# Patient Record
Sex: FEMALE | Race: WHITE | NOT HISPANIC OR LATINO
[De-identification: names, ages, dates, MRNs, and addresses within clinical notes are randomized per-mention and may not be internally consistent; named-entity substitution may affect disease eponyms.]

---

## 2017-01-03 ENCOUNTER — APPOINTMENT (OUTPATIENT)
Dept: INTERNAL MEDICINE | Facility: CLINIC | Age: 62
End: 2017-01-03

## 2017-01-04 ENCOUNTER — APPOINTMENT (OUTPATIENT)
Dept: INTERNAL MEDICINE | Facility: CLINIC | Age: 62
End: 2017-01-04

## 2017-01-30 ENCOUNTER — APPOINTMENT (OUTPATIENT)
Dept: INTERNAL MEDICINE | Facility: CLINIC | Age: 62
End: 2017-01-30

## 2017-01-30 VITALS
WEIGHT: 151 LBS | HEIGHT: 64 IN | BODY MASS INDEX: 25.78 KG/M2 | DIASTOLIC BLOOD PRESSURE: 90 MMHG | SYSTOLIC BLOOD PRESSURE: 165 MMHG | HEART RATE: 82 BPM

## 2017-02-03 ENCOUNTER — APPOINTMENT (OUTPATIENT)
Dept: PULMONOLOGY | Facility: CLINIC | Age: 62
End: 2017-02-03

## 2017-02-03 VITALS
DIASTOLIC BLOOD PRESSURE: 92 MMHG | HEART RATE: 93 BPM | WEIGHT: 148 LBS | BODY MASS INDEX: 25.4 KG/M2 | SYSTOLIC BLOOD PRESSURE: 144 MMHG

## 2017-02-23 ENCOUNTER — FORM ENCOUNTER (OUTPATIENT)
Age: 62
End: 2017-02-23

## 2017-02-25 LAB
ALBUMIN SERPL-MCNC: 4.2 G/DL
ALP SERPL-CCNC: 59 IU/L
ALT SERPL-CCNC: 15 IU/L
ANION GAP SERPL CALC-SCNC: 16 MEQ/L
AST SERPL-CCNC: 17 IU/L
BASOPHILS # BLD: 0.04 TH/MM3
BASOPHILS NFR BLD: 0.5 %
BILIRUB DIRECT SERPL-MCNC: 0.12 MG/DL
BILIRUB SERPL-MCNC: 0.6 MG/DL
BUN SERPL-MCNC: 10 MG/DL
BUN/CREAT SERPL: 11.4 %
CALCIUM SERPL-MCNC: 9.3 MG/DL
CHLORIDE SERPL-SCNC: 105 MEQ/L
CO2 SERPL-SCNC: 22 MEQ/L
CREAT SERPL-MCNC: 0.88 MG/DL
EOSINOPHIL # BLD: 0.06 TH/MM3
EOSINOPHIL NFR BLD: 0.8 %
ERYTHROCYTE [DISTWIDTH] IN BLOOD BY AUTOMATED COUNT: 14.2 %
ERYTHROCYTE [SEDIMENTATION RATE] IN BLOOD: 3 MM/HR
GFR SERPL CREATININE-BSD FRML MDRD: 65
GLUCOSE SERPL-MCNC: 89 MG/DL
GRANULOCYTES # BLD: 5.18 TH/MM3
GRANULOCYTES NFR BLD: 67 %
HCT VFR BLD AUTO: 45.9 %
HGB BLD-MCNC: 14.8 G/DL
IMM GRANULOCYTES # BLD: 0.01 TH/MM3
IMM GRANULOCYTES NFR BLD: 0.1 %
LYMPHOCYTES # BLD: 1.89 TH/MM3
LYMPHOCYTES NFR BLD: 24.4 %
MCH RBC QN AUTO: 30.5 PG
MCHC RBC AUTO-ENTMCNC: 32.2 G/DL
MCV RBC AUTO: 94.6 FL
MONOCYTES # BLD: 0.56 TH/MM3
MONOCYTES NFR BLD: 7.2 %
PLATELET # BLD: 279 TH/MM3
PMV BLD AUTO: 10 FL
POTASSIUM SERPL-SCNC: 4.5 MMOL/L
PROT SERPL-MCNC: 5.9 G/DL
RBC # BLD AUTO: 4.85 MIL/MM3
SODIUM SERPL-SCNC: 143 MEQ/L
WBC # BLD: 7.74 TH/MM3

## 2017-02-27 ENCOUNTER — APPOINTMENT (OUTPATIENT)
Dept: INTERNAL MEDICINE | Facility: CLINIC | Age: 62
End: 2017-02-27

## 2017-02-27 VITALS
WEIGHT: 149 LBS | BODY MASS INDEX: 25.44 KG/M2 | DIASTOLIC BLOOD PRESSURE: 87 MMHG | HEIGHT: 64 IN | SYSTOLIC BLOOD PRESSURE: 135 MMHG | HEART RATE: 80 BPM

## 2017-02-28 LAB — CRP SERPL-MCNC: 0.27 MG/DL

## 2017-03-02 ENCOUNTER — APPOINTMENT (OUTPATIENT)
Dept: RHEUMATOLOGY | Facility: CLINIC | Age: 62
End: 2017-03-02

## 2017-03-02 VITALS
HEIGHT: 64 IN | SYSTOLIC BLOOD PRESSURE: 126 MMHG | BODY MASS INDEX: 25.95 KG/M2 | DIASTOLIC BLOOD PRESSURE: 84 MMHG | WEIGHT: 152 LBS | HEART RATE: 65 BPM

## 2017-04-21 ENCOUNTER — OUTPATIENT (OUTPATIENT)
Dept: OUTPATIENT SERVICES | Facility: HOSPITAL | Age: 62
LOS: 1 days | Discharge: HOME | End: 2017-04-21

## 2017-04-21 ENCOUNTER — APPOINTMENT (OUTPATIENT)
Dept: INTERNAL MEDICINE | Facility: CLINIC | Age: 62
End: 2017-04-21

## 2017-04-21 VITALS
BODY MASS INDEX: 25.44 KG/M2 | DIASTOLIC BLOOD PRESSURE: 91 MMHG | HEIGHT: 64 IN | HEART RATE: 113 BPM | WEIGHT: 149 LBS | SYSTOLIC BLOOD PRESSURE: 139 MMHG

## 2017-04-28 ENCOUNTER — APPOINTMENT (OUTPATIENT)
Dept: PULMONOLOGY | Facility: CLINIC | Age: 62
End: 2017-04-28

## 2017-04-28 VITALS
BODY MASS INDEX: 25.95 KG/M2 | HEART RATE: 92 BPM | HEIGHT: 64 IN | WEIGHT: 152 LBS | DIASTOLIC BLOOD PRESSURE: 89 MMHG | SYSTOLIC BLOOD PRESSURE: 138 MMHG

## 2017-05-05 ENCOUNTER — APPOINTMENT (OUTPATIENT)
Dept: HEMATOLOGY ONCOLOGY | Facility: CLINIC | Age: 62
End: 2017-05-05

## 2017-05-05 VITALS
DIASTOLIC BLOOD PRESSURE: 100 MMHG | HEIGHT: 64 IN | RESPIRATION RATE: 12 BRPM | TEMPERATURE: 97.2 F | SYSTOLIC BLOOD PRESSURE: 134 MMHG | WEIGHT: 155 LBS | HEART RATE: 93 BPM | BODY MASS INDEX: 26.46 KG/M2

## 2017-05-23 ENCOUNTER — RX RENEWAL (OUTPATIENT)
Age: 62
End: 2017-05-23

## 2017-06-22 ENCOUNTER — APPOINTMENT (OUTPATIENT)
Dept: RHEUMATOLOGY | Facility: CLINIC | Age: 62
End: 2017-06-22

## 2017-06-22 ENCOUNTER — OUTPATIENT (OUTPATIENT)
Dept: OUTPATIENT SERVICES | Facility: HOSPITAL | Age: 62
LOS: 1 days | Discharge: HOME | End: 2017-06-22

## 2017-06-22 VITALS
HEIGHT: 64 IN | DIASTOLIC BLOOD PRESSURE: 76 MMHG | HEART RATE: 93 BPM | WEIGHT: 150 LBS | SYSTOLIC BLOOD PRESSURE: 105 MMHG | BODY MASS INDEX: 25.61 KG/M2

## 2017-06-22 DIAGNOSIS — J93.83 OTHER PNEUMOTHORAX: ICD-10-CM

## 2017-06-27 LAB
CHOLEST SERPL-MCNC: 145 MG/DL
ESTIMATED AVERGAGE GLUCOSE (NORTH): 126 MG/DL
HBA1C MFR BLD: 6 %
HDLC SERPL-MCNC: 55 MG/DL
HDLC SERPL: 2.64
LDLC SERPL DIRECT ASSAY-MCNC: 69 MG/DL
TRIGL SERPL-MCNC: 116 MG/DL
VLDLC SERPL-MCNC: 23 MG/DL

## 2017-06-28 DIAGNOSIS — C34.90 MALIGNANT NEOPLASM OF UNSPECIFIED PART OF UNSPECIFIED BRONCHUS OR LUNG: ICD-10-CM

## 2017-06-28 DIAGNOSIS — M94.1 RELAPSING POLYCHONDRITIS: ICD-10-CM

## 2017-06-30 ENCOUNTER — OUTPATIENT (OUTPATIENT)
Dept: OUTPATIENT SERVICES | Facility: HOSPITAL | Age: 62
LOS: 1 days | Discharge: HOME | End: 2017-06-30

## 2017-06-30 ENCOUNTER — APPOINTMENT (OUTPATIENT)
Dept: INTERNAL MEDICINE | Facility: CLINIC | Age: 62
End: 2017-06-30

## 2017-06-30 VITALS
DIASTOLIC BLOOD PRESSURE: 90 MMHG | SYSTOLIC BLOOD PRESSURE: 125 MMHG | HEART RATE: 92 BPM | BODY MASS INDEX: 25.61 KG/M2 | HEIGHT: 64 IN | WEIGHT: 150 LBS

## 2017-06-30 VITALS
DIASTOLIC BLOOD PRESSURE: 93 MMHG | HEART RATE: 108 BPM | BODY MASS INDEX: 25.75 KG/M2 | HEIGHT: 64 IN | SYSTOLIC BLOOD PRESSURE: 130 MMHG

## 2017-06-30 DIAGNOSIS — J93.83 OTHER PNEUMOTHORAX: ICD-10-CM

## 2017-08-03 LAB
ALBUMIN SERPL-MCNC: 4.3 G/DL
ALBUMIN/GLOB SERPL: 2.69
ALP SERPL-CCNC: 70 IU/L
ALT SERPL-CCNC: 16 IU/L
ANION GAP SERPL CALC-SCNC: 10 MEQ/L
AST SERPL-CCNC: 19 IU/L
BASOPHILS # BLD: 0.02 TH/MM3
BASOPHILS NFR BLD: 0.3 %
BILIRUB SERPL-MCNC: 0.8 MG/DL
BUN SERPL-MCNC: 10 MG/DL
BUN/CREAT SERPL: 12.2 %
CALCIUM SERPL-MCNC: 9.3 MG/DL
CHLORIDE SERPL-SCNC: 104 MEQ/L
CO2 SERPL-SCNC: 25 MEQ/L
CREAT SERPL-MCNC: 0.82 MG/DL
DIFFERENTIAL METHOD BLD: NORMAL
EOSINOPHIL # BLD: 0.06 TH/MM3
EOSINOPHIL NFR BLD: 0.9 %
ERYTHROCYTE [DISTWIDTH] IN BLOOD BY AUTOMATED COUNT: 13.9 %
GFR SERPL CREATININE-BSD FRML MDRD: 70
GLUCOSE SERPL-MCNC: 101 MG/DL
GRANULOCYTES # BLD: 4.65 TH/MM3
GRANULOCYTES NFR BLD: 71.3 %
HCT VFR BLD AUTO: 45 %
HGB BLD-MCNC: 14.7 G/DL
IMM GRANULOCYTES # BLD: 0.01 TH/MM3
IMM GRANULOCYTES NFR BLD: 0.2 %
LYMPHOCYTES # BLD: 1.38 TH/MM3
LYMPHOCYTES NFR BLD: 21.2 %
MCH RBC QN AUTO: 29.6 PG
MCHC RBC AUTO-ENTMCNC: 32.7 G/DL
MCV RBC AUTO: 90.5 FL
MONOCYTES # BLD: 0.4 TH/MM3
MONOCYTES NFR BLD: 6.1 %
PLATELET # BLD: 232 TH/MM3
PMV BLD AUTO: 10.1 FL
POTASSIUM SERPL-SCNC: 4.2 MMOL/L
PROT SERPL-MCNC: 5.9 G/DL
RBC # BLD AUTO: 4.97 MIL/MM3
SODIUM SERPL-SCNC: 139 MEQ/L
WBC # BLD: 6.52 TH/MM3

## 2017-08-04 ENCOUNTER — OUTPATIENT (OUTPATIENT)
Dept: OUTPATIENT SERVICES | Facility: HOSPITAL | Age: 62
LOS: 1 days | Discharge: HOME | End: 2017-08-04

## 2017-08-04 ENCOUNTER — APPOINTMENT (OUTPATIENT)
Dept: HEMATOLOGY ONCOLOGY | Facility: CLINIC | Age: 62
End: 2017-08-04

## 2017-08-04 VITALS
SYSTOLIC BLOOD PRESSURE: 132 MMHG | DIASTOLIC BLOOD PRESSURE: 90 MMHG | WEIGHT: 152 LBS | TEMPERATURE: 96.71 F | RESPIRATION RATE: 18 BRPM | HEART RATE: 77 BPM | BODY MASS INDEX: 25.95 KG/M2 | HEIGHT: 64 IN

## 2017-08-04 DIAGNOSIS — C34.90 MALIGNANT NEOPLASM OF UNSPECIFIED PART OF UNSPECIFIED BRONCHUS OR LUNG: ICD-10-CM

## 2017-08-04 DIAGNOSIS — J93.83 OTHER PNEUMOTHORAX: ICD-10-CM

## 2017-09-21 ENCOUNTER — APPOINTMENT (OUTPATIENT)
Dept: RHEUMATOLOGY | Facility: CLINIC | Age: 62
End: 2017-09-21

## 2017-09-21 ENCOUNTER — OUTPATIENT (OUTPATIENT)
Dept: OUTPATIENT SERVICES | Facility: HOSPITAL | Age: 62
LOS: 1 days | Discharge: HOME | End: 2017-09-21

## 2017-09-21 VITALS
WEIGHT: 149 LBS | DIASTOLIC BLOOD PRESSURE: 74 MMHG | HEIGHT: 64 IN | SYSTOLIC BLOOD PRESSURE: 112 MMHG | BODY MASS INDEX: 25.44 KG/M2 | HEART RATE: 85 BPM

## 2017-09-21 DIAGNOSIS — J93.83 OTHER PNEUMOTHORAX: ICD-10-CM

## 2017-09-27 DIAGNOSIS — M94.1 RELAPSING POLYCHONDRITIS: ICD-10-CM

## 2017-09-27 DIAGNOSIS — M79.0 RHEUMATISM, UNSPECIFIED: ICD-10-CM

## 2017-10-06 ENCOUNTER — APPOINTMENT (OUTPATIENT)
Dept: INTERNAL MEDICINE | Facility: CLINIC | Age: 62
End: 2017-10-06

## 2017-10-06 ENCOUNTER — OUTPATIENT (OUTPATIENT)
Dept: OUTPATIENT SERVICES | Facility: HOSPITAL | Age: 62
LOS: 1 days | Discharge: HOME | End: 2017-10-06

## 2017-10-06 VITALS
SYSTOLIC BLOOD PRESSURE: 129 MMHG | BODY MASS INDEX: 26.29 KG/M2 | WEIGHT: 154 LBS | HEART RATE: 66 BPM | TEMPERATURE: 97.7 F | DIASTOLIC BLOOD PRESSURE: 90 MMHG | HEIGHT: 64 IN

## 2017-10-06 DIAGNOSIS — J93.83 OTHER PNEUMOTHORAX: ICD-10-CM

## 2017-10-06 RX ORDER — AZITHROMYCIN DIHYDRATE 250 MG/1
250 TABLET, FILM COATED ORAL
Qty: 1 | Refills: 0 | Status: DISCONTINUED | COMMUNITY
Start: 2017-04-21 | End: 2017-10-06

## 2017-10-10 DIAGNOSIS — Z23 ENCOUNTER FOR IMMUNIZATION: ICD-10-CM

## 2017-10-10 DIAGNOSIS — M94.1 RELAPSING POLYCHONDRITIS: ICD-10-CM

## 2017-10-10 DIAGNOSIS — R91.1 SOLITARY PULMONARY NODULE: ICD-10-CM

## 2017-11-02 ENCOUNTER — APPOINTMENT (OUTPATIENT)
Dept: HEMATOLOGY ONCOLOGY | Facility: CLINIC | Age: 62
End: 2017-11-02

## 2017-11-02 ENCOUNTER — OUTPATIENT (OUTPATIENT)
Dept: OUTPATIENT SERVICES | Facility: HOSPITAL | Age: 62
LOS: 1 days | Discharge: HOME | End: 2017-11-02

## 2017-11-02 VITALS
SYSTOLIC BLOOD PRESSURE: 137 MMHG | HEART RATE: 89 BPM | TEMPERATURE: 98 F | WEIGHT: 154 LBS | BODY MASS INDEX: 26.29 KG/M2 | HEIGHT: 64 IN | RESPIRATION RATE: 16 BRPM | DIASTOLIC BLOOD PRESSURE: 71 MMHG

## 2017-11-09 DIAGNOSIS — C34.90 MALIGNANT NEOPLASM OF UNSPECIFIED PART OF UNSPECIFIED BRONCHUS OR LUNG: ICD-10-CM

## 2017-12-21 ENCOUNTER — APPOINTMENT (OUTPATIENT)
Dept: RHEUMATOLOGY | Facility: CLINIC | Age: 62
End: 2017-12-21

## 2017-12-29 ENCOUNTER — APPOINTMENT (OUTPATIENT)
Dept: PULMONOLOGY | Facility: CLINIC | Age: 62
End: 2017-12-29

## 2018-01-18 ENCOUNTER — APPOINTMENT (OUTPATIENT)
Dept: INTERNAL MEDICINE | Facility: CLINIC | Age: 63
End: 2018-01-18

## 2018-01-18 ENCOUNTER — OUTPATIENT (OUTPATIENT)
Dept: OUTPATIENT SERVICES | Facility: HOSPITAL | Age: 63
LOS: 1 days | Discharge: HOME | End: 2018-01-18

## 2018-01-18 VITALS
HEIGHT: 64 IN | SYSTOLIC BLOOD PRESSURE: 135 MMHG | BODY MASS INDEX: 25.95 KG/M2 | WEIGHT: 152 LBS | DIASTOLIC BLOOD PRESSURE: 87 MMHG | HEART RATE: 83 BPM

## 2018-01-18 DIAGNOSIS — J93.83 OTHER PNEUMOTHORAX: ICD-10-CM

## 2018-01-18 RX ORDER — SIMETHICONE 80 MG/1
80 TABLET, CHEWABLE ORAL
Qty: 120 | Refills: 0 | Status: COMPLETED | COMMUNITY
Start: 2017-01-30 | End: 2018-01-18

## 2018-01-19 DIAGNOSIS — M94.1 RELAPSING POLYCHONDRITIS: ICD-10-CM

## 2018-01-19 DIAGNOSIS — C34.90 MALIGNANT NEOPLASM OF UNSPECIFIED PART OF UNSPECIFIED BRONCHUS OR LUNG: ICD-10-CM

## 2018-01-19 DIAGNOSIS — I10 ESSENTIAL (PRIMARY) HYPERTENSION: ICD-10-CM

## 2018-01-19 DIAGNOSIS — J44.9 CHRONIC OBSTRUCTIVE PULMONARY DISEASE, UNSPECIFIED: ICD-10-CM

## 2018-02-15 ENCOUNTER — APPOINTMENT (OUTPATIENT)
Dept: HEMATOLOGY ONCOLOGY | Facility: CLINIC | Age: 63
End: 2018-02-15

## 2018-02-15 ENCOUNTER — OUTPATIENT (OUTPATIENT)
Dept: OUTPATIENT SERVICES | Facility: HOSPITAL | Age: 63
LOS: 1 days | Discharge: HOME | End: 2018-02-15

## 2018-02-15 VITALS
WEIGHT: 152 LBS | RESPIRATION RATE: 16 BRPM | SYSTOLIC BLOOD PRESSURE: 124 MMHG | HEIGHT: 64 IN | TEMPERATURE: 96.4 F | HEART RATE: 80 BPM | BODY MASS INDEX: 25.95 KG/M2 | DIASTOLIC BLOOD PRESSURE: 75 MMHG

## 2018-02-20 DIAGNOSIS — C34.90 MALIGNANT NEOPLASM OF UNSPECIFIED PART OF UNSPECIFIED BRONCHUS OR LUNG: ICD-10-CM

## 2018-02-22 ENCOUNTER — OUTPATIENT (OUTPATIENT)
Dept: OUTPATIENT SERVICES | Facility: HOSPITAL | Age: 63
LOS: 1 days | Discharge: HOME | End: 2018-02-22

## 2018-02-22 ENCOUNTER — APPOINTMENT (OUTPATIENT)
Dept: RHEUMATOLOGY | Facility: CLINIC | Age: 63
End: 2018-02-22

## 2018-02-22 VITALS
WEIGHT: 150 LBS | HEART RATE: 76 BPM | HEIGHT: 64 IN | BODY MASS INDEX: 25.61 KG/M2 | SYSTOLIC BLOOD PRESSURE: 119 MMHG | DIASTOLIC BLOOD PRESSURE: 75 MMHG

## 2018-02-26 ENCOUNTER — OUTPATIENT (OUTPATIENT)
Dept: OUTPATIENT SERVICES | Facility: HOSPITAL | Age: 63
LOS: 1 days | Discharge: HOME | End: 2018-02-26

## 2018-02-26 DIAGNOSIS — Z12.31 ENCOUNTER FOR SCREENING MAMMOGRAM FOR MALIGNANT NEOPLASM OF BREAST: ICD-10-CM

## 2018-03-23 ENCOUNTER — APPOINTMENT (OUTPATIENT)
Dept: PULMONOLOGY | Facility: CLINIC | Age: 63
End: 2018-03-23

## 2018-05-17 ENCOUNTER — APPOINTMENT (OUTPATIENT)
Dept: HEMATOLOGY ONCOLOGY | Facility: CLINIC | Age: 63
End: 2018-05-17

## 2018-05-24 ENCOUNTER — APPOINTMENT (OUTPATIENT)
Dept: RHEUMATOLOGY | Facility: CLINIC | Age: 63
End: 2018-05-24

## 2018-06-07 ENCOUNTER — APPOINTMENT (OUTPATIENT)
Dept: RHEUMATOLOGY | Facility: CLINIC | Age: 63
End: 2018-06-07

## 2018-06-07 ENCOUNTER — OUTPATIENT (OUTPATIENT)
Dept: OUTPATIENT SERVICES | Facility: HOSPITAL | Age: 63
LOS: 1 days | Discharge: HOME | End: 2018-06-07

## 2018-06-07 VITALS
HEART RATE: 94 BPM | HEIGHT: 64 IN | SYSTOLIC BLOOD PRESSURE: 118 MMHG | BODY MASS INDEX: 25.95 KG/M2 | WEIGHT: 152 LBS | DIASTOLIC BLOOD PRESSURE: 76 MMHG

## 2018-06-07 DIAGNOSIS — M94.1 RELAPSING POLYCHONDRITIS: ICD-10-CM

## 2018-06-08 ENCOUNTER — APPOINTMENT (OUTPATIENT)
Dept: PULMONOLOGY | Facility: CLINIC | Age: 63
End: 2018-06-08

## 2018-06-08 ENCOUNTER — OUTPATIENT (OUTPATIENT)
Dept: OUTPATIENT SERVICES | Facility: HOSPITAL | Age: 63
LOS: 1 days | Discharge: HOME | End: 2018-06-08

## 2018-06-08 VITALS
DIASTOLIC BLOOD PRESSURE: 73 MMHG | HEART RATE: 87 BPM | WEIGHT: 150 LBS | SYSTOLIC BLOOD PRESSURE: 109 MMHG | HEIGHT: 64 IN | BODY MASS INDEX: 25.61 KG/M2

## 2018-06-21 ENCOUNTER — OUTPATIENT (OUTPATIENT)
Dept: OUTPATIENT SERVICES | Facility: HOSPITAL | Age: 63
LOS: 1 days | Discharge: HOME | End: 2018-06-21

## 2018-06-21 DIAGNOSIS — J44.9 CHRONIC OBSTRUCTIVE PULMONARY DISEASE, UNSPECIFIED: ICD-10-CM

## 2018-07-13 ENCOUNTER — APPOINTMENT (OUTPATIENT)
Dept: PULMONOLOGY | Facility: CLINIC | Age: 63
End: 2018-07-13

## 2018-07-13 ENCOUNTER — OUTPATIENT (OUTPATIENT)
Dept: OUTPATIENT SERVICES | Facility: HOSPITAL | Age: 63
LOS: 1 days | Discharge: HOME | End: 2018-07-13

## 2018-07-13 VITALS
SYSTOLIC BLOOD PRESSURE: 124 MMHG | WEIGHT: 154 LBS | TEMPERATURE: 97 F | BODY MASS INDEX: 26.29 KG/M2 | HEIGHT: 64 IN | HEART RATE: 86 BPM | DIASTOLIC BLOOD PRESSURE: 86 MMHG

## 2018-08-13 ENCOUNTER — APPOINTMENT (OUTPATIENT)
Dept: INTERNAL MEDICINE | Facility: CLINIC | Age: 63
End: 2018-08-13

## 2018-08-15 ENCOUNTER — OUTPATIENT (OUTPATIENT)
Dept: OUTPATIENT SERVICES | Facility: HOSPITAL | Age: 63
LOS: 1 days | Discharge: HOME | End: 2018-08-15

## 2018-08-15 ENCOUNTER — APPOINTMENT (OUTPATIENT)
Dept: INTERNAL MEDICINE | Facility: CLINIC | Age: 63
End: 2018-08-15

## 2018-08-15 VITALS
BODY MASS INDEX: 25.95 KG/M2 | DIASTOLIC BLOOD PRESSURE: 101 MMHG | HEIGHT: 64 IN | HEART RATE: 85 BPM | TEMPERATURE: 97.9 F | SYSTOLIC BLOOD PRESSURE: 159 MMHG | WEIGHT: 152 LBS

## 2018-08-15 RX ORDER — PREDNISONE 20 MG/1
20 TABLET ORAL DAILY
Qty: 10 | Refills: 0 | Status: DISCONTINUED | COMMUNITY
Start: 2018-02-22 | End: 2018-08-15

## 2018-08-15 NOTE — ASSESSMENT
[FreeTextEntry1] : 62F w PMHx of malignant right lung nodule s/p ablation, hypertension, copd, relapsing polychondritis, prediabetes presents for f/u and results of PET. C/o left arm lesion just distal to elbow which is discolored and burning. \par \par #Lung Adenocarcinoma\par -pt follows with oncology\par \par #COPD\par -stable on ventolin\par \par #Pre-diabetes\par -Hba1c6.0 to be repeated on next visit\par \par #HTN\par -c/w lisinopril. stable\par \par #DLD\par -c/w simvastatin, zetia\par #Relapsing Polychondritis\par - will send to ophthalmology for eye check up\par -following with rheumatology/arthritis service\par -c/w cellecept \par \par #Osteoporosis\par -c/w alendronate\par \par \par #HCM\par -GI referral made for colonoscopy \par -Mammo in 2/2017 done normal. mammography referral to be made for 2018.\par -Pap smear 2017 & obgyn stated no future f/u necessary\par \par #RTC in 6 months and prn\par - all meds refill sent\par regular labs on next visit

## 2018-08-15 NOTE — HEALTH RISK ASSESSMENT
[] : Yes [No falls in past year] : Patient reported no falls in the past year [de-identified] : 1 pack a day

## 2018-08-15 NOTE — HISTORY OF PRESENT ILLNESS
[Other: _____] : [unfilled] [de-identified] : 62F w/ RLL & RML lung adenocarcinoma s/p RFA of lesions, hypertension, COPD, relapsing polychondritis, prediabetes presents for f/u\par \par \par [facility] 11/2/2017: c/w observation s/p RFA & pt agreed, ordered PET-CT to assess disease. recs to follow s/p PET-CT. [FreeTextEntry1] : INTERVAL HX: For adenocarcinoma of Lung she had radiotherapy ablation 2 cycles in 3/2017. Also following pulmonologist for the same reason. COPD and still smoker and have no intention of quitting smoking instead of being aware of harmful effects. No other symptoms.\par \par C/O straining her back last week and is having pain. On examination no signs of inflammation.

## 2018-08-15 NOTE — PHYSICAL EXAM
[No Acute Distress] : no acute distress [Well Nourished] : well nourished [Well Developed] : well developed [Normal Sclera/Conjunctiva] : normal sclera/conjunctiva [PERRL] : pupils equal round and reactive to light [EOMI] : extraocular movements intact [Normal Outer Ear/Nose] : the outer ears and nose were normal in appearance [Normal Oropharynx] : the oropharynx was normal [No JVD] : no jugular venous distention [Supple] : supple [No Lymphadenopathy] : no lymphadenopathy [No Respiratory Distress] : no respiratory distress  [Clear to Auscultation] : lungs were clear to auscultation bilaterally [Normal Rate] : normal rate  [Regular Rhythm] : with a regular rhythm [Normal S1, S2] : normal S1 and S2 [No Edema] : there was no peripheral edema [Soft] : abdomen soft [Non Tender] : non-tender [No HSM] : no HSM [Normal Bowel Sounds] : normal bowel sounds [No CVA Tenderness] : no CVA  tenderness [No Spinal Tenderness] : no spinal tenderness [No Joint Swelling] : no joint swelling [Grossly Normal Strength/Tone] : grossly normal strength/tone [No Focal Deficits] : no focal deficits [Kyphosis] : no kyphosis

## 2018-08-16 DIAGNOSIS — M94.1 RELAPSING POLYCHONDRITIS: ICD-10-CM

## 2018-08-16 DIAGNOSIS — C34.90 MALIGNANT NEOPLASM OF UNSPECIFIED PART OF UNSPECIFIED BRONCHUS OR LUNG: ICD-10-CM

## 2018-08-16 DIAGNOSIS — J44.9 CHRONIC OBSTRUCTIVE PULMONARY DISEASE, UNSPECIFIED: ICD-10-CM

## 2018-09-13 ENCOUNTER — OUTPATIENT (OUTPATIENT)
Dept: OUTPATIENT SERVICES | Facility: HOSPITAL | Age: 63
LOS: 1 days | Discharge: HOME | End: 2018-09-13

## 2018-09-13 ENCOUNTER — APPOINTMENT (OUTPATIENT)
Dept: RHEUMATOLOGY | Facility: CLINIC | Age: 63
End: 2018-09-13

## 2018-09-13 VITALS
WEIGHT: 153 LBS | HEIGHT: 64 IN | HEART RATE: 93 BPM | SYSTOLIC BLOOD PRESSURE: 117 MMHG | BODY MASS INDEX: 26.12 KG/M2 | TEMPERATURE: 96 F | DIASTOLIC BLOOD PRESSURE: 73 MMHG

## 2018-09-13 LAB
ALBUMIN SERPL ELPH-MCNC: 4.3 G/DL
ALP BLD-CCNC: 85 U/L
ALT SERPL-CCNC: 10 U/L
ANA SER IF-ACNC: NEGATIVE
ANION GAP SERPL CALC-SCNC: 14 MMOL/L
AST SERPL-CCNC: 13 U/L
BASOPHILS # BLD AUTO: 0.04 K/UL
BASOPHILS NFR BLD AUTO: 0.5 %
BILIRUB SERPL-MCNC: 0.2 MG/DL
BUN SERPL-MCNC: 10 MG/DL
CALCIUM SERPL-MCNC: 9 MG/DL
CHLORIDE SERPL-SCNC: 105 MMOL/L
CHOLEST SERPL-MCNC: 147 MG/DL
CHOLEST/HDLC SERPL: 2.9 RATIO
CO2 SERPL-SCNC: 24 MMOL/L
CREAT SERPL-MCNC: 0.7 MG/DL
EOSINOPHIL # BLD AUTO: 0.08 K/UL
EOSINOPHIL NFR BLD AUTO: 1.1 %
GLUCOSE SERPL-MCNC: 98 MG/DL
HCT VFR BLD CALC: 45.6 %
HDLC SERPL-MCNC: 50 MG/DL
HGB BLD-MCNC: 14.8 G/DL
IMM GRANULOCYTES NFR BLD AUTO: 0.3 %
LDLC SERPL CALC-MCNC: 76 MG/DL
LYMPHOCYTES # BLD AUTO: 1.26 K/UL
LYMPHOCYTES NFR BLD AUTO: 16.8 %
MAN DIFF?: NORMAL
MCHC RBC-ENTMCNC: 29.4 PG
MCHC RBC-ENTMCNC: 32.5 G/DL
MCV RBC AUTO: 90.5 FL
MONOCYTES # BLD AUTO: 0.39 K/UL
MONOCYTES NFR BLD AUTO: 5.2 %
NEUTROPHILS # BLD AUTO: 5.7 K/UL
NEUTROPHILS NFR BLD AUTO: 76.1 %
PLATELET # BLD AUTO: 221 K/UL
POTASSIUM SERPL-SCNC: 4.6 MMOL/L
PROT SERPL-MCNC: 6.2 G/DL
RBC # BLD: 5.04 M/UL
RBC # FLD: 13.4 %
RHEUMATOID FACT SER QL: <10 IU/ML
SODIUM SERPL-SCNC: 143 MMOL/L
TRIGL SERPL-MCNC: 176 MG/DL
WBC # FLD AUTO: 7.49 K/UL

## 2018-10-05 ENCOUNTER — OUTPATIENT (OUTPATIENT)
Dept: OUTPATIENT SERVICES | Facility: HOSPITAL | Age: 63
LOS: 1 days | Discharge: HOME | End: 2018-10-05

## 2018-10-05 ENCOUNTER — APPOINTMENT (OUTPATIENT)
Dept: GASTROENTEROLOGY | Facility: CLINIC | Age: 63
End: 2018-10-05

## 2018-10-05 VITALS
HEART RATE: 75 BPM | WEIGHT: 150 LBS | DIASTOLIC BLOOD PRESSURE: 75 MMHG | BODY MASS INDEX: 25.61 KG/M2 | SYSTOLIC BLOOD PRESSURE: 117 MMHG | HEIGHT: 64 IN

## 2018-10-05 DIAGNOSIS — D12.6 BENIGN NEOPLASM OF COLON, UNSPECIFIED: ICD-10-CM

## 2018-12-13 ENCOUNTER — OUTPATIENT (OUTPATIENT)
Dept: OUTPATIENT SERVICES | Facility: HOSPITAL | Age: 63
LOS: 1 days | Discharge: HOME | End: 2018-12-13

## 2018-12-13 ENCOUNTER — APPOINTMENT (OUTPATIENT)
Dept: RHEUMATOLOGY | Facility: CLINIC | Age: 63
End: 2018-12-13

## 2018-12-13 VITALS
WEIGHT: 142 LBS | HEIGHT: 64 IN | TEMPERATURE: 97 F | DIASTOLIC BLOOD PRESSURE: 68 MMHG | BODY MASS INDEX: 24.24 KG/M2 | HEART RATE: 83 BPM | SYSTOLIC BLOOD PRESSURE: 113 MMHG

## 2018-12-13 DIAGNOSIS — M94.1 RELAPSING POLYCHONDRITIS: ICD-10-CM

## 2018-12-13 RX ORDER — POLYETHYLENE GLYCOL 3350 AND ELECTROLYTES WITH LEMON FLAVOR 236; 22.74; 6.74; 5.86; 2.97 G/4L; G/4L; G/4L; G/4L; G/4L
236 POWDER, FOR SOLUTION ORAL
Qty: 1 | Refills: 0 | Status: DISCONTINUED | COMMUNITY
Start: 2018-10-05 | End: 2018-12-13

## 2019-01-03 LAB
ALBUMIN SERPL ELPH-MCNC: 4.1 G/DL
ALP BLD-CCNC: 87 U/L
ALT SERPL-CCNC: 7 U/L
ANION GAP SERPL CALC-SCNC: 8 MMOL/L
APTT BLD: 28.8 SEC
AST SERPL-CCNC: 11 U/L
BASOPHILS # BLD AUTO: 0.04 K/UL
BASOPHILS NFR BLD AUTO: 0.6 %
BILIRUB SERPL-MCNC: <0.2 MG/DL
BUN SERPL-MCNC: 7 MG/DL
CALCIUM SERPL-MCNC: 8.8 MG/DL
CHLORIDE SERPL-SCNC: 103 MMOL/L
CO2 SERPL-SCNC: 28 MMOL/L
CREAT SERPL-MCNC: 0.6 MG/DL
EOSINOPHIL # BLD AUTO: 0.06 K/UL
EOSINOPHIL NFR BLD AUTO: 0.8 %
GLUCOSE SERPL-MCNC: 100 MG/DL
HCT VFR BLD CALC: 43.5 %
HGB BLD-MCNC: 13.9 G/DL
IMM GRANULOCYTES NFR BLD AUTO: 0.3 %
INR PPP: 0.93 RATIO
LYMPHOCYTES # BLD AUTO: 1.54 K/UL
LYMPHOCYTES NFR BLD AUTO: 21.7 %
MAGNESIUM SERPL-MCNC: 2 MG/DL
MAN DIFF?: NORMAL
MCHC RBC-ENTMCNC: 29 PG
MCHC RBC-ENTMCNC: 32 G/DL
MCV RBC AUTO: 90.8 FL
MONOCYTES # BLD AUTO: 0.42 K/UL
MONOCYTES NFR BLD AUTO: 5.9 %
NEUTROPHILS # BLD AUTO: 5.01 K/UL
NEUTROPHILS NFR BLD AUTO: 70.7 %
PLATELET # BLD AUTO: 227 K/UL
POTASSIUM SERPL-SCNC: 4.5 MMOL/L
PROT SERPL-MCNC: 5.8 G/DL
PT BLD: 10.7 SEC
RBC # BLD: 4.79 M/UL
RBC # FLD: 13.4 %
SODIUM SERPL-SCNC: 139 MMOL/L
WBC # FLD AUTO: 7.09 K/UL

## 2019-01-04 ENCOUNTER — APPOINTMENT (OUTPATIENT)
Dept: GASTROENTEROLOGY | Facility: CLINIC | Age: 64
End: 2019-01-04

## 2019-01-17 ENCOUNTER — RX RENEWAL (OUTPATIENT)
Age: 64
End: 2019-01-17

## 2019-01-24 ENCOUNTER — RX RENEWAL (OUTPATIENT)
Age: 64
End: 2019-01-24

## 2019-02-01 ENCOUNTER — APPOINTMENT (OUTPATIENT)
Dept: PULMONOLOGY | Facility: CLINIC | Age: 64
End: 2019-02-01

## 2019-02-06 ENCOUNTER — APPOINTMENT (OUTPATIENT)
Dept: INTERNAL MEDICINE | Facility: CLINIC | Age: 64
End: 2019-02-06

## 2019-02-06 ENCOUNTER — OUTPATIENT (OUTPATIENT)
Dept: OUTPATIENT SERVICES | Facility: HOSPITAL | Age: 64
LOS: 1 days | Discharge: HOME | End: 2019-02-06

## 2019-02-06 VITALS
SYSTOLIC BLOOD PRESSURE: 125 MMHG | DIASTOLIC BLOOD PRESSURE: 88 MMHG | HEIGHT: 64 IN | WEIGHT: 145 LBS | BODY MASS INDEX: 24.75 KG/M2 | HEART RATE: 72 BPM | TEMPERATURE: 96.3 F

## 2019-02-06 DIAGNOSIS — J44.9 CHRONIC OBSTRUCTIVE PULMONARY DISEASE, UNSPECIFIED: ICD-10-CM

## 2019-02-06 NOTE — PHYSICAL EXAM
[No Acute Distress] : no acute distress [PERRL] : pupils equal round and reactive to light [No JVD] : no jugular venous distention [No Respiratory Distress] : no respiratory distress  [Clear to Auscultation] : lungs were clear to auscultation bilaterally [No Accessory Muscle Use] : no accessory muscle use [Normal Rate] : normal rate  [Regular Rhythm] : with a regular rhythm [Normal S1, S2] : normal S1 and S2 [No Murmur] : no murmur heard [No Carotid Bruits] : no carotid bruits [No Edema] : there was no peripheral edema [No CVA Tenderness] : no CVA  tenderness [No Joint Swelling] : no joint swelling [Grossly Normal Strength/Tone] : grossly normal strength/tone [No Focal Deficits] : no focal deficits

## 2019-02-06 NOTE — ASSESSMENT
[FreeTextEntry1] : 62F w PMHx of malignant right lung nodule s/p ablation, hypertension, copd, relapsing polychondritis, prediabetes and she c/o pelvic pressure since 1 month but no urinalry symptoms. \par \par #Lung Adenocarcinoma\par -pt follows with oncology\par - no interest in quitting smoking despite knowing consequences of smoking. \par \par #COPD\par -stable on Ventolin\par \par #Pre-diabetes dioet and exercise\par \par \par -will order today\par \par #HTN\par -c/w lisinopril. stable, c/w same dose\par \par #DLD\par -c/w simvastatin, zetia\par \par #Relapsing Polychondritis\par - seen by rheumatology, f/u the recommendastion.\par - c/w meds\par \par #Osteoporosis\par -c/w alendronate\par \par \par #HCM\par -GI referral made for colonoscopy : needs clearance from pulmonary \par - mammogram referral\par -Pap smear 2017 & obgyn stated no future f/u necessary\par \par #RTC in 6 months and prn\par - all meds refill sent\par regular labs today

## 2019-02-06 NOTE — HISTORY OF PRESENT ILLNESS
[Other: _____] : [unfilled] [de-identified] : 62F w/ RLL & RML lung adenocarcinoma s/p RFA of lesions, hypertension, COPD, relapsing polychondritis, prediabetes presents for f/u\par \par \par [facility] 11/2/2017: c/w observation s/p RFA & pt agreed, ordered PET-CT to assess disease. recs to follow s/p PET-CT. [FreeTextEntry1] : INTERVAL HX: Feels pelvic pressure, but no leaking or other urinary symptoms. She was told by a Gynecologist 4 year ago that her urinary bladder was low. \par Otherwise no symptoms.

## 2019-02-27 ENCOUNTER — FORM ENCOUNTER (OUTPATIENT)
Age: 64
End: 2019-02-27

## 2019-02-28 ENCOUNTER — OUTPATIENT (OUTPATIENT)
Dept: OUTPATIENT SERVICES | Facility: HOSPITAL | Age: 64
LOS: 1 days | Discharge: HOME | End: 2019-02-28

## 2019-03-05 ENCOUNTER — FORM ENCOUNTER (OUTPATIENT)
Age: 64
End: 2019-03-05

## 2019-03-05 DIAGNOSIS — Z12.31 ENCOUNTER FOR SCREENING MAMMOGRAM FOR MALIGNANT NEOPLASM OF BREAST: ICD-10-CM

## 2019-03-06 ENCOUNTER — OUTPATIENT (OUTPATIENT)
Dept: OUTPATIENT SERVICES | Facility: HOSPITAL | Age: 64
LOS: 1 days | Discharge: HOME | End: 2019-03-06

## 2019-03-06 DIAGNOSIS — R10.2 PELVIC AND PERINEAL PAIN: ICD-10-CM

## 2019-03-14 ENCOUNTER — APPOINTMENT (OUTPATIENT)
Dept: RHEUMATOLOGY | Facility: CLINIC | Age: 64
End: 2019-03-14

## 2019-03-14 ENCOUNTER — OUTPATIENT (OUTPATIENT)
Dept: OUTPATIENT SERVICES | Facility: HOSPITAL | Age: 64
LOS: 1 days | Discharge: HOME | End: 2019-03-14

## 2019-03-14 VITALS
TEMPERATURE: 97.2 F | HEIGHT: 64 IN | WEIGHT: 148 LBS | BODY MASS INDEX: 25.27 KG/M2 | SYSTOLIC BLOOD PRESSURE: 111 MMHG | HEART RATE: 77 BPM | DIASTOLIC BLOOD PRESSURE: 77 MMHG

## 2019-03-14 DIAGNOSIS — M94.1 RELAPSING POLYCHONDRITIS: ICD-10-CM

## 2019-03-14 NOTE — PHYSICAL EXAM
[General Appearance - Alert] : alert [Sclera] : the sclera and conjunctiva were normal [Outer Ear] : the ears and nose were normal in appearance [Auscultation Breath Sounds / Voice Sounds] : lungs were clear to auscultation bilaterally [Heart Sounds] : normal S1 and S2 [Abdomen Soft] : soft [Abdomen Tenderness] : non-tender [Abnormal Walk] : normal gait [Musculoskeletal - Swelling] : no joint swelling seen [] : no rash [No Focal Deficits] : no focal deficits [Oriented To Time, Place, And Person] : oriented to person, place, and time [FreeTextEntry1] : pain on active movement of L shoulder

## 2019-03-14 NOTE — ADDENDUM
[FreeTextEntry1] : attending- pt seen/exam w/ resident - pt w/ relapsing polychondritis /OP on cellcept 3 gm , plaquenil 400 mg , no new c/o ,same left shoulder pain w/ activity .  vit d -20 ,  esr- 5 , \par cont cellcept /plaquenil, optho eval , order vit d weekly ,encourage alendronate , repeat dexa scan , cbc/cmp,esr/crp before visit , rt 4 months

## 2019-03-14 NOTE — ASSESSMENT
[FreeTextEntry1] : 65 yo F with relapsing polychondritis and osteoporosis presenting for regular fu.\par Pt is clinically stable\par blood work reviewed and consistent with low vitamin D\par c/w cellcept and hydroxychlorquine\par c/w alendronate \par start vitamin D weekly\par Dexa scan\par ophthalmology referral\par CBC, CMP and ESR prior to next visit\par fu in 4 months

## 2019-03-21 ENCOUNTER — APPOINTMENT (OUTPATIENT)
Dept: INTERNAL MEDICINE | Facility: CLINIC | Age: 64
End: 2019-03-21

## 2019-03-21 ENCOUNTER — OUTPATIENT (OUTPATIENT)
Dept: OUTPATIENT SERVICES | Facility: HOSPITAL | Age: 64
LOS: 1 days | Discharge: HOME | End: 2019-03-21

## 2019-03-21 VITALS
HEART RATE: 80 BPM | DIASTOLIC BLOOD PRESSURE: 85 MMHG | BODY MASS INDEX: 25.27 KG/M2 | HEIGHT: 64 IN | SYSTOLIC BLOOD PRESSURE: 159 MMHG | WEIGHT: 148 LBS

## 2019-03-21 NOTE — PHYSICAL EXAM
[Normal] : urethra [No Bleeding] : there was no active vaginal bleeding [Uterine Adnexae] : were not tender and not enlarged [Rectocele] : a rectocele [Absent] : absent

## 2019-03-21 NOTE — HISTORY OF PRESENT ILLNESS
[Burning] : no burning [Continuous] : no continuous [Dull] : no dull [Throbbing] : no throbbing [Fever] : no fever [Nausea] : no nausea [Vomiting] : no vomiting [Diarrhea] : no diarrhea [Vaginal Bleeding] : no vaginal bleeding [Pelvic Pressure] : no pelvic pressure [Dysuria] : no dysuria [Localized Pain] : no localized pain [Hot Flashes] : no hot flashes [Night Sweats] : no night sweats [Vaginal Itching] : no vaginal itching [Dyspareunia] : no dyspareunia

## 2019-04-04 ENCOUNTER — APPOINTMENT (OUTPATIENT)
Dept: UROLOGY | Facility: CLINIC | Age: 64
End: 2019-04-04

## 2019-04-16 ENCOUNTER — OUTPATIENT (OUTPATIENT)
Dept: OUTPATIENT SERVICES | Facility: HOSPITAL | Age: 64
LOS: 1 days | Discharge: HOME | End: 2019-04-16

## 2019-04-17 ENCOUNTER — APPOINTMENT (OUTPATIENT)
Dept: INTERNAL MEDICINE | Facility: CLINIC | Age: 64
End: 2019-04-17

## 2019-04-17 ENCOUNTER — OUTPATIENT (OUTPATIENT)
Dept: OUTPATIENT SERVICES | Facility: HOSPITAL | Age: 64
LOS: 1 days | Discharge: HOME | End: 2019-04-17

## 2019-04-17 VITALS
TEMPERATURE: 97.1 F | HEIGHT: 64 IN | BODY MASS INDEX: 25.44 KG/M2 | DIASTOLIC BLOOD PRESSURE: 87 MMHG | WEIGHT: 149 LBS | HEART RATE: 96 BPM | SYSTOLIC BLOOD PRESSURE: 143 MMHG

## 2019-04-17 DIAGNOSIS — M81.0 AGE-RELATED OSTEOPOROSIS WITHOUT CURRENT PATHOLOGICAL FRACTURE: ICD-10-CM

## 2019-04-17 DIAGNOSIS — Z87.310 PERSONAL HISTORY OF (HEALED) OSTEOPOROSIS FRACTURE: ICD-10-CM

## 2019-04-17 DIAGNOSIS — Z09 ENCOUNTER FOR FOLLOW-UP EXAMINATION AFTER COMPLETED TREATMENT FOR CONDITIONS OTHER THAN MALIGNANT NEOPLASM: ICD-10-CM

## 2019-04-17 DIAGNOSIS — F17.200 NICOTINE DEPENDENCE, UNSPECIFIED, UNCOMPLICATED: ICD-10-CM

## 2019-04-17 DIAGNOSIS — Z78.0 ASYMPTOMATIC MENOPAUSAL STATE: ICD-10-CM

## 2019-04-18 DIAGNOSIS — M94.1 RELAPSING POLYCHONDRITIS: ICD-10-CM

## 2019-04-18 DIAGNOSIS — N81.10 CYSTOCELE, UNSPECIFIED: ICD-10-CM

## 2019-04-18 DIAGNOSIS — J98.4 OTHER DISORDERS OF LUNG: ICD-10-CM

## 2019-04-25 LAB
25(OH)D3 SERPL-MCNC: 20 NG/ML
ALBUMIN SERPL ELPH-MCNC: 4.2 G/DL
ALP BLD-CCNC: 82 U/L
ALT SERPL-CCNC: 9 U/L
ANION GAP SERPL CALC-SCNC: 12 MMOL/L
AST SERPL-CCNC: 13 U/L
BASOPHILS # BLD AUTO: 0.04 K/UL
BASOPHILS NFR BLD AUTO: 0.5 %
BILIRUB SERPL-MCNC: 0.2 MG/DL
BUN SERPL-MCNC: 10 MG/DL
CALCIUM SERPL-MCNC: 8.8 MG/DL
CHLORIDE SERPL-SCNC: 104 MMOL/L
CO2 SERPL-SCNC: 24 MMOL/L
CREAT SERPL-MCNC: 0.7 MG/DL
EOSINOPHIL # BLD AUTO: 0.05 K/UL
EOSINOPHIL NFR BLD AUTO: 0.6 %
ERYTHROCYTE [SEDIMENTATION RATE] IN BLOOD BY WESTERGREN METHOD: 5 MM/HR
GLUCOSE SERPL-MCNC: 89 MG/DL
HCT VFR BLD CALC: 43.9 %
HGB BLD-MCNC: 14 G/DL
IMM GRANULOCYTES NFR BLD AUTO: 0.2 %
LYMPHOCYTES # BLD AUTO: 1.67 K/UL
LYMPHOCYTES NFR BLD AUTO: 20.6 %
MAN DIFF?: NORMAL
MCHC RBC-ENTMCNC: 28.9 PG
MCHC RBC-ENTMCNC: 31.9 G/DL
MCV RBC AUTO: 90.7 FL
MONOCYTES # BLD AUTO: 0.38 K/UL
MONOCYTES NFR BLD AUTO: 4.7 %
NEUTROPHILS # BLD AUTO: 5.96 K/UL
NEUTROPHILS NFR BLD AUTO: 73.4 %
PLATELET # BLD AUTO: 232 K/UL
POTASSIUM SERPL-SCNC: 4.4 MMOL/L
PROT SERPL-MCNC: 6 G/DL
RBC # BLD: 4.84 M/UL
RBC # FLD: 13.6 %
SODIUM SERPL-SCNC: 140 MMOL/L
WBC # FLD AUTO: 8.12 K/UL

## 2019-05-10 ENCOUNTER — APPOINTMENT (OUTPATIENT)
Dept: UROGYNECOLOGY | Facility: CLINIC | Age: 64
End: 2019-05-10
Payer: MEDICARE

## 2019-05-10 ENCOUNTER — OUTPATIENT (OUTPATIENT)
Dept: OUTPATIENT SERVICES | Facility: HOSPITAL | Age: 64
LOS: 1 days | Discharge: HOME | End: 2019-05-10

## 2019-05-10 VITALS — DIASTOLIC BLOOD PRESSURE: 76 MMHG | BODY MASS INDEX: 25.58 KG/M2 | SYSTOLIC BLOOD PRESSURE: 124 MMHG | WEIGHT: 149 LBS

## 2019-05-10 DIAGNOSIS — R10.2 PELVIC AND PERINEAL PAIN: ICD-10-CM

## 2019-05-10 DIAGNOSIS — N81.9 FEMALE GENITAL PROLAPSE, UNSPECIFIED: ICD-10-CM

## 2019-05-10 PROCEDURE — 57160 INSERT PESSARY/OTHER DEVICE: CPT

## 2019-05-10 PROCEDURE — 99204 OFFICE O/P NEW MOD 45 MIN: CPT | Mod: 25

## 2019-05-10 PROCEDURE — 81003 URINALYSIS AUTO W/O SCOPE: CPT | Mod: QW

## 2019-05-10 RX ORDER — GLYCERIN .002; .002; .01 MG/MG; MG/MG; MG/MG
0.2-0.2-1 SOLUTION/ DROPS OPHTHALMIC
Qty: 1 | Refills: 3 | Status: DISCONTINUED | COMMUNITY
Start: 2018-12-13 | End: 2019-05-10

## 2019-05-11 LAB
APPEARANCE: CLEAR
BILIRUBIN URINE: NEGATIVE
BLOOD URINE: NEGATIVE
COLOR: YELLOW
GLUCOSE QUALITATIVE U: NEGATIVE MG/DL
KETONES URINE: NEGATIVE
LEUKOCYTE ESTERASE URINE: NEGATIVE
NITRITE URINE: NEGATIVE
PH URINE: 6.5
PROTEIN URINE: NEGATIVE MG/DL
SPECIFIC GRAVITY URINE: <=1.005
UROBILINOGEN URINE: 0.2 MG/DL (ref 0.2–?)

## 2019-05-12 LAB — BACTERIA UR CULT: NORMAL

## 2019-05-13 NOTE — COUNSELING
[FreeTextEntry1] : \par Please call the office if you have any issues with vaginal pain, vaginal bleeding, difficulty urinating or having a bowel movement or if the pessary falls out so that we can arrange another size pessary.\par \par We will notify you of the urine results if they are abnormal\par \par Apply a pea size amount of the cream to the opening of the vagina every night for two weeks followed by three nights per week\par \par Please call my office if you have any issues with the cost or side effects of the medication.\par \par Schedule 2 week pessary check with my PA, Tim. At that visit she will check to see if you empty your bladder better with the pessary in place.

## 2019-05-13 NOTE — HISTORY OF PRESENT ILLNESS
[FreeTextEntry1] : \par Pt with pelvic floor dysfunction here for urogynecologic evaluation. She describes: \par Referring provider: Dr LEWIS Dodson\par PCP: Dr Mendoza\par \par Chief PFD: prolapse\par \par Pelvic organ prolapse: hx of COPD, hx of lung adenocarcinoma (does not want follow up with oncology), s/p GEETHA/BS0 (fibroids), s/p tubal, s/p bowel resection, +bulge, for the last 6 months, denies splinting\par Stress urinary incontinence: no\par Overactive bladder syndrome: hx of pre diabetes, voids [id number] secondary to urgency and discomfort that improves with urination, no urge incontinence, for 6 months, no prior treatment, no glaucoma\par Voiding dysfunction: no Incomplete bladder emptying, yes hesitancy \par Lower urinary tract/vaginal symptoms: no recurrent UTIs per year, no hematuria, no dysuria, as above bladder pain \par Fecal incontinence: no\par Defecatory dysfunction: sausage \par Sexual dysfunction: not active\par Pelvic pain: lower quadrants, for only today, 6/10, cramping, non radiating, worse without a bowel movement, no improving factors\par Vaginal dryness : no\par \par Her pelvic floor symptoms are significantly bothersome and negatively impacting her quality of life. \par \par

## 2019-05-13 NOTE — DISCUSSION/SUMMARY
[FreeTextEntry1] : \par Vault prolapse/cystocele:\par The patient was counseled regarding the possible natural progression of prolapse and the clinical consequences of worsening prolapse. The stage and the location of the prolapse was reviewed with the patient. She was counseled regarding the management strategies including observation but if the prolapse is the cause of the incomplete bladder emptying than she can develop kidney damage, pelvic floor physical therapy, pessary placement and surgery (anterior colporrhaphy/sacrospinous ligament fixation). The patient voiced understanding and agrees with a pessary fitting.\par \par Ring with support #4, uncomfortable\par Ring with support #3 placed without difficulty. Comfortable with valsalav, ambulation and voiding.\par Precautions provided.\par Will return in 2 weeks for a pessary check and PVR check with prolapse reduction.\par \par Atrophic vaginitis-\par We reviewed the risks, benefits, alternatives and indications of local estrogen therapy and I gave her a handout that covers this information. She does opt to begin this therapy. I have given her a prescription and specific instructions on how to use the estrogen cream, applied with a finger at a low dose for urogenital atrophy.\par \par Incomplete bladder emptying-\par Advised the patient that this can be due to an acute UTI. Will send her urine to rule out infectious etiology and then will also check to see if she empties better with her prolapse reduced with a pessary. If she still does not empty well with the pessary in place  then I will recommend further workup with urodynamics (with reduction). Discussed with the patient that we are concerned about incomplete bladder emptying because it can cause recurrent UTI and can cause kidney damage. The patient voiced understanding.\par \par

## 2019-05-17 DIAGNOSIS — N81.11 CYSTOCELE, MIDLINE: ICD-10-CM

## 2019-05-17 DIAGNOSIS — N95.2 POSTMENOPAUSAL ATROPHIC VAGINITIS: ICD-10-CM

## 2019-05-17 DIAGNOSIS — R33.9 RETENTION OF URINE, UNSPECIFIED: ICD-10-CM

## 2019-05-17 DIAGNOSIS — N99.3 PROLAPSE OF VAGINAL VAULT AFTER HYSTERECTOMY: ICD-10-CM

## 2019-05-24 ENCOUNTER — OUTPATIENT (OUTPATIENT)
Dept: OUTPATIENT SERVICES | Facility: HOSPITAL | Age: 64
LOS: 1 days | Discharge: HOME | End: 2019-05-24

## 2019-05-24 ENCOUNTER — APPOINTMENT (OUTPATIENT)
Dept: UROGYNECOLOGY | Facility: CLINIC | Age: 64
End: 2019-05-24
Payer: MEDICARE

## 2019-05-24 VITALS
BODY MASS INDEX: 25.44 KG/M2 | SYSTOLIC BLOOD PRESSURE: 134 MMHG | HEIGHT: 64 IN | WEIGHT: 149 LBS | DIASTOLIC BLOOD PRESSURE: 92 MMHG

## 2019-05-24 PROCEDURE — 99214 OFFICE O/P EST MOD 30 MIN: CPT | Mod: 25

## 2019-05-24 PROCEDURE — 51701 INSERT BLADDER CATHETER: CPT

## 2019-05-24 NOTE — HISTORY OF PRESENT ILLNESS
[FreeTextEntry1] : \par Patient is here for 2 weeks pessary check for cystocele and vaginal vault prolapse and PVR check.\par Last seen 5/10/19 as NP for cystocele, vaginal vault prolapse, atrophic vaginitis, and incomplete bladder emptying. \par \par H/o COPD, H/o lung adenocarcinoma\par s/p bowel resection, s/p GEETHA/BSO\par \par GH: 4 TVL: 7\par \par Fitted for ring and support #4 (uncomfortable)\par Ring and Support #3\par Estrace cream for atrophy\par \par Today, patient states she dislikes the pessary and would like to have surgery. She would like the pessary removed and not reinserted. States she now has stress incontinence. Using Estrace cream as advised.

## 2019-05-24 NOTE — COUNSELING
[FreeTextEntry1] : \par Please schedule a kidney ultrasound ([facility]) \par \par Please review handouts regarding surgery (anterior colporrhaphy/sacrospinous ligament fixation)\par \par [facility] or [facility] will call you to schedule bladder function testing (UDS with reduction) with SHERMAN Dumont. \par \par Please call the office if you feel like you have an infection because we can not do the bladder function testing in the setting of an infection.\par \par Please come with a full, not painful bladder.

## 2019-05-24 NOTE — DISCUSSION/SUMMARY
[FreeTextEntry1] : \par Vaginal Vault Prolapse/Cystocele:\par Ring and Support #3 removed, cleaned, inspected and NOT reinserted. The patient tolerated this well. \par No bleeding, lesions, abnormal discharge were noted. Provided handouts regarding surgery (anterior colporrhaphy/sacrospinous ligament fixation) and urodynamics with reduction.\par \par Incomplete Bladder Emptying:\par Discussed with the patient that we are concerned about incomplete bladder emptying because it can cause recurrent UTI and can cause kidney damage. Recommend a renal ultrasound to rule out hydronephrosis. The patient voiced understanding.\par  \par Atrophic Vaginitis:\par Advised to continue to apply a pea size amount of the cream to the opening of the vagina three nights per week.

## 2019-05-29 DIAGNOSIS — R33.9 RETENTION OF URINE, UNSPECIFIED: ICD-10-CM

## 2019-05-29 DIAGNOSIS — N95.2 POSTMENOPAUSAL ATROPHIC VAGINITIS: ICD-10-CM

## 2019-05-29 DIAGNOSIS — N81.11 CYSTOCELE, MIDLINE: ICD-10-CM

## 2019-05-29 DIAGNOSIS — N99.3 PROLAPSE OF VAGINAL VAULT AFTER HYSTERECTOMY: ICD-10-CM

## 2019-05-30 ENCOUNTER — OUTPATIENT (OUTPATIENT)
Dept: OUTPATIENT SERVICES | Facility: HOSPITAL | Age: 64
LOS: 1 days | Discharge: HOME | End: 2019-05-30
Payer: MEDICARE

## 2019-05-30 DIAGNOSIS — R33.9 RETENTION OF URINE, UNSPECIFIED: ICD-10-CM

## 2019-05-30 PROCEDURE — 76775 US EXAM ABDO BACK WALL LIM: CPT | Mod: 26

## 2019-06-03 ENCOUNTER — APPOINTMENT (OUTPATIENT)
Dept: UROGYNECOLOGY | Facility: CLINIC | Age: 64
End: 2019-06-03
Payer: MEDICARE

## 2019-06-03 VITALS — HEIGHT: 64 IN

## 2019-06-03 PROCEDURE — 51797 INTRAABDOMINAL PRESSURE TEST: CPT | Mod: 26

## 2019-06-03 PROCEDURE — 51741 ELECTRO-UROFLOWMETRY FIRST: CPT | Mod: 26

## 2019-06-03 PROCEDURE — 99214 OFFICE O/P EST MOD 30 MIN: CPT | Mod: 25

## 2019-06-03 PROCEDURE — 81003 URINALYSIS AUTO W/O SCOPE: CPT | Mod: QW

## 2019-06-03 PROCEDURE — 51729 CYSTOMETROGRAM W/VP&UP: CPT | Mod: 26

## 2019-06-03 PROCEDURE — 51784 ANAL/URINARY MUSCLE STUDY: CPT | Mod: 26

## 2019-06-04 LAB
BILIRUB UR QL STRIP: NORMAL
CLARITY UR: CLEAR
COLLECTION METHOD: NORMAL
GLUCOSE UR-MCNC: NORMAL
HCG UR QL: NORMAL EU/DL
HGB UR QL STRIP.AUTO: NORMAL
KETONES UR-MCNC: NORMAL
LEUKOCYTE ESTERASE UR QL STRIP: NORMAL
NITRITE UR QL STRIP: NORMAL
PH UR STRIP: NORMAL
PROT UR STRIP-MCNC: NORMAL
SP GR UR STRIP: 1

## 2019-06-07 ENCOUNTER — OTHER (OUTPATIENT)
Age: 64
End: 2019-06-07

## 2019-06-20 ENCOUNTER — OUTPATIENT (OUTPATIENT)
Dept: OUTPATIENT SERVICES | Facility: HOSPITAL | Age: 64
LOS: 1 days | Discharge: HOME | End: 2019-06-20

## 2019-06-20 ENCOUNTER — APPOINTMENT (OUTPATIENT)
Dept: RHEUMATOLOGY | Facility: CLINIC | Age: 64
End: 2019-06-20

## 2019-06-20 DIAGNOSIS — M13.0 POLYARTHRITIS, UNSPECIFIED: ICD-10-CM

## 2019-06-20 NOTE — HISTORY OF PRESENT ILLNESS
[FreeTextEntry1] : 64 F here for follow up on relapsing polychondritis & osteoporosis presenting for routine follow up. Pt has no complaints, did not do the bloodwork (cbc, cmp, esr). Pt requesting refills.

## 2019-06-20 NOTE — ASSESSMENT
[FreeTextEntry1] : 64 F w/ relapsing polychondritis & osteoporosis presents for routine f/u.\par \par #  relapsing polychondritis\par - c/w cellcept & plaquenil\par - repeat blood work\par - rtc 3 months\par \par # Osteoporosis\par - c/w alendronate\par

## 2019-06-20 NOTE — PHYSICAL EXAM
[General Appearance - In No Acute Distress] : in no acute distress [General Appearance - Alert] : alert [] : no respiratory distress [Auscultation Breath Sounds / Voice Sounds] : lungs were clear to auscultation bilaterally [Heart Rate And Rhythm] : heart rate was normal and rhythm regular [Heart Sounds] : normal S1 and S2 [Heart Sounds Gallop] : no gallops [Murmurs] : no murmurs [Heart Sounds Pericardial Friction Rub] : no pericardial rub [Abnormal Walk] : normal gait [Nail Clubbing] : no clubbing  or cyanosis of the fingernails [Motor Tone] : muscle strength and tone were normal [Musculoskeletal - Swelling] : no joint swelling seen

## 2019-06-20 NOTE — ADDENDUM
[FreeTextEntry1] : attending- pt seen/exam w/resident - pt w/ rellaspsing polychondritis/ OP on cellcept/plaquenil/alendronate , no c/o , repeat dexa - no bone loss since 2016, cont alendronate - on about 5 yrs but not compliant , cont cellcept 3gm , vit d 5000, repeat  level-monitor, optho exam , bld wk now and before visit , rt 3 months

## 2019-06-25 ENCOUNTER — OUTPATIENT (OUTPATIENT)
Dept: OUTPATIENT SERVICES | Facility: HOSPITAL | Age: 64
LOS: 1 days | Discharge: HOME | End: 2019-06-25
Payer: MEDICARE

## 2019-06-25 VITALS
HEART RATE: 74 BPM | HEIGHT: 64 IN | SYSTOLIC BLOOD PRESSURE: 133 MMHG | DIASTOLIC BLOOD PRESSURE: 77 MMHG | TEMPERATURE: 98 F | WEIGHT: 146.39 LBS | RESPIRATION RATE: 20 BRPM

## 2019-06-25 DIAGNOSIS — Z01.818 ENCOUNTER FOR OTHER PREPROCEDURAL EXAMINATION: ICD-10-CM

## 2019-06-25 DIAGNOSIS — Z90.49 ACQUIRED ABSENCE OF OTHER SPECIFIED PARTS OF DIGESTIVE TRACT: Chronic | ICD-10-CM

## 2019-06-25 DIAGNOSIS — N81.11 CYSTOCELE, MIDLINE: ICD-10-CM

## 2019-06-25 DIAGNOSIS — J38.1 POLYP OF VOCAL CORD AND LARYNX: Chronic | ICD-10-CM

## 2019-06-25 DIAGNOSIS — N99.3 PROLAPSE OF VAGINAL VAULT AFTER HYSTERECTOMY: ICD-10-CM

## 2019-06-25 DIAGNOSIS — R91.1 SOLITARY PULMONARY NODULE: Chronic | ICD-10-CM

## 2019-06-25 DIAGNOSIS — Z90.710 ACQUIRED ABSENCE OF BOTH CERVIX AND UTERUS: Chronic | ICD-10-CM

## 2019-06-25 LAB
ALBUMIN SERPL ELPH-MCNC: 4.3 G/DL — SIGNIFICANT CHANGE UP (ref 3.5–5.2)
ALP SERPL-CCNC: 64 U/L — SIGNIFICANT CHANGE UP (ref 30–115)
ALT FLD-CCNC: 11 U/L — SIGNIFICANT CHANGE UP (ref 0–41)
ANION GAP SERPL CALC-SCNC: 10 MMOL/L — SIGNIFICANT CHANGE UP (ref 7–14)
APPEARANCE UR: CLEAR — SIGNIFICANT CHANGE UP
APTT BLD: 29.5 SEC — SIGNIFICANT CHANGE UP (ref 27–39.2)
AST SERPL-CCNC: 14 U/L — SIGNIFICANT CHANGE UP (ref 0–41)
BASOPHILS # BLD AUTO: 0.03 K/UL — SIGNIFICANT CHANGE UP (ref 0–0.2)
BASOPHILS NFR BLD AUTO: 0.4 % — SIGNIFICANT CHANGE UP (ref 0–1)
BILIRUB SERPL-MCNC: 0.3 MG/DL — SIGNIFICANT CHANGE UP (ref 0.2–1.2)
BILIRUB UR-MCNC: NEGATIVE — SIGNIFICANT CHANGE UP
BUN SERPL-MCNC: 11 MG/DL — SIGNIFICANT CHANGE UP (ref 10–20)
CALCIUM SERPL-MCNC: 9.2 MG/DL — SIGNIFICANT CHANGE UP (ref 8.5–10.1)
CHLORIDE SERPL-SCNC: 107 MMOL/L — SIGNIFICANT CHANGE UP (ref 98–110)
CO2 SERPL-SCNC: 27 MMOL/L — SIGNIFICANT CHANGE UP (ref 17–32)
COLOR SPEC: YELLOW — SIGNIFICANT CHANGE UP
CREAT SERPL-MCNC: 0.6 MG/DL — LOW (ref 0.7–1.5)
DIFF PNL FLD: NEGATIVE — SIGNIFICANT CHANGE UP
EOSINOPHIL # BLD AUTO: 0.1 K/UL — SIGNIFICANT CHANGE UP (ref 0–0.7)
EOSINOPHIL NFR BLD AUTO: 1.4 % — SIGNIFICANT CHANGE UP (ref 0–8)
EPI CELLS # UR: ABNORMAL /HPF
GLUCOSE SERPL-MCNC: 90 MG/DL — SIGNIFICANT CHANGE UP (ref 70–99)
GLUCOSE UR QL: NEGATIVE MG/DL — SIGNIFICANT CHANGE UP
HCT VFR BLD CALC: 43.3 % — SIGNIFICANT CHANGE UP (ref 37–47)
HGB BLD-MCNC: 14 G/DL — SIGNIFICANT CHANGE UP (ref 12–16)
IMM GRANULOCYTES NFR BLD AUTO: 0.1 % — SIGNIFICANT CHANGE UP (ref 0.1–0.3)
INR BLD: 0.93 RATIO — SIGNIFICANT CHANGE UP (ref 0.65–1.3)
KETONES UR-MCNC: NEGATIVE — SIGNIFICANT CHANGE UP
LEUKOCYTE ESTERASE UR-ACNC: NEGATIVE — SIGNIFICANT CHANGE UP
LYMPHOCYTES # BLD AUTO: 1.42 K/UL — SIGNIFICANT CHANGE UP (ref 1.2–3.4)
LYMPHOCYTES # BLD AUTO: 20 % — LOW (ref 20.5–51.1)
MCHC RBC-ENTMCNC: 29.5 PG — SIGNIFICANT CHANGE UP (ref 27–31)
MCHC RBC-ENTMCNC: 32.3 G/DL — SIGNIFICANT CHANGE UP (ref 32–37)
MCV RBC AUTO: 91.4 FL — SIGNIFICANT CHANGE UP (ref 81–99)
MONOCYTES # BLD AUTO: 0.45 K/UL — SIGNIFICANT CHANGE UP (ref 0.1–0.6)
MONOCYTES NFR BLD AUTO: 6.3 % — SIGNIFICANT CHANGE UP (ref 1.7–9.3)
NEUTROPHILS # BLD AUTO: 5.09 K/UL — SIGNIFICANT CHANGE UP (ref 1.4–6.5)
NEUTROPHILS NFR BLD AUTO: 71.8 % — SIGNIFICANT CHANGE UP (ref 42.2–75.2)
NITRITE UR-MCNC: NEGATIVE — SIGNIFICANT CHANGE UP
NRBC # BLD: 0 /100 WBCS — SIGNIFICANT CHANGE UP (ref 0–0)
PH UR: 6 — SIGNIFICANT CHANGE UP (ref 5–8)
PLATELET # BLD AUTO: 211 K/UL — SIGNIFICANT CHANGE UP (ref 130–400)
POTASSIUM SERPL-MCNC: 4.6 MMOL/L — SIGNIFICANT CHANGE UP (ref 3.5–5)
POTASSIUM SERPL-SCNC: 4.6 MMOL/L — SIGNIFICANT CHANGE UP (ref 3.5–5)
PROT SERPL-MCNC: 6.4 G/DL — SIGNIFICANT CHANGE UP (ref 6–8)
PROT UR-MCNC: ABNORMAL MG/DL
PROTHROM AB SERPL-ACNC: 10.7 SEC — SIGNIFICANT CHANGE UP (ref 9.95–12.87)
RBC # BLD: 4.74 M/UL — SIGNIFICANT CHANGE UP (ref 4.2–5.4)
RBC # FLD: 13.7 % — SIGNIFICANT CHANGE UP (ref 11.5–14.5)
SODIUM SERPL-SCNC: 144 MMOL/L — SIGNIFICANT CHANGE UP (ref 135–146)
SP GR SPEC: 1.02 — SIGNIFICANT CHANGE UP (ref 1.01–1.03)
UROBILINOGEN FLD QL: 0.2 MG/DL — SIGNIFICANT CHANGE UP (ref 0.2–0.2)
WBC # BLD: 7.1 K/UL — SIGNIFICANT CHANGE UP (ref 4.8–10.8)
WBC # FLD AUTO: 7.1 K/UL — SIGNIFICANT CHANGE UP (ref 4.8–10.8)
WBC UR QL: SIGNIFICANT CHANGE UP /HPF

## 2019-06-25 PROCEDURE — 71046 X-RAY EXAM CHEST 2 VIEWS: CPT | Mod: 26

## 2019-06-25 PROCEDURE — 93010 ELECTROCARDIOGRAM REPORT: CPT

## 2019-06-25 RX ORDER — HYDROXYCHLOROQUINE SULFATE 200 MG
1 TABLET ORAL
Qty: 0 | Refills: 0 | DISCHARGE

## 2019-06-25 RX ORDER — LISINOPRIL 2.5 MG/1
1 TABLET ORAL
Qty: 0 | Refills: 0 | DISCHARGE

## 2019-06-25 NOTE — H&P PST ADULT - NSICDXPASTMEDICALHX_GEN_ALL_CORE_FT
PAST MEDICAL HISTORY:  CAD (coronary artery disease)     COPD (chronic obstructive pulmonary disease)     Female cystocele     GERD (gastroesophageal reflux disease)     H/O gastroesophageal reflux (GERD)     HTN (hypertension)     Hypercholesterolemia     Pulmonary nodule     Relapsing polychondritis PAST MEDICAL HISTORY:  CAD (coronary artery disease)     COPD (chronic obstructive pulmonary disease)     Female cystocele     GERD (gastroesophageal reflux disease)     H/O pneumothorax 2016    HTN (hypertension)     Hypercholesterolemia     Pulmonary nodule     Relapsing polychondritis

## 2019-06-25 NOTE — H&P PST ADULT - HISTORY OF PRESENT ILLNESS
65 y/o female with h/o bladder prolapse  scheduled for anterior colporrhaphy sacrospinous ligament fixation cystoscopy  reports no c/o cp,sob,palpitations,cough or dysuria  1-2 fos without sob

## 2019-06-25 NOTE — H&P PST ADULT - NSICDXPASTSURGICALHX_GEN_ALL_CORE_FT
PAST SURGICAL HISTORY:  Pulmonary nodule     S/P colon resection     S/P GEETHA-BSO     Vocal cord polyps removal of same 2005

## 2019-06-25 NOTE — H&P PST ADULT - PRIMARY CARE PROVIDER
pmd: ministerio( 2/19) pulm: luis(2018) pmd: ministerio( 2/19) pulm: luis(2018) rheum: aquafredda(l6/19)

## 2019-06-27 PROBLEM — E78.00 PURE HYPERCHOLESTEROLEMIA, UNSPECIFIED: Chronic | Status: ACTIVE | Noted: 2019-06-25

## 2019-06-27 PROBLEM — M94.1 RELAPSING POLYCHONDRITIS: Chronic | Status: ACTIVE | Noted: 2019-06-25

## 2019-06-27 PROBLEM — K21.9 GASTRO-ESOPHAGEAL REFLUX DISEASE WITHOUT ESOPHAGITIS: Chronic | Status: ACTIVE | Noted: 2019-06-25

## 2019-06-27 PROBLEM — Z87.09 PERSONAL HISTORY OF OTHER DISEASES OF THE RESPIRATORY SYSTEM: Chronic | Status: ACTIVE | Noted: 2019-06-25

## 2019-06-27 PROBLEM — R91.1 SOLITARY PULMONARY NODULE: Chronic | Status: ACTIVE | Noted: 2019-06-25

## 2019-06-27 PROBLEM — N81.10 CYSTOCELE, UNSPECIFIED: Chronic | Status: ACTIVE | Noted: 2019-06-25

## 2019-06-27 PROBLEM — J44.9 CHRONIC OBSTRUCTIVE PULMONARY DISEASE, UNSPECIFIED: Chronic | Status: ACTIVE | Noted: 2019-06-25

## 2019-06-27 PROBLEM — I25.10 ATHEROSCLEROTIC HEART DISEASE OF NATIVE CORONARY ARTERY WITHOUT ANGINA PECTORIS: Chronic | Status: ACTIVE | Noted: 2019-06-25

## 2019-06-27 PROBLEM — I10 ESSENTIAL (PRIMARY) HYPERTENSION: Chronic | Status: ACTIVE | Noted: 2019-06-25

## 2019-06-27 LAB
-  AMIKACIN: SIGNIFICANT CHANGE UP
-  AMPICILLIN/SULBACTAM: SIGNIFICANT CHANGE UP
-  AMPICILLIN: SIGNIFICANT CHANGE UP
-  AZTREONAM: SIGNIFICANT CHANGE UP
-  CEFAZOLIN: SIGNIFICANT CHANGE UP
-  CEFEPIME: SIGNIFICANT CHANGE UP
-  CEFOXITIN: SIGNIFICANT CHANGE UP
-  CEFTRIAXONE: SIGNIFICANT CHANGE UP
-  CIPROFLOXACIN: SIGNIFICANT CHANGE UP
-  ERTAPENEM: SIGNIFICANT CHANGE UP
-  GENTAMICIN: SIGNIFICANT CHANGE UP
-  IMIPENEM: SIGNIFICANT CHANGE UP
-  LEVOFLOXACIN: SIGNIFICANT CHANGE UP
-  MEROPENEM: SIGNIFICANT CHANGE UP
-  NITROFURANTOIN: SIGNIFICANT CHANGE UP
-  PIPERACILLIN/TAZOBACTAM: SIGNIFICANT CHANGE UP
-  TIGECYCLINE: SIGNIFICANT CHANGE UP
-  TOBRAMYCIN: SIGNIFICANT CHANGE UP
-  TRIMETHOPRIM/SULFAMETHOXAZOLE: SIGNIFICANT CHANGE UP
25(OH)D3 SERPL-MCNC: 66 NG/ML
ALBUMIN SERPL ELPH-MCNC: 4.7 G/DL
ALP BLD-CCNC: 69 U/L
ALT SERPL-CCNC: 11 U/L
ANION GAP SERPL CALC-SCNC: 13 MMOL/L
AST SERPL-CCNC: 14 U/L
BASOPHILS # BLD AUTO: 0.02 K/UL
BASOPHILS NFR BLD AUTO: 0.3 %
BILIRUB SERPL-MCNC: 0.3 MG/DL
BUN SERPL-MCNC: 8 MG/DL
CALCIUM SERPL-MCNC: 8.8 MG/DL
CHLORIDE SERPL-SCNC: 104 MMOL/L
CO2 SERPL-SCNC: 25 MMOL/L
CREAT SERPL-MCNC: 0.6 MG/DL
CULTURE RESULTS: SIGNIFICANT CHANGE UP
EOSINOPHIL # BLD AUTO: 0.05 K/UL
EOSINOPHIL NFR BLD AUTO: 0.6 %
ERYTHROCYTE [SEDIMENTATION RATE] IN BLOOD BY WESTERGREN METHOD: 4 MM/HR
GLUCOSE SERPL-MCNC: 91 MG/DL
HCT VFR BLD CALC: 43.6 %
HGB BLD-MCNC: 14.1 G/DL
IMM GRANULOCYTES NFR BLD AUTO: 0.3 %
LYMPHOCYTES # BLD AUTO: 1.75 K/UL
LYMPHOCYTES NFR BLD AUTO: 22.4 %
MAN DIFF?: NORMAL
MCHC RBC-ENTMCNC: 29.3 PG
MCHC RBC-ENTMCNC: 32.3 G/DL
MCV RBC AUTO: 90.5 FL
METHOD TYPE: SIGNIFICANT CHANGE UP
MONOCYTES # BLD AUTO: 0.46 K/UL
MONOCYTES NFR BLD AUTO: 5.9 %
NEUTROPHILS # BLD AUTO: 5.52 K/UL
NEUTROPHILS NFR BLD AUTO: 70.5 %
ORGANISM # SPEC MICROSCOPIC CNT: SIGNIFICANT CHANGE UP
ORGANISM # SPEC MICROSCOPIC CNT: SIGNIFICANT CHANGE UP
PLATELET # BLD AUTO: 241 K/UL
POTASSIUM SERPL-SCNC: 4 MMOL/L
PROT SERPL-MCNC: 6.6 G/DL
RBC # BLD: 4.82 M/UL
RBC # FLD: 13.7 %
SODIUM SERPL-SCNC: 142 MMOL/L
SPECIMEN SOURCE: SIGNIFICANT CHANGE UP
WBC # FLD AUTO: 7.82 K/UL

## 2019-06-28 ENCOUNTER — APPOINTMENT (OUTPATIENT)
Dept: PULMONOLOGY | Facility: CLINIC | Age: 64
End: 2019-06-28
Payer: MEDICARE

## 2019-06-28 ENCOUNTER — OUTPATIENT (OUTPATIENT)
Dept: OUTPATIENT SERVICES | Facility: HOSPITAL | Age: 64
LOS: 1 days | Discharge: HOME | End: 2019-06-28

## 2019-06-28 VITALS
HEIGHT: 64 IN | HEART RATE: 78 BPM | BODY MASS INDEX: 24.75 KG/M2 | SYSTOLIC BLOOD PRESSURE: 135 MMHG | WEIGHT: 145 LBS | OXYGEN SATURATION: 96 % | DIASTOLIC BLOOD PRESSURE: 77 MMHG | TEMPERATURE: 97.6 F

## 2019-06-28 DIAGNOSIS — R91.1 SOLITARY PULMONARY NODULE: Chronic | ICD-10-CM

## 2019-06-28 DIAGNOSIS — J38.1 POLYP OF VOCAL CORD AND LARYNX: Chronic | ICD-10-CM

## 2019-06-28 DIAGNOSIS — Z90.710 ACQUIRED ABSENCE OF BOTH CERVIX AND UTERUS: Chronic | ICD-10-CM

## 2019-06-28 DIAGNOSIS — Z90.49 ACQUIRED ABSENCE OF OTHER SPECIFIED PARTS OF DIGESTIVE TRACT: Chronic | ICD-10-CM

## 2019-06-28 PROCEDURE — 99213 OFFICE O/P EST LOW 20 MIN: CPT | Mod: GC

## 2019-06-28 NOTE — ASSESSMENT
[FreeTextEntry1] : 62 y/o with hx of COPD , relapsing polychondritis , malignant right lung nodule s/p ablation, HTN, prediabetes comes for a pre-op risk stratification\par \par # Lung nodule/adeno ca ;\par - s/p ablation, patient reports that the repeat PET scan was clear but she does not recall when and where she had it\par - Does not want to follow up with Heme/onc \par - will get a CT chest w/o contrast as yearly screening follow up\par \par # COPD:\par - Uses inhalers PRN, does not use it frequently\par - Will get PFTs, does not need to be pre-op since it is not a thoracic/abdominal surgery\par \par # Pre-op risk stratification:\par - Patient is a moderate risk to a moderate risk surgery. From pulm standpoint, can proceed with surgery and will need to use her inhalers from now until the surgery to optimize her respiratory function.\par \par # RTC in 6 months\par \par \par

## 2019-06-28 NOTE — REVIEW OF SYSTEMS
[Negative] : Sleep Disorder [Cough] : cough [FreeTextEntry8] : whitish sputum; dyspnea on exertion ( [place])

## 2019-06-28 NOTE — HISTORY OF PRESENT ILLNESS
[FreeTextEntry1] : 62 y/o with hx of COPD , relapsing polychondritis , malignant right lung nodule s/p ablation, HTN, prediabetes comes for a pre-op risk stratification. She will have anterior colporrhaphy/sacrospinous ligament fixation on July 9th. She is still smoking, currently 6 cigarettes a day, trying to cut down completely, failed chantix and patches before. She has intermittent productive cough of whitish sputum, no hemoptysis. She stops some times when going uphill to her house. She is not using her inhalers frequently, it's every now and then. No weight loss or anorexia, no fever/chills.

## 2019-06-28 NOTE — PHYSICAL EXAM
[Heart Sounds] : normal S1 and S2 [Arterial Pulses Normal] : the arterial pulses were normal [Respiration, Rhythm And Depth] : normal respiratory rhythm and effort [Chest Palpation] : palpation of the chest revealed no abnormalities [Abdomen Soft] : soft [Abdomen Tenderness] : non-tender [FreeTextEntry1] : decrease air entry bilaterally, no wheezing

## 2019-06-28 NOTE — END OF VISIT
[FreeTextEntry3] : moderate risk for the planned gyn procedure-continue maint rx------from a pulm perspective [] : Resident [Time Spent: ___ minutes] : I have spent [unfilled] minutes of face to face time with the patient

## 2019-07-02 ENCOUNTER — APPOINTMENT (OUTPATIENT)
Dept: INTERNAL MEDICINE | Facility: CLINIC | Age: 64
End: 2019-07-02

## 2019-07-02 ENCOUNTER — OUTPATIENT (OUTPATIENT)
Dept: OUTPATIENT SERVICES | Facility: HOSPITAL | Age: 64
LOS: 1 days | Discharge: HOME | End: 2019-07-02

## 2019-07-02 VITALS
SYSTOLIC BLOOD PRESSURE: 109 MMHG | DIASTOLIC BLOOD PRESSURE: 74 MMHG | BODY MASS INDEX: 24.59 KG/M2 | TEMPERATURE: 98.2 F | HEIGHT: 64 IN | WEIGHT: 144 LBS | HEART RATE: 72 BPM

## 2019-07-02 DIAGNOSIS — Z90.49 ACQUIRED ABSENCE OF OTHER SPECIFIED PARTS OF DIGESTIVE TRACT: Chronic | ICD-10-CM

## 2019-07-02 DIAGNOSIS — J38.1 POLYP OF VOCAL CORD AND LARYNX: Chronic | ICD-10-CM

## 2019-07-02 DIAGNOSIS — R91.1 SOLITARY PULMONARY NODULE: Chronic | ICD-10-CM

## 2019-07-02 DIAGNOSIS — Z90.710 ACQUIRED ABSENCE OF BOTH CERVIX AND UTERUS: Chronic | ICD-10-CM

## 2019-07-02 NOTE — ASSESSMENT
[FreeTextEntry1] : 62F w PMHx of malignant right lung nodule s/p ablation, hypertension, copd, relapsing polychondritis, prediabetes and she c/o pelvic pressure since 1 month but no urinary symptoms. \par \par Pre-op clearance\par -risk assessed for surgery\par -intermediate risk for relatively moderate risk surgery\par \par Lung Adenocarcinoma\par -pt follows with oncology\par - no interest in quitting smoking despite knowing consequences of smoking. \par \par COPD\par -stable on Ventolin\par \par Pre-diabetes \par -diet and exercise\par -will order today\par \par HTN, stable\par -c/w lisinopril. \par \par Active smoker\par -advised to quit smoking\par DLD\par -c/w simvastatin, zetia\par \par Relapsing Polychondritis\par - seen by rheumatology, f/u the recommendastion.\par - c/w meds\par \par Osteoporosis\par -c/w alendronate\par \par HCM\par -colo utd\par - mammogram utd\par -Pap smear 2017 & obgyn stated no future f/u necessary\par \par RTC\par -after surgery.\par

## 2019-07-02 NOTE — PHYSICAL EXAM
[No Acute Distress] : no acute distress [Well Nourished] : well nourished [Well Developed] : well developed [Normal Sclera/Conjunctiva] : normal sclera/conjunctiva [Well-Appearing] : well-appearing [PERRL] : pupils equal round and reactive to light [Normal Outer Ear/Nose] : the outer ears and nose were normal in appearance [EOMI] : extraocular movements intact [Normal Oropharynx] : the oropharynx was normal [No JVD] : no jugular venous distention [No Lymphadenopathy] : no lymphadenopathy [Supple] : supple [Thyroid Normal, No Nodules] : the thyroid was normal and there were no nodules present [No Respiratory Distress] : no respiratory distress  [No Accessory Muscle Use] : no accessory muscle use [Normal Rate] : normal rate  [No Murmur] : no murmur heard [Regular Rhythm] : with a regular rhythm [Normal S1, S2] : normal S1 and S2 [Pedal Pulses Present] : the pedal pulses are present [No Edema] : there was no peripheral edema [No Extremity Clubbing/Cyanosis] : no extremity clubbing/cyanosis [Non Tender] : non-tender [Soft] : abdomen soft [No Masses] : no abdominal mass palpated [Non-distended] : non-distended [No HSM] : no HSM [Normal Bowel Sounds] : normal bowel sounds [No CVA Tenderness] : no CVA  tenderness [No Joint Swelling] : no joint swelling [No Spinal Tenderness] : no spinal tenderness [No Rash] : no rash [Grossly Normal Strength/Tone] : grossly normal strength/tone [No Focal Deficits] : no focal deficits [Coordination Grossly Intact] : coordination grossly intact [Normal Gait] : normal gait [Deep Tendon Reflexes (DTR)] : deep tendon reflexes were 2+ and symmetric [Normal Insight/Judgement] : insight and judgment were intact [Normal Affect] : the affect was normal [de-identified] : course cathy b/l

## 2019-07-02 NOTE — HISTORY OF PRESENT ILLNESS
[FreeTextEntry1] : pre-op clearence for uterine prolapse surgery [de-identified] : 64F w/ PMHx RLL & RML lung adenocarcinoma s/p RFA of lesions, hypertension, COPD, relapsing polychondritis, prediabetes presents for pre-op clearance for uterine prolapse surgery. She had been feeling pelvic pressure, but no leaking or other urinary symptoms. She went to the urogynecologist and will need surgery. She states that she feels well overall and has no current complaints.

## 2019-07-03 ENCOUNTER — CHART COPY (OUTPATIENT)
Age: 64
End: 2019-07-03

## 2019-07-03 DIAGNOSIS — M94.1 RELAPSING POLYCHONDRITIS: ICD-10-CM

## 2019-07-03 DIAGNOSIS — R33.9 RETENTION OF URINE, UNSPECIFIED: ICD-10-CM

## 2019-07-03 DIAGNOSIS — J44.9 CHRONIC OBSTRUCTIVE PULMONARY DISEASE, UNSPECIFIED: ICD-10-CM

## 2019-07-03 DIAGNOSIS — M13.0 POLYARTHRITIS, UNSPECIFIED: ICD-10-CM

## 2019-07-03 DIAGNOSIS — I25.10 ATHEROSCLEROTIC HEART DISEASE OF NATIVE CORONARY ARTERY WITHOUT ANGINA PECTORIS: ICD-10-CM

## 2019-07-09 ENCOUNTER — OUTPATIENT (OUTPATIENT)
Dept: OUTPATIENT SERVICES | Facility: HOSPITAL | Age: 64
LOS: 1 days | Discharge: HOME | End: 2019-07-09
Payer: MEDICARE

## 2019-07-09 VITALS
HEIGHT: 64 IN | DIASTOLIC BLOOD PRESSURE: 73 MMHG | WEIGHT: 145.06 LBS | TEMPERATURE: 98 F | SYSTOLIC BLOOD PRESSURE: 127 MMHG | RESPIRATION RATE: 20 BRPM | HEART RATE: 75 BPM

## 2019-07-09 VITALS
DIASTOLIC BLOOD PRESSURE: 67 MMHG | HEART RATE: 98 BPM | OXYGEN SATURATION: 96 % | RESPIRATION RATE: 18 BRPM | SYSTOLIC BLOOD PRESSURE: 137 MMHG

## 2019-07-09 DIAGNOSIS — J38.1 POLYP OF VOCAL CORD AND LARYNX: Chronic | ICD-10-CM

## 2019-07-09 DIAGNOSIS — Z90.49 ACQUIRED ABSENCE OF OTHER SPECIFIED PARTS OF DIGESTIVE TRACT: Chronic | ICD-10-CM

## 2019-07-09 DIAGNOSIS — R91.1 SOLITARY PULMONARY NODULE: Chronic | ICD-10-CM

## 2019-07-09 DIAGNOSIS — Z90.710 ACQUIRED ABSENCE OF BOTH CERVIX AND UTERUS: Chronic | ICD-10-CM

## 2019-07-09 PROCEDURE — 57282 COLPOPEXY EXTRAPERITONEAL: CPT

## 2019-07-09 PROCEDURE — 57260 CMBN ANT PST COLPRHY: CPT

## 2019-07-09 RX ORDER — DOCUSATE SODIUM 100 MG
1 CAPSULE ORAL
Qty: 60 | Refills: 1
Start: 2019-07-09 | End: 2019-09-06

## 2019-07-09 RX ORDER — PHENAZOPYRIDINE HCL 100 MG
200 TABLET ORAL ONCE
Refills: 0 | Status: COMPLETED | OUTPATIENT
Start: 2019-07-09 | End: 2019-07-09

## 2019-07-09 RX ORDER — HYDROMORPHONE HYDROCHLORIDE 2 MG/ML
1 INJECTION INTRAMUSCULAR; INTRAVENOUS; SUBCUTANEOUS
Refills: 0 | Status: DISCONTINUED | OUTPATIENT
Start: 2019-07-09 | End: 2019-07-09

## 2019-07-09 RX ORDER — ONDANSETRON 8 MG/1
4 TABLET, FILM COATED ORAL ONCE
Refills: 0 | Status: COMPLETED | OUTPATIENT
Start: 2019-07-09 | End: 2019-07-09

## 2019-07-09 RX ORDER — IBUPROFEN 200 MG
1 TABLET ORAL
Qty: 60 | Refills: 0
Start: 2019-07-09

## 2019-07-09 RX ORDER — ACETAMINOPHEN 500 MG
2 TABLET ORAL
Qty: 120 | Refills: 0
Start: 2019-07-09

## 2019-07-09 RX ORDER — PHENAZOPYRIDINE HCL 100 MG
1 TABLET ORAL
Qty: 0 | Refills: 0 | DISCHARGE
Start: 2019-07-09

## 2019-07-09 RX ORDER — SODIUM CHLORIDE 9 MG/ML
1000 INJECTION, SOLUTION INTRAVENOUS
Refills: 0 | Status: DISCONTINUED | OUTPATIENT
Start: 2019-07-09 | End: 2019-07-09

## 2019-07-09 RX ORDER — HYDROMORPHONE HYDROCHLORIDE 2 MG/ML
0.5 INJECTION INTRAMUSCULAR; INTRAVENOUS; SUBCUTANEOUS
Refills: 0 | Status: DISCONTINUED | OUTPATIENT
Start: 2019-07-09 | End: 2019-07-09

## 2019-07-09 RX ORDER — ENOXAPARIN SODIUM 100 MG/ML
40 INJECTION SUBCUTANEOUS ONCE
Refills: 0 | Status: COMPLETED | OUTPATIENT
Start: 2019-07-09 | End: 2019-07-09

## 2019-07-09 RX ADMIN — ONDANSETRON 4 MILLIGRAM(S): 8 TABLET, FILM COATED ORAL at 16:05

## 2019-07-09 RX ADMIN — Medication 200 MILLIGRAM(S): at 09:52

## 2019-07-09 RX ADMIN — SODIUM CHLORIDE 100 MILLILITER(S): 9 INJECTION, SOLUTION INTRAVENOUS at 14:05

## 2019-07-09 RX ADMIN — Medication 200 MILLIGRAM(S): at 15:47

## 2019-07-09 RX ADMIN — ENOXAPARIN SODIUM 40 MILLIGRAM(S): 100 INJECTION SUBCUTANEOUS at 09:52

## 2019-07-09 NOTE — ASU PATIENT PROFILE, ADULT - PMH
CAD (coronary artery disease)    COPD (chronic obstructive pulmonary disease)    Female cystocele    GERD (gastroesophageal reflux disease)    H/O pneumothorax  2016  HTN (hypertension)    Hypercholesterolemia    Pulmonary nodule    Relapsing polychondritis

## 2019-07-09 NOTE — ASU PATIENT PROFILE, ADULT - PSH
Pulmonary nodule    S/P colon resection    S/P GEETHA-BSO    Vocal cord polyps  removal of same 2005

## 2019-07-09 NOTE — BRIEF OPERATIVE NOTE - NSICDXBRIEFPREOP_GEN_ALL_CORE_FT
PRE-OP DIAGNOSIS:  Rectocele 09-Jul-2019 13:55:18  Dale Will  Midline cystocele 09-Jul-2019 13:55:10  Dale Will  Vaginal vault prolapse, posthysterectomy 09-Jul-2019 13:54:59  Dale Will

## 2019-07-09 NOTE — CHART NOTE - NSCHARTNOTEFT_GEN_A_CORE
PGY3 Note    Pt seen and evaluated at bedside for active voiding trial. Procedure explained to patient who voiced understanding.   Bladder emptied. Romo disconnected, bladder filled with 180cc NS, at which point pt felt intense urge to urinate.   Romo catheter removed, pt ambulated to bathroom and voided 130cc.     Pt passed active voiding trial. Ambulating well, tolerating PO. Discharge instructions and precautions given.    Will inform Dr. Ortega.

## 2019-07-09 NOTE — BRIEF OPERATIVE NOTE - NSICDXBRIEFPROCEDURE_GEN_ALL_CORE_FT
PROCEDURES:  Cystoscopy 09-Jul-2019 13:54:34  Dale Will  Posterior colporrhaphy 09-Jul-2019 13:54:24 with perineorrhaphy Dale Will  Anterior colporrhaphy 09-Jul-2019 13:54:00  Dale Will  Fixation, sacrospinous ligament, for vaginal prolapse 09-Jul-2019 13:53:47  Dale Will

## 2019-07-09 NOTE — PRE-ANESTHESIA EVALUATION ADULT - NSANTHOSAYNRD_GEN_A_CORE
No. ASHUTOSH screening performed.  STOP BANG Legend: 0-2 = LOW Risk; 3-4 = INTERMEDIATE Risk; 5-8 = HIGH Risk/see ashutosh tool

## 2019-07-09 NOTE — ASU DISCHARGE PLAN (ADULT/PEDIATRIC) - CARE PROVIDER_API CALL
Velvet Ortega)  Female [facility]; Obstetrics and Gynecology  [address] [place] [address]  Phone: [phone]  Fax: [phone]  Follow Up Time: 2 weeks

## 2019-07-09 NOTE — BRIEF OPERATIVE NOTE - OPERATION/FINDINGS
posthysterectomy vault prolapse with cystocele and rectocele noted and repaired.   cystoscopy showed B/L jets+

## 2019-07-09 NOTE — CHART NOTE - NSCHARTNOTEFT_GEN_A_CORE
PACU ANESTHESIA ADMISSION NOTE      Procedure: Anterior colporrhaphy  Fixation, sacrospinous ligament, for vaginal prolapse    Post op diagnosis:  Prolapse bladder        ____  Intubated  TV:______       Rate: ______      FiO2: ______    __x__  Patent Airway    __x__  Full return of protective reflexes    __x__  Full recovery from anesthesia / back to baseline status    Vitals:  T(C): 36.6 (07-09-19 @ 09:36), Max: 36.6 (07-09-19 @ 09:10)  HR: 75 (07-09-19 @ 09:36) (75 - 75)  BP: 127/73 (07-09-19 @ 09:36) (127/73 - 127/73)  RR: 20 (07-09-19 @ 09:36) (20 - 20)  SpO2: 99%    Mental Status:  __x__ Awake   ___x__ Alert   _____ Drowsy   _____ Sedated    Nausea/Vomiting:  __x__ NO  ______Yes,   See Post - Op Orders          Pain Scale (0-10):  _____    Treatment: __x__ None    __x__ See Post - Op/PCA Orders    Post - Operative Fluids:   ____ Oral   __x__ See Post - Op Orders    Plan: Discharge:   _x___Home       _____Floor     _____Critical Care    _____  Other:_________________    Comments: Report endorsed to RN in the PACU. Patient had smooth intraoperative event, no anesthesia complication.  PACU Vital signs stable. PACU ANESTHESIA ADMISSION NOTE      Procedure: Anterior colporrhaphy  Fixation, sacrospinous ligament, for vaginal prolapse    Post op diagnosis:  Prolapse bladder        ____  Intubated  TV:______       Rate: ______      FiO2: ______    __x__  Patent Airway    __x__  Full return of protective reflexes    __x__  Full recovery from anesthesia / back to baseline status    Vitals:  Temp: 97.2  HR: 93  BP: 122/70  RR: 19  SpO2: 99%    Mental Status:  __x__ Awake   ___x__ Alert   _____ Drowsy   _____ Sedated    Nausea/Vomiting:  __x__ NO  ______Yes,   See Post - Op Orders          Pain Scale (0-10):  _____    Treatment: __x__ None    __x__ See Post - Op/PCA Orders    Post - Operative Fluids:   ____ Oral   __x__ See Post - Op Orders    Plan: Discharge:   _x___Home       _____Floor     _____Critical Care    _____  Other:_________________    Comments: Report endorsed to RN in the PACU. Patient had smooth intraoperative event, no anesthesia complication.  PACU Vital signs stable.

## 2019-07-09 NOTE — PROGRESS NOTE ADULT - SUBJECTIVE AND OBJECTIVE BOX
DATE OF PROCEDURE: 07/9/2019  SURGEON: Velvet Ortega MD   ASSISTANT: Dr. Dale Will, PGY-3     PREOPERATIVE DIAGNOSES:  1.	Cystocele  2.	Rectocele  3.	Vault prolapse    POSTOPERATIVE DIAGNOSES:   1. Cystocele.  2. Rectocele.  3. Vault prolapse    PROCEDURES PERFORMED:  1. Anterior colporrhaphy  2. Bilateral sacrospinous ligament fixation  3. Cystourethroscopy.   4. Posterior colporrhaphy  5. Perinneorhaphy    COMPLICATIONS: None.   DISPOSITION: Stable to PACU.   DRAINS: Goddard catheter.   ANESTHESIA: General via an endotracheal tube and 30cc pitressin solution (20units in 80cc of normal saline)  INTRAVENOUS FLUIDS: 1500 mL crystalloid.   ESTIMATED BLOOD LOSS: 100 mL.   URINE OUTPUT: 50 mL.   SPECIMENS: urine culture  IMPLANTS USED: none   FINDINGS: Cystocele, vault prolapse, as well as rectocele. The vaginal vault, as well as the rectal vault  were examined at the end of the case and noted to be free of mesh exposure, perforation, or foreign body.     INDICATION: The patient is a 64-year-old woman who presented with pelvic organ prolapse that had become increasingly bothersome and impacting her quality of life. She was noted to have a symptomatic cystocele and rectocele and vault prolapse. There was no evidence of preoperative stress urinary incontinence. After a thorough discussion of her options, both conservative and surgical, the patient elected to proceed with surgery. Informed consent was obtained for the above-stated procedures. All of her questions were answered to her satisfaction. The patient expressed understanding and agreement to surgical plan.  The risks and benefits were discussed.  The patient agreed to proceed.     DESCRIPTION OF PROCEDURE: The patient was brought to the operating room where she was placed in the supine position. Sequential compression devices were placed on both lower extremities for DVT prophylaxis. The patient was repositioned in lithotomy using hydraulic Clifton stirrups. General anesthesia was then administered via an endotracheal tube and found to be adequate. The patient received antibiotics for an infectious prophylaxis. She was then prepped and draped in the usual sterile fashion. A formal timeout was performed with all surgical team members present and in agreement with the surgical plan.  A Goddard catheter was inserted under sterile technique to drain the bladder. A urine culture was sent because her preoperative urine culture was positive and treated and today’s urine culture would be a test of cure. Attention was then turned to the patient's vagina.    The urethrovesical junction was identified by using the goddard bulb. An allis clamp was placed approximately 1 cm proximal to the urethrovesical junction and another allis clamp was placed 1cm from the junction of the vaginal cuff on the vaginal epithelium. A scalpel was used to extend the incision between the two allis clamps.  The edges of the incision were grasped with allis clamps, and the Metzenbaum scissors were used to dissect the vesicovaginal fibromuscular connective tissue from the vaginal epithelium.  This dissection was carried laterally in both directions, so that the cystocele defects was completely visualized. 2-0 PDS sutures were used to plicate the underlying vesicovaginal fibromuscular connective tissue in an interrupted fashion. At the end of the plication, there was good correction of the cystocele.      Attention was then turned to sacrospinous ligament fixation.  The right ischial spine was identified. The overlying fatty tissue was bluntly dissected away from the right sacrospinous ligament.  A Capio needle  was used to place a 0 monofilament permanent suture along the sacrospinous ligament, medial to the ischial spine along the caudal portion of the ligament.  A second stitch of the same suture was loaded onto the Capio needle  and placed on the left sacrospinous ligament. Care was taken to make sure that we stayed approximately 1-2 cm medial to the ischial spine with placement of all sutures. The first suture was passed through the fibromuscular tissue on the right and the second stich was passed through the fibromuscular tissue on the left. The paravaginal spaces were noted to be hemostatic.  The anterior colporrhaphy incision was partially closed with an interrupted stitch of 2-0 vicryl, stopping in the mid section.      A cystourethroscopy was performed using a 22 Greek 30 degree cystoscope. The bladder was instilled  with sterile water and examined thoroughly in a standard fashion making sequential sweeps from the dome to the urethrovesical junction clockwise and then counterclockwise. All areas of the bladder appeared intact. The urothelium and bladder wall appeared normal. There was no foreign body in the bladder. There was no perforation of the bladder. There was excellent efflux of Pyridium tinged urine from each ureteral orifice. The sacrospinous sutures were then held on tension and strong flow of urine was visualized from the bilateral ureteral orifices. The urethral lumen was examined under distention as the cystoscope was retracted and noted to be free of mesh exposure, perforation or abnormality. The  cystoscope was then removed and a new Goddard catheter was then placed.     All sacrospinous sutures were then tied down and cut, resulting in excellent apical support of the vagina.  The remainder of the vaginal epithelium incision was closed in an interrupted fashion using 2-0 vicryl.    I turned my attention to performing a posterior colporrhaphy and perineorrhaphy. Allis clamps were placed on the left and right hymenal remnants and at the proximal limit of the rectovaginal septal defect along the vaginal epithelium. These areas were identified. The perineum and the vaginal epithelium were then injected with Pitressin solution, which was distributed beneath the perineal skin and the vaginal epithelium both in the midline and in the direction of the fornices. The perineal skin and the vaginal epithelium within the outlined area was then dissected off the underlying perineal body and fibromuscular tissue with King scissors. The rectovaginal fibromuscular tissue was exposed bilaterally. These were plicated in the midline with a series of vertical mattress stitches of 2- 0 PDS. These were switched to 0-vicryl as I approached the introitus, bringing together the perineal body in the midline between the hymenal remnants. A series of vertical mattress stitches were used to plicate the perineal body beneath the perineal skin; thereby, plicating the superficial and deep transverse perineal muscles and bulbar cavernosus muscle. Care was taken to make sure that the vaginal caliber and introitus allowed 2 to 3 fingerbreadths to be placed without difficulty. There was no significant posterior vaginal wall contracture or ridging at the completion of the plication.  The vaginal epithelial margins of the vertical incisions were then reapproximated with a running stitch of 2-0 Vicryl. Hemostasis was observed along the suture line. Vaginal exam confirmed good reinforcement of the rectocele site without ridging or significant contraction of the wall. Repeat rectal exam revealed no foreign body in the rectal vault.     Ray-Rose Mary, needle and instrument counts were noted to be correct x 2. The patient tolerated the procedure well. Vaginal exam revealed no evidence of any mesh exposure or ridging inside the vagina. A rectal exam revealed no foreign body in the rectal vault. The patient was then awoken, extubated and transferred to the PACU in stable condition.

## 2019-07-09 NOTE — BRIEF OPERATIVE NOTE - NSICDXBRIEFPOSTOP_GEN_ALL_CORE_FT
POST-OP DIAGNOSIS:  Rectocele 09-Jul-2019 13:55:52  Dale Will  Midline cystocele 09-Jul-2019 13:55:44  Dale Will  Vaginal vault prolapse, posthysterectomy 09-Jul-2019 13:55:30  Dale Will

## 2019-07-11 DIAGNOSIS — I25.10 ATHEROSCLEROTIC HEART DISEASE OF NATIVE CORONARY ARTERY WITHOUT ANGINA PECTORIS: ICD-10-CM

## 2019-07-11 DIAGNOSIS — K21.9 GASTRO-ESOPHAGEAL REFLUX DISEASE WITHOUT ESOPHAGITIS: ICD-10-CM

## 2019-07-11 DIAGNOSIS — N99.3 PROLAPSE OF VAGINAL VAULT AFTER HYSTERECTOMY: ICD-10-CM

## 2019-07-11 DIAGNOSIS — N81.10 CYSTOCELE, UNSPECIFIED: ICD-10-CM

## 2019-07-11 DIAGNOSIS — I10 ESSENTIAL (PRIMARY) HYPERTENSION: ICD-10-CM

## 2019-07-11 DIAGNOSIS — E78.00 PURE HYPERCHOLESTEROLEMIA, UNSPECIFIED: ICD-10-CM

## 2019-07-11 DIAGNOSIS — N81.6 RECTOCELE: ICD-10-CM

## 2019-07-11 DIAGNOSIS — J44.9 CHRONIC OBSTRUCTIVE PULMONARY DISEASE, UNSPECIFIED: ICD-10-CM

## 2019-07-12 LAB
CULTURE RESULTS: SIGNIFICANT CHANGE UP
SPECIMEN SOURCE: SIGNIFICANT CHANGE UP

## 2019-07-24 ENCOUNTER — APPOINTMENT (OUTPATIENT)
Dept: UROGYNECOLOGY | Facility: CLINIC | Age: 64
End: 2019-07-24
Payer: MEDICARE

## 2019-07-24 ENCOUNTER — OUTPATIENT (OUTPATIENT)
Dept: OUTPATIENT SERVICES | Facility: HOSPITAL | Age: 64
LOS: 1 days | Discharge: HOME | End: 2019-07-24

## 2019-07-24 VITALS
DIASTOLIC BLOOD PRESSURE: 78 MMHG | SYSTOLIC BLOOD PRESSURE: 128 MMHG | HEIGHT: 64 IN | WEIGHT: 144 LBS | BODY MASS INDEX: 24.59 KG/M2

## 2019-07-24 DIAGNOSIS — Z90.49 ACQUIRED ABSENCE OF OTHER SPECIFIED PARTS OF DIGESTIVE TRACT: Chronic | ICD-10-CM

## 2019-07-24 DIAGNOSIS — Z90.710 ACQUIRED ABSENCE OF BOTH CERVIX AND UTERUS: Chronic | ICD-10-CM

## 2019-07-24 DIAGNOSIS — R33.9 RETENTION OF URINE, UNSPECIFIED: ICD-10-CM

## 2019-07-24 DIAGNOSIS — N99.3 PROLAPSE OF VAGINAL VAULT AFTER HYSTERECTOMY: ICD-10-CM

## 2019-07-24 DIAGNOSIS — J38.1 POLYP OF VOCAL CORD AND LARYNX: Chronic | ICD-10-CM

## 2019-07-24 DIAGNOSIS — R91.1 SOLITARY PULMONARY NODULE: Chronic | ICD-10-CM

## 2019-07-24 PROCEDURE — 99024 POSTOP FOLLOW-UP VISIT: CPT

## 2019-07-24 NOTE — ADDENDUM
[FreeTextEntry1] : \par Please continue with all of your postoperative restrictions\par \par Please continue with the stool softeners, only once a day.\par \par Call with any issues\par \par Return for your next scheduled postop visit 09/04/19

## 2019-07-24 NOTE — DISCUSSION/SUMMARY
[FreeTextEntry1] : \par Vaginal Vault Prolapse/Midline Cystocele:\par Reviewed postoperative restrictions. All of the patient's questions and concerns were answered. Advised to return for her next scheduled postop visit or earlier if she has any issues. Advised to decrease stool softeners to once a day. The patient voiced understanding and is happy with the plan.

## 2019-07-30 DIAGNOSIS — N99.3 PROLAPSE OF VAGINAL VAULT AFTER HYSTERECTOMY: ICD-10-CM

## 2019-07-30 DIAGNOSIS — N81.11 CYSTOCELE, MIDLINE: ICD-10-CM

## 2019-08-07 LAB
CULTURE RESULTS: SIGNIFICANT CHANGE UP
SPECIMEN SOURCE: SIGNIFICANT CHANGE UP

## 2019-09-04 ENCOUNTER — OUTPATIENT (OUTPATIENT)
Dept: OUTPATIENT SERVICES | Facility: HOSPITAL | Age: 64
LOS: 1 days | Discharge: HOME | End: 2019-09-04

## 2019-09-04 ENCOUNTER — APPOINTMENT (OUTPATIENT)
Dept: UROGYNECOLOGY | Facility: CLINIC | Age: 64
End: 2019-09-04
Payer: MEDICARE

## 2019-09-04 VITALS
WEIGHT: 144 LBS | DIASTOLIC BLOOD PRESSURE: 72 MMHG | HEIGHT: 64 IN | SYSTOLIC BLOOD PRESSURE: 120 MMHG | BODY MASS INDEX: 24.59 KG/M2

## 2019-09-04 DIAGNOSIS — Z90.49 ACQUIRED ABSENCE OF OTHER SPECIFIED PARTS OF DIGESTIVE TRACT: Chronic | ICD-10-CM

## 2019-09-04 DIAGNOSIS — Z90.710 ACQUIRED ABSENCE OF BOTH CERVIX AND UTERUS: Chronic | ICD-10-CM

## 2019-09-04 DIAGNOSIS — R91.1 SOLITARY PULMONARY NODULE: Chronic | ICD-10-CM

## 2019-09-04 DIAGNOSIS — J38.1 POLYP OF VOCAL CORD AND LARYNX: Chronic | ICD-10-CM

## 2019-09-04 DIAGNOSIS — N81.11 CYSTOCELE, MIDLINE: ICD-10-CM

## 2019-09-04 DIAGNOSIS — Z87.440 PERSONAL HISTORY OF URINARY (TRACT) INFECTIONS: ICD-10-CM

## 2019-09-04 PROCEDURE — 99024 POSTOP FOLLOW-UP VISIT: CPT

## 2019-09-04 NOTE — OBJECTIVE
[Soft and Nontender] : soft and nontender [Good Support] : Good support [Healing well] : healing well [FreeTextEntry3] : suture lines intact, some suture on the posterior vaginal wall is still present

## 2019-09-04 NOTE — ADDENDUM
[FreeTextEntry1] : \par You look great!\par \par You can start doing all the restrictions again in 2 weeks\par \par Please call the office if you have any bladder or vaginal issues\par \par Please continue your routine gyn care with your general gynecologist\par \par Follow up as needed

## 2019-09-04 NOTE — DISCUSSION/SUMMARY
[FreeTextEntry1] : \par Cystocele-\par The patient is very happy with the postoperative results. All postoperative restrictions are lifted at 10 weeks. Advised to continue routine gyn care with her primary gyn provider. Patient advised to follow up with us if she has any issues . The patient voiced understanding and agrees with the plan.\par \par \par

## 2019-09-05 ENCOUNTER — FORM ENCOUNTER (OUTPATIENT)
Age: 64
End: 2019-09-05

## 2019-09-06 ENCOUNTER — OUTPATIENT (OUTPATIENT)
Dept: OUTPATIENT SERVICES | Facility: HOSPITAL | Age: 64
LOS: 1 days | Discharge: HOME | End: 2019-09-06
Payer: MEDICARE

## 2019-09-06 DIAGNOSIS — R91.1 SOLITARY PULMONARY NODULE: Chronic | ICD-10-CM

## 2019-09-06 DIAGNOSIS — Z90.710 ACQUIRED ABSENCE OF BOTH CERVIX AND UTERUS: Chronic | ICD-10-CM

## 2019-09-06 DIAGNOSIS — Z90.49 ACQUIRED ABSENCE OF OTHER SPECIFIED PARTS OF DIGESTIVE TRACT: Chronic | ICD-10-CM

## 2019-09-06 DIAGNOSIS — J44.9 CHRONIC OBSTRUCTIVE PULMONARY DISEASE, UNSPECIFIED: ICD-10-CM

## 2019-09-06 DIAGNOSIS — J38.1 POLYP OF VOCAL CORD AND LARYNX: Chronic | ICD-10-CM

## 2019-09-06 PROCEDURE — 71250 CT THORAX DX C-: CPT | Mod: 26

## 2019-09-09 ENCOUNTER — OUTPATIENT (OUTPATIENT)
Dept: OUTPATIENT SERVICES | Facility: HOSPITAL | Age: 64
LOS: 1 days | Discharge: HOME | End: 2019-09-09

## 2019-09-09 DIAGNOSIS — J44.9 CHRONIC OBSTRUCTIVE PULMONARY DISEASE, UNSPECIFIED: ICD-10-CM

## 2019-09-09 DIAGNOSIS — J38.1 POLYP OF VOCAL CORD AND LARYNX: Chronic | ICD-10-CM

## 2019-09-09 DIAGNOSIS — R91.1 SOLITARY PULMONARY NODULE: Chronic | ICD-10-CM

## 2019-09-09 DIAGNOSIS — Z90.710 ACQUIRED ABSENCE OF BOTH CERVIX AND UTERUS: Chronic | ICD-10-CM

## 2019-09-09 DIAGNOSIS — Z90.49 ACQUIRED ABSENCE OF OTHER SPECIFIED PARTS OF DIGESTIVE TRACT: Chronic | ICD-10-CM

## 2019-09-16 DIAGNOSIS — N81.11 CYSTOCELE, MIDLINE: ICD-10-CM

## 2019-09-23 ENCOUNTER — OUTPATIENT (OUTPATIENT)
Dept: OUTPATIENT SERVICES | Facility: HOSPITAL | Age: 64
LOS: 1 days | Discharge: HOME | End: 2019-09-23

## 2019-09-23 ENCOUNTER — APPOINTMENT (OUTPATIENT)
Dept: RHEUMATOLOGY | Facility: CLINIC | Age: 64
End: 2019-09-23
Payer: MEDICARE

## 2019-09-23 VITALS — DIASTOLIC BLOOD PRESSURE: 88 MMHG | HEART RATE: 89 BPM | SYSTOLIC BLOOD PRESSURE: 146 MMHG

## 2019-09-23 DIAGNOSIS — J38.1 POLYP OF VOCAL CORD AND LARYNX: Chronic | ICD-10-CM

## 2019-09-23 DIAGNOSIS — Z90.49 ACQUIRED ABSENCE OF OTHER SPECIFIED PARTS OF DIGESTIVE TRACT: Chronic | ICD-10-CM

## 2019-09-23 DIAGNOSIS — Z90.710 ACQUIRED ABSENCE OF BOTH CERVIX AND UTERUS: Chronic | ICD-10-CM

## 2019-09-23 DIAGNOSIS — R91.1 SOLITARY PULMONARY NODULE: Chronic | ICD-10-CM

## 2019-09-23 PROCEDURE — 99214 OFFICE O/P EST MOD 30 MIN: CPT

## 2019-09-24 NOTE — PHYSICAL EXAM
[General Appearance - In No Acute Distress] : in no acute distress [General Appearance - Alert] : alert [Sclera] : the sclera and conjunctiva were normal [Exaggerated Use Of Accessory Muscles For Inspiration] : no accessory muscle use [Abdomen Soft] : soft [] : no rash [Skin Lesions] : no skin lesions [FreeTextEntry1] : Normal muscle bulk/tone

## 2019-09-24 NOTE — HISTORY OF PRESENT ILLNESS
[___ Month(s) Ago] : [unfilled] month(s) ago [FreeTextEntry1] : Patient reports no changes since her last visit. Still complains of hand and foot joint pain intermittently but reports that is has been stable for years. \par ROS is otherwise negative except for mild abdominal crampy pain that has been going on since her bladder surgery.

## 2019-09-24 NOTE — ASSESSMENT
[FreeTextEntry1] : 64 year old female with polychondritis and osteoporosis presenting for follow up. \par Last DEXA in april 2019 - osteoporosis of fem neck\par Symptoms of polychondritis well controlled. \par \par PLAN\par - CBC and CMP with LFTs for next visit\par - opthalmology referral \par - xray thoraco-lumbar spine to evaluate for compression fractures\par - Flu vaccine given \par - keep same doses of medication as patient is tolerating well

## 2019-09-24 NOTE — END OF VISIT
[] : Resident [FreeTextEntry3] : 65 yo F presenting for routine follow up of RP on HCQ/MMF and osteoporosis on alendronate. Pt doing well without new symptoms suggestive of active RP. Denies auricular symptoms, ocular pain/redness, other new HEENT complaints. No new respiratory complaints. Follows with pulm for COPD (ongoing active tobacco abuse 1 ppd). No new peripheral joint complaints although does have worsened thoracic/lumbar pain. Would recommend xray thoracic/lumbar spine for completeness given known osteoporosis r/o compression fractures. Had been non-compliant on alendronate before being restarted on it earlier this year, tolerating well. Recommend continuing alendronate, MMF/HCQ. Should f/u with ophthalmology. Repeat labs in 3 months before f/u OV. Given flu vaccine today. Encouraged continued work at tobacco cessation (pt notes personal stressors have made cessation challenging).

## 2019-09-25 DIAGNOSIS — M81.0 AGE-RELATED OSTEOPOROSIS WITHOUT CURRENT PATHOLOGICAL FRACTURE: ICD-10-CM

## 2019-09-25 DIAGNOSIS — M94.1 RELAPSING POLYCHONDRITIS: ICD-10-CM

## 2019-09-25 DIAGNOSIS — Z51.81 ENCOUNTER FOR THERAPEUTIC DRUG LEVEL MONITORING: ICD-10-CM

## 2019-09-25 DIAGNOSIS — Z72.0 TOBACCO USE: ICD-10-CM

## 2019-10-08 ENCOUNTER — FORM ENCOUNTER (OUTPATIENT)
Age: 64
End: 2019-10-08

## 2019-10-09 ENCOUNTER — OUTPATIENT (OUTPATIENT)
Dept: OUTPATIENT SERVICES | Facility: HOSPITAL | Age: 64
LOS: 1 days | Discharge: HOME | End: 2019-10-09
Payer: MEDICARE

## 2019-10-09 ENCOUNTER — OUTPATIENT (OUTPATIENT)
Dept: OUTPATIENT SERVICES | Facility: HOSPITAL | Age: 64
LOS: 1 days | Discharge: HOME | End: 2019-10-09

## 2019-10-09 ENCOUNTER — APPOINTMENT (OUTPATIENT)
Dept: INTERNAL MEDICINE | Facility: CLINIC | Age: 64
End: 2019-10-09
Payer: MEDICARE

## 2019-10-09 VITALS
BODY MASS INDEX: 25.61 KG/M2 | TEMPERATURE: 97.3 F | SYSTOLIC BLOOD PRESSURE: 130 MMHG | HEART RATE: 76 BPM | WEIGHT: 150 LBS | HEIGHT: 64 IN | DIASTOLIC BLOOD PRESSURE: 78 MMHG

## 2019-10-09 DIAGNOSIS — Z90.710 ACQUIRED ABSENCE OF BOTH CERVIX AND UTERUS: Chronic | ICD-10-CM

## 2019-10-09 DIAGNOSIS — J38.1 POLYP OF VOCAL CORD AND LARYNX: Chronic | ICD-10-CM

## 2019-10-09 DIAGNOSIS — R91.1 SOLITARY PULMONARY NODULE: Chronic | ICD-10-CM

## 2019-10-09 DIAGNOSIS — Z90.49 ACQUIRED ABSENCE OF OTHER SPECIFIED PARTS OF DIGESTIVE TRACT: Chronic | ICD-10-CM

## 2019-10-09 DIAGNOSIS — R05 COUGH: ICD-10-CM

## 2019-10-09 DIAGNOSIS — M81.0 AGE-RELATED OSTEOPOROSIS WITHOUT CURRENT PATHOLOGICAL FRACTURE: ICD-10-CM

## 2019-10-09 PROCEDURE — 72070 X-RAY EXAM THORAC SPINE 2VWS: CPT | Mod: 26

## 2019-10-09 PROCEDURE — 71046 X-RAY EXAM CHEST 2 VIEWS: CPT | Mod: 26

## 2019-10-09 PROCEDURE — 72100 X-RAY EXAM L-S SPINE 2/3 VWS: CPT | Mod: 26,76

## 2019-10-09 PROCEDURE — 99214 OFFICE O/P EST MOD 30 MIN: CPT

## 2019-10-09 RX ORDER — ESTRADIOL 0.1 MG/G
0.1 CREAM VAGINAL
Qty: 1 | Refills: 3 | Status: DISCONTINUED | COMMUNITY
Start: 2019-05-10 | End: 2019-10-09

## 2019-10-09 RX ORDER — NITROFURANTOIN (MONOHYDRATE/MACROCRYSTALS) 25; 75 MG/1; MG/1
100 CAPSULE ORAL
Qty: 14 | Refills: 0 | Status: DISCONTINUED | COMMUNITY
Start: 2019-07-01 | End: 2019-10-09

## 2019-10-09 NOTE — ASSESSMENT
[FreeTextEntry1] : 64 yoF with hx of relapsing polychondritis, COPD, HTN, GERD, presents for productive cough and congestion\par \par # Productive cough and congestion\par - states it started after flu shot\par - continues to smoke, does not want to try the patch or gum\par - no chills/fever\par - sending for chest xray pa/lat and mucinex 600 bid\par \par # Relapsing polychonditis\par -c/w cellcept and hydroxychloroquine\par \par # GERD\par -c/w omeprazole\par \par # HTN\par - /78\par -c/w lisinopril\par \par # HCM\par -last colonoscopy was clear, states it's been less than 10 years\par - will get mammogram, last year it was clear\par - last pap smear 7 months ago was clear\par \par f/u in 3 months and prn

## 2019-10-09 NOTE — HISTORY OF PRESENT ILLNESS
[FreeTextEntry1] : 3 month follow up [de-identified] : 64 yoF with hx of relapsing polychondritis, RLL lung adenocarcinoma s/p RFA, HTN, COPD, prediabetes presents for 3 month follow up.  States that she has been coughing and feels congested since getting the flu shot on the 23rd.  Coughs up yellowish mucus that has become clearer.  Also states she has had this in the past.  Feels lightheaded once in a while, no SOB, states she hears herself wheezing.  No chest pain.  States she gets rib pain when she coughs.  No systemic muscle aches.  No chills or fever at home.

## 2019-10-10 DIAGNOSIS — S32.000A WEDGE COMPRESSION FRACTURE OF UNSPECIFIED LUMBAR VERTEBRA, INITIAL ENCOUNTER FOR CLOSED FRACTURE: ICD-10-CM

## 2019-10-11 PROBLEM — S32.000A LUMBAR COMPRESSION FRACTURE: Status: ACTIVE | Noted: 2019-10-11

## 2019-10-22 DIAGNOSIS — J44.9 CHRONIC OBSTRUCTIVE PULMONARY DISEASE, UNSPECIFIED: ICD-10-CM

## 2019-10-22 DIAGNOSIS — M94.1 RELAPSING POLYCHONDRITIS: ICD-10-CM

## 2019-10-22 DIAGNOSIS — I10 ESSENTIAL (PRIMARY) HYPERTENSION: ICD-10-CM

## 2019-10-22 DIAGNOSIS — R05 COUGH: ICD-10-CM

## 2019-10-22 DIAGNOSIS — E78.5 HYPERLIPIDEMIA, UNSPECIFIED: ICD-10-CM

## 2019-12-13 ENCOUNTER — APPOINTMENT (OUTPATIENT)
Dept: PULMONOLOGY | Facility: CLINIC | Age: 64
End: 2019-12-13
Payer: MEDICARE

## 2019-12-13 ENCOUNTER — OUTPATIENT (OUTPATIENT)
Dept: OUTPATIENT SERVICES | Facility: HOSPITAL | Age: 64
LOS: 1 days | Discharge: HOME | End: 2019-12-13

## 2019-12-13 VITALS
HEART RATE: 91 BPM | HEIGHT: 64 IN | WEIGHT: 151 LBS | SYSTOLIC BLOOD PRESSURE: 128 MMHG | OXYGEN SATURATION: 93 % | DIASTOLIC BLOOD PRESSURE: 87 MMHG | BODY MASS INDEX: 25.78 KG/M2

## 2019-12-13 DIAGNOSIS — J38.1 POLYP OF VOCAL CORD AND LARYNX: Chronic | ICD-10-CM

## 2019-12-13 DIAGNOSIS — Z90.49 ACQUIRED ABSENCE OF OTHER SPECIFIED PARTS OF DIGESTIVE TRACT: Chronic | ICD-10-CM

## 2019-12-13 DIAGNOSIS — R91.1 SOLITARY PULMONARY NODULE: Chronic | ICD-10-CM

## 2019-12-13 DIAGNOSIS — Z90.710 ACQUIRED ABSENCE OF BOTH CERVIX AND UTERUS: Chronic | ICD-10-CM

## 2019-12-13 PROCEDURE — 99213 OFFICE O/P EST LOW 20 MIN: CPT | Mod: GC

## 2019-12-13 NOTE — ASSESSMENT
[FreeTextEntry1] : 64 yo w/ hx of COPD, malignant right lung nodule s/p ablation presents for follow up\par \par #Dry cough, COPD\par -add Incruse\par -Ventolin prn\par -PFT Sept 2019 reviewed -> moderate obstruction, severe decrease in diffusing capacity, significant response to bronchodilator\par -RTC in 6 months\par \par #Lung nodule/adeno ca s/p ablation\par -patient reports that the repeat PET scan was clear but she does not recall when and where she had it\par -does not want to follow up with Heme/onc \par -CT chest w/o contrast Sept 2019 reviewed -> stable nodules, she needs annual CT

## 2019-12-13 NOTE — PHYSICAL EXAM
[General Appearance - Well Developed] : well developed [Normal Appearance] : normal appearance [General Appearance - Well Nourished] : well nourished [General Appearance - In No Acute Distress] : no acute distress [] : no respiratory distress [Exaggerated Use Of Accessory Muscles For Inspiration] : no accessory muscle use [Respiration, Rhythm And Depth] : normal respiratory rhythm and effort [Auscultation Breath Sounds / Voice Sounds] : lungs were clear to auscultation bilaterally [FreeTextEntry1] : erythematous pharynx

## 2019-12-13 NOTE — HISTORY OF PRESENT ILLNESS
[FreeTextEntry1] : 64 yo w/ hx of COPD, malignant right lung nodule s/p ablation presents for follow up. She is still smoking, currently 1ppd, failed Chantix and patches before. She has intermittent productive cough of whitish sputum the became dry cough, worse with lying down, feels something tickling back of her throat.

## 2019-12-17 DIAGNOSIS — R05 COUGH: ICD-10-CM

## 2019-12-17 DIAGNOSIS — R91.1 SOLITARY PULMONARY NODULE: ICD-10-CM

## 2019-12-17 DIAGNOSIS — J44.9 CHRONIC OBSTRUCTIVE PULMONARY DISEASE, UNSPECIFIED: ICD-10-CM

## 2020-01-08 ENCOUNTER — OUTPATIENT (OUTPATIENT)
Dept: OUTPATIENT SERVICES | Facility: HOSPITAL | Age: 65
LOS: 1 days | Discharge: HOME | End: 2020-01-08
Payer: MEDICARE

## 2020-01-08 DIAGNOSIS — Z90.49 ACQUIRED ABSENCE OF OTHER SPECIFIED PARTS OF DIGESTIVE TRACT: Chronic | ICD-10-CM

## 2020-01-08 DIAGNOSIS — Z90.710 ACQUIRED ABSENCE OF BOTH CERVIX AND UTERUS: Chronic | ICD-10-CM

## 2020-01-08 DIAGNOSIS — J38.1 POLYP OF VOCAL CORD AND LARYNX: Chronic | ICD-10-CM

## 2020-01-08 DIAGNOSIS — R91.1 SOLITARY PULMONARY NODULE: Chronic | ICD-10-CM

## 2020-01-08 PROCEDURE — 92134 CPTRZ OPH DX IMG PST SGM RTA: CPT | Mod: 26

## 2020-01-08 PROCEDURE — 92004 COMPRE OPH EXAM NEW PT 1/>: CPT

## 2020-01-16 ENCOUNTER — APPOINTMENT (OUTPATIENT)
Dept: RHEUMATOLOGY | Facility: CLINIC | Age: 65
End: 2020-01-16

## 2020-01-18 DIAGNOSIS — H11.123 CONJUNCTIVAL CONCRETIONS, BILATERAL: ICD-10-CM

## 2020-01-18 DIAGNOSIS — H35.039 HYPERTENSIVE RETINOPATHY, UNSPECIFIED EYE: ICD-10-CM

## 2020-01-18 DIAGNOSIS — H02.88B MEIBOMIAN GLAND DYSFUNCTION LEFT EYE, UPPER AND LOWER EYELIDS: ICD-10-CM

## 2020-01-18 DIAGNOSIS — M94.1 RELAPSING POLYCHONDRITIS: ICD-10-CM

## 2020-01-18 DIAGNOSIS — H35.343 MACULAR CYST, HOLE, OR PSEUDOHOLE, BILATERAL: ICD-10-CM

## 2020-01-18 DIAGNOSIS — H02.88A MEIBOMIAN GLAND DYSFUNCTION RIGHT EYE, UPPER AND LOWER EYELIDS: ICD-10-CM

## 2020-01-18 DIAGNOSIS — Z79.899 OTHER LONG TERM (CURRENT) DRUG THERAPY: ICD-10-CM

## 2020-01-22 ENCOUNTER — OUTPATIENT (OUTPATIENT)
Dept: OUTPATIENT SERVICES | Facility: HOSPITAL | Age: 65
LOS: 1 days | Discharge: HOME | End: 2020-01-22

## 2020-01-22 ENCOUNTER — APPOINTMENT (OUTPATIENT)
Dept: INTERNAL MEDICINE | Facility: CLINIC | Age: 65
End: 2020-01-22
Payer: MEDICARE

## 2020-01-22 VITALS
BODY MASS INDEX: 25.95 KG/M2 | SYSTOLIC BLOOD PRESSURE: 116 MMHG | HEART RATE: 94 BPM | HEIGHT: 64 IN | DIASTOLIC BLOOD PRESSURE: 86 MMHG | WEIGHT: 152 LBS

## 2020-01-22 DIAGNOSIS — R07.81 PLEURODYNIA: ICD-10-CM

## 2020-01-22 DIAGNOSIS — J38.1 POLYP OF VOCAL CORD AND LARYNX: Chronic | ICD-10-CM

## 2020-01-22 DIAGNOSIS — Z90.49 ACQUIRED ABSENCE OF OTHER SPECIFIED PARTS OF DIGESTIVE TRACT: Chronic | ICD-10-CM

## 2020-01-22 DIAGNOSIS — R91.1 SOLITARY PULMONARY NODULE: Chronic | ICD-10-CM

## 2020-01-22 DIAGNOSIS — Z90.710 ACQUIRED ABSENCE OF BOTH CERVIX AND UTERUS: Chronic | ICD-10-CM

## 2020-01-22 PROCEDURE — 99213 OFFICE O/P EST LOW 20 MIN: CPT | Mod: GC

## 2020-01-22 RX ORDER — LISINOPRIL 5 MG/1
5 TABLET ORAL DAILY
Qty: 30 | Refills: 5 | Status: COMPLETED | COMMUNITY
Start: 2017-01-30 | End: 2020-01-22

## 2020-01-22 RX ORDER — CHOLECALCIFEROL (VITAMIN D3) 25 MCG
TABLET ORAL DAILY
Refills: 0 | Status: COMPLETED | COMMUNITY
End: 2020-01-22

## 2020-01-22 RX ORDER — OMEPRAZOLE 40 MG/1
40 CAPSULE, DELAYED RELEASE ORAL
Qty: 30 | Refills: 5 | Status: COMPLETED | COMMUNITY
Start: 2017-01-30 | End: 2020-01-22

## 2020-01-22 NOTE — ASSESSMENT
[FreeTextEntry1] : 64 yoF with hx of relapsing polychondritis, COPD, HTN, GERD, presents for productive cough and congestion\par \par # Recurrent cough for 3 months - denies post nasal drip\par - Chest XR\par - C/w inhalers\par - Change ACEI to ARB\par - On omeprazole but no current symptoms\par - Pulm f/u\par - Mucinex\par \par # Right rib pain\par - Rib series XR\par \par # Relapsing polychonditis\par -c/w cellcept and hydroxychloroquine\par - Ophthomology followup\par - Rheum f/u\par \par # Hx of lung lesion s/p RFA\par - Needs annual CT chest w/o per pulm, next Sep 2020\par \par # COPD: Stable\par - Incruse\par - Ventolin\par - RTC pulm in 6/20\par \par # Osteoporosis\par - C/w alendronate\par - Hold Vit D\par - Check Vit D level\par \par # GERD\par -c/w omeprazole\par \par # HTN\par - Change to losartan 25mg\par \par # Cystocele s/p correction\par - F/u with uro-gyn\par \par # Compression fractures/back pain: Has age indeterminate compression fxs at L2, L3, L4, T5, T8\par \par # HCM\par -last colonoscopy was clear, states it's been less than 10 years\par - Ordered mammogram\par - s/p total hysterectomy\par \par f/u in 3 months and prn.

## 2020-01-22 NOTE — HISTORY OF PRESENT ILLNESS
[de-identified] : 64 yoF with hx of relapsing polychondritis, RLL lung adenocarcinoma s/p RFA, HTN, COPD, prediabetes presents for followup. \par 2 months of cough on and off, improved with Incruse and albuterol, occasionally has small amount of phlegm; was prescribed Mucinex with some relief\par Right rib pain, states worse after taking alendronate, no burnign on urination, no fevers\par Seen by pulm, started on Incruse \par Back pain stable\par \par

## 2020-01-29 DIAGNOSIS — R05 COUGH: ICD-10-CM

## 2020-01-29 DIAGNOSIS — J44.9 CHRONIC OBSTRUCTIVE PULMONARY DISEASE, UNSPECIFIED: ICD-10-CM

## 2020-01-29 DIAGNOSIS — M94.1 RELAPSING POLYCHONDRITIS: ICD-10-CM

## 2020-02-17 ENCOUNTER — FORM ENCOUNTER (OUTPATIENT)
Age: 65
End: 2020-02-17

## 2020-02-18 ENCOUNTER — OUTPATIENT (OUTPATIENT)
Dept: OUTPATIENT SERVICES | Facility: HOSPITAL | Age: 65
LOS: 1 days | Discharge: HOME | End: 2020-02-18
Payer: MEDICARE

## 2020-02-18 DIAGNOSIS — Z90.710 ACQUIRED ABSENCE OF BOTH CERVIX AND UTERUS: Chronic | ICD-10-CM

## 2020-02-18 DIAGNOSIS — J38.1 POLYP OF VOCAL CORD AND LARYNX: Chronic | ICD-10-CM

## 2020-02-18 DIAGNOSIS — Z90.49 ACQUIRED ABSENCE OF OTHER SPECIFIED PARTS OF DIGESTIVE TRACT: Chronic | ICD-10-CM

## 2020-02-18 DIAGNOSIS — R91.1 SOLITARY PULMONARY NODULE: Chronic | ICD-10-CM

## 2020-02-18 DIAGNOSIS — R07.81 PLEURODYNIA: ICD-10-CM

## 2020-02-18 PROCEDURE — 71100 X-RAY EXAM RIBS UNI 2 VIEWS: CPT | Mod: 26,RT

## 2020-02-18 PROCEDURE — 99213 OFFICE O/P EST LOW 20 MIN: CPT

## 2020-02-18 PROCEDURE — 92083 EXTENDED VISUAL FIELD XM: CPT | Mod: 26

## 2020-02-18 PROCEDURE — 71046 X-RAY EXAM CHEST 2 VIEWS: CPT | Mod: 26

## 2020-02-18 PROCEDURE — 92020 GONIOSCOPY: CPT

## 2020-02-26 DIAGNOSIS — Z79.899 OTHER LONG TERM (CURRENT) DRUG THERAPY: ICD-10-CM

## 2020-02-26 DIAGNOSIS — M94.1 RELAPSING POLYCHONDRITIS: ICD-10-CM

## 2020-02-26 DIAGNOSIS — H40.033 ANATOMICAL NARROW ANGLE, BILATERAL: ICD-10-CM

## 2020-02-26 DIAGNOSIS — H02.88A MEIBOMIAN GLAND DYSFUNCTION RIGHT EYE, UPPER AND LOWER EYELIDS: ICD-10-CM

## 2020-02-26 DIAGNOSIS — H02.88B MEIBOMIAN GLAND DYSFUNCTION LEFT EYE, UPPER AND LOWER EYELIDS: ICD-10-CM

## 2020-03-01 ENCOUNTER — FORM ENCOUNTER (OUTPATIENT)
Age: 65
End: 2020-03-01

## 2020-03-02 ENCOUNTER — RESULT REVIEW (OUTPATIENT)
Age: 65
End: 2020-03-02

## 2020-03-02 ENCOUNTER — OUTPATIENT (OUTPATIENT)
Dept: OUTPATIENT SERVICES | Facility: HOSPITAL | Age: 65
LOS: 1 days | Discharge: HOME | End: 2020-03-02
Payer: MEDICARE

## 2020-03-02 DIAGNOSIS — Z12.31 ENCOUNTER FOR SCREENING MAMMOGRAM FOR MALIGNANT NEOPLASM OF BREAST: ICD-10-CM

## 2020-03-02 DIAGNOSIS — J38.1 POLYP OF VOCAL CORD AND LARYNX: Chronic | ICD-10-CM

## 2020-03-02 DIAGNOSIS — Z90.710 ACQUIRED ABSENCE OF BOTH CERVIX AND UTERUS: Chronic | ICD-10-CM

## 2020-03-02 DIAGNOSIS — Z90.49 ACQUIRED ABSENCE OF OTHER SPECIFIED PARTS OF DIGESTIVE TRACT: Chronic | ICD-10-CM

## 2020-03-02 DIAGNOSIS — R91.1 SOLITARY PULMONARY NODULE: Chronic | ICD-10-CM

## 2020-03-02 PROCEDURE — 77067 SCR MAMMO BI INCL CAD: CPT | Mod: 26

## 2020-03-02 PROCEDURE — 77063 BREAST TOMOSYNTHESIS BI: CPT | Mod: 26

## 2020-04-23 ENCOUNTER — APPOINTMENT (OUTPATIENT)
Dept: RHEUMATOLOGY | Facility: CLINIC | Age: 65
End: 2020-04-23

## 2020-05-04 ENCOUNTER — APPOINTMENT (OUTPATIENT)
Dept: INTERNAL MEDICINE | Facility: CLINIC | Age: 65
End: 2020-05-04

## 2020-05-29 ENCOUNTER — RX RENEWAL (OUTPATIENT)
Age: 65
End: 2020-05-29

## 2020-06-09 ENCOUNTER — OUTPATIENT (OUTPATIENT)
Dept: OUTPATIENT SERVICES | Facility: HOSPITAL | Age: 65
LOS: 1 days | Discharge: HOME | End: 2020-06-09
Payer: MEDICARE

## 2020-06-09 ENCOUNTER — RESULT REVIEW (OUTPATIENT)
Age: 65
End: 2020-06-09

## 2020-06-09 DIAGNOSIS — J38.1 POLYP OF VOCAL CORD AND LARYNX: Chronic | ICD-10-CM

## 2020-06-09 DIAGNOSIS — Z90.710 ACQUIRED ABSENCE OF BOTH CERVIX AND UTERUS: Chronic | ICD-10-CM

## 2020-06-09 DIAGNOSIS — Z90.49 ACQUIRED ABSENCE OF OTHER SPECIFIED PARTS OF DIGESTIVE TRACT: Chronic | ICD-10-CM

## 2020-06-09 DIAGNOSIS — R92.8 OTHER ABNORMAL AND INCONCLUSIVE FINDINGS ON DIAGNOSTIC IMAGING OF BREAST: ICD-10-CM

## 2020-06-09 DIAGNOSIS — R91.1 SOLITARY PULMONARY NODULE: Chronic | ICD-10-CM

## 2020-06-09 PROCEDURE — 76642 ULTRASOUND BREAST LIMITED: CPT | Mod: 26,LT

## 2020-06-09 PROCEDURE — 77061 BREAST TOMOSYNTHESIS UNI: CPT | Mod: 26,LT

## 2020-06-09 PROCEDURE — 77065 DX MAMMO INCL CAD UNI: CPT | Mod: 26,LT

## 2020-06-09 PROCEDURE — G0279: CPT | Mod: 26

## 2020-06-12 ENCOUNTER — APPOINTMENT (OUTPATIENT)
Dept: PULMONOLOGY | Facility: CLINIC | Age: 65
End: 2020-06-12
Payer: MEDICARE

## 2020-06-12 ENCOUNTER — OUTPATIENT (OUTPATIENT)
Dept: OUTPATIENT SERVICES | Facility: HOSPITAL | Age: 65
LOS: 1 days | Discharge: HOME | End: 2020-06-12

## 2020-06-12 VITALS
DIASTOLIC BLOOD PRESSURE: 91 MMHG | WEIGHT: 148 LBS | BODY MASS INDEX: 25.27 KG/M2 | OXYGEN SATURATION: 98 % | HEIGHT: 64 IN | SYSTOLIC BLOOD PRESSURE: 148 MMHG | HEART RATE: 93 BPM

## 2020-06-12 DIAGNOSIS — J38.1 POLYP OF VOCAL CORD AND LARYNX: Chronic | ICD-10-CM

## 2020-06-12 DIAGNOSIS — Z90.49 ACQUIRED ABSENCE OF OTHER SPECIFIED PARTS OF DIGESTIVE TRACT: Chronic | ICD-10-CM

## 2020-06-12 DIAGNOSIS — R91.1 SOLITARY PULMONARY NODULE: Chronic | ICD-10-CM

## 2020-06-12 DIAGNOSIS — Z90.710 ACQUIRED ABSENCE OF BOTH CERVIX AND UTERUS: Chronic | ICD-10-CM

## 2020-06-12 PROCEDURE — 99213 OFFICE O/P EST LOW 20 MIN: CPT | Mod: GC

## 2020-06-12 NOTE — PHYSICAL EXAM
[Well Nourished] : well nourished [Normal Appearance] : normal appearance [No Acute Distress] : no acute distress [Well Groomed] : well groomed

## 2020-06-12 NOTE — ASSESSMENT
[FreeTextEntry1] : 62 yo w/ hx of COPD, malignant right lung nodule s/p ablation presents for follow up\par \par #Dry cough, COPD\par -add Incruse\par -Ventolin prn\par -PFT Sept 2019 reviewed -> moderate obstruction, severe decrease in diffusing capacity, significant response to bronchodilator\par -RTC in 6 months\par \par #Lung nodule/adeno ca s/p ablation\par -patient reports that the repeat PET scan was clear but she does not recall when and where she had it\par -does not want to follow up with Heme/onc \par -CT chest w/o contrast Sept 2019 reviewed -> stable nodules, CT scan was ordered \par \par

## 2020-06-12 NOTE — REASON FOR VISIT
[Follow-Up] : a follow-up visit [Lung Cancer] : lung cancer [COPD] : COPD [Pulmonary Nodules] : pulmonary nodules

## 2020-06-12 NOTE — HISTORY OF PRESENT ILLNESS
[Current] : current [TextBox_4] : 64 yo w/ hx of COPD, malignant right lung nodule s/p ablation presents for follow up. She is still smoking, currently 1ppd, failed Chantix and patches before. She has intermittent productive cough of whitish sputum the became dry cough, worse with lying down, feels something tickling back of her throat. \par  \par  [TextBox_11] : 1 [TextBox_13] : 52 [TextBox_12] : Sept/2019

## 2020-06-12 NOTE — REVIEW OF SYSTEMS
[Cough] : cough [Negative] : Cardiovascular [Sputum] : sputum [Fever] : no fever [Fatigue] : no fatigue [Chills] : no chills [Hemoptysis] : no hemoptysis [Chest Discomfort] : no chest discomfort [TextBox_30] : As per HPI

## 2020-06-15 ENCOUNTER — OUTPATIENT (OUTPATIENT)
Dept: OUTPATIENT SERVICES | Facility: HOSPITAL | Age: 65
LOS: 1 days | Discharge: HOME | End: 2020-06-15

## 2020-06-15 ENCOUNTER — APPOINTMENT (OUTPATIENT)
Dept: INTERNAL MEDICINE | Facility: CLINIC | Age: 65
End: 2020-06-15
Payer: MEDICARE

## 2020-06-15 VITALS
HEIGHT: 64 IN | DIASTOLIC BLOOD PRESSURE: 85 MMHG | SYSTOLIC BLOOD PRESSURE: 158 MMHG | WEIGHT: 168 LBS | HEART RATE: 105 BPM | BODY MASS INDEX: 28.68 KG/M2

## 2020-06-15 DIAGNOSIS — R91.1 SOLITARY PULMONARY NODULE: Chronic | ICD-10-CM

## 2020-06-15 DIAGNOSIS — Z90.49 ACQUIRED ABSENCE OF OTHER SPECIFIED PARTS OF DIGESTIVE TRACT: Chronic | ICD-10-CM

## 2020-06-15 DIAGNOSIS — J38.1 POLYP OF VOCAL CORD AND LARYNX: Chronic | ICD-10-CM

## 2020-06-15 DIAGNOSIS — Z90.710 ACQUIRED ABSENCE OF BOTH CERVIX AND UTERUS: Chronic | ICD-10-CM

## 2020-06-15 LAB
25(OH)D3 SERPL-MCNC: 37 NG/ML
ALBUMIN SERPL ELPH-MCNC: 4.4 G/DL
ALP BLD-CCNC: 56 U/L
ALT SERPL-CCNC: 13 U/L
ANION GAP SERPL CALC-SCNC: 13 MMOL/L
AST SERPL-CCNC: 16 U/L
BASOPHILS # BLD AUTO: 0.05 K/UL
BASOPHILS NFR BLD AUTO: 0.7 %
BILIRUB SERPL-MCNC: 0.4 MG/DL
BUN SERPL-MCNC: 7 MG/DL
CALCIUM SERPL-MCNC: 9 MG/DL
CHLORIDE SERPL-SCNC: 104 MMOL/L
CHOLEST SERPL-MCNC: 159 MG/DL
CHOLEST/HDLC SERPL: 3.1 RATIO
CO2 SERPL-SCNC: 25 MMOL/L
CREAT SERPL-MCNC: 0.7 MG/DL
EOSINOPHIL # BLD AUTO: 0.07 K/UL
EOSINOPHIL NFR BLD AUTO: 0.9 %
ERYTHROCYTE [SEDIMENTATION RATE] IN BLOOD BY WESTERGREN METHOD: 6 MM/HR
ESTIMATED AVERAGE GLUCOSE: 120 MG/DL
GLUCOSE SERPL-MCNC: 104 MG/DL
HBA1C MFR BLD HPLC: 5.8 %
HCT VFR BLD CALC: 46 %
HDLC SERPL-MCNC: 52 MG/DL
HGB BLD-MCNC: 14.6 G/DL
IMM GRANULOCYTES NFR BLD AUTO: 0.3 %
LDLC SERPL CALC-MCNC: 88 MG/DL
LYMPHOCYTES # BLD AUTO: 1.32 K/UL
LYMPHOCYTES NFR BLD AUTO: 17.2 %
MAN DIFF?: NORMAL
MCHC RBC-ENTMCNC: 29.1 PG
MCHC RBC-ENTMCNC: 31.7 G/DL
MCV RBC AUTO: 91.8 FL
MONOCYTES # BLD AUTO: 0.43 K/UL
MONOCYTES NFR BLD AUTO: 5.6 %
NEUTROPHILS # BLD AUTO: 5.77 K/UL
NEUTROPHILS NFR BLD AUTO: 75.3 %
PLATELET # BLD AUTO: 257 K/UL
POTASSIUM SERPL-SCNC: 4.3 MMOL/L
PROT SERPL-MCNC: 6.2 G/DL
RBC # BLD: 5.01 M/UL
RBC # FLD: 13.2 %
SODIUM SERPL-SCNC: 142 MMOL/L
TRIGL SERPL-MCNC: 142 MG/DL
WBC # FLD AUTO: 7.66 K/UL

## 2020-06-15 PROCEDURE — 99213 OFFICE O/P EST LOW 20 MIN: CPT | Mod: GC

## 2020-06-15 NOTE — HISTORY OF PRESENT ILLNESS
[FreeTextEntry1] : follow up examination [de-identified] : 65 year old lady with PMHx of relapsing polychondritis, RLL lung adenocarcinoma s/p RFA, HTN, COPD, prediabetes presents for follow up. She states that she feels well overall and has no current complaints. Denied fever, chills, cough, HA, SOB, cp, n/v/d/c.

## 2020-06-15 NOTE — PHYSICAL EXAM
[No Acute Distress] : no acute distress [Well Nourished] : well nourished [Well-Appearing] : well-appearing [Well Developed] : well developed [Normal Sclera/Conjunctiva] : normal sclera/conjunctiva [PERRL] : pupils equal round and reactive to light [EOMI] : extraocular movements intact [Normal Outer Ear/Nose] : the outer ears and nose were normal in appearance [Normal Oropharynx] : the oropharynx was normal [No JVD] : no jugular venous distention [No Respiratory Distress] : no respiratory distress  [Supple] : supple [No Accessory Muscle Use] : no accessory muscle use [Normal Rate] : normal rate  [Clear to Auscultation] : lungs were clear to auscultation bilaterally [Regular Rhythm] : with a regular rhythm [Normal S1, S2] : normal S1 and S2 [No Murmur] : no murmur heard [No Edema] : there was no peripheral edema [Pedal Pulses Present] : the pedal pulses are present [No Extremity Clubbing/Cyanosis] : no extremity clubbing/cyanosis [Soft] : abdomen soft [Non-distended] : non-distended [Non Tender] : non-tender [Normal Bowel Sounds] : normal bowel sounds [No HSM] : no HSM [No Masses] : no abdominal mass palpated [No CVA Tenderness] : no CVA  tenderness [No Spinal Tenderness] : no spinal tenderness [No Joint Swelling] : no joint swelling [Grossly Normal Strength/Tone] : grossly normal strength/tone [No Rash] : no rash [Coordination Grossly Intact] : coordination grossly intact [No Focal Deficits] : no focal deficits [Normal Gait] : normal gait [Deep Tendon Reflexes (DTR)] : deep tendon reflexes were 2+ and symmetric [Normal Insight/Judgement] : insight and judgment were intact [Normal Affect] : the affect was normal

## 2020-06-15 NOTE — ASSESSMENT
[FreeTextEntry1] : 65 year old lady with PMHx of relapsing polychondritis, COPD, HTN, GERD, presents for follow up exam\par \par Relapsing polychonditis\par - c/w cellcept and hydroxychloroquine\par - Ophthomology followup\par - Rheum f/u\par \par Hx of lung lesion s/p RFA\par - Needs annual CT chest w/o per pulm, next Sep 2020\par \par COPD: Stable\par - RTC pulm in 6/20\par \par Osteoporosis\par - C/w alendronate\par -Vit D wnl\par \par GERD\par -c/w omeprazole\par \par HTN\par - c/w losartan 25mg\par \par Cystocele s/p correction\par - F/u with uro-gyn\par \par Compression fractures/back pain: Has age indeterminate compression fxs at L2, L3, L4, T5, T8\par \par HCM\par -last colonoscopy was clear, states it's been less than 10 years\par - mammogram shows suspicious lesion. Biopsy 6/19/2020 scheduled\par - smoking cessation\par \par f/u in 3 months and PRN. \par \par

## 2020-06-16 ENCOUNTER — OUTPATIENT (OUTPATIENT)
Dept: OUTPATIENT SERVICES | Facility: HOSPITAL | Age: 65
LOS: 1 days | Discharge: HOME | End: 2020-06-16
Payer: MEDICARE

## 2020-06-16 DIAGNOSIS — Z90.49 ACQUIRED ABSENCE OF OTHER SPECIFIED PARTS OF DIGESTIVE TRACT: Chronic | ICD-10-CM

## 2020-06-16 DIAGNOSIS — Z90.710 ACQUIRED ABSENCE OF BOTH CERVIX AND UTERUS: Chronic | ICD-10-CM

## 2020-06-16 DIAGNOSIS — J38.1 POLYP OF VOCAL CORD AND LARYNX: Chronic | ICD-10-CM

## 2020-06-16 DIAGNOSIS — R91.1 SOLITARY PULMONARY NODULE: Chronic | ICD-10-CM

## 2020-06-16 PROCEDURE — 92012 INTRM OPH EXAM EST PATIENT: CPT

## 2020-06-18 DIAGNOSIS — F17.200 NICOTINE DEPENDENCE, UNSPECIFIED, UNCOMPLICATED: ICD-10-CM

## 2020-06-19 ENCOUNTER — RESULT REVIEW (OUTPATIENT)
Age: 65
End: 2020-06-19

## 2020-06-19 ENCOUNTER — OUTPATIENT (OUTPATIENT)
Dept: OUTPATIENT SERVICES | Facility: HOSPITAL | Age: 65
LOS: 1 days | Discharge: HOME | End: 2020-06-19
Payer: MEDICARE

## 2020-06-19 DIAGNOSIS — Z90.710 ACQUIRED ABSENCE OF BOTH CERVIX AND UTERUS: Chronic | ICD-10-CM

## 2020-06-19 DIAGNOSIS — Z90.49 ACQUIRED ABSENCE OF OTHER SPECIFIED PARTS OF DIGESTIVE TRACT: Chronic | ICD-10-CM

## 2020-06-19 DIAGNOSIS — R91.1 SOLITARY PULMONARY NODULE: Chronic | ICD-10-CM

## 2020-06-19 DIAGNOSIS — J38.1 POLYP OF VOCAL CORD AND LARYNX: Chronic | ICD-10-CM

## 2020-06-19 PROCEDURE — 19082 BX BREAST ADD LESION STRTCTC: CPT | Mod: LT

## 2020-06-19 PROCEDURE — 88305 TISSUE EXAM BY PATHOLOGIST: CPT | Mod: 26

## 2020-06-19 PROCEDURE — 19081 BX BREAST 1ST LESION STRTCTC: CPT | Mod: LT

## 2020-06-22 DIAGNOSIS — N63.0 UNSPECIFIED LUMP IN UNSPECIFIED BREAST: ICD-10-CM

## 2020-06-22 LAB — SURGICAL PATHOLOGY STUDY: SIGNIFICANT CHANGE UP

## 2020-06-23 DIAGNOSIS — H25.13 AGE-RELATED NUCLEAR CATARACT, BILATERAL: ICD-10-CM

## 2020-06-23 DIAGNOSIS — H40.033 ANATOMICAL NARROW ANGLE, BILATERAL: ICD-10-CM

## 2020-06-23 DIAGNOSIS — H04.123 DRY EYE SYNDROME OF BILATERAL LACRIMAL GLANDS: ICD-10-CM

## 2020-06-23 DIAGNOSIS — H40.039 ANATOMICAL NARROW ANGLE, UNSPECIFIED EYE: ICD-10-CM

## 2020-06-25 DIAGNOSIS — N60.22 FIBROADENOSIS OF LEFT BREAST: ICD-10-CM

## 2020-06-25 DIAGNOSIS — N62 HYPERTROPHY OF BREAST: ICD-10-CM

## 2020-06-25 DIAGNOSIS — N60.32 FIBROSCLEROSIS OF LEFT BREAST: ICD-10-CM

## 2020-06-25 DIAGNOSIS — D24.2 BENIGN NEOPLASM OF LEFT BREAST: ICD-10-CM

## 2020-06-25 DIAGNOSIS — N64.2 ATROPHY OF BREAST: ICD-10-CM

## 2020-06-25 DIAGNOSIS — R92.0 MAMMOGRAPHIC MICROCALCIFICATION FOUND ON DIAGNOSTIC IMAGING OF BREAST: ICD-10-CM

## 2020-06-25 DIAGNOSIS — L90.5 SCAR CONDITIONS AND FIBROSIS OF SKIN: ICD-10-CM

## 2020-06-30 DIAGNOSIS — R91.1 SOLITARY PULMONARY NODULE: ICD-10-CM

## 2020-06-30 DIAGNOSIS — E78.5 HYPERLIPIDEMIA, UNSPECIFIED: ICD-10-CM

## 2020-06-30 DIAGNOSIS — K21.9 GASTRO-ESOPHAGEAL REFLUX DISEASE WITHOUT ESOPHAGITIS: ICD-10-CM

## 2020-06-30 DIAGNOSIS — I10 ESSENTIAL (PRIMARY) HYPERTENSION: ICD-10-CM

## 2020-06-30 DIAGNOSIS — R73.03 PREDIABETES: ICD-10-CM

## 2020-06-30 DIAGNOSIS — I25.10 ATHEROSCLEROTIC HEART DISEASE OF NATIVE CORONARY ARTERY WITHOUT ANGINA PECTORIS: ICD-10-CM

## 2020-06-30 DIAGNOSIS — J44.9 CHRONIC OBSTRUCTIVE PULMONARY DISEASE, UNSPECIFIED: ICD-10-CM

## 2020-07-13 ENCOUNTER — APPOINTMENT (OUTPATIENT)
Dept: BREAST CENTER | Facility: CLINIC | Age: 65
End: 2020-07-13
Payer: MEDICARE

## 2020-07-13 VITALS
HEIGHT: 64 IN | BODY MASS INDEX: 28.68 KG/M2 | SYSTOLIC BLOOD PRESSURE: 146 MMHG | TEMPERATURE: 98.3 F | WEIGHT: 168 LBS | DIASTOLIC BLOOD PRESSURE: 80 MMHG

## 2020-07-13 DIAGNOSIS — Z80.3 FAMILY HISTORY OF MALIGNANT NEOPLASM OF BREAST: ICD-10-CM

## 2020-07-13 DIAGNOSIS — Z86.79 PERSONAL HISTORY OF OTHER DISEASES OF THE CIRCULATORY SYSTEM: ICD-10-CM

## 2020-07-13 PROCEDURE — 99203 OFFICE O/P NEW LOW 30 MIN: CPT

## 2020-07-14 PROBLEM — Z80.3 FAMILY HISTORY OF BREAST CANCER: Status: ACTIVE | Noted: 2020-07-14

## 2020-07-14 PROBLEM — Z86.79 HISTORY OF HYPERTENSION: Status: RESOLVED | Noted: 2020-07-14 | Resolved: 2020-07-14

## 2020-07-14 NOTE — HISTORY OF PRESENT ILLNESS
[FreeTextEntry1] : Sol is a 65 F with two left breast radial scars. \par \par Her work up was as follows: \par 3/2/2020 -- b/l screening mammogram \par -heterogenously dense breasts\par -focal asymmetry in L UOQ \par -no suspicious mass, calcifications, or other abnormalities in her RIGHT breast \par BIRADS 0 \par \par 2020 -- L dx mammogram and US \par -circumscribed mass with amorphous calcifications in her left lateral breast --> no significant change \par -irregular mass with amorphous calcifications in UOQ, LEFT --> BIOPSY\par -new circumscribed mass in UOQ, LEFT --> BIOPSY \par L US \par @2N6, benign cyst which correlates with lateral breast mass seen on mammogram \par BIRADS 4\par \par 2020 -- L stereobx x 2 \par -anterior asymmetry (top hat) --> radial scar \par -posterior asymmetry (minicork) --> radial scar \par \par Prior to her biopsy, she had no breast related complaints.  She denies any breast pain, has not palpated any new palpable masses in either breast and denies any nipple discharge or retraction. However, she was a little sore in her left lateral breast after her biopsy.  \par \par HISTORICAL RISK FACTORS: \par -2 prior breast biopsies as above, no prior surgeries \par -family history of breast cancer in her sister and maternal grandmother \par -, age at first live birth was 18 \par -no prior OCP use, HRT use x 2 years, stopped \par -s/p GEETHA/BSO in  \par \par RISK ASSESSMENT: \par Babs\par 5yr -- 4.7%\par lifetime -- 15.8%\par \par TC v6\par 5yr -- 3.1%\par lifetime -- 8.7%\par

## 2020-07-14 NOTE — PAST MEDICAL HISTORY
[Menopause Age____] : age at menopause was [unfilled] [Menarche Age ____] : age at menarche was [unfilled] [History of Hormone Replacement Treatment] : has a history of hormone replacement treatment [Live Births ___] : P[unfilled]  [Total Preg ___] : G[unfilled] [Age At Live Birth ___] : Age at live birth: [unfilled] [FreeTextEntry5] : s/p GEETHA/KASIA in 1996 [FreeTextEntry6] : denies [FreeTextEntry7] : denies [FreeTextEntry8] : denies

## 2020-07-14 NOTE — ASSESSMENT
[FreeTextEntry1] : Sol is a 65 F with two left breast radial scars. \par \par On exam, there was no suspicious abnormalities palpated within either breast. \par Her most recent screening mammogram and subsequent L dx mammogram and US on 3/2/2020 and 6/9/2020 revealed an irregular mass with amorphous calcifications in UOQ, left breast and a new circumscribed mass in the L UOQ, both of which were biopsy proven radial scars. \par \par We discussed radial scars without atypia.  These lesions are considered fibroproliferative lesions without atypia.  Patients with these lesions were found to have a slightly increased relative risk compared to the reference population.  However the lesions themselves do not have any malignant potential. There is about a 10-20% upgrade rate to a high risk lesion (including atypical hyperplasias) and a <3% upgrade to cancer.  However, when atypia is present there is up to a 15-25% upgrade rate to cancer.  For this reason, we have discussed observation vs. surgical excision of this lesion.  I have recommended that we move forward with surgical excision at this time.\par \par Because it is not readily palpable, I will have her undergo a preoperative radiofrequency tag localization x 2.  \par \par The risks and benefits of the procedure were explained to the patient, including bleeding, infection, seroma/hematoma formation, and possible re-operation if the surgical excision yields an upgrade to cancer with positive margins. Informed consent was obtained today.\par \par In regards to her family history of breast cancer in her sister and maternal grandmother, her risk assessment is as follows: \par \par RISK ASSESSMENT: \par Babs\par 5yr -- 4.7%\par lifetime -- 15.8%\par \par TC v6\par 5yr -- 3.1%\par lifetime -- 8.7%\par \par This puts her in an average to intermediate risk for breast cancer. She does not quite meet criteria for screening breast MRIs, although she does meet criteria for chemopreventative medications.  This will be discussed with her further at her post op visit. \par \par All of her questions were answered.  She knows to call with any further questions or concerns. \par \par PLAN: \par -OR: LEFT BREAST WIDE LOCAL EXCISION OF TWO AREAS WITH RF LOCALIZATION \par -DIAGNOSIS: LEFT BREAST RADIAL SCAR X 2\par -f/up after

## 2020-07-14 NOTE — DATA REVIEWED
[FreeTextEntry1] : EXAM: MG MAMMO SCREEN W ALONSO BI#\par \par *** ADDENDUM 05/29/2020 ***\par \par ---------- ADDENDED REPORT ----------\par \par This patient's screening mammogram showed findings that require additional\par imaging evaluation.We have attempted to contact her by telephone and by\par letter to return for the recommended additional imaging. A certified letter\par was sent on 4/8/2020 and the patient has failed to respond; the additional\par imaging has not been scheduled. Based on the screening views, malignancy\par cannot be ruled out. Dr. Mendoza was contacted on 5/29/20.\par \par ASSESSMENT:\par BI-RADS Category 0: Incomplete: Needs Additional Imaging Evaluation\par \par ----------\par \par \par *** END OF ADDENDUM 05/29/2020 ***\par \par \par \par PROCEDURE DATE: 03/02/2020\par \par \par \par INTERPRETATION: HISTORY:\par Bilateral MG MAMMO SCREEN W ALONSO BI# was performed. Patient is 64 years old\par and is seen for screening. The patient has a history of lung cancer at age\par 62. The patient has the following family history of breast cancer:\par maternal grandmother, at age 60, breast cancer.\par \par RISK ASSESSMENT:\par NCI Lifetime Risk: 4.2\par Tyrer-Cuzick Lifetime Risk: 7.8\par \par CLINICAL BREAST EXAM:\par The patient reports her last clinical breast exam was performed over one\par year ago.\par \par COMPARISON STUDIES:\par The present examination has been compared to prior imaging studies performed\par at [facility] on 02/24/2017, 02/26/2018 and\par 02/28/2019.\par \par MAMMOGRAM FINDINGS:\par Mammography was performed including the following views: bilateral\par craniocaudal with tomosynthesis, bilateral mediolateral oblique with\par tomosynthesis. The examination includes digital synthetic 2D and digital\par tomosynthesis 3D images. Additional imaging analysis was performed using CAD\par (computer-aided detection) software.\par \par The breasts are heterogeneously dense, which may obscure small masses.\par \par There is a focal asymmetry seen in the upper outer quadrant of the left\par breast.\par \par No suspicious mass, grouping of calcifications, or other abnormality is\par identified in the right breast.\par \par IMPRESSION:\par Focal asymmetry in the left breast requires additional evaluation.\par \par RECOMMENDATION:\par Patient will be recalled for additional mammographic views and, if\par indicated, breast ultrasound.\par \par ASSESSMENT:\par BI-RADS Category 0: Incomplete: Needs Additional Imaging Evaluation\par \par The patient will be notified of these results by telephone, and will also be\par mailed a written summary in layman's terms.\par \par \par \par ***Please see the addendum at the top of this report. It may contain\par additional important information or changes.****\par \par \par VADIM ORTIZ M.D., RESIDENT RADIOLOGIST\par This document has been electronically signed.\par ANEL LANGE M.D., ATTENDING RADIOLOGIST\par This document has been electronically signed. Mar 2 2020 12:24PM\par Addend: ANEL LANGE M.D., ATTENDING RADIOLOGIST\par This addendum was electronically signed on: May 29 2020 11:31AM.\par \par EXAM: US BREAST LIMITED LT\par EXAM: MG MAMMO DIAG W ALONSO LT#\par \par \par PROCEDURE DATE: 06/09/2020\par \par \par \par INTERPRETATION:\par CLINICAL HISTORY: Additional imaging requested from screening mammogram. The\par patient returns for additional imaging of a focal asymmetry in the left\par breast\par \par The patient reports her last clinical breast examination was performed over\par one year ago.\par \par FAMILY HISTORY: Maternal grandmother at the age of 60.\par \par COMPARISONS: Mammograms dating back to 2009.\par \par BREAST COMPOSITION: The breasts are heterogeneously dense, which may obscure\par small masses.\par \par VIEWS: 2-D/tomosynthesis ML and spot CC/MLO views of the left breast.\par Computer-aided detection was utilized by the radiologists in the\par interpretation of this examination.\par \par \par FINDINGS:\par \par MAMMOGRAM:.\par Circumscribed mass with amorphous calcifications is seen in the left lateral\par breast; this is not significantly changed from mammograms dating back to\par 2009, consistent with a benign etiology.\par \par Irregular mass with amorphous calcifications in the upper outer quadrant of\par the left breast corresponds to the abnormality noted on the screening\par mammogram. Stereotactic guided biopsy is recommended.\par \par Circumscribed mass in the upper outer quadrant of the left breast is new\par when compared to prior mammograms dating back to 2018. This is\par indeterminate. Stereotactic guided biopsy is recommended.\par \par \par ULTRASOUND:\par Targeted left breast ultrasound was performed.\par \par Benign cyst in the left breast, 2:00 axis, 6 cm from the nipple measures up\par to 0.5 cm. This corresponds to the circumscribed benign mass in the left\par lateral breast. No suspicious masses are seen in the remaining left lateral\par breast. There is no left axillary adenopathy.\par \par \par IMPRESSION:\par 1. Irregular mass with calcifications in the left upper outer quadrant.\par Stereotactic guided biopsy is recommended.\par 2. Circumscribed mass in the left upper outer quadrant. Stereotactic guided\par biopsy is recommended.\par 3. Benign cyst in the left breast.\par \par Recommendation: Stereotactic guided biopsy.\par \par BI-RADS Category 4: Suspicious\par \par Findings and recommendations were discussed with the patient and \par Kelly.\par \par \par \par \par \par \par \par ED SILVA M.D., ATTENDING RADIOLOGIST\par This document has been electronically signed. Jun 9 2020 4:51PM\par \par EXAM: MG STEREO BX ADD LT SISC\par EXAM: MG STEREO BX 1ST LT SISC\par \par *** ADDENDUM 06/22/2020 ***\par \par Histology:\par 1. BREAST, LEFT 0.5 CM ANTERIOR ASYMMETRY, STEREOTACTIC GUIDED\par VACUUM ASSISTED NEEDLE CORE BIOPSIES:\par - RADIAL SCLEROSING LESION (RADIAL SCAR).\par - BENIGN ATROPHIC FATTY BREAST TISSUE WITH PROLIFERATIVE TYPE\par FIBROCYSTIC CHANGES INCLUDING FLORID DUCT HYPERPLASIA, STROMAL\par FIBROSIS, SCLEROSING AND BLUNT DUCT ADENOSIS, APOCRINE\par METAPLASIA, AND MICROCALCIFICATIONS.\par \par 2. BREAST, LEFT 0.5 CM POSTERIOR ASYMMETRY, STEREOTACTIC GUIDED\par VACUUM ASSISTED NEEDLE CORE BIOPSIES:\par - COMPLEX SCLEROSING LESION (RADIAL SCAR) WITH FOCAL PERIPHERAL\par PAPILLOMATOUS ARCHITECTURE.\par - MICROSCOPIC MYXOID FIBROADENOMA.\par - BENIGN ATROPHIC FATTY BREAST TISSUE WITH PROLIFERATIVE TYPE\par FIBROCYSTIC CHANGES INCLUDING FLORID DUCT HYPERPLASIA, STROMAL\par FIBROSIS, SCLEROSING AND BLUNT DUCT ADENOSIS, CYSTIC/PAPILLARY\par APOCRINE METAPLASIA, AND MICROCALCIFICATIONS.\par \par Findings are high risk concordant.\par \par Recommendation: surgical excision. Surgical consultation for excision is\par recommended.\par \par The findings and recommendation were discussed with the patient on 6/22/2020\par at 2:57 PM.\par \par The findings and recommendation were discussed with Dr. Prince on 6/22/2020 at\par 2:45 PM with read back.\par \par \par *** END OF ADDENDUM 06/22/2020 ***\par \par \par \par PROCEDURE DATE: 06/19/2020\par \par \par \par INTERPRETATION: Clinical History / Reason for exam: Left breast upper outer\par quadrant focal asymmetries.\par \par Images were reviewed. Informed consent was obtained including a discussion\par of risks and benefits of the procedure as well as questioning about patient\par allergies. The patient safety checklist, including time out procedure, was\par used.\par \par Site 1: Upper outer quadrant, relatively anterior\par The patient was seated. The left breast was compressed and stereo and\par tomographic images were obtained to locate the focal asymmetry. The focal\par asymmetry is located in the upper outer quadrant of the breast. A superior\par approach was used. The area was prepped and draped in the normal sterile\par fashion. The skin was anesthetized with 5 mL buffered 1% lidocaine. A small\par skin incision was made. Through the incision, using a biopsy gun, a 9 gauge\par needle was introduced and pre-and post-fire stereo images were obtained.\par Deep anesthesia was given with 10 mL 1% lidocaine with epinephrine. Numerous\par specimens were taken.\par \par Through the needle a radiopaque marker (JOOR top-hat) was deposited. The\par needle was removed and the breast was compressed to achieve hemostasis.\par \par \par Site 2: Upper outer quadrant, relatively posterior\par The patient was seated. The left breast was compressed and stereo and\par tomographic images were obtained to locate the focal asymmetry. The focal\par asymmetry in located in the upper outer quadrant of the breast. A superior\par approach was used. The area was prepped and draped in the normal sterile\par fashion. The skin was anesthetized with 5 mL buffered 1% lidocaine. A small\par skin incision was made. Through the incision, using a biopsy gun, a 9 gauge\par needle was introduced and pre-and post-fire stereo images were obtained.\par Deep anesthesia was given with 10 mL 1% lidocaine with epinephrine. Numerous\par specimens were taken.\par \par Through the needle a radiopaque marker (JOOR mini-cork) was deposited.\par The needle was removed and the breast was compressed to achieve hemostasis.\par The patient tolerated the procedure well and there was no immediate post\par procedure complication.\par \par The specimens were sent to pathology.\par \par Mammogram:\par Post procedure two view mammogram demonstrates the relatively anterior top\par hat-shaped biopsy marker superiorly displaced by approximately 2 cm. The\par relatively posterior cork shaped biopsy marker is in the anticipated\par location.\par \par IMPRESSION:\par \par Successful stereotactic core biopsy of focal asymmetries in the left breast.\par \par Histology: Pending, to be reported in an addendum.\par \par \par ***Please see the addendum at the top of this report. It may contain\par additional important information or changes.****\par \par \par \par \par ANEL LANGE M.D., ATTENDING RADIOLOGIST\par This document has been electronically signed. Jun 19 2020 1:48PM\par Addend: ANEL LANGE M.D., ATTENDING RADIOLOGIST\par This addendum was electronically signed on: Jun 22 2020 2:57PM.\par \par \par

## 2020-07-14 NOTE — REVIEW OF SYSTEMS
[Cough] : cough [As Noted in HPI] : as noted in HPI [Negative] : Heme/Lymph [Fever] : no fever [Chills] : no chills [Shortness Of Breath] : no shortness of breath [Abn Vaginal Bleeding] : no unexplained vaginal bleeding [Skin Lesions] : no skin lesions [Breast Pain] : no breast pain [Skin Wound] : no skin wound [Hot Flashes] : no hot flashes [Breast Lump] : no breast lump [FreeTextEntry6] : hx of COPD

## 2020-07-14 NOTE — PHYSICAL EXAM
[Normocephalic] : normocephalic [Atraumatic] : atraumatic [No Supraclavicular Adenopathy] : no supraclavicular adenopathy [EOMI] : extra ocular movement intact [No Cervical Adenopathy] : no cervical adenopathy [Examined in the supine and seated position] : examined in the supine and seated position [Symmetrical] : symmetrical [No dominant masses] : no dominant masses in right breast  [No dominant masses] : no dominant masses left breast [No Nipple Retraction] : no left nipple retraction [No Nipple Discharge] : no left nipple discharge [No Axillary Lymphadenopathy] : no left axillary lymphadenopathy [Soft] : abdomen soft [No Edema] : no edema [Not Tender] : non-tender [No Ulceration] : no ulceration [No Rashes] : no rashes [de-identified] : no suspicious mass palpated within either breast [de-identified] : biopsy site changes present in superior breast, but no suspicious masses were palpated

## 2020-07-23 ENCOUNTER — APPOINTMENT (OUTPATIENT)
Dept: RHEUMATOLOGY | Facility: CLINIC | Age: 65
End: 2020-07-23

## 2020-07-31 ENCOUNTER — OUTPATIENT (OUTPATIENT)
Dept: OUTPATIENT SERVICES | Facility: HOSPITAL | Age: 65
LOS: 1 days | Discharge: HOME | End: 2020-07-31
Payer: MEDICARE

## 2020-07-31 DIAGNOSIS — Z90.49 ACQUIRED ABSENCE OF OTHER SPECIFIED PARTS OF DIGESTIVE TRACT: Chronic | ICD-10-CM

## 2020-07-31 DIAGNOSIS — J38.1 POLYP OF VOCAL CORD AND LARYNX: Chronic | ICD-10-CM

## 2020-07-31 DIAGNOSIS — R91.1 SOLITARY PULMONARY NODULE: ICD-10-CM

## 2020-07-31 DIAGNOSIS — R91.1 SOLITARY PULMONARY NODULE: Chronic | ICD-10-CM

## 2020-07-31 DIAGNOSIS — Z90.710 ACQUIRED ABSENCE OF BOTH CERVIX AND UTERUS: Chronic | ICD-10-CM

## 2020-07-31 PROCEDURE — 71250 CT THORAX DX C-: CPT | Mod: 26

## 2020-08-07 ENCOUNTER — APPOINTMENT (OUTPATIENT)
Dept: RHEUMATOLOGY | Facility: CLINIC | Age: 65
End: 2020-08-07
Payer: MEDICARE

## 2020-08-07 ENCOUNTER — OUTPATIENT (OUTPATIENT)
Dept: OUTPATIENT SERVICES | Facility: HOSPITAL | Age: 65
LOS: 1 days | Discharge: HOME | End: 2020-08-07

## 2020-08-07 VITALS — HEART RATE: 80 BPM | SYSTOLIC BLOOD PRESSURE: 127 MMHG | DIASTOLIC BLOOD PRESSURE: 80 MMHG

## 2020-08-07 DIAGNOSIS — J38.1 POLYP OF VOCAL CORD AND LARYNX: Chronic | ICD-10-CM

## 2020-08-07 DIAGNOSIS — Z51.81 ENCOUNTER FOR THERAPEUTIC DRUG LVL MONITORING: ICD-10-CM

## 2020-08-07 DIAGNOSIS — M81.0 AGE-RELATED OSTEOPOROSIS WITHOUT CURRENT PATHOLOGICAL FRACTURE: ICD-10-CM

## 2020-08-07 DIAGNOSIS — M94.1 RELAPSING POLYCHONDRITIS: ICD-10-CM

## 2020-08-07 DIAGNOSIS — R91.1 SOLITARY PULMONARY NODULE: Chronic | ICD-10-CM

## 2020-08-07 DIAGNOSIS — Z90.710 ACQUIRED ABSENCE OF BOTH CERVIX AND UTERUS: Chronic | ICD-10-CM

## 2020-08-07 DIAGNOSIS — Z51.81 ENCOUNTER FOR THERAPEUTIC DRUG LEVEL MONITORING: ICD-10-CM

## 2020-08-07 DIAGNOSIS — Z90.49 ACQUIRED ABSENCE OF OTHER SPECIFIED PARTS OF DIGESTIVE TRACT: Chronic | ICD-10-CM

## 2020-08-07 PROCEDURE — 99214 OFFICE O/P EST MOD 30 MIN: CPT

## 2020-08-07 NOTE — ASSESSMENT
[FreeTextEntry1] : 65 yo F presenting for routine follow up of RP on HCQ/MMF and osteoporosis on alendronate\par \par # RP, therapeutic drug monitoring \par - No current indications of flares\par - Unclear if L auricular complaints may be viral/allergy related, no external complaints, internal auditory exam not overly consistent with active otitis media - may trial OTC antihistamine over the weekend and see PCP early next week if symptoms continue \par - Given stability of RP for an extended period of time (no flares reported in any of her available notes by prior rheumatology providers either in our EMR) - would de-escalate MMF to 1000 mg BID and continue  mg BID\par - Follow up with ophthalmology in January\par - Encouraged tobacco cessation, following with pulmonology \par - Continue to maintain updated immunizations and age appropriate malignancy screenings\par - Due for labs next month, may f/u in 3 months \par \par # Osteoporosis \par - Tricky hx of intermittent PO bisphosphonate use by patient (occ has side effects with PO bisphosphonate, but prefers taking this to injection/infusion and doesn't want to change) - has been on current course of bisphosphonate regimen much less than 3-5 years so not due for drug holiday yet\par - May continue alendronate for now, will need updated DEXA next year\par - Should continue supplemental calcium/vit D\par - Should practice weight bearing exercises such as walking routinely

## 2020-08-07 NOTE — HISTORY OF PRESENT ILLNESS
[FreeTextEntry1] : 64 yo F presenting for routine follow up of RP and osteoporosis \par \par Current longstanding therapy for RP includes MMF 1500 mg BID and  mg BID - no recent flares\par Current therapy for osteoporosis includes alendronate recently restarted, has been off/on alendronate for around 10 years (current routine use has not been > 5 years)\par Plan for L breast excision of two areas later this month with Dr. Keith \par Throat and L ear bothering her on/off recently, no external ear complaints\par UTD age appropriate malignancy screenings\par Next due for ophthalmology exam in January, no hx inflammatory eye disease \par No nasal complaints\par No new cough or difficulty breathing, still smoking 1 ppd, having difficulty with stopping, following with pulmonology Dr. Bright for COPD\par No CP\par Sometimes more recently she has puffy appearing fingers/toes when the weather is very hot, but no localized joint swelling, no prolonged AM stiffness, no current joint pain\par No paresthesias, no new rashes \par No abdominal pain or frequent diarrhea, no dysphagia. + intermittent GERD

## 2020-08-17 ENCOUNTER — OUTPATIENT (OUTPATIENT)
Dept: OUTPATIENT SERVICES | Facility: HOSPITAL | Age: 65
LOS: 1 days | Discharge: HOME | End: 2020-08-17
Payer: MEDICARE

## 2020-08-17 VITALS
TEMPERATURE: 97 F | WEIGHT: 163.14 LBS | DIASTOLIC BLOOD PRESSURE: 88 MMHG | OXYGEN SATURATION: 98 % | HEART RATE: 87 BPM | HEIGHT: 64 IN | SYSTOLIC BLOOD PRESSURE: 140 MMHG | RESPIRATION RATE: 20 BRPM

## 2020-08-17 DIAGNOSIS — Z90.49 ACQUIRED ABSENCE OF OTHER SPECIFIED PARTS OF DIGESTIVE TRACT: Chronic | ICD-10-CM

## 2020-08-17 DIAGNOSIS — R91.1 SOLITARY PULMONARY NODULE: Chronic | ICD-10-CM

## 2020-08-17 DIAGNOSIS — Z90.710 ACQUIRED ABSENCE OF BOTH CERVIX AND UTERUS: Chronic | ICD-10-CM

## 2020-08-17 DIAGNOSIS — Z98.890 OTHER SPECIFIED POSTPROCEDURAL STATES: Chronic | ICD-10-CM

## 2020-08-17 DIAGNOSIS — J38.1 POLYP OF VOCAL CORD AND LARYNX: Chronic | ICD-10-CM

## 2020-08-17 DIAGNOSIS — Z01.818 ENCOUNTER FOR OTHER PREPROCEDURAL EXAMINATION: ICD-10-CM

## 2020-08-17 DIAGNOSIS — N64.89 OTHER SPECIFIED DISORDERS OF BREAST: ICD-10-CM

## 2020-08-17 DIAGNOSIS — R09.89 OTHER SPECIFIED SYMPTOMS AND SIGNS INVOLVING THE CIRCULATORY AND RESPIRATORY SYSTEMS: ICD-10-CM

## 2020-08-17 LAB
ALBUMIN SERPL ELPH-MCNC: 4.6 G/DL — SIGNIFICANT CHANGE UP (ref 3.5–5.2)
ALP SERPL-CCNC: 62 U/L — SIGNIFICANT CHANGE UP (ref 30–115)
ALT FLD-CCNC: 11 U/L — SIGNIFICANT CHANGE UP (ref 0–41)
ANION GAP SERPL CALC-SCNC: 12 MMOL/L — SIGNIFICANT CHANGE UP (ref 7–14)
APTT BLD: 27.8 SEC — SIGNIFICANT CHANGE UP (ref 27–39.2)
AST SERPL-CCNC: 14 U/L — SIGNIFICANT CHANGE UP (ref 0–41)
BASOPHILS # BLD AUTO: 0.04 K/UL — SIGNIFICANT CHANGE UP (ref 0–0.2)
BASOPHILS NFR BLD AUTO: 0.5 % — SIGNIFICANT CHANGE UP (ref 0–1)
BILIRUB SERPL-MCNC: <0.2 MG/DL — SIGNIFICANT CHANGE UP (ref 0.2–1.2)
BUN SERPL-MCNC: 6 MG/DL — LOW (ref 10–20)
CALCIUM SERPL-MCNC: 8.8 MG/DL — SIGNIFICANT CHANGE UP (ref 8.5–10.1)
CHLORIDE SERPL-SCNC: 103 MMOL/L — SIGNIFICANT CHANGE UP (ref 98–110)
CO2 SERPL-SCNC: 26 MMOL/L — SIGNIFICANT CHANGE UP (ref 17–32)
CREAT SERPL-MCNC: 0.7 MG/DL — SIGNIFICANT CHANGE UP (ref 0.7–1.5)
EOSINOPHIL # BLD AUTO: 0.1 K/UL — SIGNIFICANT CHANGE UP (ref 0–0.7)
EOSINOPHIL NFR BLD AUTO: 1.2 % — SIGNIFICANT CHANGE UP (ref 0–8)
GLUCOSE SERPL-MCNC: 98 MG/DL — SIGNIFICANT CHANGE UP (ref 70–99)
HCT VFR BLD CALC: 46.6 % — SIGNIFICANT CHANGE UP (ref 37–47)
HGB BLD-MCNC: 15 G/DL — SIGNIFICANT CHANGE UP (ref 12–16)
IMM GRANULOCYTES NFR BLD AUTO: 0.5 % — HIGH (ref 0.1–0.3)
INR BLD: 0.92 RATIO — SIGNIFICANT CHANGE UP (ref 0.65–1.3)
LYMPHOCYTES # BLD AUTO: 1.48 K/UL — SIGNIFICANT CHANGE UP (ref 1.2–3.4)
LYMPHOCYTES # BLD AUTO: 17.1 % — LOW (ref 20.5–51.1)
MCHC RBC-ENTMCNC: 29.5 PG — SIGNIFICANT CHANGE UP (ref 27–31)
MCHC RBC-ENTMCNC: 32.2 G/DL — SIGNIFICANT CHANGE UP (ref 32–37)
MCV RBC AUTO: 91.6 FL — SIGNIFICANT CHANGE UP (ref 81–99)
MONOCYTES # BLD AUTO: 0.56 K/UL — SIGNIFICANT CHANGE UP (ref 0.1–0.6)
MONOCYTES NFR BLD AUTO: 6.5 % — SIGNIFICANT CHANGE UP (ref 1.7–9.3)
NEUTROPHILS # BLD AUTO: 6.44 K/UL — SIGNIFICANT CHANGE UP (ref 1.4–6.5)
NEUTROPHILS NFR BLD AUTO: 74.2 % — SIGNIFICANT CHANGE UP (ref 42.2–75.2)
NRBC # BLD: 0 /100 WBCS — SIGNIFICANT CHANGE UP (ref 0–0)
PLATELET # BLD AUTO: 247 K/UL — SIGNIFICANT CHANGE UP (ref 130–400)
POTASSIUM SERPL-MCNC: 4.2 MMOL/L — SIGNIFICANT CHANGE UP (ref 3.5–5)
POTASSIUM SERPL-SCNC: 4.2 MMOL/L — SIGNIFICANT CHANGE UP (ref 3.5–5)
PROT SERPL-MCNC: 6.6 G/DL — SIGNIFICANT CHANGE UP (ref 6–8)
PROTHROM AB SERPL-ACNC: 10.6 SEC — SIGNIFICANT CHANGE UP (ref 9.95–12.87)
RBC # BLD: 5.09 M/UL — SIGNIFICANT CHANGE UP (ref 4.2–5.4)
RBC # FLD: 13 % — SIGNIFICANT CHANGE UP (ref 11.5–14.5)
SODIUM SERPL-SCNC: 141 MMOL/L — SIGNIFICANT CHANGE UP (ref 135–146)
WBC # BLD: 8.66 K/UL — SIGNIFICANT CHANGE UP (ref 4.8–10.8)
WBC # FLD AUTO: 8.66 K/UL — SIGNIFICANT CHANGE UP (ref 4.8–10.8)

## 2020-08-17 PROCEDURE — 93010 ELECTROCARDIOGRAM REPORT: CPT

## 2020-08-17 RX ORDER — LISINOPRIL 2.5 MG/1
1 TABLET ORAL
Qty: 0 | Refills: 0 | DISCHARGE

## 2020-08-17 RX ORDER — CHOLECALCIFEROL (VITAMIN D3) 125 MCG
1 CAPSULE ORAL
Qty: 0 | Refills: 0 | DISCHARGE

## 2020-08-17 NOTE — H&P PST ADULT - NSICDXPASTMEDICALHX_GEN_ALL_CORE_FT
PAST MEDICAL HISTORY:  CAD (coronary artery disease)     COPD (chronic obstructive pulmonary disease)     Female cystocele     GERD (gastroesophageal reflux disease)     H/O pneumothorax 2016    HTN (hypertension)     Hypercholesterolemia     Pulmonary nodule     Relapsing polychondritis

## 2020-08-17 NOTE — H&P PST ADULT - REASON FOR ADMISSION
Left breast wide local excision with radiofrequency localizer of 2 areas due to nodules found on mammo in March

## 2020-08-17 NOTE — H&P PST ADULT - HISTORY OF PRESENT ILLNESS
PATIENT CURRENTLY DENIES CHEST PAIN  SHORTNESS OF BREATH  PALPITATIONS,  DYSURIA, OR UPPER RESPIRATORY INFECTION IN PAST 2 WEEKS  EXERCISE  TOLERANCE  1-2 FLIGHT OF STAIRS  WITHOUT SHORTNESS OF BREATH     denies travel outside the [place] in the past 30 days    pt denies any covid s/s, or tested positive in the past 2 weeks    Anesthesia Alert  NO--Difficult Airway  NO--History of neck surgery or radiation  NO--Limited ROM of neck  NO--History of Malignant hyperthermia  NO--Personal or family history of Pseudocholinesterase deficiency  NO--Prior Anesthesia Complication  NO--Latex Allergy  NO--Loose teeth  NO--History of Rheumatoid Arthritis  NO--ASHUTOSH  NO--Other_____

## 2020-08-18 ENCOUNTER — RESULT REVIEW (OUTPATIENT)
Age: 65
End: 2020-08-18

## 2020-08-18 ENCOUNTER — OUTPATIENT (OUTPATIENT)
Dept: OUTPATIENT SERVICES | Facility: HOSPITAL | Age: 65
LOS: 1 days | Discharge: HOME | End: 2020-08-18
Payer: MEDICARE

## 2020-08-18 DIAGNOSIS — Z98.890 OTHER SPECIFIED POSTPROCEDURAL STATES: Chronic | ICD-10-CM

## 2020-08-18 DIAGNOSIS — J38.1 POLYP OF VOCAL CORD AND LARYNX: Chronic | ICD-10-CM

## 2020-08-18 DIAGNOSIS — Z90.710 ACQUIRED ABSENCE OF BOTH CERVIX AND UTERUS: Chronic | ICD-10-CM

## 2020-08-18 DIAGNOSIS — R91.1 SOLITARY PULMONARY NODULE: Chronic | ICD-10-CM

## 2020-08-18 DIAGNOSIS — Z90.49 ACQUIRED ABSENCE OF OTHER SPECIFIED PARTS OF DIGESTIVE TRACT: Chronic | ICD-10-CM

## 2020-08-18 PROCEDURE — 19282 PERQ DEVICE BREAST EA IMAG: CPT | Mod: 59,LT

## 2020-08-18 PROCEDURE — 19281 PERQ DEVICE BREAST 1ST IMAG: CPT | Mod: LT

## 2020-08-21 ENCOUNTER — LABORATORY RESULT (OUTPATIENT)
Age: 65
End: 2020-08-21

## 2020-08-21 ENCOUNTER — OUTPATIENT (OUTPATIENT)
Dept: OUTPATIENT SERVICES | Facility: HOSPITAL | Age: 65
LOS: 1 days | Discharge: HOME | End: 2020-08-21

## 2020-08-21 DIAGNOSIS — Z90.710 ACQUIRED ABSENCE OF BOTH CERVIX AND UTERUS: Chronic | ICD-10-CM

## 2020-08-21 DIAGNOSIS — N63.20 UNSPECIFIED LUMP IN THE LEFT BREAST, UNSPECIFIED QUADRANT: ICD-10-CM

## 2020-08-21 DIAGNOSIS — R91.1 SOLITARY PULMONARY NODULE: Chronic | ICD-10-CM

## 2020-08-21 DIAGNOSIS — Z90.49 ACQUIRED ABSENCE OF OTHER SPECIFIED PARTS OF DIGESTIVE TRACT: Chronic | ICD-10-CM

## 2020-08-21 DIAGNOSIS — Z11.59 ENCOUNTER FOR SCREENING FOR OTHER VIRAL DISEASES: ICD-10-CM

## 2020-08-21 DIAGNOSIS — J38.1 POLYP OF VOCAL CORD AND LARYNX: Chronic | ICD-10-CM

## 2020-08-21 DIAGNOSIS — Z98.890 OTHER SPECIFIED POSTPROCEDURAL STATES: Chronic | ICD-10-CM

## 2020-08-21 NOTE — ASU PATIENT PROFILE, ADULT - ACCEPTABLE
Detail Level: Detailed
Patient Specific Counseling (Will Not Stick From Patient To Patient): Meera was instructed to have his dubose raise the level of the shaver so that his scalp is not traumatized
Detail Level: Simple
0

## 2020-08-21 NOTE — ASU PATIENT PROFILE, ADULT - PSH
History of bladder surgery  2019  Pulmonary nodule    S/P colon resection    S/P GEETHA-BSO    Vocal cord polyps  removal of same 2005

## 2020-08-24 ENCOUNTER — RESULT REVIEW (OUTPATIENT)
Age: 65
End: 2020-08-24

## 2020-08-24 ENCOUNTER — OUTPATIENT (OUTPATIENT)
Dept: OUTPATIENT SERVICES | Facility: HOSPITAL | Age: 65
LOS: 1 days | Discharge: HOME | End: 2020-08-24
Payer: MEDICARE

## 2020-08-24 ENCOUNTER — APPOINTMENT (OUTPATIENT)
Dept: BREAST CENTER | Facility: AMBULATORY SURGERY CENTER | Age: 65
End: 2020-08-24
Payer: MEDICARE

## 2020-08-24 VITALS
RESPIRATION RATE: 18 BRPM | SYSTOLIC BLOOD PRESSURE: 126 MMHG | OXYGEN SATURATION: 95 % | HEART RATE: 98 BPM | DIASTOLIC BLOOD PRESSURE: 65 MMHG

## 2020-08-24 VITALS
TEMPERATURE: 96 F | HEIGHT: 64 IN | RESPIRATION RATE: 18 BRPM | WEIGHT: 163.14 LBS | HEART RATE: 74 BPM | OXYGEN SATURATION: 95 % | SYSTOLIC BLOOD PRESSURE: 127 MMHG | DIASTOLIC BLOOD PRESSURE: 73 MMHG

## 2020-08-24 DIAGNOSIS — Z90.710 ACQUIRED ABSENCE OF BOTH CERVIX AND UTERUS: Chronic | ICD-10-CM

## 2020-08-24 DIAGNOSIS — Z98.890 OTHER SPECIFIED POSTPROCEDURAL STATES: Chronic | ICD-10-CM

## 2020-08-24 DIAGNOSIS — J38.1 POLYP OF VOCAL CORD AND LARYNX: Chronic | ICD-10-CM

## 2020-08-24 DIAGNOSIS — Z90.49 ACQUIRED ABSENCE OF OTHER SPECIFIED PARTS OF DIGESTIVE TRACT: Chronic | ICD-10-CM

## 2020-08-24 DIAGNOSIS — R91.1 SOLITARY PULMONARY NODULE: Chronic | ICD-10-CM

## 2020-08-24 PROCEDURE — 19125 EXCISION BREAST LESION: CPT | Mod: LT

## 2020-08-24 PROCEDURE — 19281 PERQ DEVICE BREAST 1ST IMAG: CPT | Mod: LT

## 2020-08-24 PROCEDURE — 19282 PERQ DEVICE BREAST EA IMAG: CPT | Mod: LT

## 2020-08-24 PROCEDURE — 88305 TISSUE EXAM BY PATHOLOGIST: CPT | Mod: 26

## 2020-08-24 PROCEDURE — 88300 SURGICAL PATH GROSS: CPT | Mod: 26,59

## 2020-08-24 RX ORDER — HYDROMORPHONE HYDROCHLORIDE 2 MG/ML
0.5 INJECTION INTRAMUSCULAR; INTRAVENOUS; SUBCUTANEOUS ONCE
Refills: 0 | Status: DISCONTINUED | OUTPATIENT
Start: 2020-08-24 | End: 2020-08-24

## 2020-08-24 RX ORDER — IBUPROFEN 200 MG
1 TABLET ORAL
Qty: 12 | Refills: 0
Start: 2020-08-24 | End: 2020-08-27

## 2020-08-24 RX ORDER — OXYCODONE HYDROCHLORIDE 5 MG/1
5 TABLET ORAL ONCE
Refills: 0 | Status: DISCONTINUED | OUTPATIENT
Start: 2020-08-24 | End: 2020-08-24

## 2020-08-24 RX ORDER — ALBUTEROL 90 UG/1
2.5 AEROSOL, METERED ORAL ONCE
Refills: 0 | Status: COMPLETED | OUTPATIENT
Start: 2020-08-24 | End: 2020-08-24

## 2020-08-24 RX ORDER — ONDANSETRON 8 MG/1
4 TABLET, FILM COATED ORAL ONCE
Refills: 0 | Status: DISCONTINUED | OUTPATIENT
Start: 2020-08-24 | End: 2020-09-08

## 2020-08-24 RX ORDER — SODIUM CHLORIDE 9 MG/ML
1000 INJECTION, SOLUTION INTRAVENOUS
Refills: 0 | Status: DISCONTINUED | OUTPATIENT
Start: 2020-08-24 | End: 2020-09-08

## 2020-08-24 RX ADMIN — SODIUM CHLORIDE 100 MILLILITER(S): 9 INJECTION, SOLUTION INTRAVENOUS at 11:55

## 2020-08-24 RX ADMIN — ALBUTEROL 2.5 MILLIGRAM(S): 90 AEROSOL, METERED ORAL at 10:09

## 2020-08-24 NOTE — ASU DISCHARGE PLAN (ADULT/PEDIATRIC) - CARE PROVIDER_API CALL
Becka Keith (MD)  Surgery  [address] [facility], [address] [place] [address]  Phone: [phone]  Fax: [phone]  Follow Up Time:

## 2020-08-24 NOTE — BRIEF OPERATIVE NOTE - NSICDXBRIEFPROCEDURE_GEN_ALL_CORE_FT
PROCEDURES:  Lumpectomy, breast, 2 areas each breast 24-Aug-2020 11:52:39 left breast lumpectomy x 2 Becka Keith

## 2020-08-24 NOTE — ASU DISCHARGE PLAN (ADULT/PEDIATRIC) - ASU DC SPECIAL INSTRUCTIONSFT
s/p Left breast excision of 2 masses    diet: resume regular diet  medication: please continue with home medications. please take ibuprofen 600mg every 8 hours for 4 days.  activity: resume previous level of activity after 1 day of rest. please be aware that you were under sedation in the operation room today, rest is needed.    follow up: please follow up with Dr. Keith at her office on 9/2/2020 at 10:30am as previously discussed.

## 2020-08-24 NOTE — ASU DISCHARGE PLAN (ADULT/PEDIATRIC) - CALL YOUR DOCTOR IF YOU HAVE ANY OF THE FOLLOWING:
Swelling that gets worse/Pain not relieved by Medications/Wound/Surgical Site with redness, or foul smelling discharge or pus/Bleeding that does not stop/Fever greater than (need to indicate Fahrenheit or Celsius)/Numbness, tingling, color or temperature change to extremity

## 2020-08-24 NOTE — CHART NOTE - NSCHARTNOTEFT_GEN_A_CORE
PACU ANESTHESIA ADMISSION NOTE      Procedure: Lumpectomy, breast, 2 areas each breast: left breast lumpectomy x 2    Post op diagnosis:  Radial scar of left breast      ____  Intubated  TV:______       Rate: ______      FiO2: ______    _x___  Patent Airway    _x___  Full return of protective reflexes    _x___  Full recovery from anesthesia / back to baseline status    Vitals:  T(C): 35.8  HR: 74  BP: 127/73  RR: 18  SpO2: 95%    Mental Status:  _x___ Awake   _____ Alert   _____ Drowsy   _____ Sedated    Nausea/Vomiting:  _x___  NO       ______Yes,   See Post - Op Orders         Pain Scale (0-10):  __0___    Treatment: _x___ None    ____ See Post - Op/PCA Orders    Post - Operative Fluids:   __ Oral   ____ See Post - Op Orders    Plan: Discharge:   _x___Home       _____Floor     _____Critical Care    _____  Other:_________________    Comments:  No anesthesia issues or complications noted.  Discharge when criteria met.

## 2020-08-27 LAB — SURGICAL PATHOLOGY STUDY: SIGNIFICANT CHANGE UP

## 2020-08-28 DIAGNOSIS — Z90.49 ACQUIRED ABSENCE OF OTHER SPECIFIED PARTS OF DIGESTIVE TRACT: ICD-10-CM

## 2020-08-28 DIAGNOSIS — D24.2 BENIGN NEOPLASM OF LEFT BREAST: ICD-10-CM

## 2020-08-28 DIAGNOSIS — I25.10 ATHEROSCLEROTIC HEART DISEASE OF NATIVE CORONARY ARTERY WITHOUT ANGINA PECTORIS: ICD-10-CM

## 2020-08-28 DIAGNOSIS — Z90.710 ACQUIRED ABSENCE OF BOTH CERVIX AND UTERUS: ICD-10-CM

## 2020-08-28 DIAGNOSIS — K21.9 GASTRO-ESOPHAGEAL REFLUX DISEASE WITHOUT ESOPHAGITIS: ICD-10-CM

## 2020-08-28 DIAGNOSIS — N60.12 DIFFUSE CYSTIC MASTOPATHY OF LEFT BREAST: ICD-10-CM

## 2020-08-28 DIAGNOSIS — J44.9 CHRONIC OBSTRUCTIVE PULMONARY DISEASE, UNSPECIFIED: ICD-10-CM

## 2020-08-28 DIAGNOSIS — E78.00 PURE HYPERCHOLESTEROLEMIA, UNSPECIFIED: ICD-10-CM

## 2020-08-28 DIAGNOSIS — I10 ESSENTIAL (PRIMARY) HYPERTENSION: ICD-10-CM

## 2020-08-28 DIAGNOSIS — Z72.0 TOBACCO USE: ICD-10-CM

## 2020-09-02 ENCOUNTER — APPOINTMENT (OUTPATIENT)
Dept: BREAST CENTER | Facility: CLINIC | Age: 65
End: 2020-09-02
Payer: MEDICARE

## 2020-09-02 VITALS
HEIGHT: 64 IN | TEMPERATURE: 97.5 F | BODY MASS INDEX: 28.68 KG/M2 | WEIGHT: 168 LBS | SYSTOLIC BLOOD PRESSURE: 134 MMHG | DIASTOLIC BLOOD PRESSURE: 88 MMHG

## 2020-09-02 PROCEDURE — 99024 POSTOP FOLLOW-UP VISIT: CPT

## 2020-09-02 NOTE — ASSESSMENT
[FreeTextEntry1] : Sol is a 65 F with two left breast radial scars, s/p L WLE on 8/24/2020. \par \par 8/24/2020 -- L WLE of two areas \par -more superior mass: proliferative type FC changes, no residual radial scar \par -more inferior mass: sclerosing IP, small radial scar \par \par On exam, there was no signs of infection, seroma or hematoma formation.  \par \par She will be due for a L dx mammogram and R screening mammogram, b/l US on 3/2/2021.  This will be scheduled for her today.  I will have her follow up after for a CBE. \par \par Her final pathology revealed a sclerosing intraductal papilloma and radial scar. No further surgical intervention is indicated. \par \par Her most recent screening mammogram and subsequent L dx mammogram and US on 3/2/2020 and 6/9/2020 revealed an irregular mass with amorphous calcifications in UOQ, left breast and a new circumscribed mass in the L UOQ, both of which were biopsy proven radial scars. \par \par We discussed radial scars without atypia.  These lesions are considered fibroproliferative lesions without atypia.  Patients with these lesions were found to have a slightly increased relative risk compared to the reference population.  However the lesions themselves do not have any malignant potential. There is about a 10-20% upgrade rate to a high risk lesion (including atypical hyperplasias) and a <3% upgrade to cancer.  However, when atypia is present there is up to a 15-25% upgrade rate to cancer.  For this reason, we have discussed observation vs. surgical excision of this lesion.  I have recommended that we move forward with surgical excision at this time.\par \par In regards to her family history of breast cancer in her sister and maternal grandmother, her risk assessment is as follows: \par \par RISK ASSESSMENT: \par Babs\par 5yr -- 4.7%\par lifetime -- 15.8%\par \par TC v6\par 5yr -- 3.1%\par lifetime -- 8.7%\par \par This puts her in an average to intermediate risk for breast cancer. She does not quite meet criteria for screening breast MRIs, although she does meet criteria for chemopreventative medications.  This will be discussed with her further at her post op visit. \par \par All of her questions were answered.  She knows to call with any further questions or concerns. \par \par PLAN: \par -L dx mammogram, R screening mammogram, b/l US on 3/2/21\par -f/up after

## 2020-09-02 NOTE — DATA REVIEWED
[FreeTextEntry1] : Cayla Accession Number : [id number]\par \par GEREMIAS FULLER                       2\par \par \par \par Surgical Final Report\par \par \par \par \par Final Diagnosis\par 1. Breast, left inferior mass, needle localized lumpectomy:\par - Sclerosing intraductal papilloma located adjacent to a healing\par prior biopsy site.\par - Separate small radial sclerosing lesion (radial scar).\par - Benign breast tissue with proliferative type fibrocystic\par changes including florid duct hyperplasia, stromal fibrosis,\par sclerosing adenosis, apocrine metaplasia, and\par microcalcifications.\par \par 2. Breast, left superior mass, needle localized lumpectomy:\par - Benign atrophic fatty breast tissue with mildly proliferative\par type fibrocystic changes.\par - Prior biopsy site changes with no residual radial scar\par identified in this entirely submitted specimen.\par \par 3. Breast, left "RF" clips, removal:\par - Three (3) radio frequency seed localization clips (macroscopic\par diagnosis).\par \par Verified by: Mateus Taveras M.D.\par (Electronic Signature)\par Reported on: 08/27/20 15:18 EDT, [address] [place] [address],\par [place] [address]\par Phone: [phone]   Fax: [phone]\par _________________________________________________________________\par \par Clinical History\par Left breast wide local excision x 2\par COVID (-)\par \par Specimen(s) Submitted\par 1     Left inferior breast mass\par Time obtained: 11:05 am\par 2     Left superior breast mass\par Time obtained: 11:16 am\par 3     3 clips left breast\par Time obtained: 11:30 am\par \par Gross Description\par 1. The specimen is received fresh, labeled "left inferior breast\par mass needle localization short superior, long lateral, double\par anterior" and consists of a fibroadipose tissue mass with needle,\par weighing 9 gm and measuring 5 x 2.5 x 1.0 cm. The specimen is\par inked as follows: anterior - red, posterior - black, superior -\par blue, inferior - green, lateral - yellow\par \par \par \par \par \par \par GEREMIAS FULLER                       2\par \par \par \par Surgical Final Report\par \par \par \par \par and medial - orange. The specimen is serially sectioned from\par medial to lateral to reveal a yellow white tan cut surface. The\par clip is identified. The specimen is submitted entirely. (8\par blocks)\par \par 2. The specimen is received fresh, labeled "left superior mass,\par needle localization short superior, long lateral, double\par anterior" and consists of a fibroadipose tissue mass with needle,\par weighing 8 gm and measuring 4.5 x 3.5 x 1.0 cm. The specimen is\par inked as follows: anterior - red, posterior - black, superior -\par blue, inferior - green, lateral - yellow and medial - orange. The\par specimen is serially sectioned from superior to inferior to\par reveal a yellow white tan cut surface. The clip is identified.\par The specimen is submitted entirely. (8 blocks)\par \par 3. The specimen is received without fixative, labeled "left\par breast 3 rf clips #[id number], #[id number], #[id number]" and consists of three\par idential white brown radio frequency localizers each measuring 1\par cm in length and 0.2 cm in diameter. Gross description only. No\par sections submitted.\par \par Specimen was received and underwent gross examination at [facility], [address] [place] [address].\par \par 08/25/20 07:16 mt\par \par Perioperative Diagnosis\par Left breast radial scar x 2\par

## 2020-09-02 NOTE — REVIEW OF SYSTEMS
[Chills] : no chills [Fever] : no fever [Abn Vaginal Bleeding] : no unexplained vaginal bleeding [Shortness Of Breath] : no shortness of breath [Skin Lesions] : no skin lesions [Skin Wound] : skin wound [Breast Pain] : no breast pain [Breast Lump] : no breast lump [Hot Flashes] : no hot flashes [FreeTextEntry6] : hx of COPD

## 2020-09-02 NOTE — PHYSICAL EXAM
[de-identified] : no suspicious mass palpated within either breast [de-identified] : surgical incision is healing well with dermabond still in place, no significant seroma or hematoma, no erythema or induration or other signs of infection

## 2020-09-02 NOTE — HISTORY OF PRESENT ILLNESS
[FreeTextEntry1] : Sol is a 65 F with two left breast radial scars, s/p L WLE of two areas on 2020. \par \par 2020 -- L WLE of two areas \par -more superior mass: proliferative type FC changes, no residual radial scar \par -more inferior mass: sclerosing IP, small radial scar \par \par Her work up was as follows: \par 3/2/2020 -- b/l screening mammogram \par -heterogenously dense breasts\par -focal asymmetry in L UOQ \par -no suspicious mass, calcifications, or other abnormalities in her RIGHT breast \par BIRADS 0 \par \par 2020 -- L dx mammogram and US \par -circumscribed mass with amorphous calcifications in her left lateral breast --> no significant change \par -irregular mass with amorphous calcifications in UOQ, LEFT --> BIOPSY\par -new circumscribed mass in UOQ, LEFT --> BIOPSY \par L US \par @2N6, benign cyst which correlates with lateral breast mass seen on mammogram \par BIRADS 4\par \par 2020 -- L stereobx x 2 \par -anterior asymmetry (top hat) --> radial scar \par -posterior asymmetry (minicork) --> radial scar \par \par Prior to her biopsy, she had no breast related complaints.  She denies any breast pain, has not palpated any new palpable masses in either breast and denies any nipple discharge or retraction. However, she was a little sore in her left lateral breast after her biopsy.  \par \par HISTORICAL RISK FACTORS: \par -2 prior breast biopsies as above, no prior surgeries \par -family history of breast cancer in her sister and maternal grandmother \par -, age at first live birth was 18 \par -no prior OCP use, HRT use x 2 years, stopped \par -s/p GEETHA/BSO in  \par \par RISK ASSESSMENT: \par Babs\par 5yr -- 4.7%\par lifetime -- 15.8%\par \par TC v6\par 5yr -- 3.1%\par lifetime -- 8.7%\par \par INTERVAL HISTORY: \par Sol returns for her FIRST POST OP VISIT, s/p L WLE of two areas on 2020. \par \par Her final pathology revealed a sclerosing intraductal papilloma and a small radial scar, but no evidence of carcinoma. \par \par She is healing well from her surgery. She has occasional pain in her breast, denies any redness, drainage, fevers or chills.

## 2020-09-02 NOTE — PAST MEDICAL HISTORY
[FreeTextEntry5] : s/p GEETHA/KASIA in 1996 [FreeTextEntry6] : denies [FreeTextEntry7] : denies [FreeTextEntry8] : denies

## 2020-09-28 NOTE — H&P PST ADULT - HEALTH CARE MAINTENANCE
Cm met with pt for initial discharge planning assessment.Pt is alert and oriented at time of assessment.    Pt is a student at [employer], he is a Swimming athlete at the school. Pt lives in off campus housing with roommates. Pt has a car but friends will provide transportation home.    Pt has no PCP in the city, and will use [facility]. Pt in agreement with [facility] for meds.    Pt verified demographic information is correct in Epic. Pt with no HH or DME and pt not on HD.    CM to follow for plans and arrangements.   09/28/20 1418   Discharge Assessment   Assessment Type Discharge Planning Assessment   Confirmed/corrected address and phone number on facesheet? Yes   Assessment information obtained from? Patient;Medical Record   Expected Length of Stay (days) 3   Communicated expected length of stay with patient/caregiver yes   Prior to hospitilization cognitive status: Alert/Oriented   Prior to hospitalization functional status: Independent   Current cognitive status: Alert/Oriented   Current Functional Status: Independent   Lives With other (see comments)  (room mates)   Able to Return to Prior Arrangements yes   Is patient able to care for self after discharge? Yes   Patient's perception of discharge disposition home or selfcare   Readmission Within the Last 30 Days no previous admission in last 30 days   Patient currently being followed by outpatient case management? No   Patient currently receives any other outside agency services? No   Equipment Currently Used at Home none   Do you have any problems affording any of your prescribed medications? No   Does the patient have transportation home? Yes   Transportation Anticipated family or friend will provide   Does the patient receive services at the Coumadin Clinic? No   Discharge Plan A Home   DME Needed Upon Discharge  none   Patient/Family in Agreement with Plan yes        pap 2017  wilman 2019  colonoscopy 2011

## 2020-09-30 ENCOUNTER — APPOINTMENT (OUTPATIENT)
Dept: INTERNAL MEDICINE | Facility: CLINIC | Age: 65
End: 2020-09-30
Payer: MEDICARE

## 2020-09-30 ENCOUNTER — OUTPATIENT (OUTPATIENT)
Dept: OUTPATIENT SERVICES | Facility: HOSPITAL | Age: 65
LOS: 1 days | Discharge: HOME | End: 2020-09-30

## 2020-09-30 VITALS
WEIGHT: 161 LBS | SYSTOLIC BLOOD PRESSURE: 146 MMHG | TEMPERATURE: 97.1 F | BODY MASS INDEX: 27.49 KG/M2 | HEIGHT: 64 IN | DIASTOLIC BLOOD PRESSURE: 86 MMHG | HEART RATE: 86 BPM

## 2020-09-30 DIAGNOSIS — R91.1 SOLITARY PULMONARY NODULE: Chronic | ICD-10-CM

## 2020-09-30 DIAGNOSIS — H92.09 OTALGIA, UNSPECIFIED EAR: ICD-10-CM

## 2020-09-30 DIAGNOSIS — J38.1 POLYP OF VOCAL CORD AND LARYNX: Chronic | ICD-10-CM

## 2020-09-30 DIAGNOSIS — Z90.710 ACQUIRED ABSENCE OF BOTH CERVIX AND UTERUS: Chronic | ICD-10-CM

## 2020-09-30 DIAGNOSIS — Z98.890 OTHER SPECIFIED POSTPROCEDURAL STATES: Chronic | ICD-10-CM

## 2020-09-30 DIAGNOSIS — J44.9 CHRONIC OBSTRUCTIVE PULMONARY DISEASE, UNSPECIFIED: ICD-10-CM

## 2020-09-30 DIAGNOSIS — M13.0 POLYARTHRITIS, UNSPECIFIED: ICD-10-CM

## 2020-09-30 DIAGNOSIS — Z90.49 ACQUIRED ABSENCE OF OTHER SPECIFIED PARTS OF DIGESTIVE TRACT: Chronic | ICD-10-CM

## 2020-09-30 PROCEDURE — 99213 OFFICE O/P EST LOW 20 MIN: CPT | Mod: GC

## 2020-09-30 NOTE — HISTORY OF PRESENT ILLNESS
[FreeTextEntry1] : Follow up [de-identified] : 65 year old lady with PMHx of relapsing polychondritis, intraductal papilloma, RLL lung adenocarcinoma s/p RFA, HTN, COPD, prediabetes presents for follow up. She reports ear pain, and odynophagia with hoarseness. Denied fever, chills, cough, HA, SOB, cp, n/v/d/c. \par

## 2020-09-30 NOTE — PHYSICAL EXAM
[Normal] : normal gait, coordination grossly intact, no focal deficits and deep tendon reflexes were 2+ and symmetric [de-identified] : left ear canal inflamed, erythematous

## 2020-09-30 NOTE — REVIEW OF SYSTEMS
[Earache] : earache [Hoarseness] : hoarseness [Sore Throat] : sore throat [Postnasal Drip] : postnasal drip [Negative] : Integumentary [Fever] : no fever [Chills] : no chills [Fatigue] : no fatigue [Hot Flashes] : no hot flashes [Night Sweats] : no night sweats [Recent Change In Weight] : ~T no recent weight change [Discharge] : no discharge [Nosebleed] : no nosebleeds [Hearing Loss] : no hearing loss [Nasal Discharge] : no nasal discharge

## 2020-09-30 NOTE — ASSESSMENT
[FreeTextEntry1] : 65 year old lady with PMHx of relapsing polychondritis, COPD, HTN, GERD, presents for follow up exam\par \par #Left ear pain, hoarseness, odynophagia\par #otitis externa:\par - inflammation in left ear canal. \par - presence of hoarseness and odynophagia.\par - current smoker. \par - sent for cipro otic drops for 10 days.\par - ENT referral.\par \par #Relapsing polychonditis\par - MMF 1000 mg BID and  mg BID\par - no current indications for flare. \par - Ophthomology followup\par - Rheum f/u\par \par #Dense breast tissue on mammogram \par #Radial scar of left breast \par -S/P biopsy\par - two left breast radial scars, s/p L WLE on 8/24/2020. \par - Her final pathology revealed a sclerosing intraductal papilloma and a small radial scar, but no evidence of carcinoma. \par -L dx mammogram, R screening mammogram, b/l US on 3/2/21\par -f/up after with general surgery\par \par \par #Hx of lung lesion s/p RFA\par - Needs annual CT chest w/o per pulm, next Sep 2020\par \par #COPD: Stable\par - F/U pulm \par \par #Osteoporosis\par - C/w alendronate\par -Vit D wnl\par - needs a DEXA scan in 2021\par - weight bearing exercises\par \par #GERD\par -c/w omeprazole\par \par #HTN\par - c/w losartan 25mg\par \par #Cystocele s/p correction\par - F/u with uro-gyn\par \par #Compression fractures/back pain: Has age indeterminate compression fxs at L2, L3, L4, T5, T8\par \par #HCM\par -last colonoscopy was clear, states it's been less than 10 years\par - mammogram with B/L US in 3/2021\par - smoking cessation\par \par f/u in  6 months and PRN. \par

## 2020-10-05 ENCOUNTER — APPOINTMENT (OUTPATIENT)
Dept: INTERNAL MEDICINE | Facility: CLINIC | Age: 65
End: 2020-10-05

## 2020-10-09 ENCOUNTER — APPOINTMENT (OUTPATIENT)
Dept: OTOLARYNGOLOGY | Facility: CLINIC | Age: 65
End: 2020-10-09
Payer: MEDICARE

## 2020-10-09 VITALS — HEIGHT: 64 IN | BODY MASS INDEX: 27.66 KG/M2 | WEIGHT: 162 LBS

## 2020-10-09 DIAGNOSIS — J39.2 OTHER DISEASES OF PHARYNX: ICD-10-CM

## 2020-10-09 DIAGNOSIS — H92.02 OTALGIA, LEFT EAR: ICD-10-CM

## 2020-10-09 PROCEDURE — 99205 OFFICE O/P NEW HI 60 MIN: CPT | Mod: 25

## 2020-10-09 PROCEDURE — 31575 DIAGNOSTIC LARYNGOSCOPY: CPT

## 2020-10-09 NOTE — HISTORY OF PRESENT ILLNESS
[de-identified] : Patient presents today with c/o left otalgia, odynophagia. Patient admits theses symptoms of odynophagia for the past 3-4 months. Dr Li gave her Mucinex work for about a week thank came back. It was intermittent for about a month she gets food stuck in her throat. Recently this happens daily. \par Her ear started about a month ago has worsen she has to take Tylenol every night due to the pain .\par Hearing has been stable, but bad.\par \par Patient has a history of polychondritis.\par \par Patient complains of dysphagia to solids mainly. no choking with liquids.\par \par No dysphonia. \par

## 2020-10-26 ENCOUNTER — OUTPATIENT (OUTPATIENT)
Dept: OUTPATIENT SERVICES | Facility: HOSPITAL | Age: 65
LOS: 1 days | Discharge: HOME | End: 2020-10-26
Payer: MEDICARE

## 2020-10-26 DIAGNOSIS — J39.2 OTHER DISEASES OF PHARYNX: ICD-10-CM

## 2020-10-26 DIAGNOSIS — Z98.890 OTHER SPECIFIED POSTPROCEDURAL STATES: Chronic | ICD-10-CM

## 2020-10-26 DIAGNOSIS — R91.1 SOLITARY PULMONARY NODULE: Chronic | ICD-10-CM

## 2020-10-26 DIAGNOSIS — Z90.710 ACQUIRED ABSENCE OF BOTH CERVIX AND UTERUS: Chronic | ICD-10-CM

## 2020-10-26 DIAGNOSIS — Z90.49 ACQUIRED ABSENCE OF OTHER SPECIFIED PARTS OF DIGESTIVE TRACT: Chronic | ICD-10-CM

## 2020-10-26 DIAGNOSIS — J38.1 POLYP OF VOCAL CORD AND LARYNX: Chronic | ICD-10-CM

## 2020-10-26 PROCEDURE — 70540 MRI ORBIT/FACE/NECK W/O DYE: CPT | Mod: 26

## 2020-11-02 ENCOUNTER — RESULT REVIEW (OUTPATIENT)
Age: 65
End: 2020-11-02

## 2020-11-02 ENCOUNTER — OUTPATIENT (OUTPATIENT)
Dept: OUTPATIENT SERVICES | Facility: HOSPITAL | Age: 65
LOS: 1 days | Discharge: HOME | End: 2020-11-02
Payer: MEDICARE

## 2020-11-02 ENCOUNTER — APPOINTMENT (OUTPATIENT)
Dept: OTOLARYNGOLOGY | Facility: CLINIC | Age: 65
End: 2020-11-02
Payer: MEDICARE

## 2020-11-02 VITALS
WEIGHT: 156.97 LBS | HEART RATE: 67 BPM | RESPIRATION RATE: 15 BRPM | OXYGEN SATURATION: 97 % | TEMPERATURE: 98 F | SYSTOLIC BLOOD PRESSURE: 126 MMHG | DIASTOLIC BLOOD PRESSURE: 68 MMHG | HEIGHT: 64 IN

## 2020-11-02 DIAGNOSIS — Z98.890 OTHER SPECIFIED POSTPROCEDURAL STATES: Chronic | ICD-10-CM

## 2020-11-02 DIAGNOSIS — J38.1 POLYP OF VOCAL CORD AND LARYNX: Chronic | ICD-10-CM

## 2020-11-02 DIAGNOSIS — J39.2 OTHER DISEASES OF PHARYNX: ICD-10-CM

## 2020-11-02 DIAGNOSIS — R91.1 SOLITARY PULMONARY NODULE: Chronic | ICD-10-CM

## 2020-11-02 DIAGNOSIS — Z01.818 ENCOUNTER FOR OTHER PREPROCEDURAL EXAMINATION: ICD-10-CM

## 2020-11-02 DIAGNOSIS — Z90.49 ACQUIRED ABSENCE OF OTHER SPECIFIED PARTS OF DIGESTIVE TRACT: Chronic | ICD-10-CM

## 2020-11-02 DIAGNOSIS — Z90.710 ACQUIRED ABSENCE OF BOTH CERVIX AND UTERUS: Chronic | ICD-10-CM

## 2020-11-02 LAB
ALBUMIN SERPL ELPH-MCNC: 4.4 G/DL — SIGNIFICANT CHANGE UP (ref 3.5–5.2)
ALP SERPL-CCNC: 70 U/L — SIGNIFICANT CHANGE UP (ref 30–115)
ALT FLD-CCNC: 9 U/L — SIGNIFICANT CHANGE UP (ref 0–41)
ANION GAP SERPL CALC-SCNC: 2 MMOL/L — LOW (ref 7–14)
APPEARANCE UR: CLEAR — SIGNIFICANT CHANGE UP
APTT BLD: 32.4 SEC — SIGNIFICANT CHANGE UP (ref 27–39.2)
AST SERPL-CCNC: 14 U/L — SIGNIFICANT CHANGE UP (ref 0–41)
BACTERIA # UR AUTO: ABNORMAL
BASOPHILS # BLD AUTO: 0.05 K/UL — SIGNIFICANT CHANGE UP (ref 0–0.2)
BASOPHILS NFR BLD AUTO: 0.6 % — SIGNIFICANT CHANGE UP (ref 0–1)
BILIRUB SERPL-MCNC: 0.3 MG/DL — SIGNIFICANT CHANGE UP (ref 0.2–1.2)
BILIRUB UR-MCNC: NEGATIVE — SIGNIFICANT CHANGE UP
BUN SERPL-MCNC: 5 MG/DL — LOW (ref 10–20)
CALCIUM SERPL-MCNC: 9.3 MG/DL — SIGNIFICANT CHANGE UP (ref 8.5–10.1)
CHLORIDE SERPL-SCNC: 104 MMOL/L — SIGNIFICANT CHANGE UP (ref 98–110)
CO2 SERPL-SCNC: 24 MMOL/L — SIGNIFICANT CHANGE UP (ref 17–32)
COLOR SPEC: YELLOW — SIGNIFICANT CHANGE UP
CREAT SERPL-MCNC: 0.6 MG/DL — LOW (ref 0.7–1.5)
DIFF PNL FLD: ABNORMAL
EOSINOPHIL # BLD AUTO: 0.06 K/UL — SIGNIFICANT CHANGE UP (ref 0–0.7)
EOSINOPHIL NFR BLD AUTO: 0.7 % — SIGNIFICANT CHANGE UP (ref 0–8)
EPI CELLS # UR: 2 /HPF — SIGNIFICANT CHANGE UP (ref 0–5)
GLUCOSE SERPL-MCNC: 104 MG/DL — HIGH (ref 70–99)
GLUCOSE UR QL: NEGATIVE — SIGNIFICANT CHANGE UP
HCT VFR BLD CALC: 48.1 % — HIGH (ref 37–47)
HGB BLD-MCNC: 15.6 G/DL — SIGNIFICANT CHANGE UP (ref 12–16)
HYALINE CASTS # UR AUTO: 4 /LPF — SIGNIFICANT CHANGE UP (ref 0–7)
IMM GRANULOCYTES NFR BLD AUTO: 0.3 % — SIGNIFICANT CHANGE UP (ref 0.1–0.3)
INR BLD: 1.02 RATIO — SIGNIFICANT CHANGE UP (ref 0.65–1.3)
KETONES UR-MCNC: NEGATIVE — SIGNIFICANT CHANGE UP
LEUKOCYTE ESTERASE UR-ACNC: ABNORMAL
LYMPHOCYTES # BLD AUTO: 1.53 K/UL — SIGNIFICANT CHANGE UP (ref 1.2–3.4)
LYMPHOCYTES # BLD AUTO: 17.2 % — LOW (ref 20.5–51.1)
MCHC RBC-ENTMCNC: 29.1 PG — SIGNIFICANT CHANGE UP (ref 27–31)
MCHC RBC-ENTMCNC: 32.4 G/DL — SIGNIFICANT CHANGE UP (ref 32–37)
MCV RBC AUTO: 89.6 FL — SIGNIFICANT CHANGE UP (ref 81–99)
MONOCYTES # BLD AUTO: 0.62 K/UL — HIGH (ref 0.1–0.6)
MONOCYTES NFR BLD AUTO: 7 % — SIGNIFICANT CHANGE UP (ref 1.7–9.3)
NEUTROPHILS # BLD AUTO: 6.59 K/UL — HIGH (ref 1.4–6.5)
NEUTROPHILS NFR BLD AUTO: 74.2 % — SIGNIFICANT CHANGE UP (ref 42.2–75.2)
NITRITE UR-MCNC: POSITIVE
NRBC # BLD: 0 /100 WBCS — SIGNIFICANT CHANGE UP (ref 0–0)
PH UR: 6.5 — SIGNIFICANT CHANGE UP (ref 5–8)
PLATELET # BLD AUTO: 292 K/UL — SIGNIFICANT CHANGE UP (ref 130–400)
POTASSIUM SERPL-MCNC: 4.1 MMOL/L — SIGNIFICANT CHANGE UP (ref 3.5–5)
POTASSIUM SERPL-SCNC: 4.1 MMOL/L — SIGNIFICANT CHANGE UP (ref 3.5–5)
PROT SERPL-MCNC: 6.5 G/DL — SIGNIFICANT CHANGE UP (ref 6–8)
PROT UR-MCNC: ABNORMAL
PROTHROM AB SERPL-ACNC: 11.7 SEC — SIGNIFICANT CHANGE UP (ref 9.95–12.87)
RBC # BLD: 5.37 M/UL — SIGNIFICANT CHANGE UP (ref 4.2–5.4)
RBC # FLD: 13.6 % — SIGNIFICANT CHANGE UP (ref 11.5–14.5)
RBC CASTS # UR COMP ASSIST: 11 /HPF — HIGH (ref 0–4)
SODIUM SERPL-SCNC: 130 MMOL/L — LOW (ref 135–146)
SP GR SPEC: 1.02 — SIGNIFICANT CHANGE UP (ref 1.01–1.03)
UROBILINOGEN FLD QL: SIGNIFICANT CHANGE UP
WBC # BLD: 8.88 K/UL — SIGNIFICANT CHANGE UP (ref 4.8–10.8)
WBC # FLD AUTO: 8.88 K/UL — SIGNIFICANT CHANGE UP (ref 4.8–10.8)
WBC UR QL: 16 /HPF — HIGH (ref 0–5)

## 2020-11-02 PROCEDURE — 99215 OFFICE O/P EST HI 40 MIN: CPT | Mod: 25

## 2020-11-02 PROCEDURE — 71046 X-RAY EXAM CHEST 2 VIEWS: CPT | Mod: 26

## 2020-11-02 PROCEDURE — 99072 ADDL SUPL MATRL&STAF TM PHE: CPT

## 2020-11-02 PROCEDURE — 93010 ELECTROCARDIOGRAM REPORT: CPT

## 2020-11-02 PROCEDURE — 31575 DIAGNOSTIC LARYNGOSCOPY: CPT

## 2020-11-02 NOTE — CONSULT LETTER
[Dear  ___] : Dear  [unfilled], [Consult Letter:] : I had the pleasure of evaluating your patient, [unfilled]. [Please see my note below.] : Please see my note below. [Consult Closing:] : Thank you very much for allowing me to participate in the care of this patient.  If you have any questions, please do not hesitate to contact me. [Sincerely,] : Sincerely, [FreeTextEntry2] : Alex Mendoza MD [FreeTextEntry3] : Nuria Najera MD\par Otolaryngology - Head & Neck Surgery\par

## 2020-11-02 NOTE — H&P PST ADULT - NSICDXPASTSURGICALHX_GEN_ALL_CORE_FT
PAST SURGICAL HISTORY:  H/O breast biopsy LEFT    History of bladder surgery 2019    Pulmonary nodule     S/P colon resection     S/P GEETHA-BSO     Vocal cord polyps removal of same 2005

## 2020-11-02 NOTE — H&P PST ADULT - REASON FOR ADMISSION
PT PRESENTS TO PAST WITH NO SOB, CP, PALPITATIONS, DYSURIA, UTI OR URI AT PRESENT.   PT ABLE TO WALK UP 2-3 FLIGHTS OF STEPS WITH NO SOB.  AS PER THE PT, THIS IS HIS/HER COMPLETE MEDICAL AND SURGICAL HX, INCLUDING MEDICATIONS PRESCRIBED AND OVER THE COUNTER  denies travel outside the [place] in the past 30 days  pt denies any covid s/s, or tested positive in the past  pt advised self quarantine till day of procedure  Anesthesia Alert  NO--Difficult Airway  NO--History of neck surgery or radiation  NO--Limited ROM of neck  NO--History of Malignant hyperthermia  NO--Personal or family history of Pseudocholinesterase deficiency  NO--Prior Anesthesia Complication  NO--Latex Allergy  NO--Loose teeth  NO--History of Rheumatoid Arthritis  NO--ASHUTOSH  NO--Other_____  65 YR FEMALE SCHEDULED FOR LARYNGOSCOPY. BIOPSY OF BASE  OF TONGUE.    PT HAS H/O LUNG CANCER.  PT REPORTS- I AM GOING FOR THIS PROCEDURE -I AM HAVING A PROBLEM SWALLOWING MY FOOD.    THIS HAS BEEN GOING ON FOR ABOUT 6 MONTHS.

## 2020-11-02 NOTE — PHYSICAL EXAM
[FreeTextEntry1] : muffled voice [de-identified] : left level II LAD 1.5cm, slightly mobile, slightly tender to palpation [Midline] : trachea located in midline position [Normal] : no rashes

## 2020-11-02 NOTE — H&P PST ADULT - NSICDXFAMILYHX_GEN_ALL_CORE_FT
AK/Loki      Patient went to coumadin clinic this morning and her pulse was running low. She was told to call Dr. Hawkins to ask if he wanted to make changes to her medications. Pt. #[phone].   FAMILY HISTORY:  Family history of diabetes mellitus (DM)

## 2020-11-02 NOTE — HISTORY OF PRESENT ILLNESS
[de-identified] : Patient presents today with c/o left otalgia, odynophagia. Patient admits theses symptoms of odynophagia for the past 3-4 months. Dr Li gave her Mucinex work for about a week thank came back. It was intermittent for about a month she gets food stuck in her throat. Recently this happens daily. \par Her ear started about a month ago has worsen she has to take Tylenol every night due to the pain .\par Hearing has been stable, but bad.\par \par Patient has a history of polychondritis.\par \par Patient complains of dysphagia to solids mainly. no choking with liquids.\par \par No dysphonia. \par    [FreeTextEntry1] : \par 11/2/2020: Patient presents today following up after seeing Dr. Jain for pharyngeal mass. Patient had MRI neck done, shows vallecular mass. Son notes muffled voice over the past 6 months. Continues to have swallowing difficulty. ?weight loss. \par \par She takes Cellcept, hydroxychloroquine for polychondritis.\par \par She has a hx of FDG avid pulmonary nodules on PET 2-3 years ago, these were "burned" per patient. She sees Dr Bright for this.

## 2020-11-02 NOTE — REASON FOR VISIT
[Subsequent Evaluation] : a subsequent evaluation for [FreeTextEntry2] : dysphagia, pharyngeal mass

## 2020-11-02 NOTE — DATA REVIEWED
[de-identified] : relevant images and reports personally reviewed by me:\par \par \par EXAM: MR ORBIT FACE AND OR NECK\par \par \par PROCEDURE DATE: 10/26/2020\par \par \par \par \par INTERPRETATION: Clinical History / Reason for exam: Pharyngeal mass. Left otalgia, odontophagia.\par \par TECHNIQUE: MRI of the neck without contrast. Multiplanar multi sequential MRI of the neck was performed without intravenous contrast on a 1.5 Sanjuana magnet. The patient declined IV contrast.\par \par COMPARISON: None available\par \par FINDINGS:\par \par There is a soft tissue mass occupying the vallecula. The epiglottis is presumed to be posteriorly displaced but is not visualized distally from the mass. The mass measures up to about 3.5 x 3.7 x 5.0 cm (transverse, AP, CC).\par \par The included brain parenchyma demonstrates no gross mass effect. Trace maxillary sinus mucosal thickening is noted.\par \par Parotid and submandibular glands appear grossly unremarkable.\par \par There is cervical spine degenerative changes with mild anterolisthesis C4 on C5.\par \par There is a mildly enlarged left level 2 lymph node measuring 1.3 x 1.7 cm. Suggestion of a right level 2 lymph node measuring 0.7 x 1.2 cm.\par \par There are bilateral thyroid nodules.\par \par IMPRESSION:\par \par 1. Soft tissue mass filling the vallecula measuring up to 5 cm (craniocaudad), of probable tongue base origin base origin. Please note the patient declined IV contrast limiting evaluation of the mass.\par \par 2. Mildly enlarged left level II lymph node measuring 1.7 cm. Nonspecific right level II lymph node is also noted measuring 1.2 cm.\par \par 3. Bilateral thyroid nodules which may be further characterized with thyroid sonogram. The\par \par \par \par \par \par AMOR STEIN MD; Attending Radiologist\par This document has been electronically signed. Oct 26 2020 12:56PM

## 2020-11-02 NOTE — ASSESSMENT
[FreeTextEntry1] : - patient with large base of tongue/vallecular mass. Discussed role of direct laryngoscopy with biopsy, awake tracheostomy. Discussed risks, benefits, and alternatives to treatment in extensive detail. Patient asked questions and these were answered to his apparent satisfaction. Patient gave informed written consent.\par - PET/CT now\par - clearance from Dr Mendoza and Dr Bright\par

## 2020-11-03 ENCOUNTER — INPATIENT (INPATIENT)
Facility: HOSPITAL | Age: 65
LOS: 11 days | Discharge: HOME HEALTH | End: 2020-11-15
Attending: OTOLARYNGOLOGY | Admitting: OTOLARYNGOLOGY
Payer: MEDICARE

## 2020-11-03 VITALS
WEIGHT: 156.09 LBS | RESPIRATION RATE: 19 BRPM | HEIGHT: 64 IN | HEART RATE: 102 BPM | TEMPERATURE: 98 F | OXYGEN SATURATION: 97 % | SYSTOLIC BLOOD PRESSURE: 137 MMHG | DIASTOLIC BLOOD PRESSURE: 79 MMHG

## 2020-11-03 DIAGNOSIS — Z90.710 ACQUIRED ABSENCE OF BOTH CERVIX AND UTERUS: Chronic | ICD-10-CM

## 2020-11-03 DIAGNOSIS — R91.1 SOLITARY PULMONARY NODULE: Chronic | ICD-10-CM

## 2020-11-03 DIAGNOSIS — Z98.890 OTHER SPECIFIED POSTPROCEDURAL STATES: Chronic | ICD-10-CM

## 2020-11-03 DIAGNOSIS — Z90.49 ACQUIRED ABSENCE OF OTHER SPECIFIED PARTS OF DIGESTIVE TRACT: Chronic | ICD-10-CM

## 2020-11-03 DIAGNOSIS — J38.1 POLYP OF VOCAL CORD AND LARYNX: Chronic | ICD-10-CM

## 2020-11-03 LAB
ALBUMIN SERPL ELPH-MCNC: 3.9 G/DL — SIGNIFICANT CHANGE UP (ref 3.5–5.2)
ALP SERPL-CCNC: 64 U/L — SIGNIFICANT CHANGE UP (ref 30–115)
ALT FLD-CCNC: 9 U/L — SIGNIFICANT CHANGE UP (ref 0–41)
ANION GAP SERPL CALC-SCNC: 12 MMOL/L — SIGNIFICANT CHANGE UP (ref 7–14)
APPEARANCE UR: CLEAR — SIGNIFICANT CHANGE UP
APTT BLD: 28.5 SEC — SIGNIFICANT CHANGE UP (ref 27–39.2)
AST SERPL-CCNC: 13 U/L — SIGNIFICANT CHANGE UP (ref 0–41)
BACTERIA # UR AUTO: ABNORMAL
BASOPHILS # BLD AUTO: 0.04 K/UL — SIGNIFICANT CHANGE UP (ref 0–0.2)
BASOPHILS NFR BLD AUTO: 0.4 % — SIGNIFICANT CHANGE UP (ref 0–1)
BILIRUB SERPL-MCNC: 0.2 MG/DL — SIGNIFICANT CHANGE UP (ref 0.2–1.2)
BILIRUB UR-MCNC: NEGATIVE — SIGNIFICANT CHANGE UP
BLD GP AB SCN SERPL QL: SIGNIFICANT CHANGE UP
BUN SERPL-MCNC: 7 MG/DL — LOW (ref 10–20)
CALCIUM SERPL-MCNC: 8.9 MG/DL — SIGNIFICANT CHANGE UP (ref 8.5–10.1)
CHLORIDE SERPL-SCNC: 102 MMOL/L — SIGNIFICANT CHANGE UP (ref 98–110)
CO2 SERPL-SCNC: 24 MMOL/L — SIGNIFICANT CHANGE UP (ref 17–32)
COLOR SPEC: SIGNIFICANT CHANGE UP
CREAT SERPL-MCNC: 0.6 MG/DL — LOW (ref 0.7–1.5)
DIFF PNL FLD: NEGATIVE — SIGNIFICANT CHANGE UP
EOSINOPHIL # BLD AUTO: 0.07 K/UL — SIGNIFICANT CHANGE UP (ref 0–0.7)
EOSINOPHIL NFR BLD AUTO: 0.7 % — SIGNIFICANT CHANGE UP (ref 0–8)
EPI CELLS # UR: 2 /HPF — SIGNIFICANT CHANGE UP (ref 0–5)
GLUCOSE SERPL-MCNC: 120 MG/DL — HIGH (ref 70–99)
GLUCOSE UR QL: NEGATIVE — SIGNIFICANT CHANGE UP
HCT VFR BLD CALC: 44.2 % — SIGNIFICANT CHANGE UP (ref 37–47)
HGB BLD-MCNC: 14.6 G/DL — SIGNIFICANT CHANGE UP (ref 12–16)
HYALINE CASTS # UR AUTO: 0 /LPF — SIGNIFICANT CHANGE UP (ref 0–7)
IMM GRANULOCYTES NFR BLD AUTO: 0.2 % — SIGNIFICANT CHANGE UP (ref 0.1–0.3)
INR BLD: 1.03 RATIO — SIGNIFICANT CHANGE UP (ref 0.65–1.3)
KETONES UR-MCNC: NEGATIVE — SIGNIFICANT CHANGE UP
LEUKOCYTE ESTERASE UR-ACNC: NEGATIVE — SIGNIFICANT CHANGE UP
LYMPHOCYTES # BLD AUTO: 1.56 K/UL — SIGNIFICANT CHANGE UP (ref 1.2–3.4)
LYMPHOCYTES # BLD AUTO: 15.8 % — LOW (ref 20.5–51.1)
MCHC RBC-ENTMCNC: 29.2 PG — SIGNIFICANT CHANGE UP (ref 27–31)
MCHC RBC-ENTMCNC: 33 G/DL — SIGNIFICANT CHANGE UP (ref 32–37)
MCV RBC AUTO: 88.4 FL — SIGNIFICANT CHANGE UP (ref 81–99)
MONOCYTES # BLD AUTO: 0.58 K/UL — SIGNIFICANT CHANGE UP (ref 0.1–0.6)
MONOCYTES NFR BLD AUTO: 5.9 % — SIGNIFICANT CHANGE UP (ref 1.7–9.3)
NEUTROPHILS # BLD AUTO: 7.61 K/UL — HIGH (ref 1.4–6.5)
NEUTROPHILS NFR BLD AUTO: 77 % — HIGH (ref 42.2–75.2)
NITRITE UR-MCNC: POSITIVE
NRBC # BLD: 0 /100 WBCS — SIGNIFICANT CHANGE UP (ref 0–0)
NT-PROBNP SERPL-SCNC: 115 PG/ML — SIGNIFICANT CHANGE UP (ref 0–300)
PH UR: 6.5 — SIGNIFICANT CHANGE UP (ref 5–8)
PLATELET # BLD AUTO: 264 K/UL — SIGNIFICANT CHANGE UP (ref 130–400)
POTASSIUM SERPL-MCNC: 3.7 MMOL/L — SIGNIFICANT CHANGE UP (ref 3.5–5)
POTASSIUM SERPL-SCNC: 3.7 MMOL/L — SIGNIFICANT CHANGE UP (ref 3.5–5)
PROT SERPL-MCNC: 5.7 G/DL — LOW (ref 6–8)
PROT UR-MCNC: NEGATIVE — SIGNIFICANT CHANGE UP
PROTHROM AB SERPL-ACNC: 11.8 SEC — SIGNIFICANT CHANGE UP (ref 9.95–12.87)
RBC # BLD: 5 M/UL — SIGNIFICANT CHANGE UP (ref 4.2–5.4)
RBC # FLD: 13.7 % — SIGNIFICANT CHANGE UP (ref 11.5–14.5)
RBC CASTS # UR COMP ASSIST: 2 /HPF — SIGNIFICANT CHANGE UP (ref 0–4)
SARS-COV-2 RNA SPEC QL NAA+PROBE: SIGNIFICANT CHANGE UP
SODIUM SERPL-SCNC: 138 MMOL/L — SIGNIFICANT CHANGE UP (ref 135–146)
SP GR SPEC: 1.01 — LOW (ref 1.01–1.03)
UROBILINOGEN FLD QL: SIGNIFICANT CHANGE UP
WBC # BLD: 9.88 K/UL — SIGNIFICANT CHANGE UP (ref 4.8–10.8)
WBC # FLD AUTO: 9.88 K/UL — SIGNIFICANT CHANGE UP (ref 4.8–10.8)
WBC UR QL: 1 /HPF — SIGNIFICANT CHANGE UP (ref 0–5)

## 2020-11-03 PROCEDURE — 99285 EMERGENCY DEPT VISIT HI MDM: CPT

## 2020-11-03 PROCEDURE — 71046 X-RAY EXAM CHEST 2 VIEWS: CPT | Mod: 26

## 2020-11-03 PROCEDURE — 93010 ELECTROCARDIOGRAM REPORT: CPT

## 2020-11-03 RX ORDER — MYCOPHENOLATE MOFETIL 250 MG/1
500 CAPSULE ORAL
Refills: 0 | Status: DISCONTINUED | OUTPATIENT
Start: 2020-11-03 | End: 2020-11-04

## 2020-11-03 RX ORDER — SIMVASTATIN 20 MG/1
40 TABLET, FILM COATED ORAL AT BEDTIME
Refills: 0 | Status: DISCONTINUED | OUTPATIENT
Start: 2020-11-03 | End: 2020-11-06

## 2020-11-03 RX ORDER — IPRATROPIUM/ALBUTEROL SULFATE 18-103MCG
3 AEROSOL WITH ADAPTER (GRAM) INHALATION
Refills: 0 | Status: DISCONTINUED | OUTPATIENT
Start: 2020-11-03 | End: 2020-11-05

## 2020-11-03 RX ORDER — CHLORHEXIDINE GLUCONATE 213 G/1000ML
1 SOLUTION TOPICAL
Refills: 0 | Status: DISCONTINUED | OUTPATIENT
Start: 2020-11-03 | End: 2020-11-06

## 2020-11-03 RX ORDER — ALBUTEROL 90 UG/1
2 AEROSOL, METERED ORAL EVERY 4 HOURS
Refills: 0 | Status: DISCONTINUED | OUTPATIENT
Start: 2020-11-03 | End: 2020-11-06

## 2020-11-03 RX ORDER — PANTOPRAZOLE SODIUM 20 MG/1
40 TABLET, DELAYED RELEASE ORAL
Refills: 0 | Status: DISCONTINUED | OUTPATIENT
Start: 2020-11-03 | End: 2020-11-06

## 2020-11-03 RX ORDER — HYDROXYCHLOROQUINE SULFATE 200 MG
200 TABLET ORAL
Refills: 0 | Status: DISCONTINUED | OUTPATIENT
Start: 2020-11-03 | End: 2020-11-06

## 2020-11-03 RX ORDER — LOSARTAN POTASSIUM 100 MG/1
25 TABLET, FILM COATED ORAL DAILY
Refills: 0 | Status: DISCONTINUED | OUTPATIENT
Start: 2020-11-03 | End: 2020-11-06

## 2020-11-03 RX ORDER — TIOTROPIUM BROMIDE 18 UG/1
1 CAPSULE ORAL; RESPIRATORY (INHALATION) DAILY
Refills: 0 | Status: DISCONTINUED | OUTPATIENT
Start: 2020-11-03 | End: 2020-11-06

## 2020-11-03 RX ADMIN — ALBUTEROL 2 PUFF(S): 90 AEROSOL, METERED ORAL at 23:18

## 2020-11-03 RX ADMIN — SIMVASTATIN 40 MILLIGRAM(S): 20 TABLET, FILM COATED ORAL at 22:00

## 2020-11-03 NOTE — H&P ADULT - ASSESSMENT
65 y.o F CAD, COPD, GERD, pneumothorax 2016, pulmonary nodule, relapsing polychondritis, HTN, HLD sent from Dr. Najera office for medical and pulmonary clearance, planning to go for OR on Friday 11/6/20 for awake tracheostomy and base of tongue lesion Bx.     # Tongue biopsy on 11/6/2020, preop clearance requested  - Plan is for trach and biopsy on 11/6  - Medical clearance and Pulmonary (Dr. Bright) clearance  - EKG shows NSR, chest xray shows b/l pleural effusions  - Electrolytes and coags normal, UA negative  - Full liquid diet  - NPO after midnight on Thursday      # COPD  - Will continue standing ventolin, Duoneb PRN    # Relapsing polychondritis  - Continue home hydroxychloroquine, mycophenolate     # HLD, HTN  - Continue home meds    # GERD  - Continue home meds      DVT PPX: Lovenox  Diet: Full liquid  Dispo: From home

## 2020-11-03 NOTE — ED PROVIDER NOTE - PHYSICAL EXAMINATION
CONSTITUTIONAL: Well-developed; well-nourished; in no acute distress, nontoxic appearing  SKIN: skin exam is warm and dry,  HEAD: Normocephalic; atraumatic.  ENT: oropharynx non-erythematous, uvula midline, tolerating secretions   NECK: ROM intact.  CARD: S1, S2 normal, no murmur  RESP: Good air movement bilaterally. Bibasilar crackles. No increased WOB.   ABD: soft; non-distended; non-tender.   EXT: Normal ROM.   NEURO: awake, alert, following commands, oriented, grossly unremarkable. No Focal deficits. GCS 15.   PSYCH: Cooperative, appropriate.

## 2020-11-03 NOTE — CONSULT NOTE ADULT - CONSULT REASON
found b/l pleural effusion on preadmission testing sent for medical and pulmonary clearance. OR for awake tracheostomy and base of tongue bx on 11/6/20

## 2020-11-03 NOTE — H&P ADULT - HISTORY OF PRESENT ILLNESS
65 y.o F CAD, COPD, GERD, pneumothorax 2016, pulmonary nodule, relapsing polychondritis, HTN, HLD sent from Dr. Najera office for medical and pulmonary clearance, planning to go for OR on Friday 11/6/20 for awake tracheostomy and base of tongue lesion Bx. Patient reports mass has been growing for a few weeks and is causing her to have difficulty swallowing solids, with no difficulty swallowing liquids. She denies any other symptoms, including pain anywhere, SOB, dysuria, urinary frequency, N/V/D/C, fever, chills.  On outpatient testing Pt. found to have + UA and CXR findings of blunting of costophrenic angles consistent with pleural effusion.     From ENT note: Outpatient FFL11/2/20 There is a large mass appearing to arise from the base of tongue, filling vallecula and obstructing approximately 70% of oropharynx. Non-friable. The epiglottis, aryepiglottic folds, and arytenoids were within normal limits. Pyriform sinuses clear. Bilateral and symmetric movement of the vocal folds bilaterally, no masses or lesions

## 2020-11-03 NOTE — ED PROVIDER NOTE - ATTENDING CONTRIBUTION TO CARE
65F h/o htn, hl, cad, copd, gerd, polychondritis referred to ED by ENT for admission for med/pulm clearance for planned tongue mass bx & tracheostomy. pt rpts dysphasia x ~6 mos, recently saw ENT Dr. Najera who visualized mass @ base of tongue via laryngoscopy, rec trach & bx for further mgmt. Pt went for preo-op testing today, found w/pleurla effusion & nitrate+ urine, referred to ED for admission to obtain inpatient med & pulm clearance for planned procedure this Fri 11/6. Pt confirms diff swallowing, but denies any drooling or diff breathing. vocal changes appreciated. No f/c, cp/sob, nv, abd pain, edema, urinary sx, flank pain. pmD Dabaghian / Pulm Kaila.    PE:  nad  skin warm, dry  ncat  op- lips.ligia nl, op appears nl / no mass apprec on routine exam, +vocal changes/hoarseness noted, no stridor/drooling  neck supple  rrr nl s1s2 no mrg  ctab no wrr  abd soft ntnd no palpable masses no rgr  back non-tender no cvat  ext no cce dpi  neuro aaox3 grossly nf exam

## 2020-11-03 NOTE — ED PROVIDER NOTE - OBJECTIVE STATEMENT
65 year old female, past medical history COPD, HTN, HDL, pulmonary nodules, tongue mass, sent in by Dr Najera for admission. 65 year old female, past medical history COPD, HTN, HDL, pulmonary nodules, tongue mass, sent in by Dr Najera for admission. patient with recent diagnosis of tongue mass, plan for biposy via awake tracheostomy. 65 year old female, past medical history COPD, HTN, HDL, pulmonary nodules, tongue mass, sent in by Dr Najera for admission. patient with recent diagnosis of tongue mass, plan for biposy via awake tracheostomy. patient to be admitted for medicine/pulm clinic, followed by dr smith. patient with pre-op labs performed yesterday, +UA. no f/c, chest pain, shortness of breath, abd pain, n/v/d, hematuria/urgency/frequency. 65 year old female, past medical history COPD, HTN, HDL, pulmonary nodules, tongue mass, polychondritis, sent in by Dr Najera for admission. patient with recent diagnosis of tongue mass, plan for biposy via awake tracheostomy. patient to be admitted for medicine/pulm clinic, followed by dr smith. patient with pre-op labs performed yesterday, +UA. no f/c, chest pain, shortness of breath, abd pain, n/v/d, hematuria/urgency/frequency.

## 2020-11-03 NOTE — H&P ADULT - ATTENDING COMMENTS
66 YO F with a PMH of CAD, COPD, GERD, pneumothorax 2016, pulmonary nodule, relapsing polychondritis, HTN, and HLD who was ssent to the hospital from Dr. Najera's office for medical and pulmonary clearance for possible awake tracheostomy and biopsy of mass at base of tongue that has been growing for the past 3-4 weeks. Associated with dysphagia to food but not liquids. No difficulty with respiration or swallowing saliva.    Physical exam shows pt in NAD. VSS, afebrile, not hypoxic on RA. A&Ox3. Non-focal neuro exam. Muscle strength/sensation intact. CTA B/L with no W/C/R. RRR, no M/G/R. ABD is soft and non-tender, normoactive BSs. LEs without swelling. No rashes. Labs and radiology as above.    Admission for biopsy and awake tracheostomy on 11/6. Will need pulm and medical optimization.     Hx of CAD, COPD, GERD, pneumothorax 2016, pulmonary nodule, relapsing polychondritis, HTN, and HLD. Restart home meds. DVT PPX. Inform PCP of pt's admission to hospital. My note supersedes the residents note.

## 2020-11-03 NOTE — ED PROVIDER NOTE - NS ED ROS FT
Review of Systems:  	•	CONSTITUTIONAL: no fever, no diaphoresis, no chills  	•	SKIN: no rash  	•	EYES: no eye pain, no blurry vision  	•	ENT: +tongue mass, no drooling.   	•	RESPIRATORY: no shortness of breath, no cough  	•	CARDIAC: no chest pain, no palpitations  	•	GI: no abd pain, no nausea, no vomiting, no diarrhea  	•	GENITO-URINARY: no dysuria; no hematuria, no increased urinary frequency  	•	MUSCULOSKELETAL: no joint paint, no swelling, no redness  	•	NEUROLOGIC: no weakness, numbness, paresthesias, syncope

## 2020-11-03 NOTE — H&P ADULT - NSHPPHYSICALEXAM_GEN_ALL_CORE
Vital Signs Last 24 Hrs  T(C): 36.4 (03 Nov 2020 17:27), Max: 36.4 (03 Nov 2020 17:27)  T(F): 97.6 (03 Nov 2020 17:27), Max: 97.6 (03 Nov 2020 17:27)  HR: 102 (03 Nov 2020 17:27) (102 - 102)  BP: 137/79 (03 Nov 2020 17:27) (137/79 - 137/79)  RR: 19 (03 Nov 2020 17:27) (19 - 19)  SpO2: 97% (03 Nov 2020 17:27) (97% - 97%)          GENERAL: NAD, lying in bed comfortably  HEAD:  Atraumatic, Normocephalic  EYES: EOMI, PERRLA, conjunctiva and sclera clear  ENT: Moist mucous membranes  CHEST/LUNG: Clear to auscultation bilaterally; No rales, rhonchi, wheezing, or rubs. Unlabored respirations  HEART: Regular rate and rhythm; No murmurs, rubs, or gallops  ABDOMEN: Bowel sounds present; Soft, Nontender, Nondistended  EXTREMITIES: No clubbing, cyanosis, or edema  NERVOUS SYSTEM:  Alert & Oriented X3, speech clear. No deficits

## 2020-11-03 NOTE — H&P ADULT - NSHPLABSRESULTS_GEN_ALL_CORE
LABS:  cret                        14.6   9.88  )-----------( 264      ( 03 Nov 2020 18:35 )             44.2     11-03    138  |  102  |  7<L>  ----------------------------<  120<H>  3.7   |  24  |  0.6<L>    Ca    8.9      03 Nov 2020 18:35    TPro  5.7<L>  /  Alb  3.9  /  TBili  0.2  /  DBili  x   /  AST  13  /  ALT  9   /  AlkPhos  64  11-03    PT/INR - ( 03 Nov 2020 18:35 )   PT: 11.80 sec;   INR: 1.03 ratio         PTT - ( 03 Nov 2020 18:35 )  PTT:28.5 sec        < from: 12 Lead ECG (11.03.20 @ 18:48) >    Diagnosis Line Normal sinus rhythm  Normal ECG    < end of copied text >    < from: Xray Chest 2 Views (11.02.20 @ 13:18) >    Findings/  impression: Heart size within normal limits. Bilateral costophrenic angle blunting/pleural effusions. Stable bony structures. Midthoracic vertebral body chronic compression fracture    < end of copied text >

## 2020-11-03 NOTE — CONSULT NOTE ADULT - SUBJECTIVE AND OBJECTIVE BOX
HPI:  65 y.o F HTN, HLD, CAD, COPD, Female cystocele, GERD, pneumothorax 2016, pulmonary nodule, relapsing polychondritis base of tongue mass sent from Dr. Escobedo office for medical and pulmonary clearance, planning to go for OR on Friday 11/6/20 for awake tracheostomy and base of tongue lesion Bx. On pre-op admission testing Pt. found to have + UA and CXR findings of blunting of costophrenic angles consistent with pleural effusion. Pt. states to dysphagia to solids,  able to eat full liquid diet and had no problem swallowing fluids. Pt. denies fever, chills, N/V.    Active smoker.   Outpatient FFL11/2/20 There is a large mass appearing to arise from the base of tongue, filling vallecula and obstructing approximately 70% of oropharynx. Non-friable. The epiglottis, aryepiglottic folds, and arytenoids were within normal limits. Pyriform sinuses clear. Bilateral and symmetric movement of the vocal folds bilaterally, no masses or lesions      PMH: HTN, HLD, CAD, COPD, Female cystocele, GERD, pneumothorax 2016, pulmonary nodule, relapsing polychondritis base of tongue mass     PSH  H/O breast biopsy LEFT  History of bladder surgery 2019  Pulmonary nodule   S/P colon resection   S/P GEETHA-BSO   Vocal cord polyps removal of same 2005.     Home Medications:  alendronate weekly: 1 tab(s) orally once a week (24 Aug 2020 07:23)  ezetimibe 10 mg oral tablet: 1 tab(s) orally once a day (24 Aug 2020 07:23)  hydroxychloroquine 200 mg oral tablet: 1 tab(s) orally 2 times a day (24 Aug 2020 07:23)  Incruse Ellipta 62.5 mcg/inh inhalation powder: inhaled once a day (24 Aug 2020 07:23)  losartan 25 mg oral tablet: 1 tab(s) orally once a day (24 Aug 2020 07:23)  mycophenolate mofetil 500 mg oral tablet: 2 tab(s) orally 2 times a day (24 Aug 2020 07:23)  omeprazole 40 mg oral delayed release capsule: 1 cap(s) orally once a day (24 Aug 2020 07:23)  simvastatin 40 mg oral tablet: 1 tab(s) orally once a day (at bedtime) (24 Aug 2020 07:23)  Ventolin HFA 90 mcg/inh inhalation aerosol: 2 puff(s) inhaled 4 times a day (24 Aug 2020 07:23)      ROS:   [x ] A 10 Point Review of Systems was negative except where noted  [  ] Due to altered mental status/intubation, subjective information was not able to be obtained from the patient. History was obtained to the extent possible from review of the chart and collateral sources of information.     Vital Signs Last 24 Hrs  T(C): 36.4 (03 Nov 2020 17:27), Max: 36.4 (03 Nov 2020 17:27)  T(F): 97.6 (03 Nov 2020 17:27), Max: 97.6 (03 Nov 2020 17:27)  HR: 102 (03 Nov 2020 17:27) (102 - 102)  BP: 137/79 (03 Nov 2020 17:27) (137/79 - 137/79)  RR: 19 (03 Nov 2020 17:27) (19 - 19)  SpO2: 97% (03 Nov 2020 17:27) (97% - 97%)      PE  Constitutional: NAD, well-developed, well nourished   HEENT: NC/AT, EOMI  Nose: B/L nares patent, no d/c   Mouth: mucosa pink and moist, tongue and uvula midline, no ttp or FOM masses or lesions.   Neck: FROM no ttp.   Back: No CVA tenderness B/L   Respiratory: No accessory respiratory muscle use  Abd: Soft, NT/ND     Extremities: no edema  Neurological: A/O x 3  Psychiatric: Normal mood, normal affect.        LABS:                        15.6   8.88  )-----------( 292      ( 02 Nov 2020 14:15 )             48.1     11-02    130<L>  |  104  |  5<L>  ----------------------------<  104<H>  4.1   |  24  |  0.6<L>    Ca    9.3      02 Nov 2020 14:15    TPro  6.5  /  Alb  4.4  /  TBili  0.3  /  DBili  x   /  AST  14  /  ALT  9   /  AlkPhos  70  11-02     Urinalysis (11.02.20 @ 14:15)    Glucose Qualitative, Urine: Negative    Blood, Urine: Small    pH Urine: 6.5    Color: Yellow    Urine Appearance: Clear    Bilirubin: Negative    Ketone - Urine: Negative    Specific Gravity: 1.017    Protein, Urine: 30 mg/dL    Urobilinogen: <2 mg/dL    Nitrite: Positive    Leukocyte Esterase Concentration: Small    Urine Microscopic-Add On (NC) (11.02.20 @ 14:15)    Hyaline Casts: 4 /LPF    Red Blood Cell - Urine: 11 /HPF    White Blood Cell - Urine: 16 /HPF    Bacteria: Many    Epithelial Cells: 2 /HPF       RADIOLOGY & ADDITIONAL STUDIES:   < from: Xray Chest 2 Views (11.02.20 @ 13:18) >    EXAM:  XR CHEST 2V            PROCEDURE DATE:  11/02/2020        INTERPRETATION:  Clinical History / Reason for exam: Preop    PA and lateral    COMPARISON: February 18, 2020    Findings/  impression: Heart size within normal limits. Bilateral costophrenic angle blunting/pleural effusions. Stable bony structures. Midthoracic vertebral body chronic compression fracture    Spoke with YOUNG ESCOBEDO MD               on 11/2/2020 2:25 PM with readback.      AUDREY LEWIS MD; Attending Radiologist  This document has been electronically signed. Nov 2 2020  2:25PM    < end of copied text >    < from: MR Orbit, Face, and/or Neck No Cont (10.26.20 @ 11:28) >    EXAM:  MR ORBIT FACE AND OR NECK            PROCEDURE DATE:  10/26/2020            INTERPRETATION:  Clinical History / Reason for exam: Pharyngeal mass. Left otalgia, odontophagia.    TECHNIQUE: MRI of the neck without contrast. Multiplanar multi sequential MRI of the neck was performed without intravenous contrast on a 1.5 Sanjuana magnet. The patient declined IV contrast.    COMPARISON: None available    FINDINGS:    There is a soft tissue mass occupying the vallecula. The epiglottis is presumedto be posteriorly displaced but is not visualized distally from the mass. The mass measures up to about 3.5 x 3.7 x 5.0 cm (transverse, AP, CC).    The included brain parenchyma demonstrates no gross mass effect. Trace maxillary sinus mucosal thickening is noted.    Parotid and submandibular glands appear grossly unremarkable.    There is cervical spine degenerative changes with mild anterolisthesis C4 on C5.    There is a mildly enlarged left level 2 lymph node measuring 1.3 x 1.7 cm. Suggestionof a right level 2 lymph node measuring 0.7 x 1.2 cm.    There are bilateral thyroid nodules.    IMPRESSION:    1.  Soft tissue mass filling the vallecula measuring up to 5 cm (craniocaudad), of probable tongue base origin base origin. Please note the patient declined IV contrast limiting evaluation of the mass.    2.  Mildly enlarged left level II lymph node measuring 1.7 cm. Nonspecific right level II lymph node is also noted measuring 1.2 cm.    3.  Bilateral thyroid nodules which may be further characterized with thyroid sonogram. The        AMOR STEIN MD; Attending Radiologist  This document has been electronically signed. Oct 26 2020 12:56PM    < end of copied text >  r

## 2020-11-03 NOTE — ED PROVIDER NOTE - CLINICAL SUMMARY MEDICAL DECISION MAKING FREE TEXT BOX
tongue mass pending bx & trach by ENT, sent to eD for admission for inpatient med/pulm clearance given abn pre-op findings today (bl pleural eff on cxr, nitrate+ utine) - multiple comorbidities, admitted to medicine for further mgmt

## 2020-11-04 LAB
HCV AB S/CO SERPL IA: 0.12 COI — SIGNIFICANT CHANGE UP
HCV AB SERPL-IMP: SIGNIFICANT CHANGE UP

## 2020-11-04 PROCEDURE — 99223 1ST HOSP IP/OBS HIGH 75: CPT | Mod: AI

## 2020-11-04 RX ORDER — CEFTRIAXONE 500 MG/1
INJECTION, POWDER, FOR SOLUTION INTRAMUSCULAR; INTRAVENOUS
Refills: 0 | Status: DISCONTINUED | OUTPATIENT
Start: 2020-11-04 | End: 2020-11-05

## 2020-11-04 RX ORDER — CEFTRIAXONE 500 MG/1
1000 INJECTION, POWDER, FOR SOLUTION INTRAMUSCULAR; INTRAVENOUS EVERY 24 HOURS
Refills: 0 | Status: DISCONTINUED | OUTPATIENT
Start: 2020-11-05 | End: 2020-11-05

## 2020-11-04 RX ORDER — MYCOPHENOLATE MOFETIL 250 MG/1
1000 CAPSULE ORAL
Refills: 0 | Status: DISCONTINUED | OUTPATIENT
Start: 2020-11-04 | End: 2020-11-06

## 2020-11-04 RX ORDER — LANOLIN ALCOHOL/MO/W.PET/CERES
3 CREAM (GRAM) TOPICAL AT BEDTIME
Refills: 0 | Status: DISCONTINUED | OUTPATIENT
Start: 2020-11-04 | End: 2020-11-06

## 2020-11-04 RX ORDER — CEFTRIAXONE 500 MG/1
1000 INJECTION, POWDER, FOR SOLUTION INTRAMUSCULAR; INTRAVENOUS ONCE
Refills: 0 | Status: COMPLETED | OUTPATIENT
Start: 2020-11-04 | End: 2020-11-04

## 2020-11-04 RX ADMIN — SIMVASTATIN 40 MILLIGRAM(S): 20 TABLET, FILM COATED ORAL at 21:44

## 2020-11-04 RX ADMIN — LOSARTAN POTASSIUM 25 MILLIGRAM(S): 100 TABLET, FILM COATED ORAL at 05:56

## 2020-11-04 RX ADMIN — Medication 3 MILLIGRAM(S): at 22:27

## 2020-11-04 RX ADMIN — ALBUTEROL 2 PUFF(S): 90 AEROSOL, METERED ORAL at 09:17

## 2020-11-04 RX ADMIN — MYCOPHENOLATE MOFETIL 500 MILLIGRAM(S): 250 CAPSULE ORAL at 17:16

## 2020-11-04 RX ADMIN — ALBUTEROL 2 PUFF(S): 90 AEROSOL, METERED ORAL at 11:13

## 2020-11-04 RX ADMIN — MYCOPHENOLATE MOFETIL 500 MILLIGRAM(S): 250 CAPSULE ORAL at 05:56

## 2020-11-04 RX ADMIN — CEFTRIAXONE 1000 MILLIGRAM(S): 500 INJECTION, POWDER, FOR SOLUTION INTRAMUSCULAR; INTRAVENOUS at 14:27

## 2020-11-04 RX ADMIN — Medication 200 MILLIGRAM(S): at 05:56

## 2020-11-04 RX ADMIN — Medication 200 MILLIGRAM(S): at 17:16

## 2020-11-04 RX ADMIN — ALBUTEROL 2 PUFF(S): 90 AEROSOL, METERED ORAL at 19:57

## 2020-11-04 RX ADMIN — ALBUTEROL 2 PUFF(S): 90 AEROSOL, METERED ORAL at 05:55

## 2020-11-04 NOTE — CONSULT NOTE ADULT - SUBJECTIVE AND OBJECTIVE BOX
Patient is a 65y old  Female who presents with a chief complaint of Clearance for tongue biopsy (2020 07:53)      HPI:  65 y.o F CAD, COPD, GERD, pneumothorax 2016, pulmonary nodule, relapsing polychondritis, HTN, HLD sent from Dr. Najera office for medical and pulmonary clearance, planning to go for OR on 20 for awake tracheostomy and base of tongue lesion Bx. Patient reports mass has been growing for a few weeks and is causing her to have difficulty swallowing solids, with no difficulty swallowing liquids. She denies any other symptoms, including pain anywhere, SOB, dysuria, urinary frequency, N/V/D/C, fever, chills.  On outpatient testing Pt. found to have + UA and CXR findings of blunting of costophrenic angles consistent with pleural effusion.     From ENT note: Outpatient [id number] There is a large mass appearing to arise from the base of tongue, filling vallecula and obstructing approximately 70% of oropharynx. Non-friable. The epiglottis, aryepiglottic folds, and arytenoids were within normal limits. Pyriform sinuses clear. Bilateral and symmetric movement of the vocal folds bilaterally, no masses or lesions     (2020 21:07)      PAST MEDICAL & SURGICAL HISTORY:  H/O pneumothorax  2016    Female cystocele    Relapsing polychondritis    COPD (chronic obstructive pulmonary disease)    CAD (coronary artery disease)    Pulmonary nodule    GERD (gastroesophageal reflux disease)    Hypercholesterolemia    HTN (hypertension)    H/O breast biopsy  LEFT    History of bladder surgery  2019    Pulmonary nodule    Vocal cord polyps  removal of same     S/P colon resection    S/P GEETHA-BSO        SOCIAL HX:   Smoking                         ETOH                            Other    FAMILY HISTORY:  Family history of diabetes mellitus (DM)    .  No cardiovascular or pulmonary family history     REVIEW OF SYSTEMS:    CONSTITUTIONAL:   no fever   no chills.  no weight gain   no weight loss    EYES:   no discharge,   no visual changes.    ENT:   Ears: no ear pain and no hearing problems.  Nose: no nasal congestion and no nasal drainage.  Mouth/Throat: no dysphagia,  no hoarseness and no throat pain.  Neck: no lumps, no pain, no stiffness and no swollen glands.    CARDIOVASCULAR:   no chest pain,   no swelling  no palpitaions  no syncope    RESPIRATORY:  no SOB,  no wheezing ,  no respiratory difficulty  no sputum production    GASTROINTESTINAL:   no abdominal pain,   no constipation,   no diarrhea,   no vomiting.    GENITOURINARY:  no dysuria,   no hematuria.    MUSCULOSKELETAL:   no back pain,   no musculoskeletal pain,  no weakness.    SKIN:   no jaundice,    no rashes.    NEURO:   no loss of consciousness,   no headache,   no weakness.    PSYCHIATRIC:   no known mental health issues  no anxiety  no depression    ALLERGIC/IMMUNOLOGIC:   No active allergic or immunologic issues      Allergies    CONTRAST DYS (Nausea)  No Known Drug Allergies    Intolerances          PHYSICAL EXAM  Vital Signs Last 24 Hrs  T(C): 37 (2020 08:00), Max: 37 (2020 08:00)  T(F): 98.6 (2020 08:00), Max: 98.6 (2020 08:00)  HR: 73 (2020 08:00) (73 - 102)  BP: 121/59 (2020 08:00) (116/82 - 139/69)  BP(mean): --  RR: 18 (2020 08:00) (18 - 19)  SpO2: 98% (2020 08:00) (97% - 100%)    CONSTITUTIONAL:  Well nourished.  NAD    ENT:   Airway patent,   No thrush    EYES:   Clear bilaterally,   pupils equal,   round and reactive to light.    CARDIAC:   Normal rate,   regular rhythm.    no edema      CAROTID:   normal systolic impulse  no bruits    RESPIRATORY:   No wheezing  Nnormal chest expansion  Not tachypneic,  No use of accessory muscles    GASTROINTESTINAL:  Abdomen soft,   non-tender,   no guarding,   + BS    GENITOURINARY  normal genitalia for sex  no edema    MUSCULOSKELETAL:   range of motion is not limited,  no clubbing, cyanosis    NEUROLOGICAL:   Alert and oriented   no motor deficits.  pertinent DTRs normal    SKIN:   Skin normal color for race,   No evidence of rash.    PSYCHIATRIC:   normal mood and affect.   no apparent risk to self or others.    HEME LYMPH:   No cervical  lymphadenopathy.  no inguinal lymphadenopathy          LABS:                          14.6   9.88  )-----------( 264      ( 2020 18:35 )             44.2                                               11-03    138  |  102  |  7<L>  ----------------------------<  120<H>  3.7   |  24  |  0.6<L>    Ca    8.9      2020 18:35    TPro  5.7<L>  /  Alb  3.9  /  TBili  0.2  /  DBili  x   /  AST  13  /  ALT  9   /  AlkPhos  64  11-03      PT/INR - ( 2020 18:35 )   PT: 11.80 sec;   INR: 1.03 ratio         PTT - ( 2020 18:35 )  PTT:28.5 sec                                       Urinalysis Basic - ( 2020 20:32 )    Color: Light Yellow / Appearance: Clear / S.007 / pH: x  Gluc: x / Ketone: Negative  / Bili: Negative / Urobili: <2 mg/dL   Blood: x / Protein: Negative / Nitrite: Positive   Leuk Esterase: Negative / RBC: 2 /HPF / WBC 1 /HPF   Sq Epi: x / Non Sq Epi: 2 /HPF / Bacteria: Many                                                  LIVER FUNCTIONS - ( 2020 18:35 )  Alb: 3.9 g/dL / Pro: 5.7 g/dL / ALK PHOS: 64 U/L / ALT: 9 U/L / AST: 13 U/L / GGT: x                                                                                                MEDICATIONS  (STANDING):  ALBUTerol    90 MICROgram(s) HFA Inhaler 2 Puff(s) Inhalation every 4 hours  chlorhexidine 4% Liquid 1 Application(s) Topical <User Schedule>  hydroxychloroquine 200 milliGRAM(s) Oral two times a day  losartan 25 milliGRAM(s) Oral daily  mycophenolate mofetil 500 milliGRAM(s) Oral two times a day  pantoprazole    Tablet 40 milliGRAM(s) Oral before breakfast  simvastatin 40 milliGRAM(s) Oral at bedtime  tiotropium 18 MICROgram(s) Capsule 1 Capsule(s) Inhalation daily    MEDICATIONS  (PRN):  albuterol/ipratropium for Nebulization 3 milliLiter(s) Nebulizer four times a day PRN Shortness of Breath and/or Wheezing    PFTs 2020:   FEV1/FVC 55%  FEV1 1.26L (59%)   FVC 2.31L (79%)   DLCO 43 Patient is a 65y old  Female who presents with a chief complaint of Clearance for tongue biopsy (2020 07:53)      HPI:  65 y.o F CAD, COPD, GERD, pneumothorax 2016, pulmonary nodule, relapsing polychondritis, HTN, HLD sent from Dr. Najera office for medical and pulmonary clearance, planning to go for OR on 20 for awake tracheostomy and base of tongue lesion Bx. Patient reports mass has been growing for a few weeks and is causing her to have difficulty swallowing solids, with no difficulty swallowing liquids. She denies any other symptoms, including pain anywhere, SOB, dysuria, urinary frequency, N/V/D/C, fever, chills.  On outpatient testing Pt. found to have + UA and CXR findings of blunting of costophrenic angles consistent with pleural effusion.     From ENT note: Outpatient [id number] There is a large mass appearing to arise from the base of tongue, filling vallecula and obstructing approximately 70% of oropharynx. Non-friable. The epiglottis, aryepiglottic folds, and arytenoids were within normal limits. Pyriform sinuses clear. Bilateral and symmetric movement of the vocal folds bilaterally, no masses or lesions     (2020 21:07)      PAST MEDICAL & SURGICAL HISTORY:  H/O pneumothorax  2016    Female cystocele    Relapsing polychondritis    COPD (chronic obstructive pulmonary disease)    CAD (coronary artery disease)    Pulmonary nodule    GERD (gastroesophageal reflux disease)    Hypercholesterolemia    HTN (hypertension)    H/O breast biopsy  LEFT    History of bladder surgery  2019    Pulmonary nodule    Vocal cord polyps  removal of same     S/P colon resection    S/P GEETHA-BSO        SOCIAL HX:   Active smoker 40py. No EtOH or drug abuse    FAMILY HISTORY:  Family history of diabetes mellitus (DM)    No cardiovascular or pulmonary family history     REVIEW OF SYSTEMS:    CONSTITUTIONAL:   no fever   no chills.  no weight gain   no weight loss    EYES:   no discharge,   no visual changes.    ENT:   Ears: no ear pain and no hearing problems.  Nose: no nasal congestion and no nasal drainage.  Mouth/Throat: +dysphagia and voice change  Neck: no lumps, no pain, no stiffness and no swollen glands.    CARDIOVASCULAR:   no chest pain,   no swelling  no palpitaions  no syncope    RESPIRATORY:  no SOB,  baseline expiratory wheezing ,  no respiratory difficulty  no sputum production    GASTROINTESTINAL:   no abdominal pain,   no constipation,   no diarrhea,   no vomiting.    GENITOURINARY:  no dysuria,   no hematuria.    MUSCULOSKELETAL:   no back pain,   no musculoskeletal pain,  no weakness.    SKIN:   no jaundice,    no rashes.    NEURO:   no loss of consciousness,   no headache,   no weakness.    PSYCHIATRIC:   no known mental health issues  no anxiety  no depression    ALLERGIC/IMMUNOLOGIC:   No active allergic or immunologic issues      Allergies    CONTRAST DYS (Nausea)  No Known Drug Allergies    Intolerances      PHYSICAL EXAM  Vital Signs Last 24 Hrs  T(C): 37 (2020 08:00), Max: 37 (2020 08:00)  T(F): 98.6 (2020 08:00), Max: 98.6 (2020 08:00)  HR: 73 (2020 08:00) (73 - 102)  BP: 121/59 (2020 08:00) (116/82 - 139/69)  RR: 18 (2020 08:00) (18 - 19)  SpO2: 94% on RA (2020 08:00) (97% - 100%)    CONSTITUTIONAL:  Well nourished.  NAD    ENT:   Upper airway stridor on inspiration  Airway patent  No thrush    EYES:   Clear bilaterally,   pupils equal,   round and reactive to light.    CARDIAC:   Normal rate,   regular rhythm.    no edema    CAROTID:   normal systolic impulse  no bruits    RESPIRATORY:   Expiratory wheezing  Normal chest expansion  Not tachypneic,  No use of accessory muscles    GASTROINTESTINAL:  Abdomen soft  non-tender  no guarding  + BS    GENITOURINARY  normal genitalia for sex  no edema    MUSCULOSKELETAL:   range of motion is not limited,  no clubbing, cyanosis    NEUROLOGICAL:   Alert and oriented   no motor deficits.  pertinent DTRs normal    SKIN:   Skin normal color for race,   No evidence of rash.    PSYCHIATRIC:   normal mood and affect.   no apparent risk to self or others.    HEME LYMPH:   No cervical  lymphadenopathy.  no inguinal lymphadenopathy      LABS:                          14.6   9.88  )-----------( 264      ( 2020 18:35 )             44.2                                               11-03    138  |  102  |  7<L>  ----------------------------<  120<H>  3.7   |  24  |  0.6<L>    Ca    8.9      2020 18:35    TPro  5.7<L>  /  Alb  3.9  /  TBili  0.2  /  DBili  x   /  AST  13  /  ALT  9   /  AlkPhos  64  11-03      PT/INR - ( 2020 18:35 )   PT: 11.80 sec;   INR: 1.03 ratio         PTT - ( 2020 18:35 )  PTT:28.5 sec                                       Urinalysis Basic - ( 2020 20:32 )    Color: Light Yellow / Appearance: Clear / S.007 / pH: x  Gluc: x / Ketone: Negative  / Bili: Negative / Urobili: <2 mg/dL   Blood: x / Protein: Negative / Nitrite: Positive   Leuk Esterase: Negative / RBC: 2 /HPF / WBC 1 /HPF   Sq Epi: x / Non Sq Epi: 2 /HPF / Bacteria: Many                                            LIVER FUNCTIONS - ( 2020 18:35 )  Alb: 3.9 g/dL / Pro: 5.7 g/dL / ALK PHOS: 64 U/L / ALT: 9 U/L / AST: 13 U/L / GGT: x             MEDICATIONS  (STANDING):  ALBUTerol    90 MICROgram(s) HFA Inhaler 2 Puff(s) Inhalation every 4 hours  chlorhexidine 4% Liquid 1 Application(s) Topical <User Schedule>  hydroxychloroquine 200 milliGRAM(s) Oral two times a day  losartan 25 milliGRAM(s) Oral daily  mycophenolate mofetil 500 milliGRAM(s) Oral two times a day  pantoprazole    Tablet 40 milliGRAM(s) Oral before breakfast  simvastatin 40 milliGRAM(s) Oral at bedtime  tiotropium 18 MICROgram(s) Capsule 1 Capsule(s) Inhalation daily    MEDICATIONS  (PRN):  albuterol/ipratropium for Nebulization 3 milliLiter(s) Nebulizer four times a day PRN Shortness of Breath and/or Wheezing    PFTs 2020:   FEV1/FVC 55%  FEV1 1.26L (59%)   FVC 2.31L (79%)   DLCO 43

## 2020-11-04 NOTE — CONSULT NOTE ADULT - ASSESSMENT
65 y.o F HTN, HLD, CAD, COPD, Female cystocele, GERD, pneumothorax 2016, pulmonary nodule, relapsing polychondritis base of tongue mass sent from Dr. Najera office for medical and pulmonary clearance, planning to go for OR on Friday 11/6/20 for awake tracheostomy and base of tongue lesion Bx. On pre-op admission testing Pt. found to have + UA and CXR findings of blunting of costophrenic angles consistent with pleural effusion. Pt. states to dysphagia to solids,  able to eat full liquid diet and had no problem swallowing fluids. Pt. denies fever, chills, N/V.    Active smoker.     1. Large base of tongue mass   2. B/L pleural effusion on CXR  3. UTI    Plan:  Please admit to medicine.   Medical (Dr. Mendoza) clearance and Pulmonary (Dr. Bright) clearance for awake tracheostomy and base of tongue mass Bx on Friday by Dr. Najera.   NPO after midnight on Thursday   Please repeat UA and Urine Cx, treat UTI if UA comes back positive   Start full liquid diet.   ENT attending will F/U.    
IMPRESSION:  Base of tongue mass with airway compromise - tracheostomy scheduled under conscious sedation  Moderate COPD - stable  Patient is low risk for pulmonary complications    History of lung nodules  History of relapsing polychondritis with no lung involvement    RECOMMENDATIONS:  Continue with LAMA therapy  Nebs q4h prn  Keep SpO2>92%  DVT ppx  Follow up with pulmonary as outpatient

## 2020-11-04 NOTE — PROGRESS NOTE ADULT - SUBJECTIVE AND OBJECTIVE BOX
GEREMIAS FULLER 65y Female  MRN#: [id number]     SUBJECTIVE  Patient is a 65y old Female who presents with a chief complaint of Clearance for tongue biopsy (2020 21:07)  Currently admitted to medicine with the primary diagnosis of Tongue mass  Today is hospital day 1d, and this morning she is     OBJECTIVE  PAST MEDICAL & SURGICAL HISTORY  H/O pneumothorax  2016    Female cystocele    Relapsing polychondritis    COPD (chronic obstructive pulmonary disease)    CAD (coronary artery disease)    Pulmonary nodule    GERD (gastroesophageal reflux disease)    Hypercholesterolemia    HTN (hypertension)    H/O breast biopsy  LEFT    History of bladder surgery  2019    Pulmonary nodule    Vocal cord polyps  removal of same 2005    S/P colon resection    S/P GEETHA-BSO      ALLERGIES:  CONTRAST DYS (Nausea)  No Known Drug Allergies    MEDICATIONS:  STANDING MEDICATIONS  ALBUTerol    90 MICROgram(s) HFA Inhaler 2 Puff(s) Inhalation every 4 hours  chlorhexidine 4% Liquid 1 Application(s) Topical <User Schedule>  hydroxychloroquine 200 milliGRAM(s) Oral two times a day  losartan 25 milliGRAM(s) Oral daily  mycophenolate mofetil 500 milliGRAM(s) Oral two times a day  pantoprazole    Tablet 40 milliGRAM(s) Oral before breakfast  simvastatin 40 milliGRAM(s) Oral at bedtime  tiotropium 18 MICROgram(s) Capsule 1 Capsule(s) Inhalation daily    PRN MEDICATIONS  albuterol/ipratropium for Nebulization 3 milliLiter(s) Nebulizer four times a day PRN      VITAL SIGNS: Last 24 Hours  T(C): 36.9 (2020 22:50), Max: 36.9 (2020 22:50)  T(F): 98.5 (2020 22:50), Max: 98.5 (2020 22:50)  HR: 89 (2020 05:54) (78 - 102)  BP: 139/69 (2020 05:54) (116/82 - 139/69)  BP(mean): --  RR: 18 (2020 05:54) (18 - 19)  SpO2: 99% (2020 05:54) (97% - 100%)    LABS:                        14.6   9.88  )-----------( 264      ( 2020 18:35 )             44.2     11-03    138  |  102  |  7<L>  ----------------------------<  120<H>  3.7   |  24  |  0.6<L>    Ca    8.9      2020 18:35    TPro  5.7<L>  /  Alb  3.9  /  TBili  0.2  /  DBili  x   /  AST  13  /  ALT  9   /  AlkPhos  64  11-03    PT/INR - ( 2020 18:35 )   PT: 11.80 sec;   INR: 1.03 ratio         PTT - ( 2020 18:35 )  PTT:28.5 sec  Urinalysis Basic - ( 2020 20:32 )    Color: Light Yellow / Appearance: Clear / S.007 / pH: x  Gluc: x / Ketone: Negative  / Bili: Negative / Urobili: <2 mg/dL   Blood: x / Protein: Negative / Nitrite: Positive   Leuk Esterase: Negative / RBC: 2 /HPF / WBC 1 /HPF   Sq Epi: x / Non Sq Epi: 2 /HPF / Bacteria: Many        PHYSICAL EXAM:    GENERAL: NAD, well-developed, AAOx3  HEENT:  Atraumatic, Normocephalic. EOMI, PERRLA, conjunctiva and sclera clear, No JVD  PULMONARY: Clear to auscultation bilaterally; No wheeze  CARDIOVASCULAR: Regular rate and rhythm; No murmurs, rubs, or gallops  GASTROINTESTINAL: Soft, Nontender, Nondistended; Bowel sounds present  MUSCULOSKELETAL:  2+ Peripheral Pulses, No clubbing, cyanosis, or edema  NEUROLOGY: non-focal  SKIN: No rashes or lesions      ASSESSMENT & PLAN  65 y.o F CAD, COPD, GERD, pneumothorax 2016, pulmonary nodule, relapsing polychondritis, HTN, HLD sent from Dr. Najera office for medical and pulmonary clearance, planning to go for OR on 20 for awake tracheostomy and base of tongue lesion Bx.     # Tongue biopsy on 2020, preop clearance requested  - Plan is for trach and biopsy on   - Medical clearance and Pulmonary (Dr. Bright) clearance  - Full liquid diet  - NPO after midnight on Thursday      # COPD - stable  # hx of pneumothorax in 2016  - Will continue standing ventolin, Duoneb PRN    # Relapsing polychondritis  - Continue home hydroxychloroquine, mycophenolate     # DLD  - continue statin    # HTN  - on loasartan    # CAD - non-occlusive  - on statin    # GERD  - on PPI    DVT prophylaxis: Lovenox  Diet: Diet, Full Liquid:   Ambulation: as tolerated  Dispo: acute, patient from home  - GEREMIAS FULLER 65y Female  MRN#: [id number]     SUBJECTIVE  Patient is a 65y old Female who presents with a chief complaint of Clearance for tongue biopsy (2020 21:07)  Currently admitted to medicine with the primary diagnosis of Tongue mass  Today is hospital day 1d, and this morning she is feeling well. No SOB, no difficultly maintaining airway,     OBJECTIVE  PAST MEDICAL & SURGICAL HISTORY  H/O pneumothorax  2016    Female cystocele    Relapsing polychondritis    COPD (chronic obstructive pulmonary disease)    CAD (coronary artery disease)    Pulmonary nodule    GERD (gastroesophageal reflux disease)    Hypercholesterolemia    HTN (hypertension)    H/O breast biopsy  LEFT    History of bladder surgery  2019    Pulmonary nodule    Vocal cord polyps  removal of same     S/P colon resection    S/P GEETHA-BSO      ALLERGIES:  CONTRAST DYS (Nausea)  No Known Drug Allergies    MEDICATIONS:  STANDING MEDICATIONS  ALBUTerol    90 MICROgram(s) HFA Inhaler 2 Puff(s) Inhalation every 4 hours  chlorhexidine 4% Liquid 1 Application(s) Topical <User Schedule>  hydroxychloroquine 200 milliGRAM(s) Oral two times a day  losartan 25 milliGRAM(s) Oral daily  mycophenolate mofetil 500 milliGRAM(s) Oral two times a day  pantoprazole    Tablet 40 milliGRAM(s) Oral before breakfast  simvastatin 40 milliGRAM(s) Oral at bedtime  tiotropium 18 MICROgram(s) Capsule 1 Capsule(s) Inhalation daily    PRN MEDICATIONS  albuterol/ipratropium for Nebulization 3 milliLiter(s) Nebulizer four times a day PRN      VITAL SIGNS: Last 24 Hours  T(C): 36.9 (2020 22:50), Max: 36.9 (2020 22:50)  T(F): 98.5 (2020 22:50), Max: 98.5 (2020 22:50)  HR: 89 (2020 05:54) (78 - 102)  BP: 139/69 (2020 05:54) (116/82 - 139/69)  BP(mean): --  RR: 18 (2020 05:54) (18 - 19)  SpO2: 99% (2020 05:54) (97% - 100%)    LABS:                        14.6   9.88  )-----------( 264      ( 2020 18:35 )             44.2     11-03    138  |  102  |  7<L>  ----------------------------<  120<H>  3.7   |  24  |  0.6<L>    Ca    8.9      2020 18:35    TPro  5.7<L>  /  Alb  3.9  /  TBili  0.2  /  DBili  x   /  AST  13  /  ALT  9   /  AlkPhos  64  11-03    PT/INR - ( 2020 18:35 )   PT: 11.80 sec;   INR: 1.03 ratio         PTT - ( 2020 18:35 )  PTT:28.5 sec  Urinalysis Basic - ( 2020 20:32 )    Color: Light Yellow / Appearance: Clear / S.007 / pH: x  Gluc: x / Ketone: Negative  / Bili: Negative / Urobili: <2 mg/dL   Blood: x / Protein: Negative / Nitrite: Positive   Leuk Esterase: Negative / RBC: 2 /HPF / WBC 1 /HPF   Sq Epi: x / Non Sq Epi: 2 /HPF / Bacteria: Many        PHYSICAL EXAM:    GENERAL: NAD, well-developed, AAOx3  HEENT:  Atraumatic, Normocephalic. EOMI, PERRLA, conjunctiva and sclera clear, No JVD. Hoarseness  PULMONARY: Clear to auscultation bilaterally; No wheeze  CARDIOVASCULAR: Regular rate and rhythm; No murmurs, rubs, or gallops  GASTROINTESTINAL: Soft, Nontender, Nondistended; Bowel sounds present  MUSCULOSKELETAL:  2+ Peripheral Pulses, No clubbing, cyanosis, or edema  NEUROLOGY: non-focal  SKIN: No rashes or lesions      ASSESSMENT & PLAN  65 y.o F CAD, COPD, GERD, pneumothorax 2016, pulmonary nodule, relapsing polychondritis, HTN, HLD sent from Dr. Najera office for medical and pulmonary clearance, planning to go for OR on 20 for awake tracheostomy and base of tongue lesion Bx.     # Large base of tongue mass with 70% obstruction of oropharynx  Seen on outpatient FFL by ENT  - Plan is for trach and biopsy on   - Medical clearance and Pulmonary (Dr. Bright) clearance  - Full liquid diet  - NPO after midnight on Thursday      # COPD - stable  # hx of pneumothorax in 2016  - Will continue standing ventolin, Duoneb PRN    # Relapsing polychondritis on ear, hands.  Currently to recent relapses  - Continue home hydroxychloroquine, mycophenolate     # DLD  - continue statin    # HTN  - on loasartan    # CAD - non-occlusive  - on statin    # GERD  - on PPI    DVT prophylaxis: Lovenox  Diet: Diet, Full Liquid:   Ambulation: as tolerated  Dispo: acute, patient from home

## 2020-11-05 LAB
-  AMIKACIN: SIGNIFICANT CHANGE UP
-  AMOXICILLIN/CLAVULANIC ACID: SIGNIFICANT CHANGE UP
-  AMPICILLIN/SULBACTAM: SIGNIFICANT CHANGE UP
-  AMPICILLIN: SIGNIFICANT CHANGE UP
-  AZTREONAM: SIGNIFICANT CHANGE UP
-  CEFAZOLIN: SIGNIFICANT CHANGE UP
-  CEFEPIME: SIGNIFICANT CHANGE UP
-  CEFOXITIN: SIGNIFICANT CHANGE UP
-  CEFTRIAXONE: SIGNIFICANT CHANGE UP
-  CIPROFLOXACIN: SIGNIFICANT CHANGE UP
-  ERTAPENEM: SIGNIFICANT CHANGE UP
-  GENTAMICIN: SIGNIFICANT CHANGE UP
-  IMIPENEM: SIGNIFICANT CHANGE UP
-  LEVOFLOXACIN: SIGNIFICANT CHANGE UP
-  MEROPENEM: SIGNIFICANT CHANGE UP
-  NITROFURANTOIN: SIGNIFICANT CHANGE UP
-  PIPERACILLIN/TAZOBACTAM: SIGNIFICANT CHANGE UP
-  TIGECYCLINE: SIGNIFICANT CHANGE UP
-  TOBRAMYCIN: SIGNIFICANT CHANGE UP
-  TRIMETHOPRIM/SULFAMETHOXAZOLE: SIGNIFICANT CHANGE UP
ANION GAP SERPL CALC-SCNC: 10 MMOL/L — SIGNIFICANT CHANGE UP (ref 7–14)
APTT BLD: 28.6 SEC — SIGNIFICANT CHANGE UP (ref 27–39.2)
BUN SERPL-MCNC: 5 MG/DL — LOW (ref 10–20)
CALCIUM SERPL-MCNC: 8.6 MG/DL — SIGNIFICANT CHANGE UP (ref 8.5–10.1)
CHLORIDE SERPL-SCNC: 105 MMOL/L — SIGNIFICANT CHANGE UP (ref 98–110)
CO2 SERPL-SCNC: 23 MMOL/L — SIGNIFICANT CHANGE UP (ref 17–32)
CREAT SERPL-MCNC: 0.5 MG/DL — LOW (ref 0.7–1.5)
GLUCOSE SERPL-MCNC: 112 MG/DL — HIGH (ref 70–99)
HCT VFR BLD CALC: 40.4 % — SIGNIFICANT CHANGE UP (ref 37–47)
HGB BLD-MCNC: 13.1 G/DL — SIGNIFICANT CHANGE UP (ref 12–16)
INR BLD: 1.04 RATIO — SIGNIFICANT CHANGE UP (ref 0.65–1.3)
MCHC RBC-ENTMCNC: 29.3 PG — SIGNIFICANT CHANGE UP (ref 27–31)
MCHC RBC-ENTMCNC: 32.4 G/DL — SIGNIFICANT CHANGE UP (ref 32–37)
MCV RBC AUTO: 90.4 FL — SIGNIFICANT CHANGE UP (ref 81–99)
METHOD TYPE: SIGNIFICANT CHANGE UP
NRBC # BLD: 0 /100 WBCS — SIGNIFICANT CHANGE UP (ref 0–0)
PLATELET # BLD AUTO: 213 K/UL — SIGNIFICANT CHANGE UP (ref 130–400)
POTASSIUM SERPL-MCNC: 4.1 MMOL/L — SIGNIFICANT CHANGE UP (ref 3.5–5)
POTASSIUM SERPL-SCNC: 4.1 MMOL/L — SIGNIFICANT CHANGE UP (ref 3.5–5)
PROTHROM AB SERPL-ACNC: 12 SEC — SIGNIFICANT CHANGE UP (ref 9.95–12.87)
RBC # BLD: 4.47 M/UL — SIGNIFICANT CHANGE UP (ref 4.2–5.4)
RBC # FLD: 13.7 % — SIGNIFICANT CHANGE UP (ref 11.5–14.5)
SODIUM SERPL-SCNC: 138 MMOL/L — SIGNIFICANT CHANGE UP (ref 135–146)
WBC # BLD: 7.6 K/UL — SIGNIFICANT CHANGE UP (ref 4.8–10.8)
WBC # FLD AUTO: 7.6 K/UL — SIGNIFICANT CHANGE UP (ref 4.8–10.8)

## 2020-11-05 PROCEDURE — 99233 SBSQ HOSP IP/OBS HIGH 50: CPT

## 2020-11-05 PROCEDURE — 99222 1ST HOSP IP/OBS MODERATE 55: CPT

## 2020-11-05 RX ORDER — SODIUM CHLORIDE 9 MG/ML
1000 INJECTION INTRAMUSCULAR; INTRAVENOUS; SUBCUTANEOUS
Refills: 0 | Status: DISCONTINUED | OUTPATIENT
Start: 2020-11-06 | End: 2020-11-06

## 2020-11-05 RX ADMIN — ALBUTEROL 2 PUFF(S): 90 AEROSOL, METERED ORAL at 19:31

## 2020-11-05 RX ADMIN — LOSARTAN POTASSIUM 25 MILLIGRAM(S): 100 TABLET, FILM COATED ORAL at 05:22

## 2020-11-05 RX ADMIN — Medication 200 MILLIGRAM(S): at 18:47

## 2020-11-05 RX ADMIN — Medication 200 MILLIGRAM(S): at 05:22

## 2020-11-05 RX ADMIN — PANTOPRAZOLE SODIUM 40 MILLIGRAM(S): 20 TABLET, DELAYED RELEASE ORAL at 05:22

## 2020-11-05 RX ADMIN — Medication 3 MILLIGRAM(S): at 21:39

## 2020-11-05 RX ADMIN — MYCOPHENOLATE MOFETIL 1000 MILLIGRAM(S): 250 CAPSULE ORAL at 17:13

## 2020-11-05 RX ADMIN — CHLORHEXIDINE GLUCONATE 1 APPLICATION(S): 213 SOLUTION TOPICAL at 05:21

## 2020-11-05 RX ADMIN — ALBUTEROL 2 PUFF(S): 90 AEROSOL, METERED ORAL at 08:27

## 2020-11-05 RX ADMIN — SIMVASTATIN 40 MILLIGRAM(S): 20 TABLET, FILM COATED ORAL at 21:39

## 2020-11-05 RX ADMIN — ALBUTEROL 2 PUFF(S): 90 AEROSOL, METERED ORAL at 16:16

## 2020-11-05 RX ADMIN — MYCOPHENOLATE MOFETIL 1000 MILLIGRAM(S): 250 CAPSULE ORAL at 05:22

## 2020-11-05 NOTE — DIETITIAN INITIAL EVALUATION ADULT. - CONTINUE CURRENT NUTRITION CARE PLAN
1. Add Ensure Enlive TID to full liquid diet. d/c DASH/TLC restriction, not indicated at this time. 2. If pt cleared for PO intake s/p procedures, recommend SLP c/s for appropriate diet texture. Further interventions pending post-op plan.

## 2020-11-05 NOTE — PROGRESS NOTE ADULT - SUBJECTIVE AND OBJECTIVE BOX
65 y.o F CAD, COPD, GERD, pneumothorax 2016, pulmonary nodule, relapsing polychondritis, HTN, HLD sent from Dr. Najera office for medical and pulmonary clearance, planning to go for OR on 20 for awake tracheostomy and base of tongue lesion Bx. Patient reports mass has been growing for a few weeks and is causing her to have difficulty swallowing solids but no difficulty with liquids. On outpatient testing Pt. found to have + UA and CXR findings of blunting of costophrenic angles consistent with pleural effusion. Today is hospital day 2d and patient is currently admitted to medicine with the primary diagnosis of Tongue mass for which patient is seeking clearance for a tracheostomy and base of tongue lesion Bx.  This morning she is resting comfortably in bed and reports no new issues or overnight events.  Pt reports no urinary symptoms, no Chest pain, mild dyspnea on exertion at baseline but does exhibit some alterations in her speech.         PAST MEDICAL & SURGICAL HISTORY  H/O pneumothorax  2016    Female cystocele    Relapsing polychondritis    COPD (chronic obstructive pulmonary disease)    CAD (coronary artery disease)    Pulmonary nodule    GERD (gastroesophageal reflux disease)    Hypercholesterolemia    HTN (hypertension)    H/O breast biopsy  LEFT    History of bladder surgery  2019    Pulmonary nodule    Vocal cord polyps  removal of same     S/P colon resection    S/P GEETHA-BSO      SOCIAL HISTORY:  Active smoker 40pack year hx  Denies Alcohol  Denies Illicit drug use     ALLERGIES:  CONTRAST DYS (Nausea)  No Known Drug Allergies    MEDICATIONS:  STANDING MEDICATIONS  ALBUTerol    90 MICROgram(s) HFA Inhaler 2 Puff(s) Inhalation every 4 hours  chlorhexidine 4% Liquid 1 Application(s) Topical <User Schedule>  hydroxychloroquine 200 milliGRAM(s) Oral two times a day  losartan 25 milliGRAM(s) Oral daily  melatonin 3 milliGRAM(s) Oral at bedtime  mycophenolate mofetil 1000 milliGRAM(s) Oral two times a day  pantoprazole    Tablet 40 milliGRAM(s) Oral before breakfast  simvastatin 40 milliGRAM(s) Oral at bedtime  tiotropium 18 MICROgram(s) Capsule 1 Capsule(s) Inhalation daily    PRN MEDICATIONS      LABS:                        13.1   7.60  )-----------( 213      ( 2020 06:20 )             40.4     11-05    138  |  105  |  5<L>  ----------------------------<  112<H>  4.1   |  23  |  0.5<L>    Ca    8.6      2020 06:20    TPro  5.7<L>  /  Alb  3.9  /  TBili  0.2  /  DBili  x   /  AST  13  /  ALT  9   /  AlkPhos  64  11-03    PT/INR - ( 2020 06:20 )   PT: 12.00 sec;   INR: 1.04 ratio         PTT - ( 2020 06:20 )  PTT:28.6 sec  Urinalysis Basic - ( 2020 20:32 )    Color: Light Yellow / Appearance: Clear / S.007 / pH: x  Gluc: x / Ketone: Negative  / Bili: Negative / Urobili: <2 mg/dL   Blood: x / Protein: Negative / Nitrite: Positive   Leuk Esterase: Negative / RBC: 2 /HPF / WBC 1 /HPF   Sq Epi: x / Non Sq Epi: 2 /HPF / Bacteria: Many          Culture - Urine (collected 2020 20:32)  Source: .Urine Clean Catch (Midstream)  Preliminary Report (2020 05:11):    >100,000 CFU/ml Escherichia coli        RADIOLOGY:    < from: Xray Chest 2 Views PA/Lat (20 @ 21:19) >  Impression:    No radiographic evidence of acute cardiopulmonary disease.    Unchanged.    < end of copied text >      < from: MR Orbit, Face, and/or Neck No Cont (10.26.20 @ 11:28) >  IMPRESSION:    1.  Soft tissue mass filling the vallecula measuring up to 5 cm (craniocaudad), of probable tongue base origin base origin. Please note the patient declined IV contrast limiting evaluation of the mass.    2.  Mildly enlarged left level II lymph node measuring 1.7 cm. Nonspecific right level II lymph node is also noted measuring 1.2 cm.    3.  Bilateral thyroid nodules which may be further characterized with thyroid sonogram.    < end of copied text >      < from: CT Chest No Cont (20 @ 15:25) >  IMPRESSION:  1. No new, suspicious, or enlarging pulmonary nodule.  2. Bilateral stable or decreased pulmonary nodules dating back to 2016.    < end of copied text >        VITALS:   T(F): 98.5, Max: 98.5 (11-05-20 @ 05:19)  HR: 85  BP: 140/84  RR: 18  SpO2: 94%    PHYSICAL EXAM:  GEN: No acute distress, Altered Speech  LUNGS: Clear to auscultation bilaterally, no wheezes  HEART: Regular rate and rhythm, S1, S2 auscultated  ABD: Soft, non-tender, non-distended.  EXT: No edema  NEURO: AAOX3    Intravenous access:   NG tube:   Romo Catheter:

## 2020-11-05 NOTE — DIETITIAN INITIAL EVALUATION ADULT. - ORAL INTAKE PTA/DIET HISTORY
Pt reports increased difficulty tolerating PO intake over last 3-4 weeks d/t tongue mass. Pt reports over this time, intake varied daily and based on meal item. Mainly tolerating softer foods but sometimes felt it would stick to her throat. She notes "some days I was able to eat a martinez, egg & cheese sandwich but others I could only eat soups/soft eggs/ etc.". Notes most drastic intolerance to PO over last week PTA, mainly liquids. NKFA. Denied use of Ensure PTA. No cultural/Sikhism food pref.

## 2020-11-05 NOTE — PROGRESS NOTE ADULT - SUBJECTIVE AND OBJECTIVE BOX
GEREMIAS FULLER  65y  Female  ***My note supersedes ALL resident notes that I sign.  My corrections for their notes are in my note.***    I can be reached directly on [employer]. My office number is [phone]. My personal cell number is [phone].    INTERVAL EVENTS: Here for f/u of tongue mass. Pt going to OR for bx of tongue mass nichole. She feels OK. She has COPD w/ some baseline TORRES, but nothing unusual now. She denies CP. She stopped smoking only 4 days ago. She walks fine. She has been mostly on fluids (not eating too much solids). She denies pain. She occ sees blood after brushing her teeth and tongue.  She has had symptoms for a little while now (previously thought allergic symptoms prior to mass being found after laryngoscopy).  She has alteration in her speech, but can talk and breathe. She has no urinary symptoms at all.    T(F): 98.5 (20 @ 05:19), Max: 98.5 (20 @ 05:19)  HR: 85 (20 @ 05:19) (80 - 85)  BP: 140/84 (20 @ 05:19) (140/84 - 159/65)  RR: 18 (20 @ 05:19) (18 - 18)  SpO2: 94% (20 @ 07:44) (94% - 95%)    Gen: NAD; speech is altered by tongue mass  HEENT: PERRL, EOMI, mouth clr (cannot see mass), nose clr  Neck: no nodes, no JVD, thyroid nl  lungs: clr w/ decr BS b/l; no wheeze  hrt: s1 s2 rrr no murmur  abd: soft, NT/ND, no HS megaly  ext: no edema, no c/c  neuro: aa ox3, cn intact, can move all 4 ext    LABS:                     13.1    (    90.4   7.60  )-----------( ---------      213      ( 2020 06:20 )             40.4    (    13.7     138   (   105   (   112      20 @ 06:20  ----------------------               4.1   (   23   (   5                             -----                        0.5  Ca  8.6   Mg  --    P   --     PT/INR - ( 2020 06:20 )   PT: 12.00 sec;   INR: 1.04 ratio    PTT - ( 2020 06:20 )  PTT: 28.6 sec    Urinalysis Basic - ( 2020 20:32 )    Color: Light Yellow / Appearance: Clear / S.007 / pH: x  Gluc: x / Ketone: Negative  / Bili: Negative / Urobili: <2 mg/dL   Blood: x / Protein: Negative / Nitrite: Positive   Leuk Esterase: Negative / RBC: 2 /HPF / WBC 1 /HPF   Sq Epi: x / Non Sq Epi: 2 /HPF / Bacteria: Many    RADIOLOGY & ADDITIONAL TESTS:  < from: Xray Chest 2 Views PA/Lat (20 @ 21:19) >  Impression:    No radiographic evidence of acute cardiopulmonary disease.    Unchanged.    < end of copied text >    < from: MR Orbit, Face, and/or Neck No Cont (10.26.20 @ 11:28) >  IMPRESSION:    1.  Soft tissue mass filling the vallecula measuring up to 5 cm (craniocaudad), of probable tongue base origin base origin. Please note the patient declined IV contrast limiting evaluation of the mass.    2.  Mildly enlarged left level II lymph node measuring 1.7 cm. Nonspecific right level II lymph node is also noted measuring 1.2 cm.    3.  Bilateral thyroid nodules which may be further characterized with thyroid sonogram.    < end of copied text >    < from: CT Chest No Cont (20 @ 15:25) >  IMPRESSION:  1. No new, suspicious, or enlarging pulmonary nodule.  2. Bilateral stable or decreased pulmonary nodules dating back to 2016.    < end of copied text >    MEDICATIONS:  hydroxychloroquine 200 milliGRAM(s) Oral two times a day    ALBUTerol    90 MICROgram(s) HFA Inhaler 2 Puff(s) Inhalation every 4 hours  albuterol/ipratropium for Nebulization 3 milliLiter(s) Nebulizer four times a day PRN  chlorhexidine 4% Liquid 1 Application(s) Topical <User Schedule>  losartan 25 milliGRAM(s) Oral daily  melatonin 3 milliGRAM(s) Oral at bedtime  mycophenolate mofetil 1000 milliGRAM(s) Oral two times a day  pantoprazole    Tablet 40 milliGRAM(s) Oral before breakfast  simvastatin 40 milliGRAM(s) Oral at bedtime  tiotropium 18 MICROgram(s) Capsule 1 Capsule(s) Inhalation daily

## 2020-11-05 NOTE — DIETITIAN INITIAL EVALUATION ADULT. - PHYSCIAL ASSESSMENT
wt loss does not meet PCM criteria, no severe muscle wasting or fat loss observed. will continue to monitor wt trends.

## 2020-11-05 NOTE — PROGRESS NOTE ADULT - ASSESSMENT
65 y.o F CAD, COPD, GERD, pneumothorax 2016, pulmonary nodule, relapsing polychondritis, HTN, HLD sent from Dr. Najera office for medical and pulmonary clearance, planning to go for OR on 20 for awake tracheostomy and base of tongue lesion Bx.     # 5cm tongue mass (not visualized) w/ cervical LN up to 1.7cm (not palpable) in a current smoker (highly suspicious for head/neck cancer)  Plan is for trach and biopsy on  w/ ENT  f/u path post-op  diet: full liquids for today (can add Ensure)  will f/u w/ ENT post-op re d/c plan    # Pre-op eval  Thomas RCRI = 1 (or 0 if no CAD)  Clin Risk: relap polychon; COPD (hx PTX, no home O2); CAD (? true); HTN; DLD  Sx Risk: intermed  Fxn status: > 4 METs    EKG shows NSR  CXR: NAPD  COVID ne/3    Overall risk for this pt is low, but see relapsing polychondritis below.  May proceed to OR w/out further cardiac w/u. NPO p MN. IVFs in AM.    # Relapsing polychondritis  c/w hydroxychloroquine, mycophenolate   airway compromise is a potential complication of this dz and there may be inflammation that arises after trachea incised for tracheostomy  steroids are NOT needed pre-op, but if airway becomes an issue post-op: begin solumedrol 80mg IV stat and rheum eval     # COPD  albuterol prn wheeze  cont symbicort and spiriva  pulm noted    # HLD, HTN  c/w losartan  c/w simvastatin    # GERD  c/w PPI    # CAD by chart hx  pt not on asa  outpt f/u PMD    # DVT PPX: Lovenox    # spoke w/ son at bedside    Dispo: NPO p MN; IVFs in am  eventually to go back home post-op (will d/c plan w/ ENT); might need HC for new trache  outpt f/u ENT (might need h/o and r/o as well)  outpt f/u pulm  outpt f/u IM         65 y.o F CAD, COPD, GERD, pneumothorax 2016, pulmonary nodule, relapsing polychondritis, HTN, HLD sent from Dr. Najera office for medical and pulmonary clearance, planning to go for OR on 20 for awake tracheostomy and base of tongue lesion Bx.     # 5cm tongue mass (not visualized) w/ cervical LN up to 1.7cm (not palpable) in a current smoker (highly suspicious for head/neck cancer)  Plan is for trach and biopsy on  w/ ENT  f/u path post-op  diet: full liquids for today (can add Ensure)  will f/u w/ ENT post-op re d/c plan    # Pre-op eval  Thomas RCRI = 1 (or 0 if no CAD)  Clin Risk: relap polychon; COPD (hx PTX, no home O2); CAD (? true); HTN; DLD  Sx Risk: intermed  Fxn status: > 4 METs    EKG shows NSR  CXR: NAPD  COVID ne/3    Overall risk for this pt is low, but see relapsing polychondritis below.  May proceed to OR w/out further cardiac w/u. NPO p MN. IVFs in AM.    # Relapsing polychondritis  c/w hydroxychloroquine, mycophenolate   airway compromise is a potential complication of this dz and there may be inflammation that arises after trachea incised for tracheostomy  steroids are NOT needed pre-op, but if airway becomes an issue post-op: begin solumedrol 80mg IV stat and rheum eval     # "+U/A w/ + UCx," but pt has ZERO symptoms = asymp bacteruria  this does NOT need tx - d/c abx  can give ppx abx around incision in OR (at the discretion of surgery), but the urine does NOT need tx    # COPD  albuterol prn wheeze  cont symbicort and spiriva  pulm noted    # HLD, HTN  c/w losartan  c/w simvastatin    # GERD  c/w PPI    # CAD by chart hx  pt not on asa  outpt f/u PMD    # DVT PPX: Lovenox    # spoke w/ son at bedside    Dispo: NPO p MN; IVFs in am; d/c iv abx  eventually to go back home post-op (will d/c plan w/ ENT); might need HC for new trache  outpt f/u ENT (might need h/o and r/o as well)  outpt f/u pulm  outpt f/u IM         65 y.o F CAD, COPD, GERD, pneumothorax 2016, pulmonary nodule, relapsing polychondritis, HTN, HLD sent from Dr. Najera office for medical and pulmonary clearance, planning to go for OR on 20 for awake tracheostomy and base of tongue lesion Bx.     # 5cm tongue mass (not visualized) w/ cervical LN up to 1.7cm (not palpable) in a current smoker (highly suspicious for head/neck cancer)  Plan is for trach and biopsy on  w/ ENT  f/u path post-op  diet: full liquids for today (can add Ensure)  will f/u w/ ENT post-op re d/c plan    # Pre-op eval  Thomas RCRI = 1 (or 0 if no CAD)  Clin Risk: relap polychon; COPD (hx PTX, no home O2); CAD (? true - see below); HTN; DLD  Sx Risk: intermed  Fxn status: > 4 METs    EKG shows NSR  CXR: NAPD  COVID ne/3    Overall risk for this pt is low, but see relapsing polychondritis below.  May proceed to OR w/out further cardiac w/u. NPO p MN. IVFs in AM.    # CAD by chart hx - this might not actually be true  pt not on asa  pt says she was told she had calcifications in her heart based on radiologic imaging  she NEVER had an MI, cath, stenting or CABG  outpt f/u PMD    # Relapsing polychondritis  c/w hydroxychloroquine, mycophenolate   airway compromise is a potential complication of this dz and there may be inflammation that arises after trachea incised for tracheostomy  steroids are NOT needed pre-op, but if airway becomes an issue post-op: begin solumedrol 80mg IV stat and rheum eval     # "+U/A w/ + UCx," but pt has ZERO symptoms = asymp bacteruria  this does NOT need tx - d/c abx  can give ppx abx around incision in OR (at the discretion of surgery), but the urine does NOT need tx    # COPD  albuterol prn wheeze  cont symbicort and spiriva  pulm noted    # HLD, HTN  c/w losartan  c/w simvastatin    # GERD  c/w PPI    # DVT PPX: Lovenox    # spoke w/ son at bedside    Dispo: NPO p MN; IVFs in am; d/c iv abx  eventually to go back home post-op (will d/c plan w/ ENT); might need HC for new trache  outpt f/u ENT (might need h/o and r/o as well)  outpt f/u pulm  outpt f/u IM

## 2020-11-05 NOTE — PROGRESS NOTE ADULT - ASSESSMENT
65 y.o F CAD, COPD, GERD, pneumothorax 2016, pulmonary nodule, relapsing polychondritis, HTN, HLD sent from Dr. Najera office for medical and pulmonary clearance, planning to go for OR on 20 for awake tracheostomy and base of tongue lesion Bx who is currently admitted to medicine for clearance for a tracheostomy and base of tongue lesion Bx.    # 5cm tongue mass (not visualized) w/ cervical LN up to 1.7cm (not palpable) in a current smoker (highly suspicious for head/neck cancer)  - Plan is for tracheostomy and biopsy on  w/ ENT  - F/u path post-op  - Diet: full liquids for today (can add Ensure)  - F/u w/ ENT post-op  - NPO at midnight    # Pre-op eval  - Thomas RCRI = 1 (or 0 if no CAD)  - Clin Risk: relap polychon; COPD (hx PTX, no home O2); CAD (? true); HTN; DLD  - Sx Risk: intermed  - Fxn status: > 4 METs    - EKG shows NSR  - CXR: NAPD  - COVID ne/3    Overall risk for this pt is low, but see relapsing polychondritis below.  May proceed to OR w/out further cardiac w/u. NPO p MN. IVFs in AM.    # Relapsing polychondritis  - C/w hydroxychloroquine, mycophenolate   - Airway compromise is a potential complication of this dz and there may be inflammation that arises after trachea incised for tracheostomy  - Steroids are NOT needed pre-op, but if airway becomes an issue post-op: begin solumedrol 80mg IV stat and rheum eval     #Asymptomatic UTI  - Positive Nitrites  - Many bacteria  - D/c Ceftriaxone    # COPD  - Albuterol prn wheeze  - C/w symbicort and spiriva  - Pulm noted and found patient to be a low risk for pulmonary complications    # HLD, HTN  c/w losartan  c/w simvastatin    # GERD  c/w PPI    # CAD by chart hx  pt not on asa  outpt f/u PMD    # DVT PPX: Lovenox      Dispo: NPO p MN; IVFs in am  eventually to go back home post-op (will d/c plan w/ ENT); might need HC for new tracheostomy  outpt f/u ENT (might need h/o and r/o as well)  outpt f/u pulm  outpt f/u IM   65 y.o F CAD, COPD, GERD, pneumothorax 2016, pulmonary nodule, relapsing polychondritis, HTN, HLD sent from Dr. Najera office for medical and pulmonary clearance, planning to go for OR on 20 for awake tracheostomy and base of tongue lesion Bx who is currently admitted to medicine for clearance for a tracheostomy and base of tongue lesion Bx.    # 5cm tongue mass (not visualized) w/ cervical LN up to 1.7cm (not palpable) in a current smoker (highly suspicious for head/neck cancer)  - Plan is for tracheostomy and biopsy on  w/ ENT  - F/u path post-op  - Diet: full liquids for today (can add Ensure)  - F/u w/ ENT post-op  - NPO at midnight    # Pre-op eval  - Thomas RCRI = 1 (or 0 if no CAD)  - Clin Risk: relap polychon; COPD (hx PTX, no home O2); CAD (? true); HTN; DLD  - Sx Risk: intermed  - Fxn status: > 4 METs    - EKG shows NSR  - CXR: NAPD  - COVID ne/3    Overall risk for this pt is low, but see relapsing polychondritis below.  May proceed to OR w/out further cardiac w/u. NPO p MN. IVFs in AM.    # Relapsing polychondritis  - C/w hydroxychloroquine, mycophenolate   - Airway compromise is a potential complication of this dz and there may be inflammation that arises after trachea incised for tracheostomy  - Steroids are NOT needed pre-op, but if airway becomes an issue post-op: begin solumedrol 80mg IV stat and rheum eval     #Asymptomatic UTI  - Positive Nitrites  - Many bacteria  - D/c Ceftriaxone    # COPD  - Albuterol prn wheeze  - C/w symbicort and spiriva  - Pulm noted and found patient to be a low risk for pulmonary complications    # HLD, HTN  c/w losartan  c/w simvastatin    # GERD  c/w PPI    # CAD by chart hx  pt not on asa  outpt f/u PMD    # DVT PPX: Lovenox      Dispo: NPO p MN; IVFs in am  eventually to go back home post-op (will d/c plan w/ ENT); might need HC for new tracheostomy  outpt f/u ENT (might need h/o and r/o as well)  outpt f/u pulm  outpt f/u IM    I agree with the above note - I have made corrections within the note   Nely Cano - PGY1    65 y.o F CAD, COPD, GERD, pneumothorax 2016, pulmonary nodule, relapsing polychondritis, HTN, HLD sent from Dr. Najera office for medical and pulmonary clearance, planning to go for OR on Friday 11/6/20 for awake tracheostomy and base of tongue lesion Bx who is currently admitted to medicine for clearance for a tracheostomy and base of tongue lesion Bx.      # Large base of tongue mass with 70% obstruction of oropharynx  Seen on outpatient FFL by ENT  - Plan is for trach and biopsy on 11/6  - Medical clearance and Pulmonary (Dr. Bright) clearance  - Full liquid diet  - NPO after midnight on Thursday    - FU ent for post op dc plan    # Relapsing polychondritis on ear, hands.  Currently to recent relapses  - Continue home hydroxychloroquine, mycophenolate   - Airway compromise is a potential complication.  There may be inflammation that arises after trachea incised for tracheostomy  - Steroids are NOT needed pre-op, but if airway becomes an issue post-op: begin solumedrol 80mg IV stat and rheum eval - will find out who rheumatologist is.     #Asymptomatic UTI  - Positive Nitrites  - Many bacteria  - D/c Ceftriaxone as per Dr Huston . .     # COPD  - Albuterol prn wheeze  - C/w symbicort and spiriva  - Pulm noted and found patient to be a low risk for pulmonary complications      # HTN  - on loasartan    # CAD - non-occlusive  - on statin    # GERD  - on PPI    DVT prophylaxis: Lovenox  Diet: Diet, Full Liquid:   Ambulation: as tolerated  Dispo: acute, patient from home might need HC for new tracheostomy  outpt f/u ENT (might need h/o and r/o as well)  outpt f/u pulm  outpt f/u IM    pending - biopsy  tomorrow.

## 2020-11-05 NOTE — PROGRESS NOTE ADULT - SUBJECTIVE AND OBJECTIVE BOX
Pre-op note  Pt. seen and examined at bedside in NAD, tolerating liquid diet well. Plan to go for direct laryngoscopy  with Bx of base of tongue with awake tracheostomy.    As per conversation with medical resident IV Abx stopped Pt. is asymptomatic. UA +, Urine Cx with >100,000 CFU E. Coli( p). D/w team to restart Abx     ROS:   [ x ] A 10 Point Review of Systems was negative except where noted  [   ] Due to altered mental status/intubation, subjective information was not able to be obtained from the patient. History was obtained to the extent possible from review of the chart and collateral sources of information.     ALBUTerol    90 MICROgram(s) HFA Inhaler 2 Puff(s) Inhalation every 4 hours  chlorhexidine 4% Liquid 1 Application(s) Topical <User Schedule>  hydroxychloroquine 200 milliGRAM(s) Oral two times a day  losartan 25 milliGRAM(s) Oral daily  melatonin 3 milliGRAM(s) Oral at bedtime  mycophenolate mofetil 1000 milliGRAM(s) Oral two times a day  pantoprazole    Tablet 40 milliGRAM(s) Oral before breakfast  simvastatin 40 milliGRAM(s) Oral at bedtime  tiotropium 18 MICROgram(s) Capsule 1 Capsule(s) Inhalation daily        Vital Signs Last 24 Hrs  T(C): 36.9 (05 Nov 2020 05:19), Max: 36.9 (05 Nov 2020 05:19)  T(F): 98.5 (05 Nov 2020 05:19), Max: 98.5 (05 Nov 2020 05:19)  HR: 85 (05 Nov 2020 05:19) (80 - 85)  BP: 140/84 (05 Nov 2020 05:19) (140/84 - 159/65)  RR: 18 (05 Nov 2020 05:19) (18 - 18)  SpO2: 94% (05 Nov 2020 07:44) (94% - 95%)      PE  Constitutional: NAD, well-developed, well nourished   HEENT: NC/AT, EOMI  Nose: B/L nares patent, no d/c   Mouth: mucosa pink and moist, tongue and uvula midline, no ttp or FOM masses or lesions.   Neck: FROM no ttp.   Back: No CVA tenderness B/L   Respiratory: No accessory respiratory muscle use  Abd: Soft, NT/ND     Extremities: no edema  Neurological: A/O x 3  Psychiatric: Normal mood, normal affect.          LABS:                        13.1   7.60  )-----------( 213      ( 05 Nov 2020 06:20 )             40.4     11-05    138  |  105  |  5<L>  ----------------------------<  112<H>  4.1   |  23  |  0.5<L>    Ca    8.6      05 Nov 2020 06:20    TPro  5.7<L>  /  Alb  3.9  /  TBili  0.2  /  DBili  x   /  AST  13  /  ALT  9   /  AlkPhos  64  11-03       Activated Partial Thromboplastin Time: 28.6 sec  INR: 1.04:   Prothrombin Time, Plasma: 12.00 sec        Type + Screen (11.05.20 @ 06:20)    ABO RH Interpretation: O POS       Urinalysis (11.03.20 @ 20:32)    pH Urine: 6.5    Glucose Qualitative, Urine: Negative    Blood, Urine: Negative    Color: Light Yellow    Urine Appearance: Clear    Bilirubin: Negative    Ketone - Urine: Negative    Specific Gravity: 1.007    Protein, Urine: Negative    Urobilinogen: <2 mg/dL    Nitrite: Positive    Leukocyte Esterase Concentration: Negative      Urine Microscopic-Add On (NC) (11.03.20 @ 20:32)    Hyaline Casts: 0 /LPF    Red Blood Cell - Urine: 2 /HPF    White Blood Cell - Urine: 1 /HPF    Bacteria: Many    Epithelial Cells: 2 /HPF       Culture - Urine (11.03.20 @ 20:32)    Specimen Source: .Urine Clean Catch (Midstream)    Culture Results:   >100,000 CFU/ml Escherichia coli      < from: 12 Lead ECG (11.03.20 @ 18:48) >    Ventricular Rate 95 BPM    Atrial Rate 95 BPM    P-R Interval 118 ms    QRS Duration 74 ms    Q-T Interval 348 ms    QTC Calculation(Bazett) 437 ms    P Axis 73 degrees    R Axis 17 degrees    T Axis 53 degrees    Diagnosis Line Normal sinus rhythm  Normal ECG    Confirmed by Wesley Bergeron ([id number]) on 11/3/2020 8:05:25 PM    < end of copied text >    RADIOLOGY & ADDITIONAL STUDIES:       < from: Xray Chest 2 Views (11.02.20 @ 13:18) >    EXAM:  XR CHEST 2V            PROCEDURE DATE:  11/02/2020      INTERPRETATION:  Clinical History / Reason for exam: Preop    PA and lateral    COMPARISON: February 18, 2020    Findings/  impression: Heart size within normal limits. Bilateral costophrenic angle blunting/pleural effusions. Stable bony structures. Midthoracic vertebral body chronic compression fracture    Spoke with YOUNG ESCOBEDO MD               on 11/2/2020 2:25 PM with readback.        AUDREY LEWIS MD; Attending Radiologist  This document has been electronically signed. Nov 2 2020  2:25PM    < end of copied text >      < from: Xray Chest 2 Views PA/Lat (11.03.20 @ 21:19) >    EXAM:  XR CHEST PA LAT 2V            PROCEDURE DATE:  11/03/2020            INTERPRETATION:  Clinical History / Reason for exam: Preoperative examination.    Comparison : Chest radiograph February 18, 2020.    Technique/Positioning: Frontal and lateral.    Findings:    Support devices: None.    Cardiac/mediastinum/hilum: Unremarkable.    Lung parenchyma/Pleura: Flattening the hemidiaphragms.    Skeleton/soft tissues: Unremarkable.    Impression:    No radiographic evidence of acute cardiopulmonary disease.    Unchanged.          VADIM MORRIS MD; Attending Interventional Radiologist  This document has been electronically signed. Nov 4 2020 10:18AM    < end of copied text >

## 2020-11-05 NOTE — DIETITIAN INITIAL EVALUATION ADULT. - OTHER CALCULATIONS
est kcal needs = 2171-9052 kcal/day (MSJ x1.2-1.4 d/t mass with wt loss); est protein needs = 7892 g/day (1.1-1.3 g/kg CBW reasons mentioned above); est fluid needs = 1mL/kcal or per LIP

## 2020-11-05 NOTE — PROGRESS NOTE ADULT - ASSESSMENT
65 y.o F with obstructing base of tongue lesion cleared by medicine below average risk on NSQIP score) and pulmonology( low risk x pulmonary complications) awaiting to go for direct laryngoscopy with base of tongue mass Bx and awake tracheostomy placement toorrow  UTI      Plan:  Restart Abx, Treat UTI, adjust Abx after susceptibilities are back.   Pulmonary and Medical clearances appreciated  NPO after midnight, case going to OR at 7:30 AM  As per Pulmonary recommendations cont. with LAMA therapy, Nebulizer Q4H prn, KEEP SpO2 more than 92 %.   ENT will F/U   Case d/w medical resident spectra [phone]

## 2020-11-05 NOTE — DIETITIAN INITIAL EVALUATION ADULT. - OTHER INFO
Pt presented for biopsy & tracheostomy. Hospital course complicated by large base of tongue mass with 70% obstruction of oropharynx--plan for trach & biopsy tomorrow 11/6, needs pulm clearance. COPD, stable. Relapsing polychondritis on ear, hands.

## 2020-11-05 NOTE — DIETITIAN INITIAL EVALUATION ADULT. - ENERGY INTAKE
Pt reports tolerating full liquid diet well. Reports she feels some residue in mouth after swallowing but denied any choking. Agreeable to Ensure, RD delivered to pt. Recs at end of document d/w LIP (Dr. Cano). Good (>75%)

## 2020-11-06 ENCOUNTER — APPOINTMENT (OUTPATIENT)
Dept: PULMONOLOGY | Facility: CLINIC | Age: 65
End: 2020-11-06

## 2020-11-06 ENCOUNTER — RESULT REVIEW (OUTPATIENT)
Age: 65
End: 2020-11-06

## 2020-11-06 ENCOUNTER — APPOINTMENT (OUTPATIENT)
Dept: RHEUMATOLOGY | Facility: CLINIC | Age: 65
End: 2020-11-06

## 2020-11-06 ENCOUNTER — APPOINTMENT (OUTPATIENT)
Dept: OTOLARYNGOLOGY | Facility: HOSPITAL | Age: 65
End: 2020-11-06

## 2020-11-06 LAB
ANION GAP SERPL CALC-SCNC: 10 MMOL/L — SIGNIFICANT CHANGE UP (ref 7–14)
BASOPHILS # BLD AUTO: 0.02 K/UL — SIGNIFICANT CHANGE UP (ref 0–0.2)
BASOPHILS NFR BLD AUTO: 0.2 % — SIGNIFICANT CHANGE UP (ref 0–1)
BUN SERPL-MCNC: 5 MG/DL — LOW (ref 10–20)
CALCIUM SERPL-MCNC: 8.4 MG/DL — LOW (ref 8.5–10.1)
CHLORIDE SERPL-SCNC: 106 MMOL/L — SIGNIFICANT CHANGE UP (ref 98–110)
CK MB CFR SERPL CALC: 1.3 NG/ML — SIGNIFICANT CHANGE UP (ref 0.6–6.3)
CK MB CFR SERPL CALC: 1.8 NG/ML — SIGNIFICANT CHANGE UP (ref 0.6–6.3)
CK SERPL-CCNC: 194 U/L — SIGNIFICANT CHANGE UP (ref 0–225)
CO2 SERPL-SCNC: 23 MMOL/L — SIGNIFICANT CHANGE UP (ref 17–32)
CREAT SERPL-MCNC: 0.6 MG/DL — LOW (ref 0.7–1.5)
EOSINOPHIL # BLD AUTO: 0.02 K/UL — SIGNIFICANT CHANGE UP (ref 0–0.7)
EOSINOPHIL NFR BLD AUTO: 0.2 % — SIGNIFICANT CHANGE UP (ref 0–8)
GLUCOSE SERPL-MCNC: 116 MG/DL — HIGH (ref 70–99)
HCT VFR BLD CALC: 40.3 % — SIGNIFICANT CHANGE UP (ref 37–47)
HGB BLD-MCNC: 13.2 G/DL — SIGNIFICANT CHANGE UP (ref 12–16)
IMM GRANULOCYTES NFR BLD AUTO: 0.4 % — HIGH (ref 0.1–0.3)
LYMPHOCYTES # BLD AUTO: 0.7 K/UL — LOW (ref 1.2–3.4)
LYMPHOCYTES # BLD AUTO: 6.5 % — LOW (ref 20.5–51.1)
MAGNESIUM SERPL-MCNC: 1.8 MG/DL — SIGNIFICANT CHANGE UP (ref 1.8–2.4)
MCHC RBC-ENTMCNC: 29.3 PG — SIGNIFICANT CHANGE UP (ref 27–31)
MCHC RBC-ENTMCNC: 32.8 G/DL — SIGNIFICANT CHANGE UP (ref 32–37)
MCV RBC AUTO: 89.6 FL — SIGNIFICANT CHANGE UP (ref 81–99)
MONOCYTES # BLD AUTO: 0.27 K/UL — SIGNIFICANT CHANGE UP (ref 0.1–0.6)
MONOCYTES NFR BLD AUTO: 2.5 % — SIGNIFICANT CHANGE UP (ref 1.7–9.3)
NEUTROPHILS # BLD AUTO: 9.64 K/UL — HIGH (ref 1.4–6.5)
NEUTROPHILS NFR BLD AUTO: 90.2 % — HIGH (ref 42.2–75.2)
NRBC # BLD: 0 /100 WBCS — SIGNIFICANT CHANGE UP (ref 0–0)
PHOSPHATE SERPL-MCNC: 4.2 MG/DL — SIGNIFICANT CHANGE UP (ref 2.1–4.9)
PLATELET # BLD AUTO: 205 K/UL — SIGNIFICANT CHANGE UP (ref 130–400)
POTASSIUM SERPL-MCNC: 4.3 MMOL/L — SIGNIFICANT CHANGE UP (ref 3.5–5)
POTASSIUM SERPL-SCNC: 4.3 MMOL/L — SIGNIFICANT CHANGE UP (ref 3.5–5)
RBC # BLD: 4.5 M/UL — SIGNIFICANT CHANGE UP (ref 4.2–5.4)
RBC # FLD: 13.4 % — SIGNIFICANT CHANGE UP (ref 11.5–14.5)
SODIUM SERPL-SCNC: 139 MMOL/L — SIGNIFICANT CHANGE UP (ref 135–146)
TROPONIN T SERPL-MCNC: <0.01 NG/ML — SIGNIFICANT CHANGE UP
TROPONIN T SERPL-MCNC: <0.01 NG/ML — SIGNIFICANT CHANGE UP
WBC # BLD: 10.69 K/UL — SIGNIFICANT CHANGE UP (ref 4.8–10.8)
WBC # FLD AUTO: 10.69 K/UL — SIGNIFICANT CHANGE UP (ref 4.8–10.8)

## 2020-11-06 PROCEDURE — 88305 TISSUE EXAM BY PATHOLOGIST: CPT | Mod: 26

## 2020-11-06 PROCEDURE — 88342 IMHCHEM/IMCYTCHM 1ST ANTB: CPT | Mod: 26

## 2020-11-06 PROCEDURE — 93010 ELECTROCARDIOGRAM REPORT: CPT

## 2020-11-06 PROCEDURE — 31536 LARYNGOSCOPY W/BX & OP SCOPE: CPT

## 2020-11-06 PROCEDURE — 31600 PLANNED TRACHEOSTOMY: CPT

## 2020-11-06 PROCEDURE — 71045 X-RAY EXAM CHEST 1 VIEW: CPT | Mod: 26

## 2020-11-06 RX ORDER — MYCOPHENOLATE MOFETIL 250 MG/1
1000 CAPSULE ORAL EVERY 12 HOURS
Refills: 0 | Status: DISCONTINUED | OUTPATIENT
Start: 2020-11-06 | End: 2020-11-15

## 2020-11-06 RX ORDER — ACETAMINOPHEN 500 MG
650 TABLET ORAL EVERY 6 HOURS
Refills: 0 | Status: DISCONTINUED | OUTPATIENT
Start: 2020-11-06 | End: 2020-11-11

## 2020-11-06 RX ORDER — PANTOPRAZOLE SODIUM 20 MG/1
40 TABLET, DELAYED RELEASE ORAL DAILY
Refills: 0 | Status: DISCONTINUED | OUTPATIENT
Start: 2020-11-06 | End: 2020-11-11

## 2020-11-06 RX ORDER — SIMVASTATIN 20 MG/1
40 TABLET, FILM COATED ORAL AT BEDTIME
Refills: 0 | Status: DISCONTINUED | OUTPATIENT
Start: 2020-11-06 | End: 2020-11-15

## 2020-11-06 RX ORDER — SODIUM CHLORIDE 9 MG/ML
1000 INJECTION, SOLUTION INTRAVENOUS
Refills: 0 | Status: DISCONTINUED | OUTPATIENT
Start: 2020-11-06 | End: 2020-11-06

## 2020-11-06 RX ORDER — ALBUTEROL 90 UG/1
2 AEROSOL, METERED ORAL EVERY 4 HOURS
Refills: 0 | Status: DISCONTINUED | OUTPATIENT
Start: 2020-11-06 | End: 2020-11-13

## 2020-11-06 RX ORDER — CEFAZOLIN SODIUM 1 G
2000 VIAL (EA) INJECTION EVERY 8 HOURS
Refills: 0 | Status: COMPLETED | OUTPATIENT
Start: 2020-11-06 | End: 2020-11-06

## 2020-11-06 RX ORDER — ALBUTEROL 90 UG/1
2.5 AEROSOL, METERED ORAL ONCE
Refills: 0 | Status: COMPLETED | OUTPATIENT
Start: 2020-11-06 | End: 2020-11-06

## 2020-11-06 RX ORDER — TIOTROPIUM BROMIDE 18 UG/1
1 CAPSULE ORAL; RESPIRATORY (INHALATION) DAILY
Refills: 0 | Status: DISCONTINUED | OUTPATIENT
Start: 2020-11-06 | End: 2020-11-15

## 2020-11-06 RX ORDER — HYDROXYCHLOROQUINE SULFATE 200 MG
200 TABLET ORAL
Refills: 0 | Status: DISCONTINUED | OUTPATIENT
Start: 2020-11-06 | End: 2020-11-15

## 2020-11-06 RX ORDER — CHLORHEXIDINE GLUCONATE 213 G/1000ML
1 SOLUTION TOPICAL
Refills: 0 | Status: DISCONTINUED | OUTPATIENT
Start: 2020-11-06 | End: 2020-11-15

## 2020-11-06 RX ORDER — MYCOPHENOLATE MOFETIL 250 MG/1
1000 CAPSULE ORAL
Refills: 0 | Status: DISCONTINUED | OUTPATIENT
Start: 2020-11-06 | End: 2020-11-06

## 2020-11-06 RX ORDER — LOSARTAN POTASSIUM 100 MG/1
25 TABLET, FILM COATED ORAL DAILY
Refills: 0 | Status: DISCONTINUED | OUTPATIENT
Start: 2020-11-06 | End: 2020-11-15

## 2020-11-06 RX ORDER — HEPARIN SODIUM 5000 [USP'U]/ML
5000 INJECTION INTRAVENOUS; SUBCUTANEOUS EVERY 8 HOURS
Refills: 0 | Status: DISCONTINUED | OUTPATIENT
Start: 2020-11-06 | End: 2020-11-15

## 2020-11-06 RX ORDER — MORPHINE SULFATE 50 MG/1
4 CAPSULE, EXTENDED RELEASE ORAL
Refills: 0 | Status: DISCONTINUED | OUTPATIENT
Start: 2020-11-06 | End: 2020-11-06

## 2020-11-06 RX ORDER — MAGNESIUM SULFATE 500 MG/ML
1 VIAL (ML) INJECTION ONCE
Refills: 0 | Status: DISCONTINUED | OUTPATIENT
Start: 2020-11-06 | End: 2020-11-06

## 2020-11-06 RX ADMIN — CHLORHEXIDINE GLUCONATE 1 APPLICATION(S): 213 SOLUTION TOPICAL at 22:38

## 2020-11-06 RX ADMIN — CHLORHEXIDINE GLUCONATE 1 APPLICATION(S): 213 SOLUTION TOPICAL at 06:03

## 2020-11-06 RX ADMIN — Medication 200 MILLIGRAM(S): at 20:55

## 2020-11-06 RX ADMIN — LOSARTAN POTASSIUM 25 MILLIGRAM(S): 100 TABLET, FILM COATED ORAL at 20:55

## 2020-11-06 RX ADMIN — MORPHINE SULFATE 4 MILLIGRAM(S): 50 CAPSULE, EXTENDED RELEASE ORAL at 10:15

## 2020-11-06 RX ADMIN — MORPHINE SULFATE 4 MILLIGRAM(S): 50 CAPSULE, EXTENDED RELEASE ORAL at 10:32

## 2020-11-06 RX ADMIN — SODIUM CHLORIDE 100 MILLILITER(S): 9 INJECTION, SOLUTION INTRAVENOUS at 09:56

## 2020-11-06 RX ADMIN — HEPARIN SODIUM 5000 UNIT(S): 5000 INJECTION INTRAVENOUS; SUBCUTANEOUS at 21:52

## 2020-11-06 RX ADMIN — MORPHINE SULFATE 4 MILLIGRAM(S): 50 CAPSULE, EXTENDED RELEASE ORAL at 10:03

## 2020-11-06 RX ADMIN — Medication 100 MILLIGRAM(S): at 21:52

## 2020-11-06 RX ADMIN — Medication 100 MILLIGRAM(S): at 13:22

## 2020-11-06 RX ADMIN — PANTOPRAZOLE SODIUM 40 MILLIGRAM(S): 20 TABLET, DELAYED RELEASE ORAL at 06:03

## 2020-11-06 RX ADMIN — Medication 650 MILLIGRAM(S): at 22:39

## 2020-11-06 RX ADMIN — MYCOPHENOLATE MOFETIL 1000 MILLIGRAM(S): 250 CAPSULE ORAL at 20:56

## 2020-11-06 RX ADMIN — Medication 200 MILLIGRAM(S): at 06:03

## 2020-11-06 RX ADMIN — LOSARTAN POTASSIUM 25 MILLIGRAM(S): 100 TABLET, FILM COATED ORAL at 06:03

## 2020-11-06 RX ADMIN — SIMVASTATIN 40 MILLIGRAM(S): 20 TABLET, FILM COATED ORAL at 22:39

## 2020-11-06 RX ADMIN — SODIUM CHLORIDE 75 MILLILITER(S): 9 INJECTION INTRAMUSCULAR; INTRAVENOUS; SUBCUTANEOUS at 06:04

## 2020-11-06 RX ADMIN — MYCOPHENOLATE MOFETIL 1000 MILLIGRAM(S): 250 CAPSULE ORAL at 06:03

## 2020-11-06 RX ADMIN — MORPHINE SULFATE 4 MILLIGRAM(S): 50 CAPSULE, EXTENDED RELEASE ORAL at 10:45

## 2020-11-06 RX ADMIN — ALBUTEROL 2 PUFF(S): 90 AEROSOL, METERED ORAL at 18:22

## 2020-11-06 RX ADMIN — HEPARIN SODIUM 5000 UNIT(S): 5000 INJECTION INTRAVENOUS; SUBCUTANEOUS at 13:23

## 2020-11-06 RX ADMIN — Medication 650 MILLIGRAM(S): at 21:44

## 2020-11-06 RX ADMIN — PANTOPRAZOLE SODIUM 40 MILLIGRAM(S): 20 TABLET, DELAYED RELEASE ORAL at 11:36

## 2020-11-06 NOTE — BRIEF OPERATIVE NOTE - NSICDXBRIEFPROCEDURE_GEN_ALL_CORE_FT
PROCEDURES:  Direct laryngoscopy with biopsy 06-Nov-2020 09:39:42  Nuria Najera  Open tracheostomy 06-Nov-2020 09:39:34  Nuria Najera

## 2020-11-06 NOTE — PROGRESS NOTE ADULT - ASSESSMENT
Pt is a 65y Female admitted with BOT suspicious lesion, s/p awake tracheostomy, DL and bx, POD # 0 - biopsy confirms SCCa    ·	pt in SICU for overnight monitoring  ·	cont supportive care  ·	NGT feeds, ok for bolus - can start half goal as pt feeding naive, ok for goal if tolerates feeds tonight  ·	SLP assessed, may need PEG tube during admission  ·	discussed biopsy results with patient and son at bedside  ·	pt will be discussed at Head & Neck Tumor Board Monday 11/9 for treatment plan  ·	ok to try passey if pt able to tolerate  ·	cont humidified tcl, trach care  ·	Dr Najera at bedside.

## 2020-11-06 NOTE — PROGRESS NOTE ADULT - SUBJECTIVE AND OBJECTIVE BOX
ENT DAILY PROGRESS NOTE    Pt is a 65y Female admitted with BOT suspicious lesion, s/p awake tracheostomy, DL and bx - POD #0, seen and examined at bedside. PT doing well - no complaints at this time. Pt was seen by SLP, + overts with bedside assessment, NPO with NGT in place. Pt denies any SOB/diff breathing.       REVIEW OF SYSTEMS   [x] A ten-point review of systems was otherwise negative except as noted.  [ ] Due to altered mental status/intubation, subjective information were not able to be obtained from patient. History was obtained, to the extent possible, from review of the chart and collateral sources of information.    Allergies    CONTRAST DYS (Nausea)  No Known Drug Allergies    Intolerances    MEDICATIONS:  acetaminophen    Suspension .. 650 milliGRAM(s) Oral every 6 hours  ALBUTerol    90 MICROgram(s) HFA Inhaler 2 Puff(s) Inhalation every 4 hours  ALBUTerol   0.5% 2.5 milliGRAM(s) Nebulizer once  ceFAZolin   IVPB 2000 milliGRAM(s) IV Intermittent every 8 hours  chlorhexidine 4% Liquid 1 Application(s) Topical <User Schedule>  heparin   Injectable 5000 Unit(s) SubCutaneous every 8 hours  hydroxychloroquine 200 milliGRAM(s) Oral two times a day  lactated ringers. 1000 milliLiter(s) IV Continuous <Continuous>  losartan 25 milliGRAM(s) Oral daily  magnesium sulfate  IVPB 1 Gram(s) IV Intermittent once  mycophenolate mofetil 1000 milliGRAM(s) Oral two times a day  pantoprazole  Injectable 40 milliGRAM(s) IV Push daily  simvastatin 40 milliGRAM(s) Oral at bedtime  tiotropium 18 MICROgram(s) Capsule 1 Capsule(s) Inhalation daily      Vital Signs Last 24 Hrs  T(C): 37.1 (06 Nov 2020 15:05), Max: 37.1 (06 Nov 2020 11:30)  T(F): 98.8 (06 Nov 2020 15:05), Max: 98.8 (06 Nov 2020 11:30)  HR: 78 (06 Nov 2020 15:05) (68 - 97)  BP: 128/60 (06 Nov 2020 15:05) (106/70 - 173/90)  BP(mean): 87 (06 Nov 2020 15:05) (78 - 97)  RR: 24 (06 Nov 2020 15:05) (16 - 35)  SpO2: 98% (06 Nov 2020 15:05) (92% - 100%)      11-06 @ 07:01  -  11-06 @ 18:04  --------------------------------------------------------  IN:    IV PiggyBack: 50 mL    Lactated Ringers: 500 mL  Total IN: 550 mL    OUT:    Nasogastric/Oral tube (mL): 50 mL  Total OUT: 50 mL    Total NET: 500 mL    PHYSICAL EXAM:    GEN: Well-developed, well-nourished. NAD, awake and alert. No drooling or pooling of secretions. No stridor or stertor. unable to phonate with finger occlusion.   SKIN: Good color, non diaphoretic  HEENT: NC/AT; Oral mucosa pink and moist. No erythema or edema noted to buccal mucosa, tongue, FOM, uvula or posterior oropharynx. Uvula midline  NECK:  Trachea midline. + trach tube in place. + minimal bloody secretions noted to area. no overlying hematoma or TTP.   RESP: No dyspnea, non-labored breathing. No use of accessory muscles.  CARDIO: +S1/S2  ABDO: Soft, NT.  EXT: WOLFE x 4    LABS:  CBC-                        13.2   10.69 )-----------( 205      ( 06 Nov 2020 10:10 )             40.3     BMP/CMP-  06 Nov 2020 10:10    139    |  106    |  5      ----------------------------<  116    4.3     |  23     |  0.6      Ca    8.4        06 Nov 2020 10:10  Phos  4.2       06 Nov 2020 10:10  Mg     1.8       06 Nov 2020 10:10      Coagulation Studies-  PT/INR - ( 05 Nov 2020 06:20 )   PT: 12.00 sec;   INR: 1.04 ratio         PTT - ( 05 Nov 2020 06:20 )  PTT:28.6 sec  Endocrine Panel-  Calcium, Total Serum: 8.4 mg/dL (11-06 @ 10:10)      RADIOLOGY & ADDITIONAL STUDIES:

## 2020-11-06 NOTE — PROGRESS NOTE ADULT - SUBJECTIVE AND OBJECTIVE BOX
SUBJECTIVE:    65 y.o F CAD, COPD, GERD, pneumothorax 2016, pulmonary nodule, relapsing polychondritis, HTN, HLD sent from Dr. Najera office for medical and pulmonary clearance, planning to go for OR on Friday 11/6/20 for awake tracheostomy and base of tongue lesion Bx. Patient reports mass has been growing for a few weeks and is causing her to have difficulty swallowing solids but no difficulty with liquids. On outpatient testing Pt. found to have + UA and CXR findings of blunting of costophrenic angles consistent with pleural effusion. Today is hospital day 3d and patient is currently admitted to medicine with the primary diagnosis of Tongue mass for which patient is seeking clearance for a tracheostomy and base of tongue lesion Bx. Pt was taken to the operating room this morning around 6:30am for biopsy of tongue mass and tracheostomy.      PAST MEDICAL & SURGICAL HISTORY  H/O pneumothorax  2016    Female cystocele    Relapsing polychondritis    COPD (chronic obstructive pulmonary disease)    CAD (coronary artery disease)    Pulmonary nodule    GERD (gastroesophageal reflux disease)    Hypercholesterolemia    HTN (hypertension)    H/O breast biopsy  LEFT    History of bladder surgery  2019    Pulmonary nodule    Vocal cord polyps  removal of same 2005    S/P colon resection    S/P GEETHA-BSO      SOCIAL HISTORY:  Active smoker 40pack year hx  Denies Alcohol  Denies Illicit drug use       ALLERGIES:  CONTRAST DYS (Nausea)  No Known Drug Allergies    MEDICATIONS:  STANDING MEDICATIONS  acetaminophen    Suspension .. 650 milliGRAM(s) Oral every 6 hours  ALBUTerol    90 MICROgram(s) HFA Inhaler 2 Puff(s) Inhalation every 4 hours  ALBUTerol   0.5% 2.5 milliGRAM(s) Nebulizer once  ceFAZolin   IVPB 2000 milliGRAM(s) IV Intermittent every 8 hours  chlorhexidine 4% Liquid 1 Application(s) Topical <User Schedule>  heparin   Injectable 5000 Unit(s) SubCutaneous every 8 hours  hydroxychloroquine 200 milliGRAM(s) Oral two times a day  lactated ringers. 1000 milliLiter(s) IV Continuous <Continuous>  lactated ringers. 1000 milliLiter(s) IV Continuous <Continuous>  losartan 25 milliGRAM(s) Oral daily  mycophenolate mofetil 1000 milliGRAM(s) Oral two times a day  pantoprazole  Injectable 40 milliGRAM(s) IV Push daily  simvastatin 40 milliGRAM(s) Oral at bedtime  tiotropium 18 MICROgram(s) Capsule 1 Capsule(s) Inhalation daily    PRN MEDICATIONS  morphine  - Injectable 4 milliGRAM(s) IV Push every 10 minutes PRN      LABS:                        13.2   10.69 )-----------( 205      ( 06 Nov 2020 10:10 )             40.3     11-05    138  |  105  |  5<L>  ----------------------------<  112<H>  4.1   |  23  |  0.5<L>    Ca    8.6      05 Nov 2020 06:20      PT/INR - ( 05 Nov 2020 06:20 )   PT: 12.00 sec;   INR: 1.04 ratio         PTT - ( 05 Nov 2020 06:20 )  PTT:28.6 sec          Culture - Urine (collected 03 Nov 2020 20:32)  Source: .Urine Clean Catch (Midstream)  Preliminary Report (06 Nov 2020 08:43):    >100,000 CFU/ml Escherichia coli    10,000 - 49,000 CFU/mL Gram positive organisms  Organism: Escherichia coli (05 Nov 2020 23:38)  Organism: Escherichia coli (05 Nov 2020 23:38)      RADIOLOGY:    < from: Xray Chest 2 Views PA/Lat (11.03.20 @ 21:19) >  Impression:    No radiographic evidence of acute cardiopulmonary disease.    Unchanged.    < end of copied text >      < from: MR Orbit, Face, and/or Neck No Cont (10.26.20 @ 11:28) >  IMPRESSION:    1.  Soft tissue mass filling the vallecula measuring up to 5 cm (craniocaudad), of probable tongue base origin base origin. Please note the patient declined IV contrast limiting evaluation of the mass.    2.  Mildly enlarged left level II lymph node measuring 1.7 cm. Nonspecific right level II lymph node is also noted measuring 1.2 cm.    3.  Bilateral thyroid nodules which may be further characterized with thyroid sonogram.    < end of copied text >      < from: CT Chest No Cont (07.31.20 @ 15:25) >  IMPRESSION:  1. No new, suspicious, or enlarging pulmonary nodule.  2. Bilateral stable or decreased pulmonary nodules dating back to September 9, 2016.    < end of copied text >        VITALS:   T(F): 98.5, Max: 98.5 (11-06-20 @ 06:38)  HR: 69  BP: 138/68  RR: 18  SpO2: 95%  PHYSICAL EXAM:  GEN: No acute distress, Altered Speech  LUNGS: Clear to auscultation bilaterally, no wheezes  HEART: Regular rate and rhythm, S1, S2 auscultated  ABD: Soft, non-tender, non-distended.  EXT: No edema  NEURO: AAOX3      Intravenous access:   NG tube:   Romo Catheter:

## 2020-11-06 NOTE — BRIEF OPERATIVE NOTE - NSICDXBRIEFPREOP_GEN_ALL_CORE_FT
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

## 2020-11-06 NOTE — PROGRESS NOTE ADULT - ASSESSMENT
Assessment and Plan:   65 y.o F CAD, COPD, GERD, pneumothorax 2016, pulmonary nodule, relapsing polychondritis, HTN, HLD sent from Dr. Najera office for medical and pulmonary clearance, planning to go for OR on Friday 11/6/20 for awake tracheostomy and base of tongue lesion Bx who is currently admitted to medicine for clearance for a tracheostomy and base of tongue lesion Bx.      # Large base of tongue mass with 70% obstruction of oropharynx  Seen on outpatient FFL by ENT  - Trach and biopsy performed 11/06/2020  - Medical clearance and Pulmonary (Dr. Bright) clearance  - Full liquid diet  - NPO after midnight on Thursday   - FU Pathology   - FU ent for post op dc plan    # Relapsing polychondritis on ear, hands.  Currently to recent relapses  - Continue home hydroxychloroquine, mycophenolate   - Airway compromise is a potential complication.  There may be inflammation that arises after trachea incised for tracheostomy  - Steroids are NOT needed pre-op, but if airway becomes an issue post-op: begin solumedrol 80mg IV stat   - Rheum eval needed (Dr. Mindy Chin [phone])    #Asymptomatic UTI  - Positive Nitrites  - Many bacteria  - D/c Ceftriaxone as per Dr Huston . .     # COPD  - Albuterol prn wheeze  - C/w symbicort and spiriva  - Pulm noted and found patient to be a low risk for pulmonary complications    # HTN  - on loasartan    # CAD - non-occlusive  - on statin    # GERD  - on PPI    DVT prophylaxis: Lovenox  Diet: Diet, Full Liquid:   Ambulation: as tolerated  Dispo: acute, patient from home might need HC for new tracheostomy  outpt f/u ENT (might need h/o and r/o as well)  outpt f/u pulm  outpt f/u IM       Assessment and Plan:   65 y.o F CAD, COPD, GERD, pneumothorax 2016, pulmonary nodule, relapsing polychondritis, HTN, HLD sent from Dr. Najera office for medical and pulmonary clearance, planning to go for OR on Friday 11/6/20 for awake tracheostomy and base of tongue lesion Bx who is currently admitted to medicine for clearance for a tracheostomy and base of tongue lesion Bx.      # Large base of tongue mass with 70% obstruction of oropharynx  Seen on outpatient FFL by ENT  - Trach and biopsy performed 11/06/2020  - Medical clearance and Pulmonary (Dr. Bright) clearance  - Full liquid diet  - NPO after midnight on Thursday   - FU Pathology   - FU ent for post op dc plan    # Relapsing polychondritis on ear, hands.  Currently to recent relapses  - Continue home hydroxychloroquine, mycophenolate   - Airway compromise is a potential complication.  There may be inflammation that arises after trachea incised for tracheostomy  - Steroids are NOT needed pre-op, but if airway becomes an issue post-op: begin solumedrol 80mg IV stat   - Rheum eval needed (Dr. Mindy Chin [phone])    #Asymptomatic UTI  - Positive Nitrites  - Many bacteria  - D/c Ceftriaxone as per Dr Huston . .     # COPD  - Albuterol prn wheeze  - C/w symbicort and spiriva  - Pulm noted and found patient to be a low risk for pulmonary complications    # HTN  - on loasartan    # CAD - non-occlusive  - on statin    # GERD  - on PPI    DVT prophylaxis: Lovenox  Diet: Diet, Full Liquid:   Ambulation: as tolerated  Dispo: acute, patient from home might need HC for new tracheostomy  outpt f/u ENT (might need h/o and r/o as well)  outpt f/u pulm  outpt f/u IM    I agree with the above note and have made my corrections within the note - Nely Cano PGY-1

## 2020-11-06 NOTE — CHART NOTE - NSCHARTNOTEFT_GEN_A_CORE
PACU ANESTHESIA ADMISSION NOTE      Procedure: Direct laryngoscopy with biopsy    Open tracheostomy      Post op diagnosis:  Malignant neoplasm of base of tongue        ____  Intubated  TV:______       Rate: ______      FiO2: ______    _x___  Patent Airway    _x___  Full return of protective reflexes    _x___  Full recovery from anesthesia / back to baseline status    Vitals:  /70  T(C): 36  HR:100  SpO2: 95%  Mental Status:  _x___ Awake   _____ Alert   _____ Drowsy   _____ Sedated    Nausea/Vomiting:  _x___  NO       ______Yes,   See Post - Op Orders         Pain Scale (0-10):  __    _x___ See Post - Op/PCA Orders    Post - Operative Fluids:  x___ See Post - Op Orders    Plan: Discharge:   __Critical Care    _____  Other:_________________    Comments:  No anesthesia issues or complications noted.  Discharge when criteria met.

## 2020-11-06 NOTE — BRIEF OPERATIVE NOTE - OPERATION/FINDINGS
successful placement of 6 shiley cuffed tracheostomy tube, dobhoff tube; squamous cell carcinoma base of tongue

## 2020-11-07 LAB
ANION GAP SERPL CALC-SCNC: 11 MMOL/L — SIGNIFICANT CHANGE UP (ref 7–14)
BUN SERPL-MCNC: 13 MG/DL — SIGNIFICANT CHANGE UP (ref 10–20)
CALCIUM SERPL-MCNC: 8.6 MG/DL — SIGNIFICANT CHANGE UP (ref 8.5–10.1)
CHLORIDE SERPL-SCNC: 102 MMOL/L — SIGNIFICANT CHANGE UP (ref 98–110)
CK MB CFR SERPL CALC: 1.6 NG/ML — SIGNIFICANT CHANGE UP (ref 0.6–6.3)
CK SERPL-CCNC: 176 U/L — SIGNIFICANT CHANGE UP (ref 0–225)
CO2 SERPL-SCNC: 24 MMOL/L — SIGNIFICANT CHANGE UP (ref 17–32)
CREAT SERPL-MCNC: 0.5 MG/DL — LOW (ref 0.7–1.5)
CULTURE RESULTS: SIGNIFICANT CHANGE UP
GLUCOSE SERPL-MCNC: 112 MG/DL — HIGH (ref 70–99)
HCT VFR BLD CALC: 40 % — SIGNIFICANT CHANGE UP (ref 37–47)
HGB BLD-MCNC: 13 G/DL — SIGNIFICANT CHANGE UP (ref 12–16)
MAGNESIUM SERPL-MCNC: 1.9 MG/DL — SIGNIFICANT CHANGE UP (ref 1.8–2.4)
MCHC RBC-ENTMCNC: 29 PG — SIGNIFICANT CHANGE UP (ref 27–31)
MCHC RBC-ENTMCNC: 32.5 G/DL — SIGNIFICANT CHANGE UP (ref 32–37)
MCV RBC AUTO: 89.3 FL — SIGNIFICANT CHANGE UP (ref 81–99)
NRBC # BLD: 0 /100 WBCS — SIGNIFICANT CHANGE UP (ref 0–0)
ORGANISM # SPEC MICROSCOPIC CNT: SIGNIFICANT CHANGE UP
ORGANISM # SPEC MICROSCOPIC CNT: SIGNIFICANT CHANGE UP
PHOSPHATE SERPL-MCNC: 3.9 MG/DL — SIGNIFICANT CHANGE UP (ref 2.1–4.9)
PLATELET # BLD AUTO: 230 K/UL — SIGNIFICANT CHANGE UP (ref 130–400)
POTASSIUM SERPL-MCNC: 4 MMOL/L — SIGNIFICANT CHANGE UP (ref 3.5–5)
POTASSIUM SERPL-SCNC: 4 MMOL/L — SIGNIFICANT CHANGE UP (ref 3.5–5)
RBC # BLD: 4.48 M/UL — SIGNIFICANT CHANGE UP (ref 4.2–5.4)
RBC # FLD: 13.4 % — SIGNIFICANT CHANGE UP (ref 11.5–14.5)
SODIUM SERPL-SCNC: 137 MMOL/L — SIGNIFICANT CHANGE UP (ref 135–146)
SPECIMEN SOURCE: SIGNIFICANT CHANGE UP
TROPONIN T SERPL-MCNC: <0.01 NG/ML — SIGNIFICANT CHANGE UP
WBC # BLD: 11.92 K/UL — HIGH (ref 4.8–10.8)
WBC # FLD AUTO: 11.92 K/UL — HIGH (ref 4.8–10.8)

## 2020-11-07 PROCEDURE — 71045 X-RAY EXAM CHEST 1 VIEW: CPT | Mod: 26

## 2020-11-07 RX ORDER — MAGNESIUM SULFATE 500 MG/ML
1 VIAL (ML) INJECTION ONCE
Refills: 0 | Status: COMPLETED | OUTPATIENT
Start: 2020-11-07 | End: 2020-11-07

## 2020-11-07 RX ADMIN — Medication 640 MILLIGRAM(S): at 06:56

## 2020-11-07 RX ADMIN — ALBUTEROL 2 PUFF(S): 90 AEROSOL, METERED ORAL at 12:26

## 2020-11-07 RX ADMIN — CHLORHEXIDINE GLUCONATE 1 APPLICATION(S): 213 SOLUTION TOPICAL at 06:55

## 2020-11-07 RX ADMIN — ALBUTEROL 2 PUFF(S): 90 AEROSOL, METERED ORAL at 21:52

## 2020-11-07 RX ADMIN — Medication 650 MILLIGRAM(S): at 14:09

## 2020-11-07 RX ADMIN — Medication 100 GRAM(S): at 08:55

## 2020-11-07 RX ADMIN — SIMVASTATIN 40 MILLIGRAM(S): 20 TABLET, FILM COATED ORAL at 21:52

## 2020-11-07 RX ADMIN — ALBUTEROL 2 PUFF(S): 90 AEROSOL, METERED ORAL at 08:37

## 2020-11-07 RX ADMIN — HEPARIN SODIUM 5000 UNIT(S): 5000 INJECTION INTRAVENOUS; SUBCUTANEOUS at 06:54

## 2020-11-07 RX ADMIN — HEPARIN SODIUM 5000 UNIT(S): 5000 INJECTION INTRAVENOUS; SUBCUTANEOUS at 21:52

## 2020-11-07 RX ADMIN — HEPARIN SODIUM 5000 UNIT(S): 5000 INJECTION INTRAVENOUS; SUBCUTANEOUS at 14:10

## 2020-11-07 RX ADMIN — ALBUTEROL 2 PUFF(S): 90 AEROSOL, METERED ORAL at 23:29

## 2020-11-07 RX ADMIN — Medication 650 MILLIGRAM(S): at 11:43

## 2020-11-07 RX ADMIN — Medication 650 MILLIGRAM(S): at 19:18

## 2020-11-07 RX ADMIN — Medication 200 MILLIGRAM(S): at 06:54

## 2020-11-07 RX ADMIN — MYCOPHENOLATE MOFETIL 1000 MILLIGRAM(S): 250 CAPSULE ORAL at 19:18

## 2020-11-07 RX ADMIN — LOSARTAN POTASSIUM 25 MILLIGRAM(S): 100 TABLET, FILM COATED ORAL at 06:54

## 2020-11-07 RX ADMIN — Medication 650 MILLIGRAM(S): at 23:29

## 2020-11-07 RX ADMIN — MYCOPHENOLATE MOFETIL 1000 MILLIGRAM(S): 250 CAPSULE ORAL at 06:57

## 2020-11-07 RX ADMIN — PANTOPRAZOLE SODIUM 40 MILLIGRAM(S): 20 TABLET, DELAYED RELEASE ORAL at 11:45

## 2020-11-07 RX ADMIN — Medication 200 MILLIGRAM(S): at 19:18

## 2020-11-07 NOTE — PROGRESS NOTE ADULT - ASSESSMENT
64y/o F s/p SCCa of BOT s/p awake tracheostomy, DL, bx POD # 1    - Case d/w Dr. Najera   - OK for downgrade to floor   - trach care , cont humidified tcl  - cont NGT feeds  - Further plan to follow upon discussion at Head and Neck tumor board Monday 11/9.

## 2020-11-07 NOTE — CHART NOTE - NSCHARTNOTEFT_GEN_A_CORE
65y F with PMHx of HTN, HLD, CAD, COPD, GERD, pulmonary nodule admitted for base of tongue mass obstructing 70% of oropharyngeal lumen s/p direct laryngoscopy with biopsy of presumed squamous cell carcinoma and awake open tracheostomy with placement of 6 shiley cuffed tracheostomy tube and Dobhoff NGT.    PLAN:   Neurologic:   -AAOx3   -pain control with tylenol PRN     Respiratory:   -s/p trach, trach collar in place  -satting >92% on 40% O2   Hx of COPD, current smoker   -continue home albuterol 2 puffs q4hr, spiriva 18mcg cap, qD    Cardiovascular:   Hx of HTN  -continue losartan 25mg qD  Hx of HLD   -continue simvastatin   -CE x3 negative   -keep normotensive     Gastrointestinal/Nutrition:   -Dobhoff NGT placed in OR   -bolus TF goal 350cc/h  -SLP eval post op, failed w/ thins, will re-eval Monday    Renal/Genitourinary:   -voided   -IVL   -BUN/Cr trend     Hematologic:   -H/H 13.2/40.3> 13/40  -DVT ppx: Heparin 5000 U q8hr, SCDs    Rheum: Hx of polychondritis  - restart home hydroxychloroquine 200mg PO BID and cellcept    Infectious Disease:   -monitor for fever, leukocytosis  -iveth-op cefazolin completed   -WBC 10.69>11.9  -U/a positive, asymptomatic, UCx E.coli     Lines/Tubes: Left PIV, tracheostomy, dobhoff ngt `    Endocrine:   -no hx of chronic endocrine disease   -glucose 116    f/u  -SLP on monday  full set of evening labs     full sign out given to primary team 65y F with PMHx of HTN, HLD, CAD, COPD, GERD, pulmonary nodule admitted for base of tongue mass obstructing 70% of oropharyngeal lumen s/p direct laryngoscopy with biopsy of presumed squamous cell carcinoma and awake open tracheostomy with placement of 6 shiley cuffed tracheostomy tube and Dobhoff NGT.    PLAN:   Neurologic:   -AAOx3   -pain control with tylenol PRN     Respiratory:   -s/p trach, trach collar in place  -satting >92% on 40% O2   Hx of COPD, current smoker   -continue home albuterol 2 puffs q4hr, spiriva 18mcg cap, qD    Cardiovascular:   Hx of HTN  -continue losartan 25mg qD  Hx of HLD   -continue simvastatin   -CE x3 negative   -keep normotensive     Gastrointestinal/Nutrition:   -Dobhoff NGT placed in OR   -bolus TF goal Replete @350cc/h q6  -SLP eval post op, failed w/ thins, will re-eval Monday    Renal/Genitourinary:   -voided   -IVL   -BUN/Cr trend     Hematologic:   -H/H 13.2/40.3> 13/40  -DVT ppx: Heparin 5000 U q8hr, SCDs    Rheum: Hx of polychondritis  - restart home hydroxychloroquine 200mg PO BID and cellcept    Infectious Disease:   -monitor for fever, leukocytosis  -iveth-op cefazolin completed   -WBC 10.69>11.9  -U/a positive, asymptomatic, UCx E.coli     Lines/Tubes: Left PIV, tracheostomy, dobhoff ngt `    Endocrine:   -no hx of chronic endocrine disease   -glucose 116    f/u  -SLP on monday  full set of evening labs     full sign out given to primary team SHERMAN Cancino @ 11:25am

## 2020-11-07 NOTE — PROGRESS NOTE ADULT - SUBJECTIVE AND OBJECTIVE BOX
GEREMIAS FULLER  [id number]  65y Female    Indication for ICU admission: s/p trach and biopsy of mass   Admit Date: 11/3  ICU Date: 11/6  OR Date: 11/6    CONTRAST DYS (Nausea)  No Known Drug Allergies    PAST MEDICAL & SURGICAL HISTORY:  oropharynx mass   current smoker 1ppd  H/O pneumothorax  2016  Female cystocele  Relapsing polychondritis  COPD (chronic obstructive pulmonary disease)  CAD (coronary artery disease)  Pulmonary nodule  GERD (gastroesophageal reflux disease)  Hypercholesterolemia  HTN (hypertension)  H/O L breast biopsy  History of bladder surgery- 2019  Vocal cord polyps- removal of same 2005  S/P colon resection  S/P GEETHA-BSO      Home Medications:  alendronate weekly: 1 tab(s) orally once a week (24 Aug 2020 07:23)  ezetimibe 10 mg oral tablet: 1 tab(s) orally once a day (24 Aug 2020 07:23)  hydroxychloroquine 200 mg oral tablet: 1 tab(s) orally 2 times a day (24 Aug 2020 07:23)  Incruse Ellipta 62.5 mcg/inh inhalation powder: inhaled once a day (24 Aug 2020 07:23)  losartan 25 mg oral tablet: 1 tab(s) orally once a day (24 Aug 2020 07:23)  mycophenolate mofetil 500 mg oral tablet: 2 tab(s) orally 2 times a day (24 Aug 2020 07:23)  omeprazole 40 mg oral delayed release capsule: 1 cap(s) orally once a day (24 Aug 2020 07:23)  simvastatin 40 mg oral tablet: 1 tab(s) orally once a day (at bedtime) (24 Aug 2020 07:23)  Ventolin HFA 90 mcg/inh inhalation aerosol: 2 puff(s) inhaled 4 times a day (24 Aug 2020 07:23)        24HRS EVENT:  Day  bolus TF started 200cc/h, goal 350cc/h to advance nichole   IVL  CE neg x1, f/u 2 more sets       Overnight:  Tolerating trach collar 40%   Tolerating TF   Hemodynamically stable   CE negative x3        DVT PTX: HSQ    GI PTX: PPI    ***Tubes/Lines/Drains  ***  Peripheral IV  NGT  trach    REVIEW OF SYSTEMS    [x] A ten-point review of systems was otherwise negative except as noted.  [ ] Due to altered mental status/intubation, subjective information were not able to be obtained from the patient. History was obtained, to the extent possible, from review of the chart and collateral sources of information.   GEREMIAS FULLER  [id number]  65y Female    Indication for ICU admission: s/p trach and biopsy of mass   Admit Date: 11/3  ICU Date:   OR Date:     CONTRAST DYS (Nausea)  No Known Drug Allergies    PAST MEDICAL & SURGICAL HISTORY:  oropharynx mass   current smoker 1ppd  H/O pneumothorax  2016  Female cystocele  Relapsing polychondritis  COPD (chronic obstructive pulmonary disease)  CAD (coronary artery disease)  Pulmonary nodule  GERD (gastroesophageal reflux disease)  Hypercholesterolemia  HTN (hypertension)  H/O L breast biopsy  History of bladder surgery- 2019  Vocal cord polyps- removal of same   S/P colon resection  S/P GEETHA-BSO      Home Medications:  alendronate weekly: 1 tab(s) orally once a week (24 Aug 2020 07:23)  ezetimibe 10 mg oral tablet: 1 tab(s) orally once a day (24 Aug 2020 07:23)  hydroxychloroquine 200 mg oral tablet: 1 tab(s) orally 2 times a day (24 Aug 2020 07:23)  Incruse Ellipta 62.5 mcg/inh inhalation powder: inhaled once a day (24 Aug 2020 07:23)  losartan 25 mg oral tablet: 1 tab(s) orally once a day (24 Aug 2020 07:23)  mycophenolate mofetil 500 mg oral tablet: 2 tab(s) orally 2 times a day (24 Aug 2020 07:23)  omeprazole 40 mg oral delayed release capsule: 1 cap(s) orally once a day (24 Aug 2020 07:23)  simvastatin 40 mg oral tablet: 1 tab(s) orally once a day (at bedtime) (24 Aug 2020 07:23)  Ventolin HFA 90 mcg/inh inhalation aerosol: 2 puff(s) inhaled 4 times a day (24 Aug 2020 07:23)        24HRS EVENT:  Day  bolus TF started 200cc/h, goal 350cc/h to advance nichole   IVL  CE neg x1, f/u 2 more sets       Overnight:  Tolerating trach collar 40%   Tolerating TF   Hemodynamically stable   CE negative x3        DVT PTX: HSQ    GI PTX: PPI    ***Tubes/Lines/Drains  ***  Peripheral IV  NGT  trach    REVIEW OF SYSTEMS    [x] A ten-point review of systems was otherwise negative except as noted.  [ ] Due to altered mental status/intubation, subjective information were not able to be obtained from the patient. History was obtained, to the extent possible, from review of the chart and collateral sources of information.  Daily Height in cm: 162.56 (2020 11:30)    Daily Weight in k.9 (2020 11:30)    Diet, NPO with Tube Feed:   Tube Feeding Modality: Nasogastric  replete  Total Volume for 24 Hours (mL): 800  Bolus  Total Volume of Bolus (mL):  200  Tube Feed Frequency: Every 6 hours   Tube Feed Start Time: 18:00  Bolus Feed Rate (mL per Hour): 200   Bolus Feed Duration (in Hours): 1 (20 @ 14:51)      CURRENT MEDS:  Neurologic Medications  acetaminophen    Suspension .. 650 milliGRAM(s) Oral every 6 hours    Respiratory Medications  ALBUTerol    90 MICROgram(s) HFA Inhaler 2 Puff(s) Inhalation every 4 hours  ALBUTerol   0.5% 2.5 milliGRAM(s) Nebulizer once  tiotropium 18 MICROgram(s) Capsule 1 Capsule(s) Inhalation daily    Cardiovascular Medications  losartan 25 milliGRAM(s) Oral daily    Gastrointestinal Medications  magnesium sulfate  IVPB 1 Gram(s) IV Intermittent once  pantoprazole  Injectable 40 milliGRAM(s) IV Push daily    Genitourinary Medications    Hematologic/Oncologic Medications  heparin   Injectable 5000 Unit(s) SubCutaneous every 8 hours  mycophenolate mofetil Suspension 1000 milliGRAM(s) Oral every 12 hours    Antimicrobial/Immunologic Medications  hydroxychloroquine 200 milliGRAM(s) Oral two times a day    Endocrine/Metabolic Medications  simvastatin 40 milliGRAM(s) Oral at bedtime    Topical/Other Medications  chlorhexidine 4% Liquid 1 Application(s) Topical <User Schedule>      ICU Vital Signs Last 24 Hrs  T(C): 36.5 (2020 07:37), Max: 37.1 (2020 11:30)  T(F): 97.7 (2020 07:37), Max: 98.8 (2020 11:30)  HR: 71 (2020 08:00) (51 - 100)  BP: 113/69 (2020 08:00) (92/54 - 175/95)  BP(mean): 87 (2020 08:00) (68 - 124)  ABP: --  ABP(mean): --  RR: 64 (2020 08:00) (16 - 70)  SpO2: 97% (2020 08:00) (92% - 100%)      Adult Advanced Hemodynamics Last 24 Hrs  CVP(mm Hg): --  CVP(cm H2O): --  CO: --  CI: --  PA: --  PA(mean): --  PCWP: --  SVR: --  SVRI: --  PVR: --  PVRI: --          I&O's Summary    2020 07:01  -  2020 07:00  --------------------------------------------------------  IN: 800 mL / OUT: 50 mL / NET: 750 mL      I&O's Detail    2020 07:01  -  2020 07:00  --------------------------------------------------------  IN:    Human Milk: 200 mL    IV PiggyBack: 100 mL    Lactated Ringers: 500 mL  Total IN: 800 mL    OUT:    Nasogastric/Oral tube (mL): 50 mL  Total OUT: 50 mL    Total NET: 750 mL          PHYSICAL EXAM:    General/Neuro    Deficits:                             alert & oriented x 3, no focal deficits  Pupils: PERRLA    Lungs:      clear to auscultation, Normal expansion/effort.     Cardiovascular : S1, S2.  Regular rate and rhythm.  No Peripheral edema   Cardiac Rhythm: Normal Sinus Rhythm    GI: Abdomen soft, Non-tender, Non-distended.  NGT      Extremities: Extremities warm, pink, well-perfused. Pulses: palpable dp b/l    Derm: Good skin turgor, no skin breakdown.      :       voiding      CXR:     LABS:  CAPILLARY BLOOD GLUCOSE                              13.0   11.92 )-----------( 230      ( 2020 05:46 )             40.0       11-07    137  |  102  |  13  ----------------------------<  112<H>  4.0   |  24  |  0.5<L>    Ca    8.6      2020 05:42  Phos  3.9     11-07  Mg     1.9     11-07          CARDIAC MARKERS ( 2020 05:42 )  x     / <0.01 ng/mL / 176 U/L / x     / 1.6 ng/mL  CARDIAC MARKERS ( 2020 20:00 )  x     / <0.01 ng/mL / 194 U/L / x     / 1.8 ng/mL  CARDIAC MARKERS ( 2020 10:10 )  x     / <0.01 ng/mL / 84 U/L / x     / 1.3 ng/mL

## 2020-11-07 NOTE — PROGRESS NOTE ADULT - SUBJECTIVE AND OBJECTIVE BOX
ENT: Pt is a 64y/o F s/p SCCa of BOT s/p awake tracheostomy, DL, bx POD # 1. Pt seen and examined at bedside this AM. No acute complaints at this time. Denies any difficulty breathing or SOB. Tolerating NGT feeds well. In good spirits.    [ x ] A 10 Point Review of Systems was negative except where noted.    Vital Signs Last 24 Hrs  T(C): 36.9 (07 Nov 2020 12:00), Max: 37.1 (06 Nov 2020 15:05)  T(F): 98.4 (07 Nov 2020 12:00), Max: 98.8 (06 Nov 2020 15:05)  HR: 75 (07 Nov 2020 14:00) (51 - 100)  BP: 96/55 (07 Nov 2020 14:00) (92/54 - 175/95)  BP(mean): 70 (07 Nov 2020 14:00) (68 - 124)  RR: 223 (07 Nov 2020 14:00) (21 - 223)  SpO2: 96% (07 Nov 2020 14:00) (92% - 98%)    PHYSICAL EXAM:  Gen: NAD, well-developed  Skin: Good color  HEENT: Head NC/AT. Oral cavity no erythema/edema. Tongue wnl. Uvula midline. Posterior oropharynx clear, no discharge/blood noted.   Neck: Flat, supple, no lymphadenopathy, trachea midline. +trach in place, no erythema/edema/ tenderness by site, no overlying hematoma.  Resp: breathing easily, no stridor  CV: no peripheral edema/cyanosis  Abd: soft, nontender  Ext: WOLFE x 4    LABS:                     13.0   11.92 )-----------( 230      ( 07 Nov 2020 05:46 )             40.0

## 2020-11-08 LAB
ANION GAP SERPL CALC-SCNC: 8 MMOL/L — SIGNIFICANT CHANGE UP (ref 7–14)
BUN SERPL-MCNC: 15 MG/DL — SIGNIFICANT CHANGE UP (ref 10–20)
CALCIUM SERPL-MCNC: 8.3 MG/DL — LOW (ref 8.5–10.1)
CHLORIDE SERPL-SCNC: 103 MMOL/L — SIGNIFICANT CHANGE UP (ref 98–110)
CO2 SERPL-SCNC: 28 MMOL/L — SIGNIFICANT CHANGE UP (ref 17–32)
CREAT SERPL-MCNC: 0.5 MG/DL — LOW (ref 0.7–1.5)
GLUCOSE SERPL-MCNC: 223 MG/DL — HIGH (ref 70–99)
HCT VFR BLD CALC: 38.8 % — SIGNIFICANT CHANGE UP (ref 37–47)
HGB BLD-MCNC: 12.4 G/DL — SIGNIFICANT CHANGE UP (ref 12–16)
MAGNESIUM SERPL-MCNC: 2.1 MG/DL — SIGNIFICANT CHANGE UP (ref 1.8–2.4)
MCHC RBC-ENTMCNC: 28.9 PG — SIGNIFICANT CHANGE UP (ref 27–31)
MCHC RBC-ENTMCNC: 32 G/DL — SIGNIFICANT CHANGE UP (ref 32–37)
MCV RBC AUTO: 90.4 FL — SIGNIFICANT CHANGE UP (ref 81–99)
NRBC # BLD: 0 /100 WBCS — SIGNIFICANT CHANGE UP (ref 0–0)
PHOSPHATE SERPL-MCNC: 2.9 MG/DL — SIGNIFICANT CHANGE UP (ref 2.1–4.9)
PLATELET # BLD AUTO: 206 K/UL — SIGNIFICANT CHANGE UP (ref 130–400)
POTASSIUM SERPL-MCNC: 4.2 MMOL/L — SIGNIFICANT CHANGE UP (ref 3.5–5)
POTASSIUM SERPL-SCNC: 4.2 MMOL/L — SIGNIFICANT CHANGE UP (ref 3.5–5)
RBC # BLD: 4.29 M/UL — SIGNIFICANT CHANGE UP (ref 4.2–5.4)
RBC # FLD: 13.9 % — SIGNIFICANT CHANGE UP (ref 11.5–14.5)
SODIUM SERPL-SCNC: 139 MMOL/L — SIGNIFICANT CHANGE UP (ref 135–146)
WBC # BLD: 10.52 K/UL — SIGNIFICANT CHANGE UP (ref 4.8–10.8)
WBC # FLD AUTO: 10.52 K/UL — SIGNIFICANT CHANGE UP (ref 4.8–10.8)

## 2020-11-08 PROCEDURE — 71045 X-RAY EXAM CHEST 1 VIEW: CPT | Mod: 26

## 2020-11-08 RX ORDER — LANOLIN ALCOHOL/MO/W.PET/CERES
5 CREAM (GRAM) TOPICAL AT BEDTIME
Refills: 0 | Status: COMPLETED | OUTPATIENT
Start: 2020-11-08 | End: 2020-11-10

## 2020-11-08 RX ADMIN — MYCOPHENOLATE MOFETIL 1000 MILLIGRAM(S): 250 CAPSULE ORAL at 18:12

## 2020-11-08 RX ADMIN — CHLORHEXIDINE GLUCONATE 1 APPLICATION(S): 213 SOLUTION TOPICAL at 06:13

## 2020-11-08 RX ADMIN — Medication 650 MILLIGRAM(S): at 11:27

## 2020-11-08 RX ADMIN — LOSARTAN POTASSIUM 25 MILLIGRAM(S): 100 TABLET, FILM COATED ORAL at 06:11

## 2020-11-08 RX ADMIN — Medication 650 MILLIGRAM(S): at 06:13

## 2020-11-08 RX ADMIN — ALBUTEROL 2 PUFF(S): 90 AEROSOL, METERED ORAL at 12:34

## 2020-11-08 RX ADMIN — Medication 650 MILLIGRAM(S): at 23:37

## 2020-11-08 RX ADMIN — ALBUTEROL 2 PUFF(S): 90 AEROSOL, METERED ORAL at 09:44

## 2020-11-08 RX ADMIN — HEPARIN SODIUM 5000 UNIT(S): 5000 INJECTION INTRAVENOUS; SUBCUTANEOUS at 13:24

## 2020-11-08 RX ADMIN — Medication 200 MILLIGRAM(S): at 06:11

## 2020-11-08 RX ADMIN — PANTOPRAZOLE SODIUM 40 MILLIGRAM(S): 20 TABLET, DELAYED RELEASE ORAL at 11:26

## 2020-11-08 RX ADMIN — Medication 5 MILLIGRAM(S): at 21:40

## 2020-11-08 RX ADMIN — Medication 650 MILLIGRAM(S): at 18:13

## 2020-11-08 RX ADMIN — ALBUTEROL 2 PUFF(S): 90 AEROSOL, METERED ORAL at 17:03

## 2020-11-08 RX ADMIN — HEPARIN SODIUM 5000 UNIT(S): 5000 INJECTION INTRAVENOUS; SUBCUTANEOUS at 06:12

## 2020-11-08 RX ADMIN — SIMVASTATIN 40 MILLIGRAM(S): 20 TABLET, FILM COATED ORAL at 21:40

## 2020-11-08 RX ADMIN — Medication 200 MILLIGRAM(S): at 18:12

## 2020-11-08 RX ADMIN — MYCOPHENOLATE MOFETIL 1000 MILLIGRAM(S): 250 CAPSULE ORAL at 06:13

## 2020-11-08 RX ADMIN — HEPARIN SODIUM 5000 UNIT(S): 5000 INJECTION INTRAVENOUS; SUBCUTANEOUS at 21:40

## 2020-11-08 NOTE — PROGRESS NOTE ADULT - ASSESSMENT
64y/o F s/p SCCa of BOT s/p awake tracheostomy, DL, bx POD # 1    - Case d/w Dr. Najera   - OK for downgrade to floor   - trach care , cont humidified tcl  - cont NGT feeds  - Further plan to follow upon discussion at Head and Neck tumor board Monday 11/9. 66y/o F s/p SCCa of BOT s/p awake tracheostomy, DL, bx POD # 1    - Pt seen and examined at bedside with Dr. Najera   - It was again discussed with patient that she will likely need to have PEG tube placed before she leaves the hospital for adequate nutrition as she has failed speech and swallow, and she will likely have the tracheostomy tube in place until a treatment regimen is completed. Her case is to be discussed tomorrow at Head and Neck tumor board and once a plan of treatment is created, it will be discussed with the patient and her son Feliberto.   - Anticipate discharge Wednesday 11/11/20, coordinate with social work to have pt ready to care for trach/peg as she lives alone vs SNF.   - Continue trach care, suctioning prn  - Continue NGT + feeds

## 2020-11-08 NOTE — PROGRESS NOTE ADULT - SUBJECTIVE AND OBJECTIVE BOX
ENT: Pt is a 66y/o F with SCCa of BOT s/p awake tracheostomy, DL, bx POD # 2. Pt seen and examined at bedside with Dr. Najera. No acute complaints at this time. Denies any SOB or diff. breathing. Tolerating feeds well.     [ x ] A 10 Point Review of Systems was negative except where noted.    Vital Signs Last 24 Hrs  T(C): 37 (08 Nov 2020 05:05), Max: 37 (08 Nov 2020 05:05)  T(F): 98.6 (08 Nov 2020 05:05), Max: 98.6 (08 Nov 2020 05:05)  HR: 79 (08 Nov 2020 05:05) (75 - 96)  BP: 151/71 (08 Nov 2020 05:05) (96/55 - 151/71)  BP(mean): 89 (07 Nov 2020 16:00) (70 - 100)  RR: 20 (08 Nov 2020 05:05) (20 - 58)  SpO2: 98% (07 Nov 2020 20:01) (93% - 98%)    PHYSICAL EXAM:  Gen: NAD, well-developed  Skin: Good color  HEENT: Head NC/AT. Oral cavity no erythema/edema. Tongue wnl. Uvula midline. Posterior oropharynx clear, no discharge/blood noted.   Neck: Flat, supple, no lymphadenopathy, trachea midline. +trach in place, no erythema/edema/ tenderness by site, no overlying hematoma.  Resp: breathing easily, no stridor  CV: no peripheral edema/cyanosis  Abd: soft, nontender  Ext: WOLFE x 4    LABS:                        12.4   10.52 )-----------( 206      ( 08 Nov 2020 01:38 )             38.8

## 2020-11-09 LAB
ANION GAP SERPL CALC-SCNC: 9 MMOL/L — SIGNIFICANT CHANGE UP (ref 7–14)
APPEARANCE UR: CLEAR — SIGNIFICANT CHANGE UP
BACTERIA # UR AUTO: NEGATIVE — SIGNIFICANT CHANGE UP
BILIRUB UR-MCNC: NEGATIVE — SIGNIFICANT CHANGE UP
BUN SERPL-MCNC: 12 MG/DL — SIGNIFICANT CHANGE UP (ref 10–20)
CALCIUM SERPL-MCNC: 8 MG/DL — LOW (ref 8.5–10.1)
CHLORIDE SERPL-SCNC: 103 MMOL/L — SIGNIFICANT CHANGE UP (ref 98–110)
CO2 SERPL-SCNC: 27 MMOL/L — SIGNIFICANT CHANGE UP (ref 17–32)
COLOR SPEC: YELLOW — SIGNIFICANT CHANGE UP
CREAT SERPL-MCNC: <0.5 MG/DL — LOW (ref 0.7–1.5)
DIFF PNL FLD: NEGATIVE — SIGNIFICANT CHANGE UP
EPI CELLS # UR: 8 /HPF — HIGH (ref 0–5)
GLUCOSE BLDC GLUCOMTR-MCNC: 129 MG/DL — HIGH (ref 70–99)
GLUCOSE SERPL-MCNC: 242 MG/DL — HIGH (ref 70–99)
GLUCOSE UR QL: NEGATIVE — SIGNIFICANT CHANGE UP
HCT VFR BLD CALC: 36.4 % — LOW (ref 37–47)
HGB BLD-MCNC: 11.6 G/DL — LOW (ref 12–16)
HYALINE CASTS # UR AUTO: 4 /LPF — SIGNIFICANT CHANGE UP (ref 0–7)
KETONES UR-MCNC: ABNORMAL
LEUKOCYTE ESTERASE UR-ACNC: NEGATIVE — SIGNIFICANT CHANGE UP
MCHC RBC-ENTMCNC: 28.9 PG — SIGNIFICANT CHANGE UP (ref 27–31)
MCHC RBC-ENTMCNC: 31.9 G/DL — LOW (ref 32–37)
MCV RBC AUTO: 90.8 FL — SIGNIFICANT CHANGE UP (ref 81–99)
NITRITE UR-MCNC: NEGATIVE — SIGNIFICANT CHANGE UP
NRBC # BLD: 0 /100 WBCS — SIGNIFICANT CHANGE UP (ref 0–0)
PH UR: 7.5 — SIGNIFICANT CHANGE UP (ref 5–8)
PLATELET # BLD AUTO: 203 K/UL — SIGNIFICANT CHANGE UP (ref 130–400)
POTASSIUM SERPL-MCNC: 3.9 MMOL/L — SIGNIFICANT CHANGE UP (ref 3.5–5)
POTASSIUM SERPL-SCNC: 3.9 MMOL/L — SIGNIFICANT CHANGE UP (ref 3.5–5)
PROT UR-MCNC: ABNORMAL
RBC # BLD: 4.01 M/UL — LOW (ref 4.2–5.4)
RBC # FLD: 13.8 % — SIGNIFICANT CHANGE UP (ref 11.5–14.5)
RBC CASTS # UR COMP ASSIST: 12 /HPF — HIGH (ref 0–4)
SODIUM SERPL-SCNC: 139 MMOL/L — SIGNIFICANT CHANGE UP (ref 135–146)
SP GR SPEC: 1.03 — HIGH (ref 1.01–1.03)
UROBILINOGEN FLD QL: ABNORMAL
WBC # BLD: 9.98 K/UL — SIGNIFICANT CHANGE UP (ref 4.8–10.8)
WBC # FLD AUTO: 9.98 K/UL — SIGNIFICANT CHANGE UP (ref 4.8–10.8)
WBC UR QL: 3 /HPF — SIGNIFICANT CHANGE UP (ref 0–5)

## 2020-11-09 PROCEDURE — 74150 CT ABDOMEN W/O CONTRAST: CPT | Mod: 26

## 2020-11-09 PROCEDURE — 71045 X-RAY EXAM CHEST 1 VIEW: CPT | Mod: 26

## 2020-11-09 RX ORDER — MORPHINE SULFATE 50 MG/1
2 CAPSULE, EXTENDED RELEASE ORAL ONCE
Refills: 0 | Status: DISCONTINUED | OUTPATIENT
Start: 2020-11-09 | End: 2020-11-09

## 2020-11-09 RX ORDER — OXYCODONE AND ACETAMINOPHEN 5; 325 MG/1; MG/1
2 TABLET ORAL EVERY 4 HOURS
Refills: 0 | Status: DISCONTINUED | OUTPATIENT
Start: 2020-11-09 | End: 2020-11-15

## 2020-11-09 RX ADMIN — HEPARIN SODIUM 5000 UNIT(S): 5000 INJECTION INTRAVENOUS; SUBCUTANEOUS at 05:39

## 2020-11-09 RX ADMIN — MYCOPHENOLATE MOFETIL 1000 MILLIGRAM(S): 250 CAPSULE ORAL at 18:51

## 2020-11-09 RX ADMIN — Medication 650 MILLIGRAM(S): at 13:56

## 2020-11-09 RX ADMIN — CHLORHEXIDINE GLUCONATE 1 APPLICATION(S): 213 SOLUTION TOPICAL at 05:39

## 2020-11-09 RX ADMIN — Medication 200 MILLIGRAM(S): at 05:39

## 2020-11-09 RX ADMIN — Medication 650 MILLIGRAM(S): at 05:39

## 2020-11-09 RX ADMIN — HEPARIN SODIUM 5000 UNIT(S): 5000 INJECTION INTRAVENOUS; SUBCUTANEOUS at 14:31

## 2020-11-09 RX ADMIN — MYCOPHENOLATE MOFETIL 1000 MILLIGRAM(S): 250 CAPSULE ORAL at 05:39

## 2020-11-09 RX ADMIN — ALBUTEROL 2 PUFF(S): 90 AEROSOL, METERED ORAL at 08:10

## 2020-11-09 RX ADMIN — PANTOPRAZOLE SODIUM 40 MILLIGRAM(S): 20 TABLET, DELAYED RELEASE ORAL at 11:56

## 2020-11-09 RX ADMIN — Medication 200 MILLIGRAM(S): at 21:44

## 2020-11-09 RX ADMIN — LOSARTAN POTASSIUM 25 MILLIGRAM(S): 100 TABLET, FILM COATED ORAL at 05:39

## 2020-11-09 RX ADMIN — Medication 650 MILLIGRAM(S): at 18:51

## 2020-11-09 RX ADMIN — ALBUTEROL 2 PUFF(S): 90 AEROSOL, METERED ORAL at 12:08

## 2020-11-09 RX ADMIN — Medication 5 MILLIGRAM(S): at 21:44

## 2020-11-09 RX ADMIN — Medication 650 MILLIGRAM(S): at 14:31

## 2020-11-09 RX ADMIN — Medication 650 MILLIGRAM(S): at 05:40

## 2020-11-09 RX ADMIN — SIMVASTATIN 40 MILLIGRAM(S): 20 TABLET, FILM COATED ORAL at 21:44

## 2020-11-09 RX ADMIN — HEPARIN SODIUM 5000 UNIT(S): 5000 INJECTION INTRAVENOUS; SUBCUTANEOUS at 21:44

## 2020-11-09 NOTE — CHART NOTE - NSCHARTNOTEFT_GEN_A_CORE
NUTRITION SUPPORT CONSULTATION    HPI:  65 y.o F CAD, COPD, GERD, pneumothorax 2016, pulmonary nodule, relapsing polychondritis, HTN, HLD sent from Dr. Najera office for medical and pulmonary clearance, planning to go for OR on Friday 11/6/20 for awake tracheostomy and base of tongue lesion Bx. Patient reports mass has been growing for a few weeks and is causing her to have difficulty swallowing solids, with no difficulty swallowing liquids. She denies any other symptoms, including pain anywhere, SOB, dysuria, urinary frequency, N/V/D/C, fever, chills.  On outpatient testing Pt. found to have + UA and CXR findings of blunting of costophrenic angles consistent with pleural effusion.     From ENT note: Outpatient FFL11/2/20 There is a large mass appearing to arise from the base of tongue, filling vallecula and obstructing approximately 70% of oropharynx. Non-friable. The epiglottis, aryepiglottic folds, and arytenoids were within normal limits. Pyriform sinuses clear. Bilateral and symmetric movement of the vocal folds bilaterally, no masses or lesions  (03 Nov 2020 21:07)    GT placement planned 11/10 or 11/11 by IR, currently fed via NGT    PAST MEDICAL & SURGICAL HISTORY:  H/O pneumothorax  2016  Female cystocele  Relapsing polychondritis  COPD (chronic obstructive pulmonary disease)  CAD (coronary artery disease)  Pulmonary nodule  GERD (gastroesophageal reflux disease)  Hypercholesterolemia  HTN (hypertension)  H/O breast biopsy  LEFT  History of bladder surgery  2019  Pulmonary nodule  Vocal cord polyps  removal of same 2005  S/P colon resection  S/P GEETHA-BSO    Allergies  CONTRAST DYS (Nausea)  No Known Drug Allergies    MEDICATIONS  (STANDING):  acetaminophen    Suspension .. 650 milliGRAM(s) Oral every 6 hours  ALBUTerol    90 MICROgram(s) HFA Inhaler 2 Puff(s) Inhalation every 4 hours  ALBUTerol   0.5% 2.5 milliGRAM(s) Nebulizer once  chlorhexidine 4% Liquid 1 Application(s) Topical <User Schedule>  heparin   Injectable 5000 Unit(s) SubCutaneous every 8 hours  hydroxychloroquine 200 milliGRAM(s) Oral two times a day  losartan 25 milliGRAM(s) Oral daily  melatonin 5 milliGRAM(s) Oral at bedtime  mycophenolate mofetil Suspension 1000 milliGRAM(s) Oral every 12 hours  pantoprazole  Injectable 40 milliGRAM(s) IV Push daily  simvastatin 40 milliGRAM(s) Oral at bedtime  tiotropium 18 MICROgram(s) Capsule 1 Capsule(s) Inhalation daily    ICU Vital Signs Last 24 Hrs  T(C): 37.2 (09 Nov 2020 13:14), Max: 38.1 (08 Nov 2020 22:03)  T(F): 98.9 (09 Nov 2020 13:14), Max: 100.5 (08 Nov 2020 22:03)  HR: 85 (09 Nov 2020 13:14) (75 - 92)  BP: 133/68 (09 Nov 2020 13:14) (119/63 - 163/74)  RR: 18 (09 Nov 2020 13:14) (18 - 20)    Drug Dosing Weight  Height (cm): 165.1 (07 Nov 2020 20:00)  Weight (kg): 70.8 (07 Nov 2020 20:00)  BMI (kg/m2): 26 (07 Nov 2020 20:00)  BSA (m2): 1.78 (07 Nov 2020 20:00)    EXAM  Awake, alert. +trach, aphonic  Abd: soft, ND  Enteral access: +NGT  IV access: Rt FA #22 peripheral IV    LABS  11-09    139  |  103  |  12  ----------------------------<  242<H>  3.9   |  27  |  <0.5<L>    Ca    8.0<L>      09 Nov 2020 07:51  Phos  2.9     11-08  Mg     2.1     11-08                        11.6   9.98  )-----------( 203      ( 09 Nov 2020 07:51 )             36.4     Diet, NPO with Tube Feed:   Tube Feeding Modality: Nasogastric  replete  Total Volume for 24 Hours (mL): 1400  Bolus  Total Volume of Bolus (mL):  350  Tube Feed Frequency: Every 6 hours   Tube Feed Start Time: 12:00  Bolus Feed Rate (mL per Hour): 200   Bolus Feed Duration (in Hours): 1 (11-07-20 @ 09:00)      ASSESSMENT  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 Najera office for medical and pulmonary clearance, planning to go for OR on Friday 11/6/20 for awake tracheostomy and base of tongue lesion Bx. Patient reports mass has been growing for a few weeks and is causing her to have difficulty swallowing solids, with no difficulty swallowing liquids. She denies any other symptoms, including pain anywhere, SOB, dysuria, urinary frequency, N/V/D/C, fever, chills.  On outpatient testing Pt. found to have + UA and CXR findings of blunting of costophrenic angles consistent with pleural effusion.     From ENT note: Outpatient FFL11/2/20 There is a large mass appearing to arise from the base of tongue, filling vallecula and obstructing approximately 70% of oropharynx. Non-friable. The epiglottis, aryepiglottic folds, and arytenoids were within normal limits. Pyriform sinuses clear. Bilateral and symmetric movement of the vocal folds bilaterally, no masses or lesions  (03 Nov 2020 21:07)  OR 11/6:  ·  POST-OP DIAGNOSIS:  Malignant neoplasm of base of tongue 06-Nov-2020 09:40:18  Nuria Najera.  ·  PROCEDURES:  Direct laryngoscopy with biopsy 06-Nov-2020 09:39:42  Nuria Najera  Open tracheostomy 06-Nov-2020 09:39:34  Nuria Najera.    GT placement planned 11/10 or 11/11 by IR, currently fed via NGT    PAST MEDICAL & SURGICAL HISTORY:  H/O pneumothorax  2016  Female cystocele  Relapsing polychondritis  COPD (chronic obstructive pulmonary disease)  CAD (coronary artery disease)  Pulmonary nodule  GERD (gastroesophageal reflux disease)  Hypercholesterolemia  HTN (hypertension)  H/O breast biopsy  LEFT  History of bladder surgery  2019  Pulmonary nodule  Vocal cord polyps  removal of same 2005  S/P colon resection  S/P GEETHA-BSO    Allergies  CONTRAST DYS (Nausea)  No Known Drug Allergies    MEDICATIONS  (STANDING):  acetaminophen    Suspension .. 650 milliGRAM(s) Oral every 6 hours  ALBUTerol    90 MICROgram(s) HFA Inhaler 2 Puff(s) Inhalation every 4 hours  ALBUTerol   0.5% 2.5 milliGRAM(s) Nebulizer once  chlorhexidine 4% Liquid 1 Application(s) Topical <User Schedule>  heparin   Injectable 5000 Unit(s) SubCutaneous every 8 hours  hydroxychloroquine 200 milliGRAM(s) Oral two times a day  losartan 25 milliGRAM(s) Oral daily  melatonin 5 milliGRAM(s) Oral at bedtime  mycophenolate mofetil Suspension 1000 milliGRAM(s) Oral every 12 hours  pantoprazole  Injectable 40 milliGRAM(s) IV Push daily  simvastatin 40 milliGRAM(s) Oral at bedtime  tiotropium 18 MICROgram(s) Capsule 1 Capsule(s) Inhalation daily    ICU Vital Signs Last 24 Hrs  T(C): 37.2 (09 Nov 2020 13:14), Max: 38.1 (08 Nov 2020 22:03)  T(F): 98.9 (09 Nov 2020 13:14), Max: 100.5 (08 Nov 2020 22:03)  HR: 85 (09 Nov 2020 13:14) (75 - 92)  BP: 133/68 (09 Nov 2020 13:14) (119/63 - 163/74)  RR: 18 (09 Nov 2020 13:14) (18 - 20)    Drug Dosing Weight  Height (cm): 165.1 (07 Nov 2020 20:00)  Weight (kg): 70.8 (07 Nov 2020 20:00)  BMI (kg/m2): 26 (07 Nov 2020 20:00)  BSA (m2): 1.78 (07 Nov 2020 20:00)    EXAM  Awake, alert. +trach, aphonic  Abd: soft, ND  Enteral access: +NGT  IV access: Rt FA #22 peripheral IV    LABS  11-09    139  |  103  |  12  ----------------------------<  242<H>  3.9   |  27  |  <0.5<L>    Ca    8.0<L>      09 Nov 2020 07:51  Phos  2.9     11-08  Mg     2.1     11-08                        11.6   9.98  )-----------( 203      ( 09 Nov 2020 07:51 )             36.4     Diet, NPO with Tube Feed:   Tube Feeding Modality: Nasogastric  replete  Total Volume for 24 Hours (mL): 1400  Bolus  Total Volume of Bolus (mL):  350  Tube Feed Frequency: Every 6 hours   Tube Feed Start Time: 12:00  Bolus Feed Rate (mL per Hour): 200   Bolus Feed Duration (in Hours): 1 (11-07-20 @ 09:00)      ASSESSMENT  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

## 2020-11-09 NOTE — PROGRESS NOTE ADULT - SUBJECTIVE AND OBJECTIVE BOX
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 Pt doing well overall, asking about speaking and eating. Pt denies any pain to area. Pt up and ambulating without assistance. Pt denies any diff breathing or shortness of breath.       REVIEW OF SYSTEMS   [x] A ten-point review of systems was otherwise negative except as noted.  [ ] Due to altered mental status/intubation, subjective information were not able to be obtained from patient. History was obtained, to the extent possible, from review of the chart and collateral sources of information.    Allergies    CONTRAST DYS (Nausea)  No Known Drug Allergies    Intolerances    MEDICATIONS:  acetaminophen    Suspension .. 650 milliGRAM(s) Oral every 6 hours  ALBUTerol    90 MICROgram(s) HFA Inhaler 2 Puff(s) Inhalation every 4 hours  ALBUTerol   0.5% 2.5 milliGRAM(s) Nebulizer once  chlorhexidine 4% Liquid 1 Application(s) Topical <User Schedule>  heparin   Injectable 5000 Unit(s) SubCutaneous every 8 hours  hydroxychloroquine 200 milliGRAM(s) Oral two times a day  losartan 25 milliGRAM(s) Oral daily  melatonin 5 milliGRAM(s) Oral at bedtime  mycophenolate mofetil Suspension 1000 milliGRAM(s) Oral every 12 hours  pantoprazole  Injectable 40 milliGRAM(s) IV Push daily  simvastatin 40 milliGRAM(s) Oral at bedtime  tiotropium 18 MICROgram(s) Capsule 1 Capsule(s) Inhalation daily      Vital Signs Last 24 Hrs  T(C): 37.4 (09 Nov 2020 08:51), Max: 38.1 (08 Nov 2020 22:03)  T(F): 99.3 (09 Nov 2020 08:51), Max: 100.5 (08 Nov 2020 22:03)  HR: 86 (09 Nov 2020 08:51) (75 - 92)  BP: 163/74 (09 Nov 2020 08:51) (119/63 - 163/74)  RR: 18 (09 Nov 2020 08:51) (18 - 20)      11-08 @ 07:01  -  11-09 @ 07:00  --------------------------------------------------------  IN:    Enteral Tube Flush: 200 mL    Human Milk: 1700 mL  Total IN: 1900 mL    OUT:    Voided (mL): 1200 mL  Total OUT: 1200 mL    Total NET: 700 mL      11-09 @ 07:01  -  11-09 @ 09:53  --------------------------------------------------------  IN:  Total IN: 0 mL    OUT:    Voided (mL): 150 mL  Total OUT: 150 mL    Total NET: -150 mL    PHYSICAL EXAM:    GEN: Well-developed, well-nourished. NAD, awake and alert. No drooling or pooling of secretions. No stridor or stertor. aphonic 2* trach  SKIN: Good color, non diaphoretic  HEENT: NC/AT; Oral mucosa pink and moist. No erythema or edema noted to buccal mucosa, tongue, FOM, uvula or posterior oropharynx. Uvula midline  NECK:  Trachea midline. + 6 DCT in place, + thick secretions noted, mixed slightly with blood tinged mucus. Suctioned at bedside, minimal secretions brought up. Finger occlusion attempted, pt unable to tolerate, no voicing.    RESP: No dyspnea, non-labored breathing. No use of accessory muscles.  CARDIO: +S1/S2  ABDO: Soft, NT.  EXT: WOLFE x 4    LABS:  CBC-                        11.6   9.98  )-----------( 203      ( 09 Nov 2020 07:51 )             36.4     BMP/CMP-  09 Nov 2020 07:51    139    |  103    |  12     ----------------------------<  242    3.9     |  27     |  <0.5     Ca    8.0        09 Nov 2020 07:51  Phos  2.9       08 Nov 2020 01:38  Mg     2.1       08 Nov 2020 01:38    Frozen section:  SCCa, awaiting final path    Endocrine Panel-  Calcium, Total Serum: 8.0 mg/dL (11-09 @ 07:51)      RADIOLOGY & ADDITIONAL STUDIES:

## 2020-11-09 NOTE — CONSULT NOTE ADULT - SUBJECTIVE AND OBJECTIVE BOX
INTERVENTIONAL RADIOLOGY CONSULT:     Procedure Requested: G tube placement     HPI:  65 y.o F CAD, COPD, GERD, pneumothorax 2016, pulmonary nodule, relapsing polychondritis, HTN, HLD sent from Dr. Najera office for medical and pulmonary clearance, planning to go for OR on Friday 11/6/20 for awake tracheostomy and base of tongue lesion Bx. Patient reports mass has been growing for a few weeks and is causing her to have difficulty swallowing solids, with no difficulty swallowing liquids. She denies any other symptoms, including pain anywhere, SOB, dysuria, urinary frequency, N/V/D/C, fever, chills.  On outpatient testing Pt. found to have + UA and CXR findings of blunting of costophrenic angles consistent with pleural effusion.     From ENT note: Outpatient FFL11/2/20 There is a large mass appearing to arise from the base of tongue, filling vallecula and obstructing approximately 70% of oropharynx. Non-friable. The epiglottis, aryepiglottic folds, and arytenoids were within normal limits. Pyriform sinuses clear. Bilateral and symmetric movement of the vocal folds bilaterally, no masses or lesions     (03 Nov 2020 21:07)      PAST MEDICAL & SURGICAL HISTORY:  H/O pneumothorax  2016    Female cystocele    Relapsing polychondritis    COPD (chronic obstructive pulmonary disease)    CAD (coronary artery disease)    Pulmonary nodule    GERD (gastroesophageal reflux disease)    Hypercholesterolemia    HTN (hypertension)    H/O breast biopsy  LEFT    History of bladder surgery  2019    Pulmonary nodule    Vocal cord polyps  removal of same 2005    S/P colon resection    S/P GEETHA-BSO        MEDICATIONS  (STANDING):  acetaminophen    Suspension .. 650 milliGRAM(s) Oral every 6 hours  ALBUTerol    90 MICROgram(s) HFA Inhaler 2 Puff(s) Inhalation every 4 hours  ALBUTerol   0.5% 2.5 milliGRAM(s) Nebulizer once  chlorhexidine 4% Liquid 1 Application(s) Topical <User Schedule>  heparin   Injectable 5000 Unit(s) SubCutaneous every 8 hours  hydroxychloroquine 200 milliGRAM(s) Oral two times a day  losartan 25 milliGRAM(s) Oral daily  melatonin 5 milliGRAM(s) Oral at bedtime  mycophenolate mofetil Suspension 1000 milliGRAM(s) Oral every 12 hours  pantoprazole  Injectable 40 milliGRAM(s) IV Push daily  simvastatin 40 milliGRAM(s) Oral at bedtime  tiotropium 18 MICROgram(s) Capsule 1 Capsule(s) Inhalation daily    MEDICATIONS  (PRN):      Allergies    CONTRAST DYS (Nausea)  No Known Drug Allergies    Intolerances    FAMILY HISTORY:  Family history of diabetes mellitus (DM)        Physical Exam:   Vital Signs Last 24 Hrs  T(C): 37.2 (09 Nov 2020 13:14), Max: 38.1 (08 Nov 2020 22:03)  T(F): 98.9 (09 Nov 2020 13:14), Max: 100.5 (08 Nov 2020 22:03)  HR: 85 (09 Nov 2020 13:14) (75 - 92)  BP: 133/68 (09 Nov 2020 13:14) (119/63 - 163/74)  BP(mean): --  RR: 18 (09 Nov 2020 13:14) (18 - 20)  SpO2: --      Labs:                         11.6   9.98  )-----------( 203      ( 09 Nov 2020 07:51 )             36.4     11-09    139  |  103  |  12  ----------------------------<  242<H>  3.9   |  27  |  <0.5<L>    Ca    8.0<L>      09 Nov 2020 07:51  Phos  2.9     11-08  Mg     2.1     11-08          Pertinent labs:                      11.6   9.98  )-----------( 203      ( 09 Nov 2020 07:51 )             36.4       11-09    139  |  103  |  12  ----------------------------<  242<H>  3.9   |  27  |  <0.5<L>    Ca    8.0<L>      09 Nov 2020 07:51  Phos  2.9     11-08  Mg     2.1     11-08    Radiology & Additional Studies:     CT abdomen reviewed - good window for placement     Radiology imaging reviewed.       ASSESSMENT/ PLAN:   65 y.o F CAD, COPD, GERD, pneumothorax 2016, pulmonary nodule, relapsing polychondritis, HTN, HLD sent from Dr. Najera office for medical and pulmonary clearance, planning to go for OR on Friday 11/6/20 for awake tracheostomy and base of tongue lesion Bx.   - consulted for G tube placement   - patient recently diagnosed with BOT/vallecular SCCa  - tentative plan for image guided G tube placement tomorrow 11/10  - keep patient NPO after midnight tonight  - patient must have NG in place prior to procedure  - draw CBC/CMP/coags prior to procedure     Thank you for the courtesy of this consult, please call [phone] with any further questions.

## 2020-11-09 NOTE — PROGRESS NOTE ADULT - ASSESSMENT
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

## 2020-11-09 NOTE — SWALLOW BEDSIDE ASSESSMENT ADULT - ASR SWALLOW RECOMMEND DIAG
SLP to assess candidacy for instrumental swallow study
VFSS/MBS/planned for tomorrow 11/10
SLP to assess candidacy for instrumental swallow study

## 2020-11-10 LAB
24R-OH-CALCIDIOL SERPL-MCNC: 35 NG/ML — SIGNIFICANT CHANGE UP (ref 30–80)
A1C WITH ESTIMATED AVERAGE GLUCOSE RESULT: 6.1 % — HIGH (ref 4–5.6)
ANION GAP SERPL CALC-SCNC: 8 MMOL/L — SIGNIFICANT CHANGE UP (ref 7–14)
BUN SERPL-MCNC: 10 MG/DL — SIGNIFICANT CHANGE UP (ref 10–20)
CALCIUM SERPL-MCNC: 8.9 MG/DL — SIGNIFICANT CHANGE UP (ref 8.5–10.1)
CHLORIDE SERPL-SCNC: 102 MMOL/L — SIGNIFICANT CHANGE UP (ref 98–110)
CO2 SERPL-SCNC: 29 MMOL/L — SIGNIFICANT CHANGE UP (ref 17–32)
CREAT SERPL-MCNC: <0.5 MG/DL — LOW (ref 0.7–1.5)
ESTIMATED AVERAGE GLUCOSE: 128 MG/DL — HIGH (ref 68–114)
GLUCOSE BLDC GLUCOMTR-MCNC: 113 MG/DL — HIGH (ref 70–99)
GLUCOSE BLDC GLUCOMTR-MCNC: 126 MG/DL — HIGH (ref 70–99)
GLUCOSE BLDC GLUCOMTR-MCNC: 126 MG/DL — HIGH (ref 70–99)
GLUCOSE BLDC GLUCOMTR-MCNC: 134 MG/DL — HIGH (ref 70–99)
GLUCOSE SERPL-MCNC: 134 MG/DL — HIGH (ref 70–99)
HCT VFR BLD CALC: 40.8 % — SIGNIFICANT CHANGE UP (ref 37–47)
HGB BLD-MCNC: 13 G/DL — SIGNIFICANT CHANGE UP (ref 12–16)
MCHC RBC-ENTMCNC: 29.4 PG — SIGNIFICANT CHANGE UP (ref 27–31)
MCHC RBC-ENTMCNC: 31.9 G/DL — LOW (ref 32–37)
MCV RBC AUTO: 92.3 FL — SIGNIFICANT CHANGE UP (ref 81–99)
NRBC # BLD: 0 /100 WBCS — SIGNIFICANT CHANGE UP (ref 0–0)
PHOSPHATE SERPL-MCNC: 3.1 MG/DL — SIGNIFICANT CHANGE UP (ref 2.1–4.9)
PLATELET # BLD AUTO: 255 K/UL — SIGNIFICANT CHANGE UP (ref 130–400)
POTASSIUM SERPL-MCNC: 5 MMOL/L — SIGNIFICANT CHANGE UP (ref 3.5–5)
POTASSIUM SERPL-SCNC: 5 MMOL/L — SIGNIFICANT CHANGE UP (ref 3.5–5)
RBC # BLD: 4.42 M/UL — SIGNIFICANT CHANGE UP (ref 4.2–5.4)
RBC # FLD: 13.8 % — SIGNIFICANT CHANGE UP (ref 11.5–14.5)
SODIUM SERPL-SCNC: 139 MMOL/L — SIGNIFICANT CHANGE UP (ref 135–146)
SURGICAL PATHOLOGY STUDY: SIGNIFICANT CHANGE UP
WBC # BLD: 9.86 K/UL — SIGNIFICANT CHANGE UP (ref 4.8–10.8)
WBC # FLD AUTO: 9.86 K/UL — SIGNIFICANT CHANGE UP (ref 4.8–10.8)

## 2020-11-10 PROCEDURE — 99233 SBSQ HOSP IP/OBS HIGH 50: CPT | Mod: 25

## 2020-11-10 PROCEDURE — 71250 CT THORAX DX C-: CPT | Mod: 26

## 2020-11-10 PROCEDURE — 31615 TRCHEOBRNCHSC EST TRACHS INC: CPT

## 2020-11-10 PROCEDURE — 71046 X-RAY EXAM CHEST 2 VIEWS: CPT | Mod: 26

## 2020-11-10 RX ORDER — MORPHINE SULFATE 50 MG/1
2 CAPSULE, EXTENDED RELEASE ORAL ONCE
Refills: 0 | Status: DISCONTINUED | OUTPATIENT
Start: 2020-11-10 | End: 2020-11-10

## 2020-11-10 RX ADMIN — HEPARIN SODIUM 5000 UNIT(S): 5000 INJECTION INTRAVENOUS; SUBCUTANEOUS at 05:54

## 2020-11-10 RX ADMIN — Medication 650 MILLIGRAM(S): at 13:52

## 2020-11-10 RX ADMIN — PANTOPRAZOLE SODIUM 40 MILLIGRAM(S): 20 TABLET, DELAYED RELEASE ORAL at 13:54

## 2020-11-10 RX ADMIN — LOSARTAN POTASSIUM 25 MILLIGRAM(S): 100 TABLET, FILM COATED ORAL at 05:53

## 2020-11-10 RX ADMIN — Medication 5 MILLIGRAM(S): at 23:27

## 2020-11-10 RX ADMIN — MORPHINE SULFATE 2 MILLIGRAM(S): 50 CAPSULE, EXTENDED RELEASE ORAL at 08:39

## 2020-11-10 RX ADMIN — Medication 650 MILLIGRAM(S): at 18:02

## 2020-11-10 RX ADMIN — Medication 200 MILLIGRAM(S): at 05:53

## 2020-11-10 RX ADMIN — Medication 650 MILLIGRAM(S): at 18:04

## 2020-11-10 RX ADMIN — Medication 650 MILLIGRAM(S): at 05:53

## 2020-11-10 RX ADMIN — MYCOPHENOLATE MOFETIL 1000 MILLIGRAM(S): 250 CAPSULE ORAL at 19:13

## 2020-11-10 RX ADMIN — Medication 650 MILLIGRAM(S): at 23:26

## 2020-11-10 RX ADMIN — Medication 650 MILLIGRAM(S): at 23:27

## 2020-11-10 RX ADMIN — ALBUTEROL 2 PUFF(S): 90 AEROSOL, METERED ORAL at 16:07

## 2020-11-10 RX ADMIN — ALBUTEROL 2 PUFF(S): 90 AEROSOL, METERED ORAL at 08:29

## 2020-11-10 RX ADMIN — HEPARIN SODIUM 5000 UNIT(S): 5000 INJECTION INTRAVENOUS; SUBCUTANEOUS at 23:26

## 2020-11-10 RX ADMIN — Medication 650 MILLIGRAM(S): at 00:30

## 2020-11-10 RX ADMIN — MYCOPHENOLATE MOFETIL 1000 MILLIGRAM(S): 250 CAPSULE ORAL at 05:54

## 2020-11-10 RX ADMIN — Medication 200 MILLIGRAM(S): at 18:03

## 2020-11-10 RX ADMIN — HEPARIN SODIUM 5000 UNIT(S): 5000 INJECTION INTRAVENOUS; SUBCUTANEOUS at 13:55

## 2020-11-10 RX ADMIN — SIMVASTATIN 40 MILLIGRAM(S): 20 TABLET, FILM COATED ORAL at 23:27

## 2020-11-10 NOTE — SWALLOW VFSS/MBS ASSESSMENT ADULT - SLP PERTINENT HISTORY OF CURRENT PROBLEM
pt is a 64 y/o F w/ PMHx: CAD, COPD, GERD, pneumothorax, pulmonary nodule, relapsing polychondritis, HTN, HLD sent from Dr. Najera office for awake tracheostomy and base of tongue lesion Bx. pt reports mass has been growing for a few weeks and is causing her to have difficulty swallowing solids but no difficulty with liquids. pt s/p BOT biopsy and trach placement yesterday. Per ENT note, biopsy confirms SCCa. Pt pending PEG placement w/ plan for chemo and XRT

## 2020-11-10 NOTE — SWALLOW VFSS/MBS ASSESSMENT ADULT - PHARYNGEAL PHASE COMMENTS
Severe pharyngeal dysphagia for thin liquids w/ moderate aspiration, pt became SOB, Pt suctioned by SLP during study. +toleration of tsp sips w/o aspiration and cup sips of thin liquids w/ trace aspiration (<10% of the bolus) Pt spontaneously used consecutive swallows +severe pharyngeal dysphagia for nectar thick liquids, no further trials provided as pt w/ increased difficulty completely clearing pharyngeal residue which resulted in aspiration.

## 2020-11-10 NOTE — SWALLOW VFSS/MBS ASSESSMENT ADULT - DIAGNOSTIC IMPRESSIONS
Severe pharyngeal dysphagia for consecutive sips of thin and nectar thick liquids. +aspiration resulted in SOB requiring tracheal suctioning during the study. +toleration of tsp sips w/o aspiration and single controlled sips of thin liquids via cup w/ trace aspiration (<10%)

## 2020-11-10 NOTE — PROGRESS NOTE ADULT - ASSESSMENT
66 yo F with BOT/ vallecular SCCa, s/p DL and awake tracheostomy, POD #4    Plan   - Continue Trach care   - Trach education by RN when son arrives   - F/u AM labs (CBC, BMP  HgA1c, Vit D)   - F/u CXR A/P & lateral results - ordered/pending   - F/u Pulm recommendations for possible antibiotics for pleural effusion   - F/P MBS for tomorrow   - Will downsize trach to 6 CFS prior to D/c   - PET scan scheduled OP on Monday 11/16   - Will d/w attedning 64 yo F with BOT/ vallecular SCCa, s/p DL and awake tracheostomy, POD #4-     Plan   - Patient schedule for PEG placement by IR today   - Continue Trach care   - Trach education by RN when son arrives   - F/u AM labs (CBC, BMP  HgA1c, Vit D)   - F/u CXR A/P & lateral results - ordered/pending   - F/u Pulm recommendations for possible antibiotics for pleural effusion   - F/P MBS for tomorrow   - Will downsize trach to 6 CFS prior to D/c   - PET scan scheduled OP on Monday 11/16   - Will d/w attedning 64 yo F with BOT/ vallecular SCCa, s/p DL and awake tracheostomy, POD #4-     Plan   - Patient schedule for PEG placement by IR today   - Continue Trach care   - Trach education by RN when son arrives   - F/u AM labs (CBC, BMP  HgA1c, Vit D)   - Analgesics for pain control prn   - F/u CXR A/P & lateral results - ordered/pending   - F/u Pulm recommendations for possible antibiotics for worsening pleural effusion   - F/P MBS for tomorrow   - Will downsize trach to 6 CFS prior to D/c   - PET scan scheduled OP on Monday 11/16   - Spoke with Pulmonology   - Will d/w attedning 64 yo F with BOT/ vallecular SCCa, s/p DL and awake tracheostomy, POD #4-     Plan   - Patient schedule for PEG placement by IR for tomorrow, due to scheduling   - Continue Trach care   - Trach education by RN when son arrives   - F/u AM labs (CBC, BMP  HgA1c, Vit D)   - Analgesics for pain control prn   - F/u CXR A/P & lateral results - ordered/pending   - F/u Pulm recommendations for possible antibiotics for worsening pleural effusion   - F/P MBS for today with SLP   - Will downsize trach to 6 CFS prior to D/c   - PET scan scheduled OP on Monday 11/16   - Spoke with Pulmonology   - Will d/w attedning

## 2020-11-10 NOTE — PROGRESS NOTE ADULT - SUBJECTIVE AND OBJECTIVE BOX
ENT Progress Note:     66 yo F with BOT/ vallecular SCCa, s/p DL and awake tracheostomy, POD #4      [ x ] A 10 Point Review of Systems was negative except where noted  [    ] Due to altered mental status/intubation, subjective information was not able to be obtained from the patient. History was obtained to the extent possible from review of the chart and collateral sources of information.     PAST MEDICAL & SURGICAL HISTORY:  H/O pneumothorax  2016    Female cystocele    Relapsing polychondritis    COPD (chronic obstructive pulmonary disease)    CAD (coronary artery disease)    Pulmonary nodule    GERD (gastroesophageal reflux disease)    Hypercholesterolemia    HTN (hypertension)    H/O breast biopsy  LEFT    History of bladder surgery  2019    Pulmonary nodule    Vocal cord polyps  removal of same 2005    S/P colon resection    S/P GEETHA-BSO        Allergies    CONTRAST DYS (Nausea)  No Known Drug Allergies    Intolerances        MEDICATIONS  (STANDING):  acetaminophen    Suspension .. 650 milliGRAM(s) Oral every 6 hours  ALBUTerol    90 MICROgram(s) HFA Inhaler 2 Puff(s) Inhalation every 4 hours  ALBUTerol   0.5% 2.5 milliGRAM(s) Nebulizer once  chlorhexidine 4% Liquid 1 Application(s) Topical <User Schedule>  heparin   Injectable 5000 Unit(s) SubCutaneous every 8 hours  hydroxychloroquine 200 milliGRAM(s) Oral two times a day  losartan 25 milliGRAM(s) Oral daily  melatonin 5 milliGRAM(s) Oral at bedtime  mycophenolate mofetil Suspension 1000 milliGRAM(s) Oral every 12 hours  pantoprazole  Injectable 40 milliGRAM(s) IV Push daily  simvastatin 40 milliGRAM(s) Oral at bedtime  tiotropium 18 MICROgram(s) Capsule 1 Capsule(s) Inhalation daily    MEDICATIONS  (PRN):  oxycodone    5 mG/acetaminophen 325 mG 2 Tablet(s) Oral/Enteral Tube every 4 hours PRN Moderate Pain (4 - 6)      FAMILY HISTORY:  Family history of diabetes mellitus (DM)        Social History:  Smokes 1 pack/day  No alcohol or drug use (03 Nov 2020 21:07)      Vital Signs Last 24 Hrs  T(C): 37.4 (10 Nov 2020 04:48), Max: 37.4 (09 Nov 2020 08:51)  T(F): 99.4 (10 Nov 2020 04:48), Max: 99.4 (10 Nov 2020 04:48)  HR: 69 (10 Nov 2020 04:48) (69 - 86)  BP: 136/65 (10 Nov 2020 04:48) (131/61 - 163/74)  BP(mean): --  RR: 18 (10 Nov 2020 04:48) (18 - 18)  SpO2: 96% (09 Nov 2020 20:00) (96% - 96%)        PHYSICAL EXAM:  Gen: NAD  Skin: No rashes, bruises, or lesions  Head: Normocephalic, Atraumatic  Face: no edema, erythema, or fluctuance. Parotid glands soft without mass  Eyes: no scleral injection  Ears: Right - ear canal clear, TM intact without effusion or erythema. No evidence of any fluid drainage. No mastoid tenderness, erythema, or ear bulging            Left - ear canal clear, TM intact without effusion or erythema. No evidence of any fluid drainage. No mastoid tenderness, erythema, or ear bulging  Nose: Nares bilaterally patent, no discharge  Mouth: No Stridor / Drooling / Trismus.  Mucosa moist, tongue/uvula midline, oropharynx clear  Neck: Flat, supple, no lymphadenopathy, trachea midline, no masses  Lymphatic: No lymphadenopathy  Resp: breathing easily, no stridor  CV: no peripheral edema/cyanosis  GI: nondistended   Peripheral vascular: no JVD or edema  Neuro: facial nerve intact, no facial droop        Diagnostic Nasal Endoscopy: (Scope #2 used)    Fiberoptic Indirect laryngoscopy:  (Scope #2 used)    Labs:                         11.6   9.98  )-----------( 203      ( 09 Nov 2020 07:51 )             36.4      11-09    139  |  103  |  12  ----------------------------<  242<H>  3.9   |  27  |  <0.5<L>    Ca    8.0<L>      09 Nov 2020 07:51         COVID-19 PCR: NotDetec (03 Nov 2020 18:26)      IMAGING/ADDITIONAL STUDIES: ENT Progress Note:     64 yo F with BOT/ vallecular SCCa, s/p DL and awake tracheostomy, POD #4- patient seen and examined at bedside. Patient reports L sided rib pain 2/2 cough. She denies any SOB or difficulty breathing. Patient desires to change her cloths/shower      [ x ] A 10 Point Review of Systems was negative except where noted  [    ] Due to altered mental status/intubation, subjective information was not able to be obtained from the patient. History was obtained to the extent possible from review of the chart and collateral sources of information.     PAST MEDICAL & SURGICAL HISTORY:  H/O pneumothorax  2016    Female cystocele    Relapsing polychondritis    COPD (chronic obstructive pulmonary disease)    CAD (coronary artery disease)    Pulmonary nodule    GERD (gastroesophageal reflux disease)    Hypercholesterolemia    HTN (hypertension)    H/O breast biopsy  LEFT    History of bladder surgery  2019    Pulmonary nodule    Vocal cord polyps  removal of same 2005    S/P colon resection    S/P GEETHA-BSO        Allergies    CONTRAST DYS (Nausea)  No Known Drug Allergies    Intolerances        MEDICATIONS  (STANDING):  acetaminophen    Suspension .. 650 milliGRAM(s) Oral every 6 hours  ALBUTerol    90 MICROgram(s) HFA Inhaler 2 Puff(s) Inhalation every 4 hours  ALBUTerol   0.5% 2.5 milliGRAM(s) Nebulizer once  chlorhexidine 4% Liquid 1 Application(s) Topical <User Schedule>  heparin   Injectable 5000 Unit(s) SubCutaneous every 8 hours  hydroxychloroquine 200 milliGRAM(s) Oral two times a day  losartan 25 milliGRAM(s) Oral daily  melatonin 5 milliGRAM(s) Oral at bedtime  mycophenolate mofetil Suspension 1000 milliGRAM(s) Oral every 12 hours  pantoprazole  Injectable 40 milliGRAM(s) IV Push daily  simvastatin 40 milliGRAM(s) Oral at bedtime  tiotropium 18 MICROgram(s) Capsule 1 Capsule(s) Inhalation daily    MEDICATIONS  (PRN):  oxycodone    5 mG/acetaminophen 325 mG 2 Tablet(s) Oral/Enteral Tube every 4 hours PRN Moderate Pain (4 - 6)      FAMILY HISTORY:  Family history of diabetes mellitus (DM)        Social History:  Smokes 1 pack/day  No alcohol or drug use (03 Nov 2020 21:07)      Vital Signs Last 24 Hrs  T(C): 37.4 (10 Nov 2020 04:48), Max: 37.4 (09 Nov 2020 08:51)  T(F): 99.4 (10 Nov 2020 04:48), Max: 99.4 (10 Nov 2020 04:48)  HR: 69 (10 Nov 2020 04:48) (69 - 86)  BP: 136/65 (10 Nov 2020 04:48) (131/61 - 163/74)  RR: 18 (10 Nov 2020 04:48) (18 - 18)  SpO2: 96% (09 Nov 2020 20:00) (96% - 96%)        PHYSICAL EXAM:  Gen: NAD  Skin: No rashes, bruises, or lesions  Eyes: no scleral injection  HEENT: Normocephalic, Atraumatic, Buccal oral mucosa pink/moist, no erythema/edema noted to tongue, uvula, posterior oropharynx , no FOM tenderness/edema, uvula midline.   Neck: Trachea midline, 6DCT in place- sutures in place/tracheostomy collar in place  +thick yellow secretions. Suctioned at bedside with minimal secretions aspirated.   Resp: breathing easily, no stridor  CV: no peripheral edema/cyanosis  GI: Soft, nondistended         Labs:                         11.6   9.98  )-----------( 203      ( 09 Nov 2020 07:51 )             36.4      11-09    139  |  103  |  12  ----------------------------<  242<H>  3.9   |  27  |  <0.5<L>    Ca    8.0<L>      09 Nov 2020 07:51         COVID-19 PCR: NotDetec (03 Nov 2020 18:26)      IMAGING/ADDITIONAL STUDIES:

## 2020-11-10 NOTE — SWALLOW VFSS/MBS ASSESSMENT ADULT - ROSENBEK'S PENETRATION ASPIRATION SCALE
(7) contrast passes glottis, visible subglottic residue remains despite patient’s response (aspiration) (8) contrast passes glottis, visible subglottic residue remains, absent patient response (aspiration)

## 2020-11-10 NOTE — SWALLOW VFSS/MBS ASSESSMENT ADULT - RESIDUE IN VALLECULAE
Trace Mild/increased residue w/ heavier viscosities which did not completely clear w/ consecutive swallows

## 2020-11-10 NOTE — SWALLOW VFSS/MBS ASSESSMENT ADULT - SPECIFY REASON(S)
VFSS, Pt cleared for VFSS today by IR, PEG will be deferred until tomorrow 11/11 2' IR scheduling conflicts

## 2020-11-10 NOTE — SWALLOW VFSS/MBS ASSESSMENT ADULT - RECOMMENDED CONSISTENCY
PEG as 1' means of nutrition and hydration, ice chips and controlled single sips of water as tolerated

## 2020-11-10 NOTE — PROGRESS NOTE ADULT - SUBJECTIVE AND OBJECTIVE BOX
Patient is a 65y old  Female who presents with a chief complaint of Clearance for tongue biopsy (10 Nov 2020 07:35)        SUBJECTIVE:  left sided chest pain with coughing- improved  denies feeling short of breath    REVIEW OF SYSTEMS:    All Pertinent ROS are negative except per HPI       PHYSICAL EXAM  Vital Signs Last 24 Hrs  T(C): 37.3 (10 Nov 2020 14:44), Max: 37.4 (10 Nov 2020 04:48)  T(F): 99.2 (10 Nov 2020 14:44), Max: 99.4 (10 Nov 2020 04:48)  HR: 88 (10 Nov 2020 14:) (69 - 88)  BP: 130/63 (10 Nov 2020 14:44) (114/59 - 148/72)  BP(mean): --  RR: 18 (10 Nov 2020 14:) (18 - 18)  SpO2: 98% (10 Nov 2020 11:06) (96% - 98%)    CONSTITUTIONAL:  Well nourished.  NAD    ENT:   Airway patent,   No thrush  +trach collar  +NG tube    CARDIAC:   Normal rate,   regular rhythm.    no edema      RESPIRATORY:   NO Wheezing  Normal chest expansion  Not tachypneic,  No use of accessory muscles  Bibasilar crackles    GASTROINTESTINAL:  Abdomen soft,   non-tender,   no guarding,   + BS    MUSCULOSKELETAL:   range of motion is not limited,  no clubbing, cyanosis    NEUROLOGICAL:   Alert and oriented   no motor or deficits.    SKIN:   Skin normal color for race,   warm, dry   No evidence of rash.      20 @ 07:01  -  11-10-20 @ 07:00  --------------------------------------------------------  IN:    Enteral Tube Flush: 300 mL    Jevity 1.2: 360 mL  Total IN: 660 mL    OUT:    Voided (mL): 650 mL  Total OUT: 650 mL    Total NET: 10 mL          LABS:                          13.0   9.86  )-----------( 255      ( 10 Nov 2020 08:54 )             40.8                                               11-10    139  |  102  |  10  ----------------------------<  134<H>  5.0   |  29  |  <0.5<L>    Ca    8.9      10 Nov 2020 08:54  Phos  3.1     11-10                                               Urinalysis Basic - ( 2020 20:34 )    Color: Yellow / Appearance: Clear / S.031 / pH: x  Gluc: x / Ketone: Small  / Bili: Negative / Urobili: 3 mg/dL   Blood: x / Protein: 30 mg/dL / Nitrite: Negative   Leuk Esterase: Negative / RBC: 12 /HPF / WBC 3 /HPF   Sq Epi: x / Non Sq Epi: 8 /HPF / Bacteria: Negative                                                                                                                                         MEDICATIONS  (STANDING):  acetaminophen    Suspension .. 650 milliGRAM(s) Oral every 6 hours  ALBUTerol    90 MICROgram(s) HFA Inhaler 2 Puff(s) Inhalation every 4 hours  ALBUTerol   0.5% 2.5 milliGRAM(s) Nebulizer once  chlorhexidine 4% Liquid 1 Application(s) Topical <User Schedule>  heparin   Injectable 5000 Unit(s) SubCutaneous every 8 hours  hydroxychloroquine 200 milliGRAM(s) Oral two times a day  losartan 25 milliGRAM(s) Oral daily  melatonin 5 milliGRAM(s) Oral at bedtime  mycophenolate mofetil Suspension 1000 milliGRAM(s) Oral every 12 hours  pantoprazole  Injectable 40 milliGRAM(s) IV Push daily  simvastatin 40 milliGRAM(s) Oral at bedtime  tiotropium 18 MICROgram(s) Capsule 1 Capsule(s) Inhalation daily    MEDICATIONS  (PRN):  oxycodone    5 mG/acetaminophen 325 mG 2 Tablet(s) Oral/Enteral Tube every 4 hours PRN Moderate Pain (4 - 6)      X-Rays reviewed    CXR interpreted by me:

## 2020-11-10 NOTE — PROGRESS NOTE ADULT - ATTENDING COMMENTS
Above reviewed and agree. Patient seen and evaluated. Patient admitted for medical clearance prior to direct laryngoscopy with biopsy of base of tongue and awake tracheostomy as well as treatment of UTI. Discussed in extensive detail the risks, benefits, and alternatives of the procedure. Patient gave her informed written consent.
I personally examined this patient with the PA and resident and discussed my plan with them.  no overnight events  tube feeds to be adjusted to goal  pt can be downgraded
Patient seen and examined at bedside.   Left chest pain improving as per patient.    Tracheoscopy with main bronchi evaluation done through the tracheostomy today. No evidence of infection at this time.

## 2020-11-10 NOTE — PROGRESS NOTE ADULT - ASSESSMENT
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

## 2020-11-10 NOTE — SWALLOW VFSS/MBS ASSESSMENT ADULT - ORAL PHASE
wide column of contrast between the BOT and the PPW/Incomplete tongue to palate contact/Residue in oral cavity/Uncontrolled bolus / spillover in hypopharynx Incomplete tongue to palate contact/Delayed oral transit time

## 2020-11-10 NOTE — SWALLOW VFSS/MBS ASSESSMENT ADULT - ADDITIONAL INFORMATION
Thickening noted at the BOT, within the valleculae space and epiglottis, minimal superior movement of the thyroid cartilage and minimal anterior movement with partial epiglottic inversion

## 2020-11-11 LAB
ANION GAP SERPL CALC-SCNC: 9 MMOL/L — SIGNIFICANT CHANGE UP (ref 7–14)
BUN SERPL-MCNC: 11 MG/DL — SIGNIFICANT CHANGE UP (ref 10–20)
CALCIUM SERPL-MCNC: 8.6 MG/DL — SIGNIFICANT CHANGE UP (ref 8.5–10.1)
CHLORIDE SERPL-SCNC: 104 MMOL/L — SIGNIFICANT CHANGE UP (ref 98–110)
CO2 SERPL-SCNC: 26 MMOL/L — SIGNIFICANT CHANGE UP (ref 17–32)
CREAT SERPL-MCNC: 0.5 MG/DL — LOW (ref 0.7–1.5)
GLUCOSE BLDC GLUCOMTR-MCNC: 113 MG/DL — HIGH (ref 70–99)
GLUCOSE BLDC GLUCOMTR-MCNC: 122 MG/DL — HIGH (ref 70–99)
GLUCOSE BLDC GLUCOMTR-MCNC: 126 MG/DL — HIGH (ref 70–99)
GLUCOSE BLDC GLUCOMTR-MCNC: 212 MG/DL — HIGH (ref 70–99)
GLUCOSE SERPL-MCNC: 126 MG/DL — HIGH (ref 70–99)
HCT VFR BLD CALC: 36.7 % — LOW (ref 37–47)
HGB BLD-MCNC: 12.2 G/DL — SIGNIFICANT CHANGE UP (ref 12–16)
MAGNESIUM SERPL-MCNC: 2 MG/DL — SIGNIFICANT CHANGE UP (ref 1.8–2.4)
MCHC RBC-ENTMCNC: 29.9 PG — SIGNIFICANT CHANGE UP (ref 27–31)
MCHC RBC-ENTMCNC: 33.2 G/DL — SIGNIFICANT CHANGE UP (ref 32–37)
MCV RBC AUTO: 90 FL — SIGNIFICANT CHANGE UP (ref 81–99)
NRBC # BLD: 0 /100 WBCS — SIGNIFICANT CHANGE UP (ref 0–0)
PHOSPHATE SERPL-MCNC: 3.5 MG/DL — SIGNIFICANT CHANGE UP (ref 2.1–4.9)
PLATELET # BLD AUTO: 250 K/UL — SIGNIFICANT CHANGE UP (ref 130–400)
POTASSIUM SERPL-MCNC: 4.2 MMOL/L — SIGNIFICANT CHANGE UP (ref 3.5–5)
POTASSIUM SERPL-SCNC: 4.2 MMOL/L — SIGNIFICANT CHANGE UP (ref 3.5–5)
RBC # BLD: 4.08 M/UL — LOW (ref 4.2–5.4)
RBC # FLD: 13.9 % — SIGNIFICANT CHANGE UP (ref 11.5–14.5)
SODIUM SERPL-SCNC: 139 MMOL/L — SIGNIFICANT CHANGE UP (ref 135–146)
WBC # BLD: 10.17 K/UL — SIGNIFICANT CHANGE UP (ref 4.8–10.8)
WBC # FLD AUTO: 10.17 K/UL — SIGNIFICANT CHANGE UP (ref 4.8–10.8)

## 2020-11-11 PROCEDURE — 74230 X-RAY XM SWLNG FUNCJ C+: CPT | Mod: 26

## 2020-11-11 PROCEDURE — 49440 PLACE GASTROSTOMY TUBE PERC: CPT

## 2020-11-11 PROCEDURE — 99152 MOD SED SAME PHYS/QHP 5/>YRS: CPT

## 2020-11-11 RX ORDER — MORPHINE SULFATE 50 MG/1
4 CAPSULE, EXTENDED RELEASE ORAL ONCE
Refills: 0 | Status: DISCONTINUED | OUTPATIENT
Start: 2020-11-11 | End: 2020-11-11

## 2020-11-11 RX ORDER — SODIUM CHLORIDE 9 MG/ML
1000 INJECTION, SOLUTION INTRAVENOUS
Refills: 0 | Status: DISCONTINUED | OUTPATIENT
Start: 2020-11-11 | End: 2020-11-13

## 2020-11-11 RX ORDER — ACETAMINOPHEN 500 MG
1000 TABLET ORAL ONCE
Refills: 0 | Status: COMPLETED | OUTPATIENT
Start: 2020-11-11 | End: 2020-11-12

## 2020-11-11 RX ORDER — MORPHINE SULFATE 50 MG/1
2 CAPSULE, EXTENDED RELEASE ORAL ONCE
Refills: 0 | Status: DISCONTINUED | OUTPATIENT
Start: 2020-11-11 | End: 2020-11-11

## 2020-11-11 RX ADMIN — Medication 650 MILLIGRAM(S): at 11:14

## 2020-11-11 RX ADMIN — SODIUM CHLORIDE 65 MILLILITER(S): 9 INJECTION, SOLUTION INTRAVENOUS at 18:38

## 2020-11-11 RX ADMIN — OXYCODONE AND ACETAMINOPHEN 2 TABLET(S): 5; 325 TABLET ORAL at 01:53

## 2020-11-11 RX ADMIN — Medication 650 MILLIGRAM(S): at 06:03

## 2020-11-11 RX ADMIN — MORPHINE SULFATE 2 MILLIGRAM(S): 50 CAPSULE, EXTENDED RELEASE ORAL at 18:38

## 2020-11-11 RX ADMIN — MORPHINE SULFATE 4 MILLIGRAM(S): 50 CAPSULE, EXTENDED RELEASE ORAL at 22:50

## 2020-11-11 RX ADMIN — Medication 650 MILLIGRAM(S): at 11:44

## 2020-11-11 RX ADMIN — OXYCODONE AND ACETAMINOPHEN 2 TABLET(S): 5; 325 TABLET ORAL at 03:13

## 2020-11-11 RX ADMIN — MORPHINE SULFATE 4 MILLIGRAM(S): 50 CAPSULE, EXTENDED RELEASE ORAL at 23:55

## 2020-11-11 RX ADMIN — OXYCODONE AND ACETAMINOPHEN 2 TABLET(S): 5; 325 TABLET ORAL at 11:44

## 2020-11-11 RX ADMIN — LOSARTAN POTASSIUM 25 MILLIGRAM(S): 100 TABLET, FILM COATED ORAL at 06:02

## 2020-11-11 RX ADMIN — MYCOPHENOLATE MOFETIL 1000 MILLIGRAM(S): 250 CAPSULE ORAL at 06:02

## 2020-11-11 RX ADMIN — CHLORHEXIDINE GLUCONATE 1 APPLICATION(S): 213 SOLUTION TOPICAL at 06:03

## 2020-11-11 RX ADMIN — Medication 650 MILLIGRAM(S): at 06:02

## 2020-11-11 RX ADMIN — MORPHINE SULFATE 2 MILLIGRAM(S): 50 CAPSULE, EXTENDED RELEASE ORAL at 19:54

## 2020-11-11 RX ADMIN — HEPARIN SODIUM 5000 UNIT(S): 5000 INJECTION INTRAVENOUS; SUBCUTANEOUS at 06:02

## 2020-11-11 RX ADMIN — HEPARIN SODIUM 5000 UNIT(S): 5000 INJECTION INTRAVENOUS; SUBCUTANEOUS at 21:42

## 2020-11-11 RX ADMIN — PANTOPRAZOLE SODIUM 40 MILLIGRAM(S): 20 TABLET, DELAYED RELEASE ORAL at 11:14

## 2020-11-11 RX ADMIN — OXYCODONE AND ACETAMINOPHEN 2 TABLET(S): 5; 325 TABLET ORAL at 11:15

## 2020-11-11 RX ADMIN — Medication 200 MILLIGRAM(S): at 06:02

## 2020-11-11 NOTE — PROGRESS NOTE ADULT - SUBJECTIVE AND OBJECTIVE BOX
ENT Progress:     66 yo F with BOT/ vallecular SCCa, s/p DL and awake tracheostomy, POD #5- patient seen and examined at bedside. Patient offers no complaints this morning, lying comfortably in bed. Seen by SLP allowed for small single sips of water and ice chips. Denies any fever, chills, SOB, CP, abdominal pain.       [ x ] A 10 Point Review of Systems was negative except where noted  [    ] Due to altered mental status/intubation, subjective information was not able to be obtained from the patient. History was obtained to the extent possible from review of the chart and collateral sources of information.     PAST MEDICAL & SURGICAL HISTORY:  H/O pneumothorax  2016    Female cystocele    Relapsing polychondritis    COPD (chronic obstructive pulmonary disease)    CAD (coronary artery disease)    Pulmonary nodule    GERD (gastroesophageal reflux disease)    Hypercholesterolemia    HTN (hypertension)    H/O breast biopsy  LEFT    History of bladder surgery  2019    Pulmonary nodule    Vocal cord polyps  removal of same 2005    S/P colon resection    S/P GEETHA-BSO        Allergies    CONTRAST DYS (Nausea)  No Known Drug Allergies    Intolerances        MEDICATIONS  (STANDING):  acetaminophen    Suspension .. 650 milliGRAM(s) Oral every 6 hours  ALBUTerol    90 MICROgram(s) HFA Inhaler 2 Puff(s) Inhalation every 4 hours  ALBUTerol   0.5% 2.5 milliGRAM(s) Nebulizer once  chlorhexidine 4% Liquid 1 Application(s) Topical <User Schedule>  heparin   Injectable 5000 Unit(s) SubCutaneous every 8 hours  hydroxychloroquine 200 milliGRAM(s) Oral two times a day  losartan 25 milliGRAM(s) Oral daily  mycophenolate mofetil Suspension 1000 milliGRAM(s) Oral every 12 hours  pantoprazole  Injectable 40 milliGRAM(s) IV Push daily  simvastatin 40 milliGRAM(s) Oral at bedtime  tiotropium 18 MICROgram(s) Capsule 1 Capsule(s) Inhalation daily    MEDICATIONS  (PRN):  oxycodone    5 mG/acetaminophen 325 mG 2 Tablet(s) Oral/Enteral Tube every 4 hours PRN Moderate Pain (4 - 6)      FAMILY HISTORY:  Family history of diabetes mellitus (DM)        Social History:  Smokes 1 pack/day  No alcohol or drug use (03 Nov 2020 21:07)      Vital Signs Last 24 Hrs  T(C): 37 (11 Nov 2020 04:59), Max: 37.3 (10 Nov 2020 14:44)  T(F): 98.6 (11 Nov 2020 04:59), Max: 99.2 (10 Nov 2020 14:44)  HR: 71 (11 Nov 2020 04:59) (71 - 88)  BP: 121/65 (11 Nov 2020 04:59) (114/59 - 149/73)  RR: 18 (11 Nov 2020 04:59) (18 - 18)  SpO2: 98% (10 Nov 2020 18:00) (98% - 98%)        PHYSICAL EXAM:  Gen: NAD, lying comfortably in bed, looking well,   Skin: No rashes, bruises, or lesions  HEENT: Normocephalic/ Atraumatic, no scleral injection  + NGT in R nares, no discharge or active bleeding noted Buccal Mucosa moist/pink, tongue/uvula midline, posterior oropharynx clear, no evidence of edema/erythema/exudates   Neck: Trachea midline, +6 DCT in place/tracheostomy collar in place/sutures in place, no secretions noted. Suctioned at bedside with minimal clear secretions aspirated.   Resp: breathing easily, no stridor  CV: no peripheral edema/cyanosis  GI: nondistended   Peripheral vascular: no JVD or edema      Labs:                         12.2   10.17 )-----------( 250      ( 11 Nov 2020 08:09 )             36.7      11-10    139  |  102  |  10  ----------------------------<  134<H>  5.0   |  29  |  <0.5<L>    Ca    8.9      10 Nov 2020 08:54  Phos  3.1     11-10             COVID-19 PCR: NotDetec (03 Nov 2020 18:26)      IMAGING/ADDITIONAL STUDIES:   < from: CT Chest No Cont (07.31.20 @ 15:25) >    EXAM:  CT CHEST            PROCEDURE DATE:  07/31/2020            INTERPRETATION:  CLINICAL HISTORY / REASON FOR EXAM: Pulmonary nodules.    TECHNIQUE: Multislice helical sections were obtained from the thoracic inlet to the lung bases without contrast administration. Coronal and sagittal images have been submitted for evaluation. 3D (MIP) images obtained.    COMPARISON: CT chest September 6, 2019, dating back to June 9, 2016.    FINDINGS:    LUNGS, PLEURA, AIRWAYS: No lobar consolidation, mass, effusion, or pneumothorax. Mild emphysematous change. Bibasilar subsegmental atelectasis. No evidence of central endobronchial obstruction. No bronchiectasis or honeycombing.    PULMONARY NODULES:  No new or enlarging pulmonary nodules.  Decrease, previously 0.9 cm solid nodule at superior segment right lower lobe, now ill-defined (series 4, image 103)  Unchanged wedge-shaped 1 cm right middle lobe nodular opacity, which may represent scarring (series 4, image 175)  Stable solid 0.4 cm pulmonarynodule, right middle lobe (series 4, image 175)  Stable 0.6 cm solid subpleural nodule, left upper lobe (series 4, image 57)    THORACIC NODES: No mediastinal, hilar, supraclavicular, or axillary lymphadenopathy.    MEDIASTINUM/GREAT VESSELS: No pericardial effusion. Heart size is within normal limits. The aorta and main pulmonary artery are of normal caliber. Vascular calcifications. Stable 1.6 cm right thyroid nodule, not fully characterize.    BONES/SOFT TISSUES: Chronic right posterior rib fracture. Unchanged right lateral seventh rib sclerotic focus, likely bone island. Degenerative change, thoracic spine. Chronic T5, T8 vertebral body compression deformities.    VISUALIZED UPPER ABDOMEN: Unremarkable.      IMPRESSION:  1. No new, suspicious, or enlarging pulmonary nodule.  2. Bilateral stable or decreased pulmonary nodules dating back to September 9, 2016.            DARCIE CHAKRABORTY M.D., ATTENDING RADIOLOGIST  This document has been electronically signed. Aug  1 2020 10:40AM        < end of copied text >

## 2020-11-11 NOTE — PROVIDER CONTACT NOTE (MEDICATION) - ACTION/TREATMENT ORDERED:
Provider to RN note from [place], do not use Peg tube until 8 am tomorrow, hold all peg tube medications until that time. 6 pm medications held

## 2020-11-11 NOTE — PROGRESS NOTE ADULT - SUBJECTIVE AND OBJECTIVE BOX
Interventional Radiology Brief- Operative Note    Procedure: 18fr g tube placement    Pre-Op Diagnosis: base of tongue mass    Post-Op Diagnosis:same    Attending:   Mally    Resident:   none    Anesthesia (type):  [ ] General Anesthesia  [x ] Sedation  [ ] Spinal Anesthesia  [ x] Local/Regional    Contrast: 30ml    Estimated Blood Loss:  <5 ml    Condition:   [ ] Critical  [ ] Serious  [ ] Fair   [x ] Good    Findings/Follow up Plan of Care:  - G tube may be used at 80am tomorrow morning  -NGT removed as the patient was in significant discomfort  -Remove anchors in one week  Specimens Removed:  none  Implants:  none  Complications:  none  Condition/Disposition:    DC to floor  Please call Interventional Radiology [phone] with any questions, concerns, or issues.

## 2020-11-11 NOTE — PROGRESS NOTE ADULT - SUBJECTIVE AND OBJECTIVE BOX
Patient is a 65y old  Female who presents with a chief complaint of Clearance for tongue biopsy (2020 08:58)        SUBJECTIVE:  patient seen and examined today  still with cough  CT chest done  going for peg placement today    REVIEW OF SYSTEMS:    All Pertinent ROS are negative except per HPI       PHYSICAL EXAM  Vital Signs Last 24 Hrs  T(C): 37.3 (2020 14:04), Max: 37.3 (2020 14:04)  T(F): 99.2 (2020 14:04), Max: 99.2 (2020 14:04)  HR: 75 (2020 14:) (71 - 87)  BP: 141/65 (:) (119/58 - 149/73)  BP(mean): --  RR: 18 (:) (18 - 18)  SpO2: 97% (2020 12:00) (95% - 98%)    CONSTITUTIONAL:  Well nourished in NAD    ENT:   Airway patent,   No thrush  +trach    CARDIAC:   Normal rate,   regular rhythm.    +1 edema      RESPIRATORY:   No Wheezing  Normal chest expansion  Not tachypneic,  No use of accessory muscles  + rhonchi    GASTROINTESTINAL:  Abdomen soft,   non-tender,   no guarding,     MUSCULOSKELETAL:   range of motion is not limited,  no clubbing, cyanosis    NEUROLOGICAL:   Alert and oriented   no motor or deficits.    SKIN:   Skin normal color for race,   warm, dry   No evidence of rash.      11-10-20 @ 07:  -  20 @ 07:00  --------------------------------------------------------  IN:    Enteral Tube Flush: 150 mL    Jevity 1.2: 360 mL  Total IN: 510 mL    OUT:    Voided (mL): 500 mL  Total OUT: 500 mL    Total NET: 10 mL      20 @ 07:  -  20 @ 15:05  --------------------------------------------------------  IN:  Total IN: 0 mL    OUT:    Voided (mL): 200 mL  Total OUT: 200 mL    Total NET: -200 mL          LABS:                          12.2   10.17 )-----------( 250      ( 2020 08:09 )             36.7                                                   139  |  104  |  11  ----------------------------<  126<H>  4.2   |  26  |  0.5<L>    Ca    8.6      2020 08:09  Phos  3.5       Mg     2.0                                                    Urinalysis Basic - ( 2020 20:34 )    Color: Yellow / Appearance: Clear / S.031 / pH: x  Gluc: x / Ketone: Small  / Bili: Negative / Urobili: 3 mg/dL   Blood: x / Protein: 30 mg/dL / Nitrite: Negative   Leuk Esterase: Negative / RBC: 12 /HPF / WBC 3 /HPF   Sq Epi: x / Non Sq Epi: 8 /HPF / Bacteria: Negative                                                                                                                                         MEDICATIONS  (STANDING):  acetaminophen    Suspension .. 650 milliGRAM(s) Oral every 6 hours  ALBUTerol    90 MICROgram(s) HFA Inhaler 2 Puff(s) Inhalation every 4 hours  ALBUTerol   0.5% 2.5 milliGRAM(s) Nebulizer once  chlorhexidine 4% Liquid 1 Application(s) Topical <User Schedule>  heparin   Injectable 5000 Unit(s) SubCutaneous every 8 hours  hydroxychloroquine 200 milliGRAM(s) Oral two times a day  losartan 25 milliGRAM(s) Oral daily  mycophenolate mofetil Suspension 1000 milliGRAM(s) Oral every 12 hours  pantoprazole  Injectable 40 milliGRAM(s) IV Push daily  simvastatin 40 milliGRAM(s) Oral at bedtime  tiotropium 18 MICROgram(s) Capsule 1 Capsule(s) Inhalation daily    MEDICATIONS  (PRN):  oxycodone    5 mG/acetaminophen 325 mG 2 Tablet(s) Oral/Enteral Tube every 4 hours PRN Moderate Pain (4 - 6)      < from: CT Chest No Cont (11.10.20 @ 16:59) >  Trace left apical pneumothorax.    Left lower lobe consolidation likely pneumonia. Associated small pleural effusion.    Right middle lobar atelectasis, new.    Redemonstrated heterogeneous enlarged thyroid gland.    Enlargement pulmonary artery which can be seen with pulmonary hypertension.    < end of copied text >

## 2020-11-11 NOTE — PROGRESS NOTE ADULT - ASSESSMENT
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 CT Chest Bibasilar subsegmental atelectasis. Patient doing well,     Plan   - Patient scheduled for PEG placement by IR for on today 11/11   - Continue Trach care   - Trach education by RN   - Analgesics for pain control prn   - F/u AM labs (BMP, Phos, Mg)   - Appreciate Pulm recommendation- LAMA therapy, Neb q 4h, diuresis at tolerated, wean of O2,   - Appreciated SLP recs s/p MBS on 11/10 - severe pharyngeal dysphagia for thin liquids w/ moderate aspiration - PEG as primary nutrition/hydration, ice chips/single sips of water for oral hygiene   - F/u Nutrition recs    - Will downsize trach to 6 CFS prior to D/C   - PET Walker schedule OP for Monday 11/16   - Will d/w attending

## 2020-11-11 NOTE — CHART NOTE - NSCHARTNOTEFT_GEN_A_CORE
ENT: POST-OP CHECK     64 yo F with BOT/ vallecular SCCa, s/p DL and awake tracheostomy, POD #5, s/p PEG placement by IR, POD#0- patient seen and examined at bedside. Patient c/o mild-abdominal pain post-procedure. NGT removed by IR due to significant discomfort.  Denies any fever, chills, N/V, SOB, CP.      [ x ] A 10 Point Review of Systems was negative except where noted.  [    ] Due to altered mental status/intubation, subjective information was not able to be obtained from the patient. History was obtained to the extent possible from the review of the chart and collateral sources of information.    Vital Signs Last 24 Hrs  T(C): 37.6 (2020 17:29), Max: 37.6 (2020 17:29)  T(F): 99.6 (2020 17:29), Max: 99.6 (2020 17:29)  HR: 82 (:) (71 - 87)  BP: 139/72 (2020 17:29) (121/65 - 149/73)  BP(mean): --  RR: 18 (2020 17:29) (18 - 18)  SpO2: 97% (2020 12:00) (95% - 97%)    PHYSICAL EXAM:  Gen: NAD, lying comfortably in bed, well-developed   Skin: No rashes, bruises, or lesions  HEENT: Normocephalic/ Atraumatic, no scleral injection, Nares patent b/l, no discharge or active bleeding noted. Buccal Mucosa moist/pink, tongue/uvula midline, posterior oropharynx clear, no evidence of edema/erythema/exudates   Neck: Trachea midline, +6 DCT in place/tracheostomy collar in place/sutures in place, no secretions noted. Suctioned at bedside with minimal clear secretions aspirated.   Resp: breathing easily, no stridor  CV: no peripheral edema/cyanosis  GI: + 18 Fr PEG tube in place/capped, wound dressing - c/d/i, tenderness to palpation around area  Peripheral vascular: no JVD or edema    LABS:                        12.2   10.17 )-----------( 250      ( 2020 08:09 )             36.7         139  |  104  |  11  ----------------------------<  126<H>  4.2   |  26  |  0.5<L>    Ca    8.6      2020 08:09  Phos  3.5       Mg     2.0         Urinalysis Basic - ( 2020 20:34 )    Color: Yellow / Appearance: Clear / S.031 / pH: x  Gluc: x / Ketone: Small  / Bili: Negative / Urobili: 3 mg/dL   Blood: x / Protein: 30 mg/dL / Nitrite: Negative   Leuk Esterase: Negative / RBC: 12 /HPF / WBC 3 /HPF   Sq Epi: x / Non Sq Epi: 8 /HPF / Bacteria: Negative    Assessment: 64 yo F with BOT/ vallecular SCCa, s/p DL and awake tracheostomy, POD #5, s/p PEG placement by IR, POD#0-    Plan:   - PEG may be used at 8am tomorrow per IR   - PEG education by IR tomorrow at  after 8 am   - NGT removed by IR due to patient in significant discomfort, spoke with Dr. Cardenas and Dr. Najera, patient will receive IV hydration and have medication on hold as per IR recommendations until 8am tomorrow    - IVF D5 NS.45% at 65 ml/hr per nutrition    - Continue Trach care   - Trach education by RN   - Analgesics for pain control prn   - F/u AM labs (BMP, Phos, Mg)   - Pulm recommendation appreciated- Levaquin 500mg qd for 7 days, Prednisone 40mg qd for 5 days, will start tomorrow, diuresis as needed   - Nurse aware   - Case d/w Dr. Najera

## 2020-11-11 NOTE — PROGRESS NOTE ADULT - ASSESSMENT
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

## 2020-11-12 ENCOUNTER — TRANSCRIPTION ENCOUNTER (OUTPATIENT)
Age: 65
End: 2020-11-12

## 2020-11-12 LAB
-  AMIKACIN: SIGNIFICANT CHANGE UP
-  AMPICILLIN: SIGNIFICANT CHANGE UP
-  AZTREONAM: SIGNIFICANT CHANGE UP
-  CEFEPIME: SIGNIFICANT CHANGE UP
-  CEFTAZIDIME: SIGNIFICANT CHANGE UP
-  CIPROFLOXACIN: SIGNIFICANT CHANGE UP
-  CIPROFLOXACIN: SIGNIFICANT CHANGE UP
-  GENTAMICIN: SIGNIFICANT CHANGE UP
-  IMIPENEM: SIGNIFICANT CHANGE UP
-  LEVOFLOXACIN: SIGNIFICANT CHANGE UP
-  LEVOFLOXACIN: SIGNIFICANT CHANGE UP
-  MEROPENEM: SIGNIFICANT CHANGE UP
-  NITROFURANTOIN: SIGNIFICANT CHANGE UP
-  PIPERACILLIN/TAZOBACTAM: SIGNIFICANT CHANGE UP
-  TETRACYCLINE: SIGNIFICANT CHANGE UP
-  TOBRAMYCIN: SIGNIFICANT CHANGE UP
-  VANCOMYCIN: SIGNIFICANT CHANGE UP
ANION GAP SERPL CALC-SCNC: 11 MMOL/L — SIGNIFICANT CHANGE UP (ref 7–14)
BUN SERPL-MCNC: 12 MG/DL — SIGNIFICANT CHANGE UP (ref 10–20)
CALCIUM SERPL-MCNC: 8.4 MG/DL — LOW (ref 8.5–10.1)
CHLORIDE SERPL-SCNC: 103 MMOL/L — SIGNIFICANT CHANGE UP (ref 98–110)
CO2 SERPL-SCNC: 23 MMOL/L — SIGNIFICANT CHANGE UP (ref 17–32)
CREAT SERPL-MCNC: 0.5 MG/DL — LOW (ref 0.7–1.5)
CULTURE RESULTS: SIGNIFICANT CHANGE UP
GLUCOSE BLDC GLUCOMTR-MCNC: 127 MG/DL — HIGH (ref 70–99)
GLUCOSE BLDC GLUCOMTR-MCNC: 127 MG/DL — HIGH (ref 70–99)
GLUCOSE BLDC GLUCOMTR-MCNC: 134 MG/DL — HIGH (ref 70–99)
GLUCOSE BLDC GLUCOMTR-MCNC: 194 MG/DL — HIGH (ref 70–99)
GLUCOSE SERPL-MCNC: 122 MG/DL — HIGH (ref 70–99)
HCT VFR BLD CALC: 40.8 % — SIGNIFICANT CHANGE UP (ref 37–47)
HGB BLD-MCNC: 13.3 G/DL — SIGNIFICANT CHANGE UP (ref 12–16)
MAGNESIUM SERPL-MCNC: 2.2 MG/DL — SIGNIFICANT CHANGE UP (ref 1.8–2.4)
MCHC RBC-ENTMCNC: 29.5 PG — SIGNIFICANT CHANGE UP (ref 27–31)
MCHC RBC-ENTMCNC: 32.6 G/DL — SIGNIFICANT CHANGE UP (ref 32–37)
MCV RBC AUTO: 90.5 FL — SIGNIFICANT CHANGE UP (ref 81–99)
METHOD TYPE: SIGNIFICANT CHANGE UP
METHOD TYPE: SIGNIFICANT CHANGE UP
NRBC # BLD: 0 /100 WBCS — SIGNIFICANT CHANGE UP (ref 0–0)
ORGANISM # SPEC MICROSCOPIC CNT: SIGNIFICANT CHANGE UP
PHOSPHATE SERPL-MCNC: 3.2 MG/DL — SIGNIFICANT CHANGE UP (ref 2.1–4.9)
PLATELET # BLD AUTO: 269 K/UL — SIGNIFICANT CHANGE UP (ref 130–400)
POTASSIUM SERPL-MCNC: 4.3 MMOL/L — SIGNIFICANT CHANGE UP (ref 3.5–5)
POTASSIUM SERPL-SCNC: 4.3 MMOL/L — SIGNIFICANT CHANGE UP (ref 3.5–5)
RBC # BLD: 4.51 M/UL — SIGNIFICANT CHANGE UP (ref 4.2–5.4)
RBC # FLD: 13.4 % — SIGNIFICANT CHANGE UP (ref 11.5–14.5)
SODIUM SERPL-SCNC: 137 MMOL/L — SIGNIFICANT CHANGE UP (ref 135–146)
SPECIMEN SOURCE: SIGNIFICANT CHANGE UP
WBC # BLD: 10.31 K/UL — SIGNIFICANT CHANGE UP (ref 4.8–10.8)
WBC # FLD AUTO: 10.31 K/UL — SIGNIFICANT CHANGE UP (ref 4.8–10.8)

## 2020-11-12 RX ADMIN — Medication 200 MILLIGRAM(S): at 17:10

## 2020-11-12 RX ADMIN — OXYCODONE AND ACETAMINOPHEN 2 TABLET(S): 5; 325 TABLET ORAL at 20:54

## 2020-11-12 RX ADMIN — Medication 40 MILLIGRAM(S): at 08:47

## 2020-11-12 RX ADMIN — Medication 200 MILLIGRAM(S): at 08:44

## 2020-11-12 RX ADMIN — OXYCODONE AND ACETAMINOPHEN 2 TABLET(S): 5; 325 TABLET ORAL at 09:00

## 2020-11-12 RX ADMIN — Medication 1000 MILLIGRAM(S): at 05:08

## 2020-11-12 RX ADMIN — SIMVASTATIN 40 MILLIGRAM(S): 20 TABLET, FILM COATED ORAL at 21:37

## 2020-11-12 RX ADMIN — SODIUM CHLORIDE 65 MILLILITER(S): 9 INJECTION, SOLUTION INTRAVENOUS at 22:26

## 2020-11-12 RX ADMIN — HEPARIN SODIUM 5000 UNIT(S): 5000 INJECTION INTRAVENOUS; SUBCUTANEOUS at 21:37

## 2020-11-12 RX ADMIN — Medication 400 MILLIGRAM(S): at 03:46

## 2020-11-12 RX ADMIN — OXYCODONE AND ACETAMINOPHEN 2 TABLET(S): 5; 325 TABLET ORAL at 08:31

## 2020-11-12 RX ADMIN — HEPARIN SODIUM 5000 UNIT(S): 5000 INJECTION INTRAVENOUS; SUBCUTANEOUS at 05:09

## 2020-11-12 RX ADMIN — MYCOPHENOLATE MOFETIL 1000 MILLIGRAM(S): 250 CAPSULE ORAL at 08:48

## 2020-11-12 RX ADMIN — LOSARTAN POTASSIUM 25 MILLIGRAM(S): 100 TABLET, FILM COATED ORAL at 08:44

## 2020-11-12 RX ADMIN — OXYCODONE AND ACETAMINOPHEN 2 TABLET(S): 5; 325 TABLET ORAL at 21:48

## 2020-11-12 RX ADMIN — MYCOPHENOLATE MOFETIL 1000 MILLIGRAM(S): 250 CAPSULE ORAL at 17:10

## 2020-11-12 RX ADMIN — HEPARIN SODIUM 5000 UNIT(S): 5000 INJECTION INTRAVENOUS; SUBCUTANEOUS at 13:25

## 2020-11-12 NOTE — PROGRESS NOTE ADULT - ASSESSMENT
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 Patient seen and examined at bedside.  Patient doing well this morning.  Plan for tach change later today with Dr Najera.    - change trach later today  - SLP f/u  - cont TF per nutrition   - ENT following

## 2020-11-12 NOTE — PROGRESS NOTE ADULT - SUBJECTIVE AND OBJECTIVE BOX
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 Patient seen and examined at bedside.  Patient doing well this morning.  Plan for tach change later today with Dr Najera.      T(C): 37.3 (12 Nov 2020 08:52), Max: 37.6 (11 Nov 2020 17:29)  T(F): 99.1 (12 Nov 2020 08:52), Max: 99.6 (11 Nov 2020 17:29)  HR: 73 (12 Nov 2020 08:52) (73 - 87)  BP: 167/75 (12 Nov 2020 08:52) (121/67 - 167/75)  RR: 20 (12 Nov 2020 08:52) (18 - 20)  SpO2: 99% (12 Nov 2020 07:25) (97% - 99%)      Physical Exam  HEENT: Normocephalic/ Atraumatic, no scleral injection, Nares patent b/l, no discharge or active bleeding noted. Buccal Mucosa moist/pink, tongue/uvula midline, posterior oropharynx clear, no evidence of edema/erythema/exudates   Neck: Trachea midline, +6 DCT in place/tracheostomy collar in place/sutures in place, no secretions noted. Suctioned at bedside with minimal clear secretions aspirated.   Resp: breathing easily, no stridor  CV: no peripheral edema/cyanosis  GI: + 18 Fr PEG tube in place/capped, wound dressing - c/d/i, tenderness to palpation around area  Peripheral vascular: no JVD or edema

## 2020-11-12 NOTE — DISCHARGE NOTE PROVIDER - NSDCCPCAREPLAN_GEN_ALL_CORE_FT
PRINCIPAL DISCHARGE DIAGNOSIS  Diagnosis: Tongue mass  Assessment and Plan of Treatment:        PRINCIPAL DISCHARGE DIAGNOSIS  Diagnosis: Cancer of base of tongue  Assessment and Plan of Treatment: s/p tracheostomy and PEG

## 2020-11-12 NOTE — DISCHARGE NOTE PROVIDER - NSDCCPTREATMENT_GEN_ALL_CORE_FT
PRINCIPAL PROCEDURE  Procedure: Direct laryngoscopy with biopsy  Findings and Treatment: Positive Squamous cell carcinoma      SECONDARY PROCEDURE  Procedure: Insertion, PEG tube  Findings and Treatment:     Procedure: Open tracheostomy  Findings and Treatment:

## 2020-11-12 NOTE — DISCHARGE NOTE PROVIDER - INSTRUCTIONS
NPO, only NGT feeds NPO, PEG feeds: nutrition recs Glucerna 1.2 240ml X 2 feed then increase to 360ml X 4 feeds/day NPO, PEG feeds: Glucerna 1.5 x 5 containers per day - 360 ml x 3 meals- patterned feedings. Flush PEG with 50 ml pre and post meal.     Flush each medication separately.

## 2020-11-12 NOTE — DISCHARGE NOTE PROVIDER - NSDCMRMEDTOKEN_GEN_ALL_CORE_FT
alendronate weekly: 1 tab(s) orally once a week  ezetimibe 10 mg oral tablet: 1 tab(s) orally once a day  hydroxychloroquine 200 mg oral tablet: 1 tab(s) orally 2 times a day  ibuprofen 600 mg oral tablet: 1 tab(s) orally every 8 hours MDD:3  Incruse Ellipta 62.5 mcg/inh inhalation powder: inhaled once a day  losartan 25 mg oral tablet: 1 tab(s) orally once a day  mycophenolate mofetil 500 mg oral tablet: 2 tab(s) orally 2 times a day  omeprazole 40 mg oral delayed release capsule: 1 cap(s) orally once a day  simvastatin 40 mg oral tablet: 1 tab(s) orally once a day (at bedtime)  Ventolin HFA 90 mcg/inh inhalation aerosol: 2 puff(s) inhaled 4 times a day   alendronate weekly: 1 tab(s) orally once a week  ezetimibe 10 mg oral tablet: 1 tab(s) by gastrostomy tube once a day  hydroxychloroquine 200 mg oral tablet: 1 tab(s) by gastrostomy tube 2 times a day  ibuprofen 600 mg oral tablet: 1 tab(s) orally every 8 hours MDD:3  Incruse Ellipta 62.5 mcg/inh inhalation powder: inhaled once a day  levoFLOXacin 500 mg oral tablet: 1 tab(s) by gastrostomy tube every 24 hours for 3 more days   losartan: 25 milligram(s) by gastrostomy tube once a day  mycophenolate mofetil 500 mg oral tablet: 2 tab(s) by gastrostomy tube 2 times a day  omeprazole 40 mg oral delayed release capsule: 1 cap(s) by gastrostomy tube once a day  oxycodone-acetaminophen 5 mg-325 mg oral tablet: 1 - 2 tab(s) by gastrostomy tube every 4 to 6 hours, As Needed -Moderate Pain (4 - 6) MDD:6  predniSONE 20 mg oral tablet: 2 tab(s) by gastrostomy tube every 24 hours   simvastatin 40 mg oral tablet: 1 tab(s) by gastrostomy tube once a day (at bedtime)  Ventolin HFA 90 mcg/inh inhalation aerosol: 2 puff(s) inhaled 4 times a day

## 2020-11-12 NOTE — SWALLOW BEDSIDE ASSESSMENT ADULT - COMMENTS
Chart reviewed, case discussed w/ ENT PA, plan for VFSS to determine pts candidacy for po intake. Pt also for planned PEG
s/p trach change to #6 uncuffed trach tube, SPO2 @ 85% on room air, increased to 93-95% w/ O2 trach collar +trial of PMSV, +audible voice, +toleration w/o distress or change in SPO2, +Pt educated on donning/doffing valve w/ +return demonstration

## 2020-11-12 NOTE — CHART NOTE - NSCHARTNOTEFT_GEN_A_CORE
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  Patient seen and examined at bedside.  Dr Najera present for procedure.  Prior to procedure patient placed in appropriate positioning.  Trach suctioned at bedside.  Sutured removed.  Trach removed.   New 6CFS trach placed with no complications.  Trach suctioned.  Straps placed with snug fit.  Patient tolerated procedure.      - SLP to see patient in regards to Passy Pickens  - Cont trach care

## 2020-11-12 NOTE — DISCHARGE NOTE PROVIDER - HOSPITAL COURSE
64 yo female with large BOT mass sent in by Dr Najera for clearance for OR 11/6.  Patient now s/p awake trach, DL & bx - bx results + SCCa . SLP eval, failed 11/6, MBS 11/10, severe pharyngeal dysphagia rec PEG 1' for nutrition, PEG for 11/11 w/ IR - nutrition recs Glucerna 1.2 240ml X 2 feed then increase to 360ml X 4 feeds/day. changed trach to 6CFS on 11/12.      Patient also had L sided Rib pain, f/u Ucx - UA negative, CT Chest - L lower lobe consolidation likely PNA associated w/ small pleural effusion.Trace left apical PTX.  Per pulm - Levaquin for 7 d & Prednisone 40mg for 5d per pulm, diruresis prn. 66 yo female with large BOT mass sent in by Dr Najera for clearance for OR 11/6/20.  Patient now s/p awake trach, DL & bx - bx results + SCCa . Pt was monitored overnight in the ICU for acutre tracheostomy care. SLP eval, failed 11/6/20, MBS 11/10/20, severe pharyngeal dysphagia rec PEG 1' for nutrition, PEG for 11/11/20 w/ IR - nutrition recs Glucerna 1.2 240ml X 2 feed then increase to 360ml X 4 feeds/day. changed trach to 6CFS on 11/12/20.  Patient also had L sided Rib pain, CT Chest - L lower lobe consolidation likely PNA associated w/ small pleural effusion. Trace left apical PTX.  Levaquin for 7 d & Prednisone 40mg for 5d per pulm, diruresis prn. Pt discharged in stable condition on 11/13/20. Pt is a 64 yo female with large BOT mass sent in by Dr Najera for clearance for OR 11/6/20.  Patient now s/p awake trach, DL & bx - bx results + SCCa . Pt was monitored overnight in the ICU for acute tracheostomy care. SLP evaluation done post operatively, failed 11/6/20. A MBS done on 11/10/20 showed severe pharyngeal dysphagia with recommendations for nutrition primarily through a PEG. IR consulted for PEG placement, PEG done on. Nutrition consulted, recommended Glucerna 1.2 360ml X 4 feeds/day. Pt remained stable inhouse and preferred to be discharged home with services. Pt's tracheostomy was changed to 6CFS on 11/12/20.  Patient developed acute  L sided Rib pain with increase cough, Pulmonary consulted. A CT Chest was done showing L lower lobe consolidation likely PNA associated w/ small pleural effusion & trace left apical PTX.  Pulmonary recommended Levaquin for 7 days & Prednisone 40mg for 5days. Pt remained stable while in house waiting for delivery of home care supplies. Pt was discharged in stable condition on 11/15/20.

## 2020-11-12 NOTE — DISCHARGE NOTE PROVIDER - PROVIDER TOKENS
PROVIDER:[TOKEN:[[id number]]] PROVIDER:[TOKEN:[[id number]]],PROVIDER:[TOKEN:[[id number]]],PROVIDER:[TOKEN:[[id number]]] PROVIDER:[TOKEN:[[id number]]],PROVIDER:[TOKEN:[[id number]]],PROVIDER:[TOKEN:[[id number]]],PROVIDER:[TOKEN:[[id number]]]

## 2020-11-12 NOTE — DISCHARGE NOTE PROVIDER - NSDCFUADDINST_GEN_ALL_CORE_FT
Return to ED or call Dr. Najera if develop fever over 101.4F, shortness of breath or difficulty breathing, pain or swelling from or around PEG tube, nausea,  vomiting, diarrhea or constipation.

## 2020-11-12 NOTE — DISCHARGE NOTE PROVIDER - CARE PROVIDER_API CALL
Nuria Najera  OTOLARYNGOLOGY  [address] [place] [address], [address]nd [place] [address]  Phone: [phone]  Fax: [phone]  Follow Up Time:    Nuria Najera  OTOLARYNGOLOGY  [address] [facility], [address]nd [place] [address]  Phone: [phone]  Fax: [phone]  Follow Up Time:     Christina Cardenas  NUTRITIONAL SUPPORT  [address] [place] [address]  Phone: [phone]  Fax: [phone]  Follow Up Time:     Shmuel Bright  CRITICAL CARE MEDICINE  [address] [facility], [address]  [place] [address]  Phone: [phone]  Fax: [phone]  Follow Up Time:    Nuria Najera  OTOLARYNGOLOGY  [address] [facility], [address]nd [place] [address]  Phone: [phone]  Fax: [phone]  Follow Up Time:     Christina Cardenas  NUTRITIONAL SUPPORT  [address] [place] [address]  Phone: [phone]  Fax: [phone]  Follow Up Time:     Shmuel Bright  CRITICAL CARE MEDICINE  [address] [facility], [address]  [place] [address]  Phone: [phone]  Fax: [phone]  Follow Up Time:     Alex Mendoza  INTERNAL MEDICINE  [address] [facility], [address]  [place] [address]  Phone: [phone]  Fax: [phone]  Follow Up Time:

## 2020-11-12 NOTE — DISCHARGE NOTE PROVIDER - CARE PROVIDERS DIRECT ADDRESSES
,[email or web address],[email or web address],[email or web address],DirectAddress_Unknown,[email or web address]

## 2020-11-13 LAB
ANION GAP SERPL CALC-SCNC: 10 MMOL/L — SIGNIFICANT CHANGE UP (ref 7–14)
BUN SERPL-MCNC: 12 MG/DL — SIGNIFICANT CHANGE UP (ref 10–20)
CALCIUM SERPL-MCNC: 8.9 MG/DL — SIGNIFICANT CHANGE UP (ref 8.5–10.1)
CHLORIDE SERPL-SCNC: 99 MMOL/L — SIGNIFICANT CHANGE UP (ref 98–110)
CO2 SERPL-SCNC: 28 MMOL/L — SIGNIFICANT CHANGE UP (ref 17–32)
CREAT SERPL-MCNC: 0.5 MG/DL — LOW (ref 0.7–1.5)
GLUCOSE BLDC GLUCOMTR-MCNC: 124 MG/DL — HIGH (ref 70–99)
GLUCOSE BLDC GLUCOMTR-MCNC: 127 MG/DL — HIGH (ref 70–99)
GLUCOSE BLDC GLUCOMTR-MCNC: 129 MG/DL — HIGH (ref 70–99)
GLUCOSE BLDC GLUCOMTR-MCNC: 175 MG/DL — HIGH (ref 70–99)
GLUCOSE SERPL-MCNC: 126 MG/DL — HIGH (ref 70–99)
HCT VFR BLD CALC: 38.2 % — SIGNIFICANT CHANGE UP (ref 37–47)
HGB BLD-MCNC: 12.4 G/DL — SIGNIFICANT CHANGE UP (ref 12–16)
MAGNESIUM SERPL-MCNC: 2.2 MG/DL — SIGNIFICANT CHANGE UP (ref 1.8–2.4)
MCHC RBC-ENTMCNC: 29.2 PG — SIGNIFICANT CHANGE UP (ref 27–31)
MCHC RBC-ENTMCNC: 32.5 G/DL — SIGNIFICANT CHANGE UP (ref 32–37)
MCV RBC AUTO: 90.1 FL — SIGNIFICANT CHANGE UP (ref 81–99)
NRBC # BLD: 0 /100 WBCS — SIGNIFICANT CHANGE UP (ref 0–0)
PHOSPHATE SERPL-MCNC: 4 MG/DL — SIGNIFICANT CHANGE UP (ref 2.1–4.9)
PLATELET # BLD AUTO: 268 K/UL — SIGNIFICANT CHANGE UP (ref 130–400)
POTASSIUM SERPL-MCNC: 4.2 MMOL/L — SIGNIFICANT CHANGE UP (ref 3.5–5)
POTASSIUM SERPL-SCNC: 4.2 MMOL/L — SIGNIFICANT CHANGE UP (ref 3.5–5)
RBC # BLD: 4.24 M/UL — SIGNIFICANT CHANGE UP (ref 4.2–5.4)
RBC # FLD: 13.7 % — SIGNIFICANT CHANGE UP (ref 11.5–14.5)
SODIUM SERPL-SCNC: 137 MMOL/L — SIGNIFICANT CHANGE UP (ref 135–146)
WBC # BLD: 12.06 K/UL — HIGH (ref 4.8–10.8)
WBC # FLD AUTO: 12.06 K/UL — HIGH (ref 4.8–10.8)
ZINC SERPL-MCNC: 57 UG/DL — SIGNIFICANT CHANGE UP (ref 56–134)

## 2020-11-13 RX ORDER — ALBUTEROL 90 UG/1
2.5 AEROSOL, METERED ORAL
Refills: 0 | Status: DISCONTINUED | OUTPATIENT
Start: 2020-11-13 | End: 2020-11-13

## 2020-11-13 RX ORDER — ALBUTEROL 90 UG/1
2.5 AEROSOL, METERED ORAL EVERY 6 HOURS
Refills: 0 | Status: DISCONTINUED | OUTPATIENT
Start: 2020-11-13 | End: 2020-11-15

## 2020-11-13 RX ADMIN — OXYCODONE AND ACETAMINOPHEN 2 TABLET(S): 5; 325 TABLET ORAL at 23:10

## 2020-11-13 RX ADMIN — ALBUTEROL 2.5 MILLIGRAM(S): 90 AEROSOL, METERED ORAL at 07:44

## 2020-11-13 RX ADMIN — LOSARTAN POTASSIUM 25 MILLIGRAM(S): 100 TABLET, FILM COATED ORAL at 05:37

## 2020-11-13 RX ADMIN — HEPARIN SODIUM 5000 UNIT(S): 5000 INJECTION INTRAVENOUS; SUBCUTANEOUS at 14:52

## 2020-11-13 RX ADMIN — ALBUTEROL 2 PUFF(S): 90 AEROSOL, METERED ORAL at 03:45

## 2020-11-13 RX ADMIN — HEPARIN SODIUM 5000 UNIT(S): 5000 INJECTION INTRAVENOUS; SUBCUTANEOUS at 22:11

## 2020-11-13 RX ADMIN — MYCOPHENOLATE MOFETIL 1000 MILLIGRAM(S): 250 CAPSULE ORAL at 05:37

## 2020-11-13 RX ADMIN — Medication 200 MILLIGRAM(S): at 05:38

## 2020-11-13 RX ADMIN — ALBUTEROL 2.5 MILLIGRAM(S): 90 AEROSOL, METERED ORAL at 13:26

## 2020-11-13 RX ADMIN — OXYCODONE AND ACETAMINOPHEN 2 TABLET(S): 5; 325 TABLET ORAL at 04:20

## 2020-11-13 RX ADMIN — OXYCODONE AND ACETAMINOPHEN 2 TABLET(S): 5; 325 TABLET ORAL at 14:49

## 2020-11-13 RX ADMIN — OXYCODONE AND ACETAMINOPHEN 2 TABLET(S): 5; 325 TABLET ORAL at 22:13

## 2020-11-13 RX ADMIN — ALBUTEROL 2.5 MILLIGRAM(S): 90 AEROSOL, METERED ORAL at 21:16

## 2020-11-13 RX ADMIN — OXYCODONE AND ACETAMINOPHEN 2 TABLET(S): 5; 325 TABLET ORAL at 19:19

## 2020-11-13 RX ADMIN — OXYCODONE AND ACETAMINOPHEN 2 TABLET(S): 5; 325 TABLET ORAL at 03:36

## 2020-11-13 RX ADMIN — SIMVASTATIN 40 MILLIGRAM(S): 20 TABLET, FILM COATED ORAL at 22:11

## 2020-11-13 RX ADMIN — Medication 40 MILLIGRAM(S): at 07:57

## 2020-11-13 RX ADMIN — Medication 200 MILLIGRAM(S): at 17:42

## 2020-11-13 RX ADMIN — HEPARIN SODIUM 5000 UNIT(S): 5000 INJECTION INTRAVENOUS; SUBCUTANEOUS at 05:38

## 2020-11-13 RX ADMIN — MYCOPHENOLATE MOFETIL 1000 MILLIGRAM(S): 250 CAPSULE ORAL at 17:43

## 2020-11-13 NOTE — SWALLOW BEDSIDE ASSESSMENT ADULT - MODE OF PRESENTATION
cup/self fed
self fed
self fed/fed by clinician
laced w/ green dye/cup/spoon/fed by clinician/self fed/straw

## 2020-11-13 NOTE — PROGRESS NOTE ADULT - SUBJECTIVE AND OBJECTIVE BOX
65 y.o F CAD, COPD, GERD, pneumothorax 2016, pulmonary nodule, relapsing polychondritis, HTN, HLD sent from Dr. Najera office for medical and pulmonary clearance, planning to go for OR on Friday 11/6/20 for awake tracheostomy and base of tongue lesion Bx. Patient reports mass has been growing for a few weeks and is causing her to have difficulty swallowing solids, with no difficulty swallowing liquids. She denies any other symptoms, including pain anywhere, SOB, dysuria, urinary frequency, N/V/D/C, fever, chills.  On outpatient testing Pt. found to have + UA and CXR findings of blunting of costophrenic angles consistent with pleural effusion.     From ENT note: Outpatient FFL11/2/20 There is a large mass appearing to arise from the base of tongue, filling vallecula and obstructing approximately 70% of oropharynx. Non-friable. The epiglottis, aryepiglottic folds, and arytenoids were within normal limits. Pyriform sinuses clear. Bilateral and symmetric movement of the vocal folds bilaterally, no masses or lesions  (03 Nov 2020 21:07)  OR 11/6:  ·  POST-OP DIAGNOSIS:  Malignant neoplasm of base of tongue 06-Nov-2020 09:40:18  Nuria Najera.  ·  PROCEDURES:  Direct laryngoscopy with biopsy 06-Nov-2020 09:39:42  Nuria Najera  Open tracheostomy 06-Nov-2020 09:39:34  Nuria Najera.    GT placement 11/11 - started using it yesterday.    Vital Signs Last 24 Hrs  T(C): 37.1 (13 Nov 2020 13:15), Max: 37.1 (12 Nov 2020 20:30)  T(F): 98.7 (13 Nov 2020 13:15), Max: 98.8 (13 Nov 2020 09:00)  HR: 81 (13 Nov 2020 13:15) (63 - 86)  BP: 128/68 (13 Nov 2020 13:15) (114/64 - 163/77)  BP(mean): --  RR: 18 (13 Nov 2020 13:15) (16 - 18)  SpO2: 95% (13 Nov 2020 07:55) (94% - 98%)  alert, verbal, trach capped  skin turgor good, on iv fluid still  abd soft, NT, GT site c/c, no leakage or erythema, no pain  pt tolerated first 2 GT feeds    MEDICATIONS  (STANDING):  ALBUTerol    0.083% 2.5 milliGRAM(s) Nebulizer every 6 hours  chlorhexidine 4% Liquid 1 Application(s) Topical <User Schedule>  heparin   Injectable 5000 Unit(s) SubCutaneous every 8 hours  hydroxychloroquine 200 milliGRAM(s) Oral two times a day  levoFLOXacin  Tablet 500 milliGRAM(s) Oral every 24 hours  losartan 25 milliGRAM(s) Oral daily  mycophenolate mofetil Suspension 1000 milliGRAM(s) Oral every 12 hours  predniSONE   Tablet 40 milliGRAM(s) Oral every 24 hours  simvastatin 40 milliGRAM(s) Oral at bedtime  tiotropium 18 MICROgram(s) Capsule 1 Capsule(s) Inhalation daily                        12.4   12.06 )-----------( 268      ( 13 Nov 2020 07:21 )             38.2   11-13    137  |  99  |  12  ----------------------------<  126<H>  4.2   |  28  |  0.5<L>    Ca    8.9      13 Nov 2020 07:21  Phos  4.0     11-13  Mg     2.2     11-13    11/3 -albumin 3.9  11/10 zinc 57  11/10 vitamin D 35

## 2020-11-13 NOTE — SWALLOW BEDSIDE ASSESSMENT ADULT - NS ASR SWALLOW FINDINGS DISCUS
Patient/Physician/Nursing/RN [facility] SHERMAN Osborne
DONA Lopez/Nursing/Patient/Family
Patient/Physician/Nursing
Patient/Physician/Nursing/ENT PA
will f/u w/ family education/Physician/Patient/Nursing

## 2020-11-13 NOTE — CHART NOTE - NSCHARTNOTEFT_GEN_A_CORE
Spoke w/ case management. Pt apporved for home care for trach and PEG awaiting delivery. Will likely dc home in am once delivery confirmed w/ pt son. Pt and son (Kameron) aware of plan and agrees.

## 2020-11-13 NOTE — SWALLOW BEDSIDE ASSESSMENT ADULT - SWALLOW EVAL: RECOMMENDED DIET
NGT feeds pending VFSS
NPO w/ alt means of nutrition/hydration
NPO w/ alt means of nutrition/hydration
maintain PEG feeds as 1' means of nut/hydration, sips of water and ice po
sips of water and ice

## 2020-11-13 NOTE — SWALLOW BEDSIDE ASSESSMENT ADULT - PHARYNGEAL PHASE
Lap Sleeve Gastrectomy
Delayed cough post oral intake
Within functional limits
Within functional limits
Cough post oral intake

## 2020-11-13 NOTE — PROGRESS NOTE ADULT - SUBJECTIVE AND OBJECTIVE BOX
ENT: 66 yo F with BOT/ vallecular SCCa, s/p DL and awake tracheostomy, POD #7, s/p PEG placement by IR, POD2. Patient seen and examined at bedside.  Patient doing well this morning.  Trach was changed yesterday with Dr Najera without any complications and patient tolerated procedure well. Pt has Passy Mike in place and is able to phonate.     [ x ] A 10 Point Review of Systems was negative except where noted.  [    ] Due to altered mental status/intubation, subjective information was not able to be obtained from the patient. History was obtained to the extent possible from the review of the chart and collateral sources of information.    Vital Signs Last 24 Hrs  T(C): 36.6 (13 Nov 2020 05:00), Max: 37.3 (12 Nov 2020 08:52)  T(F): 97.9 (13 Nov 2020 05:00), Max: 99.1 (12 Nov 2020 08:52)  HR: 69 (13 Nov 2020 05:00) (63 - 88)  BP: 124/64 (13 Nov 2020 05:00) (114/64 - 169/82)  RR: 18 (13 Nov 2020 05:00) (18 - 20)  SpO2: 94% (13 Nov 2020 05:00) (94% - 98%)    MEDICATIONS  (STANDING):  acetaminophen    Suspension .. 650 milliGRAM(s) Oral every 6 hours  ALBUTerol    90 MICROgram(s) HFA Inhaler 2 Puff(s) Inhalation every 4 hours  ALBUTerol   0.5% 2.5 milliGRAM(s) Nebulizer once  chlorhexidine 4% Liquid 1 Application(s) Topical <User Schedule>  heparin   Injectable 5000 Unit(s) SubCutaneous every 8 hours  hydroxychloroquine 200 milliGRAM(s) Oral two times a day  losartan 25 milliGRAM(s) Oral daily  mycophenolate mofetil Suspension 1000 milliGRAM(s) Oral every 12 hours  pantoprazole  Injectable 40 milliGRAM(s) IV Push daily  simvastatin 40 milliGRAM(s) Oral at bedtime  tiotropium 18 MICROgram(s) Capsule 1 Capsule(s) Inhalation daily    MEDICATIONS  (PRN):  oxycodone    5 mG/acetaminophen 325 mG 2 Tablet(s) Oral/Enteral Tube every 4 hours PRN Moderate Pain (4 - 6)      FAMILY HISTORY:  Family history of diabetes mellitus (DM)      Social History:  Smokes 1 pack/day  No alcohol or drug use (03 Nov 2020 21:07)      PHYSICAL EXAM:  GEN: Well-developed, well-nourished. NAD, awake and alert. No drooling or pooling of secretions. Able to phonate with passy mike in place.  Skin; Good color, non diaphoretic  HEENT: Normocephalic/ Atraumatic, no scleral injection, Nares patent b/l, no discharge or active bleeding noted. Buccal Mucosa moist/pink, tongue/uvula midline, posterior oropharynx clear, no evidence of edema/erythema/exudates   Neck: Trachea midline, +6 CFS in place/tracheostomy collar in place, +passy mike in place and patient is able to phonate  Resp: breathing easily, no accessory muscle use  Abdomen: Soft, NT.  Cardio: +S1/S2  CV: no peripheral edema/cyanosis  GI: + 18 Fr PEG tube in place/capped, wound dressing - c/d/i, tenderness to palpation around area  Peripheral vascular: no JVD or edema      LABS:                        12.4   12.06 )-----------( 268      ( 13 Nov 2020 07:21 )             38.2

## 2020-11-13 NOTE — SWALLOW BEDSIDE ASSESSMENT ADULT - SLP GENERAL OBSERVATIONS
pt received in bed awake alert w/o c/o pain. +Shiley size 6 cuffed trach w/ fresh blood at trach site; +humidified o2 via trach collar; pt communicating via writing and head nod.
Pt received in bed asleep, woke to verbal stim, +educated on dysphagia and plan for VFSS
Pt received in bed awake and alert on room air, SPO2 @ 85%, increased to 95% w/ O2 trach collar
Pt received in bed, mood is low, pt taking min amt of water po
pt received in bed awake alert w/o c/o pain. +Shiley size 6 cuffed trach (cuff deflated at time of assessment); +dried blood at trach site; +humidified o2 via trach collar; pt communicating via writing and head nod. pts son Miah at bedside.

## 2020-11-13 NOTE — PROGRESS NOTE ADULT - ASSESSMENT
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 Patient seen and examined at bedside.  Patient doing well this morning.  Trach was changed yesterday with Dr. Najera without any complications and patient tolerated procedure well. Pt has Passy Benton in place and is able to phonate.     ·	Continue trach care  ·	RN education for PEG care   ·	F/u with Dr. Juárez outpatient for nutrition management  ·	Continue abx and steroids per pulmonary recommendations  ·	F/u outpatient for PET scan scheduled Monday 11/16/20  ·	Plan for discharge today

## 2020-11-13 NOTE — PROGRESS NOTE ADULT - ASSESSMENT
ASSESSMENT  Pt is a 65y Female with BOT/vallecular SCCa, s/p DL and awake tracheostomy, POD # 3      PLAN  - tolerating Glucerna 1.2 increased today to 360 ml x 4 feeds/day   - flush before and after each feeding with 50 ml water syringe push  - please have nursing teach pt how to infuse feeds when feeds are given - pt says she was taught to flush but not how to administer feeds  - cont to check poc glucose before each feeding.  - feeds changed to Glucerna because glc was elevated on the Jevity; this was worsened by current treatment with steroids  - spoke with case management yet again today - reportedly not yet submitted. Later spoke with pharmacist at receiving home vendor:    - home enteral Rx Glucerna 1.5 x 5 containers/d - please instruct pt to give 360 ml x 3 meal- patterned feedings/d on discharge. Will increase mid-day feed to 480 ml if pt managing well, when I see her as an outpt in 2 weeks  - outpt f/u in 2 weeks - [phone]  - RN to teach pt to give each med separately - NEVER crush multiple meds together - and flush min 20 ml water after each med  - change meds to liquid form, if available

## 2020-11-13 NOTE — CHART NOTE - NSCHARTNOTEFT_GEN_A_CORE
Caregiver son Kameron confirmed all trach supplies have been delivered to home tonight. SW remains available tonight ([phone])    D/C plan remains: home tomorrow AM

## 2020-11-13 NOTE — SWALLOW BEDSIDE ASSESSMENT ADULT - NS SPL SWALLOW CLINIC TRIAL FT
+toleration, +use of consecutive swallows independently
+toleration of sips of water w/o overt s/s of penetration/aspiration

## 2020-11-13 NOTE — SWALLOW BEDSIDE ASSESSMENT ADULT - SLP PERTINENT HISTORY OF CURRENT PROBLEM
pt is a 66 y/o F w/ PMHx: CAD, COPD, GERD, pneumothorax, pulmonary nodule, relapsing polychondritis, HTN, HLD sent from Dr. Najera office for awake tracheostomy and base of tongue lesion Bx. pt reports mass has been growing for a few weeks and is causing her to have difficulty swallowing solids but no difficulty with liquids. 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
pt is a 66 y/o F w/ PMHx: CAD, COPD, GERD, pneumothorax, pulmonary nodule, relapsing polychondritis, HTN, HLD sent from Dr. Najera office for awake tracheostomy and base of tongue lesion Bx. pt reports mass has been growing for a few weeks and is causing her to have difficulty swallowing solids but no difficulty with liquids. pt s/p BOT biopsy and trach placement yesterday. Per ENT note, biopsy confirms SCCa. pt will be discussed at Head & Neck Tumor Board Monday 11/9 for treatment plan. pt cleared for downgrade to medical floors
pt is a 64 y/o F w/ PMHx: CAD, COPD, GERD, pneumothorax, pulmonary nodule, relapsing polychondritis, HTN, HLD sent from Dr. Najera office for awake tracheostomy and base of tongue lesion Bx. pt reports mass has been growing for a few weeks and is causing her to have difficulty swallowing solids but no difficulty with liquids. pt s/p BOT biopsy and trach placement yesterday. Per ENT note, biopsy confirms SCCa. pt will be discussed at Head & Neck Tumor Board Monday 11/9 for treatment plan. pt cleared for downgrade to medical floors
pt is a 64 y/o F w/ PMHx: CAD, COPD, GERD, pneumothorax, pulmonary nodule, relapsing polychondritis, HTN, HLD sent from Dr. Najera office for awake tracheostomy and base of tongue lesion Bx. pt reports mass has been growing for a few weeks and is causing her to have difficulty swallowing solids but no difficulty with liquids. pt s/p BOT biopsy and trach placement. Per ENT note, biopsy confirms SCCa. Pt s/p PEG placement w/ plan for chemo and XRT.
pt is a 66 y/o F w/ PMHx: CAD, COPD, GERD, pneumothorax, pulmonary nodule, relapsing polychondritis, HTN, HLD sent from Dr. Najera office for awake tracheostomy and base of tongue lesion Bx. pt reports mass has been growing for a few weeks and is causing her to have difficulty swallowing solids but no difficulty with liquids. pt s/p BOT biopsy and trach placement. Per ENT note, biopsy confirms SCCa. Pt s/p PEG placement w/ plan for chemo and XRT.

## 2020-11-14 ENCOUNTER — TRANSCRIPTION ENCOUNTER (OUTPATIENT)
Age: 65
End: 2020-11-14

## 2020-11-14 LAB
GLUCOSE BLDC GLUCOMTR-MCNC: 107 MG/DL — HIGH (ref 70–99)
GLUCOSE BLDC GLUCOMTR-MCNC: 113 MG/DL — HIGH (ref 70–99)
GLUCOSE BLDC GLUCOMTR-MCNC: 132 MG/DL — HIGH (ref 70–99)
GLUCOSE BLDC GLUCOMTR-MCNC: 171 MG/DL — HIGH (ref 70–99)
GLUCOSE BLDC GLUCOMTR-MCNC: 187 MG/DL — HIGH (ref 70–99)
ZINC SERPL-MCNC: 56 UG/DL — SIGNIFICANT CHANGE UP (ref 56–134)

## 2020-11-14 RX ADMIN — HEPARIN SODIUM 5000 UNIT(S): 5000 INJECTION INTRAVENOUS; SUBCUTANEOUS at 14:17

## 2020-11-14 RX ADMIN — MYCOPHENOLATE MOFETIL 1000 MILLIGRAM(S): 250 CAPSULE ORAL at 18:46

## 2020-11-14 RX ADMIN — ALBUTEROL 2.5 MILLIGRAM(S): 90 AEROSOL, METERED ORAL at 18:31

## 2020-11-14 RX ADMIN — ALBUTEROL 2.5 MILLIGRAM(S): 90 AEROSOL, METERED ORAL at 10:31

## 2020-11-14 RX ADMIN — SIMVASTATIN 40 MILLIGRAM(S): 20 TABLET, FILM COATED ORAL at 21:56

## 2020-11-14 RX ADMIN — HEPARIN SODIUM 5000 UNIT(S): 5000 INJECTION INTRAVENOUS; SUBCUTANEOUS at 21:55

## 2020-11-14 RX ADMIN — Medication 200 MILLIGRAM(S): at 18:46

## 2020-11-14 RX ADMIN — LOSARTAN POTASSIUM 25 MILLIGRAM(S): 100 TABLET, FILM COATED ORAL at 05:39

## 2020-11-14 RX ADMIN — OXYCODONE AND ACETAMINOPHEN 2 TABLET(S): 5; 325 TABLET ORAL at 23:07

## 2020-11-14 RX ADMIN — OXYCODONE AND ACETAMINOPHEN 2 TABLET(S): 5; 325 TABLET ORAL at 22:09

## 2020-11-14 RX ADMIN — HEPARIN SODIUM 5000 UNIT(S): 5000 INJECTION INTRAVENOUS; SUBCUTANEOUS at 05:39

## 2020-11-14 RX ADMIN — ALBUTEROL 2.5 MILLIGRAM(S): 90 AEROSOL, METERED ORAL at 20:48

## 2020-11-14 RX ADMIN — MYCOPHENOLATE MOFETIL 1000 MILLIGRAM(S): 250 CAPSULE ORAL at 05:38

## 2020-11-14 RX ADMIN — Medication 200 MILLIGRAM(S): at 05:39

## 2020-11-14 RX ADMIN — Medication 40 MILLIGRAM(S): at 10:04

## 2020-11-14 NOTE — DISCHARGE NOTE NURSING/CASE MANAGEMENT/SOCIAL WORK - PATIENT PORTAL LINK FT
You can access the FollowMyHealth Patient Portal offered by [facility] by registering at the following website: [email or web address]. By joining [employer]’s FollowMyHealth portal, you will also be able to view your health information using other applications (apps) compatible with our system.

## 2020-11-14 NOTE — PROGRESS NOTE ADULT - ASSESSMENT
Pt is a 65y Female with BOT/ vallecular SCCa, s/p DL and awake tracheostomy, POD #8, s/p PEG placement by IR, POD#3.    ·	cont trach collar humidification  ·	pulse ox off trach collar 96%, awaiting 02 while ambulating -> unless walking 02 <88%, will not require 02 for home  ·	cont PEG tube feeds, will monitor BM's  ·	awaiting home care set up  ·	w/d with attng

## 2020-11-14 NOTE — PROGRESS NOTE ADULT - SUBJECTIVE AND OBJECTIVE BOX
ENT DAILY PROGRESS NOTE    Pt is a 65y Female with BOT/ vallecular SCCa, s/p DL and awake tracheostomy, POD #8, s/p PEG placement by IR, POD#3. Pt seen and examined at bedside. Pt doing well, awaiting home care setup. Pt had BM this morning, more loose than previous but not diarrhea. Pt denies any abdominal pain or discomfort. No nausea/vomiting. Pt has been tolerating PMSV.       REVIEW OF SYSTEMS   [x] A ten-point review of systems was otherwise negative except as noted.  [ ] Due to altered mental status/intubation, subjective information were not able to be obtained from patient. History was obtained, to the extent possible, from review of the chart and collateral sources of information.    Allergies    CONTRAST DYS (Nausea)  No Known Drug Allergies    Intolerances      MEDICATIONS:  ALBUTerol    0.083% 2.5 milliGRAM(s) Nebulizer every 6 hours  chlorhexidine 4% Liquid 1 Application(s) Topical <User Schedule>  heparin   Injectable 5000 Unit(s) SubCutaneous every 8 hours  hydroxychloroquine 200 milliGRAM(s) Oral two times a day  levoFLOXacin  Tablet 500 milliGRAM(s) Oral every 24 hours  losartan 25 milliGRAM(s) Oral daily  mycophenolate mofetil Suspension 1000 milliGRAM(s) Oral every 12 hours  oxycodone    5 mG/acetaminophen 325 mG 2 Tablet(s) Oral/Enteral Tube every 4 hours PRN  predniSONE   Tablet 40 milliGRAM(s) Oral every 24 hours  simvastatin 40 milliGRAM(s) Oral at bedtime  tiotropium 18 MICROgram(s) Capsule 1 Capsule(s) Inhalation daily      Vital Signs Last 24 Hrs  T(C): 36.3 (14 Nov 2020 05:11), Max: 37.1 (13 Nov 2020 13:15)  T(F): 97.4 (14 Nov 2020 05:11), Max: 98.7 (13 Nov 2020 13:15)  HR: 68 (14 Nov 2020 05:11) (58 - 81)  BP: 114/59 (14 Nov 2020 05:11) (114/59 - 141/64)  RR: 18 (14 Nov 2020 05:11) (18 - 18)  SpO2: 96% (14 Nov 2020 01:44) (96% - 96%)      11-13 @ 07:01  -  11-14 @ 07:00  --------------------------------------------------------  IN:    Enteral Tube Flush: 50 mL    Glucerna: 960 mL  Total IN: 1010 mL    OUT:    Voided (mL): 950 mL  Total OUT: 950 mL    Total NET: 60 mL      11-14 @ 07:01  -  11-14 @ 10:58  --------------------------------------------------------  IN:    Enteral Tube Flush: 100 mL    Glucerna: 360 mL  Total IN: 460 mL    OUT:    Voided (mL): 600 mL  Total OUT: 600 mL    Total NET: -140 mL    PHYSICAL EXAM:    GEN: Well-developed, well-nourished. NAD, awake and alert. No drooling or pooling of secretions. No stridor or stertor. Good vocal quality with PMSV.   SKIN: Good color, non diaphoretic  HEENT: NC/AT; Oral mucosa pink and moist. No erythema or edema noted to buccal mucosa, tongue, FOM, uvula or posterior oropharynx. Uvula midline  NECK:  Trachea midline.  + 6 CFS in place, inner cannula removed and cleaned. pt able to remove and replace PMSV.  RESP: No dyspnea, non-labored breathing. No use of accessory muscles.  CARDIO: +S1/S2  ABDO: Soft, NT.  EXT: WOLFE x 4    LABS:  CBC-                        12.4   12.06 )-----------( 268      ( 13 Nov 2020 07:21 )             38.2     BMP/CMP-  13 Nov 2020 07:21    137    |  99     |  12     ----------------------------<  126    4.2     |  28     |  0.5      Ca    8.9        13 Nov 2020 07:21  Phos  4.0       13 Nov 2020 07:21  Mg     2.2       13 Nov 2020 07:21      Coagulation Studies-    Endocrine Panel-              RADIOLOGY & ADDITIONAL STUDIES:

## 2020-11-14 NOTE — CHART NOTE - NSCHARTNOTEFT_GEN_A_CORE
Pt is a 65 y.o female with BOT/vallecula SCCa, s/p trach & PEG. Pt planned to be discharged home with services. Pt had supplies delivered to the house today, pt's son still needs education for home care. Pt will likely not be able to get home nursing care tomorrow. Discussed with pt and sons (Kameron & Feliberto)- safe discharge would be tomorrow after education for PEG and trach done again. Will have RN care set up for Monday afternoon - has PET monday AM.     Pt, patients sons (Kaemron Dao), case management and RN all aware and agreeable to plan. Will ask charge RN/ADN if son can come to the hospital at 9 am so he can demonstrate understanding for two feeds (9 am & 2pm).

## 2020-11-15 VITALS — OXYGEN SATURATION: 93 %

## 2020-11-15 LAB
GLUCOSE BLDC GLUCOMTR-MCNC: 131 MG/DL — HIGH (ref 70–99)
GLUCOSE BLDC GLUCOMTR-MCNC: 182 MG/DL — HIGH (ref 70–99)
GLUCOSE BLDC GLUCOMTR-MCNC: 90 MG/DL — SIGNIFICANT CHANGE UP (ref 70–99)

## 2020-11-15 RX ORDER — MYCOPHENOLATE MOFETIL 250 MG/1
2 CAPSULE ORAL
Qty: 0 | Refills: 0 | DISCHARGE

## 2020-11-15 RX ORDER — OMEPRAZOLE 10 MG/1
1 CAPSULE, DELAYED RELEASE ORAL
Qty: 0 | Refills: 0 | DISCHARGE

## 2020-11-15 RX ORDER — OXYCODONE AND ACETAMINOPHEN 5; 325 MG/1; MG/1
1 TABLET ORAL
Qty: 20 | Refills: 0
Start: 2020-11-15 | End: 2020-11-19

## 2020-11-15 RX ORDER — LOSARTAN POTASSIUM 100 MG/1
1 TABLET, FILM COATED ORAL
Qty: 0 | Refills: 0 | DISCHARGE

## 2020-11-15 RX ORDER — HYDROXYCHLOROQUINE SULFATE 200 MG
1 TABLET ORAL
Qty: 0 | Refills: 0 | DISCHARGE

## 2020-11-15 RX ORDER — SIMVASTATIN 20 MG/1
1 TABLET, FILM COATED ORAL
Qty: 0 | Refills: 0 | DISCHARGE

## 2020-11-15 RX ORDER — EZETIMIBE 10 MG/1
1 TABLET ORAL
Qty: 0 | Refills: 0 | DISCHARGE

## 2020-11-15 RX ORDER — LEVOFLOXACIN 5 MG/ML
1 INJECTION, SOLUTION INTRAVENOUS
Qty: 3 | Refills: 0
Start: 2020-11-15 | End: 2020-11-17

## 2020-11-15 RX ADMIN — Medication 200 MILLIGRAM(S): at 05:56

## 2020-11-15 RX ADMIN — Medication 40 MILLIGRAM(S): at 09:59

## 2020-11-15 RX ADMIN — ALBUTEROL 2.5 MILLIGRAM(S): 90 AEROSOL, METERED ORAL at 14:02

## 2020-11-15 RX ADMIN — LOSARTAN POTASSIUM 25 MILLIGRAM(S): 100 TABLET, FILM COATED ORAL at 05:56

## 2020-11-15 RX ADMIN — ALBUTEROL 2.5 MILLIGRAM(S): 90 AEROSOL, METERED ORAL at 07:40

## 2020-11-15 RX ADMIN — HEPARIN SODIUM 5000 UNIT(S): 5000 INJECTION INTRAVENOUS; SUBCUTANEOUS at 05:55

## 2020-11-15 RX ADMIN — MYCOPHENOLATE MOFETIL 1000 MILLIGRAM(S): 250 CAPSULE ORAL at 05:56

## 2020-11-15 NOTE — PROGRESS NOTE ADULT - SUBJECTIVE AND OBJECTIVE BOX
ENT DAILY PROGRESS NOTE    Pt is a 65y Female with BOT/ vallecular SCCa, s/p DL and awake tracheostomy, POD #9, s/p PEG placement by IR, POD#4. Pt seen and examined at bedside. Pt doing well, awaiting home care setup. Pt with more formed BM's last night and this AM. Pt denies any abdominal pain or discomfort. No nausea/vomiting. Pt has been tolerating PMSV. Pt states she feels her neck has been swollen since the tracheostomy tube being placed. Pt also feels she has more secretions in the morning, sometimes deep suctioning bothers her. Pt denies any SOB/diff breathing.      REVIEW OF SYSTEMS   [x] A ten-point review of systems was otherwise negative except as noted.  [ ] Due to altered mental status/intubation, subjective information were not able to be obtained from patient. History was obtained, to the extent possible, from review of the chart and collateral sources of information.    Allergies    CONTRAST DYS (Nausea)  No Known Drug Allergies    Intolerances      MEDICATIONS:  ALBUTerol  0.083% 2.5 milliGRAM(s) Nebulizer every 6 hours  chlorhexidine 4% Liquid 1 Application(s) Topical <User Schedule>  heparin   Injectable 5000 Unit(s) SubCutaneous every 8 hours  hydroxychloroquine 200 milliGRAM(s) Oral two times a day  levoFLOXacin  Tablet 500 milliGRAM(s) Oral every 24 hours  losartan 25 milliGRAM(s) Oral daily  mycophenolate mofetil Suspension 1000 milliGRAM(s) Oral every 12 hours  oxycodone    5 mG/acetaminophen 325 mG 2 Tablet(s) Oral/Enteral Tube every 4 hours PRN  predniSONE   Tablet 40 milliGRAM(s) Oral every 24 hours  simvastatin 40 milliGRAM(s) Oral at bedtime  tiotropium 18 MICROgram(s) Capsule 1 Capsule(s) Inhalation daily      Vital Signs Last 24 Hrs  T(C): 36.5 (15 Nov 2020 05:11), Max: 37.1 (14 Nov 2020 13:00)  T(F): 97.7 (15 Nov 2020 05:11), Max: 98.7 (14 Nov 2020 13:00)  HR: 94 (15 Nov 2020 05:11) (71 - 94)  BP: 137/68 (15 Nov 2020 05:11) (110/56 - 149/78)  RR: 19 (15 Nov 2020 05:50) (18 - 22)  SpO2: 94% (15 Nov 2020 05:50) (86% - 94%)      11-14 @ 07:01  -  11-15 @ 07:00  --------------------------------------------------------  IN:    Enteral Tube Flush: 330 mL    Glucerna: 1440 mL  Total IN: 1770 mL    OUT:    Voided (mL): 2000 mL  Total OUT: 2000 mL    Total NET: -230 mL      11-15 @ 07:01  -  11-15 @ 08:14  --------------------------------------------------------  IN:  Total IN: 0 mL    OUT:    Voided (mL): 400 mL  Total OUT: 400 mL    Total NET: -400 mL      PHYSICAL EXAM:    GEN: Well-developed, well-nourished. NAD, awake and alert. No drooling or pooling of secretions. No stridor or stertor. Good vocal quality with PMSV.   SKIN: Good color, non diaphoretic  HEENT: NC/AT; Oral mucosa pink and moist. No erythema or edema noted to buccal mucosa, tongue, FOM, uvula or posterior oropharynx. Uvula midline  NECK:  Trachea midline. + 6 CFS in place, no surrounding erythema or edema.   RESP: No dyspnea, non-labored breathing. No use of accessory muscles.  CARDIO: +S1/S2  ABDO: Soft, NT.  EXT: WOLFE x 4    LABS:  CBC-    BMP/CMP-    Coagulation Studies-    Endocrine Panel-      RADIOLOGY & ADDITIONAL STUDIES:

## 2020-11-15 NOTE — PROGRESS NOTE ADULT - ASSESSMENT
Pt is a 65y Female with BOT/ vallecular SCCa, s/p DL and awake tracheostomy, POD #9, s/p PEG placement by IR, POD#4 - doing well.    ·	home care set up, son to come to bedside this morning for PEG & trach education  ·	d/w pt and RN - no need for deep tracheal suctioning if pt able to cough up secretions  ·	cont PO Levaquin for PNA as per pulm, steroid PO  x 1 more day (5 days total)  ·	will f/u case mgmt regarding ambulette for D/C home today  ·	cont ambulation  ·	cont PEG tube feeds, NPO  ·	scheduled for PET tomorrow morning at 10AM  ·	w/d with attng

## 2020-11-15 NOTE — PROGRESS NOTE ADULT - REASON FOR ADMISSION
Clearance for tongue biopsy

## 2020-11-16 ENCOUNTER — OUTPATIENT (OUTPATIENT)
Dept: OUTPATIENT SERVICES | Facility: HOSPITAL | Age: 65
LOS: 1 days | Discharge: HOME | End: 2020-11-16
Payer: MEDICARE

## 2020-11-16 DIAGNOSIS — Z90.49 ACQUIRED ABSENCE OF OTHER SPECIFIED PARTS OF DIGESTIVE TRACT: Chronic | ICD-10-CM

## 2020-11-16 DIAGNOSIS — R91.1 SOLITARY PULMONARY NODULE: Chronic | ICD-10-CM

## 2020-11-16 DIAGNOSIS — Z98.890 OTHER SPECIFIED POSTPROCEDURAL STATES: Chronic | ICD-10-CM

## 2020-11-16 DIAGNOSIS — D37.02 NEOPLASM OF UNCERTAIN BEHAVIOR OF TONGUE: ICD-10-CM

## 2020-11-16 DIAGNOSIS — J38.1 POLYP OF VOCAL CORD AND LARYNX: Chronic | ICD-10-CM

## 2020-11-16 DIAGNOSIS — Z90.710 ACQUIRED ABSENCE OF BOTH CERVIX AND UTERUS: Chronic | ICD-10-CM

## 2020-11-16 LAB — GLUCOSE BLDC GLUCOMTR-MCNC: 110 MG/DL — HIGH (ref 70–99)

## 2020-11-16 PROCEDURE — 78815 PET IMAGE W/CT SKULL-THIGH: CPT | Mod: 26,PI

## 2020-11-18 LAB — ZINC SERPL-MCNC: 56 UG/DL — SIGNIFICANT CHANGE UP (ref 56–134)

## 2020-11-19 ENCOUNTER — APPOINTMENT (OUTPATIENT)
Dept: OTOLARYNGOLOGY | Facility: CLINIC | Age: 65
End: 2020-11-19
Payer: MEDICARE

## 2020-11-19 VITALS — TEMPERATURE: 97.4 F | WEIGHT: 164 LBS | HEIGHT: 64 IN | BODY MASS INDEX: 28 KG/M2

## 2020-11-19 DIAGNOSIS — C01 MALIGNANT NEOPLASM OF BASE OF TONGUE: ICD-10-CM

## 2020-11-19 DIAGNOSIS — K14.9 DISEASE OF TONGUE, UNSPECIFIED: ICD-10-CM

## 2020-11-19 DIAGNOSIS — F17.210 NICOTINE DEPENDENCE, CIGARETTES, UNCOMPLICATED: ICD-10-CM

## 2020-11-19 DIAGNOSIS — M94.1 RELAPSING POLYCHONDRITIS: ICD-10-CM

## 2020-11-19 DIAGNOSIS — J93.9 PNEUMOTHORAX, UNSPECIFIED: ICD-10-CM

## 2020-11-19 DIAGNOSIS — Z90.49 ACQUIRED ABSENCE OF OTHER SPECIFIED PARTS OF DIGESTIVE TRACT: ICD-10-CM

## 2020-11-19 DIAGNOSIS — J90 PLEURAL EFFUSION, NOT ELSEWHERE CLASSIFIED: ICD-10-CM

## 2020-11-19 DIAGNOSIS — I10 ESSENTIAL (PRIMARY) HYPERTENSION: ICD-10-CM

## 2020-11-19 DIAGNOSIS — J69.0 PNEUMONITIS DUE TO INHALATION OF FOOD AND VOMIT: ICD-10-CM

## 2020-11-19 DIAGNOSIS — E78.00 PURE HYPERCHOLESTEROLEMIA, UNSPECIFIED: ICD-10-CM

## 2020-11-19 DIAGNOSIS — J39.2 OTHER DISEASES OF PHARYNX: ICD-10-CM

## 2020-11-19 DIAGNOSIS — J44.9 CHRONIC OBSTRUCTIVE PULMONARY DISEASE, UNSPECIFIED: ICD-10-CM

## 2020-11-19 DIAGNOSIS — I25.10 ATHEROSCLEROTIC HEART DISEASE OF NATIVE CORONARY ARTERY WITHOUT ANGINA PECTORIS: ICD-10-CM

## 2020-11-19 DIAGNOSIS — K21.9 GASTRO-ESOPHAGEAL REFLUX DISEASE WITHOUT ESOPHAGITIS: ICD-10-CM

## 2020-11-19 DIAGNOSIS — R91.1 SOLITARY PULMONARY NODULE: ICD-10-CM

## 2020-11-19 DIAGNOSIS — B96.20 UNSPECIFIED ESCHERICHIA COLI [E. COLI] AS THE CAUSE OF DISEASES CLASSIFIED ELSEWHERE: ICD-10-CM

## 2020-11-19 DIAGNOSIS — Z91.041 RADIOGRAPHIC DYE ALLERGY STATUS: ICD-10-CM

## 2020-11-19 DIAGNOSIS — N39.0 URINARY TRACT INFECTION, SITE NOT SPECIFIED: ICD-10-CM

## 2020-11-19 PROCEDURE — 99024 POSTOP FOLLOW-UP VISIT: CPT

## 2020-11-19 NOTE — PHYSICAL EXAM
[de-identified] : left level II LAD 1.5cm, slightly mobile, slightly tender to palpation, 6-0 cuffless trach in place, inner cannula removed and cleaned, patient using speaking valve [Midline] : trachea located in midline position [Normal] : no rashes

## 2020-11-19 NOTE — ASSESSMENT
[FreeTextEntry1] : - will refer to Medical Oncology and Radiation Oncology\par - present at [facility] H&N tumor board\par - Percocet refilled, she has 5 tabs left in previous rx\par - discussed importance of inner cannula cleaning\par - SLP f/up for PO trials\par - rx for replacement reuable inner cannula and velcro collars\par - f/up in 1 month

## 2020-11-19 NOTE — HISTORY OF PRESENT ILLNESS
[de-identified] : Patient presents today with c/o left otalgia, odynophagia. Patient admits theses symptoms of odynophagia for the past 3-4 months. Dr Li gave her Mucinex work for about a week thank came back. It was intermittent for about a month she gets food stuck in her throat. Recently this happens daily. \par Her ear started about a month ago has worsen she has to take Tylenol every night due to the pain .\par Hearing has been stable, but bad.\par \par Patient has a history of polychondritis.\par \par Patient complains of dysphagia to solids mainly. no choking with liquids.\par \par No dysphonia. 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 Patient had MRI neck done, shows vallecular mass. Son notes muffled voice over the past 6 months. Continues to have swallowing difficulty. ?weight loss. \par \par She takes Cellcept, hydroxychloroquine for polychondritis.\par \par She has a hx of FDG avid pulmonary nodules on PET 2-3 years ago, these were "burned" per patient. She sees Dr Bright for this.  [FreeTextEntry1] : \par 11/19/2020 64 yo following up s/p direct laryngoscopy with biospy of base of tongue, awake trach 11/6/20 and PEG placement for newly diagnosed SCCa base of tongue. Underwent PET earlier this week. SHe is frustrated with her trach and PEG, has been taking water by mouth. She hasn't smoked since surgery. [id number] SCCa base of tongue, p16 pending.

## 2020-11-24 ENCOUNTER — APPOINTMENT (OUTPATIENT)
Dept: RADIATION ONCOLOGY | Facility: HOSPITAL | Age: 65
End: 2020-11-24
Payer: MEDICARE

## 2020-11-24 VITALS
DIASTOLIC BLOOD PRESSURE: 67 MMHG | TEMPERATURE: 97.4 F | BODY MASS INDEX: 25.58 KG/M2 | HEART RATE: 99 BPM | WEIGHT: 149 LBS | OXYGEN SATURATION: 96 % | RESPIRATION RATE: 16 BRPM | SYSTOLIC BLOOD PRESSURE: 106 MMHG

## 2020-11-24 PROCEDURE — 77263 THER RADIOLOGY TX PLNG CPLX: CPT

## 2020-11-24 PROCEDURE — 99203 OFFICE O/P NEW LOW 30 MIN: CPT | Mod: 25

## 2020-11-24 PROCEDURE — 77470 SPECIAL RADIATION TREATMENT: CPT | Mod: 26

## 2020-11-24 RX ORDER — ACETIC ACID 20 MG/ML
2 SOLUTION AURICULAR (OTIC)
Qty: 1 | Refills: 0 | Status: DISCONTINUED | COMMUNITY
Start: 2020-10-09 | End: 2020-11-24

## 2020-11-24 RX ORDER — CIPROFLOXACIN AND DEXAMETHASONE 3; 1 MG/ML; MG/ML
0.3-0.1 SUSPENSION/ DROPS AURICULAR (OTIC) TWICE DAILY
Qty: 2 | Refills: 0 | Status: DISCONTINUED | COMMUNITY
Start: 2020-09-30 | End: 2020-11-24

## 2020-11-24 NOTE — PHYSICAL EXAM
[Normal] : oriented to person, place and time, the affect was normal, the mood was normal and not anxious [de-identified] : Clothes are loose c/w weight loss [de-identified] : In the oral cavity, full upper denture.   lower incisors from canine to canine with some degree of decay but will be anterior to treatment.  The tongue extends in the midline.  On digital exam tumor can be felt in the left vellecula.  There is a palpable right level II subdigastric node about 2 cm in size.   [de-identified] : tracheostomy  [de-identified] : PEG

## 2020-11-24 NOTE — DISEASE MANAGEMENT
[Clinical] : TNM Stage: c [TTNM] : 3 [NTNM] : 2b [MTNM] : 0 [OPAL] : OPAL [de-identified] : base of tongue

## 2020-11-24 NOTE — OB/GYN HISTORY
[Currently In Menopause] : currently in menopause [Menopause Age: ____] : patient was [unfilled] years old at menopause [___] : Living: [unfilled] [History of Birth Control Pills] : Patient has no history of taking birth control pills [History of Hormone Replacement Therapy] : no history of hormone replacement therapy

## 2020-11-24 NOTE — REVIEW OF SYSTEMS
[Patient Intake Form Reviewed] : Patient intake form was reviewed [Fatigue] : fatigue [Dysphagia] : dysphagia [Urinary Frequency] : urinary frequency [Negative] : Allergic/Immunologic [FreeTextEntry3] : glasses [FreeTextEntry5] : fatigue [FreeTextEntry6] : mucus production [FreeTextEntry7] : difficulty swallowing, bloating [FreeTextEntry9] : arthritis [de-identified] : change in sleep pattern

## 2020-12-01 PROCEDURE — 77334 RADIATION TREATMENT AID(S): CPT | Mod: 26

## 2020-12-02 ENCOUNTER — NON-APPOINTMENT (OUTPATIENT)
Age: 65
End: 2020-12-02

## 2020-12-04 ENCOUNTER — APPOINTMENT (OUTPATIENT)
Dept: HEMATOLOGY ONCOLOGY | Facility: CLINIC | Age: 65
End: 2020-12-04
Payer: MEDICARE

## 2020-12-04 ENCOUNTER — LABORATORY RESULT (OUTPATIENT)
Age: 65
End: 2020-12-04

## 2020-12-04 ENCOUNTER — APPOINTMENT (OUTPATIENT)
Dept: PULMONOLOGY | Facility: CLINIC | Age: 65
End: 2020-12-04
Payer: MEDICARE

## 2020-12-04 ENCOUNTER — OUTPATIENT (OUTPATIENT)
Dept: OUTPATIENT SERVICES | Facility: HOSPITAL | Age: 65
LOS: 1 days | Discharge: HOME | End: 2020-12-04

## 2020-12-04 VITALS
BODY MASS INDEX: 25.27 KG/M2 | RESPIRATION RATE: 16 BRPM | HEIGHT: 64 IN | DIASTOLIC BLOOD PRESSURE: 70 MMHG | WEIGHT: 148 LBS | TEMPERATURE: 97.1 F | SYSTOLIC BLOOD PRESSURE: 129 MMHG | HEART RATE: 70 BPM

## 2020-12-04 VITALS
WEIGHT: 143 LBS | HEIGHT: 64 IN | BODY MASS INDEX: 24.41 KG/M2 | SYSTOLIC BLOOD PRESSURE: 125 MMHG | TEMPERATURE: 95.3 F | OXYGEN SATURATION: 94 % | HEART RATE: 88 BPM | DIASTOLIC BLOOD PRESSURE: 79 MMHG

## 2020-12-04 DIAGNOSIS — Z90.49 ACQUIRED ABSENCE OF OTHER SPECIFIED PARTS OF DIGESTIVE TRACT: Chronic | ICD-10-CM

## 2020-12-04 DIAGNOSIS — R91.1 SOLITARY PULMONARY NODULE: Chronic | ICD-10-CM

## 2020-12-04 DIAGNOSIS — Z90.710 ACQUIRED ABSENCE OF BOTH CERVIX AND UTERUS: Chronic | ICD-10-CM

## 2020-12-04 DIAGNOSIS — J38.1 POLYP OF VOCAL CORD AND LARYNX: Chronic | ICD-10-CM

## 2020-12-04 DIAGNOSIS — Z98.890 OTHER SPECIFIED POSTPROCEDURAL STATES: Chronic | ICD-10-CM

## 2020-12-04 DIAGNOSIS — C01 MALIGNANT NEOPLASM OF BASE OF TONGUE: ICD-10-CM

## 2020-12-04 DIAGNOSIS — R91.1 SOLITARY PULMONARY NODULE: ICD-10-CM

## 2020-12-04 DIAGNOSIS — J44.9 CHRONIC OBSTRUCTIVE PULMONARY DISEASE, UNSPECIFIED: ICD-10-CM

## 2020-12-04 DIAGNOSIS — Z72.0 TOBACCO USE: ICD-10-CM

## 2020-12-04 PROCEDURE — 99215 OFFICE O/P EST HI 40 MIN: CPT

## 2020-12-04 PROCEDURE — 99213 OFFICE O/P EST LOW 20 MIN: CPT | Mod: GC

## 2020-12-04 NOTE — REVIEW OF SYSTEMS
[Sputum] : sputum [Orthopnea] : orthopnea [Negative] : Musculoskeletal [Fever] : no fever [Chills] : no chills [Cough] : no cough [Hemoptysis] : no hemoptysis [Dyspnea] : no dyspnea [Wheezing] : no wheezing [SOB on Exertion] : no sob on exertion [Chest Discomfort] : no chest discomfort [Edema] : no edema [Palpitations] : no palpitations [Abdominal Pain] : no abdominal pain [Diarrhea] : no diarrhea [Constipation] : no constipation [TextBox_14] : +trach [TextBox_30] : B [TextBox_69] : +PEG tube

## 2020-12-04 NOTE — ASSESSMENT
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

## 2020-12-04 NOTE — HISTORY OF PRESENT ILLNESS
[Current] : current [TextBox_4] : 66 yo w/ hx of COPD, malignant right lung nodule s/p ablation presents for follow up. In the interim, she has been diagnosed with a cancer at the base of her tongue which she follows with Dr. Najera. She was hospitalized 11/3/2020-11/15/2020 and has had a temporary trach put in  on 11/6/2019 because the mass was obstructing her airway, as well as a PEG tube 11/13/2019. She is planned for chemo and radiation and is looking for clearance to start.  [TextBox_11] : 1 [TextBox_13] : 52 [TextBox_12] : Sept/2019

## 2020-12-04 NOTE — PHYSICAL EXAM
Pt. c sore throat, difficulty swallowing, pain durning drinking.  Adequate I&O.  No other s/s or complaints.  No PRNs pta    APPEARANCE: No acute distress.    NEURO: Awake, alert, appropriate for age  HEENT: Head symmetrical. Eyes bilateral.  RESPIRATORY: Airway is open and patent. Respirations are spontaneous on room air.   NEUROVASCULAR: All extremities are warm and pink with capillary refill less than 3 seconds.   MUSCULOSKELETAL: Moves all extremities, wiggling toes and moving hands.   SKIN: Warm and dry, adequate turgor, mucus membranes moist and pink  SOCIAL: Patient is accompanied by family.   Will continue to monitor.       [No Acute Distress] : no acute distress [Normal Rate/Rhythm] : normal rate/rhythm [Normal S1, S2] : normal s1, s2 [No Murmurs] : no murmurs [No Resp Distress] : no resp distress [No Acc Muscle Use] : no acc muscle use [No Abnormalities] : no abnormalities [Normal Gait] : normal gait [No Clubbing] : no clubbing [No Cyanosis] : no cyanosis [No Edema] : no edema [Normal Color/ Pigmentation] : normal color/ pigmentation [Oriented x3] : oriented x3 [Normal Affect] : normal affect [TextBox_11] : +Trach  [TextBox_44] : +trach, +enlarged LN on L neck  [TextBox_89] : +GERD

## 2020-12-08 PROCEDURE — 77300 RADIATION THERAPY DOSE PLAN: CPT | Mod: 26

## 2020-12-08 PROCEDURE — 77301 RADIOTHERAPY DOSE PLAN IMRT: CPT | Mod: 26

## 2020-12-08 PROCEDURE — 77338 DESIGN MLC DEVICE FOR IMRT: CPT | Mod: 26

## 2020-12-08 NOTE — HISTORY OF PRESENT ILLNESS
[Disease: _____________________] : Disease: [unfilled] [T: ___] : T[unfilled] [N: ___] : N[unfilled] [M: ___] : M[unfilled] [AJCC Stage: ____] : AJCC Stage: [unfilled] [de-identified] : 62 year old female with a h/o COPD, polychondritis, HTN, DL, was being followed up for a h/o lung nodules. \par 6/9/16: 9  x  8  x  11  mm  part  solid  nodule  right  middle  lobe  is  noted  (series  4  image  164),  increased slightly  in  size  (due  to  a  surrounding  ground glass  component)  from  previous  study  of  December 2010,  previously  7  mm. \par \par 9/9/16: 9  x  6  x  8  mm  solid  nodule,  superior  segment  right  lower  lobe  (series  4  image  106),  previously  8  x  5  x  7  mm. \par 1/26/17 PET-CT: Mildly hypermetabolic righ lower lobe nodule 9.7 x 8.5 mm, SUV 1.6.\par RML non-FDG avid 8mm nodule.\par \par Following this on 4/4/17, she had a CT guided biopsy consistent with adenocarcinoma and radiofrequency ablation of  right  lower  lobe  nodule  and  right  middle  lobe  nodule.\par  [de-identified] : Today, she denies any complaints. She continues to smoke. 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 She was last seen in Feb, 2018 after being diagnosed with  adenocarcinoma of the lung - subcentimeter RLL and RML, s/p RFA of both lesions. Adjuvant treatment was not indicated and she remained on surveillance, followed up with Dr Bright . She reports that she noticed difficulty swallowing with pain on side of her neck earlier this year but did not seek medical attention because of COVID19 pandemic. She is a active smoker (quit one month ago) . She saw her PMD and was treated with abx . After several courses of antibiotics and persistent pain, she was referred to ENT for evaluation and a mass was noted in the vallecula. She had MRI of neck in 10/26/2020 which showed :\par 1.  Soft tissue mass filling the vallecula measuring up to 5 cm (craniocaudad), of probable tongue base origin base origin. Please note the patient declined IV contrast limiting evaluation of the mass.\par 2.  Mildly enlarged left level II lymph node measuring 1.7 cm. Nonspecific right level II lymph node is also noted measuring 1.2 cm.\par 3.  Bilateral thyroid nodules which may be further characterized with thyroid sonogram. \par \par Pt was then seen by Dr Najera, ENT.  On 11/6/2020, she had laryngoscopy and biopsy of mass in the base of the tongue, which showed:\par - Invasive squamous carcinoma, moderately differentiated.\par - P16 immunostain was performed and it shows strong and diffuse positivity.\par - lymphovascular invasion is present.\par - Portion of cartilage and lymphoid tissue is present in the tumor.\par \par On 11/616/2020, PET/CT showed:  \par 1. Pathologic FDG uptake within an ill-defined soft tissue mass involving the base of tongue/vallecula, with max SUV 63.4, compatible with biologic tumor activity.\par 2. Several FDG avid left level 2 cervical lymph nodes, with max SUV 26.0 within a dominant 1.6 cm left level 2 node.\par 3. No additional site of pathologic FDG uptake.\par 4. Since November 10, 2020, decreased size left lower lobe consolidation, which is non-FDG avid.\par \par In summary, workup showed that she has 5 x 3.7 x 3.5 cm base of tongue mass,  2 level 2 nodes, 1.2 cm and 1.7 cm.  Clinical stage is T3N2 M0 disease (Stage IV A). She saw Dr. Grimaldo and was recommended for external beam treatment to give 70 Gy in 35 treatments with chemotherapy.  She has odynophagia, has lost about 20 lbs over past few months. She also developed muffled voice. She had  tracheostomy and PEG tube placement  She is tolerating tube feeds well. She  is waiting for dental extraction.  \par

## 2020-12-08 NOTE — ASSESSMENT
[FreeTextEntry1] : 1- New diagnosis of Squamous cell carcinoma of base of Tongue , [id number], Clinical stage OPAL, P16 + \par 2- Adenocarcinoma of the lung - subcentimeter RLL and RML. RLL biopsy consistent with adenocarcinoma. s/p RFA of both lesions, ARMANDO.\par \par PLAN: \par Diagnosis, stage, prognosis and treatment plan were discussed in detail with patient and her son. In light of locally advanced disease, definitive concurrent systemic chemotherapy and radiation is recommended.  She was made aware that her tumor is p16 + terese  which is a good prognostic indicator. \par \par We discussed chemotherapy, Cisplatin is the preferred systemic agent. Weekly Cisplatin concurrent with XRT is recommended for better tolerability. Potential adverse effect of cisplatin may include but not limit to bone marrow suppression, cytopenia, increase risk of infection, nephrotoxicity, nausea, vomiting, diarrhea, fatigue, alopecia, infusional reaction, hearing impairment and neuropathy. She will be given IVF hydration prior to Cisplatin to reduce nephrotoxicity. The renal function and electrolytes will be monitor closely. \par \par She will continue feeding via PEG. Followup with dentist for tooth extraction. We will schedule her to start chemo once her dental extraction is done. \par \par All questions were answered. They agreed with the plan.

## 2020-12-08 NOTE — PHYSICAL EXAM
[Restricted in physically strenuous activity but ambulatory and able to carry out work of a light or sedentary nature] : Status 1- Restricted in physically strenuous activity but ambulatory and able to carry out work of a light or sedentary nature, e.g., light house work, office work [Normal] : grossly intact [de-identified] : unable to visualize the mass in the base of the tongue.  oral mucosa appears normal. [de-identified] : left level II LAD 2 cm,  mobile, slightly tender to palpation, 6-0 cuffless trach in place, inner cannula removed and cleaned, patient using speaking valve. parotid gland, submandibular gland and thyroid glands are normal. temporomandibular joint is normal. \par \par left level II LAD 1.5cm, slightly mobile, slightly tender to palpation, 6-0 cuffless trach in place, inner cannula removed and cleaned, patient using speaking valve. parotid gland, submandibular gland and thyroid glands are normal. temporomandibular joint is normal. \par \par  left level II LAD 1.5cm, slightly mobile, slightly tender to palpation, 6-0 cuffless trach in place, inner cannula removed and cleaned, patient using speaking valve. parotid gland, submandibular gland and thyroid glands are normal. temporomandibular joint is normal. \par \par left cervical Lymph adenopathy ( Level II ) slightly mobile and tender to palpation . Trach cuff present , clean  [de-identified] : PEG tube present .

## 2020-12-08 NOTE — CONSULT LETTER
[Dear  ___] : Dear  [unfilled], [Courtesy Letter:] : I had the pleasure of seeing your patient, [unfilled], in my office today. [( Thank you for referring [unfilled] for consultation for _____ )] : Thank you for referring [unfilled] for consultation for [unfilled] [Please see my note below.] : Please see my note below. [Consult Closing:] : Thank you very much for allowing me to participate in the care of this patient.  If you have any questions, please do not hesitate to contact me. [Sincerely,] : Sincerely, [DrHomer  ___] : Dr. GONZALEZ [DrHomer ___] : Dr. GONZALEZ [FreeTextEntry3] : Garcia Yu MD [___] : [unfilled]

## 2020-12-08 NOTE — REASON FOR VISIT
[Follow-Up Visit] : a follow-up [Spouse] : spouse [FreeTextEntry2] : Squamous cell carcinoma of base of tongue

## 2020-12-10 ENCOUNTER — NON-APPOINTMENT (OUTPATIENT)
Age: 65
End: 2020-12-10

## 2020-12-10 ENCOUNTER — APPOINTMENT (OUTPATIENT)
Dept: HEMATOLOGY ONCOLOGY | Facility: CLINIC | Age: 65
End: 2020-12-10

## 2020-12-10 RX ORDER — GUAIFENESIN 600 MG/1
600 TABLET, EXTENDED RELEASE ORAL
Qty: 20 | Refills: 0 | Status: DISCONTINUED | COMMUNITY
Start: 2019-10-09 | End: 2020-12-10

## 2020-12-10 RX ORDER — NICOTINE POLACRILEX 2 MG/1
2 GUM, CHEWING BUCCAL
Qty: 30 | Refills: 2 | Status: DISCONTINUED | COMMUNITY
Start: 2020-06-12 | End: 2020-12-10

## 2020-12-10 RX ORDER — OXYCODONE AND ACETAMINOPHEN 5; 325 MG/1; MG/1
5-325 TABLET ORAL
Qty: 10 | Refills: 0 | Status: DISCONTINUED | COMMUNITY
Start: 2020-11-19 | End: 2020-12-10

## 2020-12-11 ENCOUNTER — NON-APPOINTMENT (OUTPATIENT)
Age: 65
End: 2020-12-11

## 2020-12-15 ENCOUNTER — NON-APPOINTMENT (OUTPATIENT)
Age: 65
End: 2020-12-15

## 2020-12-15 ENCOUNTER — LABORATORY RESULT (OUTPATIENT)
Age: 65
End: 2020-12-15

## 2020-12-15 ENCOUNTER — APPOINTMENT (OUTPATIENT)
Dept: INFUSION THERAPY | Facility: CLINIC | Age: 65
End: 2020-12-15

## 2020-12-15 VITALS
SYSTOLIC BLOOD PRESSURE: 127 MMHG | TEMPERATURE: 96.8 F | HEART RATE: 89 BPM | DIASTOLIC BLOOD PRESSURE: 77 MMHG | OXYGEN SATURATION: 96 % | WEIGHT: 146.38 LBS | BODY MASS INDEX: 25.13 KG/M2 | RESPIRATION RATE: 16 BRPM

## 2020-12-15 PROCEDURE — 77427 RADIATION TX MANAGEMENT X5: CPT

## 2020-12-15 PROCEDURE — G6002: CPT | Mod: 26

## 2020-12-15 RX ORDER — CISPLATIN 1 MG/ML
65 INJECTION, SOLUTION INTRAVENOUS ONCE
Refills: 0 | Status: COMPLETED | OUTPATIENT
Start: 2020-12-15 | End: 2020-12-15

## 2020-12-15 RX ORDER — FOSAPREPITANT DIMEGLUMINE 150 MG/5ML
150 INJECTION, POWDER, LYOPHILIZED, FOR SOLUTION INTRAVENOUS ONCE
Refills: 0 | Status: COMPLETED | OUTPATIENT
Start: 2020-12-15 | End: 2020-12-15

## 2020-12-15 RX ORDER — DEXAMETHASONE 0.5 MG/5ML
10 ELIXIR ORAL ONCE
Refills: 0 | Status: COMPLETED | OUTPATIENT
Start: 2020-12-15 | End: 2020-12-15

## 2020-12-15 RX ORDER — IBUPROFEN 600 MG/1
600 TABLET, FILM COATED ORAL 3 TIMES DAILY
Qty: 90 | Refills: 2 | Status: DISCONTINUED | COMMUNITY
Start: 2020-11-25 | End: 2020-12-15

## 2020-12-15 RX ORDER — SODIUM CHLORIDE 9 MG/ML
1000 INJECTION INTRAMUSCULAR; INTRAVENOUS; SUBCUTANEOUS
Refills: 0 | Status: DISCONTINUED | OUTPATIENT
Start: 2020-12-15 | End: 2021-05-31

## 2020-12-15 RX ADMIN — Medication 10 MILLIGRAM(S): at 12:55

## 2020-12-15 RX ADMIN — CISPLATIN 420 MILLIGRAM(S): 1 INJECTION, SOLUTION INTRAVENOUS at 12:55

## 2020-12-15 RX ADMIN — Medication 118 MILLIGRAM(S): at 12:34

## 2020-12-15 RX ADMIN — SODIUM CHLORIDE 500 MILLILITER(S): 9 INJECTION INTRAMUSCULAR; INTRAVENOUS; SUBCUTANEOUS at 12:35

## 2020-12-15 RX ADMIN — FOSAPREPITANT DIMEGLUMINE 150 MILLIGRAM(S): 150 INJECTION, POWDER, LYOPHILIZED, FOR SOLUTION INTRAVENOUS at 13:20

## 2020-12-15 RX ADMIN — FOSAPREPITANT DIMEGLUMINE 450 MILLIGRAM(S): 150 INJECTION, POWDER, LYOPHILIZED, FOR SOLUTION INTRAVENOUS at 12:55

## 2020-12-15 NOTE — HISTORY OF PRESENT ILLNESS
[FreeTextEntry1] : 12/15/2020 OTV: 1st treatment today. No complaints at this time other than tolerating the trach.  Will see Dr. Najera this week.   Not using oxygen at home. Pulse ox 96% RA. Chemo today.

## 2020-12-15 NOTE — PHYSICAL EXAM
[Normal] : oriented to person, place and time, the affect was normal, the mood was normal and not anxious [de-identified] : In the oral cavity, full upper denture.   lower incisors from canine to canine with some degree of decay but will be anterior to treatment.  The tongue extends in the midline.  On digital exam tumor can be felt in the left vellecula.  There is a palpable right level II subdigastric node about 2 cm in size.   [de-identified] : tracheostomy  [de-identified] : PEG

## 2020-12-15 NOTE — DISEASE MANAGEMENT
[Clinical] : TNM Stage: c [OPAL] : OPAL [TTNM] : 3 [NTNM] : 2b [MTNM] : 0 [de-identified] : base of tongue

## 2020-12-16 ENCOUNTER — OUTPATIENT (OUTPATIENT)
Dept: OUTPATIENT SERVICES | Facility: HOSPITAL | Age: 65
LOS: 1 days | Discharge: HOME | End: 2020-12-16

## 2020-12-16 ENCOUNTER — NON-APPOINTMENT (OUTPATIENT)
Age: 65
End: 2020-12-16

## 2020-12-16 ENCOUNTER — LABORATORY RESULT (OUTPATIENT)
Age: 65
End: 2020-12-16

## 2020-12-16 DIAGNOSIS — Z98.890 OTHER SPECIFIED POSTPROCEDURAL STATES: Chronic | ICD-10-CM

## 2020-12-16 DIAGNOSIS — R91.1 SOLITARY PULMONARY NODULE: Chronic | ICD-10-CM

## 2020-12-16 DIAGNOSIS — J38.1 POLYP OF VOCAL CORD AND LARYNX: Chronic | ICD-10-CM

## 2020-12-16 DIAGNOSIS — Z90.49 ACQUIRED ABSENCE OF OTHER SPECIFIED PARTS OF DIGESTIVE TRACT: Chronic | ICD-10-CM

## 2020-12-16 DIAGNOSIS — Z90.710 ACQUIRED ABSENCE OF BOTH CERVIX AND UTERUS: Chronic | ICD-10-CM

## 2020-12-16 DIAGNOSIS — Z11.59 ENCOUNTER FOR SCREENING FOR OTHER VIRAL DISEASES: ICD-10-CM

## 2020-12-16 PROCEDURE — G6002: CPT | Mod: 26

## 2020-12-21 PROCEDURE — G6002: CPT | Mod: 26

## 2020-12-22 ENCOUNTER — APPOINTMENT (OUTPATIENT)
Dept: INFUSION THERAPY | Facility: CLINIC | Age: 65
End: 2020-12-22

## 2020-12-22 ENCOUNTER — NON-APPOINTMENT (OUTPATIENT)
Age: 65
End: 2020-12-22

## 2020-12-22 ENCOUNTER — LABORATORY RESULT (OUTPATIENT)
Age: 65
End: 2020-12-22

## 2020-12-22 PROCEDURE — G6002: CPT | Mod: 26

## 2020-12-22 RX ORDER — FOSAPREPITANT DIMEGLUMINE 150 MG/5ML
150 INJECTION, POWDER, LYOPHILIZED, FOR SOLUTION INTRAVENOUS ONCE
Refills: 0 | Status: COMPLETED | OUTPATIENT
Start: 2020-12-22 | End: 2020-12-22

## 2020-12-22 RX ORDER — CISPLATIN 1 MG/ML
65 INJECTION, SOLUTION INTRAVENOUS ONCE
Refills: 0 | Status: COMPLETED | OUTPATIENT
Start: 2020-12-22 | End: 2020-12-22

## 2020-12-22 RX ORDER — SODIUM CHLORIDE 9 MG/ML
1000 INJECTION INTRAMUSCULAR; INTRAVENOUS; SUBCUTANEOUS
Refills: 0 | Status: DISCONTINUED | OUTPATIENT
Start: 2020-12-22 | End: 2021-05-31

## 2020-12-22 RX ORDER — DEXAMETHASONE 0.5 MG/5ML
10 ELIXIR ORAL ONCE
Refills: 0 | Status: COMPLETED | OUTPATIENT
Start: 2020-12-22 | End: 2020-12-22

## 2020-12-22 RX ADMIN — Medication 118 MILLIGRAM(S): at 14:44

## 2020-12-22 RX ADMIN — SODIUM CHLORIDE 500 MILLILITER(S): 9 INJECTION INTRAMUSCULAR; INTRAVENOUS; SUBCUTANEOUS at 14:01

## 2020-12-22 RX ADMIN — FOSAPREPITANT DIMEGLUMINE 150 MILLIGRAM(S): 150 INJECTION, POWDER, LYOPHILIZED, FOR SOLUTION INTRAVENOUS at 15:30

## 2020-12-22 RX ADMIN — CISPLATIN 420 MILLIGRAM(S): 1 INJECTION, SOLUTION INTRAVENOUS at 16:00

## 2020-12-22 RX ADMIN — Medication 10 MILLIGRAM(S): at 15:05

## 2020-12-22 RX ADMIN — FOSAPREPITANT DIMEGLUMINE 450 MILLIGRAM(S): 150 INJECTION, POWDER, LYOPHILIZED, FOR SOLUTION INTRAVENOUS at 15:05

## 2020-12-23 ENCOUNTER — NON-APPOINTMENT (OUTPATIENT)
Age: 65
End: 2020-12-23

## 2020-12-23 ENCOUNTER — APPOINTMENT (OUTPATIENT)
Dept: OTOLARYNGOLOGY | Facility: CLINIC | Age: 65
End: 2020-12-23
Payer: MEDICARE

## 2020-12-23 VITALS
RESPIRATION RATE: 14 BRPM | TEMPERATURE: 97.7 F | SYSTOLIC BLOOD PRESSURE: 131 MMHG | BODY MASS INDEX: 25.4 KG/M2 | HEART RATE: 93 BPM | DIASTOLIC BLOOD PRESSURE: 67 MMHG | OXYGEN SATURATION: 96 % | WEIGHT: 148 LBS

## 2020-12-23 PROCEDURE — 31575 DIAGNOSTIC LARYNGOSCOPY: CPT

## 2020-12-23 PROCEDURE — 77427 RADIATION TX MANAGEMENT X5: CPT

## 2020-12-23 PROCEDURE — 99024 POSTOP FOLLOW-UP VISIT: CPT

## 2020-12-23 PROCEDURE — G6002: CPT | Mod: 26

## 2020-12-23 NOTE — PHYSICAL EXAM
[de-identified] : left level II LAD 1.5cm, slightly mobile, stable, 6-0 cuffless trach in place,  patient using speaking valve [Midline] : trachea located in midline position [Normal] : no rashes

## 2020-12-23 NOTE — REVIEW OF SYSTEMS
[Constipation: Grade 0] : Constipation: Grade 0 [Diarrhea: Grade 0] : Diarrhea: Grade 0 [Dyspepsia: Grade 1 - Mild symptoms; intervention not indicated] : Dyspepsia: Grade 1 - Mild symptoms; intervention not indicated [Dysphagia: Grade 3 - Severely altered eating/swallowing; tube feeding or TPN or hospitalization indicated] : Dysphagia: Grade 3 - Severely altered eating/swallowing; tube feeding or TPN or hospitalization indicated [Nausea: Grade 0] : Nausea: Grade 0 [Vomiting: Grade 0] : Vomiting: Grade 0 [Fatigue: Grade 1 - Fatigue relieved by rest] : Fatigue: Grade 1 - Fatigue relieved by rest [Dyspnea: Grade 0] : Dyspnea: Grade 0 [Hypoxia: Grade 0] : Hypoxia: Grade 0

## 2020-12-23 NOTE — ASSESSMENT
[FreeTextEntry1] : - continue CRT\par - will refer back to SLP for repeat evaluation\par - f/up in 3 months, will consider decannulation as well as removal of PEG pending SLP eval

## 2020-12-23 NOTE — HISTORY OF PRESENT ILLNESS
[de-identified] : Patient presents today with c/o left otalgia, odynophagia. Patient admits theses symptoms of odynophagia for the past 3-4 months. Dr Li gave her Mucinex work for about a week thank came back. It was intermittent for about a month she gets food stuck in her throat. Recently this happens daily. \par Her ear started about a month ago has worsen she has to take Tylenol every night due to the pain .\par Hearing has been stable, but bad.\par \par Patient has a history of polychondritis.\par \par Patient complains of dysphagia to solids mainly. no choking with liquids.\par \par No dysphonia. 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 Patient had MRI neck done, shows vallecular mass. Son notes muffled voice over the past 6 months. Continues to have swallowing difficulty. ?weight loss. \par \par She takes Cellcept, hydroxychloroquine for polychondritis.\par \par She has a hx of FDG avid pulmonary nodules on PET 2-3 years ago, these were "burned" per patient. She sees Dr Bright for this. \par \par \par 11/19/2020 66 yo following up s/p direct laryngoscopy with biospy of base of tongue, awake trach 11/6/20 and PEG placement for newly diagnosed SCCa base of tongue. Underwent PET earlier this week. SHe is frustrated with her trach and PEG, has been taking water by mouth. She hasn't smoked since surgery. [id number] SCCa base of tongue, p16 pending.  [FreeTextEntry1] : \par 12/23/2020: Patient presents today following up on malignant neoplasm of tongue. Patient is s/p direct laryngoscopy with biopsy of base of tongue, trach and PEG placement. Patient currently having chemo and radiation, started mid Dec 2020. She does have some ear pain.

## 2020-12-23 NOTE — HISTORY OF PRESENT ILLNESS
[FreeTextEntry1] : 12/22/2020 OTV 1200cGy/7000cGy to the base of tongue:\par Patient reports doing well. However, has some mouth dryness, left ear pain has decreased now just dull pain that tolerated, and "nasty taste" since starting her Chemotherapy.  She is tolerating her PEG feeds, Glucerna X 3/day.  Maintaining weight.  Otherwise, she has no complaints.  Denies SOB.    She is s/p trach 11/3/2020, s/p Peg tube 11/5/2020.\par \par 12/15/2020 OTV: 1st treatment today. No complaints at this time other than tolerating the trach.  Will see Dr. Najera this week.   Not using oxygen at home. Pulse ox 96% RA. Chemo today.

## 2020-12-23 NOTE — REASON FOR VISIT
[Subsequent Evaluation] : a subsequent evaluation for [FreeTextEntry2] : malignant neoplasm of tongue, oropharyngeal dysphagia

## 2020-12-23 NOTE — PHYSICAL EXAM
[Sclera] : the sclera and conjunctiva were normal [Hearing Threshold Finger Rub Not Pike] : hearing was normal [] : no respiratory distress [Respiration, Rhythm And Depth] : normal respiratory rhythm and effort [Exaggerated Use Of Accessory Muscles For Inspiration] : no accessory muscle use [Heart Rate And Rhythm] : heart rate and rhythm were normal [Nail Clubbing] : no clubbing  or cyanosis of the fingernails [Normal] : oriented to person, place and time, the affect was normal, the mood was normal and not anxious [de-identified] : no erythema in oral cavity/tongue [de-identified] : tata chambers [de-identified] : Peg tube

## 2020-12-23 NOTE — DISEASE MANAGEMENT
[TTNM] : 3 [NTNM] : 2b [MTNM] : 0 [de-identified] : 1200cGy [de-identified] : 7000cGy [de-identified] : base of tongue

## 2020-12-28 ENCOUNTER — APPOINTMENT (OUTPATIENT)
Dept: HEMATOLOGY ONCOLOGY | Facility: CLINIC | Age: 65
End: 2020-12-28
Payer: MEDICARE

## 2020-12-28 ENCOUNTER — NON-APPOINTMENT (OUTPATIENT)
Age: 65
End: 2020-12-28

## 2020-12-28 ENCOUNTER — LABORATORY RESULT (OUTPATIENT)
Age: 65
End: 2020-12-28

## 2020-12-28 VITALS
WEIGHT: 144 LBS | HEART RATE: 97 BPM | SYSTOLIC BLOOD PRESSURE: 115 MMHG | BODY MASS INDEX: 25.52 KG/M2 | HEIGHT: 63 IN | TEMPERATURE: 98.4 F | DIASTOLIC BLOOD PRESSURE: 81 MMHG

## 2020-12-28 VITALS
OXYGEN SATURATION: 98 % | BODY MASS INDEX: 25.24 KG/M2 | WEIGHT: 142.5 LBS | SYSTOLIC BLOOD PRESSURE: 106 MMHG | HEART RATE: 91 BPM | RESPIRATION RATE: 16 BRPM | TEMPERATURE: 98.3 F | DIASTOLIC BLOOD PRESSURE: 58 MMHG

## 2020-12-28 PROCEDURE — G6002: CPT | Mod: 26

## 2020-12-28 PROCEDURE — 99215 OFFICE O/P EST HI 40 MIN: CPT

## 2020-12-29 ENCOUNTER — NON-APPOINTMENT (OUTPATIENT)
Age: 65
End: 2020-12-29

## 2020-12-29 ENCOUNTER — APPOINTMENT (OUTPATIENT)
Dept: INFUSION THERAPY | Facility: CLINIC | Age: 65
End: 2020-12-29

## 2020-12-29 PROCEDURE — G6002: CPT | Mod: 26

## 2020-12-29 RX ORDER — FOSAPREPITANT DIMEGLUMINE 150 MG/5ML
150 INJECTION, POWDER, LYOPHILIZED, FOR SOLUTION INTRAVENOUS ONCE
Refills: 0 | Status: COMPLETED | OUTPATIENT
Start: 2020-12-29 | End: 2020-12-29

## 2020-12-29 RX ORDER — DEXAMETHASONE 0.5 MG/5ML
10 ELIXIR ORAL ONCE
Refills: 0 | Status: COMPLETED | OUTPATIENT
Start: 2020-12-29 | End: 2020-12-29

## 2020-12-29 RX ORDER — CISPLATIN 1 MG/ML
65 INJECTION, SOLUTION INTRAVENOUS ONCE
Refills: 0 | Status: COMPLETED | OUTPATIENT
Start: 2020-12-29 | End: 2020-12-29

## 2020-12-29 RX ORDER — SODIUM CHLORIDE 9 MG/ML
1000 INJECTION INTRAMUSCULAR; INTRAVENOUS; SUBCUTANEOUS
Refills: 0 | Status: DISCONTINUED | OUTPATIENT
Start: 2020-12-29 | End: 2021-05-31

## 2020-12-29 RX ADMIN — FOSAPREPITANT DIMEGLUMINE 450 MILLIGRAM(S): 150 INJECTION, POWDER, LYOPHILIZED, FOR SOLUTION INTRAVENOUS at 13:24

## 2020-12-29 RX ADMIN — CISPLATIN 420 MILLIGRAM(S): 1 INJECTION, SOLUTION INTRAVENOUS at 14:55

## 2020-12-29 RX ADMIN — Medication 118 MILLIGRAM(S): at 13:24

## 2020-12-29 RX ADMIN — SODIUM CHLORIDE 500 MILLILITER(S): 9 INJECTION INTRAMUSCULAR; INTRAVENOUS; SUBCUTANEOUS at 13:24

## 2020-12-29 NOTE — REVIEW OF SYSTEMS
[Diarrhea: Grade 0] : Diarrhea: Grade 0 [Fatigue: Grade 0] : Fatigue: Grade 0 [Oral Pain: Grade 0] : Oral Pain: Grade 0 [Dermatitis Radiation: Grade 0] : Dermatitis Radiation: Grade 0

## 2020-12-29 NOTE — HISTORY OF PRESENT ILLNESS
[FreeTextEntry1] : 12/28/2020 OTV 1600cGy/7000cGy to the base of tongue: States dry mouth has improved and left ear pain has resolved. Lost 5.2 lbs since last week. Taking Glucerna 3x/day via PEG. Only taking sips of water by mouth. Spoke with Danna Farfan yesterday. Pulse ox 98% RA. Denies SOB.  \par \par \par 12/22/2020 OTV 1200cGy/7000cGy to the base of tongue:\par Patient reports doing well. However, has some mouth dryness, left ear pain has decreased now just dull pain that tolerated, and "nasty taste" since starting her Chemotherapy.  She is tolerating her PEG feeds, Glucerna X 3/day.  Maintaining weight.  Otherwise, she has no complaints.  Denies SOB.    She is s/p trach 11/3/2020, s/p Peg tube 11/5/2020.\par \par 12/15/2020 OTV: 1st treatment today. No complaints at this time other than tolerating the trach.  Will see Dr. Najera this week.   Not using oxygen at home. Pulse ox 96% RA. Chemo today.

## 2020-12-29 NOTE — PHYSICAL EXAM
[] : no respiratory distress [Exaggerated Use Of Accessory Muscles For Inspiration] : no accessory muscle use [Normal] : oriented to person, place and time, the affect was normal, the mood was normal and not anxious [de-identified] : Trach in place [de-identified] : PEG in place

## 2020-12-29 NOTE — DISEASE MANAGEMENT
[TTNM] : 3 [NTNM] : 2b [MTNM] : 0 [de-identified] : 3226cGy [de-identified] : 7000cGy [de-identified] : base of tongue

## 2020-12-30 PROCEDURE — G6002: CPT | Mod: 26

## 2021-01-01 NOTE — ASSESSMENT
[FreeTextEntry1] : 1- Squamous cell carcinoma of base of Tongue, [id number], Clinical stage OPAL, P16 + \par 2- Adenocarcinoma of the lung - subcentimeter RLL and RML. RLL biopsy consistent with adenocarcinoma. s/p RFA of both lesions, ARMANDO.\par \par PLAN: \par -- Will proceed with week 3 Cisplatin weekly concurrent with XRT.\par -- Repeat BMP shows Cr 0.7, Bun 26, trending up. Advised to more free water feeding in between.  Will ask [facility] Nutritionist to help her feeding. She seems to have indigestion with Glucerna.\par -- Follow up BMP LFT Mg.\par -- Followup with Dr. Atkinson for radiation.\par -- Followup with ENT for tracheotomy. \par -- RTC in 2 weeks for re-evaluation.\par \par Patient seen and examined with Dr. Yu who agrees with plan of care.

## 2021-01-01 NOTE — PHYSICAL EXAM
[Restricted in physically strenuous activity but ambulatory and able to carry out work of a light or sedentary nature] : Status 1- Restricted in physically strenuous activity but ambulatory and able to carry out work of a light or sedentary nature, e.g., light house work, office work [Normal] : grossly intact [de-identified] : unable to visualize the mass in the base of the tongue.  oral mucosa appears normal. [de-identified] : left level II LAD 2 cm,  mobile, slightly tender to palpation, 6-0 cuffless trach in place, patient using speaking valve. parotid gland, submandibular gland and thyroid glands are normal. temporomandibular joint is normal. \par \par left level II LAD 1.5cm, slightly mobile, slightly tender to palpation, 6-0 cuffless trach in place, inner cannula removed and cleaned, patient using speaking valve. parotid gland, submandibular gland and thyroid glands are normal. temporomandibular joint is normal. \par \par  left level II LAD 1.5cm, slightly mobile, slightly tender to palpation, 6-0 cuffless trach in place, inner cannula removed and cleaned, patient using speaking valve. parotid gland, submandibular gland and thyroid glands are normal. temporomandibular joint is normal. \par \par left cervical Lymph adenopathy ( Level II ) slightly mobile and tender to palpation . Trach cuff present , clean  [de-identified] : PEG tube present  [de-identified] : left crevicular LN decreasing in size

## 2021-01-01 NOTE — CONSULT LETTER
[Dear  ___] : Dear  [unfilled], [Courtesy Letter:] : I had the pleasure of seeing your patient, [unfilled], in my office today. [( Thank you for referring [unfilled] for consultation for _____ )] : Thank you for referring [unfilled] for consultation for [unfilled] [Please see my note below.] : Please see my note below. [Consult Closing:] : Thank you very much for allowing me to participate in the care of this patient.  If you have any questions, please do not hesitate to contact me. [Sincerely,] : Sincerely, [DrHomer  ___] : Dr. GONZALEZ [DrHomer ___] : Dr. GONZALEZ [___] : [unfilled] [FreeTextEntry3] : Garcia Yu MD

## 2021-01-01 NOTE — END OF VISIT
[] : Fellow [FreeTextEntry3] : I was physically present for the key portions of the evaluation and management service provided.  I agree with the history and physical, and plan which I have reviewed and edited where appropriate.\par \par  thinking about quitting

## 2021-01-01 NOTE — HISTORY OF PRESENT ILLNESS
[Disease: _____________________] : Disease: [unfilled] [T: ___] : T[unfilled] [N: ___] : N[unfilled] [M: ___] : M[unfilled] [AJCC Stage: ____] : AJCC Stage: [unfilled] [de-identified] : 62 year old female with a h/o COPD, polychondritis, HTN, DL, was being followed up for a h/o lung nodules. \par 6/9/16: 9  x  8  x  11  mm  part  solid  nodule  right  middle  lobe  is  noted  (series  4  image  164),  increased slightly  in  size  (due  to  a  surrounding  ground glass  component)  from  previous  study  of  December 2010,  previously  7  mm. \par \par 9/9/16: 9  x  6  x  8  mm  solid  nodule,  superior  segment  right  lower  lobe  (series  4  image  106),  previously  8  x  5  x  7  mm. \par 1/26/17 PET-CT: Mildly hypermetabolic righ lower lobe nodule 9.7 x 8.5 mm, SUV 1.6.\par RML non-FDG avid 8mm nodule.\par \par Following this on 4/4/17, she had a CT guided biopsy consistent with adenocarcinoma and radiofrequency ablation of  right  lower  lobe  nodule  and  right  middle  lobe  nodule.\par  [de-identified] : Today, she denies any complaints. She continues to smoke. 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 She was last seen in Feb, 2018 after being diagnosed with  adenocarcinoma of the lung - subcentimeter RLL and RML, s/p RFA of both lesions. Adjuvant treatment was not indicated and she remained on surveillance, followed up with Dr Bright . She reports that she noticed difficulty swallowing with pain on side of her neck earlier this year but did not seek medical attention because of COVID19 pandemic. She is a active smoker (quit one month ago) . She saw her PMD and was treated with abx . After several courses of antibiotics and persistent pain, she was referred to ENT for evaluation and a mass was noted in the vallecula. She had MRI of neck in 10/26/2020 which showed :\par 1.  Soft tissue mass filling the vallecula measuring up to 5 cm (craniocaudad), of probable tongue base origin base origin. Please note the patient declined IV contrast limiting evaluation of the mass.\par 2.  Mildly enlarged left level II lymph node measuring 1.7 cm. Nonspecific right level II lymph node is also noted measuring 1.2 cm.\par 3.  Bilateral thyroid nodules which may be further characterized with thyroid sonogram. \par \par Pt was then seen by Dr Najera, ENT.  On 11/6/2020, she had laryngoscopy and biopsy of mass in the base of the tongue, which showed:\par - Invasive squamous carcinoma, moderately differentiated.\par - P16 immunostain was performed and it shows strong and diffuse positivity.\par - lymphovascular invasion is present.\par - Portion of cartilage and lymphoid tissue is present in the tumor.\par \par On 11/616/2020, PET/CT showed:  \par 1. Pathologic FDG uptake within an ill-defined soft tissue mass involving the base of tongue/vallecula, with max SUV 63.4, compatible with biologic tumor activity.\par 2. Several FDG avid left level 2 cervical lymph nodes, with max SUV 26.0 within a dominant 1.6 cm left level 2 node.\par 3. No additional site of pathologic FDG uptake.\par 4. Since November 10, 2020, decreased size left lower lobe consolidation, which is non-FDG avid.\par \par In summary, workup showed that she has 5 x 3.7 x 3.5 cm base of tongue mass,  2 level 2 nodes, 1.2 cm and 1.7 cm.  Clinical stage is T3N2 M0 disease (Stage IV A). She saw Dr. Grimaldo and was recommended for external beam treatment to give 70 Gy in 35 treatments with chemotherapy.  She has odynophagia, has lost about 20 lbs over past few months. She also developed muffled voice. She had  tracheostomy and PEG tube placement  She is tolerating tube feeds well. She  is waiting for dental extraction.  \par \par 12/28/2020: \par SOL is here for follow up visit for new diagnosis of squamous cell  carcinoma in the base of tongue [id number], Clinical stage OPAL, P16 +. She started concurrent chemoRT. She received 2 weeks of Cisplatin. She is complaining of issue with Glucerna via PEG feeds. She states that she is having issues with excessive gas and diarrhea. In addition, she has had a four pound weight loss since her last visit. She denies fever, cough, SOB, or mucositis at this time. Sol feels like her tumor has shrunk; she is due for speech and swallow to see if she can initiate PO feeds. Patient continues to care for trach. She was started on XRT on 12/15/2020; tolerating well.\par

## 2021-01-04 PROCEDURE — 77427 RADIATION TX MANAGEMENT X5: CPT

## 2021-01-04 PROCEDURE — G6002: CPT | Mod: 26

## 2021-01-05 ENCOUNTER — APPOINTMENT (OUTPATIENT)
Dept: INFUSION THERAPY | Facility: CLINIC | Age: 66
End: 2021-01-05

## 2021-01-05 ENCOUNTER — NON-APPOINTMENT (OUTPATIENT)
Age: 66
End: 2021-01-05

## 2021-01-05 ENCOUNTER — LABORATORY RESULT (OUTPATIENT)
Age: 66
End: 2021-01-05

## 2021-01-05 VITALS
OXYGEN SATURATION: 97 % | DIASTOLIC BLOOD PRESSURE: 61 MMHG | SYSTOLIC BLOOD PRESSURE: 110 MMHG | TEMPERATURE: 97 F | RESPIRATION RATE: 16 BRPM | HEART RATE: 78 BPM | BODY MASS INDEX: 25.51 KG/M2 | WEIGHT: 144 LBS

## 2021-01-05 PROCEDURE — G6002: CPT | Mod: 26

## 2021-01-05 RX ORDER — CISPLATIN 1 MG/ML
65 INJECTION, SOLUTION INTRAVENOUS ONCE
Refills: 0 | Status: COMPLETED | OUTPATIENT
Start: 2021-01-05 | End: 2021-01-05

## 2021-01-05 RX ORDER — DEXAMETHASONE 0.5 MG/5ML
10 ELIXIR ORAL ONCE
Refills: 0 | Status: COMPLETED | OUTPATIENT
Start: 2021-01-05 | End: 2021-01-05

## 2021-01-05 RX ORDER — SODIUM CHLORIDE 9 MG/ML
1000 INJECTION INTRAMUSCULAR; INTRAVENOUS; SUBCUTANEOUS
Refills: 0 | Status: DISCONTINUED | OUTPATIENT
Start: 2021-01-05 | End: 2021-05-31

## 2021-01-05 RX ORDER — FOSAPREPITANT DIMEGLUMINE 150 MG/5ML
150 INJECTION, POWDER, LYOPHILIZED, FOR SOLUTION INTRAVENOUS ONCE
Refills: 0 | Status: COMPLETED | OUTPATIENT
Start: 2021-01-05 | End: 2021-01-05

## 2021-01-05 RX ADMIN — FOSAPREPITANT DIMEGLUMINE 150 MILLIGRAM(S): 150 INJECTION, POWDER, LYOPHILIZED, FOR SOLUTION INTRAVENOUS at 16:46

## 2021-01-05 RX ADMIN — SODIUM CHLORIDE 500 MILLILITER(S): 9 INJECTION INTRAMUSCULAR; INTRAVENOUS; SUBCUTANEOUS at 13:37

## 2021-01-05 RX ADMIN — SODIUM CHLORIDE 1000 MILLILITER(S): 9 INJECTION INTRAMUSCULAR; INTRAVENOUS; SUBCUTANEOUS at 15:37

## 2021-01-05 RX ADMIN — FOSAPREPITANT DIMEGLUMINE 450 MILLIGRAM(S): 150 INJECTION, POWDER, LYOPHILIZED, FOR SOLUTION INTRAVENOUS at 16:26

## 2021-01-05 RX ADMIN — CISPLATIN 420 MILLIGRAM(S): 1 INJECTION, SOLUTION INTRAVENOUS at 17:31

## 2021-01-05 RX ADMIN — Medication 10 MILLIGRAM(S): at 16:26

## 2021-01-05 RX ADMIN — Medication 118 MILLIGRAM(S): at 16:06

## 2021-01-05 RX ADMIN — CISPLATIN 65 MILLIGRAM(S): 1 INJECTION, SOLUTION INTRAVENOUS at 18:15

## 2021-01-05 NOTE — DISEASE MANAGEMENT
[Clinical] : TNM Stage: c [OPAL] : OPAL [TTNM] : 3 [NTNM] : 2b [MTNM] : 0 [de-identified] : 260cGy [de-identified] : 7000cGy [de-identified] : base of tongue

## 2021-01-05 NOTE — PHYSICAL EXAM
[Sclera] : the sclera and conjunctiva were normal [Hearing Threshold Finger Rub Not [place]] : hearing was normal [] : no respiratory distress [Respiration, Rhythm And Depth] : normal respiratory rhythm and effort [Exaggerated Use Of Accessory Muscles For Inspiration] : no accessory muscle use [Heart Rate And Rhythm] : heart rate and rhythm were normal [Nail Clubbing] : no clubbing  or cyanosis of the fingernails [Normal] : oriented to person, place and time, the affect was normal, the mood was normal and not anxious [de-identified] : faint erythema in oral cavity/tongue [de-identified] : tata chambers [de-identified] : Peg tube

## 2021-01-05 NOTE — HISTORY OF PRESENT ILLNESS
[FreeTextEntry1] : 1/5/2021 OTV 2600cGy/7000cGy to the base of tongue: pt c/o diarrhea due to water and feedings. pt states she is drinking about 12oz water/ day. water helps with dry mouth. denies any N/V. Glucerna feedings 3/day. denies any pain. productive cough with clear sputum. 3 more chemo treatments. denies any SOB. 97% on RA. weight increase of 2lbs since last week. pt is in contact with nutritionist. \par \par 12/28/2020 OTV 1600cGy/7000cGy to the base of tongue: States dry mouth has improved and left ear pain has resolved. Lost 5.2 lbs since last week. Taking Glucerna 3x/day via PEG. Only taking sips of water by mouth. Spoke with Danna Farfan yesterday. Pulse ox 98% RA. Denies SOB.  \par \par \par 12/22/2020 OTV 1200cGy/7000cGy to the base of tongue:\par Patient reports doing well. However, has some mouth dryness, left ear pain has decreased now just dull pain that tolerated, and "nasty taste" since starting her Chemotherapy.  She is tolerating her PEG feeds, Glucerna X 3/day.  Maintaining weight.  Otherwise, she has no complaints.  Denies SOB.    She is s/p trach 11/3/2020, s/p Peg tube 11/5/2020.\par \par 12/15/2020 OTV: 1st treatment today. No complaints at this time other than tolerating the trach.  Will see Dr. Najera this week.   Not using oxygen at home. Pulse ox 96% RA. Chemo today.

## 2021-01-06 PROCEDURE — G6002: CPT | Mod: 26

## 2021-01-07 PROCEDURE — G6002: CPT | Mod: 26

## 2021-01-08 ENCOUNTER — APPOINTMENT (OUTPATIENT)
Dept: HEMATOLOGY ONCOLOGY | Facility: CLINIC | Age: 66
End: 2021-01-08
Payer: MEDICARE

## 2021-01-08 VITALS
BODY MASS INDEX: 25.34 KG/M2 | DIASTOLIC BLOOD PRESSURE: 81 MMHG | TEMPERATURE: 98 F | HEART RATE: 83 BPM | HEIGHT: 63 IN | SYSTOLIC BLOOD PRESSURE: 121 MMHG | WEIGHT: 143 LBS

## 2021-01-08 PROCEDURE — 77427 RADIATION TX MANAGEMENT X5: CPT

## 2021-01-08 PROCEDURE — 99214 OFFICE O/P EST MOD 30 MIN: CPT

## 2021-01-11 PROCEDURE — G6002: CPT | Mod: 26

## 2021-01-12 ENCOUNTER — LABORATORY RESULT (OUTPATIENT)
Age: 66
End: 2021-01-12

## 2021-01-12 ENCOUNTER — NON-APPOINTMENT (OUTPATIENT)
Age: 66
End: 2021-01-12

## 2021-01-12 ENCOUNTER — OUTPATIENT (OUTPATIENT)
Dept: OUTPATIENT SERVICES | Facility: HOSPITAL | Age: 66
LOS: 1 days | Discharge: HOME | End: 2021-01-12

## 2021-01-12 VITALS
DIASTOLIC BLOOD PRESSURE: 67 MMHG | TEMPERATURE: 98.2 F | RESPIRATION RATE: 16 BRPM | OXYGEN SATURATION: 97 % | HEART RATE: 86 BPM | WEIGHT: 143 LBS | SYSTOLIC BLOOD PRESSURE: 120 MMHG | BODY MASS INDEX: 25.33 KG/M2

## 2021-01-12 DIAGNOSIS — Z98.890 OTHER SPECIFIED POSTPROCEDURAL STATES: Chronic | ICD-10-CM

## 2021-01-12 DIAGNOSIS — Z90.710 ACQUIRED ABSENCE OF BOTH CERVIX AND UTERUS: Chronic | ICD-10-CM

## 2021-01-12 DIAGNOSIS — R91.1 SOLITARY PULMONARY NODULE: Chronic | ICD-10-CM

## 2021-01-12 DIAGNOSIS — Z90.49 ACQUIRED ABSENCE OF OTHER SPECIFIED PARTS OF DIGESTIVE TRACT: Chronic | ICD-10-CM

## 2021-01-12 DIAGNOSIS — J38.1 POLYP OF VOCAL CORD AND LARYNX: Chronic | ICD-10-CM

## 2021-01-12 PROCEDURE — G6002: CPT | Mod: 26

## 2021-01-12 NOTE — PHYSICAL EXAM
[Sclera] : the sclera and conjunctiva were normal [Hearing Threshold Finger Rub Not [place]] : hearing was normal [] : no respiratory distress [Respiration, Rhythm And Depth] : normal respiratory rhythm and effort [Exaggerated Use Of Accessory Muscles For Inspiration] : no accessory muscle use [Heart Rate And Rhythm] : heart rate and rhythm were normal [Nail Clubbing] : no clubbing  or cyanosis of the fingernails [Normal] : oriented to person, place and time, the affect was normal, the mood was normal and not anxious [de-identified] : faint erythema in oral cavity/tongue [de-identified] : atta chambers [de-identified] : Peg tube

## 2021-01-12 NOTE — HISTORY OF PRESENT ILLNESS
[FreeTextEntry1] : 1/12/2021 OTV 3600cGy/7000cGy to the base of tongue: pt denies any N/V/D. Glucerna 6x/day. pt states she is running short and needs more. denies any pain. hoarseness noted. c/o discomfort. denies SOB. pt drinks about 3 12oz water bottles/day. cough with clear sputum. pt weight the same. pt has 3 more chemo tx with 1 on 11/14. \par \par 1/5/2021 OTV 2600cGy/7000cGy to the base of tongue: pt c/o diarrhea due to water and feedings. pt states she is drinking about 12oz water/ day. water helps with dry mouth. denies any N/V. Glucerna feedings 3/day. denies any pain. productive cough with clear sputum. 3 more chemo treatments. denies any SOB. 97% on RA. weight increase of 2lbs since last week. pt is in contact with nutritionist. \par \par 12/28/2020 OTV 1600cGy/7000cGy to the base of tongue: States dry mouth has improved and left ear pain has resolved. Lost 5.2 lbs since last week. Taking Glucerna 3x/day via PEG. Only taking sips of water by mouth. Spoke with Danna Farfan yesterday. Pulse ox 98% RA. Denies SOB.  \par \par \par 12/22/2020 OTV 1200cGy/7000cGy to the base of tongue:\par Patient reports doing well. However, has some mouth dryness, left ear pain has decreased now just dull pain that tolerated, and "nasty taste" since starting her Chemotherapy.  She is tolerating her PEG feeds, Glucerna X 3/day.  Maintaining weight.  Otherwise, she has no complaints.  Denies SOB.    She is s/p trach 11/3/2020, s/p Peg tube 11/5/2020.\par \par 12/15/2020 OTV: 1st treatment today. No complaints at this time other than tolerating the trach.  Will see Dr. Najera this week.   Not using oxygen at home. Pulse ox 96% RA. Chemo today.

## 2021-01-12 NOTE — DISEASE MANAGEMENT
[Clinical] : TNM Stage: c [OPAL] : OPAL [TTNM] : 3 [NTNM] : 2b [MTNM] : 0 [de-identified] : 3600cGy [de-identified] : 7000cGy [de-identified] : base of tongue

## 2021-01-13 DIAGNOSIS — Z11.59 ENCOUNTER FOR SCREENING FOR OTHER VIRAL DISEASES: ICD-10-CM

## 2021-01-13 PROCEDURE — G6002: CPT | Mod: 26

## 2021-01-14 ENCOUNTER — LABORATORY RESULT (OUTPATIENT)
Age: 66
End: 2021-01-14

## 2021-01-14 ENCOUNTER — APPOINTMENT (OUTPATIENT)
Dept: INFUSION THERAPY | Facility: CLINIC | Age: 66
End: 2021-01-14

## 2021-01-14 PROCEDURE — G6002: CPT | Mod: 26

## 2021-01-14 RX ORDER — DEXAMETHASONE 0.5 MG/5ML
10 ELIXIR ORAL ONCE
Refills: 0 | Status: COMPLETED | OUTPATIENT
Start: 2021-01-14 | End: 2021-01-14

## 2021-01-14 RX ORDER — SODIUM CHLORIDE 9 MG/ML
1000 INJECTION INTRAMUSCULAR; INTRAVENOUS; SUBCUTANEOUS
Refills: 0 | Status: DISCONTINUED | OUTPATIENT
Start: 2021-01-14 | End: 2021-05-31

## 2021-01-14 RX ORDER — CISPLATIN 1 MG/ML
65 INJECTION, SOLUTION INTRAVENOUS ONCE
Refills: 0 | Status: COMPLETED | OUTPATIENT
Start: 2021-01-14 | End: 2021-01-14

## 2021-01-14 RX ORDER — FOSAPREPITANT DIMEGLUMINE 150 MG/5ML
150 INJECTION, POWDER, LYOPHILIZED, FOR SOLUTION INTRAVENOUS ONCE
Refills: 0 | Status: COMPLETED | OUTPATIENT
Start: 2021-01-14 | End: 2021-01-14

## 2021-01-14 RX ADMIN — CISPLATIN 420 MILLIGRAM(S): 1 INJECTION, SOLUTION INTRAVENOUS at 16:00

## 2021-01-14 RX ADMIN — Medication 118 MILLIGRAM(S): at 15:18

## 2021-01-14 RX ADMIN — Medication 10 MILLIGRAM(S): at 15:40

## 2021-01-14 RX ADMIN — FOSAPREPITANT DIMEGLUMINE 450 MILLIGRAM(S): 150 INJECTION, POWDER, LYOPHILIZED, FOR SOLUTION INTRAVENOUS at 15:40

## 2021-01-14 RX ADMIN — FOSAPREPITANT DIMEGLUMINE 150 MILLIGRAM(S): 150 INJECTION, POWDER, LYOPHILIZED, FOR SOLUTION INTRAVENOUS at 16:00

## 2021-01-14 RX ADMIN — CISPLATIN 65 MILLIGRAM(S): 1 INJECTION, SOLUTION INTRAVENOUS at 17:00

## 2021-01-15 PROCEDURE — 77427 RADIATION TX MANAGEMENT X5: CPT

## 2021-01-17 NOTE — CONSULT LETTER
[Dear  ___] : Dear  [unfilled], [Courtesy Letter:] : I had the pleasure of seeing your patient, [unfilled], in my office today. [( Thank you for referring [unfilled] for consultation for _____ )] : Thank you for referring [unfilled] for consultation for [unfilled] [Please see my note below.] : Please see my note below. [Consult Closing:] : Thank you very much for allowing me to participate in the care of this patient.  If you have any questions, please do not hesitate to contact me. [Sincerely,] : Sincerely, [FreeTextEntry3] : Garcia Yu MD [DrHomer  ___] : Dr. GONZALEZ [DrHomer ___] : Dr. GONZALEZ [___] : [unfilled]

## 2021-01-17 NOTE — ASSESSMENT
[FreeTextEntry1] : 1- Squamous cell carcinoma of base of Tongue, [id number], Clinical stage OPAL, P16 + \par 2- Adenocarcinoma of the lung - subcentimeter RLL and RML. RLL biopsy consistent with adenocarcinoma. s/p RFA of both lesions, ARMANDO.\par \par PLAN: \par -- Continue weekly Cisplatin concurrent with radiation. Will proceed with week 4 Cisplatin. \par -- Repeat BMP shows Cr 0.6, Bun 20. Magnesium is normal. Advised to have more free water feeding in between.   Danna, Nutritionist has talked to her about feeding.\par -- Will order repeat CBC, BMP and Mg before chemotherapy. \par -- Followup with Dr. Atkinson for radiation.\par -- Followup with ENT for tracheotomy. \par -- RTC in 2 weeks for re-evaluation.\par \par

## 2021-01-17 NOTE — HISTORY OF PRESENT ILLNESS
[Disease: _____________________] : Disease: [unfilled] [T: ___] : T[unfilled] [N: ___] : N[unfilled] [M: ___] : M[unfilled] [AJCC Stage: ____] : AJCC Stage: [unfilled] [de-identified] : 62 year old female with a h/o COPD, polychondritis, HTN, DL, was being followed up for a h/o lung nodules. \par 6/9/16: 9  x  8  x  11  mm  part  solid  nodule  right  middle  lobe  is  noted  (series  4  image  164),  increased slightly  in  size  (due  to  a  surrounding  ground glass  component)  from  previous  study  of  December 2010,  previously  7  mm. \par \par 9/9/16: 9  x  6  x  8  mm  solid  nodule,  superior  segment  right  lower  lobe  (series  4  image  106),  previously  8  x  5  x  7  mm. \par 1/26/17 PET-CT: Mildly hypermetabolic righ lower lobe nodule 9.7 x 8.5 mm, SUV 1.6.\par RML non-FDG avid 8mm nodule.\par \par Following this on 4/4/17, she had a CT guided biopsy consistent with adenocarcinoma and radiofrequency ablation of  right  lower  lobe  nodule  and  right  middle  lobe  nodule.\par  [de-identified] : Today, she denies any complaints. She continues to smoke. 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 She was last seen in Feb, 2018 after being diagnosed with  adenocarcinoma of the lung - subcentimeter RLL and RML, s/p RFA of both lesions. Adjuvant treatment was not indicated and she remained on surveillance, followed up with Dr Bright . She reports that she noticed difficulty swallowing with pain on side of her neck earlier this year but did not seek medical attention because of COVID19 pandemic. She is a active smoker (quit one month ago) . She saw her PMD and was treated with abx . After several courses of antibiotics and persistent pain, she was referred to ENT for evaluation and a mass was noted in the vallecula. She had MRI of neck in 10/26/2020 which showed :\par 1.  Soft tissue mass filling the vallecula measuring up to 5 cm (craniocaudad), of probable tongue base origin base origin. Please note the patient declined IV contrast limiting evaluation of the mass.\par 2.  Mildly enlarged left level II lymph node measuring 1.7 cm. Nonspecific right level II lymph node is also noted measuring 1.2 cm.\par 3.  Bilateral thyroid nodules which may be further characterized with thyroid sonogram. \par \par Pt was then seen by Dr Najera, ENT.  On 11/6/2020, she had laryngoscopy and biopsy of mass in the base of the tongue, which showed:\par - Invasive squamous carcinoma, moderately differentiated.\par - P16 immunostain was performed and it shows strong and diffuse positivity.\par - lymphovascular invasion is present.\par - Portion of cartilage and lymphoid tissue is present in the tumor.\par \par On 11/616/2020, PET/CT showed:  \par 1. Pathologic FDG uptake within an ill-defined soft tissue mass involving the base of tongue/vallecula, with max SUV 63.4, compatible with biologic tumor activity.\par 2. Several FDG avid left level 2 cervical lymph nodes, with max SUV 26.0 within a dominant 1.6 cm left level 2 node.\par 3. No additional site of pathologic FDG uptake.\par 4. Since November 10, 2020, decreased size left lower lobe consolidation, which is non-FDG avid.\par \par In summary, workup showed that she has 5 x 3.7 x 3.5 cm base of tongue mass,  2 level 2 nodes, 1.2 cm and 1.7 cm.  Clinical stage is T3N2 M0 disease (Stage IV A). She saw Dr. Grimaldo and was recommended for external beam treatment to give 70 Gy in 35 treatments with chemotherapy.  She has odynophagia, has lost about 20 lbs over past few months. She also developed muffled voice. She had  tracheostomy and PEG tube placement  She is tolerating tube feeds well. She  is waiting for dental extraction.  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 She started concurrent chemoRT. She received 2 weeks of Cisplatin. She is complaining of issue with Glucerna via PEG feeds. She states that she is having issues with excessive gas and diarrhea. In addition, she has had a four pound weight loss since her last visit. She denies fever, cough, SOB, or mucositis at this time. Sol feels like her tumor has shrunk; she is due for speech and swallow to see if she can initiate PO feeds. Patient continues to care for trach. She was started on XRT on 12/15/2020; tolerating well.\par \par 1/8/2021:\kevin ARCHULETA is here for follow up visit and re-evaluation prior to chemotherapy. 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 She has been on concurrent chemoRT. She received weekly Cisplatin and tolerated so far. She felt mass in the back her tongue has reduced. She is using PEG feeding although she feels that she can swallow better now.  She denies fever, cough, SOB, or mucositis at this time.

## 2021-01-17 NOTE — PHYSICAL EXAM
[Restricted in physically strenuous activity but ambulatory and able to carry out work of a light or sedentary nature] : Status 1- Restricted in physically strenuous activity but ambulatory and able to carry out work of a light or sedentary nature, e.g., light house work, office work [Normal] : grossly intact [de-identified] : unable to visualize the mass in the base of the tongue.  oral mucosa appears normal. [de-identified] : left level II LAD 2 cm,  mobile, slightly tender to palpation, 6-0 cuffless trach in place, patient using speaking valve. parotid gland, submandibular gland and thyroid glands are normal. temporomandibular joint is normal. \par \par left level II LAD 1.5cm, slightly mobile, slightly tender to palpation, 6-0 cuffless trach in place, inner cannula removed and cleaned, patient using speaking valve. parotid gland, submandibular gland and thyroid glands are normal. temporomandibular joint is normal. \par \par  left level II LAD 1.5cm, slightly mobile, slightly tender to palpation, 6-0 cuffless trach in place, inner cannula removed and cleaned, patient using speaking valve. parotid gland, submandibular gland and thyroid glands are normal. temporomandibular joint is normal. \par \par left cervical Lymph adenopathy ( Level II ) slightly mobile and tender to palpation . Trach cuff present , clean  [de-identified] : PEG tube present  [de-identified] : left crevicular LN decreasing in size

## 2021-01-19 ENCOUNTER — APPOINTMENT (OUTPATIENT)
Dept: INFUSION THERAPY | Facility: CLINIC | Age: 66
End: 2021-01-19

## 2021-01-19 ENCOUNTER — NON-APPOINTMENT (OUTPATIENT)
Age: 66
End: 2021-01-19

## 2021-01-19 VITALS
HEART RATE: 92 BPM | RESPIRATION RATE: 16 BRPM | DIASTOLIC BLOOD PRESSURE: 71 MMHG | TEMPERATURE: 98.1 F | SYSTOLIC BLOOD PRESSURE: 141 MMHG | OXYGEN SATURATION: 97 % | WEIGHT: 141.25 LBS | BODY MASS INDEX: 25.02 KG/M2

## 2021-01-19 PROCEDURE — 77014: CPT | Mod: 26

## 2021-01-19 NOTE — HISTORY OF PRESENT ILLNESS
[FreeTextEntry1] : 1/19/2021 OTV 4400cGy/7000cGy to the base of tongue: pt took Tylenol with codeine and experienced nauaea/vomit. pt stopped the medication. states pain is tolerated at this time. SOB on exertion. productive cough with clear sputum. hoarseness noted. pt still drinking 2-3oz 12oz water bottles.Glucerna 6x/day. weight is down 2 pounds since last week. 97% 02 on RA. 2 more chemo treatments left. \par \par 1/12/2021 OTV 3600cGy/7000cGy to the base of tongue: pt denies any N/V/D. Glucerna 6x/day. pt states she is running short and needs more. denies any pain. hoarseness noted. c/o discomfort. denies SOB. pt drinks about 3 12oz water bottles/day. cough with clear sputum. pt weight the same. pt has 3 more chemo tx with 1 on 11/14. \par \par 1/5/2021 OTV 2600cGy/7000cGy to the base of tongue: pt c/o diarrhea due to water and feedings. pt states she is drinking about 12oz water/ day. water helps with dry mouth. denies any N/V. Glucerna feedings 3/day. denies any pain. productive cough with clear sputum. 3 more chemo treatments. denies any SOB. 97% on RA. weight increase of 2lbs since last week. pt is in contact with nutritionist. \par \par 12/28/2020 OTV 1600cGy/7000cGy to the base of tongue: States dry mouth has improved and left ear pain has resolved. Lost 5.2 lbs since last week. Taking Glucerna 3x/day via PEG. Only taking sips of water by mouth. Spoke with Danna Farfan yesterday. Pulse ox 98% RA. Denies SOB.  \par \par \par 12/22/2020 OTV 1200cGy/7000cGy to the base of tongue:\par Patient reports doing well. However, has some mouth dryness, left ear pain has decreased now just dull pain that tolerated, and "nasty taste" since starting her Chemotherapy.  She is tolerating her PEG feeds, Glucerna X 3/day.  Maintaining weight.  Otherwise, she has no complaints.  Denies SOB.    She is s/p trach 11/3/2020, s/p Peg tube 11/5/2020.\par \par 12/15/2020 OTV: 1st treatment today. No complaints at this time other than tolerating the trach.  Will see Dr. Najera this week.   Not using oxygen at home. Pulse ox 96% RA. Chemo today.

## 2021-01-19 NOTE — PHYSICAL EXAM
[Sclera] : the sclera and conjunctiva were normal [Hearing Threshold Finger Rub Not [place]] : hearing was normal [] : no respiratory distress [Respiration, Rhythm And Depth] : normal respiratory rhythm and effort [Exaggerated Use Of Accessory Muscles For Inspiration] : no accessory muscle use [Heart Rate And Rhythm] : heart rate and rhythm were normal [Nail Clubbing] : no clubbing  or cyanosis of the fingernails [Normal] : oriented to person, place and time, the affect was normal, the mood was normal and not anxious [de-identified] : ongoing erythema in oral cavity/tongue [de-identified] : tata chambers [de-identified] : Peg tube

## 2021-01-19 NOTE — DISEASE MANAGEMENT
[Clinical] : TNM Stage: c [OPAL] : OPAL [TTNM] : 3 [NTNM] : 2b [MTNM] : 0 [de-identified] : 6698cGy [de-identified] : 7000cGy [de-identified] : base of tongue

## 2021-01-20 PROCEDURE — G6002: CPT | Mod: 26

## 2021-01-21 ENCOUNTER — APPOINTMENT (OUTPATIENT)
Dept: HEMATOLOGY ONCOLOGY | Facility: CLINIC | Age: 66
End: 2021-01-21
Payer: MEDICARE

## 2021-01-21 ENCOUNTER — LABORATORY RESULT (OUTPATIENT)
Age: 66
End: 2021-01-21

## 2021-01-21 ENCOUNTER — APPOINTMENT (OUTPATIENT)
Dept: INFUSION THERAPY | Facility: CLINIC | Age: 66
End: 2021-01-21
Payer: MEDICARE

## 2021-01-21 VITALS
HEIGHT: 63 IN | BODY MASS INDEX: 24.8 KG/M2 | WEIGHT: 140 LBS | TEMPERATURE: 98.5 F | DIASTOLIC BLOOD PRESSURE: 78 MMHG | SYSTOLIC BLOOD PRESSURE: 117 MMHG | HEART RATE: 90 BPM

## 2021-01-21 DIAGNOSIS — Z51.11 ENCOUNTER FOR ANTINEOPLASTIC CHEMOTHERAPY: ICD-10-CM

## 2021-01-21 DIAGNOSIS — D37.02 NEOPLASM OF UNCERTAIN BEHAVIOR OF TONGUE: ICD-10-CM

## 2021-01-21 LAB
ALBUMIN SERPL ELPH-MCNC: 4 G/DL
ALBUMIN SERPL ELPH-MCNC: 4.1 G/DL
ALBUMIN SERPL ELPH-MCNC: 4.2 G/DL
ALBUMIN SERPL ELPH-MCNC: 4.4 G/DL
ALBUMIN SERPL ELPH-MCNC: 4.4 G/DL
ALP BLD-CCNC: 64 U/L
ALP BLD-CCNC: 68 U/L
ALP BLD-CCNC: 70 U/L
ALP BLD-CCNC: 72 U/L
ALP BLD-CCNC: 74 U/L
ALT SERPL-CCNC: 14 U/L
ALT SERPL-CCNC: 15 U/L
ALT SERPL-CCNC: 16 U/L
ALT SERPL-CCNC: 17 U/L
ALT SERPL-CCNC: 19 U/L
ANION GAP SERPL CALC-SCNC: 11 MMOL/L
ANION GAP SERPL CALC-SCNC: 11 MMOL/L
ANION GAP SERPL CALC-SCNC: 12 MMOL/L
ANION GAP SERPL CALC-SCNC: 14 MMOL/L
AST SERPL-CCNC: 13 U/L
AST SERPL-CCNC: 15 U/L
AST SERPL-CCNC: 16 U/L
AST SERPL-CCNC: 17 U/L
AST SERPL-CCNC: 22 U/L
BILIRUB DIRECT SERPL-MCNC: <0.2 MG/DL
BILIRUB INDIRECT SERPL-MCNC: >0 MG/DL
BILIRUB INDIRECT SERPL-MCNC: >0.7 MG/DL
BILIRUB INDIRECT SERPL-MCNC: NORMAL MG/DL
BILIRUB SERPL-MCNC: 0.2 MG/DL
BILIRUB SERPL-MCNC: 0.9 MG/DL
BILIRUB SERPL-MCNC: <0.2 MG/DL
BUN SERPL-MCNC: 17 MG/DL
BUN SERPL-MCNC: 19 MG/DL
BUN SERPL-MCNC: 20 MG/DL
BUN SERPL-MCNC: 24 MG/DL
BUN SERPL-MCNC: 25 MG/DL
BUN SERPL-MCNC: 26 MG/DL
CALCIUM SERPL-MCNC: 8.9 MG/DL
CALCIUM SERPL-MCNC: 8.9 MG/DL
CALCIUM SERPL-MCNC: 9.2 MG/DL
CALCIUM SERPL-MCNC: 9.4 MG/DL
CALCIUM SERPL-MCNC: 9.5 MG/DL
CALCIUM SERPL-MCNC: 9.7 MG/DL
CHLORIDE SERPL-SCNC: 102 MMOL/L
CHLORIDE SERPL-SCNC: 103 MMOL/L
CHLORIDE SERPL-SCNC: 104 MMOL/L
CHLORIDE SERPL-SCNC: 105 MMOL/L
CHLORIDE SERPL-SCNC: 105 MMOL/L
CHLORIDE SERPL-SCNC: 106 MMOL/L
CO2 SERPL-SCNC: 23 MMOL/L
CO2 SERPL-SCNC: 24 MMOL/L
CO2 SERPL-SCNC: 25 MMOL/L
CREAT SERPL-MCNC: 0.6 MG/DL
CREAT SERPL-MCNC: 0.7 MG/DL
CREAT SERPL-MCNC: 0.7 MG/DL
GLUCOSE SERPL-MCNC: 100 MG/DL
GLUCOSE SERPL-MCNC: 102 MG/DL
GLUCOSE SERPL-MCNC: 117 MG/DL
GLUCOSE SERPL-MCNC: 89 MG/DL
GLUCOSE SERPL-MCNC: 97 MG/DL
GLUCOSE SERPL-MCNC: 98 MG/DL
HCT VFR BLD CALC: 29.2 %
HCT VFR BLD CALC: 33.2 %
HCT VFR BLD CALC: 34.1 %
HCT VFR BLD CALC: 40.1 %
HCT VFR BLD CALC: 40.5 %
HCT VFR BLD CALC: 42 %
HCT VFR BLD CALC: 42.4 %
HGB BLD-MCNC: 10 G/DL
HGB BLD-MCNC: 11 G/DL
HGB BLD-MCNC: 11.5 G/DL
HGB BLD-MCNC: 13.4 G/DL
HGB BLD-MCNC: 13.5 G/DL
HGB BLD-MCNC: 13.8 G/DL
HGB BLD-MCNC: 14.1 G/DL
MAGNESIUM SERPL-MCNC: 1.8 MG/DL
MAGNESIUM SERPL-MCNC: 1.9 MG/DL
MAGNESIUM SERPL-MCNC: 2 MG/DL
MAGNESIUM SERPL-MCNC: 2.1 MG/DL
MAGNESIUM SERPL-MCNC: 2.1 MG/DL
MCHC RBC-ENTMCNC: 29.3 PG
MCHC RBC-ENTMCNC: 29.3 PG
MCHC RBC-ENTMCNC: 29.8 PG
MCHC RBC-ENTMCNC: 30 PG
MCHC RBC-ENTMCNC: 30 PG
MCHC RBC-ENTMCNC: 30.1 PG
MCHC RBC-ENTMCNC: 30.2 PG
MCHC RBC-ENTMCNC: 32.5 G/DL
MCHC RBC-ENTMCNC: 33.1 G/DL
MCHC RBC-ENTMCNC: 33.1 G/DL
MCHC RBC-ENTMCNC: 33.6 G/DL
MCHC RBC-ENTMCNC: 33.7 G/DL
MCHC RBC-ENTMCNC: 33.7 G/DL
MCHC RBC-ENTMCNC: 34.2 G/DL
MCV RBC AUTO: 86.9 FL
MCV RBC AUTO: 88.5 FL
MCV RBC AUTO: 89.1 FL
MCV RBC AUTO: 89.3 FL
MCV RBC AUTO: 89.9 FL
MCV RBC AUTO: 90 FL
MCV RBC AUTO: 90.6 FL
PLATELET # BLD AUTO: 137 K/UL
PLATELET # BLD AUTO: 182 K/UL
PLATELET # BLD AUTO: 221 K/UL
PLATELET # BLD AUTO: 226 K/UL
PLATELET # BLD AUTO: 236 K/UL
PLATELET # BLD AUTO: 245 K/UL
PLATELET # BLD AUTO: 99 K/UL
PMV BLD: 10.1 FL
PMV BLD: 9.3 FL
PMV BLD: 9.4 FL
PMV BLD: 9.4 FL
PMV BLD: 9.6 FL
PMV BLD: 9.7 FL
PMV BLD: 9.9 FL
POTASSIUM SERPL-SCNC: 4.1 MMOL/L
POTASSIUM SERPL-SCNC: 4.2 MMOL/L
POTASSIUM SERPL-SCNC: 4.3 MMOL/L
PROT SERPL-MCNC: 6 G/DL
PROT SERPL-MCNC: 6.1 G/DL
PROT SERPL-MCNC: 6.2 G/DL
PROT SERPL-MCNC: 6.5 G/DL
PROT SERPL-MCNC: 6.7 G/DL
RBC # BLD: 3.36 M/UL
RBC # BLD: 3.75 M/UL
RBC # BLD: 3.82 M/UL
RBC # BLD: 4.47 M/UL
RBC # BLD: 4.5 M/UL
RBC # BLD: 4.67 M/UL
RBC # BLD: 4.71 M/UL
RBC # FLD: 13.6 %
RBC # FLD: 13.8 %
RBC # FLD: 13.9 %
RBC # FLD: 14 %
RBC # FLD: 14.4 %
SODIUM SERPL-SCNC: 138 MMOL/L
SODIUM SERPL-SCNC: 139 MMOL/L
SODIUM SERPL-SCNC: 139 MMOL/L
SODIUM SERPL-SCNC: 140 MMOL/L
SODIUM SERPL-SCNC: 141 MMOL/L
SODIUM SERPL-SCNC: 141 MMOL/L
T4 FREE SERPL-MCNC: 1.2 NG/DL
TSH SERPL-ACNC: 0.07 UIU/ML
WBC # FLD AUTO: 2.59 K/UL
WBC # FLD AUTO: 4.11 K/UL
WBC # FLD AUTO: 4.66 K/UL
WBC # FLD AUTO: 7.1 K/UL
WBC # FLD AUTO: 8.06 K/UL
WBC # FLD AUTO: 9.41 K/UL
WBC # FLD AUTO: 9.52 K/UL

## 2021-01-21 PROCEDURE — G6002: CPT | Mod: 26

## 2021-01-21 PROCEDURE — 99214 OFFICE O/P EST MOD 30 MIN: CPT

## 2021-01-21 RX ORDER — CISPLATIN 1 MG/ML
65 INJECTION, SOLUTION INTRAVENOUS ONCE
Refills: 0 | Status: COMPLETED | OUTPATIENT
Start: 2021-01-21 | End: 2021-01-21

## 2021-01-21 RX ORDER — FOSAPREPITANT DIMEGLUMINE 150 MG/5ML
150 INJECTION, POWDER, LYOPHILIZED, FOR SOLUTION INTRAVENOUS ONCE
Refills: 0 | Status: COMPLETED | OUTPATIENT
Start: 2021-01-21 | End: 2021-01-21

## 2021-01-21 RX ORDER — SODIUM CHLORIDE 9 MG/ML
1000 INJECTION INTRAMUSCULAR; INTRAVENOUS; SUBCUTANEOUS
Refills: 0 | Status: DISCONTINUED | OUTPATIENT
Start: 2021-01-21 | End: 2021-05-31

## 2021-01-21 RX ORDER — DEXAMETHASONE 0.5 MG/5ML
10 ELIXIR ORAL ONCE
Refills: 0 | Status: COMPLETED | OUTPATIENT
Start: 2021-01-21 | End: 2021-01-21

## 2021-01-21 RX ADMIN — SODIUM CHLORIDE 500 MILLILITER(S): 9 INJECTION INTRAMUSCULAR; INTRAVENOUS; SUBCUTANEOUS at 15:41

## 2021-01-21 RX ADMIN — CISPLATIN 420 MILLIGRAM(S): 1 INJECTION, SOLUTION INTRAVENOUS at 16:15

## 2021-01-21 RX ADMIN — FOSAPREPITANT DIMEGLUMINE 450 MILLIGRAM(S): 150 INJECTION, POWDER, LYOPHILIZED, FOR SOLUTION INTRAVENOUS at 15:41

## 2021-01-21 RX ADMIN — Medication 118 MILLIGRAM(S): at 16:00

## 2021-01-21 RX ADMIN — SODIUM CHLORIDE 500 MILLILITER(S): 9 INJECTION INTRAMUSCULAR; INTRAVENOUS; SUBCUTANEOUS at 15:42

## 2021-01-21 RX ADMIN — Medication 10 MILLIGRAM(S): at 16:20

## 2021-01-21 RX ADMIN — FOSAPREPITANT DIMEGLUMINE 150 MILLIGRAM(S): 150 INJECTION, POWDER, LYOPHILIZED, FOR SOLUTION INTRAVENOUS at 16:00

## 2021-01-21 RX ADMIN — SODIUM CHLORIDE 1000 MILLILITER(S): 9 INJECTION INTRAMUSCULAR; INTRAVENOUS; SUBCUTANEOUS at 15:42

## 2021-01-21 RX ADMIN — CISPLATIN 65 MILLIGRAM(S): 1 INJECTION, SOLUTION INTRAVENOUS at 16:40

## 2021-01-21 NOTE — ASSESSMENT
[FreeTextEntry1] : 1- Squamous cell carcinoma of base of Tongue, [id number], Clinical stage OPAL, P16 + \par 2- Adenocarcinoma of the lung - subcentimeter RLL and RML. RLL biopsy consistent with adenocarcinoma. s/p RFA of both lesions, ARMANDO.\par \par PLAN: \par -- Continue weekly Cisplatin concurrent with radiation. Will proceed with week 6 Cisplatin. \par -- Repeat CBC today showed blood counts trending down. WBC 2.59 with ANC 1.96 and PLT 99. Will check CBC next week to decide if she can continue chemo.\par -- Renal function remains normal with Cr 0.6. Will order repeat  BMP and Mg before chemotherapy. Will give Mg supplement if indicated. \par -- Sore throat and voice change is due mucositis and inflammation from radiation. Will close monitor. \par -- Followup with Dr. Grimaldo for radiation.\par -- Followup with ENT for tracheotomy. \par -- RTC in 1 week for re-evaluation.\par \par

## 2021-01-21 NOTE — HISTORY OF PRESENT ILLNESS
[Disease: _____________________] : Disease: [unfilled] [T: ___] : T[unfilled] [N: ___] : N[unfilled] [M: ___] : M[unfilled] [AJCC Stage: ____] : AJCC Stage: [unfilled] [de-identified] : 62 year old female with a h/o COPD, polychondritis, HTN, DL, was being followed up for a h/o lung nodules. \par 6/9/16: 9  x  8  x  11  mm  part  solid  nodule  right  middle  lobe  is  noted  (series  4  image  164),  increased slightly  in  size  (due  to  a  surrounding  ground glass  component)  from  previous  study  of  December 2010,  previously  7  mm. \par \par 9/9/16: 9  x  6  x  8  mm  solid  nodule,  superior  segment  right  lower  lobe  (series  4  image  106),  previously  8  x  5  x  7  mm. \par 1/26/17 PET-CT: Mildly hypermetabolic righ lower lobe nodule 9.7 x 8.5 mm, SUV 1.6.\par RML non-FDG avid 8mm nodule.\par \par Following this on 4/4/17, she had a CT guided biopsy consistent with adenocarcinoma and radiofrequency ablation of  right  lower  lobe  nodule  and  right  middle  lobe  nodule.\par  [de-identified] : Today, she denies any complaints. She continues to smoke. 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 She was last seen in Feb, 2018 after being diagnosed with  adenocarcinoma of the lung - subcentimeter RLL and RML, s/p RFA of both lesions. Adjuvant treatment was not indicated and she remained on surveillance, followed up with Dr Bright . She reports that she noticed difficulty swallowing with pain on side of her neck earlier this year but did not seek medical attention because of COVID19 pandemic. She is a active smoker (quit one month ago) . She saw her PMD and was treated with abx . After several courses of antibiotics and persistent pain, she was referred to ENT for evaluation and a mass was noted in the vallecula. She had MRI of neck in 10/26/2020 which showed :\par 1.  Soft tissue mass filling the vallecula measuring up to 5 cm (craniocaudad), of probable tongue base origin base origin. Please note the patient declined IV contrast limiting evaluation of the mass.\par 2.  Mildly enlarged left level II lymph node measuring 1.7 cm. Nonspecific right level II lymph node is also noted measuring 1.2 cm.\par 3.  Bilateral thyroid nodules which may be further characterized with thyroid sonogram. \par \par Pt was then seen by Dr Najera, ENT.  On 11/6/2020, she had laryngoscopy and biopsy of mass in the base of the tongue, which showed:\par - Invasive squamous carcinoma, moderately differentiated.\par - P16 immunostain was performed and it shows strong and diffuse positivity.\par - lymphovascular invasion is present.\par - Portion of cartilage and lymphoid tissue is present in the tumor.\par \par On 11/616/2020, PET/CT showed:  \par 1. Pathologic FDG uptake within an ill-defined soft tissue mass involving the base of tongue/vallecula, with max SUV 63.4, compatible with biologic tumor activity.\par 2. Several FDG avid left level 2 cervical lymph nodes, with max SUV 26.0 within a dominant 1.6 cm left level 2 node.\par 3. No additional site of pathologic FDG uptake.\par 4. Since November 10, 2020, decreased size left lower lobe consolidation, which is non-FDG avid.\par \par In summary, workup showed that she has 5 x 3.7 x 3.5 cm base of tongue mass,  2 level 2 nodes, 1.2 cm and 1.7 cm.  Clinical stage is T3N2 M0 disease (Stage IV A). She saw Dr. Grimaldo and was recommended for external beam treatment to give 70 Gy in 35 treatments with chemotherapy.  She has odynophagia, has lost about 20 lbs over past few months. She also developed muffled voice. She had  tracheostomy and PEG tube placement  She is tolerating tube feeds well. She  is waiting for dental extraction.  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 She started concurrent chemoRT. She received 2 weeks of Cisplatin. She is complaining of issue with Glucerna via PEG feeds. She states that she is having issues with excessive gas and diarrhea. In addition, she has had a four pound weight loss since her last visit. She denies fever, cough, SOB, or mucositis at this time. Sol feels like her tumor has shrunk; she is due for speech and swallow to see if she can initiate PO feeds. Patient continues to care for trach. She was started on XRT on 12/15/2020; tolerating well.\par \par 1/8/2021:vamshi ARCHULETA is here for follow up visit and re-evaluation prior to chemotherapy. 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 She has been on concurrent chemoRT. She received weekly Cisplatin and tolerated so far. She felt mass in the back her tongue has reduced. She is using PEG feeding although she feels that she can swallow better now.  She denies fever, cough, SOB, or mucositis at this time. \par \par 1/21/2021:vamshi ARCHULETA is here for follow up visit and chemotherapy. 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 She has been on concurrent chemoRT. She is getting weekly Cisplatin.  To date, she has received 5 weekly treatment and tolerated so far. She feels sore in her throat and has horsiness voice. She is ablve to drink liquid. She is using PEG feeding. She denies fever, .and cough, SOB.

## 2021-01-21 NOTE — PHYSICAL EXAM
[Restricted in physically strenuous activity but ambulatory and able to carry out work of a light or sedentary nature] : Status 1- Restricted in physically strenuous activity but ambulatory and able to carry out work of a light or sedentary nature, e.g., light house work, office work [Normal] : grossly intact [de-identified] : unable to visualize the mass in the base of the tongue.  oral mucosa appears normal. [de-identified] : left level II LAD 2 cm,  mobile, slightly tender to palpation, 6-0 cuffless trach in place, patient using speaking valve. parotid gland, submandibular gland and thyroid glands are normal. temporomandibular joint is normal. \par \par left level II LAD 1.5cm, slightly mobile, slightly tender to palpation, 6-0 cuffless trach in place, inner cannula removed and cleaned, patient using speaking valve. parotid gland, submandibular gland and thyroid glands are normal. temporomandibular joint is normal. \par \par  left level II LAD 1.5cm, slightly mobile, slightly tender to palpation, 6-0 cuffless trach in place, inner cannula removed and cleaned, patient using speaking valve. parotid gland, submandibular gland and thyroid glands are normal. temporomandibular joint is normal. \par \par left cervical Lymph adenopathy ( Level II ) slightly mobile and tender to palpation . Trach cuff present , clean  [de-identified] : PEG tube present  [de-identified] : left crevicular LN decreasing in size

## 2021-01-25 PROCEDURE — G6002: CPT | Mod: 26

## 2021-01-25 PROCEDURE — 77427 RADIATION TX MANAGEMENT X5: CPT

## 2021-01-26 ENCOUNTER — APPOINTMENT (OUTPATIENT)
Dept: INFUSION THERAPY | Facility: CLINIC | Age: 66
End: 2021-01-26

## 2021-01-26 ENCOUNTER — NON-APPOINTMENT (OUTPATIENT)
Age: 66
End: 2021-01-26

## 2021-01-26 VITALS
OXYGEN SATURATION: 98 % | WEIGHT: 141.4 LBS | DIASTOLIC BLOOD PRESSURE: 66 MMHG | SYSTOLIC BLOOD PRESSURE: 121 MMHG | TEMPERATURE: 97.2 F | HEART RATE: 110 BPM | RESPIRATION RATE: 18 BRPM | BODY MASS INDEX: 25.05 KG/M2

## 2021-01-26 PROCEDURE — G6002: CPT | Mod: 26

## 2021-01-26 RX ORDER — ACETAMINOPHEN AND CODEINE PHOSPHATE 120; 12 MG/5ML; MG/5ML
120-12 SOLUTION ORAL
Qty: 240 | Refills: 0 | Status: DISCONTINUED | COMMUNITY
Start: 2021-01-12 | End: 2021-01-26

## 2021-01-26 NOTE — DISEASE MANAGEMENT
[Clinical] : TNM Stage: c [OPAL] : OPAL [TTNM] : 3 [NTNM] : 2b [MTNM] : 0 [de-identified] : 5400cGy [de-identified] : 7000cGy [de-identified] : base of tongue

## 2021-01-26 NOTE — PHYSICAL EXAM
[Sclera] : the sclera and conjunctiva were normal [Hearing Threshold Finger Rub Not [place]] : hearing was normal [] : no respiratory distress [Respiration, Rhythm And Depth] : normal respiratory rhythm and effort [Exaggerated Use Of Accessory Muscles For Inspiration] : no accessory muscle use [Heart Rate And Rhythm] : heart rate and rhythm were normal [Nail Clubbing] : no clubbing  or cyanosis of the fingernails [Normal] : oriented to person, place and time, the affect was normal, the mood was normal and not anxious [de-identified] : No active mucositis seen at present.  No tumor seen at present.  Tongue extends in the midline.   [de-identified] : tata chambers [de-identified] : Peg tube

## 2021-01-26 NOTE — HISTORY OF PRESENT ILLNESS
[FreeTextEntry1] : 1/26/2021 OTV 5400cGy/7000cGy to the base of tongue: Patient states; "I feels alright, just a little weak". Tolerating the glucerna 6 x daily. weight today is 141 up one pound from Jan 21st 2021 VSS, patient presents with a dry non productive cough minimal sputum. she has dyspnea noted on exertion, pulse ox 98% on room air.  No nausea or vomiting only diarrhea after her chemo treatment, patient has one chemo treatment left.  \par \par 1/19/2021 OTV 4400cGy/7000cGy to the base of tongue: pt took Tylenol with codeine and experienced nauaea/vomit. pt stopped the medication. states pain is tolerated at this time. SOB on exertion. productive cough with clear sputum. hoarseness noted. pt still drinking 2-3oz 12oz water bottles.Glucerna 6x/day. weight is down 2 pounds since last week. 97% 02 on RA. 2 more chemo treatments left. \par \par 1/12/2021 OTV 3600cGy/7000cGy to the base of tongue: pt denies any N/V/D. Glucerna 6x/day. pt states she is running short and needs more. denies any pain. hoarseness noted. c/o discomfort. denies SOB. pt drinks about 3 12oz water bottles/day. cough with clear sputum. pt weight the same. pt has 3 more chemo tx with 1 on 11/14. \par \par 1/5/2021 OTV 2600cGy/7000cGy to the base of tongue: pt c/o diarrhea due to water and feedings. pt states she is drinking about 12oz water/ day. water helps with dry mouth. denies any N/V. Glucerna feedings 3/day. denies any pain. productive cough with clear sputum. 3 more chemo treatments. denies any SOB. 97% on RA. weight increase of 2lbs since last week. pt is in contact with nutritionist. \par \par 12/28/2020 OTV 1600cGy/7000cGy to the base of tongue: States dry mouth has improved and left ear pain has resolved. Lost 5.2 lbs since last week. Taking Glucerna 3x/day via PEG. Only taking sips of water by mouth. Spoke with Danna Farfan yesterday. Pulse ox 98% RA. Denies SOB.  \par \par \par 12/22/2020 OTV 1200cGy/7000cGy to the base of tongue:\par Patient reports doing well. However, has some mouth dryness, left ear pain has decreased now just dull pain that tolerated, and "nasty taste" since starting her Chemotherapy.  She is tolerating her PEG feeds, Glucerna X 3/day.  Maintaining weight.  Otherwise, she has no complaints.  Denies SOB.    She is s/p trach 11/3/2020, s/p Peg tube 11/5/2020.\par \par 12/15/2020 OTV: 1st treatment today. No complaints at this time other than tolerating the trach.  Will see Dr. Najera this week.   Not using oxygen at home. Pulse ox 96% RA. Chemo today.

## 2021-01-27 PROCEDURE — G6002: CPT | Mod: 26

## 2021-01-28 ENCOUNTER — APPOINTMENT (OUTPATIENT)
Dept: INFUSION THERAPY | Facility: CLINIC | Age: 66
End: 2021-01-28

## 2021-01-28 ENCOUNTER — LABORATORY RESULT (OUTPATIENT)
Age: 66
End: 2021-01-28

## 2021-01-28 PROCEDURE — G6002: CPT | Mod: 26

## 2021-01-28 RX ORDER — FOSAPREPITANT DIMEGLUMINE 150 MG/5ML
150 INJECTION, POWDER, LYOPHILIZED, FOR SOLUTION INTRAVENOUS ONCE
Refills: 0 | Status: COMPLETED | OUTPATIENT
Start: 2021-01-28 | End: 2021-01-28

## 2021-01-28 RX ORDER — SODIUM CHLORIDE 9 MG/ML
1000 INJECTION INTRAMUSCULAR; INTRAVENOUS; SUBCUTANEOUS
Refills: 0 | Status: DISCONTINUED | OUTPATIENT
Start: 2021-01-28 | End: 2021-05-31

## 2021-01-28 RX ORDER — CISPLATIN 1 MG/ML
50 INJECTION, SOLUTION INTRAVENOUS ONCE
Refills: 0 | Status: COMPLETED | OUTPATIENT
Start: 2021-01-28 | End: 2021-01-28

## 2021-01-28 RX ORDER — MAGNESIUM SULFATE 500 MG/ML
2 VIAL (ML) INJECTION ONCE
Refills: 0 | Status: COMPLETED | OUTPATIENT
Start: 2021-01-28 | End: 2021-01-28

## 2021-01-28 RX ORDER — DEXAMETHASONE 0.5 MG/5ML
10 ELIXIR ORAL ONCE
Refills: 0 | Status: COMPLETED | OUTPATIENT
Start: 2021-01-28 | End: 2021-01-28

## 2021-01-28 RX ADMIN — CISPLATIN 400 MILLIGRAM(S): 1 INJECTION, SOLUTION INTRAVENOUS at 16:05

## 2021-01-28 RX ADMIN — Medication 118 MILLIGRAM(S): at 15:14

## 2021-01-28 RX ADMIN — CISPLATIN 50 MILLIGRAM(S): 1 INJECTION, SOLUTION INTRAVENOUS at 17:05

## 2021-01-28 RX ADMIN — SODIUM CHLORIDE 500 MILLILITER(S): 9 INJECTION INTRAMUSCULAR; INTRAVENOUS; SUBCUTANEOUS at 14:35

## 2021-01-28 RX ADMIN — Medication 10 MILLIGRAM(S): at 15:35

## 2021-01-28 RX ADMIN — Medication 50 GRAM(S): at 16:05

## 2021-01-28 RX ADMIN — FOSAPREPITANT DIMEGLUMINE 450 MILLIGRAM(S): 150 INJECTION, POWDER, LYOPHILIZED, FOR SOLUTION INTRAVENOUS at 15:34

## 2021-01-28 RX ADMIN — SODIUM CHLORIDE 1000 MILLILITER(S): 9 INJECTION INTRAMUSCULAR; INTRAVENOUS; SUBCUTANEOUS at 16:35

## 2021-01-28 RX ADMIN — Medication 2 GRAM(S): at 17:05

## 2021-01-28 RX ADMIN — FOSAPREPITANT DIMEGLUMINE 150 MILLIGRAM(S): 150 INJECTION, POWDER, LYOPHILIZED, FOR SOLUTION INTRAVENOUS at 15:55

## 2021-02-03 ENCOUNTER — NON-APPOINTMENT (OUTPATIENT)
Age: 66
End: 2021-02-03

## 2021-02-03 VITALS
SYSTOLIC BLOOD PRESSURE: 93 MMHG | RESPIRATION RATE: 16 BRPM | OXYGEN SATURATION: 96 % | TEMPERATURE: 97.5 F | DIASTOLIC BLOOD PRESSURE: 55 MMHG | HEART RATE: 99 BPM | WEIGHT: 137.38 LBS | BODY MASS INDEX: 24.33 KG/M2

## 2021-02-03 PROCEDURE — G6002: CPT | Mod: 26

## 2021-02-03 PROCEDURE — 77427 RADIATION TX MANAGEMENT X5: CPT

## 2021-02-03 NOTE — DISEASE MANAGEMENT
[Clinical] : TNM Stage: c [OPAL] : OPAL [TTNM] : 3 [NTNM] : 2b [MTNM] : 0 [de-identified] : 1690cGy [de-identified] : 7000cGy [de-identified] : base of tongue

## 2021-02-03 NOTE — PHYSICAL EXAM
[Sclera] : the sclera and conjunctiva were normal [Hearing Threshold Finger Rub Not [place]] : hearing was normal [] : no respiratory distress [Respiration, Rhythm And Depth] : normal respiratory rhythm and effort [Exaggerated Use Of Accessory Muscles For Inspiration] : no accessory muscle use [Heart Rate And Rhythm] : heart rate and rhythm were normal [Nail Clubbing] : no clubbing  or cyanosis of the fingernails [Normal] : oriented to person, place and time, the affect was normal, the mood was normal and not anxious [de-identified] : No active mucositis seen at present.  No tumor seen at present.  Tongue extends in the midline.   [de-identified] : tata chambers [de-identified] : Peg tube

## 2021-02-03 NOTE — HISTORY OF PRESENT ILLNESS
[FreeTextEntry1] : 2/3/2021 OTV 6200cGy/7000cGy to the base of tongue: BP 93/56 HR 99. Pt states she had diarrhea yesterday and today. She states she had 2 episodes of diarrhea yesterday and 2 today. Will take Imodium today. Taking Glucerna 6 cans per day. Flushing PEG with 1 and a half syringe of water. Lost 4lbs since last week. C/o thick secretions and dry mouth. Spoke with nutritionist Danna Farfan, she will speak with pt regarding feedings and hydration through PEG. \par \par 1/26/2021 OTV 5400cGy/7000cGy to the base of tongue: Patient states; "I feels alright, just a little weak". Tolerating the glucerna 6 x daily. weight today is 141 up one pound from Jan 21st 2021 VSS, patient presents with a dry non productive cough minimal sputum. she has dyspnea noted on exertion, pulse ox 98% on room air.  No nausea or vomiting only diarrhea after her chemo treatment, patient has one chemo treatment left.  \par \par 1/19/2021 OTV 4400cGy/7000cGy to the base of tongue: pt took Tylenol with codeine and experienced nauaea/vomit. pt stopped the medication. states pain is tolerated at this time. SOB on exertion. productive cough with clear sputum. hoarseness noted. pt still drinking 2-3oz 12oz water bottles.Glucerna 6x/day. weight is down 2 pounds since last week. 97% 02 on RA. 2 more chemo treatments left. \par \par 1/12/2021 OTV 3600cGy/7000cGy to the base of tongue: pt denies any N/V/D. Glucerna 6x/day. pt states she is running short and needs more. denies any pain. hoarseness noted. c/o discomfort. denies SOB. pt drinks about 3 12oz water bottles/day. cough with clear sputum. pt weight the same. pt has 3 more chemo tx with 1 on 11/14. \par \par 1/5/2021 OTV 2600cGy/7000cGy to the base of tongue: pt c/o diarrhea due to water and feedings. pt states she is drinking about 12oz water/ day. water helps with dry mouth. denies any N/V. Glucerna feedings 3/day. denies any pain. productive cough with clear sputum. 3 more chemo treatments. denies any SOB. 97% on RA. weight increase of 2lbs since last week. pt is in contact with nutritionist. \par \par 12/28/2020 OTV 1600cGy/7000cGy to the base of tongue: States dry mouth has improved and left ear pain has resolved. Lost 5.2 lbs since last week. Taking Glucerna 3x/day via PEG. Only taking sips of water by mouth. Spoke with Danna Farfan yesterday. Pulse ox 98% RA. Denies SOB.  \par \par \par 12/22/2020 OTV 1200cGy/7000cGy to the base of tongue:\par Patient reports doing well. However, has some mouth dryness, left ear pain has decreased now just dull pain that tolerated, and "nasty taste" since starting her Chemotherapy.  She is tolerating her PEG feeds, Glucerna X 3/day.  Maintaining weight.  Otherwise, she has no complaints.  Denies SOB.    She is s/p trach 11/3/2020, s/p Peg tube 11/5/2020.\par \par 12/15/2020 OTV: 1st treatment today. No complaints at this time other than tolerating the trach.  Will see Dr. Najera this week.   Not using oxygen at home. Pulse ox 96% RA. Chemo today.

## 2021-02-04 ENCOUNTER — NON-APPOINTMENT (OUTPATIENT)
Age: 66
End: 2021-02-04

## 2021-02-04 ENCOUNTER — APPOINTMENT (OUTPATIENT)
Dept: HEMATOLOGY ONCOLOGY | Facility: CLINIC | Age: 66
End: 2021-02-04

## 2021-02-04 ENCOUNTER — LABORATORY RESULT (OUTPATIENT)
Age: 66
End: 2021-02-04

## 2021-02-04 PROCEDURE — G6002: CPT | Mod: 26

## 2021-02-05 ENCOUNTER — APPOINTMENT (OUTPATIENT)
Dept: PULMONOLOGY | Facility: CLINIC | Age: 66
End: 2021-02-05
Payer: MEDICARE

## 2021-02-05 ENCOUNTER — OUTPATIENT (OUTPATIENT)
Dept: OUTPATIENT SERVICES | Facility: HOSPITAL | Age: 66
LOS: 1 days | Discharge: HOME | End: 2021-02-05

## 2021-02-05 VITALS
HEIGHT: 63 IN | WEIGHT: 137 LBS | DIASTOLIC BLOOD PRESSURE: 59 MMHG | TEMPERATURE: 97 F | SYSTOLIC BLOOD PRESSURE: 97 MMHG | BODY MASS INDEX: 24.27 KG/M2 | OXYGEN SATURATION: 93 % | HEART RATE: 85 BPM

## 2021-02-05 DIAGNOSIS — Z90.710 ACQUIRED ABSENCE OF BOTH CERVIX AND UTERUS: Chronic | ICD-10-CM

## 2021-02-05 DIAGNOSIS — J38.1 POLYP OF VOCAL CORD AND LARYNX: Chronic | ICD-10-CM

## 2021-02-05 DIAGNOSIS — Z98.890 OTHER SPECIFIED POSTPROCEDURAL STATES: Chronic | ICD-10-CM

## 2021-02-05 DIAGNOSIS — Z90.49 ACQUIRED ABSENCE OF OTHER SPECIFIED PARTS OF DIGESTIVE TRACT: Chronic | ICD-10-CM

## 2021-02-05 DIAGNOSIS — R91.1 SOLITARY PULMONARY NODULE: Chronic | ICD-10-CM

## 2021-02-05 PROCEDURE — 99213 OFFICE O/P EST LOW 20 MIN: CPT | Mod: GC

## 2021-02-05 NOTE — HISTORY OF PRESENT ILLNESS
[TextBox_4] : 64 yo w/ hx of COPD, malignant right lung nodule s/p ablation, squamous carcinoma of the tongue on chemo and radiotherapy s/p trach and peg tube,  presents for follow up. Patient reports having no SOB, but she does have some secretions and dried blood coming from the trach tube. she uses duoneb 4 times daily and it helps with clearing the secretions. she tried mucinex before with no relief. she has an appointment next month with ENT for possible removal of trach tube and trach closure. last PET scan in 2020 showed no uptake in lung or pleura. she is exsmoker

## 2021-02-05 NOTE — REVIEW OF SYSTEMS
[Fatigue] : fatigue [Sore Throat] : sore throat [Nasal Congestion] : nasal congestion [Dry Mouth] : dry mouth [Poor Dentition] : poor dentition [Sputum] : sputum [Arthralgias] : arthralgias [Negative] : Neurologic

## 2021-02-05 NOTE — ASSESSMENT
[FreeTextEntry1] : 64 yo w/ hx of COPD, malignant right lung nodule s/p ablation , and carcinoma of the base of the tongue presents for follow up\par \par # COPD\par - continue duonebs four times a day since she may have difficulty with the trach \par - RTC in 2 months\par \par # Lung nodule/adeno ca s/p ablation\par - last PET scan in Nov 2020 showed no uptake in lung or pleura\par \par # Carcinoma at the base of the tongue s/p trach and peg\par - on chemo/radio\par - fu with onc/ENT\par - fu ENT for trach closure\par \par fu in 2 months\par

## 2021-02-05 NOTE — PHYSICAL EXAM
[No Acute Distress] : no acute distress [No Resp Distress] : no resp distress [Clear to Auscultation Bilaterally] : clear to auscultation bilaterally [No Clubbing] : no clubbing [No Edema] : no edema [No Focal Deficits] : no focal deficits [Oriented x3] : oriented x3 [TextBox_44] : trach

## 2021-02-08 PROCEDURE — G6002: CPT | Mod: 26

## 2021-02-09 ENCOUNTER — OUTPATIENT (OUTPATIENT)
Dept: OUTPATIENT SERVICES | Facility: HOSPITAL | Age: 66
LOS: 1 days | Discharge: HOME | End: 2021-02-09
Payer: MEDICARE

## 2021-02-09 DIAGNOSIS — Z90.710 ACQUIRED ABSENCE OF BOTH CERVIX AND UTERUS: Chronic | ICD-10-CM

## 2021-02-09 DIAGNOSIS — R91.1 SOLITARY PULMONARY NODULE: Chronic | ICD-10-CM

## 2021-02-09 DIAGNOSIS — J38.1 POLYP OF VOCAL CORD AND LARYNX: Chronic | ICD-10-CM

## 2021-02-09 DIAGNOSIS — C01 MALIGNANT NEOPLASM OF BASE OF TONGUE: ICD-10-CM

## 2021-02-09 DIAGNOSIS — Z98.890 OTHER SPECIFIED POSTPROCEDURAL STATES: Chronic | ICD-10-CM

## 2021-02-09 DIAGNOSIS — Z90.49 ACQUIRED ABSENCE OF OTHER SPECIFIED PARTS OF DIGESTIVE TRACT: Chronic | ICD-10-CM

## 2021-02-12 ENCOUNTER — OUTPATIENT (OUTPATIENT)
Dept: OUTPATIENT SERVICES | Facility: HOSPITAL | Age: 66
LOS: 1 days | Discharge: HOME | End: 2021-02-12

## 2021-02-12 ENCOUNTER — APPOINTMENT (OUTPATIENT)
Dept: RHEUMATOLOGY | Facility: CLINIC | Age: 66
End: 2021-02-12
Payer: MEDICARE

## 2021-02-12 VITALS
WEIGHT: 139 LBS | TEMPERATURE: 98.4 F | SYSTOLIC BLOOD PRESSURE: 102 MMHG | HEIGHT: 63 IN | OXYGEN SATURATION: 94 % | BODY MASS INDEX: 24.63 KG/M2 | DIASTOLIC BLOOD PRESSURE: 65 MMHG | HEART RATE: 87 BPM

## 2021-02-12 DIAGNOSIS — Z90.710 ACQUIRED ABSENCE OF BOTH CERVIX AND UTERUS: Chronic | ICD-10-CM

## 2021-02-12 DIAGNOSIS — G47.10 HYPERSOMNIA, UNSPECIFIED: ICD-10-CM

## 2021-02-12 DIAGNOSIS — Z98.890 OTHER SPECIFIED POSTPROCEDURAL STATES: Chronic | ICD-10-CM

## 2021-02-12 DIAGNOSIS — R91.1 SOLITARY PULMONARY NODULE: Chronic | ICD-10-CM

## 2021-02-12 DIAGNOSIS — Z90.49 ACQUIRED ABSENCE OF OTHER SPECIFIED PARTS OF DIGESTIVE TRACT: Chronic | ICD-10-CM

## 2021-02-12 DIAGNOSIS — J38.1 POLYP OF VOCAL CORD AND LARYNX: Chronic | ICD-10-CM

## 2021-02-12 PROCEDURE — 99214 OFFICE O/P EST MOD 30 MIN: CPT | Mod: GC

## 2021-02-15 NOTE — REVIEW OF SYSTEMS
[Sore Throat] : sore throat [Fever] : no fever [Chills] : no chills [Eyesight Problems] : no eyesight problems [Chest Pain] : no chest pain [Shortness Of Breath] : no shortness of breath [Abdominal Pain] : no abdominal pain [Constipation] : no constipation [Diarrhea] : no diarrhea [Dysuria] : no dysuria [FreeTextEntry4] : + Hoarseness

## 2021-02-15 NOTE — HISTORY OF PRESENT ILLNESS
[FreeTextEntry1] : 66yo F w/ pmhx of relapsing polychondritis and osteoporosis here for med refill. On MMF 1500mg BId and HCQ 200mg BID. \par \par Patient has been feeling weak because she finished radiation on Tuesday and finished chemotherapy last week. She is endorsing some throat pain. However patient denies any shortness of breath and abdominal pain. \par \par Last dexa scan was april 2019. Has not followed up with ophtho because she has been busy with chemo/radiation. Patient still feels hoarseness but heme/onc says that she will feel like that for a couple weeks after last radiation treatment. \par \par Denies puffy fingers, ears and denies stiffness in fingers.

## 2021-02-15 NOTE — ASSESSMENT
[FreeTextEntry1] : 66 yo F presenting for routine follow up of RP on HCQ/MMF and osteoporosis on alendronate\par \par # RP, therapeutic drug monitoring \par - History of RP, no biopsy\par - No current evidence of flare\par - on MMF 1000mg BID and HCQ 200mg BID (renewed)\par - encouraged patient to follow up with ophtho; referral \par - will get hep panel (annual for cellcept) (HBs Ab, HBs Ag, HC IgM, HC core total, Hep C w/ reflex RNA) \par - f/u 4 months \par \par # Osteoporosis \par - has not been taking alendronate (takes 70mg once a week) for the past couple months because she does not know if you can crush it and give it through PEG (november). \par - last dexa scan was april 2019: osteopenia of lumbar spine and total left hip; osteoporosis of alendronate\par - will address dexa scan on next visit  \par - advised patient to ask pharmacist if she can crush alendronate and vitamin D/Calcium\par - Should practice weight bearing exercises such as walking routinely.

## 2021-02-19 ENCOUNTER — APPOINTMENT (OUTPATIENT)
Dept: OTOLARYNGOLOGY | Facility: CLINIC | Age: 66
End: 2021-02-19
Payer: MEDICARE

## 2021-02-19 PROCEDURE — 31575 DIAGNOSTIC LARYNGOSCOPY: CPT

## 2021-02-19 PROCEDURE — 99072 ADDL SUPL MATRL&STAF TM PHE: CPT

## 2021-02-19 PROCEDURE — 99214 OFFICE O/P EST MOD 30 MIN: CPT | Mod: 25

## 2021-02-19 NOTE — ASSESSMENT
[FreeTextEntry1] : - doing well\par - new 6-0 shiley cuffless trach ordered\par - f/up with new trach, will begin capping trials\par - will refer to SLP for swallow eval\par - f/up with team who placed PEG

## 2021-02-19 NOTE — HISTORY OF PRESENT ILLNESS
[de-identified] : Patient presents today with c/o left otalgia, odynophagia. Patient admits theses symptoms of odynophagia for the past 3-4 months. Dr Li gave her Mucinex work for about a week thank came back. It was intermittent for about a month she gets food stuck in her throat. Recently this happens daily. \par Her ear started about a month ago has worsen she has to take Tylenol every night due to the pain .\par Hearing has been stable, but bad.\par \par 12/23/2020: Patient presents today following up on malignant neoplasm of tongue. Patient is s/p direct laryngoscopy with biopsy of base of tongue, trach and PEG placement. Patient currently having chemo and radiation, started mid Dec 2020. She does have some ear pain.  \par Patient has a history of polychondritis.\par \par Patient complains of dysphagia to solids mainly. no choking with liquids.\par \par No dysphonia. 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 Patient had MRI neck done, shows vallecular mass. Son notes muffled voice over the past 6 months. Continues to have swallowing difficulty. ?weight loss. \par \par She takes Cellcept, hydroxychloroquine for polychondritis.\par \par She has a hx of FDG avid pulmonary nodules on PET 2-3 years ago, these were "burned" per patient. She sees Dr Bright for this. \par \par \par 11/19/2020 64 yo following up s/p direct laryngoscopy with biospy of base of tongue, awake trach 11/6/20 and PEG placement for newly diagnosed SCCa base of tongue. Underwent PET earlier this week. SHe is frustrated with her trach and PEG, has been taking water by mouth. She hasn't smoked since surgery. [id number] SCCa base of tongue, p16 pending.  [FreeTextEntry1] : \par 02/19/2021 : Final pathology p16+ of base of tongue SCCa.  She has completed CRT 2/10/21. Breathing well.  She believes  is has a trachea infection. Thick secretions from trach. She weighs 139 lbs down from 146lbs. Uses speaking valve intermittently.

## 2021-02-19 NOTE — PHYSICAL EXAM
[Midline] : trachea located in midline position [Normal] : no rashes [de-identified] : 6-0 cuffless trach in place,  patient using speaking valve

## 2021-02-19 NOTE — REASON FOR VISIT
[Subsequent Evaluation] : a subsequent evaluation for [FreeTextEntry2] : malignant neoplasm of tongue , oropharyngeal dysphagia.

## 2021-02-25 ENCOUNTER — APPOINTMENT (OUTPATIENT)
Dept: HEMATOLOGY ONCOLOGY | Facility: CLINIC | Age: 66
End: 2021-02-25
Payer: MEDICARE

## 2021-02-25 ENCOUNTER — EMERGENCY (EMERGENCY)
Facility: HOSPITAL | Age: 66
LOS: 0 days | Discharge: HOME | End: 2021-02-25
Attending: EMERGENCY MEDICINE | Admitting: EMERGENCY MEDICINE
Payer: MEDICARE

## 2021-02-25 ENCOUNTER — OUTPATIENT (OUTPATIENT)
Dept: OUTPATIENT SERVICES | Facility: HOSPITAL | Age: 66
LOS: 1 days | Discharge: HOME | End: 2021-02-25

## 2021-02-25 ENCOUNTER — LABORATORY RESULT (OUTPATIENT)
Age: 66
End: 2021-02-25

## 2021-02-25 VITALS
WEIGHT: 138 LBS | TEMPERATURE: 98.1 F | BODY MASS INDEX: 24.45 KG/M2 | HEART RATE: 111 BPM | SYSTOLIC BLOOD PRESSURE: 135 MMHG | DIASTOLIC BLOOD PRESSURE: 79 MMHG | HEIGHT: 63 IN

## 2021-02-25 VITALS
HEART RATE: 88 BPM | RESPIRATION RATE: 19 BRPM | DIASTOLIC BLOOD PRESSURE: 66 MMHG | HEIGHT: 65 IN | TEMPERATURE: 98 F | OXYGEN SATURATION: 97 % | SYSTOLIC BLOOD PRESSURE: 127 MMHG

## 2021-02-25 VITALS
DIASTOLIC BLOOD PRESSURE: 75 MMHG | TEMPERATURE: 98 F | RESPIRATION RATE: 20 BRPM | OXYGEN SATURATION: 100 % | SYSTOLIC BLOOD PRESSURE: 136 MMHG

## 2021-02-25 DIAGNOSIS — Z90.710 ACQUIRED ABSENCE OF BOTH CERVIX AND UTERUS: Chronic | ICD-10-CM

## 2021-02-25 DIAGNOSIS — J38.1 POLYP OF VOCAL CORD AND LARYNX: Chronic | ICD-10-CM

## 2021-02-25 DIAGNOSIS — Z98.890 OTHER SPECIFIED POSTPROCEDURAL STATES: Chronic | ICD-10-CM

## 2021-02-25 DIAGNOSIS — Z90.49 ACQUIRED ABSENCE OF OTHER SPECIFIED PARTS OF DIGESTIVE TRACT: Chronic | ICD-10-CM

## 2021-02-25 DIAGNOSIS — C01 MALIGNANT NEOPLASM OF BASE OF TONGUE: ICD-10-CM

## 2021-02-25 DIAGNOSIS — E78.00 PURE HYPERCHOLESTEROLEMIA, UNSPECIFIED: ICD-10-CM

## 2021-02-25 DIAGNOSIS — R91.1 SOLITARY PULMONARY NODULE: Chronic | ICD-10-CM

## 2021-02-25 DIAGNOSIS — R79.89 OTHER SPECIFIED ABNORMAL FINDINGS OF BLOOD CHEMISTRY: ICD-10-CM

## 2021-02-25 DIAGNOSIS — Z91.041 RADIOGRAPHIC DYE ALLERGY STATUS: ICD-10-CM

## 2021-02-25 DIAGNOSIS — Z79.899 OTHER LONG TERM (CURRENT) DRUG THERAPY: ICD-10-CM

## 2021-02-25 DIAGNOSIS — F17.200 NICOTINE DEPENDENCE, UNSPECIFIED, UNCOMPLICATED: ICD-10-CM

## 2021-02-25 DIAGNOSIS — I10 ESSENTIAL (PRIMARY) HYPERTENSION: ICD-10-CM

## 2021-02-25 DIAGNOSIS — Z79.52 LONG TERM (CURRENT) USE OF SYSTEMIC STEROIDS: ICD-10-CM

## 2021-02-25 DIAGNOSIS — J44.9 CHRONIC OBSTRUCTIVE PULMONARY DISEASE, UNSPECIFIED: ICD-10-CM

## 2021-02-25 DIAGNOSIS — D64.9 ANEMIA, UNSPECIFIED: ICD-10-CM

## 2021-02-25 DIAGNOSIS — Z51.11 ENCOUNTER FOR ANTINEOPLASTIC CHEMOTHERAPY: ICD-10-CM

## 2021-02-25 LAB — BLD GP AB SCN SERPL QL: SIGNIFICANT CHANGE UP

## 2021-02-25 PROCEDURE — 99214 OFFICE O/P EST MOD 30 MIN: CPT

## 2021-02-25 PROCEDURE — 99218: CPT

## 2021-02-25 NOTE — ED CDU PROVIDER DISPOSITION NOTE - PATIENT PORTAL LINK FT
You can access the FollowMyHealth Patient Portal offered by [facility] by registering at the following website: [email or web address]. By joining [email or web address]’s FollowMyHealth portal, you will also be able to view your health information using other applications (apps) compatible with our system.

## 2021-02-25 NOTE — ED ADULT NURSE NOTE - NS ED PATIENT SAFETY CONCERN
No
Implemented All Fall with Harm Risk Interventions:  [place] to call system. Call bell, personal items and telephone within reach. Instruct patient to call for assistance. Room bathroom lighting operational. Non-slip footwear when patient is off stretcher. Physically safe environment: no spills, clutter or unnecessary equipment. Stretcher in lowest position, wheels locked, appropriate side rails in place. Provide visual cue, wrist band, yellow gown, etc. Monitor gait and stability. Monitor for mental status changes and reorient to person, place, and time. Review medications for side effects contributing to fall risk. Reinforce activity limits and safety measures with patient and family. Provide visual clues: red socks.

## 2021-02-25 NOTE — ED CDU PROVIDER INITIAL DAY NOTE - PROGRESS NOTE DETAILS
1st unit infusing--pt reports feeling well, without complaints. Pleasant and conversant Hgb 2/4/21--7.7, today 5.7. (In [facility]) platelets 201 received signout from Monico Young - pt in obs for transfusion of 2 units of blood; pt currently receiving 1st unit; will continue to monitor and d/c home after 2nd unit;

## 2021-02-25 NOTE — ED CDU PROVIDER INITIAL DAY NOTE - ATTENDING CONTRIBUTION TO CARE
64 yo female pMH as noted sent by her oncologist for blood transfusion.  Patient denies any black or bloody stools, no hematuria , no CP, SOB, abdominal pain, no  leg pain or swelling,  She reports fatigue, but not more than usual.  Well-appearing, NAD, PERRL pale conjunctivae and skin pallor, nml work of breathing, lungs CTA b/l, RRR, abdomen soft, NT/ND, no skin changes around trach or PEG, no leg edema or calf ttp, no gross neuro deficits.  Will place in EDOU for blood transfusion.  Patient signed consent, it was placed in a scanner.

## 2021-02-25 NOTE — ED ADULT NURSE NOTE - OBJECTIVE STATEMENT
pt sent after chemo for blood transfusion. pt with oral ca, and a trach diagnosed last year. pt denies chest pain , SOB , or dizziness. pt noted some weakness last week but didn't interfere with her daily activity . pt with PEG tube, stating awaiting to be revered after tumor is removed

## 2021-02-25 NOTE — ED CDU PROVIDER INITIAL DAY NOTE - PHYSICAL EXAMINATION
CONST: Frail appearing  EYES: PERRL, EOMI, Sclera and conjunctiva clear.  ENT: No nasal discharge. Pale conjunctiva  CARD: Normal S1 S2; Normal rate and rhythm  RESP: Equal BS B/L, No wheezes, rhonchi or rales. No distress  GI: Soft, non-tender, non-distended. + peg  MS: Normal ROM in all extremities. No midline spinal tenderness.  SKIN: Warm, dry, no acute rashes. Good turgor  NEURO: A&Ox3, No focal deficits. Strength 5/5 with no sensory deficits. CONST: Frail appearing, hoarse voice  EYES: PERRL, EOMI, Sclera and conjunctiva clear.  ENT: No nasal discharge. Pale conjunctiva. + trach without surrounding erythema or drainage.   CARD: Normal S1 S2; Normal rate and rhythm  RESP: Equal BS B/L, No wheezes, rhonchi or rales. No distress  GI: Soft, non-tender, non-distended. + peg  MS: Normal ROM in all extremities. No midline spinal tenderness.  SKIN: Warm, dry, no acute rashes. Good turgor  NEURO: A&Ox3, No focal deficits. Strength 5/5 with no sensory deficits.

## 2021-02-25 NOTE — ED PROVIDER NOTE - ATTENDING CONTRIBUTION TO CARE
66 yo female pMH as noted sent by her oncologist for blood transfusion.  Patient denies any black or bloody stools, no hematuria , no CP, SOB, abdominal pain, no  leg pain or swelling,  She reports fatigue, but not more than usual.  Well-appearing, NAD, PERRL pale conjunctivae and skin pallor, nml work of breathing, lungs CTA b/l, RRR, abdomen soft, NT/ND, no skin changes around trach or PEG, no leg edema or calf ttp, no gross neuro deficits.  Will place in EDOU for blood transfusion.  Patient signed consent, it was placed in a scanner.

## 2021-02-25 NOTE — ED CDU PROVIDER INITIAL DAY NOTE - MEDICAL DECISION MAKING DETAILS
Patient with anemia, received 2 units of PRBCs in EDOU, feeling well.  D/c home when transfusion is completed.

## 2021-02-25 NOTE — ED PROVIDER NOTE - OBJECTIVE STATEMENT
64 y/o F, PMHx Squamous Cell Carcinoma (found on biopsy of tongue mass) - s/p trach; s/p PEG, Polychondritis, presents to the ED at the request of Dr. Yu as patient's Hgb was found to be 5.7 on lab work performed today (labs performed in [facility] laboratory and viewed in system). Dr. Yu requests two units of blood and then patient may be transferred back home. Patient is without complaints; denies chest pain, dyspnea, nausea, vomiting, fever and chills. She resides at home with her son. Her last chemo/RT was two weeks ago.

## 2021-02-25 NOTE — ED CDU PROVIDER INITIAL DAY NOTE - NS ED ROS FT
CONST: Malaise, fatigue, weakness  EYES: No pain, redness, drainage or visual changes.  ENT: No ear pain or discharge, nasal discharge or congestion. No sore throat  CARD: No chest pain, palpitations  RESP: No SOB, cough  GI: No abdominal pain, N/V/D  MS: No joint pain, back pain or extremity pain/injury  SKIN: No rashes  NEURO: No headache, dizziness, paresthesias or LOC

## 2021-02-25 NOTE — ED CDU PROVIDER INITIAL DAY NOTE - OBJECTIVE STATEMENT
64yo female with PMHx COPD, CAD, Squamous Cell Carcinoma (found on biopsy of tongue mass) - s/p trach; s/p PEG, Polychondritis, last chemo/RT 2 weeks prior, sent to ED by heme/onc Dr. Yu for blood transfusion as Hgb on outpatient labs 5.7. Dr. Yu had stated pt can received two units prbcs and then be discharged to f/u outpatient [place]. Pt reported feeling weak and fatigued more so than usual. She denied CP, SOB, blood in stool or vomitus. She denies fever or chills. Pt states has never had a blood transfusion before.

## 2021-02-25 NOTE — ED CDU PROVIDER DISPOSITION NOTE - NSFOLLOWUPINSTRUCTIONS_ED_ALL_ED_FT
Anemia    Anemia is a condition in which the concentration of red blood cells or hemoglobin in the blood is below normal. Hemoglobin is a substance in red blood cells that carries oxygen to the tissues of the body. Anemia results in not enough oxygen reaching these tissues which can cause symptoms such as weakness, dizziness/lightheadedness, shortness of breath, chest pain, paleness, or nausea.    SEEK IMMEDIATE MEDICAL CARE IF YOU HAVE THE FOLLOWING SYMPTOMS: extreme weakness/chest pain/shortness of breath, black or bloody stools, vomiting blood, fainting, fever, or any signs of dehydration.    Blood Transfusion    WHAT YOU NEED TO KNOW:    A blood transfusion is used to give you blood through an IV. You may get only part of the blood, such as red blood cells, platelets, or plasma. The blood may be from you and stored for you to use later. The blood may instead be from another person. Donated blood is tested for HIV, hepatitis, syphilis, West Nile virus, and other diseases.    DISCHARGE INSTRUCTIONS:    Call 911 for any of the following:     You have a skin rash, hives, swelling, or itching.       You have trouble breathing, shortness of breath, wheezing, or coughing.      Your throat tightens or your lips or tongue swell.      You have difficulty swallowing or speaking.    Seek care immediately if:     You develop a high fever and chills.       You are dizzy, lightheaded, confused, or feel like you are going to faint.      You have nausea, diarrhea, or abdominal cramps, or you are vomiting.      You urinate little or not at all.      You develop headaches or double vision.      Your skin or the whites of your eyes look yellow.      You see pinpoint purple spots or purple patches on your body.       You have a seizure.     Contact your healthcare provider if:     You feel tired and weak within 10 days of your transfusion.      You have questions or concerns about blood transfusions.    Medicines:     Antihistamines may help stop mild itching or a rash.      Epinephrine is emergency medicine used to stop anaphylaxis. You may be given epinephrine if you are at risk for anaphylaxis. Your healthcare provider will teach you how to use it.      Take your medicine as directed. Contact your healthcare provider if you think your medicine is not helping or if you have side effects. Tell him or her if you are allergic to any medicine. Keep a list of the medicines, vitamins, and herbs you take. Include the amounts, and when and why you take them. Bring the list or the pill bottles to follow-up visits. Carry your medicine list with you in case of an emergency.    Apply ice to decrease pain and swelling. Use an ice pack, or put ice in a plastic bag and wrap a towel around it. Apply the ice pack or wrapped bag to your transfusion site for 20 minutes each hour or as directed.     Follow up with your healthcare provider as directed: Write down your questions so you remember to ask them during your visits.       © Copyright [employer] 2019 All illustrations and images included in CareNotes are the copyrighted property of [employer] or [employer].

## 2021-02-25 NOTE — ED CDU PROVIDER DISPOSITION NOTE - ATTENDING CONTRIBUTION TO CARE
I personally evaluated the patient. I reviewed the Resident’s or Physician Assistant’s note (as assigned above), and agree with the findings and plan except as documented in my note.    Pt tolerated transfusion well. Pt feels well. Comfortable leaving home and following up w hemc/onc. Extensive return precautions provided

## 2021-02-25 NOTE — ED ADULT NURSE REASSESSMENT NOTE - NS ED NURSE REASSESS COMMENT FT1
Second unit of PRBCs completed, no sign of allergic reaction. tolerated well. pt is discharged, awaiting for son to pick her up who states he needs an hour to arrive to the ED.

## 2021-02-25 NOTE — ED ADULT NURSE REASSESSMENT NOTE - NS ED NURSE REASSESS COMMENT FT1
first unit of PRBC completed with no signs of allergic reaction. Request sent to blood bank for second PRBCs unit

## 2021-02-25 NOTE — ED CDU PROVIDER DISPOSITION NOTE - CARE PROVIDER_API CALL
Estella Nicole)  Hematology; Internal Medicine; Medical Oncology  [address] [place] [address]  Phone: [phone]  Fax: [phone]  Established Patient  Follow Up Time: 1-3 Days

## 2021-02-25 NOTE — ED ADULT NURSE NOTE - NSIMPLEMENTINTERV_GEN_ALL_ED
Implemented All Fall with Harm Risk Interventions:  [place] to call system. Call bell, personal items and telephone within reach. Instruct patient to call for assistance. Room bathroom lighting operational. Non-slip footwear when patient is off stretcher. Physically safe environment: no spills, clutter or unnecessary equipment. Stretcher in lowest position, wheels locked, appropriate side rails in place. Provide visual cue, wrist band, yellow gown, etc. Monitor gait and stability. Monitor for mental status changes and reorient to person, place, and time. Review medications for side effects contributing to fall risk. Reinforce activity limits and safety measures with patient and family. Provide visual clues: red socks.

## 2021-02-26 LAB
HBV CORE IGG+IGM SER QL: NONREACTIVE
HBV CORE IGM SER QL: NONREACTIVE
HBV SURFACE AB SER QL: NONREACTIVE
HBV SURFACE AG SER QL: NONREACTIVE
HCV AB SER QL: NONREACTIVE
HCV S/CO RATIO: 0.06 S/CO

## 2021-03-01 ENCOUNTER — APPOINTMENT (OUTPATIENT)
Dept: OTOLARYNGOLOGY | Facility: CLINIC | Age: 66
End: 2021-03-01
Payer: MEDICARE

## 2021-03-01 PROCEDURE — 99072 ADDL SUPL MATRL&STAF TM PHE: CPT

## 2021-03-01 PROCEDURE — 99212 OFFICE O/P EST SF 10 MIN: CPT

## 2021-03-01 NOTE — ASSESSMENT
[FreeTextEntry1] : - capping trials discussed with patient and her son, as tolerated\par - f/up in 1-2 weeks, if tolerating cap, will decannulate

## 2021-03-01 NOTE — HISTORY OF PRESENT ILLNESS
[de-identified] : Patient presents today with c/o left otalgia, odynophagia. Patient admits theses symptoms of odynophagia for the past 3-4 months. Dr Li gave her Mucinex work for about a week thank came back. It was intermittent for about a month she gets food stuck in her throat. Recently this happens daily. \par Her ear started about a month ago has worsen she has to take Tylenol every night due to the pain .\par Hearing has been stable, but bad.\par \par 12/23/2020: Patient presents today following up on malignant neoplasm of tongue. Patient is s/p direct laryngoscopy with biopsy of base of tongue, trach and PEG placement. Patient currently having chemo and radiation, started mid Dec 2020. She does have some ear pain.  \par Patient has a history of polychondritis.\par \par Patient complains of dysphagia to solids mainly. no choking with liquids.\par \par No dysphonia. 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 Patient had MRI neck done, shows vallecular mass. Son notes muffled voice over the past 6 months. Continues to have swallowing difficulty. ?weight loss. \par \par She takes Cellcept, hydroxychloroquine for polychondritis.\par \par She has a hx of FDG avid pulmonary nodules on PET 2-3 years ago, these were "burned" per patient. She sees Dr Bright for this. \par \par \par 11/19/2020 66 yo following up s/p direct laryngoscopy with biospy of base of tongue, awake trach 11/6/20 and PEG placement for newly diagnosed SCCa base of tongue. Underwent PET earlier this week. SHe is frustrated with her trach and PEG, has been taking water by mouth. She hasn't smoked since surgery. [id number] SCCa base of tongue, p16 pending. \par \par \par 02/19/2021 : Final pathology p16+ of base of tongue SCCa.  She has completed CRT 2/10/21. Breathing well.  She believes  is has a trachea infection. Thick secretions from trach. She weighs 139 lbs down from 146lbs. Uses speaking valve intermittently.  [FreeTextEntry1] : \par 3/1/21: Patient presents today following up on malignant neoplasm of tongue. Patient has new trach today with cap for capping trials.

## 2021-03-01 NOTE — PHYSICAL EXAM
[de-identified] : 6-0 cuffless trach in place,  cap placed [Midline] : trachea located in midline position [Normal] : no rashes

## 2021-03-01 NOTE — REASON FOR VISIT
[Subsequent Evaluation] : a subsequent evaluation for [FreeTextEntry2] : malignant neoplasm of tongue

## 2021-03-05 ENCOUNTER — RESULT REVIEW (OUTPATIENT)
Age: 66
End: 2021-03-05

## 2021-03-05 ENCOUNTER — OUTPATIENT (OUTPATIENT)
Dept: OUTPATIENT SERVICES | Facility: HOSPITAL | Age: 66
LOS: 1 days | Discharge: HOME | End: 2021-03-05
Payer: MEDICARE

## 2021-03-05 DIAGNOSIS — Z90.49 ACQUIRED ABSENCE OF OTHER SPECIFIED PARTS OF DIGESTIVE TRACT: Chronic | ICD-10-CM

## 2021-03-05 DIAGNOSIS — R92.2 INCONCLUSIVE MAMMOGRAM: ICD-10-CM

## 2021-03-05 DIAGNOSIS — Z98.890 OTHER SPECIFIED POSTPROCEDURAL STATES: Chronic | ICD-10-CM

## 2021-03-05 DIAGNOSIS — Z90.710 ACQUIRED ABSENCE OF BOTH CERVIX AND UTERUS: Chronic | ICD-10-CM

## 2021-03-05 DIAGNOSIS — R92.8 OTHER ABNORMAL AND INCONCLUSIVE FINDINGS ON DIAGNOSTIC IMAGING OF BREAST: ICD-10-CM

## 2021-03-05 DIAGNOSIS — Z12.31 ENCOUNTER FOR SCREENING MAMMOGRAM FOR MALIGNANT NEOPLASM OF BREAST: ICD-10-CM

## 2021-03-05 DIAGNOSIS — J38.1 POLYP OF VOCAL CORD AND LARYNX: Chronic | ICD-10-CM

## 2021-03-05 DIAGNOSIS — R91.1 SOLITARY PULMONARY NODULE: Chronic | ICD-10-CM

## 2021-03-05 PROCEDURE — 76641 ULTRASOUND BREAST COMPLETE: CPT | Mod: 26,50

## 2021-03-05 PROCEDURE — G0279: CPT | Mod: 26

## 2021-03-05 PROCEDURE — 77066 DX MAMMO INCL CAD BI: CPT | Mod: 26

## 2021-03-09 ENCOUNTER — OUTPATIENT (OUTPATIENT)
Dept: OUTPATIENT SERVICES | Facility: HOSPITAL | Age: 66
LOS: 1 days | Discharge: HOME | End: 2021-03-09

## 2021-03-09 ENCOUNTER — LABORATORY RESULT (OUTPATIENT)
Age: 66
End: 2021-03-09

## 2021-03-09 DIAGNOSIS — R91.1 SOLITARY PULMONARY NODULE: Chronic | ICD-10-CM

## 2021-03-09 DIAGNOSIS — Z98.890 OTHER SPECIFIED POSTPROCEDURAL STATES: Chronic | ICD-10-CM

## 2021-03-09 DIAGNOSIS — Z90.49 ACQUIRED ABSENCE OF OTHER SPECIFIED PARTS OF DIGESTIVE TRACT: Chronic | ICD-10-CM

## 2021-03-09 DIAGNOSIS — Z90.710 ACQUIRED ABSENCE OF BOTH CERVIX AND UTERUS: Chronic | ICD-10-CM

## 2021-03-09 DIAGNOSIS — Z11.59 ENCOUNTER FOR SCREENING FOR OTHER VIRAL DISEASES: ICD-10-CM

## 2021-03-09 DIAGNOSIS — D64.9 ANEMIA, UNSPECIFIED: ICD-10-CM

## 2021-03-09 DIAGNOSIS — K94.23 GASTROSTOMY MALFUNCTION: ICD-10-CM

## 2021-03-09 DIAGNOSIS — J38.1 POLYP OF VOCAL CORD AND LARYNX: Chronic | ICD-10-CM

## 2021-03-09 NOTE — PHYSICAL EXAM
[Restricted in physically strenuous activity but ambulatory and able to carry out work of a light or sedentary nature] : Status 1- Restricted in physically strenuous activity but ambulatory and able to carry out work of a light or sedentary nature, e.g., light house work, office work [Normal] : grossly intact [de-identified] : unable to visualize the mass in the base of the tongue.  oral mucosa appears normal.  No thrush visualized. [de-identified] : left level II LAD 2 cm,  mobile, slightly tender to palpation, 6-0 cuffless trach in place, patient using speaking valve. parotid gland, submandibular gland and thyroid glands are normal. temporomandibular joint is normal. \par \par left level II LAD 1.5cm, slightly mobile, slightly tender to palpation, 6-0 cuffless trach in place, inner cannula removed and cleaned, patient using speaking valve. parotid gland, submandibular gland and thyroid glands are normal. temporomandibular joint is normal. \par \par  left level II LAD 1.5cm, slightly mobile, slightly tender to palpation, 6-0 cuffless trach in place, inner cannula removed and cleaned, patient using speaking valve. parotid gland, submandibular gland and thyroid glands are normal. temporomandibular joint is normal. \par \par left cervical Lymph adenopathy ( Level II ) slightly mobile and tender to palpation . Trach cuff present , clean  [de-identified] : PEG tube present, sutures not visualized in place [de-identified] : left crevicular LN decreasing in size

## 2021-03-09 NOTE — ASSESSMENT
[FreeTextEntry1] : 1- Squamous cell carcinoma of base of Tongue, [id number], Clinical stage OPAL, P16 + \par 2- Adenocarcinoma of the lung - subcentimeter RLL and RML. RLL biopsy consistent with adenocarcinoma. s/p RFA of both lesions, ARMANDO.\par \par PLAN: \par --Patient completed Cisplatin and radiation #35. She continues to complain of foul odor with thick discharge from trach x 3 weeks.  She saw  and a  flexible laryngoscopy was performed. The oropharynx was without masses or lesions. The epiglottis, aryepiglottic folds, and arytenoids mildly edematous. Post-radiation changes. Pyriform sinuses clear. Bilateral and symmetric movement of the vocal folds bilaterally, slight nodularity of vocal folds, post-radiation changes. \par -- Repeat CBC today showed severe anemia. Hb dropped to 5.7 from 7.7, with wbc=8.69, ANC=7.56, smti=330. There is no room for her to get blood transfusion in the office today. She will be transferred to ER for blood transfusion. Meanwhile, she can have her evaluated by IR while she is there. Pt was willing to be transferred to ER.  Transferred via wheelchair with transporter.\par -- Follow up with Dr. Grimaldo for post radiation care. \par -- Follow up with ENT, Dr. Najera for new trach.\par -- RTO for followup in 4 weeks.\par \par Case was seen and discussed with Dr. Yu  who agreed with the assessment and plan.\par

## 2021-03-09 NOTE — END OF VISIT
Patient given AVS, discharge instructions and prescriptions. Verbalizes understanding no further needs identified at this time.      Clarence Valenzuela RN  08/08/19 [id number]
[FreeTextEntry3] : I was physically present for the key portions of the evaluation and management service provided.  I agree with the history and physical, and plan which I have reviewed and edited where appropriate.\par

## 2021-03-09 NOTE — HISTORY OF PRESENT ILLNESS
[Disease: _____________________] : Disease: [unfilled] [T: ___] : T[unfilled] [N: ___] : N[unfilled] [M: ___] : M[unfilled] [AJCC Stage: ____] : AJCC Stage: [unfilled] [de-identified] : 62 year old female with a h/o COPD, polychondritis, HTN, DL, was being followed up for a h/o lung nodules. \par 6/9/16: 9  x  8  x  11  mm  part  solid  nodule  right  middle  lobe  is  noted  (series  4  image  164),  increased slightly  in  size  (due  to  a  surrounding  ground glass  component)  from  previous  study  of  December 2010,  previously  7  mm. \par \par 9/9/16: 9  x  6  x  8  mm  solid  nodule,  superior  segment  right  lower  lobe  (series  4  image  106),  previously  8  x  5  x  7  mm. \par 1/26/17 PET-CT: Mildly hypermetabolic righ lower lobe nodule 9.7 x 8.5 mm, SUV 1.6.\par RML non-FDG avid 8mm nodule.\par \par Following this on 4/4/17, she had a CT guided biopsy consistent with adenocarcinoma and radiofrequency ablation of  right  lower  lobe  nodule  and  right  middle  lobe  nodule.\par  [de-identified] : Today, she denies any complaints. She continues to smoke. 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 She was last seen in Feb, 2018 after being diagnosed with  adenocarcinoma of the lung - subcentimeter RLL and RML, s/p RFA of both lesions. Adjuvant treatment was not indicated and she remained on surveillance, followed up with Dr Bright . She reports that she noticed difficulty swallowing with pain on side of her neck earlier this year but did not seek medical attention because of COVID19 pandemic. She is a active smoker (quit one month ago) . She saw her PMD and was treated with abx . After several courses of antibiotics and persistent pain, she was referred to ENT for evaluation and a mass was noted in the vallecula. She had MRI of neck in 10/26/2020 which showed :\par 1.  Soft tissue mass filling the vallecula measuring up to 5 cm (craniocaudad), of probable tongue base origin base origin. Please note the patient declined IV contrast limiting evaluation of the mass.\par 2.  Mildly enlarged left level II lymph node measuring 1.7 cm. Nonspecific right level II lymph node is also noted measuring 1.2 cm.\par 3.  Bilateral thyroid nodules which may be further characterized with thyroid sonogram. \par \par Pt was then seen by Dr Najera, ENT.  On 11/6/2020, she had laryngoscopy and biopsy of mass in the base of the tongue, which showed:\par - Invasive squamous carcinoma, moderately differentiated.\par - P16 immunostain was performed and it shows strong and diffuse positivity.\par - lymphovascular invasion is present.\par - Portion of cartilage and lymphoid tissue is present in the tumor.\par \par On 11/616/2020, PET/CT showed:  \par 1. Pathologic FDG uptake within an ill-defined soft tissue mass involving the base of tongue/vallecula, with max SUV 63.4, compatible with biologic tumor activity.\par 2. Several FDG avid left level 2 cervical lymph nodes, with max SUV 26.0 within a dominant 1.6 cm left level 2 node.\par 3. No additional site of pathologic FDG uptake.\par 4. Since November 10, 2020, decreased size left lower lobe consolidation, which is non-FDG avid.\par \par In summary, workup showed that she has 5 x 3.7 x 3.5 cm base of tongue mass,  2 level 2 nodes, 1.2 cm and 1.7 cm.  Clinical stage is T3N2 M0 disease (Stage IV A). She saw Dr. Grimaldo and was recommended for external beam treatment to give 70 Gy in 35 treatments with chemotherapy.  She has odynophagia, has lost about 20 lbs over past few months. She also developed muffled voice. She had  tracheostomy and PEG tube placement  She is tolerating tube feeds well. She  is waiting for dental extraction.  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 She started concurrent chemoRT. She received 2 weeks of Cisplatin. She is complaining of issue with Glucerna via PEG feeds. She states that she is having issues with excessive gas and diarrhea. In addition, she has had a four pound weight loss since her last visit. She denies fever, cough, SOB, or mucositis at this time. Sol feels like her tumor has shrunk; she is due for speech and swallow to see if she can initiate PO feeds. Patient continues to care for trach. She was started on XRT on 12/15/2020; tolerating well.\par \par 1/8/2021:vamshi ARCHULETA is here for follow up visit and re-evaluation prior to chemotherapy. 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 She has been on concurrent chemoRT. She received weekly Cisplatin and tolerated so far. She felt mass in the back her tongue has reduced. She is using PEG feeding although she feels that she can swallow better now.  She denies fever, cough, SOB, or mucositis at this time. \par \par 1/21/2021:vamshi ARCHULETA is here for follow up visit and chemotherapy. 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 She has been on concurrent chemoRT. She is getting weekly Cisplatin.  To date, she has received 5 weekly treatment and tolerated so far. She feels sore in her throat and has horsiness voice. She is able to drink liquid. She is using PEG feeding. She denies fever, .and cough, SOB. 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 She completed concurrent chemoRT on 1/28/21. She c/o foul odor from trach site x 3 weeks and loss of sutures at PEG site. She denies any fever, SOB, cough. She saw ENT, Dr. Najera on 2/19/21 and had a flexible laryngoscopy done which did not show any masses in the oropharynx. She noticed improvement with her voice, burning sensation improving. She still feels very frustrated about her situation. Her son noticed that her temper is easily irritated and notices that she is more fatigue than usual.\par She denies any difficulty breathing, SOB, fever, or cough.\par \par

## 2021-03-10 ENCOUNTER — OUTPATIENT (OUTPATIENT)
Dept: OUTPATIENT SERVICES | Facility: HOSPITAL | Age: 66
LOS: 1 days | Discharge: HOME | End: 2021-03-10

## 2021-03-10 ENCOUNTER — APPOINTMENT (OUTPATIENT)
Dept: BREAST CENTER | Facility: CLINIC | Age: 66
End: 2021-03-10

## 2021-03-10 DIAGNOSIS — Z98.890 OTHER SPECIFIED POSTPROCEDURAL STATES: Chronic | ICD-10-CM

## 2021-03-10 DIAGNOSIS — Z90.710 ACQUIRED ABSENCE OF BOTH CERVIX AND UTERUS: Chronic | ICD-10-CM

## 2021-03-10 DIAGNOSIS — J38.1 POLYP OF VOCAL CORD AND LARYNX: Chronic | ICD-10-CM

## 2021-03-10 DIAGNOSIS — Z90.49 ACQUIRED ABSENCE OF OTHER SPECIFIED PARTS OF DIGESTIVE TRACT: Chronic | ICD-10-CM

## 2021-03-10 DIAGNOSIS — R13.10 DYSPHAGIA, UNSPECIFIED: ICD-10-CM

## 2021-03-10 DIAGNOSIS — R91.1 SOLITARY PULMONARY NODULE: Chronic | ICD-10-CM

## 2021-03-12 ENCOUNTER — NON-APPOINTMENT (OUTPATIENT)
Age: 66
End: 2021-03-12

## 2021-03-16 ENCOUNTER — OUTPATIENT (OUTPATIENT)
Dept: OUTPATIENT SERVICES | Facility: HOSPITAL | Age: 66
LOS: 1 days | Discharge: HOME | End: 2021-03-16

## 2021-03-16 ENCOUNTER — LABORATORY RESULT (OUTPATIENT)
Age: 66
End: 2021-03-16

## 2021-03-16 DIAGNOSIS — J38.1 POLYP OF VOCAL CORD AND LARYNX: Chronic | ICD-10-CM

## 2021-03-16 DIAGNOSIS — Z98.890 OTHER SPECIFIED POSTPROCEDURAL STATES: Chronic | ICD-10-CM

## 2021-03-16 DIAGNOSIS — R91.1 SOLITARY PULMONARY NODULE: Chronic | ICD-10-CM

## 2021-03-16 DIAGNOSIS — Z90.49 ACQUIRED ABSENCE OF OTHER SPECIFIED PARTS OF DIGESTIVE TRACT: Chronic | ICD-10-CM

## 2021-03-16 DIAGNOSIS — Z11.59 ENCOUNTER FOR SCREENING FOR OTHER VIRAL DISEASES: ICD-10-CM

## 2021-03-16 DIAGNOSIS — Z90.710 ACQUIRED ABSENCE OF BOTH CERVIX AND UTERUS: Chronic | ICD-10-CM

## 2021-03-18 ENCOUNTER — APPOINTMENT (OUTPATIENT)
Dept: OTOLARYNGOLOGY | Facility: CLINIC | Age: 66
End: 2021-03-18
Payer: MEDICARE

## 2021-03-18 PROCEDURE — 99213 OFFICE O/P EST LOW 20 MIN: CPT

## 2021-03-18 PROCEDURE — 99072 ADDL SUPL MATRL&STAF TM PHE: CPT

## 2021-03-18 NOTE — ASSESSMENT
[FreeTextEntry1] : - continue night time capping trials\par - humidifer at night with sleeping \par - order for 4 shiley tracheostomy tube\par - f/up with 4 shiley tracheostomy tube

## 2021-03-18 NOTE — PHYSICAL EXAM
[de-identified] : 6-0 cuffless trach in place, cap in place [Midline] : trachea located in midline position [Normal] : no rashes

## 2021-03-18 NOTE — HISTORY OF PRESENT ILLNESS
[de-identified] : Patient presents today with c/o left otalgia, odynophagia. Patient admits theses symptoms of odynophagia for the past 3-4 months. Dr Li gave her Mucinex work for about a week thank came back. It was intermittent for about a month she gets food stuck in her throat. Recently this happens daily. \par Her ear started about a month ago has worsen she has to take Tylenol every night due to the pain .\par Hearing has been stable, but bad.\par \par 12/23/2020: Patient presents today following up on malignant neoplasm of tongue. Patient is s/p direct laryngoscopy with biopsy of base of tongue, trach and PEG placement. Patient currently having chemo and radiation, started mid Dec 2020. She does have some ear pain.  \par Patient has a history of polychondritis.\par \par Patient complains of dysphagia to solids mainly. no choking with liquids.\par \par No dysphonia. 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 Patient had MRI neck done, shows vallecular mass. Son notes muffled voice over the past 6 months. Continues to have swallowing difficulty. ?weight loss. \par \par She takes Cellcept, hydroxychloroquine for polychondritis.\par \par She has a hx of FDG avid pulmonary nodules on PET 2-3 years ago, these were "burned" per patient. She sees Dr Bright for this. \par \par \par 11/19/2020 66 yo following up s/p direct laryngoscopy with biospy of base of tongue, awake trach 11/6/20 and PEG placement for newly diagnosed SCCa base of tongue. Underwent PET earlier this week. SHe is frustrated with her trach and PEG, has been taking water by mouth. She hasn't smoked since surgery. [id number] SCCa base of tongue, p16 pending. \par \par \par 02/19/2021 : Final pathology p16+ of base of tongue SCCa.  She has completed CRT 2/10/21. Breathing well.  She believes  is has a trachea infection. Thick secretions from trach. She weighs 139 lbs down from 146lbs. Uses speaking valve intermittently. \par \par \par 3/1/21: Patient presents today following up on malignant neoplasm of tongue. Patient has new trach today with cap for capping trials.  [FreeTextEntry1] : \par 3/18/21: Patent present today following up malignant neoplasm of tongue. She keeps her trach capped around 8am until 6pm. She is taking the cap off in the evenings for nebulizer treatment, she then takes the cap off at night for sleeping.

## 2021-03-19 ENCOUNTER — RESULT REVIEW (OUTPATIENT)
Age: 66
End: 2021-03-19

## 2021-03-19 ENCOUNTER — OUTPATIENT (OUTPATIENT)
Dept: OUTPATIENT SERVICES | Facility: HOSPITAL | Age: 66
LOS: 1 days | Discharge: HOME | End: 2021-03-19
Payer: MEDICARE

## 2021-03-19 DIAGNOSIS — Z98.890 OTHER SPECIFIED POSTPROCEDURAL STATES: Chronic | ICD-10-CM

## 2021-03-19 DIAGNOSIS — R91.1 SOLITARY PULMONARY NODULE: Chronic | ICD-10-CM

## 2021-03-19 DIAGNOSIS — R13.10 DYSPHAGIA, UNSPECIFIED: ICD-10-CM

## 2021-03-19 DIAGNOSIS — Z90.49 ACQUIRED ABSENCE OF OTHER SPECIFIED PARTS OF DIGESTIVE TRACT: Chronic | ICD-10-CM

## 2021-03-19 DIAGNOSIS — J38.1 POLYP OF VOCAL CORD AND LARYNX: Chronic | ICD-10-CM

## 2021-03-19 DIAGNOSIS — Z90.710 ACQUIRED ABSENCE OF BOTH CERVIX AND UTERUS: Chronic | ICD-10-CM

## 2021-03-19 PROCEDURE — 74230 X-RAY XM SWLNG FUNCJ C+: CPT | Mod: 26

## 2021-03-25 ENCOUNTER — OUTPATIENT (OUTPATIENT)
Dept: OUTPATIENT SERVICES | Facility: HOSPITAL | Age: 66
LOS: 1 days | Discharge: HOME | End: 2021-03-25

## 2021-03-25 ENCOUNTER — APPOINTMENT (OUTPATIENT)
Dept: INTERNAL MEDICINE | Facility: CLINIC | Age: 66
End: 2021-03-25
Payer: MEDICARE

## 2021-03-25 VITALS
WEIGHT: 136 LBS | BODY MASS INDEX: 24.1 KG/M2 | HEART RATE: 86 BPM | DIASTOLIC BLOOD PRESSURE: 80 MMHG | SYSTOLIC BLOOD PRESSURE: 147 MMHG | TEMPERATURE: 97.1 F | HEIGHT: 63 IN | OXYGEN SATURATION: 99 %

## 2021-03-25 DIAGNOSIS — Z90.49 ACQUIRED ABSENCE OF OTHER SPECIFIED PARTS OF DIGESTIVE TRACT: Chronic | ICD-10-CM

## 2021-03-25 DIAGNOSIS — J38.1 POLYP OF VOCAL CORD AND LARYNX: Chronic | ICD-10-CM

## 2021-03-25 DIAGNOSIS — Z98.890 OTHER SPECIFIED POSTPROCEDURAL STATES: Chronic | ICD-10-CM

## 2021-03-25 DIAGNOSIS — Z90.710 ACQUIRED ABSENCE OF BOTH CERVIX AND UTERUS: Chronic | ICD-10-CM

## 2021-03-25 DIAGNOSIS — R91.1 SOLITARY PULMONARY NODULE: Chronic | ICD-10-CM

## 2021-03-25 PROCEDURE — 99214 OFFICE O/P EST MOD 30 MIN: CPT | Mod: GC

## 2021-03-25 NOTE — PHYSICAL EXAM
[No Acute Distress] : no acute distress [Well Nourished] : well nourished [No JVD] : no jugular venous distention [No Lymphadenopathy] : no lymphadenopathy [Normal Rate] : normal rate  [Regular Rhythm] : with a regular rhythm [Normal S1, S2] : normal S1 and S2 [Soft] : abdomen soft [No HSM] : no HSM [Normal Anterior Cervical Nodes] : no anterior cervical lymphadenopathy [No CVA Tenderness] : no CVA  tenderness [No Rash] : no rash [Coordination Grossly Intact] : coordination grossly intact [de-identified] : + trach

## 2021-03-25 NOTE — HISTORY OF PRESENT ILLNESS
[de-identified] : 62 year old female with a h/o COPD, polychondritis, HTN, DLD, RLL and RML pulmonary nodule w/ biopsy showing adenocarcinoma, SCC of the oral cavity (base of the tongue).\par \par On 11/616/2020, PET/CT showed: \par 1. Pathologic FDG uptake within an ill-defined soft tissue mass involving the base of tongue/vallecula, with max SUV 63.4, compatible with biologic tumor activity.\par 2. Several FDG avid left level 2 cervical lymph nodes, with max SUV 26.0 within a dominant 1.6 cm left level 2 node.\par 3. No additional site of pathologic FDG uptake.\par 4. Since November 10, 2020, decreased size left lower lobe consolidation, which is non-FDG avid.\par \par She follows with Dr. Yu for her SCC, was started on cisplatin, completed 5 cycles on 02/25/2021. Also completed radiation therapy. \par SHe was seen by Dr. Yu on 02/25, was found to have Hb of 5.7 at the time and was sent to the ED for blood transfusions. received 2 units and was discharged. \par

## 2021-03-25 NOTE — ASSESSMENT
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 RLL biopsy consistent with adenocarcinoma. s/p RFA of both lesions, ARMANDO.\par - s/p  Cisplatin and radiation #35 iN february 24 \par - s/p laryngoscopy without masses or lesions\par - The epiglottis, aryepiglottic folds, and arytenoids mildly edematous. Post-radiation changes.\par - ENT referral as pt would like her trach removed \par \par #Mammogram 03/05/2021\par - Probably benign heterogenous mass at 12-1 o'clock axis. Short term follow up US is recommended in 6 months\par = Indeterminate heterogenous mass at left 3.00 axis. US- guided biopsy is suggested (Bi-RADS 4)\par In 08./2020, she had biopsy of 2 breast lesions: 1 left inferior mass and one left superior; both were benign breast tissue w/ proliferative fibrocystic changes.\par - Will give script for mammogram in 6 months. Pt says she does not get biopsy done. She wants to discuss it with her oncologist first (Dr. Yu)\par \par #Normocytic ANemia\par #Bruising\par - Hb 5.7 requiring 2u pRBC's\par - No repeat Hb since the transfusion. Will send her for repeat CBC\par - brusining likely secondary to thrombocytopenia secondary to cisplating therpay\par \par #Relapsing polychondritis\par #Osteoporosis\par -  on MMF 1000mg BID and HCQ 200mg BID (renewed)\par  - has not been taking alendronate (takes 70mg once a week) for the past couple months because she does not know if you can crush it and give it through PEG (november). \par \par RC in 6 months\par \par

## 2021-03-26 ENCOUNTER — APPOINTMENT (OUTPATIENT)
Dept: RADIATION ONCOLOGY | Facility: HOSPITAL | Age: 66
End: 2021-03-26
Payer: MEDICARE

## 2021-03-26 PROCEDURE — 99024 POSTOP FOLLOW-UP VISIT: CPT

## 2021-03-26 RX ORDER — ALENDRONATE SODIUM 35 MG/1
TABLET ORAL
Refills: 0 | Status: DISCONTINUED | COMMUNITY
End: 2021-03-26

## 2021-03-26 NOTE — PHYSICAL EXAM
[Sclera] : the sclera and conjunctiva were normal [Hearing Threshold Finger Rub Not [place]] : hearing was normal [] : no respiratory distress [Respiration, Rhythm And Depth] : normal respiratory rhythm and effort [Exaggerated Use Of Accessory Muscles For Inspiration] : no accessory muscle use [Heart Rate And Rhythm] : heart rate and rhythm were normal [Nail Clubbing] : no clubbing  or cyanosis of the fingernails [Normal] : oriented to person, place and time, the affect was normal, the mood was normal and not anxious [de-identified] : No active mucositis seen at present.  No tumor seen at present.  Tongue extends in the midline.  On digital exam, modest induration at tumor site (right base of tongue) [de-identified] : trach collar [de-identified] : Peg tube

## 2021-03-26 NOTE — DISEASE MANAGEMENT
[Clinical] : TNM Stage: c [OPAL] : OPAL [TTNM] : 3 [NTNM] : 2b [MTNM] : 0 [de-identified] : 7000cGy [de-identified] : 7000cGy [de-identified] : base of tongue

## 2021-03-29 DIAGNOSIS — D64.9 ANEMIA, UNSPECIFIED: ICD-10-CM

## 2021-03-29 DIAGNOSIS — R92.8 OTHER ABNORMAL AND INCONCLUSIVE FINDINGS ON DIAGNOSTIC IMAGING OF BREAST: ICD-10-CM

## 2021-03-29 DIAGNOSIS — J44.9 CHRONIC OBSTRUCTIVE PULMONARY DISEASE, UNSPECIFIED: ICD-10-CM

## 2021-03-29 DIAGNOSIS — C01 MALIGNANT NEOPLASM OF BASE OF TONGUE: ICD-10-CM

## 2021-04-01 ENCOUNTER — APPOINTMENT (OUTPATIENT)
Dept: OTOLARYNGOLOGY | Facility: CLINIC | Age: 66
End: 2021-04-01
Payer: MEDICARE

## 2021-04-01 PROCEDURE — 31575 DIAGNOSTIC LARYNGOSCOPY: CPT

## 2021-04-01 PROCEDURE — 99072 ADDL SUPL MATRL&STAF TM PHE: CPT

## 2021-04-01 PROCEDURE — 99214 OFFICE O/P EST MOD 30 MIN: CPT | Mod: 25

## 2021-04-01 NOTE — REASON FOR VISIT
[Subsequent Evaluation] : a subsequent evaluation for [FreeTextEntry2] : carcinoma of base of tongue

## 2021-04-01 NOTE — PHYSICAL EXAM
[Midline] : trachea located in midline position [Normal] : no rashes [de-identified] : 6-0 cuffless trach in place, cap in place, decannulated, occlusive dressing placed

## 2021-04-01 NOTE — ASSESSMENT
[FreeTextEntry1] : - decannulated today, occlusive dressing applied\par - post tx imaging ordered today, MRI neck (CT contrast makes her sick) and PET, for mid May\par - f/up in 6-8 weeks, after imaging

## 2021-04-01 NOTE — HISTORY OF PRESENT ILLNESS
[de-identified] : Patient presents today with c/o left otalgia, odynophagia. Patient admits theses symptoms of odynophagia for the past 3-4 months. Dr Li gave her Mucinex work for about a week thank came back. It was intermittent for about a month she gets food stuck in her throat. Recently this happens daily. \par Her ear started about a month ago has worsen she has to take Tylenol every night due to the pain .\par Hearing has been stable, but bad.\par \par 12/23/2020: Patient presents today following up on malignant neoplasm of tongue. Patient is s/p direct laryngoscopy with biopsy of base of tongue, trach and PEG placement. Patient currently having chemo and radiation, started mid Dec 2020. She does have some ear pain.  \par Patient has a history of polychondritis.\par \par Patient complains of dysphagia to solids mainly. no choking with liquids.\par \par No dysphonia. 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 Patient had MRI neck done, shows vallecular mass. Son notes muffled voice over the past 6 months. Continues to have swallowing difficulty. ?weight loss. \par \par She takes Cellcept, hydroxychloroquine for polychondritis.\par \par She has a hx of FDG avid pulmonary nodules on PET 2-3 years ago, these were "burned" per patient. She sees Dr Bright for this. \par \par \par 11/19/2020 64 yo following up s/p direct laryngoscopy with biospy of base of tongue, awake trach 11/6/20 and PEG placement for newly diagnosed SCCa base of tongue. Underwent PET earlier this week. SHe is frustrated with her trach and PEG, has been taking water by mouth. She hasn't smoked since surgery. [id number] SCCa base of tongue, p16 pending. \par \par \par 02/19/2021 : Final pathology p16+ of base of tongue SCCa.  She has completed CRT 2/10/21. Breathing well.  She believes  is has a trachea infection. Thick secretions from trach. She weighs 139 lbs down from 146lbs. Uses speaking valve intermittently. \par \par \par 3/1/21: Patient presents today following up on malignant neoplasm of tongue. Patient has new trach today with cap for capping trials. \par \par \par 3/18/21: Patent present today following up malignant neoplasm of tongue. She keeps her trach capped around 8am until 6pm. She is taking the cap off in the evenings for nebulizer treatment, she then takes the cap off at night for sleeping.  [FreeTextEntry1] : \par 4/1/21: Patient presents today following up on malignant neoplasm of tongue. She had her PEG removed 2 days ago, tolerating regular diet. She has kept her cap on her trach for several days, including at night. Patient here to possibly remove trach. Overall doing well.

## 2021-04-05 ENCOUNTER — RX RENEWAL (OUTPATIENT)
Age: 66
End: 2021-04-05

## 2021-04-05 LAB
BASOPHILS # BLD AUTO: 0.02 K/UL
BASOPHILS NFR BLD AUTO: 0.3 %
EOSINOPHIL # BLD AUTO: 0.07 K/UL
EOSINOPHIL NFR BLD AUTO: 1.1 %
HCT VFR BLD CALC: 30.4 %
HGB BLD-MCNC: 9.3 G/DL
IMM GRANULOCYTES NFR BLD AUTO: 0.3 %
LYMPHOCYTES # BLD AUTO: 0.42 K/UL
LYMPHOCYTES NFR BLD AUTO: 6.4 %
MAN DIFF?: NORMAL
MCHC RBC-ENTMCNC: 30.6 G/DL
MCHC RBC-ENTMCNC: 31.3 PG
MCV RBC AUTO: 102.4 FL
MONOCYTES # BLD AUTO: 0.51 K/UL
MONOCYTES NFR BLD AUTO: 7.8 %
NEUTROPHILS # BLD AUTO: 5.51 K/UL
NEUTROPHILS NFR BLD AUTO: 84.1 %
PLATELET # BLD AUTO: 229 K/UL
RBC # BLD: 2.97 M/UL
RBC # FLD: 18.9 %
WBC # FLD AUTO: 6.55 K/UL

## 2021-04-05 RX ORDER — OMEPRAZOLE 40 MG/1
40 CAPSULE, DELAYED RELEASE ORAL
Qty: 30 | Refills: 2 | Status: DISCONTINUED | COMMUNITY
Start: 2020-01-22 | End: 2021-04-05

## 2021-04-09 ENCOUNTER — OUTPATIENT (OUTPATIENT)
Dept: OUTPATIENT SERVICES | Facility: HOSPITAL | Age: 66
LOS: 1 days | Discharge: HOME | End: 2021-04-09
Payer: MEDICARE

## 2021-04-09 ENCOUNTER — RESULT REVIEW (OUTPATIENT)
Age: 66
End: 2021-04-09

## 2021-04-09 DIAGNOSIS — J38.1 POLYP OF VOCAL CORD AND LARYNX: Chronic | ICD-10-CM

## 2021-04-09 DIAGNOSIS — Z90.49 ACQUIRED ABSENCE OF OTHER SPECIFIED PARTS OF DIGESTIVE TRACT: Chronic | ICD-10-CM

## 2021-04-09 DIAGNOSIS — Z90.710 ACQUIRED ABSENCE OF BOTH CERVIX AND UTERUS: Chronic | ICD-10-CM

## 2021-04-09 DIAGNOSIS — Z98.890 OTHER SPECIFIED POSTPROCEDURAL STATES: Chronic | ICD-10-CM

## 2021-04-09 DIAGNOSIS — R91.1 SOLITARY PULMONARY NODULE: Chronic | ICD-10-CM

## 2021-04-09 PROCEDURE — 77065 DX MAMMO INCL CAD UNI: CPT | Mod: 26,LT

## 2021-04-09 PROCEDURE — 19083 BX BREAST 1ST LESION US IMAG: CPT | Mod: LT

## 2021-04-09 PROCEDURE — 88305 TISSUE EXAM BY PATHOLOGIST: CPT | Mod: 26

## 2021-04-12 LAB — SURGICAL PATHOLOGY STUDY: SIGNIFICANT CHANGE UP

## 2021-04-16 ENCOUNTER — NON-APPOINTMENT (OUTPATIENT)
Age: 66
End: 2021-04-16

## 2021-04-20 DIAGNOSIS — N63.20 UNSPECIFIED LUMP IN THE LEFT BREAST, UNSPECIFIED QUADRANT: ICD-10-CM

## 2021-04-20 DIAGNOSIS — N60.22 FIBROADENOSIS OF LEFT BREAST: ICD-10-CM

## 2021-04-20 DIAGNOSIS — N60.92 UNSPECIFIED BENIGN MAMMARY DYSPLASIA OF LEFT BREAST: ICD-10-CM

## 2021-04-23 ENCOUNTER — RX RENEWAL (OUTPATIENT)
Age: 66
End: 2021-04-23

## 2021-04-23 ENCOUNTER — APPOINTMENT (OUTPATIENT)
Dept: BREAST CENTER | Facility: CLINIC | Age: 66
End: 2021-04-23
Payer: MEDICARE

## 2021-04-23 PROCEDURE — 99213 OFFICE O/P EST LOW 20 MIN: CPT

## 2021-04-23 PROCEDURE — 99072 ADDL SUPL MATRL&STAF TM PHE: CPT

## 2021-04-23 NOTE — REVIEW OF SYSTEMS
[Cough] : cough [As Noted in HPI] : as noted in HPI [Negative] : Heme/Lymph [Fever] : no fever [Chills] : no chills [Shortness Of Breath] : no shortness of breath [Abn Vaginal Bleeding] : no unexplained vaginal bleeding [Skin Lesions] : no skin lesions [Skin Wound] : no skin wound [Breast Pain] : no breast pain [Breast Lump] : no breast lump [Hot Flashes] : no hot flashes [FreeTextEntry6] : hx of COPD

## 2021-04-23 NOTE — PAST MEDICAL HISTORY
[Menarche Age ____] : age at menarche was [unfilled] [Menopause Age____] : age at menopause was [unfilled] [History of Hormone Replacement Treatment] : has a history of hormone replacement treatment [Total Preg ___] : G[unfilled] [Live Births ___] : P[unfilled]  [Age At Live Birth ___] : Age at live birth: [unfilled] [FreeTextEntry5] : s/p GEETHA/KASIA in 1996 [FreeTextEntry6] : denies [FreeTextEntry7] : denies [FreeTextEntry8] : denies

## 2021-04-23 NOTE — DATA REVIEWED
[FreeTextEntry1] : EXAM: MG US BREAST COMPLETE BI\par EXAM: MG MAMMO DIAG W ALONSO BI#\par \par \par PROCEDURE DATE: 03/05/2021\par \par \par \par INTERPRETATION: Clinical History / Reason for exam: 66 years old patient for short interval follow-up left as well as bilateral yearly diagnostic mammogram and ultrasound. She is status post recent prior left breast excisional biopsies on 8/20/2020.\par \par Last CBE: 6 months ago\par \par FAMILY HISTORY OF BREAST CANCER: Maternal grandmother at age 60\par \par TECHNIQUE: Bilateral full field digital mammography including Tomosynthesis in CC and MLO projections. Additional left lateral breast exaggerated CC spot compression image. CAD was also utilized.\par \par COMPARISON: Dating back to 2/23/2016\par \par BREAST COMPOSITION: There are scattered areas of fibroglandular density.\par \par FINDINGS:\par \par MAMMOGRAM:\par Postsurgical distortion is present in the left upper outer quadrant. There is an 8 mm oil cyst at the surgical site. In the left superior breast at posterior depth, there is a low-density apparently stable nodular asymmetry, unchanged since March 2020. Another tiny nodule in the left lateral central breast has not significantly changed since 2017.\par \par Scattered coarse benign calcifications are seen on the right side.\par \par Skin, nipples and subcutaneous tissues are unremarkable.\par \par BREAST ULTRASOUND:\par Complete bilateral breast ultrasound was performed.\par \par Comparison: 6/19/2020.\par \par No discrete suspicious solid cystic mass is identified on the right side.\par \par At the left 2:00 axis, 6 cm from the nipple, there is a 5 x 5 x 3 mm circumscribed anechoic benign cyst.\par \par At the left 12-1 o'clock axis, 5 cm from the nipple, there is an 8 x 7 x 6 mm heterogeneous mass, probably corresponding to the prior cyst at the surgical site.\par \par At the left 3:00 axis, 3 cm from the nipple, 5 x 6 x 3 mm heterogeneous nonvascular mass, indeterminate.\par \par There is no axillary lymphadenopathy on either side.\par \par IMPRESSION:\par \par No significant interval change on the right side. Yearly right mammogram is suggested.\par \par Probably benign low-density nodular asymmetry left superior breast at posterior depth, apparently stable since March 2020. Short-term follow-up left mammogram is suggested in 6 months.\par \par Ultrasound demonstrates probably benign heterogeneous mass at the 12-1 o'clock axis. Short-term follow-up targeted ultrasound is suggested in 6 months.\par \par Indeterminate heterogeneous mass at the left 3:00 axis. Ultrasound-guided biopsy is suggested.\par \par Recommendation: Ultrasound guided biopsy.\par \par BI-RADS Category 4: Suspicious\par \par The findings and recommendations were discussed with the patient prior to departure.\par \par \par \par \par LILLIANA CASON MD; Attending Radiologist\par This document has been electronically signed. Mar 5 2021 7:03PM\par \par EXAM: MG MAMMO DIAG LT\par EXAM: MG US BX BRST 1ST LT SISC\par \par *** ADDENDUM 04/12/2021 ***\par \par Postprocedure patient follow-up and Pathology result:\par \par -Diagnosis:\par BREAST, LEFT MASS, ULTRASOUND GUIDED NEEDLE CORE BIOPSIES:\par - COMPLEX SCLEROSING PAPILLARY LESION ASSOCIATED WITH\par MICROCALCIFICATIONS AND CONTAINING BOTH FLORID DUCT HYPERPLASIA\par AND APOCRINE METAPLASIA; MEASURING 3.5 MM IN THIS BIOPSY MATERIAL.\par -The pathology results are concordant with the imaging findings.\par \par -Recommendation: Surgical consultation recommended.\par \par -No significant delayed complications.\par \par -Results were discussed with 4/12/2021 at 2:00 PM by Dr. Goodrich via telephone with read back.\par \par \par \par *** END OF ADDENDUM 04/12/2021 ***\par \par \par \par PROCEDURE DATE: 04/09/2021\par \par \par \par INTERPRETATION: Clinical History / Reason for exam: Left breast mass.\par \par PROCEDURES: left breast breast ultrasound guided spring loaded core biopsy and post clip placement two view left breast mammogram.\par \par TECHNIQUE: Previous films and reports were reviewed. The procedure, its risks, benefits, and alternatives were explained to the patient, who expressed understanding and gave informed written and verbal consent. A time-out, which included the patient's full name, date of birth, description of the expected procedure and procedure site, was performed immediately before the procedure.\par \par Using the usual sterile technique and ultrasound guidance, the previously described mass in the left breast 3:00 N3 was biopsied utilizing a 14-gauge automated Bard core biopsy device with a 13-gauge introducer sheath. 5 samples were collected. A marking clip ([employer] mini-cork) was deployed under ultrasound guidance. Hemostasis was obtained and a sterile dressing was applied.\par \par A left mammogram was performed:\par VIEWS: CC and ML views of the left breast.\par BREAST COMPOSITION: There are scattered areas of fibroglandular density.\par FINDINGS: The biopsy clip placed during the ultrasound guided biopsy is in the anticipated location in the left breast.\par FINAL ASSESSMENT Category: Post Procedure Mammogram for Marker Placement\par \par The patient tolerated the procedure well and left the department in good condition with written discharge instructions.\par \par \par IMPRESSION:\par \par Successful ultrasound guided core biopsy of the left breast.\par \par Pathology: Pending, to be reported as an addendum.\par \par ***Please see the addendum at the top of this report. It may contain additional important information or changes.****\par \par \par \par ANTONIO GOODRICH DO; Attending Radiologist\par This document has been electronically signed. Apr 9 2021 3:18PM\par Addend:ANTONIO GOODRICH DO; Attending Radiologist\par This addendum was electronically signed on: Apr 12 2021 3:12PM

## 2021-04-23 NOTE — ASSESSMENT
[FreeTextEntry1] : Sol is a 66 F with two left breast radial scars, s/p L WLE on 8/24/2020. \par \par 8/24/2020 -- L WLE of two areas \par -more superior mass: proliferative type FC changes, no residual radial scar \par -more inferior mass: sclerosing IP, small radial scar \par \par On exam, I was not able to palpate any suspicious abnormalities within either breast. \par \par Her most recent imaging was a b/l dx mammogram and US on 3/5/2021 which revealed post surgical changes in her left breast in UOQ, stable nodular asymmetry in her superior L breast, unchanged since 3/2020 and in her left breast @12-1N5, a heterogenous mass measuring 8 x 7 x 6 mm consistent with post surgical changes, deemed BIRADS 3 and in her left breast @3N3, a heterogenous nonvascular mass measuring 5 x 6 x 3 mm which was biopsy proven complex sclerosing papillary lesion. \par \par We have discussed surgical excision of her new L breast mas @3N3 revealing a complex sclerosing papillary lesion vs. continued observation.  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  I will have her scheduled for a L dx mammogram and US on 9/7/2021.  She can follow up after for a CBE. \par \par We discussed radial scars without atypia.  These lesions are considered fibroproliferative lesions without atypia.  Patients with these lesions were found to have a slightly increased relative risk compared to the reference population.  However the lesions themselves do not have any malignant potential. There is about a 10-20% upgrade rate to a high risk lesion (including atypical hyperplasias) and a <3% upgrade to cancer.  However, when atypia is present there is up to a 15-25% upgrade rate to cancer.  For this reason, we have discussed observation vs. surgical excision of this lesion.  I have recommended that we move forward with surgical excision at this time.\par \par In regards to her family history of breast cancer in her sister and maternal grandmother, her risk assessment is as follows: \par \par RISK ASSESSMENT: \par Babs\par 5yr -- 4.7%\par lifetime -- 15.8%\par \par TC v6\par 5yr -- 3.1%\par lifetime -- 8.7%\par \par This puts her in an average to intermediate risk for breast cancer. She does not quite meet criteria for screening breast MRIs, although she does meet criteria for chemopreventative medications.  This will be discussed with her further at her post op visit. \par \par All of her questions were answered.  She knows to call with any further questions or concerns. \par \par PLAN: \par -L dx mammogram and US on 9/7/2021 (radial scar/IP -- not excised) \par -f/up after

## 2021-04-23 NOTE — HISTORY OF PRESENT ILLNESS
[FreeTextEntry1] : Sol is a 66 F with two left breast radial scars, s/p L WLE of two areas on 2020, now with a new complex sclerosing papillary lesion in the L breast. \par \par 2020 -- L WLE of two areas \par -more superior mass: proliferative type FC changes, no residual radial scar \par -more inferior mass: sclerosing IP, small radial scar \par \par Her work up was as follows: \par 3/2/2020 -- b/l screening mammogram \par -heterogenously dense breasts\par -focal asymmetry in L UOQ \par -no suspicious mass, calcifications, or other abnormalities in her RIGHT breast \par BIRADS 0 \par \par 2020 -- L dx mammogram and US \par -circumscribed mass with amorphous calcifications in her left lateral breast --> no significant change \par -irregular mass with amorphous calcifications in UOQ, LEFT --> BIOPSY\par -new circumscribed mass in UOQ, LEFT --> BIOPSY \par L US \par @2N6, benign cyst which correlates with lateral breast mass seen on mammogram \par BIRADS 4\par \par 2020 -- L stereobx x 2 \par -anterior asymmetry (top hat) --> radial scar \par -posterior asymmetry (minicork) --> radial scar \par \par Prior to her biopsy, she had no breast related complaints.  She denies any breast pain, has not palpated any new palpable masses in either breast and denies any nipple discharge or retraction. However, she was a little sore in her left lateral breast after her biopsy.  \par \par HISTORICAL RISK FACTORS: \par -2 prior breast biopsies as above, no prior surgeries \par -family history of breast cancer in her sister and maternal grandmother \par -, age at first live birth was 18 \par -no prior OCP use, HRT use x 2 years, stopped \par -s/p GEETHA/BSO in  \par \par RISK ASSESSMENT: \par Babs\par 5yr -- 4.7%\par lifetime -- 15.8%\par \par TC v6\par 5yr -- 3.1%\par lifetime -- 8.7%\par \par INTERVAL HISTORY: \par Sol returns for her 6 month follow up VISIT, s/p L WLE of two areas on 2020. \par \par She has no breast related complaints at this time.  She denies any breast pain, has not palpated any new palpable masses in either breast and denies any nipple discharge or retraction.  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 Her trach has since been removed and her PEG has also been removed.  She is due for repeat imaging to evaluate the efficacy of her treatments. \par  \par She had the following work up: \par 3/5/2021 -- b/l dx mammogram and US \par -scattered areas of fibroglandular densities\par -L: UOQ, post surgical distortion, 8mm oil cyst @surgical site \par -L: superior breast, low density stable nodular asymmetry, unchanged since 3/2020 \par -tiny nodule in L lateral/central breast, no significant changes since  \par -scattered coarse benign calcifications in R \par b/l US \par RIGHT: \par -no suspicious abnormalities \par LEFT: \par -@2N6, benign cyst, measures 5 x 5 x 3 mm \par -@12-1N5, heterogenous mass measuring 8 x 7 x 6 mm, consistent with surgical site --> BIRADS 3 \par -@3N3, 5 x 6 x 3 mm heterogenous nonvascular mass --> BIOPSY \par BIRADS 4 \par \par 2021 -- L US CNBx @3N3 (minicork) \par -complex sclerosing papillary lesion

## 2021-04-30 ENCOUNTER — APPOINTMENT (OUTPATIENT)
Dept: PULMONOLOGY | Facility: CLINIC | Age: 66
End: 2021-04-30
Payer: MEDICARE

## 2021-04-30 ENCOUNTER — OUTPATIENT (OUTPATIENT)
Dept: OUTPATIENT SERVICES | Facility: HOSPITAL | Age: 66
LOS: 1 days | Discharge: HOME | End: 2021-04-30

## 2021-04-30 VITALS
BODY MASS INDEX: 23.92 KG/M2 | WEIGHT: 135 LBS | SYSTOLIC BLOOD PRESSURE: 106 MMHG | HEIGHT: 63 IN | DIASTOLIC BLOOD PRESSURE: 74 MMHG | TEMPERATURE: 97.8 F | OXYGEN SATURATION: 96 % | HEART RATE: 108 BPM

## 2021-04-30 DIAGNOSIS — R91.1 SOLITARY PULMONARY NODULE: ICD-10-CM

## 2021-04-30 DIAGNOSIS — Z98.890 OTHER SPECIFIED POSTPROCEDURAL STATES: Chronic | ICD-10-CM

## 2021-04-30 DIAGNOSIS — J38.1 POLYP OF VOCAL CORD AND LARYNX: Chronic | ICD-10-CM

## 2021-04-30 DIAGNOSIS — J44.9 CHRONIC OBSTRUCTIVE PULMONARY DISEASE, UNSPECIFIED: ICD-10-CM

## 2021-04-30 DIAGNOSIS — R91.1 SOLITARY PULMONARY NODULE: Chronic | ICD-10-CM

## 2021-04-30 DIAGNOSIS — Z90.49 ACQUIRED ABSENCE OF OTHER SPECIFIED PARTS OF DIGESTIVE TRACT: Chronic | ICD-10-CM

## 2021-04-30 DIAGNOSIS — Z90.710 ACQUIRED ABSENCE OF BOTH CERVIX AND UTERUS: Chronic | ICD-10-CM

## 2021-04-30 PROCEDURE — 99213 OFFICE O/P EST LOW 20 MIN: CPT | Mod: GC

## 2021-04-30 NOTE — REVIEW OF SYSTEMS
[Fever] : no fever [Fatigue] : no fatigue [Cough] : no cough [Sputum] : no sputum [Chest Discomfort] : no chest discomfort [Orthopnea] : no orthopnea

## 2021-04-30 NOTE — PHYSICAL EXAM
[No Acute Distress] : no acute distress [Well Nourished] : well nourished [Well Developed] : well developed [Normal Rate/Rhythm] : normal rate/rhythm [Normal S1, S2] : normal s1, s2 [No Murmurs] : no murmurs [No Resp Distress] : no resp distress [No Acc Muscle Use] : no acc muscle use [Clear to Auscultation Bilaterally] : clear to auscultation bilaterally [Oriented x3] : oriented x3 [Normal Mood] : normal mood [Normal Affect] : normal affect

## 2021-04-30 NOTE — HISTORY OF PRESENT ILLNESS
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 Inna solis/ repeat mammo planned Sep 2021), presenting for follow-up for her COPD. She reports having had the trach and PEG removed several weeks ago; states the swelling around the trach site has reduced and she does not have any difficulty breathing or w/ PO intake; feels well. She reports using the Incruse as prescribed and Ventolin as needed.

## 2021-05-04 ENCOUNTER — RESULT REVIEW (OUTPATIENT)
Age: 66
End: 2021-05-04

## 2021-05-04 ENCOUNTER — OUTPATIENT (OUTPATIENT)
Dept: OUTPATIENT SERVICES | Facility: HOSPITAL | Age: 66
LOS: 1 days | Discharge: HOME | End: 2021-05-04
Payer: MEDICARE

## 2021-05-04 DIAGNOSIS — J38.1 POLYP OF VOCAL CORD AND LARYNX: Chronic | ICD-10-CM

## 2021-05-04 DIAGNOSIS — Z90.710 ACQUIRED ABSENCE OF BOTH CERVIX AND UTERUS: Chronic | ICD-10-CM

## 2021-05-04 DIAGNOSIS — Z90.49 ACQUIRED ABSENCE OF OTHER SPECIFIED PARTS OF DIGESTIVE TRACT: Chronic | ICD-10-CM

## 2021-05-04 DIAGNOSIS — J39.2 OTHER DISEASES OF PHARYNX: ICD-10-CM

## 2021-05-04 DIAGNOSIS — Z98.890 OTHER SPECIFIED POSTPROCEDURAL STATES: Chronic | ICD-10-CM

## 2021-05-04 DIAGNOSIS — R91.1 SOLITARY PULMONARY NODULE: Chronic | ICD-10-CM

## 2021-05-04 PROCEDURE — 70540 MRI ORBIT/FACE/NECK W/O DYE: CPT | Mod: 26

## 2021-05-06 ENCOUNTER — OUTPATIENT (OUTPATIENT)
Dept: OUTPATIENT SERVICES | Facility: HOSPITAL | Age: 66
LOS: 1 days | Discharge: HOME | End: 2021-05-06
Payer: MEDICARE

## 2021-05-06 ENCOUNTER — RESULT REVIEW (OUTPATIENT)
Age: 66
End: 2021-05-06

## 2021-05-06 DIAGNOSIS — C77.0 SECONDARY AND UNSPECIFIED MALIGNANT NEOPLASM OF LYMPH NODES OF HEAD, FACE AND NECK: ICD-10-CM

## 2021-05-06 DIAGNOSIS — Z90.49 ACQUIRED ABSENCE OF OTHER SPECIFIED PARTS OF DIGESTIVE TRACT: Chronic | ICD-10-CM

## 2021-05-06 DIAGNOSIS — J38.1 POLYP OF VOCAL CORD AND LARYNX: Chronic | ICD-10-CM

## 2021-05-06 DIAGNOSIS — R91.1 SOLITARY PULMONARY NODULE: Chronic | ICD-10-CM

## 2021-05-06 DIAGNOSIS — Z90.710 ACQUIRED ABSENCE OF BOTH CERVIX AND UTERUS: Chronic | ICD-10-CM

## 2021-05-06 DIAGNOSIS — Z98.890 OTHER SPECIFIED POSTPROCEDURAL STATES: Chronic | ICD-10-CM

## 2021-05-06 LAB — GLUCOSE BLDC GLUCOMTR-MCNC: 80 MG/DL — SIGNIFICANT CHANGE UP (ref 70–99)

## 2021-05-06 PROCEDURE — 78815 PET IMAGE W/CT SKULL-THIGH: CPT | Mod: 26,PS

## 2021-05-13 ENCOUNTER — APPOINTMENT (OUTPATIENT)
Dept: OTOLARYNGOLOGY | Facility: CLINIC | Age: 66
End: 2021-05-13
Payer: MEDICARE

## 2021-05-13 DIAGNOSIS — I89.0 LYMPHEDEMA, NOT ELSEWHERE CLASSIFIED: ICD-10-CM

## 2021-05-13 PROCEDURE — 31575 DIAGNOSTIC LARYNGOSCOPY: CPT

## 2021-05-13 PROCEDURE — 99072 ADDL SUPL MATRL&STAF TM PHE: CPT

## 2021-05-13 PROCEDURE — 99214 OFFICE O/P EST MOD 30 MIN: CPT | Mod: 25

## 2021-05-13 NOTE — HISTORY OF PRESENT ILLNESS
[de-identified] : Patient presents today with c/o left otalgia, odynophagia. Patient admits theses symptoms of odynophagia for the past 3-4 months. Dr Li gave her Mucinex work for about a week thank came back. It was intermittent for about a month she gets food stuck in her throat. Recently this happens daily. \par Her ear started about a month ago has worsen she has to take Tylenol every night due to the pain .\par Hearing has been stable, but bad.\par \par 12/23/2020: Patient presents today following up on malignant neoplasm of tongue. Patient is s/p direct laryngoscopy with biopsy of base of tongue, trach and PEG placement. Patient currently having chemo and radiation, started mid Dec 2020. She does have some ear pain.  \par Patient has a history of polychondritis.\par \par Patient complains of dysphagia to solids mainly. no choking with liquids.\par \par No dysphonia. 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 Patient had MRI neck done, shows vallecular mass. Son notes muffled voice over the past 6 months. Continues to have swallowing difficulty. ?weight loss. \par \par She takes Cellcept, hydroxychloroquine for polychondritis.\par \par She has a hx of FDG avid pulmonary nodules on PET 2-3 years ago, these were "burned" per patient. She sees Dr Bright for this. \par \par \par 11/19/2020 64 yo following up s/p direct laryngoscopy with biospy of base of tongue, awake trach 11/6/20 and PEG placement for newly diagnosed SCCa base of tongue. Underwent PET earlier this week. SHe is frustrated with her trach and PEG, has been taking water by mouth. She hasn't smoked since surgery. [id number] SCCa base of tongue, p16 pending. \par \par \par 02/19/2021 : Final pathology p16+ of base of tongue SCCa.  She has completed CRT 2/10/21. Breathing well.  She believes  is has a trachea infection. Thick secretions from trach. She weighs 139 lbs down from 146lbs. Uses speaking valve intermittently. \par \par \par 3/1/21: Patient presents today following up on malignant neoplasm of tongue. Patient has new trach today with cap for capping trials. \par \par \par 3/18/21: Patent present today following up malignant neoplasm of tongue. She keeps her trach capped around 8am until 6pm. She is taking the cap off in the evenings for nebulizer treatment, she then takes the cap off at night for sleeping. \par \par \par 4/1/21: Patient presents today following up on malignant neoplasm of tongue. She had her PEG removed 2 days ago, tolerating regular diet. She has kept her cap on her trach for several days, including at night. Patient here to possibly remove trach. Overall doing well.  [FreeTextEntry1] : \par 5/13/21: Patient presents today following up on malignant neoplasm of tongue. Patient doing well. Certain foods especially spicy or dry will irritate the throat. Watching what she is eating. Patient had PET and MRI scan done early May 2021 - she's essentially ARMANDO.

## 2021-05-13 NOTE — PHYSICAL EXAM
[Midline] : trachea located in midline position [Normal] : no rashes [de-identified] : trachostomy site well healed, closed completely

## 2021-05-13 NOTE — ASSESSMENT
[FreeTextEntry1] : - reviewed imaging, on exam ARMANDO\par - will obtain 3 month CT chest w contrast in 3 months, per May 2021 PET, shows new nonspecific subpleural and right lower lobe lesions\par - PT for lymphedema\par - f/up in 6 weeks

## 2021-05-13 NOTE — DATA REVIEWED
[de-identified] : relevant images and reports personally reviewed by me:\par \par EXAM:  PETCT SKUL-THI ONC FDG SUBS\par \par \par PROCEDURE DATE:  05/06/2021\par \par \par \par \par INTERPRETATION:  FDG PET CT STUDY, (EXTRA HEAD / NECK ACQUISITION) SUBSEQUENT TREATMENT STRATEGY\par REASON: TUMOR IMAGING - PET with concurrently acquired CT for attenuation correction and anatomic localization; skull base to mid - thigh / 10577\par \par HISTORY: Squamous cell cancer, base of tongue Blood glucose pre injection 80 mg/dL\par TECHNIQUE: Approximately 45 minutes after the intravenous administration of 11.4 mCi 18-Fluorine FDG, whole body PET images were acquired from base of skull to mid - thigh. Repeat PET acquisition over the head and neck were acquired with the patient?s head in a ?head - hicks?, and special instructions to avoid motion.  In addition, non-contrast, low dose, non - diagnostic CT was acquired for attenuation correction and anatomic correlation purposes only, for both sets of acquisitions.\par \par These images reveal compared to PET/CT of 11/16/2020, interval significantly decreased pathologic FDG uptake within the base of tongue/preepiglottic region with improvement on CT. There is interval resolution of FDG avid left level 2 cervical lymph nodes. There is asymmetry of the left aryepiglottic fold on CT with mild asymmetric FDG uptake. The aerodigestive tract is not well evaluated on emission imaging due to normal physiologic uptake.\par \par There are new opacities within the right lung predominantly within the right lower lobe and two nodular subpleural densities SUV up to 3.7 (images 165-172 on series 3)\par \par There is new nonspecific subpleural focal FDG uptake within the medial lingula without definite CT correlate, SUV 4.1 (image 94).\par \par There is probably physiologic FDG uptake within the ascending colon.\par \par No definite other sites of abnormal FDG uptake.\par \par Additional CT findings: Interval removal of tracheostomy and gastrostomy tube. Interval resolution of left pleural effusion. Nonspecific gallbladder distention. Post hysterectomy. Post partial bowel resection.\par \par \par Impression:\par Compared to PET/CT of 11/16/2020, interval significantly decreased pathologic FDG uptake within the base of tongue/preepiglottic region with improvement on CT.\par \par Interval resolution of FDG avid left level 2 cervical lymph nodes.\par \par Asymmetry of the left aryepiglottic fold on CT with mild asymmetric FDG uptake. The aerodigestive tract is not well evaluated on emission imaging due to normal physiologic uptake. Recommend direct visualization.\par \par New opacities within the right lung predominantly within the right lower lobe and two right lower lobe nodular subpleural densities SUV up to 3.7. Differential includes scarring postinfectious or inflammatory. Recommend short-term follow-up contrast CT chest.\par \par New nonspecific subpleural focal FDG uptake within the medial lingula without definite CT correlate, SUV 4.1. Attention on follow-up is suggested.\par \par No definite other sites of abnormal FDG uptake.\par \par \par \par \par \par IZZY MONTESINOS MD; Attending Radiologist\par This document has been electronically signed. May  6 2021  6:41PM\par \par  \par \par  Ordered by: YOUNG ESCOBEDO       Collected/Examined: 06May2021 11:17AM       \par Verification Required       Stage: Final       \par  Performed at: [facility]       Resulted: [id number] 01:52PM       Last Updated: 06May2021 07:04PM       Accession: [id number]\par EXAM:  MR ORBIT FACE AND OR NECK\par \par \par PROCEDURE DATE:  05/04/2021\par \par \par \par \par INTERPRETATION:  MR EXAMINATION OF THE NECK\par \par CLINICAL INDICATION:  Squamous cell carcinoma in the base of the thumb, status post chemoradiation.\par \par TECHNIQUE:  Multiplanar multisequence MR images of the neck were obtained without intravenous contrast. Intravenous contrast was not administered as per patient request.\par \par COMPARISON: MR of the neck dated 10/26/2020\par \par FINDINGS:\par Aerodigestive structures: There is significantly decreased size of the soft tissue mass in the preepiglottic space in comparison to prior MRI dated 10/26/2020. There is persistent effacement of the preepiglottic fat. There is redemonstrated thickening of the left aryepiglottic fold with effacement of the left preepiglottic fat.\par \par Thyroid gland: There is redemonstrated partially imaged thyroid nodules.\par Parotid and submandibular glands:  Normal.\par \par Lymph nodes: No pathologic adenopathy by imaging size criteria.\par \par Vascular structures: Normal.\par \par Osseous structures: There is asymmetrically decreased marrow signal in the left hyoid. No acute fracture.\par \par Paranasal sinuses: Clear.\par Mastoid air cells:  Clear.\par \par Partially visualized intracranial structures: Normal.\par \par Partially visualized orbits: Normal\par \par Partially visualized lung apices: Normal.\par \par \par IMPRESSION:\par \par Allowing for limitation from absence of contrast, there is significantly decreased size of the soft tissue mass in the preepiglottic region since the prior MRI dated 10/26/2020. There is however persistent effacement of the preepiglottic fat suspicious for residual mass.\par \par Persistent asymmetric soft tissue fullness/thickening of the left aryepiglottic fold with paraglotic fat effacement suspicious for underlying supraglottic mass. Recommend correlation with laryngoscopy.\par \par Questionable abnormal marrow signal in the adjacent left hyoid bone.\par \par Redemonstrated thyroid nodules.\par \par \par \par \par TAHMINA FAGAN M.D., ATTENDING RADIOLOGIST\par This document has been electronically signed. May  5 2021 12:23PM\par \par  \par \par  Ordered by: YOUNG ESCOBEDO       Collected/Examined: [id number] 11:57AM       \par Verification Required       Stage: Final       \par  Performed at: [facility]       Resulted: [id number] 11:50AM       Last Updated: 05May2021 12:26PM       Accession: [id number]       \par  \par \par

## 2021-05-14 ENCOUNTER — NON-APPOINTMENT (OUTPATIENT)
Age: 66
End: 2021-05-14

## 2021-05-25 ENCOUNTER — NON-APPOINTMENT (OUTPATIENT)
Age: 66
End: 2021-05-25

## 2021-06-18 ENCOUNTER — APPOINTMENT (OUTPATIENT)
Dept: RHEUMATOLOGY | Facility: CLINIC | Age: 66
End: 2021-06-18
Payer: MEDICARE

## 2021-06-18 VITALS
SYSTOLIC BLOOD PRESSURE: 161 MMHG | WEIGHT: 141 LBS | BODY MASS INDEX: 24.98 KG/M2 | HEIGHT: 63 IN | TEMPERATURE: 97.6 F | OXYGEN SATURATION: 97 % | DIASTOLIC BLOOD PRESSURE: 90 MMHG | HEART RATE: 87 BPM

## 2021-06-18 PROCEDURE — 99215 OFFICE O/P EST HI 40 MIN: CPT | Mod: GC

## 2021-06-18 NOTE — HISTORY OF PRESENT ILLNESS
[FreeTextEntry1] : 67yo F w/ pmhx of relapsing polychondritis and osteoporosis here for med refill. On MMF 1000mg BId and HCQ 200mg BID. \par \par Interval history \par She has had hoarse voice secondary to trachestomy. she is following with ENT. \par She does have intermittent pain in the ears but they are not swollen . \par She has not gone to Ophthalmology since the last visit. \par Denied any pain in the sternal area. \par \par  \par Denies puffy fingers, and has minimal stiffness in fingers in the morning.

## 2021-06-18 NOTE — ASSESSMENT
[FreeTextEntry1] : 67 yo F presenting for routine follow up of RP on HCQ/MMF and osteoporosis on alendronate\par \par # RP, therapeutic drug monitoring \par - History of RP, no biopsy\par - No current evidence of flare\par - on MMF 1000mg BID and HCQ 200mg BID (renewed)\par - encouraged patient to follow up with ophtho\par \par - f/u 4 months \par \par # Osteoporosis \par - has not been taking alendronate ( 70mg once a week) as she feels back pain with it . she wants to try Reclast instead. we will get prior auth for it. side effects discussed w patient\par - last dexa scan was april 2019: showed  osteoporosis \par - get dexa scan this visit  \par - advised patient to take vit D and calcium supplements\par - Should practice weight bearing exercises such as walking routinely. \par \par #HM\par Covid vaccine advised\par \par Total time spent was   . Time was divided in review of labs and imaging studies, obtaining history, personally examining patient, counselling patient/ family, ordering medications/tests/ imaging tests, communicating with other health care providers, documentation in electronic health records, independent interpretation of labs, imaging results and procedure results and communicating to the patient/family and care co ordination.\par

## 2021-06-18 NOTE — PHYSICAL EXAM
[General Appearance - Alert] : alert [General Appearance - In No Acute Distress] : in no acute distress [Sclera] : the sclera and conjunctiva were normal [PERRL With Normal Accommodation] : pupils were equal in size, round, and reactive to light [Extraocular Movements] : extraocular movements were intact [Neck Appearance] : the appearance of the neck was normal [Neck Cervical Mass (___cm)] : no neck mass was observed [Jugular Venous Distention Increased] : there was no jugular-venous distention [Thyroid Diffuse Enlargement] : the thyroid was not enlarged [Thyroid Nodule] : there were no palpable thyroid nodules [Auscultation Breath Sounds / Voice Sounds] : lungs were clear to auscultation bilaterally [Heart Rate And Rhythm] : heart rate was normal and rhythm regular [Heart Sounds] : normal S1 and S2 [Heart Sounds Gallop] : no gallops [Murmurs] : no murmurs [Heart Sounds Pericardial Friction Rub] : no pericardial rub [Bowel Sounds] : normal bowel sounds [Abdomen Soft] : soft [Abdomen Tenderness] : non-tender [Abdomen Mass (___ Cm)] : no abdominal mass palpated [Abnormal Walk] : normal gait [Nail Clubbing] : no clubbing  or cyanosis of the fingernails [Musculoskeletal - Swelling] : no joint swelling seen [Motor Tone] : muscle strength and tone were normal [Skin Turgor] : normal skin turgor [Skin Color & Pigmentation] : normal skin color and pigmentation [] : no rash [Deep Tendon Reflexes (DTR)] : deep tendon reflexes were 2+ and symmetric [Sensation] : the sensory exam was normal to light touch and pinprick [No Focal Deficits] : no focal deficits [Oriented To Time, Place, And Person] : oriented to person, place, and time [Impaired Insight] : insight and judgment were intact [Affect] : the affect was normal [FreeTextEntry1] : R ear slightly more edematous than L but no erythema or warmth

## 2021-06-18 NOTE — REVIEW OF SYSTEMS
[Sore Throat] : sore throat [As Noted in HPI] : as noted in HPI [Negative] : Psychiatric [Fever] : no fever [Chills] : no chills [Eyesight Problems] : no eyesight problems [Chest Pain] : no chest pain [Shortness Of Breath] : no shortness of breath [Abdominal Pain] : no abdominal pain [Constipation] : no constipation [Diarrhea] : no diarrhea [FreeTextEntry4] : + Hoarseness

## 2021-06-21 ENCOUNTER — RX RENEWAL (OUTPATIENT)
Age: 66
End: 2021-06-21

## 2021-06-24 ENCOUNTER — OUTPATIENT (OUTPATIENT)
Dept: OUTPATIENT SERVICES | Facility: HOSPITAL | Age: 66
LOS: 1 days | Discharge: HOME | End: 2021-06-24

## 2021-06-24 ENCOUNTER — APPOINTMENT (OUTPATIENT)
Dept: INTERNAL MEDICINE | Facility: CLINIC | Age: 66
End: 2021-06-24
Payer: MEDICARE

## 2021-06-24 ENCOUNTER — NON-APPOINTMENT (OUTPATIENT)
Age: 66
End: 2021-06-24

## 2021-06-24 VITALS
HEIGHT: 63 IN | TEMPERATURE: 97.3 F | WEIGHT: 138 LBS | BODY MASS INDEX: 24.45 KG/M2 | SYSTOLIC BLOOD PRESSURE: 147 MMHG | HEART RATE: 90 BPM | DIASTOLIC BLOOD PRESSURE: 94 MMHG

## 2021-06-24 DIAGNOSIS — J38.1 POLYP OF VOCAL CORD AND LARYNX: Chronic | ICD-10-CM

## 2021-06-24 DIAGNOSIS — R91.1 SOLITARY PULMONARY NODULE: Chronic | ICD-10-CM

## 2021-06-24 DIAGNOSIS — Z90.710 ACQUIRED ABSENCE OF BOTH CERVIX AND UTERUS: Chronic | ICD-10-CM

## 2021-06-24 DIAGNOSIS — Z98.890 OTHER SPECIFIED POSTPROCEDURAL STATES: Chronic | ICD-10-CM

## 2021-06-24 DIAGNOSIS — Z90.49 ACQUIRED ABSENCE OF OTHER SPECIFIED PARTS OF DIGESTIVE TRACT: Chronic | ICD-10-CM

## 2021-06-24 PROCEDURE — 99213 OFFICE O/P EST LOW 20 MIN: CPT | Mod: GC

## 2021-06-24 NOTE — PHYSICAL EXAM
[No Acute Distress] : no acute distress [Well Nourished] : well nourished [No Respiratory Distress] : no respiratory distress  [No Accessory Muscle Use] : no accessory muscle use [Clear to Auscultation] : lungs were clear to auscultation bilaterally [Normal Rate] : normal rate  [No Edema] : there was no peripheral edema [Regular Rhythm] : with a regular rhythm [Soft] : abdomen soft [Non Tender] : non-tender [No Joint Swelling] : no joint swelling [Normal Insight/Judgement] : insight and judgment were intact [de-identified] : hoarse voice

## 2021-06-24 NOTE — REVIEW OF SYSTEMS
[Fever] : no fever [Chills] : no chills [Chest Pain] : no chest pain [Palpitations] : no palpitations [Lower Ext Edema] : no lower extremity edema [Shortness Of Breath] : no shortness of breath [Cough] : no cough [Abdominal Pain] : no abdominal pain [Nausea] : no nausea [Vomiting] : no vomiting [Joint Pain] : no joint pain

## 2021-06-24 NOTE — HISTORY OF PRESENT ILLNESS
[FreeTextEntry1] : 3 mo f/u visit  [de-identified] : 62 year old female with a h/o COPD, polychondritis, HTN, DLD, RLL and RML pulmonary nodule w/ biopsy showing adenocarcinoma, SCC of the oral cavity (base of the tongue).\par \par On 11/616/2020, PET/CT showed: \par 1. Pathologic FDG uptake within an ill-defined soft tissue mass involving the base of tongue/vallecula, with max SUV 63.4, compatible with biologic tumor activity.\par 2. Several FDG avid left level 2 cervical lymph nodes, with max SUV 26.0 within a dominant 1.6 cm left level 2 node.\par 3. No additional site of pathologic FDG uptake.\par 4. Since November 10, 2020, decreased size left lower lobe consolidation, which is non-FDG avid.\par \par She follows with Dr. Yu for her SCC, was started on cisplatin, completed 5 cycles on 02/25/2021. Also completed radiation therapy. \par SHe was seen by Dr. Yu on 02/25, was found to have Hb of 5.7 at the time and was sent to the ED for blood transfusions. received 2 units and was discharged. \par \par Pt reports no new complaints and is here for medication refill.

## 2021-06-24 NOTE — ASSESSMENT
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 RLL biopsy consistent with adenocarcinoma. s/p RFA of both lesions, ARMANDO.\par - s/p Cisplatin and radiation #35 iN february 24 \par - s/p laryngoscopy without masses or lesions\par Trach removed in April \par \par #Mammogram 03/05/2021\par - Probably benign heterogenous mass at 12-1 o'clock axis. Short term follow up US is recommended in 6 months\par = Indeterminate heterogenous mass at left 3.00 axis. US- guided biopsy is suggested (Bi-RADS 4)\par In 08./2020, she had biopsy of 2 breast lesions: 1 left inferior mass and one left superior; both were benign breast tissue w/ proliferative fibrocystic changes.\par - pt needs repeat mammogram this year, stated she will schedule and follow up\par \par #Normocytic ANemia\par #Bruising now better \par - Hb 5.7 requiring 2u pRBC's, hb mar 2021- 9.7\par -f/u Repeat CBC\par - bruisiing likely secondary to thrombocytopenia secondary to cisplatin therapy\par \par #Relapsing polychondritis\par #Osteoporosis\par - on MMF 1000mg BID and HCQ 200mg BID (renewed)\par  - stopped taking alendronate, pending authorization for another medication \par -Rheum on board \par \par #COPD\par cont f/u with Pulm\par cont with incruse and ventolin as prescribed\par \par \par Medications renewed\par rpt blood work ordered\par COVID vaccine series complete\par did not get flu shot last year\par RTC in 6 months and prn\par \par

## 2021-06-25 ENCOUNTER — NON-APPOINTMENT (OUTPATIENT)
Age: 66
End: 2021-06-25

## 2021-06-28 DIAGNOSIS — M94.8X9 OTHER SPECIFIED DISORDERS OF CARTILAGE, UNSPECIFIED SITES: ICD-10-CM

## 2021-06-28 DIAGNOSIS — J44.9 CHRONIC OBSTRUCTIVE PULMONARY DISEASE, UNSPECIFIED: ICD-10-CM

## 2021-06-28 DIAGNOSIS — Z00.00 ENCOUNTER FOR GENERAL ADULT MEDICAL EXAMINATION WITHOUT ABNORMAL FINDINGS: ICD-10-CM

## 2021-06-28 DIAGNOSIS — D64.9 ANEMIA, UNSPECIFIED: ICD-10-CM

## 2021-06-28 DIAGNOSIS — C01 MALIGNANT NEOPLASM OF BASE OF TONGUE: ICD-10-CM

## 2021-07-07 ENCOUNTER — RESULT REVIEW (OUTPATIENT)
Age: 66
End: 2021-07-07

## 2021-07-07 ENCOUNTER — OUTPATIENT (OUTPATIENT)
Dept: OUTPATIENT SERVICES | Facility: HOSPITAL | Age: 66
LOS: 1 days | Discharge: HOME | End: 2021-07-07

## 2021-07-07 DIAGNOSIS — Z90.710 ACQUIRED ABSENCE OF BOTH CERVIX AND UTERUS: Chronic | ICD-10-CM

## 2021-07-07 DIAGNOSIS — Z90.49 ACQUIRED ABSENCE OF OTHER SPECIFIED PARTS OF DIGESTIVE TRACT: Chronic | ICD-10-CM

## 2021-07-07 DIAGNOSIS — J38.1 POLYP OF VOCAL CORD AND LARYNX: Chronic | ICD-10-CM

## 2021-07-07 DIAGNOSIS — Z98.890 OTHER SPECIFIED POSTPROCEDURAL STATES: Chronic | ICD-10-CM

## 2021-07-07 DIAGNOSIS — R91.1 SOLITARY PULMONARY NODULE: Chronic | ICD-10-CM

## 2021-07-07 LAB
ALBUMIN SERPL ELPH-MCNC: 4.6 G/DL
ALP BLD-CCNC: 56 U/L
ALT SERPL-CCNC: 16 U/L
ANION GAP SERPL CALC-SCNC: 15 MMOL/L
AST SERPL-CCNC: 20 U/L
BASOPHILS # BLD AUTO: 0.02 K/UL
BASOPHILS NFR BLD AUTO: 0.3 %
BILIRUB SERPL-MCNC: 0.2 MG/DL
BUN SERPL-MCNC: 13 MG/DL
CALCIUM SERPL-MCNC: 9.2 MG/DL
CHLORIDE SERPL-SCNC: 103 MMOL/L
CHOLEST SERPL-MCNC: 149 MG/DL
CO2 SERPL-SCNC: 24 MMOL/L
CREAT SERPL-MCNC: 0.9 MG/DL
EOSINOPHIL # BLD AUTO: 0.02 K/UL
EOSINOPHIL NFR BLD AUTO: 0.3 %
ESTIMATED AVERAGE GLUCOSE: 103 MG/DL
GLUCOSE SERPL-MCNC: 86 MG/DL
HBA1C MFR BLD HPLC: 5.2 %
HCT VFR BLD CALC: 36.9 %
HDLC SERPL-MCNC: 56 MG/DL
HGB BLD-MCNC: 11.8 G/DL
IMM GRANULOCYTES NFR BLD AUTO: 0.3 %
LDLC SERPL CALC-MCNC: 54 MG/DL
LYMPHOCYTES # BLD AUTO: 0.49 K/UL
LYMPHOCYTES NFR BLD AUTO: 7.8 %
MAN DIFF?: NORMAL
MCHC RBC-ENTMCNC: 31.4 PG
MCHC RBC-ENTMCNC: 32 G/DL
MCV RBC AUTO: 98.1 FL
MONOCYTES # BLD AUTO: 0.4 K/UL
MONOCYTES NFR BLD AUTO: 6.4 %
NEUTROPHILS # BLD AUTO: 5.34 K/UL
NEUTROPHILS NFR BLD AUTO: 84.9 %
NONHDLC SERPL-MCNC: 93 MG/DL
PLATELET # BLD AUTO: 186 K/UL
POTASSIUM SERPL-SCNC: 3.9 MMOL/L
PROT SERPL-MCNC: 6.5 G/DL
RBC # BLD: 3.76 M/UL
RBC # FLD: 12.9 %
SODIUM SERPL-SCNC: 142 MMOL/L
TRIGL SERPL-MCNC: 213 MG/DL
WBC # FLD AUTO: 6.29 K/UL

## 2021-07-08 DIAGNOSIS — M81.0 AGE-RELATED OSTEOPOROSIS WITHOUT CURRENT PATHOLOGICAL FRACTURE: ICD-10-CM

## 2021-07-08 DIAGNOSIS — Z78.0 ASYMPTOMATIC MENOPAUSAL STATE: ICD-10-CM

## 2021-07-08 DIAGNOSIS — Z87.310 PERSONAL HISTORY OF (HEALED) OSTEOPOROSIS FRACTURE: ICD-10-CM

## 2021-07-08 DIAGNOSIS — Z13.820 ENCOUNTER FOR SCREENING FOR OSTEOPOROSIS: ICD-10-CM

## 2021-07-08 LAB — TSH SERPL-ACNC: 1.07 UIU/ML

## 2021-07-12 ENCOUNTER — APPOINTMENT (OUTPATIENT)
Dept: OTOLARYNGOLOGY | Facility: CLINIC | Age: 66
End: 2021-07-12
Payer: MEDICARE

## 2021-07-12 PROCEDURE — 99214 OFFICE O/P EST MOD 30 MIN: CPT | Mod: 25

## 2021-07-12 PROCEDURE — 31575 DIAGNOSTIC LARYNGOSCOPY: CPT

## 2021-07-12 NOTE — PHYSICAL EXAM
[de-identified] : trachostomy site well healed, closed completely, submental lymphedema [Midline] : trachea located in midline position [Normal] : no rashes

## 2021-07-12 NOTE — HISTORY OF PRESENT ILLNESS
[de-identified] : Patient presents today with c/o left otalgia, odynophagia. Patient admits theses symptoms of odynophagia for the past 3-4 months. Dr Li gave her Mucinex work for about a week thank came back. It was intermittent for about a month she gets food stuck in her throat. Recently this happens daily. \par Her ear started about a month ago has worsen she has to take Tylenol every night due to the pain .\par Hearing has been stable, but bad.\par \par 12/23/2020: Patient presents today following up on malignant neoplasm of tongue. Patient is s/p direct laryngoscopy with biopsy of base of tongue, trach and PEG placement. Patient currently having chemo and radiation, started mid Dec 2020. She does have some ear pain.  \par Patient has a history of polychondritis.\par \par Patient complains of dysphagia to solids mainly. no choking with liquids.\par \par No dysphonia. 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 Patient had MRI neck done, shows vallecular mass. Son notes muffled voice over the past 6 months. Continues to have swallowing difficulty. ?weight loss. \par \par She takes Cellcept, hydroxychloroquine for polychondritis.\par \par She has a hx of FDG avid pulmonary nodules on PET 2-3 years ago, these were "burned" per patient. She sees Dr Bright for this. \par \par \par 11/19/2020 64 yo following up s/p direct laryngoscopy with biospy of base of tongue, awake trach 11/6/20 and PEG placement for newly diagnosed SCCa base of tongue. Underwent PET earlier this week. SHe is frustrated with her trach and PEG, has been taking water by mouth. She hasn't smoked since surgery. [id number] SCCa base of tongue, p16 pending. \par \par \par 02/19/2021 : Final pathology p16+ of base of tongue SCCa.  She has completed CRT 2/10/21. Breathing well.  She believes  is has a trachea infection. Thick secretions from trach. She weighs 139 lbs down from 146lbs. Uses speaking valve intermittently. \par \par \par 3/1/21: Patient presents today following up on malignant neoplasm of tongue. Patient has new trach today with cap for capping trials. \par \par \par 3/18/21: Patent present today following up malignant neoplasm of tongue. She keeps her trach capped around 8am until 6pm. She is taking the cap off in the evenings for nebulizer treatment, she then takes the cap off at night for sleeping. \par \par \par 4/1/21: Patient presents today following up on malignant neoplasm of tongue. She had her PEG removed 2 days ago, tolerating regular diet. She has kept her cap on her trach for several days, including at night. Patient here to possibly remove trach. Overall doing well. \par \par \par 5/13/21: Patient presents today following up on malignant neoplasm of tongue. Patient doing well. Certain foods especially spicy or dry will irritate the throat. Watching what she is eating. Patient had PET and MRI scan done early May 2021 - she's essentially ARMANDO.  [FreeTextEntry1] : \par 7/12/21: Patient following up on malignant neoplasm of tongue. Patient doing well. Patient having excessive phlegm. Slight cold because of the air conditioning. Left ear was bothering her for a few days, swelling on the outside of her ear. No clogged sensation. No otorrhea.

## 2021-07-12 NOTE — ASSESSMENT
[FreeTextEntry1] : - ARMANDO, doing well\par - will obtain CT chest w contrast in August 2021, ordered already\par - f/up mid August to review imaging\par

## 2021-08-04 LAB
ANION GAP SERPL CALC-SCNC: 10 MMOL/L
BUN SERPL-MCNC: 14 MG/DL
CALCIUM SERPL-MCNC: 8.9 MG/DL
CHLORIDE SERPL-SCNC: 104 MMOL/L
CO2 SERPL-SCNC: 25 MMOL/L
CREAT SERPL-MCNC: 0.9 MG/DL
GLUCOSE SERPL-MCNC: 93 MG/DL
POTASSIUM SERPL-SCNC: 4.4 MMOL/L
SODIUM SERPL-SCNC: 139 MMOL/L

## 2021-08-06 ENCOUNTER — OUTPATIENT (OUTPATIENT)
Dept: OUTPATIENT SERVICES | Facility: HOSPITAL | Age: 66
LOS: 1 days | Discharge: HOME | End: 2021-08-06
Payer: MEDICARE

## 2021-08-06 DIAGNOSIS — Z90.710 ACQUIRED ABSENCE OF BOTH CERVIX AND UTERUS: Chronic | ICD-10-CM

## 2021-08-06 DIAGNOSIS — Z98.890 OTHER SPECIFIED POSTPROCEDURAL STATES: Chronic | ICD-10-CM

## 2021-08-06 DIAGNOSIS — J38.1 POLYP OF VOCAL CORD AND LARYNX: Chronic | ICD-10-CM

## 2021-08-06 DIAGNOSIS — R91.1 SOLITARY PULMONARY NODULE: Chronic | ICD-10-CM

## 2021-08-06 DIAGNOSIS — C02.9 MALIGNANT NEOPLASM OF TONGUE, UNSPECIFIED: ICD-10-CM

## 2021-08-06 DIAGNOSIS — Z90.49 ACQUIRED ABSENCE OF OTHER SPECIFIED PARTS OF DIGESTIVE TRACT: Chronic | ICD-10-CM

## 2021-08-06 PROCEDURE — 71260 CT THORAX DX C+: CPT | Mod: 26

## 2021-08-17 ENCOUNTER — APPOINTMENT (OUTPATIENT)
Dept: OTOLARYNGOLOGY | Facility: CLINIC | Age: 66
End: 2021-08-17
Payer: MEDICARE

## 2021-08-17 DIAGNOSIS — N95.2 POSTMENOPAUSAL ATROPHIC VAGINITIS: ICD-10-CM

## 2021-08-17 DIAGNOSIS — K94.23 GASTROSTOMY MALFUNCTION: ICD-10-CM

## 2021-08-17 DIAGNOSIS — R59.0 LOCALIZED ENLARGED LYMPH NODES: ICD-10-CM

## 2021-08-17 PROCEDURE — 31575 DIAGNOSTIC LARYNGOSCOPY: CPT

## 2021-08-17 PROCEDURE — 99214 OFFICE O/P EST MOD 30 MIN: CPT | Mod: 25

## 2021-08-17 NOTE — HISTORY OF PRESENT ILLNESS
[FreeTextEntry1] : \par \par Sol Lopez presented today with her son to discuss treatment for her base of tongue cancer.  \par \par She is a 65 year smoker (quit one month ago) who for several months has complained of pain with swallowing and pain in the left ear.  After several courses of antibiotics and persistent pain, a mass was noted in the vallecula.   As noted by MRI and PET, the tumor extends from vallecula to the epiglottis, biased to the left side and measured 5 x 3.7 x 3.5 cn,  There are 2 level 2 nodes, 1.2 cm and 1.7 cm.  She has lost about 20 lbs.  She was choking on secretions and so a tracheostomy was placed.  She will have concurrent chemotherapy.  
left

## 2021-08-17 NOTE — PHYSICAL EXAM
[Midline] : trachea located in midline position [Normal] : no rashes [de-identified] : tracheostomy site well healed, closed completely, submental lymphedema, minimal

## 2021-08-17 NOTE — HISTORY OF PRESENT ILLNESS
[de-identified] : 67 yo F, former smoker, w hx [id number] SCCa base of tongue, p16+, s/p CRT completed  2/10/21,  required tracheostomy at time of diagnosis, since decannulated. She has been doing well, first post-tx imaging May 2021 - ARMANDO. She no longer smokes, stopped prior to treatment.  [FreeTextEntry1] : \par 8/17/21: Patient following up on malignant neoplasm of tongue, most recent imaging May 2021 - ARMANDO . Patient denies any recent issues. Here to discuss CT chest results. Continues to NOT smoke.

## 2021-08-17 NOTE — REASON FOR VISIT
[Subsequent Evaluation] : a subsequent evaluation for [FreeTextEntry2] : malignant neoplasm of tongue.

## 2021-08-17 NOTE — DATA REVIEWED
[de-identified] : relevant images and reports personally reviewed by me:\par \par \par   Test   Result   Flag Reference Goal \par   Sodium 139 mmol/L   135-146  \par   Potassium 4.4 mmol/L   3.5-5.0  \par   Chloride 104 mmol/L     \par   CO2 25 mmol/L   17-32  \par   Anion Gap, Serum 10 mmol/L   7-14  \par   Glucose 93 mg/dL   70-99  \par   BUN 14 mg/dL   10-20  \par   Creatinine 0.9 mg/dL   0.7-1.5  \par   Calcium, Serum 8.9 mg/dL   8.5-10.1  \par   eGFR Non African-American 66 mL/min/1.73M2 \par  [de-identified] : relevant images and reports personally reviewed by me:\par \par EXAM:  CT CHEST IC\par \par \par PROCEDURE DATE:  08/06/2021\par \par \par \par \par INTERPRETATION:  CLINICAL HISTORY / REASON FOR EXAM: Squamous cell carcinoma of tongue. Abnormal PET CT.\par \par TECHNIQUE: Multislice helical sections were obtained from the thoracic inlet to the lung bases with IV contrast administration. Coronal and sagittal images have been submitted for evaluation. 3D (MIP) images obtained.\par \par COMPARISON: PET/CT May 6, 2021, CT chest November 10, 2020, CT chest June 9, 2016.\par \par FINDINGS:\par \par LUNGS, PLEURA, AIRWAYS: Centrilobular and paraseptal emphysema. Near complete resolution of right lung opacities seen on May 6, 2021 PET/CT, suggesting inflammatory or infectious etiology. Persistent right subpleural nodular thickening, up to 0.7 cm stable dating back to June 9, 2016 (series 4, image 154). Bilateral lower lobe subsegmental atelectasis or scarring. No current CT correlate for focal increased uptake within medial lingula on May 6, 2021 PET/CT.\par \par Central airway patent. No pleural effusion or pneumothorax. Debris or secretions within right mainstem bronchus; correlate for aspiration.\par \par THORACIC NODES: No mediastinal, hilar, supraclavicular, or axillary lymphadenopathy.\par \par MEDIASTINUM/GREAT VESSELS: No pericardial effusion. Heart size is within normal limits. The aorta is of normal caliber. Unchanged dilated main pulmonary artery, 3.5 cm diameter, which may be seen in pulmonary arterial hypertension. Unchanged 1.7 cm right thyroid lobe nodule. Aortic and coronary artery calcifications.\par \par BONES/SOFT TISSUES: Degenerative changes spine. Unchanged right seventh rib bone island. Chronic T5 and T8 compression deformities.\par \par VISUALIZED UPPER ABDOMEN: Unremarkable.\par \par \par IMPRESSION:\par Since May 6, 2021 PET/CT:\par \par 1. Near complete resolution of right lung opacities seen on May 6, 2021 PET/CT, suggesting inflammatory or infectious etiology.\par \par 2. Persistent right subpleural nodular thickening, up to 0.7 cm stable dating back to June 9, 2016.\par \par 3. No current CT correlate for focal increased uptake within medial lingula on May 6, 2021 PET/CT.\par \par 4. Centrilobular and paraseptal emphysema.\par \par 5. Debris or secretions within right mainstem bronchus; correlate for aspiration.\par \par --- End of Report ---\par \par \par \par \par DARCIE CHAKRABORTY MD; Attending Radiologist\par This document has been electronically signed. Aug  7 2021  9:54AM\par \par  \par

## 2021-08-17 NOTE — ASSESSMENT
[FreeTextEntry1] : - ARMANDO, reviewed CT chest, no concerning findings\par - recommend humidifier at night, notes dry mouth and throat\par - f/up with Dr Mendoza as scheduled, she notes occasional shortness of breath\par - f/up in 3 months. Further head and neck imaging as clinically indicated based on symptoms, CT chest in 6 months for hx of smoking, needs to be ordered at next visit

## 2021-08-21 NOTE — CHART NOTE - NSCHARTNOTEFT_GEN_A_CORE
GT postponed yesterday, planned for today  Currently NPO  Tm 99.2  Post GT when ok to use tube would start with Glucerna 1.2    T(F): 99.2 (20 @ 14:04), Max: 99.2 (20 @ 14:04)  HR: 75 (20 @ 14:04) (71 - 87)  BP: 141/65 (20 @ 14:04) (119/58 - 149/73)  RR: 18 (20 @ 14:04) (18 - 18)  SpO2: 97% (20 @ 12:00) (95% - 98%)    I&O's Detail    10 Nov 2020 07:01  -  2020 07:00  --------------------------------------------------------  IN:    Enteral Tube Flush: 150 mL    Jevity 1.2: 360 mL  Total IN: 510 mL    OUT:    Voided (mL): 500 mL  Total OUT: 500 mL    Total NET: 10 mL      2020 07:01  -  2020 15:11  --------------------------------------------------------  IN:  Total IN: 0 mL    OUT:    Voided (mL): 200 mL  Total OUT: 200 mL    Total NET: -200 mL        139  |  104  |  11  ----------------------------<  126<H>  4.2   |  26  |  0.5<L>    Ca    8.6      2020 08:09  Phos  3.5       Mg     2.0         Vitamin D, 25-Hydroxy: 35      A1C with Estimated Average Glucose Result: 6.1: Method: Immunoassay       Reference Range                4.0-5.6%       High risk (prediabetic)        5.7-6.4%       Diabetic, diagnostic             >=6.5%       ADA diabetic treatment goal       <7.0%                         12.2   10. )-----------( 250      ( 2020 08:09 )             36.7     CAPILLARY BLOOD GLUCOSE  POCT Blood Glucose.: 122 mg/dL (2020 11:11)  POCT Blood Glucose.: 126 mg/dL (2020 06:08)  POCT Blood Glucose.: 212 mg/dL (2020 00:00)  POCT Blood Glucose.: 113 mg/dL (10 Nov 2020 17:59)     Daily Weight in k.8 (2020 06:41)    Diet, NPO after Midnight:      NPO Start Date: 10-Nov-2020,   NPO Start Time: 23:59  Except Medications (11-10-20 @ 11:17)  Diet, NPO with Tube Feed:   Tube Feeding Modality: Nasogastric  Jevity 1.2 Mayur  Total Volume for 24 Hours (mL): 1440  Bolus  Total Volume of Bolus (mL):  360  Tube Feed Frequency: Every 6 hours   Tube Feed Start Time: 17:00  Bolus Feed Rate (mL per Hour): 500   Bolus Feed Duration (in Hours): 0.75 (20 @ 16:35)    ASSESSMENT  Pt is a 65y Female with BOT/vallecular SCCa, s/p DL and awake tracheostomy, POD # 3      PLAN  - Post GT placement when tube is ok to use start with Glucerna 1.2 240ml X 2 feed then increase to 360ml X 4 feeds/day   - please have nursing teach pt how to infuse feeds when feeds are given  - monitor bmp, in phos, mg levels  - check zinc     - check poc glucose before each feeding GT postponed yesterday, planned for today  Currently NPO  Tm 99.2  Post GT when ok to use tube would start with Glucerna 1.2, because poc glucose levels have sandy elevated on Jevity, and current kcal load lower than planned for home.    T(F): 99.2 (20 @ 14:04), Max: 99.2 (20 @ 14:04)  HR: 75 (20 @ 14:04) (71 - 87)  BP: 141/65 (20 @ 14:04) (119/58 - 149/73)  RR: 18 (20 @ 14:04) (18 - 18)  SpO2: 97% (20 @ 12:00) (95% - 98%)    I&O's Detail - - incomplete documentation, obviously    10 Nov 2020 07:01  -  2020 07:00  --------------------------------------------------------  IN:    Enteral Tube Flush: 150 mL    Jevity 1.2: 360 mL  Total IN: 510 mL    OUT:    Voided (mL): 500 mL  Total OUT: 500 mL    Total NET: 10 mL        139  |  104  |  11  ----------------------------<  126<H>  4.2   |  26  |  0.5<L>    Ca    8.6      2020 08:09  Phos  3.5       Mg     2.0         Vitamin D, 25-Hydroxy: 35      A1C with Estimated Average Glucose Result: 6.1: Method: Immunoassay       Reference Range                4.0-5.6%       High risk (prediabetic)        5.7-6.4%       Diabetic, diagnostic             >=6.5%       ADA diabetic treatment goal       <7.0%                         12.2   10.17 )-----------( 250      ( 2020 08:09 )             36.7     CAPILLARY BLOOD GLUCOSE  POCT Blood Glucose.: 122 mg/dL (2020 11:11)  POCT Blood Glucose.: 126 mg/dL (2020 06:08)  POCT Blood Glucose.: 212 mg/dL (2020 00:00)  POCT Blood Glucose.: 113 mg/dL (10 Nov 2020 17:59)     Daily Weight in k.8 (2020 06:41)    Diet, NPO after Midnight:      NPO Start Date: 10-Nov-2020,   NPO Start Time: 23:59  Except Medications (11-10-20 @ 11:17)  Diet, NPO with Tube Feed:   Tube Feeding Modality: Nasogastric  Jevity 1.2 Mayur  Total Volume for 24 Hours (mL): 1440  Bolus  Total Volume of Bolus (mL):  360  Tube Feed Frequency: Every 6 hours   Tube Feed Start Time: 17:00  Bolus Feed Rate (mL per Hour): 500   Bolus Feed Duration (in Hours): 0.75 (20 @ 16:35)    ASSESSMENT  Pt is a 65y Female with BOT/vallecular SCCa, s/p DL and awake tracheostomy, POD # 3      PLAN  - Post GT placement when tube is ok to use start with Glucerna 1.2 240ml X 2 feed then increase to 360ml X 4 feeds/day   - after ~ 24 h of full feeds check K, phos, Mg and replete  - flush before and after eahc feeding with 50 ml water syringe push  - please have nursing teach pt how to infuse feeds when feeds are given  - monitor bmp, in phos, mg levels  - check zinc     - check poc glucose before each feeding Intact

## 2021-08-25 ENCOUNTER — NON-APPOINTMENT (OUTPATIENT)
Age: 66
End: 2021-08-25

## 2021-09-03 NOTE — ED ADULT NURSE NOTE - NSFALLRSKHARMRISK_ED_ALL_ED
Group Topic: BH Coping Skills Education    Date: 9/3/2021  Start Time: 1300  End Time: 1330  Facilitators: Jody Natarajan LCSW    Focus:  Coping  Number in attendance: 6    Method: Group  Attendance: Present  Participation: Refused  Patient Response: Attentive, Poor eye contact and Quiet  Mood: Depressed and Sad  Affect: Type: Depressed   Range: Blunted/flat   Congruency: Congruent   Stability: Stable  Behavior/Socialization: Withdrawn  Thought Process: Unremarkable  Task Performance: Needs cueing  Patient Evaluation: Attends - no participation         no

## 2021-09-07 ENCOUNTER — RESULT REVIEW (OUTPATIENT)
Age: 66
End: 2021-09-07

## 2021-09-07 ENCOUNTER — OUTPATIENT (OUTPATIENT)
Dept: OUTPATIENT SERVICES | Facility: HOSPITAL | Age: 66
LOS: 1 days | Discharge: HOME | End: 2021-09-07
Payer: MEDICARE

## 2021-09-07 DIAGNOSIS — Z98.890 OTHER SPECIFIED POSTPROCEDURAL STATES: Chronic | ICD-10-CM

## 2021-09-07 DIAGNOSIS — Z90.710 ACQUIRED ABSENCE OF BOTH CERVIX AND UTERUS: Chronic | ICD-10-CM

## 2021-09-07 DIAGNOSIS — R91.1 SOLITARY PULMONARY NODULE: Chronic | ICD-10-CM

## 2021-09-07 DIAGNOSIS — Z90.49 ACQUIRED ABSENCE OF OTHER SPECIFIED PARTS OF DIGESTIVE TRACT: Chronic | ICD-10-CM

## 2021-09-07 DIAGNOSIS — R92.8 OTHER ABNORMAL AND INCONCLUSIVE FINDINGS ON DIAGNOSTIC IMAGING OF BREAST: ICD-10-CM

## 2021-09-07 DIAGNOSIS — J38.1 POLYP OF VOCAL CORD AND LARYNX: Chronic | ICD-10-CM

## 2021-09-07 PROCEDURE — G0279: CPT | Mod: 26

## 2021-09-07 PROCEDURE — 76642 ULTRASOUND BREAST LIMITED: CPT | Mod: 26,LT

## 2021-09-07 PROCEDURE — 77065 DX MAMMO INCL CAD UNI: CPT | Mod: 26,LT

## 2021-09-24 ENCOUNTER — APPOINTMENT (OUTPATIENT)
Dept: RADIATION ONCOLOGY | Facility: HOSPITAL | Age: 66
End: 2021-09-24
Payer: MEDICARE

## 2021-09-24 VITALS
RESPIRATION RATE: 14 BRPM | TEMPERATURE: 96.6 F | WEIGHT: 146.6 LBS | OXYGEN SATURATION: 97 % | HEART RATE: 79 BPM | BODY MASS INDEX: 25.97 KG/M2

## 2021-09-24 VITALS — DIASTOLIC BLOOD PRESSURE: 87 MMHG | SYSTOLIC BLOOD PRESSURE: 163 MMHG

## 2021-09-24 PROCEDURE — 99212 OFFICE O/P EST SF 10 MIN: CPT

## 2021-09-24 RX ORDER — EZETIMIBE 10 MG/1
TABLET ORAL
Refills: 0 | Status: COMPLETED | COMMUNITY
End: 2021-09-24

## 2021-09-24 RX ORDER — UMECLIDINIUM 62.5 UG/1
AEROSOL, POWDER ORAL
Refills: 0 | Status: COMPLETED | COMMUNITY
End: 2021-09-24

## 2021-09-24 RX ORDER — ESOMEPRAZOLE MAGNESIUM 40 MG/1
40 CAPSULE, DELAYED RELEASE ORAL DAILY
Qty: 30 | Refills: 5 | Status: DISCONTINUED | COMMUNITY
Start: 2021-04-05 | End: 2021-09-24

## 2021-09-24 RX ORDER — ONDANSETRON 4 MG/5ML
4 SOLUTION ORAL EVERY 8 HOURS
Qty: 300 | Refills: 3 | Status: DISCONTINUED | COMMUNITY
Start: 2020-12-10 | End: 2021-09-24

## 2021-09-24 RX ORDER — DIPHENHYDRAMINE HCL 50 MG/1
50 CAPSULE ORAL
Qty: 1 | Refills: 0 | Status: DISCONTINUED | COMMUNITY
Start: 2021-07-14 | End: 2021-09-24

## 2021-09-24 RX ORDER — PREDNISONE 50 MG/1
50 TABLET ORAL DAILY
Qty: 3 | Refills: 0 | Status: DISCONTINUED | COMMUNITY
Start: 2021-07-14 | End: 2021-09-24

## 2021-09-24 NOTE — DISEASE MANAGEMENT
[Clinical] : TNM Stage: c [OPAL] : OPAL [NTNM] : 2b [TTNM] : 3 [MTNM] : 0 [de-identified] : 7000cGy [de-identified] : 7000cGy [de-identified] : base of tongue

## 2021-09-24 NOTE — PHYSICAL EXAM
[Sclera] : the sclera and conjunctiva were normal [Hearing Threshold Finger Rub Not [place]] : hearing was normal [] : no respiratory distress [Respiration, Rhythm And Depth] : normal respiratory rhythm and effort [Exaggerated Use Of Accessory Muscles For Inspiration] : no accessory muscle use [Heart Rate And Rhythm] : heart rate and rhythm were normal [Nail Clubbing] : no clubbing  or cyanosis of the fingernails [Normal] : oriented to person, place and time, the affect was normal, the mood was normal and not anxious [de-identified] : No active mucositis seen at present.  No tumor seen at present.  Tongue extends in the midline.  On digital exam, modest induration at tumor site (right base of tongue) [de-identified] : Peg tube

## 2021-10-04 ENCOUNTER — APPOINTMENT (OUTPATIENT)
Dept: INTERNAL MEDICINE | Facility: CLINIC | Age: 66
End: 2021-10-04
Payer: MEDICARE

## 2021-10-04 ENCOUNTER — NON-APPOINTMENT (OUTPATIENT)
Age: 66
End: 2021-10-04

## 2021-10-04 ENCOUNTER — OUTPATIENT (OUTPATIENT)
Dept: OUTPATIENT SERVICES | Facility: HOSPITAL | Age: 66
LOS: 1 days | Discharge: HOME | End: 2021-10-04

## 2021-10-04 VITALS
HEIGHT: 63 IN | WEIGHT: 153 LBS | SYSTOLIC BLOOD PRESSURE: 170 MMHG | DIASTOLIC BLOOD PRESSURE: 93 MMHG | OXYGEN SATURATION: 97 % | BODY MASS INDEX: 27.11 KG/M2 | HEART RATE: 85 BPM | TEMPERATURE: 98 F

## 2021-10-04 DIAGNOSIS — J44.9 CHRONIC OBSTRUCTIVE PULMONARY DISEASE, UNSPECIFIED: ICD-10-CM

## 2021-10-04 DIAGNOSIS — E78.5 HYPERLIPIDEMIA, UNSPECIFIED: ICD-10-CM

## 2021-10-04 DIAGNOSIS — Z90.710 ACQUIRED ABSENCE OF BOTH CERVIX AND UTERUS: Chronic | ICD-10-CM

## 2021-10-04 DIAGNOSIS — Z23 ENCOUNTER FOR IMMUNIZATION: ICD-10-CM

## 2021-10-04 DIAGNOSIS — R91.1 SOLITARY PULMONARY NODULE: Chronic | ICD-10-CM

## 2021-10-04 DIAGNOSIS — J38.1 POLYP OF VOCAL CORD AND LARYNX: Chronic | ICD-10-CM

## 2021-10-04 DIAGNOSIS — I10 ESSENTIAL (PRIMARY) HYPERTENSION: ICD-10-CM

## 2021-10-04 DIAGNOSIS — Z98.890 OTHER SPECIFIED POSTPROCEDURAL STATES: Chronic | ICD-10-CM

## 2021-10-04 DIAGNOSIS — Z90.49 ACQUIRED ABSENCE OF OTHER SPECIFIED PARTS OF DIGESTIVE TRACT: Chronic | ICD-10-CM

## 2021-10-04 DIAGNOSIS — C01 MALIGNANT NEOPLASM OF BASE OF TONGUE: ICD-10-CM

## 2021-10-04 PROCEDURE — 99214 OFFICE O/P EST MOD 30 MIN: CPT | Mod: GC

## 2021-10-04 NOTE — HISTORY OF PRESENT ILLNESS
[FreeTextEntry1] : Here for follow up.  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 Here for medication refill. She would also like referral to opthalmology, ENT, and dental. \par Denies fever, chills, recent change in weight.

## 2021-10-04 NOTE — REVIEW OF SYSTEMS
[Fever] : no fever [Chills] : no chills [Discharge] : no discharge [Pain] : no pain [Redness] : no redness [Earache] : no earache [Nosebleed] : no nosebleeds [Hoarseness] : no hoarseness [Chest Pain] : no chest pain [Palpitations] : no palpitations [Shortness Of Breath] : no shortness of breath [Wheezing] : no wheezing [Abdominal Pain] : no abdominal pain [Dysuria] : no dysuria [Hematuria] : no hematuria

## 2021-10-04 NOTE — ASSESSMENT
[FreeTextEntry1] : 66 year old female with a h/o COPD, polychondritis, HTN, DLD, RLL and RML pulmonary nodule w/ biopsy showing adenocarcinoma, SCC of the oral cavity (base of the tongue).\par \par \par #Squamous cell carcinoma of base of Tongue\par #Adenocarcinoma of the lung - subcentimeter RLL and RML. RLL biopsy consistent with adenocarcinoma. s/p RFA of both lesions, ARMANDO.\par - s/p Cisplatin and radiation #35 iN february 24 \par - s/p laryngoscopy without masses or lesions\par Trach removed in April \par -Will give ENT, dental, ohphto referral \par \par #Mammogram 03/05/2021\par - Probably benign heterogenous mass at 12-1 o'clock axis. Short term follow up US is recommended in 6 months\par = Indeterminate heterogenous mass at left 3.00 axis. US- guided biopsy is suggested (Bi-RADS 4)\par In 08./2020, she had biopsy of 2 breast lesions: 1 left inferior mass and one left superior; both were benign breast tissue w/ proliferative fibrocystic changes.\par - pt needs repeat mammogram this year, stated she will schedule and follow up\par \par \par #Relapsing polychondritis\par #Osteoporosis\par - on MMF 1000mg BID and HCQ 200mg BID (renewed)\par  - stopped taking alendronate, pending authorization for another medication \par -Rheum on board \par \par \par #HTN-not controlled \par - /93 today\par -Will increase losartan from 25 to 50 mg daily \par #COPD\par cont f/u with Pulm\par cont with incruse and ventolin as prescribed\par \par #HCM\par Medications renewed\par rpt blood work ordered\par COVID vaccine series complete\par Flu shot 10/21 \par RTC in 6 months and prn

## 2021-10-12 ENCOUNTER — OUTPATIENT (OUTPATIENT)
Dept: OUTPATIENT SERVICES | Facility: HOSPITAL | Age: 66
LOS: 1 days | Discharge: HOME | End: 2021-10-12
Payer: MEDICARE

## 2021-10-12 ENCOUNTER — APPOINTMENT (OUTPATIENT)
Dept: OPHTHALMOLOGY | Facility: CLINIC | Age: 66
End: 2021-10-12

## 2021-10-12 DIAGNOSIS — Z98.890 OTHER SPECIFIED POSTPROCEDURAL STATES: Chronic | ICD-10-CM

## 2021-10-12 DIAGNOSIS — Z90.710 ACQUIRED ABSENCE OF BOTH CERVIX AND UTERUS: Chronic | ICD-10-CM

## 2021-10-12 DIAGNOSIS — Z90.49 ACQUIRED ABSENCE OF OTHER SPECIFIED PARTS OF DIGESTIVE TRACT: Chronic | ICD-10-CM

## 2021-10-12 DIAGNOSIS — R91.1 SOLITARY PULMONARY NODULE: Chronic | ICD-10-CM

## 2021-10-12 DIAGNOSIS — J38.1 POLYP OF VOCAL CORD AND LARYNX: Chronic | ICD-10-CM

## 2021-10-12 PROCEDURE — 92134 CPTRZ OPH DX IMG PST SGM RTA: CPT | Mod: 26

## 2021-10-12 PROCEDURE — 92014 COMPRE OPH EXAM EST PT 1/>: CPT

## 2021-10-12 PROCEDURE — 92202 OPSCPY EXTND ON/MAC DRAW: CPT

## 2021-10-14 DIAGNOSIS — H04.123 DRY EYE SYNDROME OF BILATERAL LACRIMAL GLANDS: ICD-10-CM

## 2021-10-14 DIAGNOSIS — H40.033 ANATOMICAL NARROW ANGLE, BILATERAL: ICD-10-CM

## 2021-10-14 DIAGNOSIS — H40.039 ANATOMICAL NARROW ANGLE, UNSPECIFIED EYE: ICD-10-CM

## 2021-10-14 DIAGNOSIS — H25.13 AGE-RELATED NUCLEAR CATARACT, BILATERAL: ICD-10-CM

## 2021-10-21 ENCOUNTER — APPOINTMENT (OUTPATIENT)
Dept: BREAST CENTER | Facility: CLINIC | Age: 66
End: 2021-10-21
Payer: MEDICARE

## 2021-10-21 VITALS
BODY MASS INDEX: 27.11 KG/M2 | SYSTOLIC BLOOD PRESSURE: 150 MMHG | DIASTOLIC BLOOD PRESSURE: 84 MMHG | HEIGHT: 63 IN | WEIGHT: 153 LBS | TEMPERATURE: 97.5 F

## 2021-10-21 PROCEDURE — 99213 OFFICE O/P EST LOW 20 MIN: CPT

## 2021-10-23 NOTE — HISTORY OF PRESENT ILLNESS
[FreeTextEntry1] : Sol is a 66 F with two left breast radial scars, s/p L WLE of two areas on 2020, now with a new complex sclerosing papillary lesion in the L breast (not excised). \par \par 2020 -- L WLE of two areas \par -more superior mass: proliferative type FC changes, no residual radial scar \par -more inferior mass: sclerosing IP, small radial scar \par \par Her work up was as follows: \par 3/2/2020 -- b/l screening mammogram \par -heterogenously dense breasts\par -focal asymmetry in L UOQ \par -no suspicious mass, calcifications, or other abnormalities in her RIGHT breast \par BIRADS 0 \par \par 2020 -- L dx mammogram and US \par -circumscribed mass with amorphous calcifications in her left lateral breast --> no significant change \par -irregular mass with amorphous calcifications in UOQ, LEFT --> BIOPSY\par -new circumscribed mass in UOQ, LEFT --> BIOPSY \par L US \par @2N6, benign cyst which correlates with lateral breast mass seen on mammogram \par BIRADS 4\par \par 2020 -- L stereobx x 2 \par -anterior asymmetry (top hat) --> radial scar \par -posterior asymmetry (minicork) --> radial scar \par \par Prior to her biopsy, she had no breast related complaints.  She denies any breast pain, has not palpated any new palpable masses in either breast and denies any nipple discharge or retraction. However, she was a little sore in her left lateral breast after her biopsy.  \par \par HISTORICAL RISK FACTORS: \par -2 prior breast biopsies as above, no prior surgeries \par -family history of breast cancer in her sister and maternal grandmother \par -, age at first live birth was 18 \par -no prior OCP use, HRT use x 2 years, stopped \par -s/p GEETHA/BSO in  \par \par RISK ASSESSMENT: \par Babs\par 5yr -- 4.7%\par lifetime -- 15.8%\par \par TC v6\par 5yr -- 3.1%\par lifetime -- 8.7%\par \par INTERVAL HISTORY: \par Sol returns for her 6 month follow up VISIT, s/p L WLE of two areas on 2020. \par \par She has no breast related complaints at this time.  She denies any breast pain, has not palpated any new palpable masses in either breast and denies any nipple discharge or retraction.  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 Her trach has since been removed and her PEG has also been removed.  She is due for repeat imaging to evaluate the efficacy of her treatments. \par  \par She had the following work up: \par 3/5/2021 -- b/l dx mammogram and US \par -scattered areas of fibroglandular densities\par -L: UOQ, post surgical distortion, 8mm oil cyst @surgical site \par -L: superior breast, low density stable nodular asymmetry, unchanged since 3/2020 \par -tiny nodule in L lateral/central breast, no significant changes since  \par -scattered coarse benign calcifications in R \par b/l US \par RIGHT: \par -no suspicious abnormalities \par LEFT: \par -@2N6, benign cyst, measures 5 x 5 x 3 mm \par -@12-1N5, heterogenous mass measuring 8 x 7 x 6 mm, consistent with surgical site --> BIRADS 3 \par -@3N3, 5 x 6 x 3 mm heterogenous nonvascular mass --> BIOPSY \par BIRADS 4 \par \par 2021 -- L US CNBx @3N3 (minicork) \par -complex sclerosing papillary lesion \par \par \par INTERVAL HISTORY: 10/21/21\par Sol returns for her 6 month follow up VISIT, s/p L WLE of two areas on 2020.  She also was diagnosed with LEFT complex sclerosing papillary lesion on 2021 that was not excised. \par \par She has no breast related complaints at this time.  She denies any breast pain, has not palpated any new palpable masses in either breast and denies any nipple discharge or retraction.  \par \par Her imaging as follows:\par 21 LEFT US and mammo \par -@3:00N 3 stable heterogeneous mass measuring 0.8 x 0.8 x 0.4 cm, consistent with prior biopsy proven papillary      lesion. Deemed BIRADS2

## 2021-10-23 NOTE — ASSESSMENT
[FreeTextEntry1] : Sol is a 66 F with two left breast radial scars, s/p L WLE on 8/24/2020, currently with a complex sclerosing papillary lesion in the L breast (not excised). \par \par On exam, I was not able to palpate any suspicious abnormalities. \par \par Her imaging as follows:\par 09/07/21 LEFT US and mammo \par -@3:00N 3 stable heterogeneous mass measuring 0.8 x 0.8 x 0.4 cm, consistent with prior biopsy proven papillary      lesion. Deemed BIRADS2\par \par Her mass did increase slightly since her last US, so I have again offered her surgical excision, but given her radiation treatment to her mouth/neck, I am not sure how well she will tolerate sedation with local.  She has an appointment with her pulmonologist and PMD in one month and if cleared for surgery, she will call back. \par \par Otherwise, she will be scheduled for a b/l dx mammogram and US on 3/5/2022 and follow up after for a CBE. \par \par AS REVIEW:\par 8/24/2020 -- L WLE of two areas \par -more superior mass: proliferative type FC changes, no residual radial scar \par -more inferior mass: sclerosing IP, small radial scar \par \par Her most recent imaging was a b/l dx mammogram and US on 3/5/2021 which revealed post surgical changes in her left breast in UOQ, stable nodular asymmetry in her superior L breast, unchanged since 3/2020 and in her left breast @12-1N5, a heterogenous mass measuring 8 x 7 x 6 mm consistent with post surgical changes, deemed BIRADS 3 and in her left breast @3N3, a heterogenous nonvascular mass measuring 5 x 6 x 3 mm which was biopsy proven complex sclerosing papillary lesion. \par \par We discussed radial scars without atypia.  These lesions are considered fibroproliferative lesions without atypia.  Patients with these lesions were found to have a slightly increased relative risk compared to the reference population.  However the lesions themselves do not have any malignant potential. There is about a 10-20% upgrade rate to a high risk lesion (including atypical hyperplasias) and a <3% upgrade to cancer.  However, when atypia is present there is up to a 15-25% upgrade rate to cancer.  For this reason, we have discussed observation vs. surgical excision of this lesion.  I have recommended that we move forward with surgical excision at this time.\par \par In regards to her family history of breast cancer in her sister and maternal grandmother, her risk assessment is as follows: \par \par RISK ASSESSMENT: \par Babs\par 5yr -- 4.7%\par lifetime -- 15.8%\par \par TC v6\par 5yr -- 3.1%\par lifetime -- 8.7%\par \par This puts her in an average to intermediate risk for breast cancer. She does not quite meet criteria for screening breast MRIs, although she does meet criteria for chemopreventative medications.  This will be discussed with her further at her post op visit. \par \par All of her questions were answered.  She knows to call with any further questions or concerns. \par \par PLAN: \par -if she decides for surgery, she will need: LEFT BREAST WIDE LOCAL EXCISION with RF LOCALIZATION\par otherwise: \par -B/L dx mammogram and US on 3/5/2022 (radial scar/IP -- not excised) \par -f/up after

## 2021-10-23 NOTE — PHYSICAL EXAM
[Normocephalic] : normocephalic [Atraumatic] : atraumatic [EOMI] : extra ocular movement intact [No Supraclavicular Adenopathy] : no supraclavicular adenopathy [No Cervical Adenopathy] : no cervical adenopathy [Examined in the supine and seated position] : examined in the supine and seated position [Symmetrical] : symmetrical [No dominant masses] : no dominant masses in right breast  [No dominant masses] : no dominant masses left breast [No Nipple Retraction] : no left nipple retraction [No Nipple Discharge] : no left nipple discharge [No Axillary Lymphadenopathy] : no left axillary lymphadenopathy [Soft] : abdomen soft [Not Tender] : non-tender [No Edema] : no edema [No Rashes] : no rashes [No Ulceration] : no ulceration [de-identified] : no suspicious mass palpated within either breast [de-identified] : no suspicious lesions palpated

## 2021-10-23 NOTE — DATA REVIEWED
[FreeTextEntry1] : EXAM:  MG US BREAST LIMITED LT\par EXAM:  MG MAMMO DIAG W ALONSO LT#\par \par \par PROCEDURE DATE:  09/07/2021\par \par \par \par INTERPRETATION:  Clinical History / Reason for exam: Short interval follow-up of a biopsy-proven complex sclerosing papillary lesion in the left breast in April 2021.\par \par The patient reports her last clinical breast examination was performed over one year ago.\par \par Family history: Maternal grandmother at the age of 60\par \par Comparisons: Priors dating back to 2015.\par \par Views obtained:Full field CC and MLO views of the left breast with tomosynthesis.\par \par Computer-aided detection was utilized in the interpretation of this examination.\par \par Breast composition:There are scattered areas of fibroglandular density.\par \par Findings:\par \par Mammogram:\par \par Linear marker denotes the site of cutaneous scarring in the left breast upper outer quadrant. There is stable postsurgical distortion and fat necrosis. There is a cork shaped biopsy marker in the upper outer quadrant denoting biopsy-proven papillary lesion. No new suspicious mass, microcalcifications or areas of architectural distortion is seen the left breast.\par \par Ultrasound:\par \par Targeted unilateral left breast ultrasound was performed.\par \par At the 3:00 position 3 cm from nipple, there is a stable heterogeneous mass measuring 0.8 x 0.8 x 0.4 cm, consistent with prior biopsy proven papillary lesion.\par \par Impression: Stable left breast 3:00 position biopsy-proven papillary lesion.\par \par Recommendation: clinical correlation is advised.\par \par BI-RADS Category 2: Benign\par \par

## 2021-11-12 ENCOUNTER — NON-APPOINTMENT (OUTPATIENT)
Age: 66
End: 2021-11-12

## 2021-11-12 ENCOUNTER — OUTPATIENT (OUTPATIENT)
Dept: OUTPATIENT SERVICES | Facility: HOSPITAL | Age: 66
LOS: 1 days | Discharge: HOME | End: 2021-11-12

## 2021-11-12 ENCOUNTER — APPOINTMENT (OUTPATIENT)
Dept: PULMONOLOGY | Facility: CLINIC | Age: 66
End: 2021-11-12
Payer: MEDICARE

## 2021-11-12 VITALS
WEIGHT: 147 LBS | OXYGEN SATURATION: 96 % | SYSTOLIC BLOOD PRESSURE: 163 MMHG | HEIGHT: 63 IN | DIASTOLIC BLOOD PRESSURE: 83 MMHG | BODY MASS INDEX: 26.05 KG/M2 | HEART RATE: 103 BPM

## 2021-11-12 DIAGNOSIS — Z90.49 ACQUIRED ABSENCE OF OTHER SPECIFIED PARTS OF DIGESTIVE TRACT: Chronic | ICD-10-CM

## 2021-11-12 DIAGNOSIS — Z98.890 OTHER SPECIFIED POSTPROCEDURAL STATES: Chronic | ICD-10-CM

## 2021-11-12 DIAGNOSIS — R91.1 SOLITARY PULMONARY NODULE: Chronic | ICD-10-CM

## 2021-11-12 DIAGNOSIS — J38.1 POLYP OF VOCAL CORD AND LARYNX: Chronic | ICD-10-CM

## 2021-11-12 DIAGNOSIS — Z90.710 ACQUIRED ABSENCE OF BOTH CERVIX AND UTERUS: Chronic | ICD-10-CM

## 2021-11-12 PROCEDURE — 99213 OFFICE O/P EST LOW 20 MIN: CPT | Mod: GC

## 2021-11-12 RX ORDER — UMECLIDINIUM 62.5 UG/1
62.5 AEROSOL, POWDER ORAL
Qty: 3 | Refills: 1 | Status: DISCONTINUED | COMMUNITY
Start: 2019-12-13 | End: 2021-11-12

## 2021-11-12 NOTE — HISTORY OF PRESENT ILLNESS
[FreeTextEntry1] : GEREMIAS JONNY is being seen for a follow-up visit for COPD.  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 Keith w/ repeat US breast (Sep 2021), presenting for follow-up for her COPD. She reports having had the trach and PEG removed several months ago; states the swelling around the trach site has reduced and she does not have any difficulty breathing or w/ PO intake; feels well. She reports using the Incruse as prescribed and Ventolin as needed.

## 2021-11-12 NOTE — PLAN
[FreeTextEntry1] : Spiriva\par Albuterol PRN\par Continue Cts \par PFT \par FU in 6 months \par    \par \par

## 2021-11-12 NOTE — END OF VISIT
[] : Resident [FreeTextEntry4] : Sen and examined with the pulmonary resident at the bed side.  Impression and plan formulated with the fellow

## 2021-11-18 ENCOUNTER — APPOINTMENT (OUTPATIENT)
Dept: OTOLARYNGOLOGY | Facility: CLINIC | Age: 66
End: 2021-11-18
Payer: MEDICARE

## 2021-11-18 PROCEDURE — 31575 DIAGNOSTIC LARYNGOSCOPY: CPT

## 2021-11-18 PROCEDURE — 99213 OFFICE O/P EST LOW 20 MIN: CPT | Mod: 25

## 2021-11-18 NOTE — REASON FOR VISIT
[Subsequent Evaluation] : a subsequent evaluation for [FreeTextEntry2] : malignant neoplasm of tongue, carcinoma of base of tongue

## 2021-11-18 NOTE — HISTORY OF PRESENT ILLNESS
[de-identified] : 65 yo F, former smoker, w hx [id number] SCCa base of tongue, p16+, s/p CRT completed  2/10/21,  required tracheostomy at time of diagnosis, since decannulated. She has been doing well, first post-tx imaging May 2021 - ARMANDO. She no longer smokes, stopped prior to treatment. \par \par 8/17/21: Patient following up on malignant neoplasm of tongue, most recent imaging May 2021 - ARMANDO . Patient denies any recent issues. Here to discuss CT chest results. Continues to NOT smoke. [FreeTextEntry1] : \par 11/18/21: Patient following up on carcinoma of base of tongue. Patient denies any new issues. Not smoking. Swallowing well.

## 2021-11-18 NOTE — PROCEDURE
[Normal] : posterior cricoid area had healthy pink mucosa in the interarytenoid area and the esophageal inlet [de-identified] : ARMANDO\par \par radiation changes

## 2021-12-08 ENCOUNTER — APPOINTMENT (OUTPATIENT)
Dept: OPHTHALMOLOGY | Facility: CLINIC | Age: 66
End: 2021-12-08

## 2021-12-08 ENCOUNTER — OUTPATIENT (OUTPATIENT)
Dept: OUTPATIENT SERVICES | Facility: HOSPITAL | Age: 66
LOS: 1 days | Discharge: HOME | End: 2021-12-08
Payer: MEDICARE

## 2021-12-08 DIAGNOSIS — J38.1 POLYP OF VOCAL CORD AND LARYNX: Chronic | ICD-10-CM

## 2021-12-08 DIAGNOSIS — Z98.890 OTHER SPECIFIED POSTPROCEDURAL STATES: Chronic | ICD-10-CM

## 2021-12-08 DIAGNOSIS — Z90.49 ACQUIRED ABSENCE OF OTHER SPECIFIED PARTS OF DIGESTIVE TRACT: Chronic | ICD-10-CM

## 2021-12-08 DIAGNOSIS — Z90.710 ACQUIRED ABSENCE OF BOTH CERVIX AND UTERUS: Chronic | ICD-10-CM

## 2021-12-08 DIAGNOSIS — R91.1 SOLITARY PULMONARY NODULE: Chronic | ICD-10-CM

## 2021-12-08 PROCEDURE — 92083 EXTENDED VISUAL FIELD XM: CPT | Mod: 26

## 2021-12-17 ENCOUNTER — APPOINTMENT (OUTPATIENT)
Dept: INTERNAL MEDICINE | Facility: CLINIC | Age: 66
End: 2021-12-17
Payer: MEDICARE

## 2021-12-17 ENCOUNTER — OUTPATIENT (OUTPATIENT)
Dept: OUTPATIENT SERVICES | Facility: HOSPITAL | Age: 66
LOS: 1 days | Discharge: HOME | End: 2021-12-17

## 2021-12-17 ENCOUNTER — APPOINTMENT (OUTPATIENT)
Dept: RHEUMATOLOGY | Facility: CLINIC | Age: 66
End: 2021-12-17
Payer: MEDICARE

## 2021-12-17 VITALS
BODY MASS INDEX: 26.05 KG/M2 | DIASTOLIC BLOOD PRESSURE: 82 MMHG | HEART RATE: 82 BPM | OXYGEN SATURATION: 95 % | TEMPERATURE: 97.8 F | HEIGHT: 63 IN | SYSTOLIC BLOOD PRESSURE: 159 MMHG | WEIGHT: 147 LBS

## 2021-12-17 DIAGNOSIS — Z98.890 OTHER SPECIFIED POSTPROCEDURAL STATES: Chronic | ICD-10-CM

## 2021-12-17 DIAGNOSIS — J38.1 POLYP OF VOCAL CORD AND LARYNX: Chronic | ICD-10-CM

## 2021-12-17 DIAGNOSIS — Z90.710 ACQUIRED ABSENCE OF BOTH CERVIX AND UTERUS: Chronic | ICD-10-CM

## 2021-12-17 DIAGNOSIS — Z90.49 ACQUIRED ABSENCE OF OTHER SPECIFIED PARTS OF DIGESTIVE TRACT: Chronic | ICD-10-CM

## 2021-12-17 DIAGNOSIS — R91.1 SOLITARY PULMONARY NODULE: Chronic | ICD-10-CM

## 2021-12-17 PROCEDURE — 99215 OFFICE O/P EST HI 40 MIN: CPT | Mod: GC

## 2021-12-17 RX ORDER — ZOLEDRONIC ACID 5 MG/100ML
5 INJECTION INTRAVENOUS
Qty: 1 | Refills: 0 | Status: DISCONTINUED | COMMUNITY
Start: 2021-06-18 | End: 2021-12-17

## 2021-12-17 NOTE — HISTORY OF PRESENT ILLNESS
[FreeTextEntry1] : 66F w/ PMH of Relapsing Polychondritis, Osteoporosis, COPD , DLD, HTN, RML+RLL lung nodules s/p ablation, SCC of tongue base s/p chemo+rad, s/p trach and PEG s/p removal presents for follow up\par \par Interval History:\par Seen by ophtho: normal exam as per pt\par Her symptoms are stable - has chronic back pain, no other joint pains, stiffness, or weakness. \par Other ROS as per below

## 2021-12-17 NOTE — PHYSICAL EXAM
[General Appearance - Alert] : alert [General Appearance - In No Acute Distress] : in no acute distress [General Appearance - Well Nourished] : well nourished [Sclera] : the sclera and conjunctiva were normal [Outer Ear] : the ears and nose were normal in appearance [Neck Cervical Mass (___cm)] : no neck mass was observed [Exaggerated Use Of Accessory Muscles For Inspiration] : no accessory muscle use [Auscultation Breath Sounds / Voice Sounds] : lungs were clear to auscultation bilaterally [Apical Impulse] : the apical impulse was normal [Heart Sounds] : normal S1 and S2 [Murmurs] : no murmurs [Edema] : there was no peripheral edema [Bowel Sounds] : normal bowel sounds [Abdomen Soft] : soft [Abdomen Tenderness] : non-tender [Nail Clubbing] : no clubbing  or cyanosis of the fingernails [] : no rash [Sensation] : the sensory exam was normal to light touch and pinprick [Motor Exam] : the motor exam was normal [Oriented To Time, Place, And Person] : oriented to person, place, and time [Affect] : the affect was normal

## 2021-12-17 NOTE — REVIEW OF SYSTEMS
[Hoarseness] : hoarseness [Confused] : confusion [Fever] : no fever [Chills] : no chills [Eyesight Problems] : no eyesight problems [Earache] : no earache [Sore Throat] : no sore throat [Chest Pain] : no chest pain [Palpitations] : no palpitations [Lower Ext Edema] : no lower extremity edema [Shortness Of Breath] : no shortness of breath [Cough] : no cough [SOB on Exertion] : no shortness of breath during exertion [Abdominal Pain] : no abdominal pain [Vomiting] : no vomiting [Constipation] : no constipation [Diarrhea] : no diarrhea [Melena] : no melena [Dysuria] : no dysuria [Joint Pain] : no joint pain [Joint Swelling] : no joint swelling [Joint Stiffness] : no joint stiffness [Difficulty Walking] : no difficulty walking [Anxiety] : no anxiety [Depression] : no depression

## 2021-12-17 NOTE — END OF VISIT
[] : Resident [FreeTextEntry3] : RP: will decrease dose of HCQ to 200 mg as advanced age and weight based dosing. c/w Cellcept 1000 mg BID\par Osteoporosis. has a h/o compression vertebral fractures. has been off Fosamax for almost 2 yrs due to concern about back pain. refused reclast infusion as afraid of needles. will restart Fosamax today\par \par Total time spent was   . Time was divided in review of labs and imaging studies, obtaining history, personally examining patient, counselling patient/ family, ordering medications/tests/ imaging tests, communicating with other health care providers, documentation in electronic health records, independent interpretation of labs, imaging results and procedure results and communicating to the patient/family and care co ordination.\par  [Time Spent: ___ minutes] : I have spent [unfilled] minutes of time on the encounter.

## 2021-12-17 NOTE — ASSESSMENT
[FreeTextEntry1] : 66F w/ Relapsing Polychondritis on HCQ/MMF and osteoporosis on alendronate presents for follow up\par \par # Relapsing Polychondritis (dx about 20 y/a by ear hypertrophy - no bx)\par - Not in flare\par - on MMF 1000mg BID and HCQ 200mg BID; reduce HCQ to 200mg qd\par - seen by ophtho: normal eye exam\par - repeat labs\par - f/u 3 months \par \par # Osteopenia, ?hx of osteoporosis\par # multiple compression fx of T and L spines (see 2019 T-L spine xray results)\par - has not been taking alendronate ( 70mg once a week) as she feels back pain with it. last alendronate taken 1.5 years ago, will restart now. Reclast infusion - Pt didn't want to get transfusion.\par - Dexa scan 2019: AP spine -1.2, Dexa Scan 2021: AP spine -1.2\par - c/w Vit D and calcium supplement \par \par

## 2021-12-20 DIAGNOSIS — M54.9 DORSALGIA, UNSPECIFIED: ICD-10-CM

## 2021-12-20 DIAGNOSIS — M81.0 AGE-RELATED OSTEOPOROSIS WITHOUT CURRENT PATHOLOGICAL FRACTURE: ICD-10-CM

## 2021-12-20 DIAGNOSIS — M94.1 RELAPSING POLYCHONDRITIS: ICD-10-CM

## 2021-12-20 DIAGNOSIS — Z00.00 ENCOUNTER FOR GENERAL ADULT MEDICAL EXAMINATION WITHOUT ABNORMAL FINDINGS: ICD-10-CM

## 2022-01-10 ENCOUNTER — OUTPATIENT (OUTPATIENT)
Dept: OUTPATIENT SERVICES | Facility: HOSPITAL | Age: 67
LOS: 1 days | Discharge: HOME | End: 2022-01-10

## 2022-01-10 ENCOUNTER — NON-APPOINTMENT (OUTPATIENT)
Age: 67
End: 2022-01-10

## 2022-01-10 ENCOUNTER — APPOINTMENT (OUTPATIENT)
Dept: INTERNAL MEDICINE | Facility: CLINIC | Age: 67
End: 2022-01-10
Payer: MEDICARE

## 2022-01-10 VITALS
DIASTOLIC BLOOD PRESSURE: 101 MMHG | WEIGHT: 160 LBS | HEART RATE: 102 BPM | SYSTOLIC BLOOD PRESSURE: 156 MMHG | BODY MASS INDEX: 28.35 KG/M2 | HEIGHT: 63 IN | OXYGEN SATURATION: 95 % | TEMPERATURE: 97.7 F

## 2022-01-10 DIAGNOSIS — J38.1 POLYP OF VOCAL CORD AND LARYNX: Chronic | ICD-10-CM

## 2022-01-10 DIAGNOSIS — Z98.890 OTHER SPECIFIED POSTPROCEDURAL STATES: Chronic | ICD-10-CM

## 2022-01-10 DIAGNOSIS — R91.1 SOLITARY PULMONARY NODULE: Chronic | ICD-10-CM

## 2022-01-10 DIAGNOSIS — Z90.49 ACQUIRED ABSENCE OF OTHER SPECIFIED PARTS OF DIGESTIVE TRACT: Chronic | ICD-10-CM

## 2022-01-10 DIAGNOSIS — Z90.710 ACQUIRED ABSENCE OF BOTH CERVIX AND UTERUS: Chronic | ICD-10-CM

## 2022-01-10 PROCEDURE — 99213 OFFICE O/P EST LOW 20 MIN: CPT | Mod: GC

## 2022-01-10 RX ORDER — MULTIVIT-MIN/FOLIC/VIT K/LYCOP 400-300MCG
50 MCG TABLET ORAL
Qty: 30 | Refills: 5 | Status: ACTIVE | COMMUNITY
Start: 2022-01-10 | End: 1900-01-01

## 2022-01-10 NOTE — REVIEW OF SYSTEMS
[Cough] : cough [Negative] : Musculoskeletal [Shortness Of Breath] : no shortness of breath [Wheezing] : no wheezing [Dyspnea on Exertion] : no dyspnea on exertion

## 2022-01-10 NOTE — PHYSICAL EXAM
[No Acute Distress] : no acute distress [Well Nourished] : well nourished [Normal] : no joint swelling and grossly normal strength and tone [de-identified] : poor dentition

## 2022-01-10 NOTE — HISTORY OF PRESENT ILLNESS
[FreeTextEntry1] : follow up [de-identified] : 65 yo F with PMHx of COPD, Polychondritis, HTN, DLD, Lung Adenocarcinoma, SCC of tongue base s/p CRT presents for follow up. \par She reports that she feels well, is following with ENT, Rad Onc, Pulm routinely for follow up. Requesting Dental referral. Reports that she has been using her inhalers for COPD as prescribed although is having difficulty using Ventolin inhaler.

## 2022-01-10 NOTE — ASSESSMENT
[FreeTextEntry1] : 67 yo F with PMHx of COPD, Polychondritis, HTN, DLD, Lung Adenocarcinoma, SCC of tongue base s/p CRT presents for follow up.\par \par #Squamous cell carcinoma of base of tongue s/p CRT in the past\par #Adenocarcinoma of the lung - subcentimeter RLL and RML. RLL biopsy consistent with adenocarcinoma. s/p RFA of both lesions, ARMANDO.\par - s/p laryngoscopy without masses or lesions\par - Following with ENT, Rad Onc for monitoring \par - Last PET scan in May - negative\par - Stable CT in August\par - s/p Cisplatin, radiation completed in February 2021\par - Requesting Dental referral for poor dentition as a result of her prior treatments\par \par #Relapsing polychondritis\par #Hx of Osteoporosis\par - Follows with Rheum\par - Dexa done in July - osteopenia\par - C/w MMF 1000mg BID, HCQ 200mg BID\par - Is supposed to be taking Alendronate although refuses because she had side effects in the past, also does not want to do yearly infusions; requesting Vitamin D supplements instead\par \par #HTN\par - Slightly elevated, although pt reports poor dietary intake night prior, states that she eats high salt, fried meals at times, counseled on importance of avoiding excessive salt intake\par - C/w Losartan\par \par #COPD\par - Follows with Pulm\par - C/w Albuterol neb, Spiriva, uses Ventolin 2-3 times per day during cold weather, changed to Proventil to see if has better use of pump\par \par #HCM\par - Mammogram in Sept 2021 - BIRADS 2\par - COVID vaccine series complete\par - Flu shot 10/21 \par - Per pt, had colonoscopy 5 yrs ago with Dr. Glynn, reportedly normal; advised to bring in report at next visit \par - RTC in 6 months or PRN with repeat blood work prior

## 2022-01-13 ENCOUNTER — APPOINTMENT (OUTPATIENT)
Dept: OTOLARYNGOLOGY | Facility: CLINIC | Age: 67
End: 2022-01-13
Payer: MEDICARE

## 2022-01-13 DIAGNOSIS — M81.0 AGE-RELATED OSTEOPOROSIS WITHOUT CURRENT PATHOLOGICAL FRACTURE: ICD-10-CM

## 2022-01-13 DIAGNOSIS — C02.9 MALIGNANT NEOPLASM OF TONGUE, UNSPECIFIED: ICD-10-CM

## 2022-01-13 DIAGNOSIS — Z00.00 ENCOUNTER FOR GENERAL ADULT MEDICAL EXAMINATION WITHOUT ABNORMAL FINDINGS: ICD-10-CM

## 2022-01-13 PROCEDURE — 99214 OFFICE O/P EST MOD 30 MIN: CPT | Mod: 25

## 2022-01-13 PROCEDURE — 31575 DIAGNOSTIC LARYNGOSCOPY: CPT

## 2022-01-13 NOTE — DATA REVIEWED
[de-identified] : PET/CT 5/2021 personally reviewed\par Mildly FDG avidity left supraglottis\par No obvious residual tumor BOT or neck\par

## 2022-01-13 NOTE — PROCEDURE
[Lesion] : lesion identified by mirror examination needing further evaluation [None] : none [Flexible Endoscope] : examined with the flexible endoscope [de-identified] : Scope performed for cancer surveillance. Nasal cavity, nasopharynx, oropharynx, hypopharynx, and larynx evaluated. No masses or lesions, bilateral true vocal folds symmetric and mobile.\par \par Mild radiation supraglottitis, no masses

## 2022-01-13 NOTE — HISTORY OF PRESENT ILLNESS
[de-identified] : [id number] SCC BOT p16 pos CRT 2/2021 [FreeTextEntry1] : Patient following up on carcinoma of base of tongue and oropharyngeal dysphagia. Patient denies any new changes. Patient c/o throat dryness.

## 2022-01-18 ENCOUNTER — APPOINTMENT (OUTPATIENT)
Dept: OPHTHALMOLOGY | Facility: CLINIC | Age: 67
End: 2022-01-18

## 2022-01-18 ENCOUNTER — OUTPATIENT (OUTPATIENT)
Dept: OUTPATIENT SERVICES | Facility: HOSPITAL | Age: 67
LOS: 1 days | Discharge: HOME | End: 2022-01-18
Payer: MEDICARE

## 2022-01-18 DIAGNOSIS — Z98.890 OTHER SPECIFIED POSTPROCEDURAL STATES: Chronic | ICD-10-CM

## 2022-01-18 DIAGNOSIS — Z90.49 ACQUIRED ABSENCE OF OTHER SPECIFIED PARTS OF DIGESTIVE TRACT: Chronic | ICD-10-CM

## 2022-01-18 DIAGNOSIS — J38.1 POLYP OF VOCAL CORD AND LARYNX: Chronic | ICD-10-CM

## 2022-01-18 DIAGNOSIS — Z90.710 ACQUIRED ABSENCE OF BOTH CERVIX AND UTERUS: Chronic | ICD-10-CM

## 2022-01-18 DIAGNOSIS — R91.1 SOLITARY PULMONARY NODULE: Chronic | ICD-10-CM

## 2022-01-18 PROCEDURE — 92014 COMPRE OPH EXAM EST PT 1/>: CPT

## 2022-01-18 PROCEDURE — 92134 CPTRZ OPH DX IMG PST SGM RTA: CPT | Mod: 26

## 2022-01-20 DIAGNOSIS — H04.123 DRY EYE SYNDROME OF BILATERAL LACRIMAL GLANDS: ICD-10-CM

## 2022-01-20 DIAGNOSIS — Z79.899 OTHER LONG TERM (CURRENT) DRUG THERAPY: ICD-10-CM

## 2022-01-20 DIAGNOSIS — H25.13 AGE-RELATED NUCLEAR CATARACT, BILATERAL: ICD-10-CM

## 2022-01-20 DIAGNOSIS — H40.039 ANATOMICAL NARROW ANGLE, UNSPECIFIED EYE: ICD-10-CM

## 2022-01-20 DIAGNOSIS — H40.033 ANATOMICAL NARROW ANGLE, BILATERAL: ICD-10-CM

## 2022-02-20 LAB
ANION GAP SERPL CALC-SCNC: 13 MMOL/L
ANION GAP SERPL CALC-SCNC: 13 MMOL/L
BUN SERPL-MCNC: 22 MG/DL
BUN SERPL-MCNC: 27 MG/DL
CALCIUM SERPL-MCNC: 8.3 MG/DL
CALCIUM SERPL-MCNC: 9.1 MG/DL
CHLORIDE SERPL-SCNC: 102 MMOL/L
CHLORIDE SERPL-SCNC: 96 MMOL/L
CO2 SERPL-SCNC: 24 MMOL/L
CO2 SERPL-SCNC: 29 MMOL/L
CREAT SERPL-MCNC: 0.7 MG/DL
CREAT SERPL-MCNC: 0.8 MG/DL
GLUCOSE SERPL-MCNC: 96 MG/DL
GLUCOSE SERPL-MCNC: 99 MG/DL
HCT VFR BLD CALC: 17.2 %
HCT VFR BLD CALC: 21.9 %
HCT VFR BLD CALC: 25.8 %
HGB BLD-MCNC: 5.7 G/DL
HGB BLD-MCNC: 7.7 G/DL
HGB BLD-MCNC: 9.2 G/DL
MAGNESIUM SERPL-MCNC: 1.4 MG/DL
MAGNESIUM SERPL-MCNC: 1.8 MG/DL
MCHC RBC-ENTMCNC: 30.2 PG
MCHC RBC-ENTMCNC: 30.5 PG
MCHC RBC-ENTMCNC: 30.7 PG
MCHC RBC-ENTMCNC: 33.1 G/DL
MCHC RBC-ENTMCNC: 35.2 G/DL
MCHC RBC-ENTMCNC: 35.7 G/DL
MCV RBC AUTO: 85.9 FL
MCV RBC AUTO: 86 FL
MCV RBC AUTO: 92 FL
PLATELET # BLD AUTO: 201 K/UL
PLATELET # BLD AUTO: 81 K/UL
PLATELET # BLD AUTO: 88 K/UL
PMV BLD: 10 FL
PMV BLD: 10.6 FL
PMV BLD: 9.1 FL
POTASSIUM SERPL-SCNC: 3.1 MMOL/L
POTASSIUM SERPL-SCNC: 3.6 MMOL/L
RBC # BLD: 1.87 M/UL
RBC # BLD: 2.55 M/UL
RBC # BLD: 3 M/UL
RBC # FLD: 13.4 %
RBC # FLD: 13.4 %
RBC # FLD: 16.3 %
SODIUM SERPL-SCNC: 138 MMOL/L
SODIUM SERPL-SCNC: 139 MMOL/L
WBC # FLD AUTO: 2.52 K/UL
WBC # FLD AUTO: 2.71 K/UL
WBC # FLD AUTO: 8.69 K/UL

## 2022-03-07 ENCOUNTER — RESULT REVIEW (OUTPATIENT)
Age: 67
End: 2022-03-07

## 2022-03-07 ENCOUNTER — OUTPATIENT (OUTPATIENT)
Dept: OUTPATIENT SERVICES | Facility: HOSPITAL | Age: 67
LOS: 1 days | Discharge: HOME | End: 2022-03-07
Payer: MEDICARE

## 2022-03-07 DIAGNOSIS — J38.1 POLYP OF VOCAL CORD AND LARYNX: Chronic | ICD-10-CM

## 2022-03-07 DIAGNOSIS — Z98.890 OTHER SPECIFIED POSTPROCEDURAL STATES: Chronic | ICD-10-CM

## 2022-03-07 DIAGNOSIS — Z90.49 ACQUIRED ABSENCE OF OTHER SPECIFIED PARTS OF DIGESTIVE TRACT: Chronic | ICD-10-CM

## 2022-03-07 DIAGNOSIS — Z90.710 ACQUIRED ABSENCE OF BOTH CERVIX AND UTERUS: Chronic | ICD-10-CM

## 2022-03-07 DIAGNOSIS — R91.1 SOLITARY PULMONARY NODULE: Chronic | ICD-10-CM

## 2022-03-07 DIAGNOSIS — R92.8 OTHER ABNORMAL AND INCONCLUSIVE FINDINGS ON DIAGNOSTIC IMAGING OF BREAST: ICD-10-CM

## 2022-03-07 LAB
ALBUMIN SERPL ELPH-MCNC: 4.6 G/DL
ALBUMIN SERPL ELPH-MCNC: 4.7 G/DL
ALP BLD-CCNC: 55 U/L
ALP BLD-CCNC: 58 U/L
ALT SERPL-CCNC: 14 U/L
ALT SERPL-CCNC: 14 U/L
ANION GAP SERPL CALC-SCNC: 14 MMOL/L
ANION GAP SERPL CALC-SCNC: 14 MMOL/L
AST SERPL-CCNC: 17 U/L
AST SERPL-CCNC: 18 U/L
BASOPHILS # BLD AUTO: 0.02 K/UL
BASOPHILS # BLD AUTO: 0.02 K/UL
BASOPHILS NFR BLD AUTO: 0.4 %
BASOPHILS NFR BLD AUTO: 0.4 %
BILIRUB SERPL-MCNC: 0.3 MG/DL
BILIRUB SERPL-MCNC: 0.3 MG/DL
BUN SERPL-MCNC: 16 MG/DL
BUN SERPL-MCNC: 16 MG/DL
CALCIUM SERPL-MCNC: 9.5 MG/DL
CALCIUM SERPL-MCNC: 9.7 MG/DL
CHLORIDE SERPL-SCNC: 105 MMOL/L
CHLORIDE SERPL-SCNC: 105 MMOL/L
CHOLEST SERPL-MCNC: 164 MG/DL
CO2 SERPL-SCNC: 24 MMOL/L
CO2 SERPL-SCNC: 24 MMOL/L
CREAT SERPL-MCNC: 0.9 MG/DL
CREAT SERPL-MCNC: 1 MG/DL
EGFR: 61 ML/MIN/1.73M2
EGFR: 70 ML/MIN/1.73M2
EOSINOPHIL # BLD AUTO: 0.05 K/UL
EOSINOPHIL # BLD AUTO: 0.06 K/UL
EOSINOPHIL NFR BLD AUTO: 0.9 %
EOSINOPHIL NFR BLD AUTO: 1.1 %
ERYTHROCYTE [SEDIMENTATION RATE] IN BLOOD BY WESTERGREN METHOD: 2 MM/HR
ESTIMATED AVERAGE GLUCOSE: 108 MG/DL
GLUCOSE SERPL-MCNC: 97 MG/DL
GLUCOSE SERPL-MCNC: 97 MG/DL
HBA1C MFR BLD HPLC: 5.4 %
HCT VFR BLD CALC: 40.7 %
HCT VFR BLD CALC: 41.4 %
HDLC SERPL-MCNC: 53 MG/DL
HGB BLD-MCNC: 13.1 G/DL
HGB BLD-MCNC: 13.3 G/DL
IMM GRANULOCYTES NFR BLD AUTO: 0.2 %
IMM GRANULOCYTES NFR BLD AUTO: 0.4 %
LDLC SERPL CALC-MCNC: 63 MG/DL
LYMPHOCYTES # BLD AUTO: 0.58 K/UL
LYMPHOCYTES # BLD AUTO: 0.59 K/UL
LYMPHOCYTES NFR BLD AUTO: 10.3 %
LYMPHOCYTES NFR BLD AUTO: 10.4 %
MAN DIFF?: NORMAL
MAN DIFF?: NORMAL
MCHC RBC-ENTMCNC: 30.5 PG
MCHC RBC-ENTMCNC: 30.6 PG
MCHC RBC-ENTMCNC: 32.1 G/DL
MCHC RBC-ENTMCNC: 32.2 G/DL
MCV RBC AUTO: 94.9 FL
MCV RBC AUTO: 95.2 FL
MONOCYTES # BLD AUTO: 0.44 K/UL
MONOCYTES # BLD AUTO: 0.45 K/UL
MONOCYTES NFR BLD AUTO: 7.7 %
MONOCYTES NFR BLD AUTO: 8 %
NEUTROPHILS # BLD AUTO: 4.52 K/UL
NEUTROPHILS # BLD AUTO: 4.58 K/UL
NEUTROPHILS NFR BLD AUTO: 79.8 %
NEUTROPHILS NFR BLD AUTO: 80.4 %
NONHDLC SERPL-MCNC: 111 MG/DL
PLATELET # BLD AUTO: 196 K/UL
PLATELET # BLD AUTO: 201 K/UL
POTASSIUM SERPL-SCNC: 4.1 MMOL/L
POTASSIUM SERPL-SCNC: 4.1 MMOL/L
PROT SERPL-MCNC: 6.4 G/DL
PROT SERPL-MCNC: 6.5 G/DL
RBC # BLD: 4.29 M/UL
RBC # BLD: 4.35 M/UL
RBC # FLD: 12.9 %
RBC # FLD: 13 %
SODIUM SERPL-SCNC: 143 MMOL/L
SODIUM SERPL-SCNC: 143 MMOL/L
TRIGL SERPL-MCNC: 264 MG/DL
TSH SERPL-ACNC: 0.92 UIU/ML
WBC # FLD AUTO: 5.65 K/UL
WBC # FLD AUTO: 5.69 K/UL

## 2022-03-07 PROCEDURE — 76641 ULTRASOUND BREAST COMPLETE: CPT | Mod: 26,50

## 2022-03-07 PROCEDURE — G0279: CPT | Mod: 26

## 2022-03-07 PROCEDURE — 77066 DX MAMMO INCL CAD BI: CPT | Mod: 26

## 2022-03-08 LAB
25(OH)D3 SERPL-MCNC: 49 NG/ML
CRP SERPL-MCNC: <3 MG/L

## 2022-03-09 ENCOUNTER — NON-APPOINTMENT (OUTPATIENT)
Age: 67
End: 2022-03-09

## 2022-03-17 ENCOUNTER — APPOINTMENT (OUTPATIENT)
Dept: OTOLARYNGOLOGY | Facility: CLINIC | Age: 67
End: 2022-03-17
Payer: MEDICARE

## 2022-03-17 PROCEDURE — 31575 DIAGNOSTIC LARYNGOSCOPY: CPT

## 2022-03-17 PROCEDURE — 99213 OFFICE O/P EST LOW 20 MIN: CPT | Mod: 25

## 2022-03-17 NOTE — PROCEDURE
[None] : none [Flexible Endoscope] : examined with the flexible endoscope [de-identified] : Flexible laryngoscopy performed. Nasal cavity, nasopharynx, oropharynx, hypopharynx, and larynx evaluated. No masses or lesions, bilateral true vocal folds symmetric and mobile.\par

## 2022-03-17 NOTE — HISTORY OF PRESENT ILLNESS
[de-identified] : [id number] SCC BOT p16 pos CRT 2/2021  [FreeTextEntry1] : Patient following up on base of tongue carcinoma and oropharyngeal dysphagia. Patient denies any new changes. Throat dryness persists.

## 2022-03-17 NOTE — PHYSICAL EXAM
[de-identified] : radiation fibrosis [Midline] : trachea located in midline position [Normal] : no rashes

## 2022-03-18 ENCOUNTER — APPOINTMENT (OUTPATIENT)
Dept: INTERNAL MEDICINE | Facility: CLINIC | Age: 67
End: 2022-03-18

## 2022-03-23 PROBLEM — R92.2 DENSE BREAST TISSUE ON MAMMOGRAM: Status: ACTIVE | Noted: 2020-09-02

## 2022-03-24 ENCOUNTER — APPOINTMENT (OUTPATIENT)
Dept: BREAST CENTER | Facility: CLINIC | Age: 67
End: 2022-03-24
Payer: MEDICARE

## 2022-03-24 DIAGNOSIS — R92.2 INCONCLUSIVE MAMMOGRAM: ICD-10-CM

## 2022-03-24 PROCEDURE — 99212 OFFICE O/P EST SF 10 MIN: CPT

## 2022-03-24 NOTE — HISTORY OF PRESENT ILLNESS
[FreeTextEntry1] : Sol is a 66 F with two left breast radial scars, s/p L WLE of two areas on 2020, now with a new complex sclerosing papillary lesion in the L breast (not excised). \par \par 2020 -- L WLE of two areas \par -more superior mass: proliferative type FC changes, no residual radial scar \par -more inferior mass: sclerosing IP, small radial scar \par \par Her work up was as follows: \par 3/2/2020 -- b/l screening mammogram \par -heterogenously dense breasts\par -focal asymmetry in L UOQ \par -no suspicious mass, calcifications, or other abnormalities in her RIGHT breast \par BIRADS 0 \par \par 2020 -- L dx mammogram and US \par -circumscribed mass with amorphous calcifications in her left lateral breast --> no significant change \par -irregular mass with amorphous calcifications in UOQ, LEFT --> BIOPSY\par -new circumscribed mass in UOQ, LEFT --> BIOPSY \par L US \par @2N6, benign cyst which correlates with lateral breast mass seen on mammogram \par BIRADS 4\par \par 2020 -- L stereobx x 2 \par -anterior asymmetry (top hat) --> radial scar \par -posterior asymmetry (minicork) --> radial scar \par \par Prior to her biopsy, she had no breast related complaints.  She denies any breast pain, has not palpated any new palpable masses in either breast and denies any nipple discharge or retraction. However, she was a little sore in her left lateral breast after her biopsy.  \par \par HISTORICAL RISK FACTORS: \par -2 prior breast biopsies as above, no prior surgeries \par -family history of breast cancer in her sister and maternal grandmother \par -, age at first live birth was 18 \par -no prior OCP use, HRT use x 2 years, stopped \par -s/p GEETHA/BSO in  \par \par RISK ASSESSMENT: \par Babs\par 5yr -- 4.7%\par lifetime -- 15.8%\par \par TC v6\par 5yr -- 3.1%\par lifetime -- 8.7%\par \par INTERVAL HISTORY: \par Sol returns for her 6 month follow up VISIT, s/p L WLE of two areas on 2020. \par \par She has no breast related complaints at this time.  She denies any breast pain, has not palpated any new palpable masses in either breast and denies any nipple discharge or retraction.  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 Her trach has since been removed and her PEG has also been removed.  She is due for repeat imaging to evaluate the efficacy of her treatments. \par  \par She had the following work up: \par 3/5/2021 -- b/l dx mammogram and US \par -scattered areas of fibroglandular densities\par -L: UOQ, post surgical distortion, 8mm oil cyst @surgical site \par -L: superior breast, low density stable nodular asymmetry, unchanged since 3/2020 \par -tiny nodule in L lateral/central breast, no significant changes since  \par -scattered coarse benign calcifications in R \par b/l US \par RIGHT: \par -no suspicious abnormalities \par LEFT: \par -@2N6, benign cyst, measures 5 x 5 x 3 mm \par -@12-1N5, heterogenous mass measuring 8 x 7 x 6 mm, consistent with surgical site --> BIRADS 3 \par -@3N3, 5 x 6 x 3 mm heterogenous nonvascular mass --> BIOPSY \par BIRADS 4 \par \par 2021 -- L US CNBx @3N3 (minicork) \par -complex sclerosing papillary lesion \par \par \par INTERVAL HISTORY: 10/21/21\par Sol returns for her 6 month follow up VISIT, s/p L WLE of two areas on 2020.  She also was diagnosed with LEFT complex sclerosing papillary lesion on 2021 that was not excised. \par \par She has no breast related complaints at this time.  She denies any breast pain, has not palpated any new palpable masses in either breast and denies any nipple discharge or retraction.  \par \par Her imaging as follows:\par 21 LEFT US and mammo \par -@3:00N 3 stable heterogeneous mass measuring 0.8 x 0.8 x 0.4 cm, consistent with prior biopsy proven papillary      lesion. Deemed BIRADS2\par \par 2022 --\par Sol returns for her 6 month follow up visit, s/p L WLE of two areas on 2020.  She also was diagnosed with LEFT complex sclerosing papillary lesion on 2021 that was not excised. \par \par She has no breast related complaints at this time.  She denies any breast pain, has not palpated any new palpable masses in either breast and denies any nipple discharge or retraction.\par \par Her most recent imaging is as follows:\par 2022 - B/L Dx Mammo & Sono:\par -There are scattered areas of fibroglandular density.\par -Postsurgical architectural distortion noted in the left breast.\par Bilateral whole breast ultrasound was performed.\par -At 12:00 N6 of the left breast there is a stable hypoechoic mass measuring 0.5 x 0.7 x 0.5 cm. Short interval follow-up recommended.\par -At 3:00 N3 of the left breast there is redemonstration of stable previously biopsied mass (radial scar) measuring 0.5 x 0.5 x 0.2 cm. \par BI-RADS category 3: Probably Benign\par \par \par

## 2022-03-24 NOTE — DATA REVIEWED
[FreeTextEntry1] : EXAM:  MG US BREAST COMPLETE BI\par EXAM:  MG MAMMO DIAG W ALONSO BI#\par \par \par PROCEDURE DATE:  03/07/2022\par \par \par \par INTERPRETATION:  Clinical History / Reason for exam: High risk lesion left breast status post excisional biopsy in 2021. Screening right breast.\par \par The patient reports last clinical breast examination was performed about: six months ago.\par \par Family history of breast cancer: Maternal grandmother at 60.\par \par Comparisons: Mammograms dating back to 2020. Breast ultrasound dating back to 2021.\par \par Views obtained: bilateral full field digital 2D and digital tomosynthesis images .\par \par Computer-aided detection was utilized in the interpretation of this examination.\par \par Breast composition: There are scattered areas of fibroglandular density.\par \par Findings:\par \par Mammogram:\par \par No suspicious mass, microcalcifications or areas of architectural distortion seen in either breast. Postsurgical architectural distortion noted in the left breast.\par \par Ultrasound:\par \par Bilateral whole breast ultrasound was performed.\par At 12:00 N6 of the left breast there is a stable hypoechoic mass measuring 0.5 x 0.7 x 0.5 cm. Short interval follow-up recommended.\par At 3:00 N3 of the left breast there is redemonstration of stable previously biopsied mass (radial scar) measuring 0.5 x 0.5 x 0.2 cm. Continue surgical consultation.\par There is no other solid or cystic mass noted in either breast. No right or left axillary lymphadenopathy.\par \par Impression:\par 1. Stable left breast 12:00 mass as above. Short interval follow-up recommended.\par 2. Stable previously biopsied high risk lesion (radial scar) in the left breast, continue surgical consultation.\par 3. No mammographic or sonographic evidence of malignancy in the right breast, much annual screening right breast.\par \par Recommendation: Follow-up breast ultrasound in 6 months.\par \par BI-RADS category 3: Probably Benign\par

## 2022-03-24 NOTE — REASON FOR VISIT
[FreeTextEntry1] :  two left breast radial scars, s/p L WLE of two areas on 8/24/2020, now with a new complex sclerosing papillary lesion in the L breast (not excised).

## 2022-03-24 NOTE — PHYSICAL EXAM
[Normocephalic] : normocephalic [Atraumatic] : atraumatic [EOMI] : extra ocular movement intact [No Supraclavicular Adenopathy] : no supraclavicular adenopathy [No Cervical Adenopathy] : no cervical adenopathy [Examined in the supine and seated position] : examined in the supine and seated position [Symmetrical] : symmetrical [No dominant masses] : no dominant masses in right breast  [No dominant masses] : no dominant masses left breast [No Nipple Retraction] : no left nipple retraction [No Nipple Discharge] : no left nipple discharge [No Axillary Lymphadenopathy] : no left axillary lymphadenopathy [Soft] : abdomen soft [Not Tender] : non-tender [No Edema] : no edema [No Rashes] : no rashes [No Ulceration] : no ulceration [de-identified] : no suspicious mass palpated within either breast [de-identified] : no suspicious lesions palpated

## 2022-03-24 NOTE — ASSESSMENT
SammiPT from [facility] is going to see the patient shortly and would like some clarification of the right hip restrictions and if the doctor would like Occupational Therapy involved for range of motion therapy. She also states that patient was on a 50 pound weight restriction with his hip but was not adhering to those restrictions in his home. Please contact Shayy Espinoza at [phone]. She will see the patient before 2 pm today. [FreeTextEntry1] : Sol is a 67 F with two left breast radial scars, s/p L WLE on 8/24/2020, currently with a complex sclerosing papillary lesion in the L breast (not excised). \par \par On exam, I was not able to palpate any suspicious abnormalities. \par \par Her most recent imaging is as follows:\par 03/07/2022 - B/L Dx Mammo & Sono:\par -There are scattered areas of fibroglandular density.\par -Postsurgical architectural distortion noted in the left breast.\par Bilateral whole breast ultrasound was performed.\par -At 12:00 N6 of the left breast there is a stable hypoechoic mass measuring 0.5 x 0.7 x 0.5 cm. Short interval follow-up recommended.\par -At 3:00 N3 of the left breast there is redemonstration of stable previously biopsied mass (radial scar) measuring 0.5 x 0.5 x 0.2 cm. \par BI-RADS category 3: Probably Benign\par  \par AS REVIEW:\par 8/24/2020 -- L WLE of two areas \par -more superior mass: proliferative type FC changes, no residual radial scar \par -more inferior mass: sclerosing IP, small radial scar \par \par Her most recent imaging was a b/l dx mammogram and US on 3/5/2021 which revealed post surgical changes in her left breast in UOQ, stable nodular asymmetry in her superior L breast, unchanged since 3/2020 and in her left breast @12-1N5, a heterogenous mass measuring 8 x 7 x 6 mm consistent with post surgical changes, deemed BIRADS 3 and in her left breast @3N3, a heterogenous nonvascular mass measuring 5 x 6 x 3 mm which was biopsy proven complex sclerosing papillary lesion. \par \par We discussed radial scars without atypia.  These lesions are considered fibroproliferative lesions without atypia.  Patients with these lesions were found to have a slightly increased relative risk compared to the reference population.  However the lesions themselves do not have any malignant potential. There is about a 10-20% upgrade rate to a high risk lesion (including atypical hyperplasias) and a <3% upgrade to cancer.  However, when atypia is present there is up to a 15-25% upgrade rate to cancer.  For this reason, we have discussed observation vs. surgical excision of this lesion.  I have recommended that we move forward with surgical excision at this time.\par \par In regards to her family history of breast cancer in her sister and maternal grandmother, her risk assessment is as follows: \par \par RISK ASSESSMENT: \par Babs\par 5yr -- 4.7%\par lifetime -- 15.8%\par \par TC v6\par 5yr -- 3.1%\par lifetime -- 8.7%\par \par This puts her in an average to intermediate risk for breast cancer. She does not quite meet criteria for screening breast MRIs, although she does meet criteria for chemopreventative medications.  This will be discussed with her further at her post op visit. \par \par All of her questions were answered.  She knows to call with any further questions or concerns. \par \par PLAN: \par -if she decides for surgery, she will need: LEFT BREAST WIDE LOCAL EXCISION with RF LOCALIZATION\par otherwise: \par -Left Breast Sono - September 2022 follow-up on (radial scar/IP -- not excised) and BIRADS-3 imaging.\par -f/up after

## 2022-03-25 ENCOUNTER — APPOINTMENT (OUTPATIENT)
Dept: RADIATION ONCOLOGY | Facility: HOSPITAL | Age: 67
End: 2022-03-25
Payer: MEDICARE

## 2022-03-25 ENCOUNTER — OUTPATIENT (OUTPATIENT)
Dept: OUTPATIENT SERVICES | Facility: HOSPITAL | Age: 67
LOS: 1 days | Discharge: HOME | End: 2022-03-25

## 2022-03-25 VITALS
HEART RATE: 73 BPM | BODY MASS INDEX: 28.91 KG/M2 | SYSTOLIC BLOOD PRESSURE: 160 MMHG | OXYGEN SATURATION: 96 % | RESPIRATION RATE: 14 BRPM | TEMPERATURE: 96.3 F | DIASTOLIC BLOOD PRESSURE: 76 MMHG | WEIGHT: 163.2 LBS

## 2022-03-25 DIAGNOSIS — R91.1 SOLITARY PULMONARY NODULE: Chronic | ICD-10-CM

## 2022-03-25 DIAGNOSIS — Z98.890 OTHER SPECIFIED POSTPROCEDURAL STATES: Chronic | ICD-10-CM

## 2022-03-25 DIAGNOSIS — Z90.710 ACQUIRED ABSENCE OF BOTH CERVIX AND UTERUS: Chronic | ICD-10-CM

## 2022-03-25 DIAGNOSIS — Z90.49 ACQUIRED ABSENCE OF OTHER SPECIFIED PARTS OF DIGESTIVE TRACT: Chronic | ICD-10-CM

## 2022-03-25 DIAGNOSIS — J38.1 POLYP OF VOCAL CORD AND LARYNX: Chronic | ICD-10-CM

## 2022-03-25 PROCEDURE — 99212 OFFICE O/P EST SF 10 MIN: CPT

## 2022-03-25 RX ORDER — ALENDRONATE SODIUM 70 MG/1
70 TABLET ORAL
Qty: 12 | Refills: 3 | Status: DISCONTINUED | COMMUNITY
Start: 2021-12-17 | End: 2022-03-25

## 2022-03-25 NOTE — PHYSICAL EXAM
[Normal] : well developed, well nourished, in no acute distress [Sclera] : the sclera and conjunctiva were normal [Hearing Threshold Finger Rub Not Patrick] : hearing was normal [] : no respiratory distress [Respiration, Rhythm And Depth] : normal respiratory rhythm and effort [Exaggerated Use Of Accessory Muscles For Inspiration] : no accessory muscle use [Heart Rate And Rhythm] : heart rate and rhythm were normal [Nail Clubbing] : no clubbing  or cyanosis of the fingernails [de-identified] : Wt 163 lbs.  She has gained nearly 20 lbs since the time of the diagnosis.   [de-identified] : No active mucositis seen at present.  No tumor seen at present.  Tongue extends in the midline.  On digital exam, no suspicious findings.  (right base of tongue) [de-identified] : Peg tube

## 2022-04-30 ENCOUNTER — LABORATORY RESULT (OUTPATIENT)
Age: 67
End: 2022-04-30

## 2022-05-02 ENCOUNTER — OUTPATIENT (OUTPATIENT)
Dept: OUTPATIENT SERVICES | Facility: HOSPITAL | Age: 67
LOS: 1 days | Discharge: HOME | End: 2022-05-02
Payer: MEDICARE

## 2022-05-02 DIAGNOSIS — Z90.710 ACQUIRED ABSENCE OF BOTH CERVIX AND UTERUS: Chronic | ICD-10-CM

## 2022-05-02 DIAGNOSIS — Z90.49 ACQUIRED ABSENCE OF OTHER SPECIFIED PARTS OF DIGESTIVE TRACT: Chronic | ICD-10-CM

## 2022-05-02 DIAGNOSIS — Z98.890 OTHER SPECIFIED POSTPROCEDURAL STATES: Chronic | ICD-10-CM

## 2022-05-02 DIAGNOSIS — J38.1 POLYP OF VOCAL CORD AND LARYNX: Chronic | ICD-10-CM

## 2022-05-02 DIAGNOSIS — J44.9 CHRONIC OBSTRUCTIVE PULMONARY DISEASE, UNSPECIFIED: ICD-10-CM

## 2022-05-02 DIAGNOSIS — R91.1 SOLITARY PULMONARY NODULE: Chronic | ICD-10-CM

## 2022-05-02 PROCEDURE — 94727 GAS DIL/WSHOT DETER LNG VOL: CPT | Mod: 26

## 2022-05-02 PROCEDURE — 94729 DIFFUSING CAPACITY: CPT | Mod: 26

## 2022-05-02 PROCEDURE — 94060 EVALUATION OF WHEEZING: CPT | Mod: 26

## 2022-05-12 ENCOUNTER — OUTPATIENT (OUTPATIENT)
Dept: OUTPATIENT SERVICES | Facility: HOSPITAL | Age: 67
LOS: 1 days | Discharge: HOME | End: 2022-05-12
Payer: MEDICARE

## 2022-05-12 ENCOUNTER — RESULT REVIEW (OUTPATIENT)
Age: 67
End: 2022-05-12

## 2022-05-12 DIAGNOSIS — C01 MALIGNANT NEOPLASM OF BASE OF TONGUE: ICD-10-CM

## 2022-05-12 DIAGNOSIS — R91.1 SOLITARY PULMONARY NODULE: Chronic | ICD-10-CM

## 2022-05-12 DIAGNOSIS — K11.7 DISTURBANCES OF SALIVARY SECRETION: ICD-10-CM

## 2022-05-12 DIAGNOSIS — J38.1 POLYP OF VOCAL CORD AND LARYNX: Chronic | ICD-10-CM

## 2022-05-12 DIAGNOSIS — Z90.710 ACQUIRED ABSENCE OF BOTH CERVIX AND UTERUS: Chronic | ICD-10-CM

## 2022-05-12 DIAGNOSIS — Z98.890 OTHER SPECIFIED POSTPROCEDURAL STATES: Chronic | ICD-10-CM

## 2022-05-12 DIAGNOSIS — Z90.49 ACQUIRED ABSENCE OF OTHER SPECIFIED PARTS OF DIGESTIVE TRACT: Chronic | ICD-10-CM

## 2022-05-12 LAB — GLUCOSE BLDC GLUCOMTR-MCNC: 87 MG/DL — SIGNIFICANT CHANGE UP (ref 70–99)

## 2022-05-12 PROCEDURE — 78815 PET IMAGE W/CT SKULL-THIGH: CPT | Mod: 26,PS

## 2022-05-13 ENCOUNTER — NON-APPOINTMENT (OUTPATIENT)
Age: 67
End: 2022-05-13

## 2022-05-13 ENCOUNTER — APPOINTMENT (OUTPATIENT)
Dept: PULMONOLOGY | Facility: CLINIC | Age: 67
End: 2022-05-13
Payer: MEDICARE

## 2022-05-13 ENCOUNTER — OUTPATIENT (OUTPATIENT)
Dept: OUTPATIENT SERVICES | Facility: HOSPITAL | Age: 67
LOS: 1 days | Discharge: HOME | End: 2022-05-13

## 2022-05-13 VITALS
OXYGEN SATURATION: 97 % | SYSTOLIC BLOOD PRESSURE: 137 MMHG | HEART RATE: 65 BPM | HEIGHT: 63 IN | BODY MASS INDEX: 28.53 KG/M2 | TEMPERATURE: 97.3 F | WEIGHT: 161 LBS | DIASTOLIC BLOOD PRESSURE: 86 MMHG

## 2022-05-13 DIAGNOSIS — Z98.890 OTHER SPECIFIED POSTPROCEDURAL STATES: Chronic | ICD-10-CM

## 2022-05-13 DIAGNOSIS — J38.1 POLYP OF VOCAL CORD AND LARYNX: Chronic | ICD-10-CM

## 2022-05-13 DIAGNOSIS — Z90.710 ACQUIRED ABSENCE OF BOTH CERVIX AND UTERUS: Chronic | ICD-10-CM

## 2022-05-13 DIAGNOSIS — Z85.118 PERSONAL HISTORY OF OTHER MALIGNANT NEOPLASM OF BRONCHUS AND LUNG: ICD-10-CM

## 2022-05-13 DIAGNOSIS — R91.1 SOLITARY PULMONARY NODULE: Chronic | ICD-10-CM

## 2022-05-13 DIAGNOSIS — Z90.49 ACQUIRED ABSENCE OF OTHER SPECIFIED PARTS OF DIGESTIVE TRACT: Chronic | ICD-10-CM

## 2022-05-13 PROCEDURE — 99213 OFFICE O/P EST LOW 20 MIN: CPT | Mod: GC

## 2022-05-13 RX ORDER — TIOTROPIUM BROMIDE INHALATION SPRAY 3.12 UG/1
2.5 SPRAY, METERED RESPIRATORY (INHALATION) DAILY
Qty: 1 | Refills: 6 | Status: DISCONTINUED | COMMUNITY
Start: 2021-11-12 | End: 2022-05-13

## 2022-05-13 NOTE — HISTORY OF PRESENT ILLNESS
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 Inna solis/ repeat US breast (Sep 2021), presenting for follow-up for her COPD. Patient had a PFT performed and had followed up with ENT in the interim. Otherwise no complaints. \par

## 2022-05-13 NOTE — ASSESSMENT
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 Will need f/u in 3 months for CT chest with IV contrast for evaluation. Needs to follow up with order \par - ENT and rad onc follow up \par - Cardio eval post chemo therapy \par - RTC in 3 months. \par \par

## 2022-05-13 NOTE — END OF VISIT
[] : Fellow [FreeTextEntry3] : patient seen and examined agree above note \par start anoro d/c incruse \par ct scan in3  month to follow the nodule on pet scan \par echo cardiology check any pulm htn decrease dlco

## 2022-05-13 NOTE — PHYSICAL EXAM
[No Acute Distress] : no acute distress [Normal Oropharynx] : normal oropharynx [Normal Rate/Rhythm] : normal rate/rhythm [Normal S1, S2] : normal s1, s2 [No Resp Distress] : no resp distress [Benign] : benign [Normal Gait] : normal gait [No Clubbing] : no clubbing [No Cyanosis] : no cyanosis [No Edema] : no edema [Normal Color/ Pigmentation] : normal color/ pigmentation [No Focal Deficits] : no focal deficits [Oriented x3] : oriented x3 [Normal Affect] : normal affect

## 2022-05-16 DIAGNOSIS — J44.9 CHRONIC OBSTRUCTIVE PULMONARY DISEASE, UNSPECIFIED: ICD-10-CM

## 2022-05-16 DIAGNOSIS — R05.9 COUGH, UNSPECIFIED: ICD-10-CM

## 2022-05-16 DIAGNOSIS — K21.9 GASTRO-ESOPHAGEAL REFLUX DISEASE WITHOUT ESOPHAGITIS: ICD-10-CM

## 2022-05-16 DIAGNOSIS — Z87.09 PERSONAL HISTORY OF OTHER DISEASES OF THE RESPIRATORY SYSTEM: ICD-10-CM

## 2022-05-16 DIAGNOSIS — Z85.118 PERSONAL HISTORY OF OTHER MALIGNANT NEOPLASM OF BRONCHUS AND LUNG: ICD-10-CM

## 2022-05-16 DIAGNOSIS — Z87.891 PERSONAL HISTORY OF NICOTINE DEPENDENCE: ICD-10-CM

## 2022-05-16 DIAGNOSIS — R91.1 SOLITARY PULMONARY NODULE: ICD-10-CM

## 2022-05-18 ENCOUNTER — APPOINTMENT (OUTPATIENT)
Dept: INTERNAL MEDICINE | Facility: CLINIC | Age: 67
End: 2022-05-18
Payer: MEDICARE

## 2022-05-18 ENCOUNTER — OUTPATIENT (OUTPATIENT)
Dept: OUTPATIENT SERVICES | Facility: HOSPITAL | Age: 67
LOS: 1 days | Discharge: HOME | End: 2022-05-18

## 2022-05-18 VITALS
WEIGHT: 164 LBS | HEART RATE: 86 BPM | DIASTOLIC BLOOD PRESSURE: 86 MMHG | OXYGEN SATURATION: 98 % | SYSTOLIC BLOOD PRESSURE: 135 MMHG | BODY MASS INDEX: 29.06 KG/M2 | HEIGHT: 63 IN

## 2022-05-18 DIAGNOSIS — R91.1 SOLITARY PULMONARY NODULE: Chronic | ICD-10-CM

## 2022-05-18 DIAGNOSIS — R73.03 PREDIABETES.: ICD-10-CM

## 2022-05-18 DIAGNOSIS — J44.9 CHRONIC OBSTRUCTIVE PULMONARY DISEASE, UNSPECIFIED: ICD-10-CM

## 2022-05-18 DIAGNOSIS — Z98.890 OTHER SPECIFIED POSTPROCEDURAL STATES: Chronic | ICD-10-CM

## 2022-05-18 DIAGNOSIS — I25.10 ATHEROSCLEROTIC HEART DISEASE OF NATIVE CORONARY ARTERY WITHOUT ANGINA PECTORIS: ICD-10-CM

## 2022-05-18 DIAGNOSIS — Z90.49 ACQUIRED ABSENCE OF OTHER SPECIFIED PARTS OF DIGESTIVE TRACT: Chronic | ICD-10-CM

## 2022-05-18 DIAGNOSIS — I10 ESSENTIAL (PRIMARY) HYPERTENSION: ICD-10-CM

## 2022-05-18 DIAGNOSIS — E78.5 HYPERLIPIDEMIA, UNSPECIFIED: ICD-10-CM

## 2022-05-18 DIAGNOSIS — Z90.710 ACQUIRED ABSENCE OF BOTH CERVIX AND UTERUS: Chronic | ICD-10-CM

## 2022-05-18 DIAGNOSIS — J38.1 POLYP OF VOCAL CORD AND LARYNX: Chronic | ICD-10-CM

## 2022-05-18 DIAGNOSIS — R73.03 PREDIABETES: ICD-10-CM

## 2022-05-18 PROCEDURE — 99214 OFFICE O/P EST MOD 30 MIN: CPT | Mod: GC

## 2022-05-18 NOTE — HISTORY OF PRESENT ILLNESS
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 Patient was last seen in Jan 2022 and was due for colonoscopy but patient was unable to go. Labs notable for TG = 264 (LDL 63), total cholesterol 164. Patient reports dry mouth (uses biotin) and occasional loss of taste. Requires refill for hydroxychloroquin, ezetemibe, simvastatin, losartan, omeprazole.

## 2022-05-18 NOTE — PHYSICAL EXAM
[No Acute Distress] : no acute distress [Well Nourished] : well nourished [Well Developed] : well developed [Normal Sclera/Conjunctiva] : normal sclera/conjunctiva [PERRL] : pupils equal round and reactive to light [Normal Outer Ear/Nose] : the outer ears and nose were normal in appearance [Normal Oropharynx] : the oropharynx was normal [No JVD] : no jugular venous distention [Supple] : supple [Thyroid Normal, No Nodules] : the thyroid was normal and there were no nodules present [No Accessory Muscle Use] : no accessory muscle use [Normal Rate] : normal rate  [Regular Rhythm] : with a regular rhythm [No Abdominal Bruit] : a ~M bruit was not heard ~T in the abdomen [No Varicosities] : no varicosities [No Edema] : there was no peripheral edema [Soft] : abdomen soft [No HSM] : no HSM [Normal Bowel Sounds] : normal bowel sounds [Normal Axillary Nodes] : no axillary lymphadenopathy [Normal Posterior Cervical Nodes] : no posterior cervical lymphadenopathy [Normal Anterior Cervical Nodes] : no anterior cervical lymphadenopathy [No Joint Swelling] : no joint swelling [No Rash] : no rash [Coordination Grossly Intact] : coordination grossly intact [No Focal Deficits] : no focal deficits [Normal Gait] : normal gait [de-identified] : R>L upper lobe expiratory rhonchi that does not clear w cough

## 2022-05-18 NOTE — REVIEW OF SYSTEMS
[Recent Change In Weight] : ~T recent weight change [Shortness Of Breath] : shortness of breath [Wheezing] : wheezing [Cough] : cough [Fever] : no fever [Fatigue] : no fatigue [Night Sweats] : no night sweats [Discharge] : no discharge [Pain] : no pain [Itching] : no itching [Earache] : no earache [Hearing Loss] : no hearing loss [Postnasal Drip] : no postnasal drip [Chest Pain] : no chest pain [Palpitations] : no palpitations [Orthopnea] : no orthopnea [Paroxysmal Nocturnal Dyspnea] : no paroxysmal nocturnal dyspnea [Abdominal Pain] : no abdominal pain [Nausea] : no nausea [Vomiting] : no vomiting [Heartburn] : no heartburn [Melena] : no melena [Dysuria] : no dysuria [Nocturia] : no nocturia [Hematuria] : no hematuria [Frequency] : no frequency [Joint Pain] : no joint pain [Mole Changes] : no mole changes [Nail Changes] : no nail changes [Skin Rash] : no skin rash [Headache] : no headache [Dizziness] : no dizziness [Fainting] : no fainting [Memory Loss] : no memory loss [Unsteady Walking] : no ataxia [Suicidal] : not suicidal [Insomnia] : no insomnia [Easy Bleeding] : no easy bleeding [Easy Bruising] : no easy bruising [Swollen Glands] : no swollen glands [FreeTextEntry6] : followed by pulm

## 2022-05-27 ENCOUNTER — APPOINTMENT (OUTPATIENT)
Dept: OTOLARYNGOLOGY | Facility: CLINIC | Age: 67
End: 2022-05-27
Payer: MEDICARE

## 2022-05-27 PROCEDURE — 99214 OFFICE O/P EST MOD 30 MIN: CPT | Mod: 25

## 2022-05-27 PROCEDURE — 31575 DIAGNOSTIC LARYNGOSCOPY: CPT

## 2022-05-27 NOTE — HISTORY OF PRESENT ILLNESS
[de-identified] : Oncology Summary\par Stage: T3N1M0\par Site: Tongue base\par Pathology: p16-positive SCC\par Treatment: Chemoradiation completed 2/2021\par Disease status: ARMANDO\par Swallow: No issues\par Airway: Decannulated\par Smoking: Former\par Thyroid: TSH 3/2022 0.92\par  [FreeTextEntry1] : Patient following up on base of tongue carcinoma. Patient here to discuss PET Scan results.  No complaints.

## 2022-05-27 NOTE — PHYSICAL EXAM
[de-identified] : radiation fibrosis [Midline] : trachea located in midline position [Normal] : no rashes

## 2022-05-27 NOTE — DATA REVIEWED
[de-identified] : PET/CT personally reviewed and interpreted. No uptake in head and neck to suggest locoregional recurrence.

## 2022-05-27 NOTE — PROCEDURE
[None] : none [Flexible Endoscope] : examined with the flexible endoscope [de-identified] : Flexible laryngoscopy performed. Nasal cavity, nasopharynx, oropharynx, hypopharynx, and larynx evaluated. No masses or lesions, bilateral true vocal folds symmetric and mobile.\par

## 2022-06-03 ENCOUNTER — APPOINTMENT (OUTPATIENT)
Dept: RHEUMATOLOGY | Facility: CLINIC | Age: 67
End: 2022-06-03
Payer: MEDICARE

## 2022-06-03 ENCOUNTER — OUTPATIENT (OUTPATIENT)
Dept: OUTPATIENT SERVICES | Facility: HOSPITAL | Age: 67
LOS: 1 days | Discharge: HOME | End: 2022-06-03

## 2022-06-03 VITALS
OXYGEN SATURATION: 95 % | BODY MASS INDEX: 29.23 KG/M2 | DIASTOLIC BLOOD PRESSURE: 110 MMHG | HEART RATE: 86 BPM | WEIGHT: 165 LBS | SYSTOLIC BLOOD PRESSURE: 160 MMHG | HEIGHT: 63 IN | TEMPERATURE: 97 F

## 2022-06-03 DIAGNOSIS — Z98.890 OTHER SPECIFIED POSTPROCEDURAL STATES: Chronic | ICD-10-CM

## 2022-06-03 DIAGNOSIS — Z90.49 ACQUIRED ABSENCE OF OTHER SPECIFIED PARTS OF DIGESTIVE TRACT: Chronic | ICD-10-CM

## 2022-06-03 DIAGNOSIS — Z90.710 ACQUIRED ABSENCE OF BOTH CERVIX AND UTERUS: Chronic | ICD-10-CM

## 2022-06-03 DIAGNOSIS — M94.8X9 OTHER SPECIFIED DISORDERS OF CARTILAGE, UNSPECIFIED SITES: ICD-10-CM

## 2022-06-03 DIAGNOSIS — J38.1 POLYP OF VOCAL CORD AND LARYNX: Chronic | ICD-10-CM

## 2022-06-03 DIAGNOSIS — R91.1 SOLITARY PULMONARY NODULE: Chronic | ICD-10-CM

## 2022-06-03 DIAGNOSIS — M81.0 AGE-RELATED OSTEOPOROSIS WITHOUT CURRENT PATHOLOGICAL FRACTURE: ICD-10-CM

## 2022-06-03 PROCEDURE — 99214 OFFICE O/P EST MOD 30 MIN: CPT | Mod: GC

## 2022-06-03 NOTE — END OF VISIT
Offered second opinion. [] : Resident [Time Spent: ___ minutes] : I have spent [unfilled] minutes of time on the encounter. [FreeTextEntry3] : 66 y/o woman with h/o relapsing polychondritis diagnosed many years ago, on Cellcept and Plaquenil, presents for f/u. At her last visit in 12/2021, pt's Plaquenil dose was decreased to daily from BID; pt has not noticed any changes in her symptoms after the change. + Persistent R ear auricle hypertrophy which has been stable. Pt does report L shoulder pain for the past 1.5 weeks, worse with activity. Her exam is notable for +Espinal test, full ROM, 5/5 MS. Suspect that pt's L shoulder pain is likely due to impingement based on her symptoms and exam. \par \par - I offered pt L shoulder injection but she declined, will continue to take Tylenol BID for pain. \par - Continue Plaquenil 200 mg q day. Last ophtho exam in 1/2022 with no e/o Plaquenil toxicity\par - Continue Cellcept 1000 mg BID. Last CBC and CMP in 3/2022 were within normal limits.\par - f/u repeat labs in 3 months\par - f/u in 3 months after labs\par \par Osteoporosis: With prior fragility fracture in hip; last DEXA scan in 7/2021 with T score -2.3 in hip; pt previously on alendronate but it was causing side effects. Was offered IV zolendronic acid but declined, prescribed alendronate at last visit in 3/2022 but pt did not take it because she is already taking a lot of medications.\par - f/u repeat DEXA scan in 7/2023\par - Would consider denosumab injections if pt is amenable

## 2022-06-03 NOTE — HISTORY OF PRESENT ILLNESS
[FreeTextEntry1] : 68 y/o F PMH of Relapsing Polychondritis, Osteoporosis, COPD , DLD, HTN, RML+RLL lung nodules s/p ablation, SCC of tongue base s/p chemo+rad, s/p trach and PEG s/p removal presents for follow up.\par Pt reports only fatigue, she is complaining of LUE pain. Pt has not had a flare in years, she states she is sensitive to extreme weather and tend to avoid it.\par She has no other issues or complaints.

## 2022-06-03 NOTE — ASSESSMENT
[FreeTextEntry1] : 68 y/o PMHx relapsing polychondritis on HCQ/MMF and osteoporosis on alendronate presents for follow up\par \par # Lt shoulder tendonitis\par - start tylenol arthritis \par - offered steroid injection but pt not interested.\par - can't tolerate NSAIDs\par - start voltaren gel\par \par # Relapsing Polychondritis \par - dx about 20 y/a by ear hypertrophy, no biopsy done)\par - Not in flare\par - on MMF 1000mg BID and HCQ 200mg qd\par - seen by ophtho 1/2022: normal eye exam\par - last cbc and LFTs in March were normal, will repeat labs\par - Meds refilled\par \par # Osteopenia, ?hx of osteoporosis\par # multiple compression fx of T and L spines (seen 2019 T-L spine xray results)\par - has not been taking alendronate ( 70mg once a week) as she feels back pain with it. last alendronate taken 2 years ago, Pt not interestd in taking alendronate ever (she was prescribed last visit but hasn't taken it)\par - Pt refusing Reclast infusion also\par - Dexa scan 2019: AP spine -1.2, Dexa Scan 2021: AP spine -1.2\par - c/w Vit D and calcium supplement \par \par RTC in 3 months or PRN

## 2022-06-16 ENCOUNTER — APPOINTMENT (OUTPATIENT)
Dept: OTOLARYNGOLOGY | Facility: CLINIC | Age: 67
End: 2022-06-16

## 2022-07-29 ENCOUNTER — OUTPATIENT (OUTPATIENT)
Dept: OUTPATIENT SERVICES | Facility: HOSPITAL | Age: 67
LOS: 1 days | Discharge: HOME | End: 2022-07-29

## 2022-07-29 ENCOUNTER — APPOINTMENT (OUTPATIENT)
Dept: GASTROENTEROLOGY | Facility: CLINIC | Age: 67
End: 2022-07-29

## 2022-07-29 VITALS
SYSTOLIC BLOOD PRESSURE: 133 MMHG | DIASTOLIC BLOOD PRESSURE: 82 MMHG | WEIGHT: 168 LBS | HEART RATE: 79 BPM | OXYGEN SATURATION: 95 % | BODY MASS INDEX: 29.77 KG/M2 | TEMPERATURE: 95.5 F | HEIGHT: 63 IN

## 2022-07-29 DIAGNOSIS — Z90.49 ACQUIRED ABSENCE OF OTHER SPECIFIED PARTS OF DIGESTIVE TRACT: Chronic | ICD-10-CM

## 2022-07-29 DIAGNOSIS — Z90.710 ACQUIRED ABSENCE OF BOTH CERVIX AND UTERUS: Chronic | ICD-10-CM

## 2022-07-29 DIAGNOSIS — R91.1 SOLITARY PULMONARY NODULE: Chronic | ICD-10-CM

## 2022-07-29 DIAGNOSIS — D36.9 BENIGN NEOPLASM, UNSPECIFIED SITE: ICD-10-CM

## 2022-07-29 DIAGNOSIS — Z98.890 OTHER SPECIFIED POSTPROCEDURAL STATES: Chronic | ICD-10-CM

## 2022-07-29 DIAGNOSIS — J38.1 POLYP OF VOCAL CORD AND LARYNX: Chronic | ICD-10-CM

## 2022-07-29 PROCEDURE — 99213 OFFICE O/P EST LOW 20 MIN: CPT | Mod: GC

## 2022-07-29 NOTE — PHYSICAL EXAM
[General Appearance - Alert] : alert [General Appearance - Well Nourished] : well nourished [Neck Appearance] : the appearance of the neck was normal [Respiration, Rhythm And Depth] : normal respiratory rhythm and effort [Exaggerated Use Of Accessory Muscles For Inspiration] : no accessory muscle use [Apical Impulse] : the apical impulse was normal [Heart Sounds] : normal S1 and S2 [Bowel Sounds] : normal bowel sounds [Abdomen Soft] : soft [Abdomen Tenderness] : non-tender [] : no rash [Oriented To Time, Place, And Person] : oriented to person, place, and time

## 2022-07-30 PROBLEM — D36.9 TUBULAR ADENOMA: Status: ACTIVE | Noted: 2022-07-29

## 2022-07-30 NOTE — HISTORY OF PRESENT ILLNESS
[Heartburn] : denies heartburn [Nausea] : denies nausea [Vomiting] : denies vomiting [Diarrhea] : denies diarrhea [Constipation] : denies constipation [Yellow Skin Or Eyes (Jaundice)] : denies jaundice [Abdominal Pain] : denies abdominal pain [_________] : Performed [unfilled] [de-identified] :  66 yo F pt  w a PMH of  mod to severe COPD (former smoker, quit 1 yr ago, not on BiPAP or O2 at home) w reactive airway,  polychondrits, Rt Mid and lower lobe lung adenocarcinoma s/p ablation, SCC of tongue (cisplatin x5 cycle, most recent 2/25/21 and radiotherapy 2021). sp trach and PEG sp removal, and complex sclerosing papillar lesion of left breast. Pt had a colonoscopy and EGD done in 2014. Pt i s here for follow up.  [de-identified] : hiatal hernia/ arreas of AVM in body of the stomach/ non erosive gastritis in antrum  [de-identified] : single flat polyp in cecum / single dimunitive polyp in sigmoid

## 2022-08-05 DIAGNOSIS — D36.9 BENIGN NEOPLASM, UNSPECIFIED SITE: ICD-10-CM

## 2022-08-26 ENCOUNTER — APPOINTMENT (OUTPATIENT)
Dept: PULMONOLOGY | Facility: CLINIC | Age: 67
End: 2022-08-26

## 2022-08-26 ENCOUNTER — OUTPATIENT (OUTPATIENT)
Dept: OUTPATIENT SERVICES | Facility: HOSPITAL | Age: 67
LOS: 1 days | Discharge: HOME | End: 2022-08-26

## 2022-08-26 VITALS
DIASTOLIC BLOOD PRESSURE: 85 MMHG | OXYGEN SATURATION: 96 % | TEMPERATURE: 97.2 F | WEIGHT: 161.19 LBS | HEIGHT: 63 IN | SYSTOLIC BLOOD PRESSURE: 139 MMHG | BODY MASS INDEX: 28.56 KG/M2 | HEART RATE: 79 BPM

## 2022-08-26 DIAGNOSIS — Z98.890 OTHER SPECIFIED POSTPROCEDURAL STATES: Chronic | ICD-10-CM

## 2022-08-26 DIAGNOSIS — Z90.710 ACQUIRED ABSENCE OF BOTH CERVIX AND UTERUS: Chronic | ICD-10-CM

## 2022-08-26 DIAGNOSIS — R91.1 SOLITARY PULMONARY NODULE: Chronic | ICD-10-CM

## 2022-08-26 DIAGNOSIS — Z90.49 ACQUIRED ABSENCE OF OTHER SPECIFIED PARTS OF DIGESTIVE TRACT: Chronic | ICD-10-CM

## 2022-08-26 DIAGNOSIS — J38.1 POLYP OF VOCAL CORD AND LARYNX: Chronic | ICD-10-CM

## 2022-08-26 PROCEDURE — 99214 OFFICE O/P EST MOD 30 MIN: CPT | Mod: GC

## 2022-08-26 NOTE — HISTORY OF PRESENT ILLNESS
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 Inna solis/ repeat US breast (Sep 2021), presenting for follow-up for her COPD. Patient had a PFT performed and had followed up with ENT in the interim. Otherwise no complaints. \par \par 8/26/22: She has no respiratory issues at present. PET scna from may 2022 shows no abnormal FDG uptake. She has COPD and should continue Anoro once a day and Albuterol as needed. PFTs reviewd this visit as well.

## 2022-08-29 ENCOUNTER — NON-APPOINTMENT (OUTPATIENT)
Age: 67
End: 2022-08-29

## 2022-08-29 DIAGNOSIS — C01 MALIGNANT NEOPLASM OF BASE OF TONGUE: ICD-10-CM

## 2022-08-29 DIAGNOSIS — R91.1 SOLITARY PULMONARY NODULE: ICD-10-CM

## 2022-08-29 DIAGNOSIS — J44.9 CHRONIC OBSTRUCTIVE PULMONARY DISEASE, UNSPECIFIED: ICD-10-CM

## 2022-08-29 LAB
ALBUMIN SERPL ELPH-MCNC: 4.8 G/DL
ALP BLD-CCNC: 63 U/L
ALT SERPL-CCNC: 19 U/L
ANION GAP SERPL CALC-SCNC: 15 MMOL/L
AST SERPL-CCNC: 23 U/L
BASOPHILS # BLD AUTO: 0.02 K/UL
BASOPHILS NFR BLD AUTO: 0.4 %
BILIRUB SERPL-MCNC: 0.4 MG/DL
BUN SERPL-MCNC: 14 MG/DL
CALCIUM SERPL-MCNC: 9.8 MG/DL
CHLORIDE SERPL-SCNC: 102 MMOL/L
CO2 SERPL-SCNC: 22 MMOL/L
CREAT SERPL-MCNC: 1 MG/DL
EGFR: 61 ML/MIN/1.73M2
EOSINOPHIL # BLD AUTO: 0.12 K/UL
EOSINOPHIL NFR BLD AUTO: 2.4 %
GLUCOSE SERPL-MCNC: 105 MG/DL
HCT VFR BLD CALC: 40.1 %
HGB BLD-MCNC: 13.2 G/DL
IMM GRANULOCYTES NFR BLD AUTO: 0.8 %
LYMPHOCYTES # BLD AUTO: 0.54 K/UL
LYMPHOCYTES NFR BLD AUTO: 10.6 %
MAN DIFF?: NORMAL
MCHC RBC-ENTMCNC: 30.8 PG
MCHC RBC-ENTMCNC: 32.9 G/DL
MCV RBC AUTO: 93.7 FL
MONOCYTES # BLD AUTO: 0.47 K/UL
MONOCYTES NFR BLD AUTO: 9.2 %
NEUTROPHILS # BLD AUTO: 3.91 K/UL
NEUTROPHILS NFR BLD AUTO: 76.6 %
PLATELET # BLD AUTO: 230 K/UL
POTASSIUM SERPL-SCNC: 4.2 MMOL/L
PROT SERPL-MCNC: 6.5 G/DL
RBC # BLD: 4.28 M/UL
RBC # FLD: 14.3 %
SODIUM SERPL-SCNC: 139 MMOL/L
WBC # FLD AUTO: 5.1 K/UL

## 2022-09-08 ENCOUNTER — OUTPATIENT (OUTPATIENT)
Dept: OUTPATIENT SERVICES | Facility: HOSPITAL | Age: 67
LOS: 1 days | Discharge: HOME | End: 2022-09-08

## 2022-09-08 ENCOUNTER — RESULT REVIEW (OUTPATIENT)
Age: 67
End: 2022-09-08

## 2022-09-08 DIAGNOSIS — Z90.49 ACQUIRED ABSENCE OF OTHER SPECIFIED PARTS OF DIGESTIVE TRACT: Chronic | ICD-10-CM

## 2022-09-08 DIAGNOSIS — R91.1 SOLITARY PULMONARY NODULE: Chronic | ICD-10-CM

## 2022-09-08 DIAGNOSIS — R92.8 OTHER ABNORMAL AND INCONCLUSIVE FINDINGS ON DIAGNOSTIC IMAGING OF BREAST: ICD-10-CM

## 2022-09-08 DIAGNOSIS — Z98.890 OTHER SPECIFIED POSTPROCEDURAL STATES: Chronic | ICD-10-CM

## 2022-09-08 DIAGNOSIS — Z90.710 ACQUIRED ABSENCE OF BOTH CERVIX AND UTERUS: Chronic | ICD-10-CM

## 2022-09-08 DIAGNOSIS — J38.1 POLYP OF VOCAL CORD AND LARYNX: Chronic | ICD-10-CM

## 2022-09-08 PROCEDURE — 76642 ULTRASOUND BREAST LIMITED: CPT | Mod: 26,LT

## 2022-09-12 NOTE — DIETITIAN INITIAL EVALUATION ADULT. - PERSON TAUGHT/METHOD
No verbal instruction/Pt aware of need for full liquid diet at this time and beenfits of Ensure supplement. Aware of RD recs for SLP assessment post-op as able. no further d/c ed at this time./patient instructed

## 2022-09-15 ENCOUNTER — APPOINTMENT (OUTPATIENT)
Dept: BREAST CENTER | Facility: CLINIC | Age: 67
End: 2022-09-15

## 2022-09-15 VITALS
BODY MASS INDEX: 28.53 KG/M2 | DIASTOLIC BLOOD PRESSURE: 80 MMHG | WEIGHT: 161 LBS | HEIGHT: 63 IN | SYSTOLIC BLOOD PRESSURE: 133 MMHG

## 2022-09-15 PROCEDURE — 99212 OFFICE O/P EST SF 10 MIN: CPT

## 2022-09-15 NOTE — ASSESSMENT
[FreeTextEntry1] : Sol is a 67 F with two left breast radial scars, s/p L WLE on 8/24/2020, currently with a complex sclerosing papillary lesion in the L breast (not excised). \par \par On exam, I was not able to palpate any suspicious abnormalities. \par \par Her most recent imaging is as follows:\par Left Breast Sono - 09/08/2022:\par -At the 12:00 position 6 cm from the nipple, there is a stable hypoechoic mass measuring 0.4 x 0.5 x 0.3 cm. Short interval follow-up is recommended.\par -At the 3:00 position 3 cm from the nipple, there is a stable biopsy-proven radial scar measuring 0.5 x 0.5 x 0.3 cm.\par BI-RADS category 3: Probably Benign\par \par ---\par AS REVIEW:\par 8/24/2020 -- L WLE of two areas \par -more superior mass: proliferative type FC changes, no residual radial scar \par -more inferior mass: sclerosing IP, small radial scar \par \par Her most recent imaging was a b/l dx mammogram and US on 3/5/2021 which revealed post surgical changes in her left breast in UOQ, stable nodular asymmetry in her superior L breast, unchanged since 3/2020 and in her left breast @12-1N5, a heterogenous mass measuring 8 x 7 x 6 mm consistent with post surgical changes, deemed BIRADS 3 and in her left breast @3N3, a heterogenous nonvascular mass measuring 5 x 6 x 3 mm which was biopsy proven complex sclerosing papillary lesion. \par \par We discussed radial scars without atypia.  These lesions are considered fibroproliferative lesions without atypia.  Patients with these lesions were found to have a slightly increased relative risk compared to the reference population.  However the lesions themselves do not have any malignant potential. There is about a 10-20% upgrade rate to a high risk lesion (including atypical hyperplasias) and a <3% upgrade to cancer.  However, when atypia is present there is up to a 15-25% upgrade rate to cancer.  For this reason, we have discussed observation vs. surgical excision of this lesion.  I have recommended that we move forward with surgical excision at this time.\par \par In regards to her family history of breast cancer in her sister and maternal grandmother, her risk assessment is as follows: \par \par RISK ASSESSMENT: \par Babs\par 5yr -- 4.7%\par lifetime -- 15.8%\par \par TC v6\par 5yr -- 3.1%\par lifetime -- 8.7%\par \par This puts her in an average to intermediate risk for breast cancer. She does not quite meet criteria for screening breast MRIs.\par \par I spent a total of 15 minutes of face to face time with this patient, greater than 50% of which was spent in counseling and/or coordination of care.\par All of her questions were appropriately answered.\par She knows to call with any concerns.\par \par \par PLAN: \par -B/L Dx Mammo & Sono - March 2023 follow-up on (radial scar/IP -- not excised) and BIRADS-3 imaging.\par -f/up after

## 2022-09-15 NOTE — HISTORY OF PRESENT ILLNESS
[FreeTextEntry1] : Sol is a 66 F with two left breast radial scars, s/p L WLE of two areas on 2020, now with a new complex sclerosing papillary lesion in the L breast (not excised). \par \par 2020 -- L WLE of two areas \par -more superior mass: proliferative type FC changes, no residual radial scar \par -more inferior mass: sclerosing IP, small radial scar \par \par Her work up was as follows: \par 3/2/2020 -- b/l screening mammogram \par -heterogenously dense breasts\par -focal asymmetry in L UOQ \par -no suspicious mass, calcifications, or other abnormalities in her RIGHT breast \par BIRADS 0 \par \par 2020 -- L dx mammogram and US \par -circumscribed mass with amorphous calcifications in her left lateral breast --> no significant change \par -irregular mass with amorphous calcifications in UOQ, LEFT --> BIOPSY\par -new circumscribed mass in UOQ, LEFT --> BIOPSY \par L US \par @2N6, benign cyst which correlates with lateral breast mass seen on mammogram \par BIRADS 4\par \par 2020 -- L stereobx x 2 \par -anterior asymmetry (top hat) --> radial scar \par -posterior asymmetry (minicork) --> radial scar \par \par Prior to her biopsy, she had no breast related complaints.  She denies any breast pain, has not palpated any new palpable masses in either breast and denies any nipple discharge or retraction. However, she was a little sore in her left lateral breast after her biopsy.  \par \par HISTORICAL RISK FACTORS: \par -2 prior breast biopsies as above, no prior surgeries \par -family history of breast cancer in her sister and maternal grandmother \par -, age at first live birth was 18 \par -no prior OCP use, HRT use x 2 years, stopped \par -s/p GEETHA/BSO in  \par \par RISK ASSESSMENT: \par Babs\par 5yr -- 4.7%\par lifetime -- 15.8%\par \par TC v6\par 5yr -- 3.1%\par lifetime -- 8.7%\par \par INTERVAL HISTORY: \par Sol returns for her 6 month follow up VISIT, s/p L WLE of two areas on 2020. \par \par She has no breast related complaints at this time.  She denies any breast pain, has not palpated any new palpable masses in either breast and denies any nipple discharge or retraction.  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 Her trach has since been removed and her PEG has also been removed.  She is due for repeat imaging to evaluate the efficacy of her treatments. \par  \par She had the following work up: \par 3/5/2021 -- b/l dx mammogram and US \par -scattered areas of fibroglandular densities\par -L: UOQ, post surgical distortion, 8mm oil cyst @surgical site \par -L: superior breast, low density stable nodular asymmetry, unchanged since 3/2020 \par -tiny nodule in L lateral/central breast, no significant changes since  \par -scattered coarse benign calcifications in R \par b/l US \par RIGHT: \par -no suspicious abnormalities \par LEFT: \par -@2N6, benign cyst, measures 5 x 5 x 3 mm \par -@12-1N5, heterogenous mass measuring 8 x 7 x 6 mm, consistent with surgical site --> BIRADS 3 \par -@3N3, 5 x 6 x 3 mm heterogenous nonvascular mass --> BIOPSY \par BIRADS 4 \par \par 2021 -- L US CNBx @3N3 (minicork) \par -complex sclerosing papillary lesion \par \par \par INTERVAL HISTORY: 10/21/21\par Sol returns for her 6 month follow up VISIT, s/p L WLE of two areas on 2020.  She also was diagnosed with LEFT complex sclerosing papillary lesion on 2021 that was not excised. \par \par She has no breast related complaints at this time.  She denies any breast pain, has not palpated any new palpable masses in either breast and denies any nipple discharge or retraction.  \par \par Her imaging as follows:\par 21 LEFT US and mammo \par -@3:00N 3 stable heterogeneous mass measuring 0.8 x 0.8 x 0.4 cm, consistent with prior biopsy proven papillary      lesion. Deemed BIRADS2\par \par 2022 --\kevin Horton returns for her 6 month follow up visit, s/p L WLE of two areas on 2020.  She also was diagnosed with LEFT complex sclerosing papillary lesion on 2021 that was not excised. \par \par She has no breast related complaints at this time.  She denies any breast pain, has not palpated any new palpable masses in either breast and denies any nipple discharge or retraction.\par \par Her most recent imaging is as follows:\par 2022 - B/L Dx Mammo & Sono:\par -There are scattered areas of fibroglandular density.\par -Postsurgical architectural distortion noted in the left breast.\par Bilateral whole breast ultrasound was performed.\par -At 12:00 N6 of the left breast there is a stable hypoechoic mass measuring 0.5 x 0.7 x 0.5 cm. Short interval follow-up recommended.\par -At 3:00 N3 of the left breast there is redemonstration of stable previously biopsied mass (radial scar) measuring 0.5 x 0.5 x 0.2 cm. \par BI-RADS category 3: Probably Benign\par \par \par 09/15/2022 --vamshi Horton returns for her 6 month follow up visit, s/p L WLE of two areas on 2020.  She also was diagnosed with LEFT complex sclerosing papillary lesion on 2021 that was not excised. \par \par She has no breast related complaints at this time.  She denies any breast pain, has not palpated any new palpable masses in either breast and denies any nipple discharge or retraction.\par \par Her most recent imaging is as follows:\par Left Breast Sono - 2022:\par -At the 12:00 position 6 cm from the nipple, there is a stable hypoechoic mass measuring 0.4 x 0.5 x 0.3 cm. Short interval follow-up is recommended.\par -At the 3:00 position 3 cm from the nipple, there is a stable biopsy-proven radial scar measuring 0.5 x 0.5 x 0.3 cm.\par BI-RADS category 3: Probably Benign

## 2022-09-15 NOTE — DATA REVIEWED
[FreeTextEntry1] : Left Breast Sono - 09/08/2022:\par Findings:\par \par Ultrasound:\par \par Targeted unilateral left breast ultrasound was performed.\par \par At the 12:00 position 6 cm from the nipple, there is a stable hypoechoic mass measuring 0.4 x 0.5 x 0.3 cm. Short interval follow-up is recommended.\par \par At the 3:00 position 3 cm from the nipple, there is a stable biopsy-proven radial scar measuring 0.5 x 0.5 x 0.3 cm.\par \par Impression: Stable probably benign left breast 12:00 position mass and biopsy-proven left breast radial scar at the 3:00 position.\par \par Recommendation: Follow-up bilateral diagnostic mammogram and ultrasound in 6 months.\par \par BI-RADS category 3: Probably Benign

## 2022-09-15 NOTE — PHYSICAL EXAM
[Normocephalic] : normocephalic [Atraumatic] : atraumatic [No Supraclavicular Adenopathy] : no supraclavicular adenopathy [Examined in the supine and seated position] : examined in the supine and seated position [No dominant masses] : no dominant masses in right breast  [No dominant masses] : no dominant masses left breast [No Nipple Discharge] : no left nipple discharge [No Axillary Lymphadenopathy] : no left axillary lymphadenopathy [Soft] : abdomen soft [Not Tender] : non-tender [No Edema] : no edema [No Rashes] : no rashes [No Ulceration] : no ulceration [de-identified] : no suspicious mass palpated within either breast [de-identified] : no suspicious lesions palpated

## 2022-09-15 NOTE — REASON FOR VISIT
[Follow-Up: _____] : a [unfilled] follow-up visit [FreeTextEntry1] :  two left breast radial scars, s/p L WLE of two areas on 8/24/2020, now with a new complex sclerosing papillary lesion in the L breast (not excised).

## 2022-09-21 ENCOUNTER — OUTPATIENT (OUTPATIENT)
Dept: OUTPATIENT SERVICES | Facility: HOSPITAL | Age: 67
LOS: 1 days | Discharge: HOME | End: 2022-09-21

## 2022-09-21 DIAGNOSIS — Z90.710 ACQUIRED ABSENCE OF BOTH CERVIX AND UTERUS: Chronic | ICD-10-CM

## 2022-09-21 DIAGNOSIS — J38.1 POLYP OF VOCAL CORD AND LARYNX: Chronic | ICD-10-CM

## 2022-09-21 DIAGNOSIS — R91.1 SOLITARY PULMONARY NODULE: Chronic | ICD-10-CM

## 2022-09-21 DIAGNOSIS — Z98.890 OTHER SPECIFIED POSTPROCEDURAL STATES: Chronic | ICD-10-CM

## 2022-09-21 DIAGNOSIS — Z90.49 ACQUIRED ABSENCE OF OTHER SPECIFIED PARTS OF DIGESTIVE TRACT: Chronic | ICD-10-CM

## 2022-09-22 DIAGNOSIS — K02.63 DENTAL CARIES ON SMOOTH SURFACE PENETRATING INTO PULP: ICD-10-CM

## 2022-09-23 ENCOUNTER — APPOINTMENT (OUTPATIENT)
Dept: OTOLARYNGOLOGY | Facility: CLINIC | Age: 67
End: 2022-09-23

## 2022-09-23 DIAGNOSIS — K11.7 DISTURBANCES OF SALIVARY SECRETION: ICD-10-CM

## 2022-09-23 PROCEDURE — 31575 DIAGNOSTIC LARYNGOSCOPY: CPT

## 2022-09-23 PROCEDURE — 99213 OFFICE O/P EST LOW 20 MIN: CPT | Mod: 25

## 2022-09-23 NOTE — HISTORY OF PRESENT ILLNESS
[de-identified] : Oncology Summary\par Stage: T3N1M0\par Site: Tongue base\par Pathology: p16-positive SCC\par Treatment: Chemoradiation completed 2/2021\par Disease status: ARMANDO\par Swallow: No issues\par Airway: Decannulated\par Smoking: Former\par Thyroid: TSH 3/2022 0.92\par  [FreeTextEntry1] : Patient returns today following up on carcinoma base of tongue . Planning to undergo hyperbaric for dental extraction

## 2022-09-23 NOTE — PHYSICAL EXAM
[de-identified] : radiation fibrosis [Midline] : trachea located in midline position [Normal] : no rashes

## 2022-09-23 NOTE — PROCEDURE
[Dysphagia] : dysphagia not clearly evaluated by indirect laryngoscopy [None] : none [Flexible Endoscope] : examined with the flexible endoscope [de-identified] : Flexible laryngoscopy performed. Nasal cavity, nasopharynx, oropharynx, hypopharynx, and larynx evaluated. No masses or lesions, bilateral true vocal folds symmetric and mobile.\par

## 2022-10-04 ENCOUNTER — OUTPATIENT (OUTPATIENT)
Dept: OUTPATIENT SERVICES | Facility: HOSPITAL | Age: 67
LOS: 1 days | Discharge: HOME | End: 2022-10-04

## 2022-10-04 ENCOUNTER — RESULT REVIEW (OUTPATIENT)
Age: 67
End: 2022-10-04

## 2022-10-04 DIAGNOSIS — Z90.49 ACQUIRED ABSENCE OF OTHER SPECIFIED PARTS OF DIGESTIVE TRACT: Chronic | ICD-10-CM

## 2022-10-04 DIAGNOSIS — R91.1 SOLITARY PULMONARY NODULE: ICD-10-CM

## 2022-10-04 DIAGNOSIS — Z98.890 OTHER SPECIFIED POSTPROCEDURAL STATES: Chronic | ICD-10-CM

## 2022-10-04 DIAGNOSIS — R91.1 SOLITARY PULMONARY NODULE: Chronic | ICD-10-CM

## 2022-10-04 DIAGNOSIS — J38.1 POLYP OF VOCAL CORD AND LARYNX: Chronic | ICD-10-CM

## 2022-10-04 DIAGNOSIS — Z90.710 ACQUIRED ABSENCE OF BOTH CERVIX AND UTERUS: Chronic | ICD-10-CM

## 2022-10-04 PROCEDURE — 71250 CT THORAX DX C-: CPT | Mod: 26

## 2022-10-07 ENCOUNTER — APPOINTMENT (OUTPATIENT)
Dept: RHEUMATOLOGY | Facility: CLINIC | Age: 67
End: 2022-10-07

## 2022-10-07 ENCOUNTER — OUTPATIENT (OUTPATIENT)
Dept: OUTPATIENT SERVICES | Facility: HOSPITAL | Age: 67
LOS: 1 days | Discharge: HOME | End: 2022-10-07

## 2022-10-07 VITALS
WEIGHT: 164 LBS | HEART RATE: 84 BPM | DIASTOLIC BLOOD PRESSURE: 80 MMHG | BODY MASS INDEX: 29.06 KG/M2 | OXYGEN SATURATION: 96 % | SYSTOLIC BLOOD PRESSURE: 125 MMHG | HEIGHT: 63 IN | TEMPERATURE: 97.4 F

## 2022-10-07 DIAGNOSIS — Z98.890 OTHER SPECIFIED POSTPROCEDURAL STATES: Chronic | ICD-10-CM

## 2022-10-07 DIAGNOSIS — Z90.710 ACQUIRED ABSENCE OF BOTH CERVIX AND UTERUS: Chronic | ICD-10-CM

## 2022-10-07 DIAGNOSIS — J38.1 POLYP OF VOCAL CORD AND LARYNX: Chronic | ICD-10-CM

## 2022-10-07 DIAGNOSIS — Z90.49 ACQUIRED ABSENCE OF OTHER SPECIFIED PARTS OF DIGESTIVE TRACT: Chronic | ICD-10-CM

## 2022-10-07 DIAGNOSIS — M94.1 RELAPSING POLYCHONDRITIS: ICD-10-CM

## 2022-10-07 DIAGNOSIS — R91.1 SOLITARY PULMONARY NODULE: Chronic | ICD-10-CM

## 2022-10-07 PROCEDURE — 99214 OFFICE O/P EST MOD 30 MIN: CPT | Mod: GC

## 2022-10-07 NOTE — HISTORY OF PRESENT ILLNESS
[FreeTextEntry1] : 68 y/o woman presents for f/u of long-standing relapsing polychondritis. Pt denies any ear or eye pain, shortness of breath or eye problems currently. Her main concern today is that she recently had a CT chest which was done differently from the usual chest CTs that she has done, and demonstrated an increased pulmonary nodule. Pt does not trust the results as the CT was done more rapidly than usual, and is frustrated about having to pay for this CT and to have another one in 3 months. She does not have any respiratory symptoms currently. + Continued L shoulder pain; pt previously had PT for her shoulder with no improvement.\par \par Physical exam: GEN: AAO woman sitting in chair in NAD\par SKIN: No rashes\par ENT: + Thickening of R auricle, + ?mild saddle nose deformity, + mild erythema in the R tympanic canal, normal tympanic reflex on L\par MSK:\par Shoulders: + Slightly limited ROM in L to approx 160 degrees with ?Espinal\par Elbows: Full ROM b/l, no effusions\par Wrists: Full ROM b/l, no effusions\par Hands: No synovitis\par Hips; Full ROM b/l\par Knees: no effusions, full ROM b/l\par Ankles: No effusions, full ROM b/l\par Feet: no effusions, no TTP\par EXT: no LE edema b/l\par

## 2022-10-07 NOTE — ASSESSMENT
[FreeTextEntry1] : Relapsing polychondritis: With physical exam sequela affecting pt's R ear and nose; pt has no active symptoms currently.\par - Continue Plaquenil 200 mg q day. Last ophtho exam in 1/2022 with no e/o Plaquenil toxicity. Would consider discontinuing Plaquenil at next appointment if pt continues to feel well.\par - Continue Cellcept 1000 mg BID. Last CBC and CMP in 8/2022 were within normal limits.\par - f/u in 3 months\par \par Osteoporosis: With prior fragility fracture in hip; last DEXA scan in 7/2021 with T score -2.3 in hip; pt previously on alendronate but it was causing side effects. Was offered IV zolendronic acid but declined, prescribed alendronate  in 3/2022 but pt did not take it because she is already taking a lot of medications.\par - f/u repeat DEXA scan in 7/2023\par - Would consider denosumab injections if pt is amenable.

## 2022-10-14 ENCOUNTER — NON-APPOINTMENT (OUTPATIENT)
Age: 67
End: 2022-10-14

## 2022-10-18 ENCOUNTER — APPOINTMENT (OUTPATIENT)
Dept: PULMONOLOGY | Facility: CLINIC | Age: 67
End: 2022-10-18

## 2022-10-18 ENCOUNTER — OUTPATIENT (OUTPATIENT)
Dept: OUTPATIENT SERVICES | Facility: HOSPITAL | Age: 67
LOS: 1 days | Discharge: HOME | End: 2022-10-18

## 2022-10-18 ENCOUNTER — APPOINTMENT (OUTPATIENT)
Dept: INTERNAL MEDICINE | Facility: CLINIC | Age: 67
End: 2022-10-18

## 2022-10-18 ENCOUNTER — MED ADMIN CHARGE (OUTPATIENT)
Age: 67
End: 2022-10-18

## 2022-10-18 VITALS
TEMPERATURE: 98.6 F | HEART RATE: 78 BPM | SYSTOLIC BLOOD PRESSURE: 148 MMHG | DIASTOLIC BLOOD PRESSURE: 86 MMHG | WEIGHT: 167 LBS | OXYGEN SATURATION: 96 % | HEIGHT: 63 IN | BODY MASS INDEX: 29.59 KG/M2

## 2022-10-18 DIAGNOSIS — Z98.890 OTHER SPECIFIED POSTPROCEDURAL STATES: Chronic | ICD-10-CM

## 2022-10-18 DIAGNOSIS — F17.200 NICOTINE DEPENDENCE, UNSPECIFIED, UNCOMPLICATED: ICD-10-CM

## 2022-10-18 DIAGNOSIS — Z90.49 ACQUIRED ABSENCE OF OTHER SPECIFIED PARTS OF DIGESTIVE TRACT: Chronic | ICD-10-CM

## 2022-10-18 DIAGNOSIS — J38.1 POLYP OF VOCAL CORD AND LARYNX: Chronic | ICD-10-CM

## 2022-10-18 DIAGNOSIS — R91.1 SOLITARY PULMONARY NODULE: Chronic | ICD-10-CM

## 2022-10-18 DIAGNOSIS — Z87.891 PERSONAL HISTORY OF NICOTINE DEPENDENCE: ICD-10-CM

## 2022-10-18 DIAGNOSIS — Z90.710 ACQUIRED ABSENCE OF BOTH CERVIX AND UTERUS: Chronic | ICD-10-CM

## 2022-10-18 DIAGNOSIS — R05.9 COUGH, UNSPECIFIED: ICD-10-CM

## 2022-10-18 DIAGNOSIS — J44.9 CHRONIC OBSTRUCTIVE PULMONARY DISEASE, UNSPECIFIED: ICD-10-CM

## 2022-10-18 DIAGNOSIS — R91.8 OTHER NONSPECIFIC ABNORMAL FINDING OF LUNG FIELD: ICD-10-CM

## 2022-10-18 DIAGNOSIS — Z87.09 PERSONAL HISTORY OF OTHER DISEASES OF THE RESPIRATORY SYSTEM: ICD-10-CM

## 2022-10-18 PROCEDURE — 99397 PER PM REEVAL EST PAT 65+ YR: CPT | Mod: GC

## 2022-10-18 PROCEDURE — 99214 OFFICE O/P EST MOD 30 MIN: CPT | Mod: GC

## 2022-10-18 RX ORDER — ALBUTEROL SULFATE 2.5 MG/3ML
(2.5 MG/3ML) SOLUTION RESPIRATORY (INHALATION)
Qty: 120 | Refills: 6 | Status: DISCONTINUED | COMMUNITY
Start: 2021-11-12 | End: 2022-10-18

## 2022-10-18 RX ORDER — LOSARTAN POTASSIUM 50 MG/1
50 TABLET, FILM COATED ORAL DAILY
Qty: 30 | Refills: 3 | Status: COMPLETED | COMMUNITY
Start: 2020-01-22 | End: 2022-10-18

## 2022-10-18 RX ORDER — UMECLIDINIUM BROMIDE AND VILANTEROL TRIFENATATE 62.5; 25 UG/1; UG/1
62.5-25 POWDER RESPIRATORY (INHALATION) DAILY
Qty: 1 | Refills: 2 | Status: DISCONTINUED | COMMUNITY
Start: 2022-05-13 | End: 2022-10-18

## 2022-10-18 RX ORDER — EZETIMIBE 10 MG/1
10 TABLET ORAL
Qty: 30 | Refills: 5 | Status: COMPLETED | COMMUNITY
Start: 2021-03-25 | End: 2022-10-18

## 2022-10-18 RX ORDER — ALBUTEROL SULFATE 90 UG/1
108 (90 BASE) INHALANT RESPIRATORY (INHALATION)
Qty: 1 | Refills: 5 | Status: DISCONTINUED | COMMUNITY
Start: 2022-01-10 | End: 2022-10-18

## 2022-10-18 NOTE — ASSESSMENT
[FreeTextEntry1] : ASSESSMENT: 67 year-old female with a PMH of COPD (not on CPAP/BiPAP), DLD, HTN, RML+RLL lung nodules s/p ablation, SCC of tongue base s/p cisplatin x 5 cycles (last 2/25/21) and RT last in 2021 s/p trach and PEG s/p removal, complex sclerosing papillar lesion of left breast (followed by Dr. Keith w/ repeat US breast (Sep 2021), presenting with stable COPD on triple inhaler therapy and right lower lobe pulmonary nodule slightly increased in size from 7 to 9 mm on recent CT scan on 10/4.\par \par Plans:\par # COPD\par - No significant symptoms apart from SOB from "bending down too fast."\par - Continue Anoro, albuterol and ipratropium (all refilled today).\par \par # RLL pulmonary nodule\par - CT on 10/4 shows 9mm right lower lobe pulmonary nodule, increased in size from 7mm on previous CT.\par - Repeat CT in 3 months.\par \par # SCC of tongue base\par - s/p chemotherapy and radiation therapy.\par - s/p trach and PEG and removal.\par - Following with ENT.\par \par # Health Care Maintenance\par # Hx smoking - 52 pack yrs\par - Annual LDCT for lung cancer screening.\par - Quit smoking in 2020; continue cessation.\par - Follow up in Pulmonary clinic in 3 months.\par \par

## 2022-10-18 NOTE — HISTORY OF PRESENT ILLNESS
[Former] : former [Never] : never [TextBox_4] : 67 year-old female with a PMH of COPD (not on CPAP/BiPAP), DLD, HTN, RML+RLL lung nodules s/p ablation, SCC of tongue base s/p cisplatin x 5 cycles (last 2/25/21) and RT last in 2021 s/p trach and PEG s/p removal, complex sclerosing papillar lesion of left breast (followed by Dr. Keith w/ repeat US breast (Sep 2021), presenting for follow-up for her COPD and pulmonary nodule.\par \par States she smoked 1 PPD for 52 years, quitting in 2020 after being diagnosed with SCC of the tongue. Denies inhalational drug use.\par Worked as home health aide, denies being exposed to illnesses.\par \par Reports having a cold with some yellow sputum last week, which since resolved. Reports cough due to dry mouth from chemotherapy for SCC. Reports SOB when "bending down too fast." Denies regular coughs, wheezing and hemoptysis.\par \par CT scan from 10/4 shows 9mm right lower lobe nodule, which was 7mm on previous CT scan.\par \par Patient also inquired regarding hyperbaric oxygen therapy for her mandible/teeth. From a pulmonary standpoint, patient has no contraindications for this therapy. [TextBox_11] : 1 [TextBox_13] : 52 [YearQuit] : 2020

## 2022-10-18 NOTE — END OF VISIT
[] : Resident [FreeTextEntry3] : Ms Lopez seen & examined today, she is requesting refill of medications and is here for follow-up of a recent CT (images and radiologist reads reviewed).  The CT shows a newly enlarged subpleural nodule on the right, for which we will perform a short-term CT follow-up.  She reports having an upper respiratory tract infection at the time of that scan, which may explain the enlarged nodule, however we will be conservative with repeat imaging.  She is also asking for pulmonary clearance for hyperbaric oxygen, there appears to be no contraindication for her (no extensive emphysema, blebs, or bullae).  RTC in 3 months for repeat CT.

## 2022-10-18 NOTE — ASSESSMENT
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 \par \par #Polychondritis\par - triggered by extreme weather, cold>heat\par - follows with Rheum \par \par #Hypertriglyceridemia, improved\par - TG improved from 264 to 160 on ezetimibe & simvastatin \par \par #COPD\par #Rt mid&low lung adenocarcinoma s/p ablation\par - patient following with Pulm today \par - f/u CT in 3 months per Pulm \par \par #SCC of tongue s/p chemo and radiotherapy\par - dry mouth 2/2 tx\par - followed by ENT surg\par \par #Left Brest mass\par - following with Dr. Keith, due for repeat mammogram in March 2023\par \par #HTN\par - c/w losartan \par \par Medical Clearance for hyperbaric treatments and dental extraction\par Moderate Risk for low risk procedure. Proceed with procedure as scheduled.\par \par #Health Care maintenance\par - Colonoscopy: patient opting for FIT testing, has the kit at home \par - Mammogram: completed March 2022\par - Pap Smear: s/p total hysterectomy with cervix removed per patient \par - Flu Vaccine: administered today 10/18/22\par - Pneumococcal: will administer at next visit \par rto in 3 months and prn

## 2022-10-18 NOTE — PHYSICAL EXAM
[No Acute Distress] : no acute distress [Normal Appearance] : normal appearance [Normal Rate/Rhythm] : normal rate/rhythm [Normal S1, S2] : normal s1, s2 [No Murmurs] : no murmurs [No Resp Distress] : no resp distress [No Acc Muscle Use] : no acc muscle use [Normal Rhythm and Effort] : normal rhythm and effort [Clear to Auscultation Bilaterally] : clear to auscultation bilaterally

## 2022-10-18 NOTE — HISTORY OF PRESENT ILLNESS
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 Patient is here for routine follow up. She is overall feeling well. She is requesting clearance for hyperbaric treatments and dental extraction. Former smoker, quit 2 years ago.\par

## 2022-10-18 NOTE — REVIEW OF SYSTEMS
[Cough] : cough [Sputum] : sputum [A.M. Dry Mouth] : a.m. dry mouth [SOB on Exertion] : sob on exertion [Fatigue] : fatigue [Recent Wt Gain (___ Lbs)] : ~T recent [unfilled] lb weight gain [Fever] : no fever [Chills] : no chills [Poor Appetite] : no poor appetite [Recent Wt Loss (___ Lbs)] : ~T no recent weight loss [Hemoptysis] : no hemoptysis [Chest Tightness] : no chest tightness [Frequent URIs] : no frequent URIs [Dyspnea] : no dyspnea [Pleuritic Pain] : no pleuritic pain [Wheezing] : no wheezing

## 2022-10-18 NOTE — END OF VISIT
[] : Resident Information: Selecting Yes will display possible errors in your note based on the variables you have selected. This validation is only offered as a suggestion for you. PLEASE NOTE THAT THE VALIDATION TEXT WILL BE REMOVED WHEN YOU FINALIZE YOUR NOTE. IF YOU WANT TO FAX A PRELIMINARY NOTE YOU WILL NEED TO TOGGLE THIS TO 'NO' IF YOU DO NOT WANT IT IN YOUR FAXED NOTE.

## 2022-10-21 DIAGNOSIS — Z00.00 ENCOUNTER FOR GENERAL ADULT MEDICAL EXAMINATION WITHOUT ABNORMAL FINDINGS: ICD-10-CM

## 2022-10-24 ENCOUNTER — APPOINTMENT (OUTPATIENT)
Dept: OPHTHALMOLOGY | Facility: CLINIC | Age: 67
End: 2022-10-24

## 2022-10-24 ENCOUNTER — OUTPATIENT (OUTPATIENT)
Dept: OUTPATIENT SERVICES | Facility: HOSPITAL | Age: 67
LOS: 1 days | Discharge: HOME | End: 2022-10-24

## 2022-10-24 DIAGNOSIS — R91.1 SOLITARY PULMONARY NODULE: Chronic | ICD-10-CM

## 2022-10-24 DIAGNOSIS — Z98.890 OTHER SPECIFIED POSTPROCEDURAL STATES: Chronic | ICD-10-CM

## 2022-10-24 DIAGNOSIS — Z90.49 ACQUIRED ABSENCE OF OTHER SPECIFIED PARTS OF DIGESTIVE TRACT: Chronic | ICD-10-CM

## 2022-10-24 DIAGNOSIS — J38.1 POLYP OF VOCAL CORD AND LARYNX: Chronic | ICD-10-CM

## 2022-10-24 DIAGNOSIS — Z90.710 ACQUIRED ABSENCE OF BOTH CERVIX AND UTERUS: Chronic | ICD-10-CM

## 2022-10-24 PROCEDURE — 92083 EXTENDED VISUAL FIELD XM: CPT | Mod: 26

## 2022-10-28 ENCOUNTER — RX RENEWAL (OUTPATIENT)
Age: 67
End: 2022-10-28

## 2022-11-25 ENCOUNTER — OUTPATIENT (OUTPATIENT)
Dept: OUTPATIENT SERVICES | Facility: HOSPITAL | Age: 67
LOS: 1 days | Discharge: HOME | End: 2022-11-25

## 2022-11-25 DIAGNOSIS — Z98.890 OTHER SPECIFIED POSTPROCEDURAL STATES: Chronic | ICD-10-CM

## 2022-11-25 DIAGNOSIS — Z90.710 ACQUIRED ABSENCE OF BOTH CERVIX AND UTERUS: Chronic | ICD-10-CM

## 2022-11-25 DIAGNOSIS — J38.1 POLYP OF VOCAL CORD AND LARYNX: Chronic | ICD-10-CM

## 2022-11-25 DIAGNOSIS — R91.1 SOLITARY PULMONARY NODULE: Chronic | ICD-10-CM

## 2022-11-25 DIAGNOSIS — Z90.49 ACQUIRED ABSENCE OF OTHER SPECIFIED PARTS OF DIGESTIVE TRACT: Chronic | ICD-10-CM

## 2022-11-29 ENCOUNTER — APPOINTMENT (OUTPATIENT)
Dept: CARDIOLOGY | Facility: CLINIC | Age: 67
End: 2022-11-29

## 2022-11-29 ENCOUNTER — OUTPATIENT (OUTPATIENT)
Dept: OUTPATIENT SERVICES | Facility: HOSPITAL | Age: 67
LOS: 1 days | Discharge: HOME | End: 2022-11-29

## 2022-11-29 VITALS
BODY MASS INDEX: 29.06 KG/M2 | WEIGHT: 164 LBS | HEIGHT: 63 IN | SYSTOLIC BLOOD PRESSURE: 148 MMHG | DIASTOLIC BLOOD PRESSURE: 78 MMHG | HEART RATE: 71 BPM | OXYGEN SATURATION: 96 % | TEMPERATURE: 97.5 F

## 2022-11-29 DIAGNOSIS — E78.5 HYPERLIPIDEMIA, UNSPECIFIED: ICD-10-CM

## 2022-11-29 DIAGNOSIS — Z09 ENCOUNTER FOR FOLLOW-UP EXAMINATION AFTER COMPLETED TREATMENT FOR CONDITIONS OTHER THAN MALIGNANT NEOPLASM: ICD-10-CM

## 2022-11-29 DIAGNOSIS — I10 ESSENTIAL (PRIMARY) HYPERTENSION: ICD-10-CM

## 2022-11-29 DIAGNOSIS — R91.1 SOLITARY PULMONARY NODULE: Chronic | ICD-10-CM

## 2022-11-29 DIAGNOSIS — Z98.890 OTHER SPECIFIED POSTPROCEDURAL STATES: Chronic | ICD-10-CM

## 2022-11-29 DIAGNOSIS — Z90.49 ACQUIRED ABSENCE OF OTHER SPECIFIED PARTS OF DIGESTIVE TRACT: Chronic | ICD-10-CM

## 2022-11-29 DIAGNOSIS — J38.1 POLYP OF VOCAL CORD AND LARYNX: Chronic | ICD-10-CM

## 2022-11-29 DIAGNOSIS — Z90.710 ACQUIRED ABSENCE OF BOTH CERVIX AND UTERUS: Chronic | ICD-10-CM

## 2022-11-29 PROCEDURE — 99214 OFFICE O/P EST MOD 30 MIN: CPT

## 2022-11-29 PROCEDURE — 93010 ELECTROCARDIOGRAM REPORT: CPT

## 2022-11-29 PROCEDURE — 93010 ELECTROCARDIOGRAM REPORT: CPT | Mod: 77

## 2022-11-29 NOTE — ASSESSMENT
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

## 2022-11-29 NOTE — REASON FOR VISIT
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 Patient is unsure why she was referred to cardiology clinic. However reports that she was advised by pulm to see a cardiologist for post chemo cardiac eval earlier this year. Denies any headache, neurological deficits, shortness of breathe, cough, chest pain, palpitations, nausea, vomiting, diarrhea or constipation. Denies having diagnosed with any coronary artery disease in the past\par

## 2022-11-29 NOTE — PHYSICAL EXAM
[Well Developed] : well developed [Well Nourished] : well nourished [Normal Conjunctiva] : normal conjunctiva [No Carotid Bruit] : no carotid bruit [Normal S1, S2] : normal S1, S2 [No Murmur] : no murmur [Clear Lung Fields] : clear lung fields [Good Air Entry] : good air entry [No Respiratory Distress] : no respiratory distress  [Soft] : abdomen soft [Non Tender] : non-tender [Normal Gait] : normal gait [No Edema] : no edema [Moves all extremities] : moves all extremities [No Focal Deficits] : no focal deficits [Normal Speech] : normal speech [Alert and Oriented] : alert and oriented

## 2022-11-29 NOTE — END OF VISIT
[] : Resident Composite Graft Text: The defect edges were debeveled with a #15 scalpel blade.  Given the location of the defect, shape of the defect, the proximity to free margins and the fact the defect was full thickness a composite graft was deemed most appropriate.  The defect was outline and then transferred to the donor site.  A full thickness graft was then excised from the donor site. The graft was then placed in the primary defect, oriented appropriately and then sutured into place.  The secondary defect was then repaired using a primary closure.

## 2022-12-07 ENCOUNTER — OUTPATIENT (OUTPATIENT)
Dept: OUTPATIENT SERVICES | Facility: HOSPITAL | Age: 67
LOS: 1 days | Discharge: HOME | End: 2022-12-07

## 2022-12-07 DIAGNOSIS — Z98.890 OTHER SPECIFIED POSTPROCEDURAL STATES: Chronic | ICD-10-CM

## 2022-12-07 DIAGNOSIS — J38.1 POLYP OF VOCAL CORD AND LARYNX: Chronic | ICD-10-CM

## 2022-12-07 DIAGNOSIS — Z90.710 ACQUIRED ABSENCE OF BOTH CERVIX AND UTERUS: Chronic | ICD-10-CM

## 2022-12-07 DIAGNOSIS — Z90.49 ACQUIRED ABSENCE OF OTHER SPECIFIED PARTS OF DIGESTIVE TRACT: Chronic | ICD-10-CM

## 2022-12-07 DIAGNOSIS — R91.1 SOLITARY PULMONARY NODULE: Chronic | ICD-10-CM

## 2022-12-12 ENCOUNTER — FORM ENCOUNTER (OUTPATIENT)
Age: 67
End: 2022-12-12

## 2022-12-13 ENCOUNTER — OUTPATIENT (OUTPATIENT)
Dept: OUTPATIENT SERVICES | Facility: HOSPITAL | Age: 67
LOS: 1 days | Discharge: HOME | End: 2022-12-13

## 2022-12-13 DIAGNOSIS — Z09 ENCOUNTER FOR FOLLOW-UP EXAMINATION AFTER COMPLETED TREATMENT FOR CONDITIONS OTHER THAN MALIGNANT NEOPLASM: ICD-10-CM

## 2022-12-13 DIAGNOSIS — Z90.49 ACQUIRED ABSENCE OF OTHER SPECIFIED PARTS OF DIGESTIVE TRACT: Chronic | ICD-10-CM

## 2022-12-13 DIAGNOSIS — J38.1 POLYP OF VOCAL CORD AND LARYNX: Chronic | ICD-10-CM

## 2022-12-13 DIAGNOSIS — Z98.890 OTHER SPECIFIED POSTPROCEDURAL STATES: Chronic | ICD-10-CM

## 2022-12-13 DIAGNOSIS — R91.1 SOLITARY PULMONARY NODULE: Chronic | ICD-10-CM

## 2022-12-13 DIAGNOSIS — Z90.710 ACQUIRED ABSENCE OF BOTH CERVIX AND UTERUS: Chronic | ICD-10-CM

## 2022-12-13 PROCEDURE — 93320 DOPPLER ECHO COMPLETE: CPT | Mod: 26

## 2022-12-13 PROCEDURE — 93325 DOPPLER ECHO COLOR FLOW MAPG: CPT | Mod: 26

## 2022-12-13 PROCEDURE — 93312 ECHO TRANSESOPHAGEAL: CPT | Mod: 26,XU

## 2022-12-14 ENCOUNTER — OUTPATIENT (OUTPATIENT)
Dept: OUTPATIENT SERVICES | Facility: HOSPITAL | Age: 67
LOS: 1 days | Discharge: HOME | End: 2022-12-14

## 2022-12-14 DIAGNOSIS — J38.1 POLYP OF VOCAL CORD AND LARYNX: Chronic | ICD-10-CM

## 2022-12-14 DIAGNOSIS — R91.1 SOLITARY PULMONARY NODULE: Chronic | ICD-10-CM

## 2022-12-14 DIAGNOSIS — Z98.890 OTHER SPECIFIED POSTPROCEDURAL STATES: Chronic | ICD-10-CM

## 2022-12-14 DIAGNOSIS — Z90.49 ACQUIRED ABSENCE OF OTHER SPECIFIED PARTS OF DIGESTIVE TRACT: Chronic | ICD-10-CM

## 2022-12-14 DIAGNOSIS — Z90.710 ACQUIRED ABSENCE OF BOTH CERVIX AND UTERUS: Chronic | ICD-10-CM

## 2022-12-22 ENCOUNTER — APPOINTMENT (OUTPATIENT)
Dept: OTOLARYNGOLOGY | Facility: CLINIC | Age: 67
End: 2022-12-22

## 2022-12-22 DIAGNOSIS — H74.8X1 OTHER SPECIFIED DISORDERS OF RIGHT MIDDLE EAR AND MASTOID: ICD-10-CM

## 2022-12-22 PROCEDURE — 31575 DIAGNOSTIC LARYNGOSCOPY: CPT

## 2022-12-22 PROCEDURE — 99213 OFFICE O/P EST LOW 20 MIN: CPT | Mod: 25

## 2022-12-22 NOTE — ASSESSMENT
[FreeTextEntry1] : Doing well after extraction\par Getting follow up CT chest in January\par ARMANDO\par RV 2/2023 (two years out)\par Will check hearing test at that point

## 2022-12-22 NOTE — HISTORY OF PRESENT ILLNESS
[de-identified] : Oncology Summary\par Stage: T3N1M0\par Site: Tongue base\par Pathology: p16-positive SCC\par Treatment: Chemoradiation completed 2/2021\par Disease status: ARMANDO\par Swallow: No issues\par Airway: Decannulated\par Smoking: Former\par Thyroid: TSH 3/2022 0.92\par  [FreeTextEntry1] : Patient returns today following up on carcinoma base of tongue .  Patient states she has no complaints .  She just had dental extraction and is undergoing hyperbaric.

## 2022-12-22 NOTE — PHYSICAL EXAM
[de-identified] : radiation fibrosis [de-identified] : right hemotympanum [Midline] : trachea located in midline position [de-identified] : extracted sites healing [Normal] : no rashes

## 2023-01-11 ENCOUNTER — APPOINTMENT (OUTPATIENT)
Dept: INTERNAL MEDICINE | Facility: CLINIC | Age: 68
End: 2023-01-11
Payer: MEDICARE

## 2023-01-11 ENCOUNTER — OUTPATIENT (OUTPATIENT)
Dept: OUTPATIENT SERVICES | Facility: HOSPITAL | Age: 68
LOS: 1 days | Discharge: HOME | End: 2023-01-11

## 2023-01-11 VITALS
HEART RATE: 104 BPM | SYSTOLIC BLOOD PRESSURE: 129 MMHG | DIASTOLIC BLOOD PRESSURE: 79 MMHG | TEMPERATURE: 96.9 F | OXYGEN SATURATION: 95 % | BODY MASS INDEX: 28.35 KG/M2 | HEIGHT: 63 IN | WEIGHT: 160 LBS

## 2023-01-11 DIAGNOSIS — C01 MALIGNANT NEOPLASM OF BASE OF TONGUE: ICD-10-CM

## 2023-01-11 DIAGNOSIS — Z98.890 OTHER SPECIFIED POSTPROCEDURAL STATES: Chronic | ICD-10-CM

## 2023-01-11 DIAGNOSIS — Z90.710 ACQUIRED ABSENCE OF BOTH CERVIX AND UTERUS: Chronic | ICD-10-CM

## 2023-01-11 DIAGNOSIS — Z90.49 ACQUIRED ABSENCE OF OTHER SPECIFIED PARTS OF DIGESTIVE TRACT: Chronic | ICD-10-CM

## 2023-01-11 DIAGNOSIS — M94.8X9 OTHER SPECIFIED DISORDERS OF CARTILAGE, UNSPECIFIED SITES: ICD-10-CM

## 2023-01-11 DIAGNOSIS — J38.1 POLYP OF VOCAL CORD AND LARYNX: Chronic | ICD-10-CM

## 2023-01-11 DIAGNOSIS — R91.1 SOLITARY PULMONARY NODULE: Chronic | ICD-10-CM

## 2023-01-11 LAB
ALBUMIN SERPL ELPH-MCNC: 4.5 G/DL
ALBUMIN SERPL ELPH-MCNC: 4.8 G/DL
ALP BLD-CCNC: 129 U/L
ALP BLD-CCNC: 65 U/L
ALT SERPL-CCNC: 13 U/L
ALT SERPL-CCNC: 19 U/L
ANION GAP SERPL CALC-SCNC: 14 MMOL/L
ANION GAP SERPL CALC-SCNC: 16 MMOL/L
AST SERPL-CCNC: 17 U/L
AST SERPL-CCNC: 23 U/L
BASOPHILS # BLD AUTO: 0.03 K/UL
BASOPHILS # BLD AUTO: 0.04 K/UL
BASOPHILS NFR BLD AUTO: 0.6 %
BASOPHILS NFR BLD AUTO: 0.8 %
BILIRUB SERPL-MCNC: 0.3 MG/DL
BILIRUB SERPL-MCNC: 0.4 MG/DL
BUN SERPL-MCNC: 10 MG/DL
BUN SERPL-MCNC: 15 MG/DL
CALCIUM SERPL-MCNC: 10 MG/DL
CALCIUM SERPL-MCNC: 9.9 MG/DL
CHLORIDE SERPL-SCNC: 102 MMOL/L
CHLORIDE SERPL-SCNC: 103 MMOL/L
CHOLEST SERPL-MCNC: 132 MG/DL
CHOLEST SERPL-MCNC: 143 MG/DL
CO2 SERPL-SCNC: 23 MMOL/L
CO2 SERPL-SCNC: 24 MMOL/L
CREAT SERPL-MCNC: 0.9 MG/DL
CREAT SERPL-MCNC: 0.9 MG/DL
EGFR: 70 ML/MIN/1.73M2
EGFR: 70 ML/MIN/1.73M2
EOSINOPHIL # BLD AUTO: 0.1 K/UL
EOSINOPHIL # BLD AUTO: 0.11 K/UL
EOSINOPHIL NFR BLD AUTO: 2 %
EOSINOPHIL NFR BLD AUTO: 2.1 %
ESTIMATED AVERAGE GLUCOSE: 111 MG/DL
ESTIMATED AVERAGE GLUCOSE: 114 MG/DL
GLUCOSE SERPL-MCNC: 106 MG/DL
GLUCOSE SERPL-MCNC: 86 MG/DL
HBA1C MFR BLD HPLC: 5.5 %
HBA1C MFR BLD HPLC: 5.6 %
HCT VFR BLD CALC: 42.3 %
HCT VFR BLD CALC: 43.9 %
HDLC SERPL-MCNC: 53 MG/DL
HDLC SERPL-MCNC: 58 MG/DL
HGB BLD-MCNC: 13.5 G/DL
HGB BLD-MCNC: 14.1 G/DL
IMM GRANULOCYTES NFR BLD AUTO: 0.2 %
IMM GRANULOCYTES NFR BLD AUTO: 0.4 %
LDLC SERPL CALC-MCNC: 43 MG/DL
LDLC SERPL CALC-MCNC: 52 MG/DL
LYMPHOCYTES # BLD AUTO: 0.5 K/UL
LYMPHOCYTES # BLD AUTO: 0.5 K/UL
LYMPHOCYTES NFR BLD AUTO: 9.5 %
LYMPHOCYTES NFR BLD AUTO: 9.9 %
MAN DIFF?: NORMAL
MAN DIFF?: NORMAL
MCHC RBC-ENTMCNC: 29.9 PG
MCHC RBC-ENTMCNC: 30.6 PG
MCHC RBC-ENTMCNC: 31.9 G/DL
MCHC RBC-ENTMCNC: 32.1 G/DL
MCV RBC AUTO: 93 FL
MCV RBC AUTO: 95.9 FL
MONOCYTES # BLD AUTO: 0.38 K/UL
MONOCYTES # BLD AUTO: 0.46 K/UL
MONOCYTES NFR BLD AUTO: 7.2 %
MONOCYTES NFR BLD AUTO: 9.1 %
NEUTROPHILS # BLD AUTO: 3.92 K/UL
NEUTROPHILS # BLD AUTO: 4.22 K/UL
NEUTROPHILS NFR BLD AUTO: 78 %
NEUTROPHILS NFR BLD AUTO: 80.2 %
NONHDLC SERPL-MCNC: 79 MG/DL
NONHDLC SERPL-MCNC: 85 MG/DL
PLATELET # BLD AUTO: 211 K/UL
PLATELET # BLD AUTO: 221 K/UL
POTASSIUM SERPL-SCNC: 4.1 MMOL/L
POTASSIUM SERPL-SCNC: 4.7 MMOL/L
PROT SERPL-MCNC: 6.3 G/DL
PROT SERPL-MCNC: 6.5 G/DL
RBC # BLD: 4.41 M/UL
RBC # BLD: 4.72 M/UL
RBC # FLD: 14.1 %
RBC # FLD: 14.1 %
SODIUM SERPL-SCNC: 139 MMOL/L
SODIUM SERPL-SCNC: 143 MMOL/L
TRIGL SERPL-MCNC: 164 MG/DL
TRIGL SERPL-MCNC: 178 MG/DL
TSH SERPL-ACNC: 1.11 UIU/ML
WBC # FLD AUTO: 5.03 K/UL
WBC # FLD AUTO: 5.26 K/UL

## 2023-01-11 PROCEDURE — 99214 OFFICE O/P EST MOD 30 MIN: CPT | Mod: GC

## 2023-01-11 RX ORDER — HYDROXYCHLOROQUINE SULFATE 200 MG/1
200 TABLET, FILM COATED ORAL DAILY
Qty: 30 | Refills: 3 | Status: DISCONTINUED | COMMUNITY
Start: 2017-09-21 | End: 2023-01-11

## 2023-01-11 RX ORDER — HYDROXYCHLOROQUINE SULFATE 200 MG/1
200 TABLET, FILM COATED ORAL DAILY
Qty: 30 | Refills: 6 | Status: DISCONTINUED | COMMUNITY
Start: 2022-05-18 | End: 2023-01-11

## 2023-01-11 RX ORDER — OMEPRAZOLE, SODIUM BICARBONATE 20; 1680 MG/1; MG/1
20-1680 POWDER, FOR SUSPENSION ORAL
Qty: 90 | Refills: 0 | Status: DISCONTINUED | COMMUNITY
Start: 2021-04-05 | End: 2023-01-11

## 2023-01-11 NOTE — PHYSICAL EXAM
[No Acute Distress] : no acute distress [Well Nourished] : well nourished [Well Developed] : well developed [Well-Appearing] : well-appearing [Normal Sclera/Conjunctiva] : normal sclera/conjunctiva [Normal Outer Ear/Nose] : the outer ears and nose were normal in appearance [Normal Oropharynx] : the oropharynx was normal [No JVD] : no jugular venous distention [No Lymphadenopathy] : no lymphadenopathy [Supple] : supple [Thyroid Normal, No Nodules] : the thyroid was normal and there were no nodules present [No Respiratory Distress] : no respiratory distress  [No Accessory Muscle Use] : no accessory muscle use [Normal Rate] : normal rate  [Regular Rhythm] : with a regular rhythm [Normal S1, S2] : normal S1 and S2 [No Carotid Bruits] : no carotid bruits [No Edema] : there was no peripheral edema [Soft] : abdomen soft [Non Tender] : non-tender [Non-distended] : non-distended [No HSM] : no HSM [Normal Anterior Cervical Nodes] : no anterior cervical lymphadenopathy [No CVA Tenderness] : no CVA  tenderness [Coordination Grossly Intact] : coordination grossly intact [No Focal Deficits] : no focal deficits [Normal Affect] : the affect was normal [Normal Insight/Judgement] : insight and judgment were intact [de-identified] : Wheezing

## 2023-01-11 NOTE — END OF VISIT
[] : Resident [FreeTextEntry3] : ct lung ordered by pulmonary needs close f/u 3months awaiting prior authorization and f/u with pulmonary

## 2023-01-11 NOTE — HISTORY OF PRESENT ILLNESS
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 Patient is here for routine follow up. Pt states she's been feeling congestion last 2 weeks, denies flu like sx. She is wheezing on exam and SOB NYHA3

## 2023-01-11 NOTE — ASSESSMENT
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 s/p ablation, SCC of tongue (cisplatin x5 cycle, most recent 21 and radiotherapy )s/p laryngoscopy (no lesions identified) and complex sclerosing papillar lesion of left breast here for f/u \par \par #Polychondritis\par - triggered by extreme weather, cold>heat\par - follows with Rheum \par \par #Hypertriglyceridemia, improved\par - T on ezetimibe & simvastatin \par \par #COPD\par #Rt mid&low lung adenocarcinoma s/p ablation\par - patient following with Pulm today \par - due for her 3 month f/u CT\par - wheezing on exam: Prednisone 40mg QD for 5 days \par \par #SCC of tongue s/p chemo and radiotherapy\par - dry mouth  tx\par - s/p hyperbaric tx\par - followed by ENT surg\par \par #Left Brest mass\par - following with Dr. Keith, due for repeat mammogram in 2023\par \par #HTN\par - c/w losartan \par \par #Health Care maintenance\par - Colonoscopy: patient opting for FIT testing, has the kit at home \par - Mammogram: completed 2022, has appoitment for next time \par - Pap Smear: s/p total hysterectomy with cervix removed per patient \par - Flu Vaccine: administered 10/18/22\par - Pneumococcal: administered 2023\par - rto in 3 months and prn. \par \par  \par

## 2023-01-11 NOTE — REVIEW OF SYSTEMS
[Shortness Of Breath] : shortness of breath [Wheezing] : wheezing [Cough] : cough [Back Pain] : back pain [Fever] : no fever [Chills] : no chills [Fatigue] : no fatigue [Night Sweats] : no night sweats [Discharge] : no discharge [Pain] : no pain [Vision Problems] : no vision problems [Earache] : no earache [Hearing Loss] : no hearing loss [Nasal Discharge] : no nasal discharge [Chest Pain] : no chest pain [Palpitations] : no palpitations [Orthopnea] : no orthopnea [Abdominal Pain] : no abdominal pain [Nausea] : no nausea [Vomiting] : no vomiting [Dysuria] : no dysuria [Incontinence] : no incontinence [Hematuria] : no hematuria [Itching] : no itching [Skin Rash] : no skin rash [Headache] : no headache [Memory Loss] : no memory loss [FreeTextEntry4] : Dry mouth

## 2023-01-13 ENCOUNTER — OUTPATIENT (OUTPATIENT)
Dept: OUTPATIENT SERVICES | Facility: HOSPITAL | Age: 68
LOS: 1 days | Discharge: HOME | End: 2023-01-13
Payer: MEDICARE

## 2023-01-13 ENCOUNTER — RESULT REVIEW (OUTPATIENT)
Age: 68
End: 2023-01-13

## 2023-01-13 DIAGNOSIS — Z98.890 OTHER SPECIFIED POSTPROCEDURAL STATES: Chronic | ICD-10-CM

## 2023-01-13 DIAGNOSIS — Z90.710 ACQUIRED ABSENCE OF BOTH CERVIX AND UTERUS: Chronic | ICD-10-CM

## 2023-01-13 DIAGNOSIS — R91.1 SOLITARY PULMONARY NODULE: Chronic | ICD-10-CM

## 2023-01-13 DIAGNOSIS — C76.0 MALIGNANT NEOPLASM OF HEAD, FACE AND NECK: ICD-10-CM

## 2023-01-13 DIAGNOSIS — Z90.49 ACQUIRED ABSENCE OF OTHER SPECIFIED PARTS OF DIGESTIVE TRACT: Chronic | ICD-10-CM

## 2023-01-13 DIAGNOSIS — J38.1 POLYP OF VOCAL CORD AND LARYNX: Chronic | ICD-10-CM

## 2023-01-13 PROCEDURE — 93880 EXTRACRANIAL BILAT STUDY: CPT | Mod: 26

## 2023-01-17 DIAGNOSIS — M94.8X9 OTHER SPECIFIED DISORDERS OF CARTILAGE, UNSPECIFIED SITES: ICD-10-CM

## 2023-01-17 DIAGNOSIS — E78.5 HYPERLIPIDEMIA, UNSPECIFIED: ICD-10-CM

## 2023-01-17 DIAGNOSIS — J44.9 CHRONIC OBSTRUCTIVE PULMONARY DISEASE, UNSPECIFIED: ICD-10-CM

## 2023-01-17 DIAGNOSIS — C01 MALIGNANT NEOPLASM OF BASE OF TONGUE: ICD-10-CM

## 2023-01-17 DIAGNOSIS — I10 ESSENTIAL (PRIMARY) HYPERTENSION: ICD-10-CM

## 2023-01-18 ENCOUNTER — OUTPATIENT (OUTPATIENT)
Dept: OUTPATIENT SERVICES | Facility: HOSPITAL | Age: 68
LOS: 1 days | Discharge: HOME | End: 2023-01-18
Payer: MEDICARE

## 2023-01-18 ENCOUNTER — RESULT REVIEW (OUTPATIENT)
Age: 68
End: 2023-01-18

## 2023-01-18 DIAGNOSIS — R91.8 OTHER NONSPECIFIC ABNORMAL FINDING OF LUNG FIELD: ICD-10-CM

## 2023-01-18 DIAGNOSIS — Z98.890 OTHER SPECIFIED POSTPROCEDURAL STATES: Chronic | ICD-10-CM

## 2023-01-18 DIAGNOSIS — J38.1 POLYP OF VOCAL CORD AND LARYNX: Chronic | ICD-10-CM

## 2023-01-18 DIAGNOSIS — Z90.49 ACQUIRED ABSENCE OF OTHER SPECIFIED PARTS OF DIGESTIVE TRACT: Chronic | ICD-10-CM

## 2023-01-18 DIAGNOSIS — Z90.710 ACQUIRED ABSENCE OF BOTH CERVIX AND UTERUS: Chronic | ICD-10-CM

## 2023-01-18 DIAGNOSIS — R91.1 SOLITARY PULMONARY NODULE: Chronic | ICD-10-CM

## 2023-01-18 PROCEDURE — 71250 CT THORAX DX C-: CPT | Mod: 26

## 2023-01-24 ENCOUNTER — APPOINTMENT (OUTPATIENT)
Dept: PULMONOLOGY | Facility: CLINIC | Age: 68
End: 2023-01-24
Payer: MEDICARE

## 2023-01-24 ENCOUNTER — OUTPATIENT (OUTPATIENT)
Dept: OUTPATIENT SERVICES | Facility: HOSPITAL | Age: 68
LOS: 1 days | Discharge: HOME | End: 2023-01-24

## 2023-01-24 VITALS
HEIGHT: 63 IN | BODY MASS INDEX: 27.82 KG/M2 | WEIGHT: 157 LBS | SYSTOLIC BLOOD PRESSURE: 138 MMHG | DIASTOLIC BLOOD PRESSURE: 85 MMHG | OXYGEN SATURATION: 95 % | TEMPERATURE: 98 F | HEART RATE: 78 BPM

## 2023-01-24 DIAGNOSIS — R05.9 COUGH, UNSPECIFIED: ICD-10-CM

## 2023-01-24 DIAGNOSIS — R91.1 SOLITARY PULMONARY NODULE: ICD-10-CM

## 2023-01-24 DIAGNOSIS — Z90.710 ACQUIRED ABSENCE OF BOTH CERVIX AND UTERUS: Chronic | ICD-10-CM

## 2023-01-24 DIAGNOSIS — R91.1 SOLITARY PULMONARY NODULE: Chronic | ICD-10-CM

## 2023-01-24 DIAGNOSIS — R91.8 OTHER NONSPECIFIC ABNORMAL FINDING OF LUNG FIELD: ICD-10-CM

## 2023-01-24 DIAGNOSIS — Z87.891 PERSONAL HISTORY OF NICOTINE DEPENDENCE: ICD-10-CM

## 2023-01-24 DIAGNOSIS — Z90.49 ACQUIRED ABSENCE OF OTHER SPECIFIED PARTS OF DIGESTIVE TRACT: Chronic | ICD-10-CM

## 2023-01-24 DIAGNOSIS — Z98.890 OTHER SPECIFIED POSTPROCEDURAL STATES: Chronic | ICD-10-CM

## 2023-01-24 DIAGNOSIS — J38.1 POLYP OF VOCAL CORD AND LARYNX: Chronic | ICD-10-CM

## 2023-01-24 DIAGNOSIS — Z87.09 PERSONAL HISTORY OF OTHER DISEASES OF THE RESPIRATORY SYSTEM: ICD-10-CM

## 2023-01-24 DIAGNOSIS — J44.9 CHRONIC OBSTRUCTIVE PULMONARY DISEASE, UNSPECIFIED: ICD-10-CM

## 2023-01-24 PROCEDURE — 99214 OFFICE O/P EST MOD 30 MIN: CPT | Mod: GC

## 2023-01-24 NOTE — ASSESSMENT
[FreeTextEntry1] : ASSESSMENT: 67 year-old female with a PMH of COPD (not on CPAP/BiPAP), DLD, HTN, RML+RLL lung nodules s/p ablation, SCC of tongue base s/p cisplatin x 5 cycles (last 2/25/21) and RT last in 2021 s/p trach and PEG s/p removal, complex sclerosing papillar lesion of left breast (followed by Dr. Keith w/ repeat US breast (Sep 2021), presenting with stable COPD on triple inhaler therapy and right lower lobe pulmonary nodule slightly decreased in size from 9mm to 4-5mm on recent CT scan on 1/18.\par \par Plans:\par # COPD\par - No significant symptoms\par - Continue Anoro, albuterol and ipratropium (all refilled today)\par \par # RLL pulmonary nodule\par - CT on 1/18 shows 4-5mm right lower lobe pulmonary nodule, decreased in size from 9mm on previous CT\par - Repeat CT in 1 year\par \par # SCC of tongue base\par - s/p chemotherapy and radiation therapy.\par - s/p trach and PEG and removal.\par - Following with ENT.\par \par # Health Care Maintenance\par # Hx smoking - 52 pack yrs\par - Annual LDCT for lung cancer screening\par - Quit smoking in 2020; continue cessation\par - Follow up in Pulmonary clinic in 6 months\par \par

## 2023-01-24 NOTE — PHYSICAL EXAM
[No Acute Distress] : no acute distress [Normal Appearance] : normal appearance [Normal Rate/Rhythm] : normal rate/rhythm [Normal S1, S2] : normal s1, s2 [No Murmurs] : no murmurs [No Resp Distress] : no resp distress [No Acc Muscle Use] : no acc muscle use [Normal Rhythm and Effort] : normal rhythm and effort [Rales] : rales [No Abnormalities] : no abnormalities [Benign] : benign [No Focal Deficits] : no focal deficits [Oriented x3] : oriented x3 [Normal Mood] : normal mood [Normal Affect] : normal affect

## 2023-01-24 NOTE — HISTORY OF PRESENT ILLNESS
[Former] : former [>= 20 pack years] : >= 20 pack years [TextBox_4] : 67 year-old female with a PMH of COPD (not on CPAP/BiPAP), DLD, HTN, RML+RLL lung nodules s/p ablation, SCC of tongue base s/p cisplatin x 5 cycles (last 2/25/21) and RT last in 2021 s/p trach and PEG s/p removal, complex sclerosing papillar lesion of left breast (followed by Dr. Keith w/ repeat US breast (Sep 2021), presenting for follow-up for her COPD and pulmonary nodule.\par \par States she smoked 1 PPD for 52 years, quitting in 2020 after being diagnosed with SCC of the tongue. \par \par 10/4/22 CT chest shows 9mm RLL nodule, increased in size from 7mm. Repeat CT 1/18/23 shows RLL decreased in size to 4-5mm. Patient denies any worsening of her symptoms and requests refills of her medications. [TextBox_13] : 52 [TextBox_11] : 1 [YearQuit] : 2020

## 2023-01-31 ENCOUNTER — APPOINTMENT (OUTPATIENT)
Dept: OPHTHALMOLOGY | Facility: CLINIC | Age: 68
End: 2023-01-31
Payer: MEDICARE

## 2023-01-31 ENCOUNTER — OUTPATIENT (OUTPATIENT)
Dept: OUTPATIENT SERVICES | Facility: HOSPITAL | Age: 68
LOS: 1 days | Discharge: HOME | End: 2023-01-31

## 2023-01-31 DIAGNOSIS — Z90.710 ACQUIRED ABSENCE OF BOTH CERVIX AND UTERUS: Chronic | ICD-10-CM

## 2023-01-31 DIAGNOSIS — Z98.890 OTHER SPECIFIED POSTPROCEDURAL STATES: Chronic | ICD-10-CM

## 2023-01-31 DIAGNOSIS — J38.1 POLYP OF VOCAL CORD AND LARYNX: Chronic | ICD-10-CM

## 2023-01-31 DIAGNOSIS — Z90.49 ACQUIRED ABSENCE OF OTHER SPECIFIED PARTS OF DIGESTIVE TRACT: Chronic | ICD-10-CM

## 2023-01-31 DIAGNOSIS — R91.1 SOLITARY PULMONARY NODULE: Chronic | ICD-10-CM

## 2023-01-31 PROCEDURE — 92134 CPTRZ OPH DX IMG PST SGM RTA: CPT | Mod: 26

## 2023-01-31 PROCEDURE — 92014 COMPRE OPH EXAM EST PT 1/>: CPT

## 2023-02-03 DIAGNOSIS — H04.129 DRY EYE SYNDROME OF UNSPECIFIED LACRIMAL GLAND: ICD-10-CM

## 2023-02-03 DIAGNOSIS — Z79.899 OTHER LONG TERM (CURRENT) DRUG THERAPY: ICD-10-CM

## 2023-02-03 DIAGNOSIS — H02.9 UNSPECIFIED DISORDER OF EYELID: ICD-10-CM

## 2023-02-16 ENCOUNTER — APPOINTMENT (OUTPATIENT)
Dept: OTOLARYNGOLOGY | Facility: CLINIC | Age: 68
End: 2023-02-16
Payer: MEDICAID

## 2023-02-16 VITALS — HEIGHT: 63 IN | WEIGHT: 158 LBS | BODY MASS INDEX: 28 KG/M2

## 2023-02-16 DIAGNOSIS — C01 MALIGNANT NEOPLASM OF BASE OF TONGUE: ICD-10-CM

## 2023-02-16 PROCEDURE — 31575 DIAGNOSTIC LARYNGOSCOPY: CPT

## 2023-02-16 PROCEDURE — 99214 OFFICE O/P EST MOD 30 MIN: CPT | Mod: 25

## 2023-02-16 NOTE — PHYSICAL EXAM
[Midline] : trachea located in midline position [Edentulous] : edentulous [de-identified] : well healed from extractions [Normal] : no rashes

## 2023-02-16 NOTE — HISTORY OF PRESENT ILLNESS
[de-identified] : Oncology Summary\par Stage: T3N1M0\par Site: Tongue base\par Pathology: p16-positive SCC\par Treatment: Chemoradiation completed 2/2021\par Disease status: ARMANDO\par Swallow: No issues\par Airway: Decannulated\par Smoking: Former\par Thyroid: TSH 3/2022 0.92\par  [FreeTextEntry1] : Patient returns today following up on carcinoma base of tongue .  She under went hyperbaric treatment , here to have ear evaluated . She denies any right ear discomfort .

## 2023-02-16 NOTE — PROCEDURE
[Complicated Symptoms] : complicated symptoms requiring more thorough examination than provided by mirror [None] : none [Flexible Endoscope] : examined with the flexible endoscope [de-identified] : Flexible laryngoscopy performed. Nasal cavity, nasopharynx, oropharynx, hypopharynx, and larynx evaluated. No masses or lesions, bilateral true vocal folds symmetric and mobile.\par

## 2023-02-23 NOTE — PROGRESS NOTE ADULT - ASSESSMENT
Toma Berman MD         9:54 AM  The concern about the bump is if she is straining to have BM or she is coughing a lot she could have a herniation at this site.  If the area does not improve to baseline by the weekend, or changes in anyway we should get a CT scan to ensure no hernia.  We can also see her next week for exam too in lieu of a CT if she would prefer.        PLAN:   Neurologic:   -AAOx3   -pain control with tylenol PRN     Respiratory:   -s/p trach, trach collar in place  -satting >92% on 50% O2   Hx of COPD, current smoker   -continue home albuterol 2 puffs q4hr, spiriva 18mcg cap, qD    Cardiovascular:   Hx of HTN  -continue losartan 25mg qD  Hx of HLD   -continue simvastatin   -CE x3 negative   -keep normotensive     Gastrointestinal/Nutrition:   -Dobhoff NGT placed in OR   -bolus TF started 200cc/h, goal 350cc/h to advance nichole   -SLP eval post op, failed w/ thins, will re-eval Monday    Renal/Genitourinary:   -voided   -IVL   -BUN/Cr trend     Hematologic:   -H/H 13.2/40.3  -DVT ppx: Heparin 5000 U q8hr, SCDs    Rheum: Hx of polychondritis  - restart home hydroxychloroquine 200mg PO BID and cellcept    Infectious Disease:   -monitor for fever, leukocytosis  -iveth-op cefazolin   -WBC 10.69  -U/a positive, asymptomatic, UCx E.coli     Lines/Tubes: Left PIV, tracheostomy, dobhoff ngt     Endocrine:   -no hx of chronic endocrine disease   -glucose 116    Disposition: SICU   PLAN:   Neurologic:   -AAOx3   -pain control with tylenol PRN     Respiratory:   -s/p trach, trach collar in place  -satting >92% on 40% O2   Hx of COPD, current smoker   -continue home albuterol 2 puffs q4hr, spiriva 18mcg cap, qD    Cardiovascular:   Hx of HTN  -continue losartan 25mg qD  Hx of HLD   -continue simvastatin   -CE x3 negative   -keep normotensive     Gastrointestinal/Nutrition:   -Dobhoff NGT placed in OR   -bolus TF started 200cc/h, goal 350cc/h to advance  -SLP eval post op, failed w/ thins, will re-eval Monday    Renal/Genitourinary:   -voided   -IVL   -BUN/Cr trend     Hematologic:   -H/H 13.2/40.3  -DVT ppx: Heparin 5000 U q8hr, SCDs    Rheum: Hx of polychondritis  - restart home hydroxychloroquine 200mg PO BID and cellcept    Infectious Disease:   -monitor for fever, leukocytosis  -iveth-op cefazolin   -WBC 10.69  -U/a positive, asymptomatic, UCx E.coli     Lines/Tubes: Left PIV, tracheostomy, dobhoff ngt     Endocrine:   -no hx of chronic endocrine disease   -glucose 116    Disposition: SICU

## 2023-03-10 ENCOUNTER — APPOINTMENT (OUTPATIENT)
Dept: PULMONOLOGY | Facility: CLINIC | Age: 68
End: 2023-03-10

## 2023-03-15 ENCOUNTER — RESULT REVIEW (OUTPATIENT)
Age: 68
End: 2023-03-15

## 2023-03-15 ENCOUNTER — OUTPATIENT (OUTPATIENT)
Dept: OUTPATIENT SERVICES | Facility: HOSPITAL | Age: 68
LOS: 1 days | End: 2023-03-15
Payer: MEDICARE

## 2023-03-15 DIAGNOSIS — Z98.890 OTHER SPECIFIED POSTPROCEDURAL STATES: Chronic | ICD-10-CM

## 2023-03-15 DIAGNOSIS — R92.8 OTHER ABNORMAL AND INCONCLUSIVE FINDINGS ON DIAGNOSTIC IMAGING OF BREAST: ICD-10-CM

## 2023-03-15 DIAGNOSIS — J38.1 POLYP OF VOCAL CORD AND LARYNX: Chronic | ICD-10-CM

## 2023-03-15 DIAGNOSIS — Z90.49 ACQUIRED ABSENCE OF OTHER SPECIFIED PARTS OF DIGESTIVE TRACT: Chronic | ICD-10-CM

## 2023-03-15 DIAGNOSIS — R91.1 SOLITARY PULMONARY NODULE: Chronic | ICD-10-CM

## 2023-03-15 DIAGNOSIS — Z90.710 ACQUIRED ABSENCE OF BOTH CERVIX AND UTERUS: Chronic | ICD-10-CM

## 2023-03-15 DIAGNOSIS — Z00.8 ENCOUNTER FOR OTHER GENERAL EXAMINATION: ICD-10-CM

## 2023-03-15 PROCEDURE — 77066 DX MAMMO INCL CAD BI: CPT | Mod: 26

## 2023-03-15 PROCEDURE — 77066 DX MAMMO INCL CAD BI: CPT

## 2023-03-15 PROCEDURE — G0279: CPT

## 2023-03-15 PROCEDURE — 76642 ULTRASOUND BREAST LIMITED: CPT | Mod: 26,LT

## 2023-03-15 PROCEDURE — 76642 ULTRASOUND BREAST LIMITED: CPT | Mod: LT

## 2023-03-15 PROCEDURE — G0279: CPT | Mod: 26

## 2023-03-16 DIAGNOSIS — R92.8 OTHER ABNORMAL AND INCONCLUSIVE FINDINGS ON DIAGNOSTIC IMAGING OF BREAST: ICD-10-CM

## 2023-03-22 ENCOUNTER — APPOINTMENT (OUTPATIENT)
Dept: BREAST CENTER | Facility: CLINIC | Age: 68
End: 2023-03-22
Payer: MEDICARE

## 2023-03-22 VITALS
HEIGHT: 63 IN | BODY MASS INDEX: 28 KG/M2 | SYSTOLIC BLOOD PRESSURE: 156 MMHG | WEIGHT: 158 LBS | DIASTOLIC BLOOD PRESSURE: 83 MMHG

## 2023-03-22 DIAGNOSIS — R92.8 OTHER ABNORMAL AND INCONCLUSIVE FINDINGS ON DIAGNOSTIC IMAGING OF BREAST: ICD-10-CM

## 2023-03-22 PROCEDURE — 99212 OFFICE O/P EST SF 10 MIN: CPT

## 2023-03-22 NOTE — PHYSICAL EXAM
[Normocephalic] : normocephalic [Atraumatic] : atraumatic [No Supraclavicular Adenopathy] : no supraclavicular adenopathy [Examined in the supine and seated position] : examined in the supine and seated position [No dominant masses] : no dominant masses in right breast  [No dominant masses] : no dominant masses left breast [No Nipple Discharge] : no left nipple discharge [No Rashes] : no rashes [No Ulceration] : no ulceration [Breast Nipple Inversion] : nipples not inverted [Breast Nipple Retraction] : nipples not retracted [de-identified] : no suspicious mass palpated within either breast [de-identified] : no suspicious lesions palpated  [de-identified] : No axillary lymphadenopathy appreciated. [de-identified] : No axillary lymphadenopathy appreciated.

## 2023-03-22 NOTE — DATA REVIEWED
[FreeTextEntry1] : B/L Dx Mammo & Left Breast Sono - 03/15/2023:\par Breast composition:There are scattered areas of fibroglandular density.\par \par Findings:\par \par Mammogram:\par \par No suspicious mass, microcalcifications or areas of architectural distortion is seen either breast. There is stable postsurgical distortion in the left breast upper outer quadrant. Stable bilateral asymmetries, benign. When compared to the previous examinations there is no significant interval change and nothing to suggest malignancy.\par \par Ultrasound:\par \par Targeted unilateral left breast ultrasound was performed.\par \par At the 12:00 position 6 cm from the nipple, there is a stable circumscribed hypoechoic mass measuring 0.3 x 0.4 x 0.2 cm, probably benign. This is unchanged since March 2022.\par \par At the 3:00 position 3 cm from the nipple, there is a circumscribed hypoechoic mass measuring 0.5 x 0.5 x 0.3 cm. This was biopsy-proven to be a radial scar and is unchanged since March 2021.\par \par Impression: No mammographic evidence of malignancy. Stable probably benign left breast 12:00 position mass for which short-term sonographic follow-up in 6 months is recommended. Stable biopsy-proven radial scar in the left breast, unchanged since March 2021.\par \par Recommendation: Follow-up breast ultrasound in 6 months.\par \par BI-RADS category 3: Probably Benign\par \par

## 2023-03-22 NOTE — ASSESSMENT
[FreeTextEntry1] : Sol is a 68 F with two left breast radial scars, s/p L WLE on 8/24/2020, currently with a complex sclerosing papillary lesion in the L breast (not excised). \par \par On exam, I was not able to palpate any suspicious abnormalities. \par \par Her most recent imaging is as follows:\par B/L Dx Mammo & Left Breast Sono - 03/15/2023:\par -There are scattered areas of fibroglandular density.\par -There is stable postsurgical distortion in the left breast upper outer quadrant. \par -Stable bilateral asymmetries, benign. \par Targeted unilateral left breast ultrasound was performed.\par -At the 12:00 position 6 cm from the nipple, there is a stable circumscribed hypoechoic mass measuring 0.3 x 0.4 x 0.2 cm, probably benign. This is unchanged since March 2022.\par -At the 3:00 position 3 cm from the nipple, there is a circumscribed hypoechoic mass measuring 0.5 x 0.5 x 0.3 cm. This was biopsy-proven to be a radial scar and is unchanged since March 2021.\par Recommendation: Follow-up breast ultrasound in 6 months.\par BI-RADS category 3: Probably Benign\par \par We discussed BIRADS 3 lesions. These lesions have a 2% chance of harboring malignancy. There is always an option to obtain a tissue sample with a biopsy to confirm the diagnosis. The procedure was described in detail including the placement of a tissue marker clip. The risks of the procedure, including but not limited to bleeding and infection were also explained. She is not interested in biopsy at this time and agrees to continue with short-term interval imaging, which is traditionally performed at six-month intervals for approximately two years to establish stability.\par \par ---\par AS REVIEW:\par 8/24/2020 -- L WLE of two areas \par -more superior mass: proliferative type FC changes, no residual radial scar \par -more inferior mass: sclerosing IP, small radial scar \par \par Her most recent imaging was a b/l dx mammogram and US on 3/5/2021 which revealed post surgical changes in her left breast in UOQ, stable nodular asymmetry in her superior L breast, unchanged since 3/2020 and in her left breast @12-1N5, a heterogenous mass measuring 8 x 7 x 6 mm consistent with post surgical changes, deemed BIRADS 3 and in her left breast @3N3, a heterogenous nonvascular mass measuring 5 x 6 x 3 mm which was biopsy proven complex sclerosing papillary lesion. \par \par We discussed radial scars without atypia.  These lesions are considered fibroproliferative lesions without atypia.  Patients with these lesions were found to have a slightly increased relative risk compared to the reference population.  However the lesions themselves do not have any malignant potential. There is about a 10-20% upgrade rate to a high risk lesion (including atypical hyperplasias) and a <3% upgrade to cancer.  However, when atypia is present there is up to a 15-25% upgrade rate to cancer.  For this reason, we have discussed observation vs. surgical excision of this lesion.  I have recommended that we move forward with surgical excision at this time.\par \par In regards to her family history of breast cancer in her sister and maternal grandmother, her risk assessment is as follows: \par \par RISK ASSESSMENT: \par Babs\par 5yr -- 4.7%\par lifetime -- 15.8%\par \par TC v6\par 5yr -- 3.1%\par lifetime -- 8.7%\par \par This puts her in an average to intermediate risk for breast cancer. She does not quite meet criteria for screening breast MRIs.\par \par I spent a total of 15 minutes of face to face time with this patient, greater than 50% of which was spent in counseling and/or coordination of care.\par All of her questions were appropriately answered.\par She knows to call with any concerns.\par \par \par PLAN: \par -Left Breast Sono - September 2023 follow-up on BIRADS-3 imaging.\par -f/up after

## 2023-03-22 NOTE — HISTORY OF PRESENT ILLNESS
[FreeTextEntry1] : Sol is a 66 F with two left breast radial scars, s/p L WLE of two areas on 2020, now with a new complex sclerosing papillary lesion in the L breast (not excised). \par \par 2020 -- L WLE of two areas \par -more superior mass: proliferative type FC changes, no residual radial scar \par -more inferior mass: sclerosing IP, small radial scar \par \par Her work up was as follows: \par 3/2/2020 -- b/l screening mammogram \par -heterogenously dense breasts\par -focal asymmetry in L UOQ \par -no suspicious mass, calcifications, or other abnormalities in her RIGHT breast \par BIRADS 0 \par \par 2020 -- L dx mammogram and US \par -circumscribed mass with amorphous calcifications in her left lateral breast --> no significant change \par -irregular mass with amorphous calcifications in UOQ, LEFT --> BIOPSY\par -new circumscribed mass in UOQ, LEFT --> BIOPSY \par L US \par @2N6, benign cyst which correlates with lateral breast mass seen on mammogram \par BIRADS 4\par \par 2020 -- L stereobx x 2 \par -anterior asymmetry (top hat) --> radial scar \par -posterior asymmetry (minicork) --> radial scar \par \par Prior to her biopsy, she had no breast related complaints.  She denies any breast pain, has not palpated any new palpable masses in either breast and denies any nipple discharge or retraction. However, she was a little sore in her left lateral breast after her biopsy.  \par \par HISTORICAL RISK FACTORS: \par -2 prior breast biopsies as above, no prior surgeries \par -family history of breast cancer in her sister and maternal grandmother \par -, age at first live birth was 18 \par -no prior OCP use, HRT use x 2 years, stopped \par -s/p GEETHA/BSO in  \par \par RISK ASSESSMENT: \par Babs\par 5yr -- 4.7%\par lifetime -- 15.8%\par \par TC v6\par 5yr -- 3.1%\par lifetime -- 8.7%\par \par INTERVAL HISTORY: \par Sol returns for her 6 month follow up VISIT, s/p L WLE of two areas on 2020. \par \par She has no breast related complaints at this time.  She denies any breast pain, has not palpated any new palpable masses in either breast and denies any nipple discharge or retraction.  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 Her trach has since been removed and her PEG has also been removed.  She is due for repeat imaging to evaluate the efficacy of her treatments. \par  \par She had the following work up: \par 3/5/2021 -- b/l dx mammogram and US \par -scattered areas of fibroglandular densities\par -L: UOQ, post surgical distortion, 8mm oil cyst @surgical site \par -L: superior breast, low density stable nodular asymmetry, unchanged since 3/2020 \par -tiny nodule in L lateral/central breast, no significant changes since  \par -scattered coarse benign calcifications in R \par b/l US \par RIGHT: \par -no suspicious abnormalities \par LEFT: \par -@2N6, benign cyst, measures 5 x 5 x 3 mm \par -@12-1N5, heterogenous mass measuring 8 x 7 x 6 mm, consistent with surgical site --> BIRADS 3 \par -@3N3, 5 x 6 x 3 mm heterogenous nonvascular mass --> BIOPSY \par BIRADS 4 \par \par 2021 -- L US CNBx @3N3 (minicork) \par -complex sclerosing papillary lesion \par \par \par INTERVAL HISTORY: 10/21/21\par Sol returns for her 6 month follow up VISIT, s/p L WLE of two areas on 2020.  She also was diagnosed with LEFT complex sclerosing papillary lesion on 2021 that was not excised. \par \par She has no breast related complaints at this time.  She denies any breast pain, has not palpated any new palpable masses in either breast and denies any nipple discharge or retraction.  \par \par Her imaging as follows:\par 21 LEFT US and mammo \par -@3:00N 3 stable heterogeneous mass measuring 0.8 x 0.8 x 0.4 cm, consistent with prior biopsy proven papillary      lesion. Deemed BIRADS2\par \par 2022 --\kevin Horton returns for her 6 month follow up visit, s/p L WLE of two areas on 2020.  She also was diagnosed with LEFT complex sclerosing papillary lesion on 2021 that was not excised. \par \par She has no breast related complaints at this time.  She denies any breast pain, has not palpated any new palpable masses in either breast and denies any nipple discharge or retraction.\par \par Her most recent imaging is as follows:\par 2022 - B/L Dx Mammo & Sono:\par -There are scattered areas of fibroglandular density.\par -Postsurgical architectural distortion noted in the left breast.\par Bilateral whole breast ultrasound was performed.\par -At 12:00 N6 of the left breast there is a stable hypoechoic mass measuring 0.5 x 0.7 x 0.5 cm. Short interval follow-up recommended.\par -At 3:00 N3 of the left breast there is redemonstration of stable previously biopsied mass (radial scar) measuring 0.5 x 0.5 x 0.2 cm. \par BI-RADS category 3: Probably Benign\par \par \par 09/15/2022 --vamshi Horton returns for her 6 month follow up visit, s/p L WLE of two areas on 2020.  She also was diagnosed with LEFT complex sclerosing papillary lesion on 2021 that was not excised. \par \par She has no breast related complaints at this time.  She denies any breast pain, has not palpated any new palpable masses in either breast and denies any nipple discharge or retraction.\par \par Her most recent imaging is as follows:\par Left Breast Sono - 2022:\par -At the 12:00 position 6 cm from the nipple, there is a stable hypoechoic mass measuring 0.4 x 0.5 x 0.3 cm. Short interval follow-up is recommended.\par -At the 3:00 position 3 cm from the nipple, there is a stable biopsy-proven radial scar measuring 0.5 x 0.5 x 0.3 cm.\par BI-RADS category 3: Probably Benign\par \par \par 2023 --\par Sol returns for her 6 month follow up visit, s/p L WLE of two areas on 2020.  She also was diagnosed with LEFT complex sclerosing papillary lesion on 2021 that was not excised. \par \par She has no breast related complaints at this time.  She denies any breast pain, has not palpated any new palpable masses in either breast and denies any nipple discharge or retraction.\par \par Her most recent imaging is as follows:\par B/L Dx Mammo & Left Breast Sono - 03/15/2023:\par -There are scattered areas of fibroglandular density.\par -There is stable postsurgical distortion in the left breast upper outer quadrant. \par -Stable bilateral asymmetries, benign. \par Targeted unilateral left breast ultrasound was performed.\par -At the 12:00 position 6 cm from the nipple, there is a stable circumscribed hypoechoic mass measuring 0.3 x 0.4 x 0.2 cm, probably benign. This is unchanged since 2022.\par -At the 3:00 position 3 cm from the nipple, there is a circumscribed hypoechoic mass measuring 0.5 x 0.5 x 0.3 cm. This was biopsy-proven to be a radial scar and is unchanged since 2021.\par Recommendation: Follow-up breast ultrasound in 6 months.\par BI-RADS category 3: Probably Benign\par

## 2023-04-03 ENCOUNTER — OUTPATIENT (OUTPATIENT)
Dept: OUTPATIENT SERVICES | Facility: HOSPITAL | Age: 68
LOS: 1 days | End: 2023-04-03
Payer: COMMERCIAL

## 2023-04-03 DIAGNOSIS — R91.1 SOLITARY PULMONARY NODULE: Chronic | ICD-10-CM

## 2023-04-03 DIAGNOSIS — K08.409 PARTIAL LOSS OF TEETH, UNSPECIFIED CAUSE, UNSPECIFIED CLASS: ICD-10-CM

## 2023-04-03 DIAGNOSIS — Z90.710 ACQUIRED ABSENCE OF BOTH CERVIX AND UTERUS: Chronic | ICD-10-CM

## 2023-04-03 DIAGNOSIS — J38.1 POLYP OF VOCAL CORD AND LARYNX: Chronic | ICD-10-CM

## 2023-04-03 DIAGNOSIS — Z98.890 OTHER SPECIFIED POSTPROCEDURAL STATES: Chronic | ICD-10-CM

## 2023-04-03 DIAGNOSIS — Z90.49 ACQUIRED ABSENCE OF OTHER SPECIFIED PARTS OF DIGESTIVE TRACT: Chronic | ICD-10-CM

## 2023-04-03 PROCEDURE — D5110: CPT

## 2023-04-03 PROCEDURE — D5120: CPT

## 2023-04-04 DIAGNOSIS — K08.409 PARTIAL LOSS OF TEETH, UNSPECIFIED CAUSE, UNSPECIFIED CLASS: ICD-10-CM

## 2023-04-04 DIAGNOSIS — K08.109 COMPLETE LOSS OF TEETH, UNSPECIFIED CAUSE, UNSPECIFIED CLASS: ICD-10-CM

## 2023-04-07 ENCOUNTER — RESULT REVIEW (OUTPATIENT)
Age: 68
End: 2023-04-07

## 2023-04-07 ENCOUNTER — APPOINTMENT (OUTPATIENT)
Dept: RHEUMATOLOGY | Facility: CLINIC | Age: 68
End: 2023-04-07
Payer: MEDICARE

## 2023-04-07 ENCOUNTER — APPOINTMENT (OUTPATIENT)
Dept: RHEUMATOLOGY | Facility: CLINIC | Age: 68
End: 2023-04-07

## 2023-04-07 ENCOUNTER — OUTPATIENT (OUTPATIENT)
Dept: OUTPATIENT SERVICES | Facility: HOSPITAL | Age: 68
LOS: 1 days | End: 2023-04-07
Payer: MEDICARE

## 2023-04-07 VITALS
DIASTOLIC BLOOD PRESSURE: 76 MMHG | HEIGHT: 63 IN | OXYGEN SATURATION: 95 % | WEIGHT: 161 LBS | TEMPERATURE: 96 F | BODY MASS INDEX: 28.53 KG/M2 | HEART RATE: 69 BPM | SYSTOLIC BLOOD PRESSURE: 113 MMHG

## 2023-04-07 DIAGNOSIS — R91.1 SOLITARY PULMONARY NODULE: Chronic | ICD-10-CM

## 2023-04-07 DIAGNOSIS — M25.512 PAIN IN LEFT SHOULDER: ICD-10-CM

## 2023-04-07 DIAGNOSIS — M25.511 PAIN IN RIGHT SHOULDER: ICD-10-CM

## 2023-04-07 DIAGNOSIS — Z00.00 ENCOUNTER FOR GENERAL ADULT MEDICAL EXAMINATION WITHOUT ABNORMAL FINDINGS: ICD-10-CM

## 2023-04-07 DIAGNOSIS — J38.1 POLYP OF VOCAL CORD AND LARYNX: Chronic | ICD-10-CM

## 2023-04-07 DIAGNOSIS — Z98.890 OTHER SPECIFIED POSTPROCEDURAL STATES: Chronic | ICD-10-CM

## 2023-04-07 DIAGNOSIS — Z90.710 ACQUIRED ABSENCE OF BOTH CERVIX AND UTERUS: Chronic | ICD-10-CM

## 2023-04-07 DIAGNOSIS — Z90.49 ACQUIRED ABSENCE OF OTHER SPECIFIED PARTS OF DIGESTIVE TRACT: Chronic | ICD-10-CM

## 2023-04-07 PROCEDURE — 73030 X-RAY EXAM OF SHOULDER: CPT | Mod: 50

## 2023-04-07 PROCEDURE — 99214 OFFICE O/P EST MOD 30 MIN: CPT | Mod: GC

## 2023-04-07 PROCEDURE — 99214 OFFICE O/P EST MOD 30 MIN: CPT

## 2023-04-07 PROCEDURE — 73030 X-RAY EXAM OF SHOULDER: CPT | Mod: 26,50

## 2023-04-07 RX ORDER — HYDROXYCHLOROQUINE SULFATE 200 MG/1
200 TABLET, FILM COATED ORAL DAILY
Qty: 90 | Refills: 2 | Status: DISCONTINUED | COMMUNITY
Start: 2022-10-07 | End: 2023-04-07

## 2023-04-07 NOTE — PHYSICAL EXAM
[General Appearance - Alert] : alert [General Appearance - In No Acute Distress] : in no acute distress [General Appearance - Well Nourished] : well nourished [Sclera] : the sclera and conjunctiva were normal [Neck Appearance] : the appearance of the neck was normal [] : no respiratory distress [Apical Impulse] : the apical impulse was normal [Heart Rate And Rhythm] : heart rate was normal and rhythm regular [Heart Sounds] : normal S1 and S2 [Abnormal Walk] : normal gait [Oriented To Time, Place, And Person] : oriented to person, place, and time [FreeTextEntry1] : + Slightly limited ROM in L to approx 160 degrees

## 2023-04-07 NOTE — ASSESSMENT
[FreeTextEntry1] : Relapsing polychondritis: With physical exam sequela affecting pt's R ear and nose; pt has no active symptoms currently.\par - Continue Plaquenil 200 mg q day. Last ophtho exam in 10/2022 with no e/o Plaquenil toxicity. Patient reports +L. shoulder pain and R. hand swelling, recommend Plaquenil 200 mg every other day and 400 mg every other day. \par - Recommend L shoulder xray to rule out osteoarthritis; advised on pain control and PT \par - Continue Cellcept 1000 mg BID. Last CBC and CMP in 1/2023 were within normal limits.\par - f/u in 1 months\par \par Osteoporosis: With prior fragility fracture in hip; last DEXA scan in 7/2021 with T score -2.3 in hip; pt previously on alendronate but it was causing side effects. Was offered IV zolendronic acid but declined, prescribed alendronate  in 3/2022 but pt did not take it because she is already taking a lot of medications.\par - f/u repeat DEXA scan in 7/2023\par - Would consider denosumab injections if pt is amenable.

## 2023-04-07 NOTE — HISTORY OF PRESENT ILLNESS
[FreeTextEntry1] : 69 y/o woman presents for f/u of long-standing relapsing polychondritis. Pt denies any ear or eye pain, shortness of breath or eye problems currently. From her last visit, patient followed up with pulmonary regarding her RLL pulmonary nodule and undergoing annual CT surveillance. Today patient reports overall feelling well on cellcept and hydroxcloroquine and still endorses L shoulder pain, reports noticed her right hand is more swollen in the left. Patient initially recommended for PT however reports does not have time. \par \par Physical exam: GEN: AAO woman sitting in chair in NAD\par SKIN: No rashes\par ENT: ild saddle nose deformity\par MSK:\par Shoulders: + Slightly limited ROM in L to approx 160 degrees \par Elbows: Full ROM b/l, no effusions\par Wrists: Full ROM b/l, no effusions\par Hands: No synovitis\par Hips; Full ROM b/l\par Knees: no effusions, full ROM b/l\par Ankles: No effusions, full ROM b/l\par Feet: no effusions, no TTP\par EXT: no LE edema b/l\par

## 2023-04-07 NOTE — END OF VISIT
[] : Resident [FreeTextEntry3] : 67 y/o woman presents for f/u of relapsing polychondritis. Pt was last seen in 6/2022, when she was continued on Plaquenil and Cellcept. Today, she says she is doing well from an RP standpoint, but she has experienced worsening of her chronic L shoulder pain, to the point where she can't lift her arm. Pt's exam today is notable for limited ROM in the L shoulder to approx 30 degrees, concerning for possible worsening of OA versus adhesive capsulitis.\par - f/u L shoulder x-ray, would consider sending pt for MRI depending on results of x-ray\par - Offered pt PT referral but she says she previously had PT for her shoulder and did not find it helpful\par - Increase hydroxychloroquine dose to 400 mg q OD and 200 mg q OD as pt reports worsening of swelling in her R hand since she went down on the HCQ dose. Last ophthalmology exam was in 10/2022\par - Continue Cellcept 500 mg BID. Last CBC and CMP in 1/2023 were wnl\par \par f/u in 1 month for shoulder pain [Time Spent: ___ minutes] : I have spent [unfilled] minutes of time on the encounter.

## 2023-04-08 DIAGNOSIS — M25.512 PAIN IN LEFT SHOULDER: ICD-10-CM

## 2023-04-08 DIAGNOSIS — M25.511 PAIN IN RIGHT SHOULDER: ICD-10-CM

## 2023-04-10 RX ORDER — IPRATROPIUM BROMIDE AND ALBUTEROL SULFATE 2.5; .5 MG/3ML; MG/3ML
0.5-2.5 (3) SOLUTION RESPIRATORY (INHALATION) 4 TIMES DAILY
Qty: 1 | Refills: 6 | Status: ACTIVE | COMMUNITY
Start: 2020-12-04 | End: 1900-01-01

## 2023-04-14 DIAGNOSIS — M94.1 RELAPSING POLYCHONDRITIS: ICD-10-CM

## 2023-04-14 DIAGNOSIS — M25.512 PAIN IN LEFT SHOULDER: ICD-10-CM

## 2023-04-17 ENCOUNTER — OUTPATIENT (OUTPATIENT)
Dept: OUTPATIENT SERVICES | Facility: HOSPITAL | Age: 68
LOS: 1 days | End: 2023-04-17

## 2023-04-17 DIAGNOSIS — K08.409 PARTIAL LOSS OF TEETH, UNSPECIFIED CAUSE, UNSPECIFIED CLASS: ICD-10-CM

## 2023-04-17 DIAGNOSIS — Z90.49 ACQUIRED ABSENCE OF OTHER SPECIFIED PARTS OF DIGESTIVE TRACT: Chronic | ICD-10-CM

## 2023-04-17 DIAGNOSIS — Z98.890 OTHER SPECIFIED POSTPROCEDURAL STATES: Chronic | ICD-10-CM

## 2023-04-17 DIAGNOSIS — Z90.710 ACQUIRED ABSENCE OF BOTH CERVIX AND UTERUS: Chronic | ICD-10-CM

## 2023-04-17 DIAGNOSIS — R91.1 SOLITARY PULMONARY NODULE: Chronic | ICD-10-CM

## 2023-04-17 DIAGNOSIS — J38.1 POLYP OF VOCAL CORD AND LARYNX: Chronic | ICD-10-CM

## 2023-04-19 ENCOUNTER — OUTPATIENT (OUTPATIENT)
Dept: OUTPATIENT SERVICES | Facility: HOSPITAL | Age: 68
LOS: 1 days | End: 2023-04-19
Payer: COMMERCIAL

## 2023-04-19 ENCOUNTER — APPOINTMENT (OUTPATIENT)
Dept: INTERNAL MEDICINE | Facility: CLINIC | Age: 68
End: 2023-04-19

## 2023-04-19 DIAGNOSIS — J38.1 POLYP OF VOCAL CORD AND LARYNX: Chronic | ICD-10-CM

## 2023-04-19 DIAGNOSIS — Z98.890 OTHER SPECIFIED POSTPROCEDURAL STATES: Chronic | ICD-10-CM

## 2023-04-19 DIAGNOSIS — K08.109 COMPLETE LOSS OF TEETH, UNSPECIFIED CAUSE, UNSPECIFIED CLASS: ICD-10-CM

## 2023-04-19 DIAGNOSIS — Z90.710 ACQUIRED ABSENCE OF BOTH CERVIX AND UTERUS: Chronic | ICD-10-CM

## 2023-04-19 DIAGNOSIS — R91.1 SOLITARY PULMONARY NODULE: Chronic | ICD-10-CM

## 2023-04-19 DIAGNOSIS — K01.1 IMPACTED TEETH: ICD-10-CM

## 2023-04-19 DIAGNOSIS — Z90.49 ACQUIRED ABSENCE OF OTHER SPECIFIED PARTS OF DIGESTIVE TRACT: Chronic | ICD-10-CM

## 2023-04-19 PROCEDURE — D7320: CPT

## 2023-04-20 DIAGNOSIS — K02.63 DENTAL CARIES ON SMOOTH SURFACE PENETRATING INTO PULP: ICD-10-CM

## 2023-04-27 ENCOUNTER — APPOINTMENT (OUTPATIENT)
Dept: OPHTHALMOLOGY | Facility: CLINIC | Age: 68
End: 2023-04-27

## 2023-04-27 ENCOUNTER — OUTPATIENT (OUTPATIENT)
Dept: OUTPATIENT SERVICES | Facility: HOSPITAL | Age: 68
LOS: 1 days | End: 2023-04-27
Payer: MEDICARE

## 2023-04-27 ENCOUNTER — APPOINTMENT (OUTPATIENT)
Age: 68
End: 2023-04-27

## 2023-04-27 ENCOUNTER — APPOINTMENT (OUTPATIENT)
Dept: OPHTHALMOLOGY | Facility: CLINIC | Age: 68
End: 2023-04-27
Payer: MEDICARE

## 2023-04-27 DIAGNOSIS — Z90.49 ACQUIRED ABSENCE OF OTHER SPECIFIED PARTS OF DIGESTIVE TRACT: Chronic | ICD-10-CM

## 2023-04-27 DIAGNOSIS — J38.1 POLYP OF VOCAL CORD AND LARYNX: Chronic | ICD-10-CM

## 2023-04-27 DIAGNOSIS — Z98.890 OTHER SPECIFIED POSTPROCEDURAL STATES: Chronic | ICD-10-CM

## 2023-04-27 DIAGNOSIS — R91.1 SOLITARY PULMONARY NODULE: Chronic | ICD-10-CM

## 2023-04-27 DIAGNOSIS — Z90.710 ACQUIRED ABSENCE OF BOTH CERVIX AND UTERUS: Chronic | ICD-10-CM

## 2023-04-27 DIAGNOSIS — H53.40 UNSPECIFIED VISUAL FIELD DEFECTS: ICD-10-CM

## 2023-04-27 PROCEDURE — 92083 EXTENDED VISUAL FIELD XM: CPT

## 2023-04-27 PROCEDURE — 92083 EXTENDED VISUAL FIELD XM: CPT | Mod: 26

## 2023-05-02 DIAGNOSIS — Z79.899 OTHER LONG TERM (CURRENT) DRUG THERAPY: ICD-10-CM

## 2023-05-03 ENCOUNTER — OUTPATIENT (OUTPATIENT)
Dept: OUTPATIENT SERVICES | Facility: HOSPITAL | Age: 68
LOS: 1 days | End: 2023-05-03
Payer: COMMERCIAL

## 2023-05-03 DIAGNOSIS — Z98.890 OTHER SPECIFIED POSTPROCEDURAL STATES: Chronic | ICD-10-CM

## 2023-05-03 DIAGNOSIS — J38.1 POLYP OF VOCAL CORD AND LARYNX: Chronic | ICD-10-CM

## 2023-05-03 DIAGNOSIS — K01.1 IMPACTED TEETH: ICD-10-CM

## 2023-05-03 DIAGNOSIS — R91.1 SOLITARY PULMONARY NODULE: Chronic | ICD-10-CM

## 2023-05-03 DIAGNOSIS — Z90.710 ACQUIRED ABSENCE OF BOTH CERVIX AND UTERUS: Chronic | ICD-10-CM

## 2023-05-03 DIAGNOSIS — K02.63 DENTAL CARIES ON SMOOTH SURFACE PENETRATING INTO PULP: ICD-10-CM

## 2023-05-03 DIAGNOSIS — Z90.49 ACQUIRED ABSENCE OF OTHER SPECIFIED PARTS OF DIGESTIVE TRACT: Chronic | ICD-10-CM

## 2023-05-03 PROCEDURE — D0170: CPT

## 2023-05-12 ENCOUNTER — APPOINTMENT (OUTPATIENT)
Dept: RHEUMATOLOGY | Facility: CLINIC | Age: 68
End: 2023-05-12
Payer: MEDICARE

## 2023-05-12 ENCOUNTER — OUTPATIENT (OUTPATIENT)
Dept: OUTPATIENT SERVICES | Facility: HOSPITAL | Age: 68
LOS: 1 days | End: 2023-05-12
Payer: MEDICARE

## 2023-05-12 VITALS
TEMPERATURE: 98.1 F | SYSTOLIC BLOOD PRESSURE: 146 MMHG | OXYGEN SATURATION: 99 % | HEART RATE: 89 BPM | DIASTOLIC BLOOD PRESSURE: 94 MMHG | BODY MASS INDEX: 28.53 KG/M2 | WEIGHT: 161 LBS | HEIGHT: 63 IN

## 2023-05-12 DIAGNOSIS — Z90.710 ACQUIRED ABSENCE OF BOTH CERVIX AND UTERUS: Chronic | ICD-10-CM

## 2023-05-12 DIAGNOSIS — Z00.00 ENCOUNTER FOR GENERAL ADULT MEDICAL EXAMINATION WITHOUT ABNORMAL FINDINGS: ICD-10-CM

## 2023-05-12 DIAGNOSIS — J38.1 POLYP OF VOCAL CORD AND LARYNX: Chronic | ICD-10-CM

## 2023-05-12 DIAGNOSIS — Z98.890 OTHER SPECIFIED POSTPROCEDURAL STATES: Chronic | ICD-10-CM

## 2023-05-12 DIAGNOSIS — Z90.49 ACQUIRED ABSENCE OF OTHER SPECIFIED PARTS OF DIGESTIVE TRACT: Chronic | ICD-10-CM

## 2023-05-12 PROCEDURE — 99214 OFFICE O/P EST MOD 30 MIN: CPT | Mod: GC

## 2023-05-12 PROCEDURE — 86480 TB TEST CELL IMMUN MEASURE: CPT

## 2023-05-12 PROCEDURE — 86704 HEP B CORE ANTIBODY TOTAL: CPT

## 2023-05-12 PROCEDURE — 86705 HEP B CORE ANTIBODY IGM: CPT

## 2023-05-12 PROCEDURE — 86803 HEPATITIS C AB TEST: CPT

## 2023-05-12 PROCEDURE — 86706 HEP B SURFACE ANTIBODY: CPT

## 2023-05-12 PROCEDURE — 80053 COMPREHEN METABOLIC PANEL: CPT

## 2023-05-12 PROCEDURE — 85027 COMPLETE CBC AUTOMATED: CPT

## 2023-05-12 PROCEDURE — 99214 OFFICE O/P EST MOD 30 MIN: CPT

## 2023-05-12 PROCEDURE — 87340 HEPATITIS B SURFACE AG IA: CPT

## 2023-05-12 RX ORDER — MYCOPHENOLATE MOFETIL 500 MG/1
500 TABLET ORAL TWICE DAILY
Qty: 120 | Refills: 3 | Status: DISCONTINUED | COMMUNITY
Start: 2017-09-21 | End: 2023-05-12

## 2023-05-12 NOTE — END OF VISIT
[] : Resident [FreeTextEntry3] : 68 year old female with a history of relapsing polychondritis and osteopenia here for follow up of her shoulder pain. Her bilateral shoulder xray showed severe OA of her bilateral glenohumeral joints and moderate OA of her AC joints. She has shoulder pain in cold weather and does not want to take any medication, injections, go to physical therapy or have surgery for this. Regarding her relapsing polychondritis she has no acute symptoms of ear pain, nasal pain, throat pain, congestion. She has baseline dyspnea from COPD that is stable. Denies any side effects from Cellcept or HCQ and is up to date on her HCQ screening.\par \par Bilateral shoulder osteoarthritis\par -Advised voltaren gel, arnica gel, tumeric, omega 3 fatty acids, supplements\par \par Relapsing polychondritis, stable\par -Check CBC, CMP, hepatitis B and C, TB quantiferon\par -Continue cellcept 1000 mg BID and  mg alternating with 400 mg daily\par \par Osteoporosis\par -Prior fragility fracture in hip. Previously on alendronate. Repeat DEXA in July 2023 and can decide on therapy possibly denosumab\par

## 2023-05-12 NOTE — ASSESSMENT
[FreeTextEntry1] : 69 y/o woman presents for f/u of long-standing relapsing polychondritis. Pt denies any ear or eye pain, shortness of breath or eye problems currently. Today patient reports overall feeling well on CellCept and hydrochloroquine. Pt has severe-moderate OA in bilateral shoulders, does not want pain meds, joint injections.\par \par #Relapsing polychondritis\par - Continue Plaquenil 200mg and 400mg alternating days\par - Last ophtho exam in 10/2022 with no e/o Plaquenil toxicity.\par - Continue Cellcept 1000 mg BID\par - F/u CBC CMP hepatitis profile and quantiferon TB\par \par #OA shoulder\par - Xray shoulders showed sever OA of bilateral glenohumeral joints and moderate OA of AC joints\par - denied pain meds and joint injections\par \par #Osteoporosis\par - With prior fragility fracture in hip; last DEXA scan in 7/2021 with T score -2.3 in hip (osteopenia); pt previously on alendronate but it was causing side effects. Was offered IV zolendronic acid but declined, prescribed alendronate  in 3/2022 but pt did not take it because she is already taking a lot of medications.\par - f/u repeat DEXA scan in 7/2023\par

## 2023-05-12 NOTE — HISTORY OF PRESENT ILLNESS
[FreeTextEntry1] : 67 y/o woman presents for f/u of long-standing relapsing polychondritis. Pt denies any ear or eye pain, shortness of breath or eye problems currently. Today patient reports overall feeling well on CellCept and hydrochloroquine. Pt has severe-moderate OA in bilateral shoulders, does not want pain meds, joint injections.\par \par

## 2023-05-15 ENCOUNTER — OUTPATIENT (OUTPATIENT)
Dept: OUTPATIENT SERVICES | Facility: HOSPITAL | Age: 68
LOS: 1 days | End: 2023-05-15

## 2023-05-15 DIAGNOSIS — J38.1 POLYP OF VOCAL CORD AND LARYNX: Chronic | ICD-10-CM

## 2023-05-15 DIAGNOSIS — K08.409 PARTIAL LOSS OF TEETH, UNSPECIFIED CAUSE, UNSPECIFIED CLASS: ICD-10-CM

## 2023-05-15 DIAGNOSIS — Z98.890 OTHER SPECIFIED POSTPROCEDURAL STATES: Chronic | ICD-10-CM

## 2023-05-15 DIAGNOSIS — Z90.710 ACQUIRED ABSENCE OF BOTH CERVIX AND UTERUS: Chronic | ICD-10-CM

## 2023-05-15 DIAGNOSIS — R91.1 SOLITARY PULMONARY NODULE: Chronic | ICD-10-CM

## 2023-05-15 DIAGNOSIS — Z90.49 ACQUIRED ABSENCE OF OTHER SPECIFIED PARTS OF DIGESTIVE TRACT: Chronic | ICD-10-CM

## 2023-05-15 LAB
ALBUMIN SERPL ELPH-MCNC: 4.5 G/DL
ALP BLD-CCNC: 77 U/L
ALT SERPL-CCNC: 16 U/L
ANION GAP SERPL CALC-SCNC: 12 MMOL/L
AST SERPL-CCNC: 18 U/L
BASOPHILS # BLD AUTO: 0.03 K/UL
BASOPHILS NFR BLD AUTO: 0.5 %
BILIRUB SERPL-MCNC: 0.2 MG/DL
BUN SERPL-MCNC: 15 MG/DL
CALCIUM SERPL-MCNC: 9.6 MG/DL
CHLORIDE SERPL-SCNC: 105 MMOL/L
CO2 SERPL-SCNC: 23 MMOL/L
CREAT SERPL-MCNC: 0.9 MG/DL
EGFR: 69 ML/MIN/1.73M2
EOSINOPHIL # BLD AUTO: 0.11 K/UL
EOSINOPHIL NFR BLD AUTO: 2 %
GLUCOSE SERPL-MCNC: 99 MG/DL
HBV CORE IGG+IGM SER QL: NONREACTIVE
HBV CORE IGM SER QL: NONREACTIVE
HBV SURFACE AB SER QL: NONREACTIVE
HBV SURFACE AG SER QL: NONREACTIVE
HCT VFR BLD CALC: 45.4 %
HCV AB SER QL: NONREACTIVE
HCV S/CO RATIO: 0.08 S/CO
HGB BLD-MCNC: 14.2 G/DL
IMM GRANULOCYTES NFR BLD AUTO: 0.4 %
LYMPHOCYTES # BLD AUTO: 0.73 K/UL
LYMPHOCYTES NFR BLD AUTO: 12.9 %
M TB IFN-G BLD-IMP: NEGATIVE
MAN DIFF?: NORMAL
MCHC RBC-ENTMCNC: 29.3 PG
MCHC RBC-ENTMCNC: 31.3 G/DL
MCV RBC AUTO: 93.8 FL
MONOCYTES # BLD AUTO: 0.47 K/UL
MONOCYTES NFR BLD AUTO: 8.3 %
NEUTROPHILS # BLD AUTO: 4.28 K/UL
NEUTROPHILS NFR BLD AUTO: 75.9 %
PLATELET # BLD AUTO: 192 K/UL
POTASSIUM SERPL-SCNC: 4.5 MMOL/L
PROT SERPL-MCNC: 6.4 G/DL
QUANTIFERON TB PLUS MITOGEN MINUS NIL: 7.37 IU/ML
QUANTIFERON TB PLUS NIL: 0.01 IU/ML
QUANTIFERON TB PLUS TB1 MINUS NIL: 0 IU/ML
QUANTIFERON TB PLUS TB2 MINUS NIL: 0 IU/ML
RBC # BLD: 4.84 M/UL
RBC # FLD: 13.2 %
SODIUM SERPL-SCNC: 140 MMOL/L
WBC # FLD AUTO: 5.64 K/UL

## 2023-05-16 ENCOUNTER — RESULT REVIEW (OUTPATIENT)
Age: 68
End: 2023-05-16

## 2023-05-16 ENCOUNTER — OUTPATIENT (OUTPATIENT)
Dept: OUTPATIENT SERVICES | Facility: HOSPITAL | Age: 68
LOS: 1 days | End: 2023-05-16
Payer: MEDICARE

## 2023-05-16 DIAGNOSIS — Z98.890 OTHER SPECIFIED POSTPROCEDURAL STATES: Chronic | ICD-10-CM

## 2023-05-16 DIAGNOSIS — M94.1 RELAPSING POLYCHONDRITIS: ICD-10-CM

## 2023-05-16 DIAGNOSIS — M81.0 AGE-RELATED OSTEOPOROSIS WITHOUT CURRENT PATHOLOGICAL FRACTURE: ICD-10-CM

## 2023-05-16 DIAGNOSIS — Z90.710 ACQUIRED ABSENCE OF BOTH CERVIX AND UTERUS: Chronic | ICD-10-CM

## 2023-05-16 DIAGNOSIS — Z90.49 ACQUIRED ABSENCE OF OTHER SPECIFIED PARTS OF DIGESTIVE TRACT: Chronic | ICD-10-CM

## 2023-05-16 DIAGNOSIS — Z00.8 ENCOUNTER FOR OTHER GENERAL EXAMINATION: ICD-10-CM

## 2023-05-16 DIAGNOSIS — M19.019 PRIMARY OSTEOARTHRITIS, UNSPECIFIED SHOULDER: ICD-10-CM

## 2023-05-16 DIAGNOSIS — R91.1 SOLITARY PULMONARY NODULE: Chronic | ICD-10-CM

## 2023-05-16 DIAGNOSIS — J38.1 POLYP OF VOCAL CORD AND LARYNX: Chronic | ICD-10-CM

## 2023-05-16 DIAGNOSIS — Z13.820 ENCOUNTER FOR SCREENING FOR OSTEOPOROSIS: ICD-10-CM

## 2023-05-16 PROCEDURE — 77080 DXA BONE DENSITY AXIAL: CPT

## 2023-05-17 DIAGNOSIS — Z13.820 ENCOUNTER FOR SCREENING FOR OSTEOPOROSIS: ICD-10-CM

## 2023-05-25 ENCOUNTER — NON-APPOINTMENT (OUTPATIENT)
Age: 68
End: 2023-05-25

## 2023-06-05 ENCOUNTER — OUTPATIENT (OUTPATIENT)
Dept: OUTPATIENT SERVICES | Facility: HOSPITAL | Age: 68
LOS: 1 days | End: 2023-06-05

## 2023-06-05 DIAGNOSIS — Z98.890 OTHER SPECIFIED POSTPROCEDURAL STATES: Chronic | ICD-10-CM

## 2023-06-05 DIAGNOSIS — K08.409 PARTIAL LOSS OF TEETH, UNSPECIFIED CAUSE, UNSPECIFIED CLASS: ICD-10-CM

## 2023-06-05 DIAGNOSIS — Z90.49 ACQUIRED ABSENCE OF OTHER SPECIFIED PARTS OF DIGESTIVE TRACT: Chronic | ICD-10-CM

## 2023-06-05 DIAGNOSIS — J38.1 POLYP OF VOCAL CORD AND LARYNX: Chronic | ICD-10-CM

## 2023-06-05 DIAGNOSIS — Z90.710 ACQUIRED ABSENCE OF BOTH CERVIX AND UTERUS: Chronic | ICD-10-CM

## 2023-06-05 DIAGNOSIS — R91.1 SOLITARY PULMONARY NODULE: Chronic | ICD-10-CM

## 2023-06-06 ENCOUNTER — NON-APPOINTMENT (OUTPATIENT)
Age: 68
End: 2023-06-06

## 2023-06-06 ENCOUNTER — OUTPATIENT (OUTPATIENT)
Dept: OUTPATIENT SERVICES | Facility: HOSPITAL | Age: 68
LOS: 1 days | End: 2023-06-06
Payer: MEDICARE

## 2023-06-06 ENCOUNTER — APPOINTMENT (OUTPATIENT)
Dept: CARDIOLOGY | Facility: CLINIC | Age: 68
End: 2023-06-06
Payer: MEDICARE

## 2023-06-06 ENCOUNTER — APPOINTMENT (OUTPATIENT)
Dept: CARDIOLOGY | Facility: CLINIC | Age: 68
End: 2023-06-06

## 2023-06-06 VITALS
DIASTOLIC BLOOD PRESSURE: 79 MMHG | OXYGEN SATURATION: 97 % | BODY MASS INDEX: 28.88 KG/M2 | HEART RATE: 84 BPM | WEIGHT: 163 LBS | HEIGHT: 63 IN | SYSTOLIC BLOOD PRESSURE: 133 MMHG

## 2023-06-06 DIAGNOSIS — R91.1 SOLITARY PULMONARY NODULE: Chronic | ICD-10-CM

## 2023-06-06 DIAGNOSIS — R00.2 PALPITATIONS: ICD-10-CM

## 2023-06-06 DIAGNOSIS — Z90.49 ACQUIRED ABSENCE OF OTHER SPECIFIED PARTS OF DIGESTIVE TRACT: Chronic | ICD-10-CM

## 2023-06-06 DIAGNOSIS — Z98.890 OTHER SPECIFIED POSTPROCEDURAL STATES: Chronic | ICD-10-CM

## 2023-06-06 DIAGNOSIS — Z00.00 ENCOUNTER FOR GENERAL ADULT MEDICAL EXAMINATION WITHOUT ABNORMAL FINDINGS: ICD-10-CM

## 2023-06-06 DIAGNOSIS — Z90.710 ACQUIRED ABSENCE OF BOTH CERVIX AND UTERUS: Chronic | ICD-10-CM

## 2023-06-06 DIAGNOSIS — J38.1 POLYP OF VOCAL CORD AND LARYNX: Chronic | ICD-10-CM

## 2023-06-06 PROCEDURE — 99213 OFFICE O/P EST LOW 20 MIN: CPT

## 2023-06-06 NOTE — ASSESSMENT
[FreeTextEntry1] : 68 F with PMH of HLD, HTN, COPD (former smoker, quit 1 yr ago, not on BiPAP or O2 at home), chronic polychondritis(triggers include extreme weather and illness), Rt Mid and lower lobe lung adenocarcinoma s/p ablation(follows with pulmonary), SCC of tongue (cisplatin x5 cycle, most recent 2/25/21 and radiotherapy 2021) s/p laryngoscopy (no lesions identified) and complex sclerosing papillar lesion of left breast (followed by Surg) presents for follow up. \par \par \par \par Plan\par ECHO normal EF \par carotid US no significant disease \par cont with losartan, simvastatin \par consider stopping ezetimibe as per primary care doctor opinion \par RTC as needed

## 2023-06-06 NOTE — HISTORY OF PRESENT ILLNESS
[FreeTextEntry1] : 68 F with PMH of HLD, HTN, COPD (former smoker, quit 1 yr ago, not on BiPAP or O2 at home), chronic polychondritis(triggers include extreme weather and illness), Rt Mid and lower lobe lung adenocarcinoma s/p ablation(follows with pulmonary), SCC of tongue (cisplatin x5 cycle, most recent 2/25/21 and radiotherapy 2021) s/p laryngoscopy (no lesions identified) and complex sclerosing papillar lesion of left breast (followed by Surg) presents for follow up. patient was seen in November for post chemotherapy cardiac eval. \par patient today denies CP,SOB, n/v/d, kevan and urinary symptoms. She admits to have joint pain and sholder pain which is chronic. \par \par

## 2023-06-09 DIAGNOSIS — E78.5 HYPERLIPIDEMIA, UNSPECIFIED: ICD-10-CM

## 2023-06-09 DIAGNOSIS — R00.2 PALPITATIONS: ICD-10-CM

## 2023-06-14 ENCOUNTER — OUTPATIENT (OUTPATIENT)
Dept: OUTPATIENT SERVICES | Facility: HOSPITAL | Age: 68
LOS: 1 days | End: 2023-06-14
Payer: MEDICARE

## 2023-06-14 ENCOUNTER — APPOINTMENT (OUTPATIENT)
Dept: INTERNAL MEDICINE | Facility: CLINIC | Age: 68
End: 2023-06-14
Payer: MEDICARE

## 2023-06-14 ENCOUNTER — NON-APPOINTMENT (OUTPATIENT)
Age: 68
End: 2023-06-14

## 2023-06-14 VITALS
BODY MASS INDEX: 28.7 KG/M2 | WEIGHT: 162 LBS | HEIGHT: 63 IN | TEMPERATURE: 98 F | OXYGEN SATURATION: 95 % | SYSTOLIC BLOOD PRESSURE: 134 MMHG | HEART RATE: 80 BPM | DIASTOLIC BLOOD PRESSURE: 77 MMHG

## 2023-06-14 DIAGNOSIS — J38.1 POLYP OF VOCAL CORD AND LARYNX: Chronic | ICD-10-CM

## 2023-06-14 DIAGNOSIS — Z00.00 ENCOUNTER FOR GENERAL ADULT MEDICAL EXAMINATION WITHOUT ABNORMAL FINDINGS: ICD-10-CM

## 2023-06-14 DIAGNOSIS — R91.1 SOLITARY PULMONARY NODULE: Chronic | ICD-10-CM

## 2023-06-14 DIAGNOSIS — I25.10 ATHEROSCLEROTIC HEART DISEASE OF NATIVE CORONARY ARTERY W/OUT ANGINA PECTORIS: ICD-10-CM

## 2023-06-14 DIAGNOSIS — Z90.710 ACQUIRED ABSENCE OF BOTH CERVIX AND UTERUS: Chronic | ICD-10-CM

## 2023-06-14 DIAGNOSIS — Z98.890 OTHER SPECIFIED POSTPROCEDURAL STATES: Chronic | ICD-10-CM

## 2023-06-14 DIAGNOSIS — Z87.891 PERSONAL HISTORY OF NICOTINE DEPENDENCE: ICD-10-CM

## 2023-06-14 PROCEDURE — 99214 OFFICE O/P EST MOD 30 MIN: CPT | Mod: GC

## 2023-06-14 PROCEDURE — 99214 OFFICE O/P EST MOD 30 MIN: CPT

## 2023-06-14 RX ORDER — SIMVASTATIN 40 MG/1
40 TABLET, FILM COATED ORAL DAILY
Qty: 30 | Refills: 5 | Status: DISCONTINUED | COMMUNITY
Start: 2018-01-18 | End: 2023-06-14

## 2023-06-14 RX ORDER — EZETIMIBE 10 MG/1
10 TABLET ORAL DAILY
Qty: 30 | Refills: 6 | Status: DISCONTINUED | COMMUNITY
Start: 2022-05-18 | End: 2023-06-14

## 2023-06-14 NOTE — HISTORY OF PRESENT ILLNESS
[FreeTextEntry1] : follow up  [de-identified] : 68 year year old woman w a PMH of COPD (former smoker, quit 1 yr ago, not on BiPAP or O2 at home) w reactive airway, chronic polychondritis(triggers include extreme weather and illness), Rt Mid and lower lobe lung adenocarcinoma s/p ablation(follows with pulmonary) SCC of tongue (cisplatin x5 cycle, most recent 2/25/21 and radiotherapy 2021) s/p laryngoscopy (no lesions identified) and complex sclerosing papillar lesion of left breast (followed by Surg). Patient is here for routine follow up. \par \par Patient concerned about muscle weakness and intermittent pain in her left upper extremity. Comes at night sometimes. Improves with tylenol and voltaren gel. Concerned it may be caused my ezetimibe.

## 2023-06-14 NOTE — ASSESSMENT
[FreeTextEntry1] : Ms Lopez is a 68 DARLENE w a PMH of COPD (former smoker, quit 1 yr ago, not on BiPAP at home), hypertriglyceridemia, chronic polychondritis, Rt Mid and lower lobe lung adenocarcinoma s/p ablation, SCC of tongue (cisplatin x5 cycle, most recent 21 and radiotherapy )s/p laryngoscopy (no lesions identified) and complex sclerosing papillar lesion of left breast here for f/u \par \par #Polychondritis\par - triggered by extreme weather, cold>heat\par - follows with Rheum \par \par # LUE intermittent? muscle weakness and pain \par - comes predominant at night and with motion. no motor deficit. \par - improves with Tylenol and Voltaren \par - was offered PT, did not want to go PT\par - rheumatologist evaluated, considering injections \par - patient asking to stop ezetimibe for concerning muscle weakness  \par \par #Hypertriglyceridemia, improved\par - T. switch to atorvastatin 40mg PO qHS, repeat lipid panel before next visit, \par \par #COPD\par #Rt mid&low lung adenocarcinoma s/p ablation\par - following with pulm \par \par #SCC of tongue s/p chemo and radiotherapy in remission \par - dry mouth 2/ tx\par - s/p hyperbaric tx\par - followed by ENT surg. planned for follow up in up august\par \par #Left Brest mass\par - following with Dr. Keith, due for repeat mammogram in 2023\par \par #HTN\par - c/w losartan \par \par # CRC screening\par - patient said she did FIT test, results misplaced. \par - counseling provided, willing to consider colonoscopy. GI referral. \par \par #Health Care maintenance\par - Colonoscopy: patient opting for FIT testing, has the kit at home \par - Mammogram: completed 2022, has appoitment for next time \par - Pap Smear: s/p total hysterectomy with cervix removed per patient \par - Flu Vaccine: administered 10/18/22\par - Pneumococcal: administered 2023\par - labs reviewed  - wnl\par - RTC in 6 months and prn. flu shot planned for then. \par \par  \par

## 2023-06-14 NOTE — PHYSICAL EXAM
[No Acute Distress] : no acute distress [Normal Sclera/Conjunctiva] : normal sclera/conjunctiva [PERRL] : pupils equal round and reactive to light [EOMI] : extraocular movements intact [Normal Outer Ear/Nose] : the outer ears and nose were normal in appearance [No JVD] : no jugular venous distention [No Respiratory Distress] : no respiratory distress  [Normal Rate] : normal rate  [Regular Rhythm] : with a regular rhythm [No Joint Swelling] : no joint swelling [No Focal Deficits] : no focal deficits

## 2023-06-15 DIAGNOSIS — I25.10 ATHEROSCLEROTIC HEART DISEASE OF NATIVE CORONARY ARTERY WITHOUT ANGINA PECTORIS: ICD-10-CM

## 2023-06-15 DIAGNOSIS — E78.5 HYPERLIPIDEMIA, UNSPECIFIED: ICD-10-CM

## 2023-06-15 DIAGNOSIS — Z87.891 PERSONAL HISTORY OF NICOTINE DEPENDENCE: ICD-10-CM

## 2023-06-15 DIAGNOSIS — Z00.00 ENCOUNTER FOR GENERAL ADULT MEDICAL EXAMINATION WITHOUT ABNORMAL FINDINGS: ICD-10-CM

## 2023-06-26 ENCOUNTER — OUTPATIENT (OUTPATIENT)
Dept: OUTPATIENT SERVICES | Facility: HOSPITAL | Age: 68
LOS: 1 days | End: 2023-06-26

## 2023-06-26 DIAGNOSIS — Z90.49 ACQUIRED ABSENCE OF OTHER SPECIFIED PARTS OF DIGESTIVE TRACT: Chronic | ICD-10-CM

## 2023-06-26 DIAGNOSIS — Z98.890 OTHER SPECIFIED POSTPROCEDURAL STATES: Chronic | ICD-10-CM

## 2023-06-26 DIAGNOSIS — J38.1 POLYP OF VOCAL CORD AND LARYNX: Chronic | ICD-10-CM

## 2023-06-26 DIAGNOSIS — R91.1 SOLITARY PULMONARY NODULE: Chronic | ICD-10-CM

## 2023-06-26 DIAGNOSIS — K08.409 PARTIAL LOSS OF TEETH, UNSPECIFIED CAUSE, UNSPECIFIED CLASS: ICD-10-CM

## 2023-06-26 DIAGNOSIS — Z90.710 ACQUIRED ABSENCE OF BOTH CERVIX AND UTERUS: Chronic | ICD-10-CM

## 2023-06-30 DIAGNOSIS — K08.109 COMPLETE LOSS OF TEETH, UNSPECIFIED CAUSE, UNSPECIFIED CLASS: ICD-10-CM

## 2023-07-18 ENCOUNTER — OUTPATIENT (OUTPATIENT)
Dept: OUTPATIENT SERVICES | Facility: HOSPITAL | Age: 68
LOS: 1 days | End: 2023-07-18
Payer: MEDICARE

## 2023-07-18 ENCOUNTER — APPOINTMENT (OUTPATIENT)
Dept: PULMONOLOGY | Facility: CLINIC | Age: 68
End: 2023-07-18

## 2023-07-18 ENCOUNTER — APPOINTMENT (OUTPATIENT)
Dept: PULMONOLOGY | Facility: CLINIC | Age: 68
End: 2023-07-18
Payer: MEDICARE

## 2023-07-18 VITALS
HEIGHT: 63 IN | OXYGEN SATURATION: 96 % | WEIGHT: 160 LBS | TEMPERATURE: 97.5 F | DIASTOLIC BLOOD PRESSURE: 79 MMHG | BODY MASS INDEX: 28.35 KG/M2 | SYSTOLIC BLOOD PRESSURE: 142 MMHG | HEART RATE: 84 BPM

## 2023-07-18 DIAGNOSIS — Z98.890 OTHER SPECIFIED POSTPROCEDURAL STATES: Chronic | ICD-10-CM

## 2023-07-18 DIAGNOSIS — Z00.00 ENCOUNTER FOR GENERAL ADULT MEDICAL EXAMINATION WITHOUT ABNORMAL FINDINGS: ICD-10-CM

## 2023-07-18 DIAGNOSIS — R91.1 SOLITARY PULMONARY NODULE: Chronic | ICD-10-CM

## 2023-07-18 DIAGNOSIS — J38.1 POLYP OF VOCAL CORD AND LARYNX: Chronic | ICD-10-CM

## 2023-07-18 DIAGNOSIS — Z90.710 ACQUIRED ABSENCE OF BOTH CERVIX AND UTERUS: Chronic | ICD-10-CM

## 2023-07-18 PROCEDURE — 99214 OFFICE O/P EST MOD 30 MIN: CPT | Mod: GC

## 2023-07-18 PROCEDURE — 99214 OFFICE O/P EST MOD 30 MIN: CPT

## 2023-07-18 NOTE — PHYSICAL EXAM
[No Acute Distress] : no acute distress [Normal Appearance] : normal appearance [Supple] : supple [No Neck Mass] : no neck mass [Normal Rate/Rhythm] : normal rate/rhythm [Normal S1, S2] : normal s1, s2 [No Murmurs] : no murmurs [No Resp Distress] : no resp distress [No Acc Muscle Use] : no acc muscle use [Normal Rhythm and Effort] : normal rhythm and effort [Clear to Auscultation Bilaterally] : clear to auscultation bilaterally [No Clubbing] : no clubbing [No Cyanosis] : no cyanosis [No Edema] : no edema

## 2023-07-18 NOTE — ASSESSMENT
[FreeTextEntry1] : 68 year-old female with a PMH of COPD (not on CPAP/BiPAP), DLD, HTN, RML+RLL lung nodules s/p ablation and PET negative, SCC of tongue base s/p cisplatin x 5 cycles (last 2/25/21) and RT last in 2021 s/p trach and PEG s/p removal, complex sclerosing papillar lesion of left breast (followed by Dr. Keith w/ repeat US breast (Sep 2021), presenting for follow-up for her COPD and pulmonary nodule.\par \par #Chronic Obstructive Pulmonary Disease\par #Former Smoker (52 pack years)\par -No hospitalizations or COPD exacerbations since last visit 1/2023\par -refilled anoro and albuterol, pt advised to use nebulizer prn although has not used for some time\par -denies current smoking\par \par #RML/RLL nodule\par -decreased sizes compared to 10/2022\par -repeat CT ordered 1/2024, pt to f/u post exam\par \par #SCC of tongue s/p RT and cisplatin\par -follow with ENT/Onc\par \par RTC 6mo post CT chest or PRN

## 2023-07-18 NOTE — HISTORY OF PRESENT ILLNESS
[TextBox_4] : 68 year-old female with a PMH of COPD (not on CPAP/BiPAP), DLD, HTN, RML+RLL lung nodules s/p ablation and PET negative, SCC of tongue base s/p cisplatin x 5 cycles (last 2/25/21) and RT last in 2021 s/p trach and PEG s/p removal, complex sclerosing papillar lesion of left breast (followed by Dr. Keith w/ repeat US breast (Sep 2021), presenting for follow-up for her COPD and pulmonary nodule.\par \par States she smoked 1 PPD for 52 years, quitting in 2020 after being diagnosed with SCC of the tongue. \par \par Last CT chest 1/18/23 shows RML 2.4 x 1.3cm nodule, decreased size, and RLL decreased in size to 4-5mm. Pt using rescue inhaler prophylactically. Has not had any COPD exacerbations or hospitalizations since last office visit. Pt compliant with anoro\par

## 2023-07-18 NOTE — REVIEW OF SYSTEMS
[SOB on Exertion] : sob on exertion [Negative] : Endocrine [Cough] : no cough [Hemoptysis] : no hemoptysis [Sputum] : no sputum [Dyspnea] : no dyspnea [Wheezing] : no wheezing

## 2023-07-19 DIAGNOSIS — J44.9 CHRONIC OBSTRUCTIVE PULMONARY DISEASE, UNSPECIFIED: ICD-10-CM

## 2023-07-19 DIAGNOSIS — R91.8 OTHER NONSPECIFIC ABNORMAL FINDING OF LUNG FIELD: ICD-10-CM

## 2023-07-24 ENCOUNTER — OUTPATIENT (OUTPATIENT)
Dept: OUTPATIENT SERVICES | Facility: HOSPITAL | Age: 68
LOS: 1 days | End: 2023-07-24

## 2023-07-24 DIAGNOSIS — Z98.890 OTHER SPECIFIED POSTPROCEDURAL STATES: Chronic | ICD-10-CM

## 2023-07-24 DIAGNOSIS — R91.1 SOLITARY PULMONARY NODULE: Chronic | ICD-10-CM

## 2023-07-24 DIAGNOSIS — Z90.710 ACQUIRED ABSENCE OF BOTH CERVIX AND UTERUS: Chronic | ICD-10-CM

## 2023-07-24 DIAGNOSIS — K08.409 PARTIAL LOSS OF TEETH, UNSPECIFIED CAUSE, UNSPECIFIED CLASS: ICD-10-CM

## 2023-07-24 DIAGNOSIS — J38.1 POLYP OF VOCAL CORD AND LARYNX: Chronic | ICD-10-CM

## 2023-07-24 DIAGNOSIS — Z90.49 ACQUIRED ABSENCE OF OTHER SPECIFIED PARTS OF DIGESTIVE TRACT: Chronic | ICD-10-CM

## 2023-07-25 ENCOUNTER — APPOINTMENT (OUTPATIENT)
Dept: PULMONOLOGY | Facility: CLINIC | Age: 68
End: 2023-07-25

## 2023-08-01 ENCOUNTER — OUTPATIENT (OUTPATIENT)
Dept: OUTPATIENT SERVICES | Facility: HOSPITAL | Age: 68
LOS: 1 days | End: 2023-08-01
Payer: MEDICARE

## 2023-08-01 ENCOUNTER — APPOINTMENT (OUTPATIENT)
Dept: OPHTHALMOLOGY | Facility: CLINIC | Age: 68
End: 2023-08-01

## 2023-08-01 ENCOUNTER — APPOINTMENT (OUTPATIENT)
Dept: OPHTHALMOLOGY | Facility: CLINIC | Age: 68
End: 2023-08-01
Payer: MEDICARE

## 2023-08-01 DIAGNOSIS — Z90.710 ACQUIRED ABSENCE OF BOTH CERVIX AND UTERUS: Chronic | ICD-10-CM

## 2023-08-01 DIAGNOSIS — K08.109 COMPLETE LOSS OF TEETH, UNSPECIFIED CAUSE, UNSPECIFIED CLASS: ICD-10-CM

## 2023-08-01 DIAGNOSIS — H25.13 AGE-RELATED NUCLEAR CATARACT, BILATERAL: ICD-10-CM

## 2023-08-01 DIAGNOSIS — H40.039 ANATOMICAL NARROW ANGLE, UNSPECIFIED EYE: ICD-10-CM

## 2023-08-01 DIAGNOSIS — Z79.899 OTHER LONG TERM (CURRENT) DRUG THERAPY: ICD-10-CM

## 2023-08-01 DIAGNOSIS — H40.033 ANATOMICAL NARROW ANGLE, BILATERAL: ICD-10-CM

## 2023-08-01 DIAGNOSIS — H53.8 OTHER VISUAL DISTURBANCES: ICD-10-CM

## 2023-08-01 DIAGNOSIS — R91.1 SOLITARY PULMONARY NODULE: Chronic | ICD-10-CM

## 2023-08-01 DIAGNOSIS — H04.123 DRY EYE SYNDROME OF BILATERAL LACRIMAL GLANDS: ICD-10-CM

## 2023-08-01 DIAGNOSIS — Z98.890 OTHER SPECIFIED POSTPROCEDURAL STATES: Chronic | ICD-10-CM

## 2023-08-01 DIAGNOSIS — Z90.49 ACQUIRED ABSENCE OF OTHER SPECIFIED PARTS OF DIGESTIVE TRACT: Chronic | ICD-10-CM

## 2023-08-01 PROCEDURE — 92014 COMPRE OPH EXAM EST PT 1/>: CPT

## 2023-08-01 PROCEDURE — 92134 CPTRZ OPH DX IMG PST SGM RTA: CPT | Mod: 26

## 2023-08-01 PROCEDURE — 92134 CPTRZ OPH DX IMG PST SGM RTA: CPT

## 2023-08-07 ENCOUNTER — OUTPATIENT (OUTPATIENT)
Dept: OUTPATIENT SERVICES | Facility: HOSPITAL | Age: 68
LOS: 1 days | End: 2023-08-07

## 2023-08-07 DIAGNOSIS — Z90.710 ACQUIRED ABSENCE OF BOTH CERVIX AND UTERUS: Chronic | ICD-10-CM

## 2023-08-07 DIAGNOSIS — K08.409 PARTIAL LOSS OF TEETH, UNSPECIFIED CAUSE, UNSPECIFIED CLASS: ICD-10-CM

## 2023-08-07 DIAGNOSIS — R91.1 SOLITARY PULMONARY NODULE: Chronic | ICD-10-CM

## 2023-08-07 DIAGNOSIS — Z90.49 ACQUIRED ABSENCE OF OTHER SPECIFIED PARTS OF DIGESTIVE TRACT: Chronic | ICD-10-CM

## 2023-08-07 DIAGNOSIS — Z98.890 OTHER SPECIFIED POSTPROCEDURAL STATES: Chronic | ICD-10-CM

## 2023-08-07 DIAGNOSIS — J38.1 POLYP OF VOCAL CORD AND LARYNX: Chronic | ICD-10-CM

## 2023-08-10 DIAGNOSIS — K08.109 COMPLETE LOSS OF TEETH, UNSPECIFIED CAUSE, UNSPECIFIED CLASS: ICD-10-CM

## 2023-08-17 ENCOUNTER — APPOINTMENT (OUTPATIENT)
Dept: OTOLARYNGOLOGY | Facility: CLINIC | Age: 68
End: 2023-08-17
Payer: MEDICARE

## 2023-08-17 DIAGNOSIS — T70.0XXD OTITIC BAROTRAUMA, SUBSEQUENT ENCOUNTER: ICD-10-CM

## 2023-08-17 PROCEDURE — 99213 OFFICE O/P EST LOW 20 MIN: CPT | Mod: 25

## 2023-08-17 PROCEDURE — 31575 DIAGNOSTIC LARYNGOSCOPY: CPT

## 2023-08-17 NOTE — HISTORY OF PRESENT ILLNESS
[de-identified] : Oncology Summary Stage: T3N1M0 Site: Tongue base Pathology: p16-positive SCC Treatment: Chemoradiation completed 2/2021 Disease status: ARMANDO Swallow: Mild dysphagia and occ pain Airway: Decannulated Smoking: Former Thyroid: TSH 3/2022 0.92  [FreeTextEntry1] : Patient returns today following up on carcinoma base of tongue. Had dentures fitted, doing well. Ear improving after barotrauma from hyperbaric

## 2023-08-17 NOTE — PROCEDURE
[Complicated Symptoms] : complicated symptoms requiring more thorough examination than provided by mirror [None] : none [Flexible Endoscope] : examined with the flexible endoscope [de-identified] : Flexible laryngoscopy performed. Nasal cavity, nasopharynx, oropharynx, hypopharynx, and larynx evaluated. No masses or lesions, bilateral true vocal folds symmetric and mobile.

## 2023-08-17 NOTE — PHYSICAL EXAM
[Normal] : mucosa is normal [Midline] : trachea located in midline position [Edentulous] : edentulous

## 2023-08-18 ENCOUNTER — APPOINTMENT (OUTPATIENT)
Dept: RHEUMATOLOGY | Facility: CLINIC | Age: 68
End: 2023-08-18
Payer: MEDICARE

## 2023-08-18 ENCOUNTER — OUTPATIENT (OUTPATIENT)
Dept: OUTPATIENT SERVICES | Facility: HOSPITAL | Age: 68
LOS: 1 days | End: 2023-08-18
Payer: MEDICARE

## 2023-08-18 VITALS
DIASTOLIC BLOOD PRESSURE: 92 MMHG | BODY MASS INDEX: 28.88 KG/M2 | HEIGHT: 63 IN | OXYGEN SATURATION: 96 % | HEART RATE: 82 BPM | SYSTOLIC BLOOD PRESSURE: 172 MMHG | TEMPERATURE: 97.4 F | WEIGHT: 163 LBS

## 2023-08-18 DIAGNOSIS — Z00.00 ENCOUNTER FOR GENERAL ADULT MEDICAL EXAMINATION WITHOUT ABNORMAL FINDINGS: ICD-10-CM

## 2023-08-18 DIAGNOSIS — Z98.890 OTHER SPECIFIED POSTPROCEDURAL STATES: Chronic | ICD-10-CM

## 2023-08-18 DIAGNOSIS — R91.1 SOLITARY PULMONARY NODULE: Chronic | ICD-10-CM

## 2023-08-18 DIAGNOSIS — J38.1 POLYP OF VOCAL CORD AND LARYNX: Chronic | ICD-10-CM

## 2023-08-18 DIAGNOSIS — Z90.710 ACQUIRED ABSENCE OF BOTH CERVIX AND UTERUS: Chronic | ICD-10-CM

## 2023-08-18 PROCEDURE — 99214 OFFICE O/P EST MOD 30 MIN: CPT | Mod: GC

## 2023-08-18 PROCEDURE — 99214 OFFICE O/P EST MOD 30 MIN: CPT

## 2023-08-18 PROCEDURE — 85027 COMPLETE CBC AUTOMATED: CPT

## 2023-08-18 PROCEDURE — 80053 COMPREHEN METABOLIC PANEL: CPT

## 2023-08-18 PROCEDURE — 82306 VITAMIN D 25 HYDROXY: CPT

## 2023-08-18 NOTE — ASSESSMENT
[FreeTextEntry1] : 69 y/o woman presents for f/u of long-standing relapsing polychondritis. Today patient reports overall feeling well on CellCept and hydrochloroquine. Pt has severe-moderate OA in bilateral shoulders.  #Relapsing polychondritis - Continue Plaquenil 200mg and 400mg alternating days - Last optho visit 8/1, no evidence of plaquenil toxicity  - Continue Cellcept 1000 mg BID - Quantiferon in May negative   #OA shoulder -Xray shoulders showed severe OA of bilateral glenohumeral joints and moderate OA of AC joints -denied pain meds and joint injections at last visit  -open to trying injections at this visit; given 40mg Kenalog in L shoulder   #Osteoporosis -With prior fragility fracture in hip; last DEXA scan in 5/2023 with T score -1.7 in hip (osteopenia) and -2.5 in L femoral neck (osteoporosis); no significant bone loss compared to prior study  -pt was previously on alendronate but doesn't like taking it, states it makes her back hurt  -prescribed Vitamin D, hasn't been taking it  -f/u repeat vitamin D level   -plan to start Prolia infusions   RTC in 3 months or once Prolia infusions arrive to office

## 2023-08-18 NOTE — END OF VISIT
[] : Resident [FreeTextEntry3] : 68 year old with a history of relapsing polychondritis and osteoporosis here for follow up. Denies any episodes of polychondritis or issues with cellcept or HCQ. Her repeat DEXA showed improvement in AP spine -0.5 but worsening in L femoral neck T score -2.5, L total hip -1.7. She was on alendronate in the past but caused back pains. Exam shows no evidence of inflammatory changes to her ears, nose or throat.   Relapsing polychondritis, stable -Continue cellcept and hydroxychloroquine  Osteoporosis -Side effects to alendronate. Discussed Reclast and she doesn't want an infusion -Check CBC, CMP, vitamin D -Continue vitamin D 5000 IU daily -Start prolia 60 mg q 6 months

## 2023-08-18 NOTE — PHYSICAL EXAM
[General Appearance - Alert] : alert [General Appearance - In No Acute Distress] : in no acute distress [General Appearance - Well Nourished] : well nourished [Sclera] : the sclera and conjunctiva were normal [Neck Appearance] : the appearance of the neck was normal [Apical Impulse] : the apical impulse was normal [Heart Rate And Rhythm] : heart rate was normal and rhythm regular [Heart Sounds] : normal S1 and S2 [Abnormal Walk] : normal gait [Oriented To Time, Place, And Person] : oriented to person, place, and time [Bowel Sounds] : normal bowel sounds [Abdomen Soft] : soft [Abdomen Tenderness] : non-tender [Supraclavicular Lymph Nodes Enlarged Bilaterally] : supraclavicular [Extraocular Movements] : extraocular movements were intact [Hearing Threshold Finger Rub Not Elbert] : hearing was normal [Neck Cervical Mass (___cm)] : no neck mass was observed [] : no rash [Skin Lesions] : no skin lesions [FreeTextEntry1] : + Slightly limited ROM in L to approx. 160 degrees due to pain. Mild tenderness to palpation along joint.

## 2023-08-18 NOTE — HISTORY OF PRESENT ILLNESS
[FreeTextEntry1] : 69 y/o woman presents for f/u of long-standing relapsing polychondritis.   Today patient reports overall feeling well on CellCept and hydroxychloroquine. Pt has severe-moderate OA in bilateral shoulders, at last visit she did not want any pain meds or injections. Today, she states that her PCP told her she should get the injections so she would like to try it as she is having up to 8/10 pain in her shoulders. Describes it as throbbing pain that sometimes radiates down her arm and associated with warmth of the joint.   Denies fever, chill, muscle weakness, rash, blurry vision, ear or eye pain, chest pain, SOB.

## 2023-08-22 LAB
25(OH)D3 SERPL-MCNC: 52 NG/ML
ALBUMIN SERPL ELPH-MCNC: 4.9 G/DL
ALP BLD-CCNC: 76 U/L
ALT SERPL-CCNC: 17 U/L
ANION GAP SERPL CALC-SCNC: 13 MMOL/L
AST SERPL-CCNC: 19 U/L
BASOPHILS # BLD AUTO: 0.04 K/UL
BASOPHILS NFR BLD AUTO: 0.7 %
BILIRUB SERPL-MCNC: 0.4 MG/DL
BUN SERPL-MCNC: 11 MG/DL
CALCIUM SERPL-MCNC: 9.5 MG/DL
CHLORIDE SERPL-SCNC: 106 MMOL/L
CO2 SERPL-SCNC: 23 MMOL/L
CREAT SERPL-MCNC: 1 MG/DL
EGFR: 61 ML/MIN/1.73M2
EOSINOPHIL # BLD AUTO: 0.07 K/UL
EOSINOPHIL NFR BLD AUTO: 1.2 %
GLUCOSE SERPL-MCNC: 92 MG/DL
HCT VFR BLD CALC: 44.8 %
HGB BLD-MCNC: 14.4 G/DL
IMM GRANULOCYTES NFR BLD AUTO: 0.3 %
LYMPHOCYTES # BLD AUTO: 0.62 K/UL
LYMPHOCYTES NFR BLD AUTO: 10.2 %
MAN DIFF?: NORMAL
MCHC RBC-ENTMCNC: 28.9 PG
MCHC RBC-ENTMCNC: 32.1 G/DL
MCV RBC AUTO: 90 FL
MONOCYTES # BLD AUTO: 0.41 K/UL
MONOCYTES NFR BLD AUTO: 6.8 %
NEUTROPHILS # BLD AUTO: 4.89 K/UL
NEUTROPHILS NFR BLD AUTO: 80.8 %
PLATELET # BLD AUTO: 219 K/UL
POTASSIUM SERPL-SCNC: 4.4 MMOL/L
PROT SERPL-MCNC: 6.6 G/DL
RBC # BLD: 4.98 M/UL
RBC # FLD: 13.5 %
SODIUM SERPL-SCNC: 142 MMOL/L
WBC # FLD AUTO: 6.05 K/UL

## 2023-08-28 DIAGNOSIS — M94.1 RELAPSING POLYCHONDRITIS: ICD-10-CM

## 2023-08-28 DIAGNOSIS — M81.0 AGE-RELATED OSTEOPOROSIS WITHOUT CURRENT PATHOLOGICAL FRACTURE: ICD-10-CM

## 2023-08-29 ENCOUNTER — APPOINTMENT (OUTPATIENT)
Dept: GASTROENTEROLOGY | Facility: CLINIC | Age: 68
End: 2023-08-29
Payer: MEDICARE

## 2023-08-29 ENCOUNTER — OUTPATIENT (OUTPATIENT)
Dept: OUTPATIENT SERVICES | Facility: HOSPITAL | Age: 68
LOS: 1 days | End: 2023-08-29
Payer: MEDICARE

## 2023-08-29 VITALS
OXYGEN SATURATION: 96 % | SYSTOLIC BLOOD PRESSURE: 121 MMHG | HEART RATE: 82 BPM | BODY MASS INDEX: 28.17 KG/M2 | TEMPERATURE: 97.5 F | HEIGHT: 63 IN | DIASTOLIC BLOOD PRESSURE: 77 MMHG | WEIGHT: 159 LBS

## 2023-08-29 DIAGNOSIS — Z90.49 ACQUIRED ABSENCE OF OTHER SPECIFIED PARTS OF DIGESTIVE TRACT: Chronic | ICD-10-CM

## 2023-08-29 DIAGNOSIS — Z98.890 OTHER SPECIFIED POSTPROCEDURAL STATES: Chronic | ICD-10-CM

## 2023-08-29 DIAGNOSIS — D64.9 ANEMIA, UNSPECIFIED: ICD-10-CM

## 2023-08-29 DIAGNOSIS — Z00.00 ENCOUNTER FOR GENERAL ADULT MEDICAL EXAMINATION WITHOUT ABNORMAL FINDINGS: ICD-10-CM

## 2023-08-29 DIAGNOSIS — J38.1 POLYP OF VOCAL CORD AND LARYNX: Chronic | ICD-10-CM

## 2023-08-29 DIAGNOSIS — Z87.898 PERSONAL HISTORY OF OTHER SPECIFIED CONDITIONS: ICD-10-CM

## 2023-08-29 DIAGNOSIS — Z86.2 PERSONAL HISTORY OF DISEASES OF THE BLOOD AND BLOOD-FORMING ORGANS AND CERTAIN DISORDERS INVOLVING THE IMMUNE MECHANISM: ICD-10-CM

## 2023-08-29 DIAGNOSIS — Z90.710 ACQUIRED ABSENCE OF BOTH CERVIX AND UTERUS: Chronic | ICD-10-CM

## 2023-08-29 DIAGNOSIS — Z87.19 PERSONAL HISTORY OF OTHER DISEASES OF THE DIGESTIVE SYSTEM: ICD-10-CM

## 2023-08-29 PROCEDURE — 99204 OFFICE O/P NEW MOD 45 MIN: CPT

## 2023-08-29 PROCEDURE — 99214 OFFICE O/P EST MOD 30 MIN: CPT | Mod: GC

## 2023-08-29 NOTE — PHYSICAL EXAM
[Alert] : alert [Healthy Appearing] : healthy appearing [No Acute Distress] : no acute distress [Sclera] : the sclera and conjunctiva were normal [No Respiratory Distress] : no respiratory distress [No Acc Muscle Use] : no accessory muscle use [Auscultation Breath Sounds / Voice Sounds] : lungs were clear to auscultation bilaterally [Heart Sounds Gallop] : no gallops [Bowel Sounds] : normal bowel sounds [Abdomen Tenderness] : non-tender [No Masses] : no abdominal mass palpated [Abdomen Soft] : soft [Oriented To Time, Place, And Person] : oriented to person, place, and time

## 2023-08-29 NOTE — HISTORY OF PRESENT ILLNESS
[FreeTextEntry1] : 68-year-old woman w a PMH of COPD (former smoker, quit 1 yr ago, not on BiPAP or O2 at home) w reactive airway, chronic polychondritis (triggers include extreme weather and illness), Rt Mid and lower lobe lung adenocarcinoma s/p ablation (follows with pulmonary) SCC of tongue (cisplatin x5 cycle, most recent 2/25/21 and radiotherapy 2021) s/p laryngoscopy (no lesions identified) is referred by PMD for screening colonoscopy. Patient denies any GI complains. Patient with family history of colon cancer in sister at age of 30.

## 2023-08-29 NOTE — REVIEW OF SYSTEMS
[Fever] : no fever [Recent Weight Gain (___ Lbs)] : no recent weight gain [Recent Weight Loss (___ Lbs)] : no recent weight loss [Red Eyes] : eyes not red [Scleral Icterus (Yellow Eyes)] : no scleral icterus [Chest Pain] : no chest pain [Shortness Of Breath] : no shortness of breath [Cough] : no cough [Abdominal Pain] : no abdominal pain [Vomiting] : no vomiting [Constipation] : no constipation [Diarrhea] : no diarrhea [Heartburn] : no heartburn [Melena (black stool)] : no melena [Bleeding] : no bleeding [Bloating (gassiness)] : no bloating [Itching] : no itching [Jaundice (yellowing of skin)] : no jaundice [Confused] : no confusion [Convulsions] : no convulsions

## 2023-08-29 NOTE — ASSESSMENT
[FreeTextEntry1] : 68-year female patient is here for screening colonoscopy. Last colon more than 5 years ago, patient with family history of colon cancer in sister at age of 30.  #Screening for colon cancer  High risk  no antiplatelet or AC CBD, CMP reviewed.  REC: will schedule for colonoscopy. Risks and benefits discussed, and patient is agreeable.

## 2023-08-30 DIAGNOSIS — Z12.11 ENCOUNTER FOR SCREENING FOR MALIGNANT NEOPLASM OF COLON: ICD-10-CM

## 2023-09-14 ENCOUNTER — OUTPATIENT (OUTPATIENT)
Dept: OUTPATIENT SERVICES | Facility: HOSPITAL | Age: 68
LOS: 1 days | End: 2023-09-14
Payer: MEDICARE

## 2023-09-14 ENCOUNTER — RESULT REVIEW (OUTPATIENT)
Age: 68
End: 2023-09-14

## 2023-09-14 DIAGNOSIS — Z98.890 OTHER SPECIFIED POSTPROCEDURAL STATES: Chronic | ICD-10-CM

## 2023-09-14 DIAGNOSIS — R91.1 SOLITARY PULMONARY NODULE: Chronic | ICD-10-CM

## 2023-09-14 DIAGNOSIS — Z90.710 ACQUIRED ABSENCE OF BOTH CERVIX AND UTERUS: Chronic | ICD-10-CM

## 2023-09-14 DIAGNOSIS — J38.1 POLYP OF VOCAL CORD AND LARYNX: Chronic | ICD-10-CM

## 2023-09-14 DIAGNOSIS — Z90.49 ACQUIRED ABSENCE OF OTHER SPECIFIED PARTS OF DIGESTIVE TRACT: Chronic | ICD-10-CM

## 2023-09-14 DIAGNOSIS — R92.8 OTHER ABNORMAL AND INCONCLUSIVE FINDINGS ON DIAGNOSTIC IMAGING OF BREAST: ICD-10-CM

## 2023-09-14 DIAGNOSIS — Z00.8 ENCOUNTER FOR OTHER GENERAL EXAMINATION: ICD-10-CM

## 2023-09-14 PROCEDURE — 76642 ULTRASOUND BREAST LIMITED: CPT | Mod: LT

## 2023-09-14 PROCEDURE — 76642 ULTRASOUND BREAST LIMITED: CPT | Mod: 26,LT

## 2023-09-15 DIAGNOSIS — R92.8 OTHER ABNORMAL AND INCONCLUSIVE FINDINGS ON DIAGNOSTIC IMAGING OF BREAST: ICD-10-CM

## 2023-09-20 ENCOUNTER — APPOINTMENT (OUTPATIENT)
Dept: BREAST CENTER | Facility: CLINIC | Age: 68
End: 2023-09-20
Payer: MEDICARE

## 2023-09-20 VITALS
DIASTOLIC BLOOD PRESSURE: 86 MMHG | SYSTOLIC BLOOD PRESSURE: 133 MMHG | BODY MASS INDEX: 28.35 KG/M2 | WEIGHT: 160 LBS | HEIGHT: 63 IN

## 2023-09-20 DIAGNOSIS — N64.89 OTHER SPECIFIED DISORDERS OF BREAST: ICD-10-CM

## 2023-09-20 PROCEDURE — 99213 OFFICE O/P EST LOW 20 MIN: CPT

## 2023-09-22 NOTE — BRIEF OPERATIVE NOTE - ANESTHESIOLOGIST NAME
Re: Clarification Required  Dear  and office staff, we have received an order that needs further clarification from your office on patient Yeyo Bhat, MRN: [id number], : 1965. We are unable to optimize your patient for surgery without the following information being clarified.    Please provide a NEW ORDER with corrections as noted below or contact the [facility] Chart Room at [phone] to further discuss as soon as possible to avoid any delays in service for your patient.     Please correct the following:    [] Patient Name  []   [x] Consent please clarify consent \"lithotripsy\" is missing?. Revise and re-fax thanks     Please fax back as soon as possible to [phone].    DO NOT USE THIS FORM AS AN ORDER.    [facility] Chart Room  (P) [phone]  (F) [phone]    [facility] Order Requirements state:  ALL ORDERS MUST BE DATED, LEGIBLE AND CONTAIN:  Full Patient Legal Name (as appears on ’s license)  Patient’s Date of Birth (as appears on ’s license)  Pre-admission testing as per anesthesia guidelines  Diagnosis and procedural consent.  (No spelling errors or abbreviations)  Physician/provider name  Physician/Provider CMS Approved Signature    All documentation (ie. History and Physical, labs) MUST contain name and date of birth on EACH page.    Thank you for helping us provide you and your patient with the best possible experience!  
Rl

## 2023-10-17 NOTE — ASU DISCHARGE PLAN (ADULT/PEDIATRIC) - ACTIVITY LEVEL
Nothing per rectum/No tampons/No douching/No intercourse/Nothing per vagina/No tub baths/No heavy lifting/X 4-6 weeks/No excercise Keystone Flap Text: The defect edges were debeveled with a #15 scalpel blade.  Given the location of the defect, shape of the defect a keystone flap was deemed most appropriate.  Using a sterile surgical marker, an appropriate keystone flap was drawn incorporating the defect, outlining the appropriate donor tissue and placing the expected incisions within the relaxed skin tension lines where possible. The area thus outlined was incised deep to adipose tissue with a #15 scalpel blade.  The skin margins were undermined to an appropriate distance in all directions around the primary defect and laterally outward around the flap utilizing iris scissors.

## 2023-10-25 ENCOUNTER — NON-APPOINTMENT (OUTPATIENT)
Age: 68
End: 2023-10-25

## 2023-11-17 ENCOUNTER — APPOINTMENT (OUTPATIENT)
Dept: RHEUMATOLOGY | Facility: CLINIC | Age: 68
End: 2023-11-17
Payer: MEDICARE

## 2023-11-17 ENCOUNTER — OUTPATIENT (OUTPATIENT)
Dept: OUTPATIENT SERVICES | Facility: HOSPITAL | Age: 68
LOS: 1 days | End: 2023-11-17
Payer: MEDICARE

## 2023-11-17 VITALS
TEMPERATURE: 98.2 F | HEIGHT: 63 IN | WEIGHT: 163 LBS | HEART RATE: 93 BPM | OXYGEN SATURATION: 96 % | DIASTOLIC BLOOD PRESSURE: 94 MMHG | SYSTOLIC BLOOD PRESSURE: 151 MMHG | BODY MASS INDEX: 28.88 KG/M2

## 2023-11-17 DIAGNOSIS — Z98.890 OTHER SPECIFIED POSTPROCEDURAL STATES: Chronic | ICD-10-CM

## 2023-11-17 DIAGNOSIS — Z00.00 ENCOUNTER FOR GENERAL ADULT MEDICAL EXAMINATION WITHOUT ABNORMAL FINDINGS: ICD-10-CM

## 2023-11-17 DIAGNOSIS — Z90.49 ACQUIRED ABSENCE OF OTHER SPECIFIED PARTS OF DIGESTIVE TRACT: Chronic | ICD-10-CM

## 2023-11-17 DIAGNOSIS — Z90.710 ACQUIRED ABSENCE OF BOTH CERVIX AND UTERUS: Chronic | ICD-10-CM

## 2023-11-17 DIAGNOSIS — J38.1 POLYP OF VOCAL CORD AND LARYNX: Chronic | ICD-10-CM

## 2023-11-17 DIAGNOSIS — M19.019 PRIMARY OSTEOARTHRITIS, UNSPECIFIED SHOULDER: ICD-10-CM

## 2023-11-17 DIAGNOSIS — M81.0 AGE-RELATED OSTEOPOROSIS W/OUT CURRENT PATHOLOGICAL FRACTURE: ICD-10-CM

## 2023-11-17 DIAGNOSIS — M94.1 RELAPSING POLYCHONDRITIS: ICD-10-CM

## 2023-11-17 DIAGNOSIS — R91.1 SOLITARY PULMONARY NODULE: Chronic | ICD-10-CM

## 2023-11-17 PROCEDURE — 86140 C-REACTIVE PROTEIN: CPT

## 2023-11-17 PROCEDURE — 85027 COMPLETE CBC AUTOMATED: CPT

## 2023-11-17 PROCEDURE — 85652 RBC SED RATE AUTOMATED: CPT

## 2023-11-17 PROCEDURE — 99214 OFFICE O/P EST MOD 30 MIN: CPT

## 2023-11-21 DIAGNOSIS — M19.019 PRIMARY OSTEOARTHRITIS, UNSPECIFIED SHOULDER: ICD-10-CM

## 2023-11-21 DIAGNOSIS — M81.0 AGE-RELATED OSTEOPOROSIS WITHOUT CURRENT PATHOLOGICAL FRACTURE: ICD-10-CM

## 2023-11-21 DIAGNOSIS — M94.1 RELAPSING POLYCHONDRITIS: ICD-10-CM

## 2023-11-27 LAB
BASOPHILS # BLD AUTO: 0.04 K/UL
BASOPHILS NFR BLD AUTO: 0.7 %
CRP SERPL-MCNC: <3 MG/L
EOSINOPHIL # BLD AUTO: 0.13 K/UL
EOSINOPHIL NFR BLD AUTO: 2.2 %
ERYTHROCYTE [SEDIMENTATION RATE] IN BLOOD BY WESTERGREN METHOD: 4 MM/HR
HCT VFR BLD CALC: 44.1 %
HGB BLD-MCNC: 13.9 G/DL
IMM GRANULOCYTES NFR BLD AUTO: 0.3 %
LYMPHOCYTES # BLD AUTO: 0.76 K/UL
LYMPHOCYTES NFR BLD AUTO: 13.1 %
MAN DIFF?: NORMAL
MCHC RBC-ENTMCNC: 29.2 PG
MCHC RBC-ENTMCNC: 31.5 G/DL
MCV RBC AUTO: 92.6 FL
MONOCYTES # BLD AUTO: 0.54 K/UL
MONOCYTES NFR BLD AUTO: 9.3 %
NEUTROPHILS # BLD AUTO: 4.3 K/UL
NEUTROPHILS NFR BLD AUTO: 74.4 %
PLATELET # BLD AUTO: 233 K/UL
RBC # BLD: 4.76 M/UL
RBC # FLD: 13.9 %
WBC # FLD AUTO: 5.79 K/UL

## 2023-11-30 ENCOUNTER — RESULT REVIEW (OUTPATIENT)
Age: 68
End: 2023-11-30

## 2023-11-30 ENCOUNTER — TRANSCRIPTION ENCOUNTER (OUTPATIENT)
Age: 68
End: 2023-11-30

## 2023-11-30 ENCOUNTER — OUTPATIENT (OUTPATIENT)
Dept: OUTPATIENT SERVICES | Facility: HOSPITAL | Age: 68
LOS: 1 days | Discharge: HOME | End: 2023-11-30
Payer: MEDICARE

## 2023-11-30 VITALS
RESPIRATION RATE: 119 BRPM | WEIGHT: 164.91 LBS | DIASTOLIC BLOOD PRESSURE: 84 MMHG | TEMPERATURE: 97 F | SYSTOLIC BLOOD PRESSURE: 119 MMHG | HEART RATE: 106 BPM | HEIGHT: 64 IN

## 2023-11-30 VITALS
RESPIRATION RATE: 20 BRPM | SYSTOLIC BLOOD PRESSURE: 108 MMHG | HEART RATE: 99 BPM | DIASTOLIC BLOOD PRESSURE: 57 MMHG | OXYGEN SATURATION: 93 %

## 2023-11-30 DIAGNOSIS — Z90.710 ACQUIRED ABSENCE OF BOTH CERVIX AND UTERUS: Chronic | ICD-10-CM

## 2023-11-30 DIAGNOSIS — Z98.890 OTHER SPECIFIED POSTPROCEDURAL STATES: Chronic | ICD-10-CM

## 2023-11-30 DIAGNOSIS — J38.1 POLYP OF VOCAL CORD AND LARYNX: Chronic | ICD-10-CM

## 2023-11-30 DIAGNOSIS — Z90.49 ACQUIRED ABSENCE OF OTHER SPECIFIED PARTS OF DIGESTIVE TRACT: Chronic | ICD-10-CM

## 2023-11-30 DIAGNOSIS — R91.1 SOLITARY PULMONARY NODULE: Chronic | ICD-10-CM

## 2023-11-30 DIAGNOSIS — Z86.2 PERSONAL HISTORY OF DISEASES OF THE BLOOD AND BLOOD-FORMING ORGANS AND CERTAIN DISORDERS INVOLVING THE IMMUNE MECHANISM: ICD-10-CM

## 2023-11-30 DIAGNOSIS — Z12.11 ENCOUNTER FOR SCREENING FOR MALIGNANT NEOPLASM OF COLON: ICD-10-CM

## 2023-11-30 PROCEDURE — 45385 COLONOSCOPY W/LESION REMOVAL: CPT

## 2023-11-30 PROCEDURE — 88305 TISSUE EXAM BY PATHOLOGIST: CPT

## 2023-11-30 PROCEDURE — C1889: CPT

## 2023-11-30 PROCEDURE — 88305 TISSUE EXAM BY PATHOLOGIST: CPT | Mod: 26

## 2023-11-30 RX ORDER — EZETIMIBE 10 MG/1
1 TABLET ORAL
Qty: 0 | Refills: 0 | DISCHARGE

## 2023-11-30 RX ORDER — SIMVASTATIN 20 MG/1
1 TABLET, FILM COATED ORAL
Qty: 0 | Refills: 0 | DISCHARGE

## 2023-11-30 RX ORDER — HYDROXYCHLOROQUINE SULFATE 200 MG
1 TABLET ORAL
Qty: 0 | Refills: 0 | DISCHARGE

## 2023-11-30 RX ORDER — ALBUTEROL 90 UG/1
2 AEROSOL, METERED ORAL
Qty: 0 | Refills: 0 | DISCHARGE

## 2023-11-30 RX ORDER — UMECLIDINIUM 62.5 UG/1
0 AEROSOL, POWDER ORAL
Qty: 0 | Refills: 0 | DISCHARGE

## 2023-11-30 RX ORDER — OMEPRAZOLE 10 MG/1
1 CAPSULE, DELAYED RELEASE ORAL
Qty: 0 | Refills: 0 | DISCHARGE

## 2023-11-30 RX ORDER — MYCOPHENOLATE MOFETIL 250 MG/1
2 CAPSULE ORAL
Qty: 0 | Refills: 0 | DISCHARGE

## 2023-11-30 RX ORDER — LOSARTAN POTASSIUM 100 MG/1
25 TABLET, FILM COATED ORAL
Qty: 0 | Refills: 0 | DISCHARGE

## 2023-11-30 RX ORDER — ALENDRONATE SODIUM 70 MG/1
1 TABLET ORAL
Qty: 0 | Refills: 0 | DISCHARGE

## 2023-11-30 NOTE — H&P PST ADULT - HISTORY OF PRESENT ILLNESS
68-year-old woman w a PMH of COPD (former smoker, quit 1 yr ago, not on BiPAP or O2 at home) w reactive airway, chronic polychondritis (triggers include extreme weather and illness), Rt Mid and lower lobe lung adenocarcinoma s/p ablation (follows with pulmonary) SCC of tongue (cisplatin x5 cycle, most recent 2/25/21 and radiotherapy 2021) s/p laryngoscopy (no lesions identified) is referred by PMD for screening colonoscopy.

## 2023-11-30 NOTE — ASU PATIENT PROFILE, ADULT - FALL HARM RISK - UNIVERSAL INTERVENTIONS
Bed in lowest position, wheels locked, appropriate side rails in place/Call bell, personal items and telephone in reach/Instruct patient to call for assistance before getting out of bed or chair/Non-slip footwear when patient is out of bed/[place] to call system/Physically safe environment - no spills, clutter or unnecessary equipment/Purposeful Proactive Rounding/Room/bathroom lighting operational, light cord in reach

## 2023-11-30 NOTE — ASU DISCHARGE PLAN (ADULT/PEDIATRIC) - NS MD DC FALL RISK RISK
For information on Fall & Injury Prevention, visit: [email or web address] OR  [email or web address] OR  https://www.cdc.gov/steadi/patient.html

## 2023-11-30 NOTE — ASU DISCHARGE PLAN (ADULT/PEDIATRIC) - ASU DC SPECIAL INSTRUCTIONSFT
- Follow up with our [facility] located at [address] [place] [address]. Phone Number: [phone]

## 2023-12-01 LAB
SURGICAL PATHOLOGY STUDY: SIGNIFICANT CHANGE UP
SURGICAL PATHOLOGY STUDY: SIGNIFICANT CHANGE UP

## 2023-12-05 DIAGNOSIS — Z86.010 PERSONAL HISTORY OF COLONIC POLYPS: ICD-10-CM

## 2023-12-05 DIAGNOSIS — K64.4 RESIDUAL HEMORRHOIDAL SKIN TAGS: ICD-10-CM

## 2023-12-05 DIAGNOSIS — K57.30 DIVERTICULOSIS OF LARGE INTESTINE WITHOUT PERFORATION OR ABSCESS WITHOUT BLEEDING: ICD-10-CM

## 2023-12-05 DIAGNOSIS — I10 ESSENTIAL (PRIMARY) HYPERTENSION: ICD-10-CM

## 2023-12-05 DIAGNOSIS — Z91.041 RADIOGRAPHIC DYE ALLERGY STATUS: ICD-10-CM

## 2023-12-05 DIAGNOSIS — K64.8 OTHER HEMORRHOIDS: ICD-10-CM

## 2023-12-05 DIAGNOSIS — J44.9 CHRONIC OBSTRUCTIVE PULMONARY DISEASE, UNSPECIFIED: ICD-10-CM

## 2023-12-05 DIAGNOSIS — D12.4 BENIGN NEOPLASM OF DESCENDING COLON: ICD-10-CM

## 2023-12-05 DIAGNOSIS — M94.1 RELAPSING POLYCHONDRITIS: ICD-10-CM

## 2023-12-05 DIAGNOSIS — Z12.11 ENCOUNTER FOR SCREENING FOR MALIGNANT NEOPLASM OF COLON: ICD-10-CM

## 2023-12-05 DIAGNOSIS — I25.10 ATHEROSCLEROTIC HEART DISEASE OF NATIVE CORONARY ARTERY WITHOUT ANGINA PECTORIS: ICD-10-CM

## 2023-12-05 DIAGNOSIS — K21.9 GASTRO-ESOPHAGEAL REFLUX DISEASE WITHOUT ESOPHAGITIS: ICD-10-CM

## 2023-12-05 DIAGNOSIS — E78.00 PURE HYPERCHOLESTEROLEMIA, UNSPECIFIED: ICD-10-CM

## 2023-12-05 DIAGNOSIS — K52.9 NONINFECTIVE GASTROENTERITIS AND COLITIS, UNSPECIFIED: ICD-10-CM

## 2023-12-11 ENCOUNTER — OUTPATIENT (OUTPATIENT)
Dept: OUTPATIENT SERVICES | Facility: HOSPITAL | Age: 68
LOS: 1 days | End: 2023-12-11
Payer: MEDICARE

## 2023-12-11 ENCOUNTER — APPOINTMENT (OUTPATIENT)
Dept: OPHTHALMOLOGY | Facility: CLINIC | Age: 68
End: 2023-12-11
Payer: MEDICARE

## 2023-12-11 DIAGNOSIS — R91.1 SOLITARY PULMONARY NODULE: Chronic | ICD-10-CM

## 2023-12-11 DIAGNOSIS — Z98.890 OTHER SPECIFIED POSTPROCEDURAL STATES: Chronic | ICD-10-CM

## 2023-12-11 DIAGNOSIS — Z90.49 ACQUIRED ABSENCE OF OTHER SPECIFIED PARTS OF DIGESTIVE TRACT: Chronic | ICD-10-CM

## 2023-12-11 DIAGNOSIS — J38.1 POLYP OF VOCAL CORD AND LARYNX: Chronic | ICD-10-CM

## 2023-12-11 DIAGNOSIS — Z90.710 ACQUIRED ABSENCE OF BOTH CERVIX AND UTERUS: Chronic | ICD-10-CM

## 2023-12-11 DIAGNOSIS — M13.0 POLYARTHRITIS, UNSPECIFIED: ICD-10-CM

## 2023-12-11 DIAGNOSIS — H53.8 OTHER VISUAL DISTURBANCES: ICD-10-CM

## 2023-12-11 LAB
ALBUMIN SERPL ELPH-MCNC: 4.3 G/DL
ALP BLD-CCNC: 61 U/L
ALT SERPL-CCNC: 18 U/L
ANION GAP SERPL CALC-SCNC: 12 MMOL/L
AST SERPL-CCNC: 16 U/L
BILIRUB SERPL-MCNC: 0.4 MG/DL
BUN SERPL-MCNC: 8 MG/DL
CALCIUM SERPL-MCNC: 9 MG/DL
CHLORIDE SERPL-SCNC: 107 MMOL/L
CO2 SERPL-SCNC: 23 MMOL/L
CREAT SERPL-MCNC: 0.9 MG/DL
EGFR: 69 ML/MIN/1.73M2
GLUCOSE SERPL-MCNC: 107 MG/DL
POTASSIUM SERPL-SCNC: 4.1 MMOL/L
PROT SERPL-MCNC: 6 G/DL
SODIUM SERPL-SCNC: 142 MMOL/L
TSH SERPL-ACNC: 1.62 UIU/ML

## 2023-12-11 PROCEDURE — 92083 EXTENDED VISUAL FIELD XM: CPT

## 2023-12-11 PROCEDURE — 80053 COMPREHEN METABOLIC PANEL: CPT

## 2023-12-11 PROCEDURE — 92083 EXTENDED VISUAL FIELD XM: CPT | Mod: 26

## 2023-12-11 PROCEDURE — 80061 LIPID PANEL: CPT

## 2023-12-11 PROCEDURE — 84443 ASSAY THYROID STIM HORMONE: CPT

## 2023-12-12 DIAGNOSIS — M13.0 POLYARTHRITIS, UNSPECIFIED: ICD-10-CM

## 2023-12-14 ENCOUNTER — APPOINTMENT (OUTPATIENT)
Dept: INTERNAL MEDICINE | Facility: CLINIC | Age: 68
End: 2023-12-14
Payer: MEDICARE

## 2023-12-14 ENCOUNTER — OUTPATIENT (OUTPATIENT)
Dept: OUTPATIENT SERVICES | Facility: HOSPITAL | Age: 68
LOS: 1 days | End: 2023-12-14
Payer: MEDICARE

## 2023-12-14 VITALS
TEMPERATURE: 97.6 F | WEIGHT: 163.25 LBS | BODY MASS INDEX: 28.93 KG/M2 | DIASTOLIC BLOOD PRESSURE: 87 MMHG | HEIGHT: 63 IN | HEART RATE: 76 BPM | OXYGEN SATURATION: 97 % | SYSTOLIC BLOOD PRESSURE: 144 MMHG

## 2023-12-14 DIAGNOSIS — Z90.710 ACQUIRED ABSENCE OF BOTH CERVIX AND UTERUS: Chronic | ICD-10-CM

## 2023-12-14 DIAGNOSIS — I10 ESSENTIAL (PRIMARY) HYPERTENSION: ICD-10-CM

## 2023-12-14 DIAGNOSIS — Z00.00 ENCOUNTER FOR GENERAL ADULT MEDICAL EXAMINATION WITHOUT ABNORMAL FINDINGS: ICD-10-CM

## 2023-12-14 DIAGNOSIS — Z98.890 OTHER SPECIFIED POSTPROCEDURAL STATES: Chronic | ICD-10-CM

## 2023-12-14 DIAGNOSIS — Z00.00 ENCOUNTER FOR GENERAL ADULT MEDICAL EXAMINATION W/OUT ABNORMAL FINDINGS: ICD-10-CM

## 2023-12-14 DIAGNOSIS — R91.1 SOLITARY PULMONARY NODULE: Chronic | ICD-10-CM

## 2023-12-14 DIAGNOSIS — J38.1 POLYP OF VOCAL CORD AND LARYNX: Chronic | ICD-10-CM

## 2023-12-14 LAB
CHOLEST SERPL-MCNC: 152 MG/DL
HDLC SERPL-MCNC: 54 MG/DL
LDLC SERPL CALC-MCNC: 65 MG/DL
NONHDLC SERPL-MCNC: 98 MG/DL
TRIGL SERPL-MCNC: 165 MG/DL

## 2023-12-14 PROCEDURE — 90662 IIV NO PRSV INCREASED AG IM: CPT

## 2023-12-14 PROCEDURE — 99214 OFFICE O/P EST MOD 30 MIN: CPT

## 2023-12-14 PROCEDURE — 90471 IMMUNIZATION ADMIN: CPT | Mod: 25

## 2023-12-14 RX ORDER — POLYETHYLENE GLYCOL 3350 AND ELECTROLYTES WITH LEMON FLAVOR 236; 22.74; 6.74; 5.86; 2.97 G/4L; G/4L; G/4L; G/4L; G/4L
236 POWDER, FOR SOLUTION ORAL
Qty: 1 | Refills: 0 | Status: DISCONTINUED | COMMUNITY
Start: 2023-08-29 | End: 2023-12-14

## 2023-12-14 RX ORDER — LOSARTAN POTASSIUM 50 MG/1
50 TABLET, FILM COATED ORAL DAILY
Qty: 30 | Refills: 6 | Status: DISCONTINUED | COMMUNITY
Start: 2022-05-18 | End: 2023-12-14

## 2023-12-14 NOTE — ASSESSMENT
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  #HTN - BP today 144/87 and previously high readings - On losartan 50mg PO OD - increase to 100mg PO  #DLD #Hypertriglyceridemia, improved - Lipid profile december 2023: , HDL 54, LDL 65 - Was previously on ezetimibe and simvastatin, held due to concern of muscle weakness? - c/w statin  #Polychondritison rx f/u with rheumatology #Osteoporosis - triggered by extreme weather, cold>heat - follows with Rheum for both -- Continue vitamin D 5000 IU q day, pt had vitamin D 52 in 8/2023 - Sp prolia injection with rheum - Decreased hydroxychloroquine back to 200 mg q day on last rheum visit - Pt had eye exam in 8/2023 - Continue mycophenolate mofetil 1000 mg BID  #COPD #Rt mid&low lung adenocarcinoma s/p ablation - following with pulm  #SCC of tongue s/p chemo and radiotherapy in remission - dry mouth 2/2 tx - s/p hyperbaric tx - following with ENT  #Left Brest mass - following with surgery, due for repeat mammogram in March 2024  #Health Care maintenance - Colonoscopy: done, following with GI, next one in December of 2028 - Mammogram: March 2024, following with surgery - Pap Smear: s/p total hysterectomy with cervix removed per patient - Flu Vaccine: given today - Took shingles vaccine as OP - [id number] : administered 1/11/2023 - RTC in 6 months

## 2023-12-14 NOTE — PHYSICAL EXAM
[No Acute Distress] : no acute distress [Well Developed] : well developed [No Respiratory Distress] : no respiratory distress  [Clear to Auscultation] : lungs were clear to auscultation bilaterally [No Edema] : there was no peripheral edema [Soft] : abdomen soft [Non Tender] : non-tender

## 2023-12-14 NOTE — HISTORY OF PRESENT ILLNESS
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

## 2023-12-15 NOTE — ASU PATIENT PROFILE, ADULT - ACCEPTABLE
POD0, lap appy.    A&Ox4, SBA, VSS on RA, on capno. 3 lap sites with sutures and glue, covered with bandaids at patient request due to sensitivity to fabric. PRN oxycodone given for pain. IV running LR @ 50mL. Tolerating regular diet. 20 weeks pregnant, OB following.    0

## 2023-12-18 DIAGNOSIS — J44.9 CHRONIC OBSTRUCTIVE PULMONARY DISEASE, UNSPECIFIED: ICD-10-CM

## 2023-12-18 DIAGNOSIS — I10 ESSENTIAL (PRIMARY) HYPERTENSION: ICD-10-CM

## 2023-12-18 DIAGNOSIS — Z23 ENCOUNTER FOR IMMUNIZATION: ICD-10-CM

## 2023-12-18 DIAGNOSIS — Z00.00 ENCOUNTER FOR GENERAL ADULT MEDICAL EXAMINATION WITHOUT ABNORMAL FINDINGS: ICD-10-CM

## 2023-12-18 DIAGNOSIS — E78.5 HYPERLIPIDEMIA, UNSPECIFIED: ICD-10-CM

## 2023-12-19 ENCOUNTER — OUTPATIENT (OUTPATIENT)
Dept: OUTPATIENT SERVICES | Facility: HOSPITAL | Age: 68
LOS: 1 days | End: 2023-12-19
Payer: MEDICARE

## 2023-12-19 ENCOUNTER — APPOINTMENT (OUTPATIENT)
Dept: GASTROENTEROLOGY | Facility: CLINIC | Age: 68
End: 2023-12-19
Payer: MEDICARE

## 2023-12-19 VITALS — OXYGEN SATURATION: 94 % | HEART RATE: 76 BPM | DIASTOLIC BLOOD PRESSURE: 86 MMHG | SYSTOLIC BLOOD PRESSURE: 124 MMHG

## 2023-12-19 DIAGNOSIS — Z90.710 ACQUIRED ABSENCE OF BOTH CERVIX AND UTERUS: Chronic | ICD-10-CM

## 2023-12-19 DIAGNOSIS — Z12.11 ENCOUNTER FOR SCREENING FOR MALIGNANT NEOPLASM OF COLON: ICD-10-CM

## 2023-12-19 DIAGNOSIS — Z90.49 ACQUIRED ABSENCE OF OTHER SPECIFIED PARTS OF DIGESTIVE TRACT: Chronic | ICD-10-CM

## 2023-12-19 DIAGNOSIS — K64.9 UNSPECIFIED HEMORRHOIDS: ICD-10-CM

## 2023-12-19 DIAGNOSIS — R91.1 SOLITARY PULMONARY NODULE: Chronic | ICD-10-CM

## 2023-12-19 DIAGNOSIS — Z00.00 ENCOUNTER FOR GENERAL ADULT MEDICAL EXAMINATION WITHOUT ABNORMAL FINDINGS: ICD-10-CM

## 2023-12-19 DIAGNOSIS — K57.90 DIVERTICULOSIS OF INTESTINE, PART UNSPECIFIED, W/OUT PERFORATION OR ABSCESS W/OUT BLEEDING: ICD-10-CM

## 2023-12-19 DIAGNOSIS — Z98.890 OTHER SPECIFIED POSTPROCEDURAL STATES: Chronic | ICD-10-CM

## 2023-12-19 DIAGNOSIS — J38.1 POLYP OF VOCAL CORD AND LARYNX: Chronic | ICD-10-CM

## 2023-12-19 PROCEDURE — 99213 OFFICE O/P EST LOW 20 MIN: CPT

## 2023-12-19 NOTE — PHYSICAL EXAM
[Alert] : alert [Normal Voice/Communication] : normal voice/communication [Healthy Appearing] : healthy appearing [Sclera] : the sclera and conjunctiva were normal [Hearing Threshold Finger Rub Not Elbert] : hearing was normal [Normal Lips/Gums] : the lips and gums were normal [Oropharynx] : the oropharynx was normal [Normal Appearance] : the appearance of the neck was normal [No Neck Mass] : no neck mass was observed [No Respiratory Distress] : no respiratory distress [No Acc Muscle Use] : no accessory muscle use [Respiration, Rhythm And Depth] : normal respiratory rhythm and effort [Auscultation Breath Sounds / Voice Sounds] : lungs were clear to auscultation bilaterally [Heart Rate And Rhythm] : heart rate was normal and rhythm regular [Normal S1, S2] : normal S1 and S2 [Murmurs] : no murmurs [Bowel Sounds] : normal bowel sounds [Abdomen Tenderness] : non-tender [No Masses] : no abdominal mass palpated [Abdomen Soft] : soft [No CVA Tenderness] : no CVA  tenderness [Abnormal Walk] : normal gait [Normal Color / Pigmentation] : normal skin color and pigmentation [Motor Exam] : the motor exam was normal [Oriented To Time, Place, And Person] : oriented to person, place, and time

## 2023-12-20 DIAGNOSIS — H40.039 ANATOMICAL NARROW ANGLE, UNSPECIFIED EYE: ICD-10-CM

## 2023-12-20 DIAGNOSIS — Z79.899 OTHER LONG TERM (CURRENT) DRUG THERAPY: ICD-10-CM

## 2023-12-21 DIAGNOSIS — Z12.11 ENCOUNTER FOR SCREENING FOR MALIGNANT NEOPLASM OF COLON: ICD-10-CM

## 2023-12-21 DIAGNOSIS — K57.90 DIVERTICULOSIS OF INTESTINE, PART UNSPECIFIED, WITHOUT PERFORATION OR ABSCESS WITHOUT BLEEDING: ICD-10-CM

## 2023-12-21 DIAGNOSIS — K64.9 UNSPECIFIED HEMORRHOIDS: ICD-10-CM

## 2024-01-15 ENCOUNTER — NON-APPOINTMENT (OUTPATIENT)
Age: 69
End: 2024-01-15

## 2024-01-26 ENCOUNTER — OUTPATIENT (OUTPATIENT)
Dept: OUTPATIENT SERVICES | Facility: HOSPITAL | Age: 69
LOS: 1 days | End: 2024-01-26
Payer: MEDICARE

## 2024-01-26 ENCOUNTER — RESULT REVIEW (OUTPATIENT)
Age: 69
End: 2024-01-26

## 2024-01-26 DIAGNOSIS — Z98.890 OTHER SPECIFIED POSTPROCEDURAL STATES: Chronic | ICD-10-CM

## 2024-01-26 DIAGNOSIS — R91.1 SOLITARY PULMONARY NODULE: ICD-10-CM

## 2024-01-26 DIAGNOSIS — Z90.710 ACQUIRED ABSENCE OF BOTH CERVIX AND UTERUS: Chronic | ICD-10-CM

## 2024-01-26 DIAGNOSIS — R91.1 SOLITARY PULMONARY NODULE: Chronic | ICD-10-CM

## 2024-01-26 DIAGNOSIS — J38.1 POLYP OF VOCAL CORD AND LARYNX: Chronic | ICD-10-CM

## 2024-01-26 DIAGNOSIS — Z00.8 ENCOUNTER FOR OTHER GENERAL EXAMINATION: ICD-10-CM

## 2024-01-26 DIAGNOSIS — Z90.49 ACQUIRED ABSENCE OF OTHER SPECIFIED PARTS OF DIGESTIVE TRACT: Chronic | ICD-10-CM

## 2024-01-26 PROCEDURE — 71250 CT THORAX DX C-: CPT

## 2024-01-26 PROCEDURE — 71250 CT THORAX DX C-: CPT | Mod: 26

## 2024-01-27 DIAGNOSIS — R91.1 SOLITARY PULMONARY NODULE: ICD-10-CM

## 2024-02-06 ENCOUNTER — OUTPATIENT (OUTPATIENT)
Dept: OUTPATIENT SERVICES | Facility: HOSPITAL | Age: 69
LOS: 1 days | End: 2024-02-06
Payer: MEDICARE

## 2024-02-06 ENCOUNTER — APPOINTMENT (OUTPATIENT)
Dept: OPHTHALMOLOGY | Facility: CLINIC | Age: 69
End: 2024-02-06
Payer: MEDICARE

## 2024-02-06 DIAGNOSIS — Z90.49 ACQUIRED ABSENCE OF OTHER SPECIFIED PARTS OF DIGESTIVE TRACT: Chronic | ICD-10-CM

## 2024-02-06 DIAGNOSIS — J38.1 POLYP OF VOCAL CORD AND LARYNX: Chronic | ICD-10-CM

## 2024-02-06 DIAGNOSIS — R91.1 SOLITARY PULMONARY NODULE: Chronic | ICD-10-CM

## 2024-02-06 DIAGNOSIS — H53.8 OTHER VISUAL DISTURBANCES: ICD-10-CM

## 2024-02-06 DIAGNOSIS — Z98.890 OTHER SPECIFIED POSTPROCEDURAL STATES: Chronic | ICD-10-CM

## 2024-02-06 DIAGNOSIS — Z90.710 ACQUIRED ABSENCE OF BOTH CERVIX AND UTERUS: Chronic | ICD-10-CM

## 2024-02-06 PROCEDURE — 92014 COMPRE OPH EXAM EST PT 1/>: CPT

## 2024-02-06 PROCEDURE — 92134 CPTRZ OPH DX IMG PST SGM RTA: CPT

## 2024-02-06 PROCEDURE — 92134 CPTRZ OPH DX IMG PST SGM RTA: CPT | Mod: 26

## 2024-02-13 DIAGNOSIS — H40.033 ANATOMICAL NARROW ANGLE, BILATERAL: ICD-10-CM

## 2024-02-13 DIAGNOSIS — H40.039 ANATOMICAL NARROW ANGLE, UNSPECIFIED EYE: ICD-10-CM

## 2024-02-13 DIAGNOSIS — Z79.899 OTHER LONG TERM (CURRENT) DRUG THERAPY: ICD-10-CM

## 2024-02-13 DIAGNOSIS — H04.123 DRY EYE SYNDROME OF BILATERAL LACRIMAL GLANDS: ICD-10-CM

## 2024-02-13 DIAGNOSIS — H25.13 AGE-RELATED NUCLEAR CATARACT, BILATERAL: ICD-10-CM

## 2024-02-15 ENCOUNTER — APPOINTMENT (OUTPATIENT)
Age: 69
End: 2024-02-15
Payer: MEDICARE

## 2024-02-15 VITALS — WEIGHT: 156 LBS | BODY MASS INDEX: 27.63 KG/M2

## 2024-02-15 DIAGNOSIS — C77.0 SECONDARY AND UNSPECIFIED MALIGNANT NEOPLASM OF LYMPH NODES OF HEAD, FACE AND NECK: ICD-10-CM

## 2024-02-15 DIAGNOSIS — C01 MALIGNANT NEOPLASM OF BASE OF TONGUE: ICD-10-CM

## 2024-02-15 DIAGNOSIS — R13.12 DYSPHAGIA, OROPHARYNGEAL PHASE: ICD-10-CM

## 2024-02-15 PROCEDURE — 99213 OFFICE O/P EST LOW 20 MIN: CPT | Mod: 25

## 2024-02-15 PROCEDURE — 31575 DIAGNOSTIC LARYNGOSCOPY: CPT

## 2024-02-15 NOTE — PROCEDURE
[Dysphagia] : dysphagia not clearly evaluated by indirect laryngoscopy [None] : none [Flexible Endoscope] : examined with the flexible endoscope [de-identified] : Flexible laryngoscopy performed. Nasal cavity, nasopharynx, oropharynx, hypopharynx, and larynx evaluated. No masses or lesions, bilateral true vocal folds symmetric and mobile.

## 2024-02-15 NOTE — PHYSICAL EXAM
[Nasal Endoscopy Performed] : nasal endoscopy was performed, see procedure section for findings [Normal] : mucosa is normal [Midline] : trachea located in midline position [Edentulous] : edentulous [de-identified] : radiation fibrosis

## 2024-02-15 NOTE — HISTORY OF PRESENT ILLNESS
[de-identified] : Oncology Summary Stage: T3N1M0 Site: Tongue base Pathology: p16-positive SCC Treatment: Chemoradiation completed 2/2021 Disease status: ARMANDO Swallow: Mild dysphagia and occ pain Airway: Decannulated Smoking: Former Thyroid: TSH 3/2022 0.92  [FreeTextEntry1] : Patient returns today following up on carcinoma base of tongue. Sometimes has trouble swallowing. No oral pain or bleeding.

## 2024-02-27 ENCOUNTER — OUTPATIENT (OUTPATIENT)
Dept: OUTPATIENT SERVICES | Facility: HOSPITAL | Age: 69
LOS: 1 days | End: 2024-02-27
Payer: MEDICARE

## 2024-02-27 ENCOUNTER — APPOINTMENT (OUTPATIENT)
Dept: PULMONOLOGY | Facility: CLINIC | Age: 69
End: 2024-02-27
Payer: MEDICARE

## 2024-02-27 VITALS
HEIGHT: 63 IN | WEIGHT: 145 LBS | OXYGEN SATURATION: 96 % | HEART RATE: 84 BPM | SYSTOLIC BLOOD PRESSURE: 145 MMHG | DIASTOLIC BLOOD PRESSURE: 98 MMHG | TEMPERATURE: 97.9 F | BODY MASS INDEX: 25.69 KG/M2

## 2024-02-27 VITALS — DIASTOLIC BLOOD PRESSURE: 77 MMHG | SYSTOLIC BLOOD PRESSURE: 132 MMHG

## 2024-02-27 DIAGNOSIS — F17.201 NICOTINE DEPENDENCE, UNSPECIFIED, IN REMISSION: ICD-10-CM

## 2024-02-27 DIAGNOSIS — Z90.49 ACQUIRED ABSENCE OF OTHER SPECIFIED PARTS OF DIGESTIVE TRACT: Chronic | ICD-10-CM

## 2024-02-27 DIAGNOSIS — Z98.890 OTHER SPECIFIED POSTPROCEDURAL STATES: Chronic | ICD-10-CM

## 2024-02-27 DIAGNOSIS — R91.1 SOLITARY PULMONARY NODULE: ICD-10-CM

## 2024-02-27 DIAGNOSIS — Z00.00 ENCOUNTER FOR GENERAL ADULT MEDICAL EXAMINATION WITHOUT ABNORMAL FINDINGS: ICD-10-CM

## 2024-02-27 DIAGNOSIS — Z90.710 ACQUIRED ABSENCE OF BOTH CERVIX AND UTERUS: Chronic | ICD-10-CM

## 2024-02-27 DIAGNOSIS — R91.1 SOLITARY PULMONARY NODULE: Chronic | ICD-10-CM

## 2024-02-27 DIAGNOSIS — J38.1 POLYP OF VOCAL CORD AND LARYNX: Chronic | ICD-10-CM

## 2024-02-27 PROCEDURE — 99214 OFFICE O/P EST MOD 30 MIN: CPT

## 2024-02-27 PROCEDURE — 99214 OFFICE O/P EST MOD 30 MIN: CPT | Mod: GC

## 2024-02-27 RX ORDER — PREDNISONE 20 MG/1
20 TABLET ORAL DAILY
Qty: 10 | Refills: 0 | Status: DISCONTINUED | COMMUNITY
Start: 2023-01-11 | End: 2024-02-27

## 2024-02-27 RX ORDER — ALBUTEROL SULFATE 90 UG/1
108 (90 BASE) INHALANT RESPIRATORY (INHALATION)
Qty: 1 | Refills: 6 | Status: ACTIVE | COMMUNITY
Start: 2022-08-26 | End: 1900-01-01

## 2024-02-27 RX ORDER — UMECLIDINIUM BROMIDE AND VILANTEROL TRIFENATATE 62.5; 25 UG/1; UG/1
62.5-25 POWDER RESPIRATORY (INHALATION) DAILY
Qty: 1 | Refills: 6 | Status: ACTIVE | COMMUNITY
Start: 2022-08-26 | End: 1900-01-01

## 2024-02-27 NOTE — PHYSICAL EXAM
[No Acute Distress] : no acute distress [Normal Appearance] : normal appearance [Normal Rate/Rhythm] : normal rate/rhythm [Normal S1, S2] : normal s1, s2 [No Murmurs] : no murmurs [No Resp Distress] : no resp distress [Clear to Auscultation Bilaterally] : clear to auscultation bilaterally [Not Tender] : not tender [Soft] : soft [No Edema] : no edema [No Focal Deficits] : no focal deficits [Oriented x3] : oriented x3 [Normal Affect] : normal affect

## 2024-02-27 NOTE — REVIEW OF SYSTEMS
[Cough] : no cough [Hemoptysis] : no hemoptysis [Chest Tightness] : no chest tightness [Frequent URIs] : no frequent URIs [Sputum] : no sputum [Dyspnea] : no dyspnea [Pleuritic Pain] : no pleuritic pain [Wheezing] : no wheezing [A.M. Dry Mouth] : no a.m. dry mouth [SOB on Exertion] : no sob on exertion

## 2024-02-27 NOTE — HISTORY OF PRESENT ILLNESS
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 Keith w/ repeat US breast, presenting for follow-up for her COPD and pulmonary nodule.  States she smoked 1 PPD for 52 years, quitting in 2020 after being diagnosed with SCC of the tongue.  Last CT chest 1/18/23 shows RML 2.4 x 1.3cm nodule, decreased size, and RLL decreased in size to 4-5mm. The most current CT chest 1/2024 Stable right lower lobe subpleural pulmonary nodule (series 302 image 149). Stable nodular thickening along the inferior aspect of the right oblique fissure.  The patient reports using albuterol and Anoro and reports no shortness of breath or limitation in ADL's except for some mild SOB going up stairs. Pt denies any COPD exacerbation in the past 1 year or use of prednisone.   Pt reports recent URI 2 weeks ago during which she was slightly more SOB but denied having to use prednisone or visiting the ED.

## 2024-02-27 NOTE — ASSESSMENT
[FreeTextEntry1] : 68 year-old female with a PMH of COPD (not on CPAP/BiPAP), DLD, HTN, RML+RLL lung nodules s/p ablation and PET negative, SCC of tongue base s/p cisplatin x 5 cycles (last 2/25/21) and RT last in 2021 s/p trach and PEG s/p removal, complex sclerosing papillar lesion of left breast (followed by Dr. Keith, presenting for follow-up for her COPD and pulmonary nodule.   #Chronic Obstructive Pulmonary Disease #Former Smoker (52 pack years) -No hospitalizations or COPD exacerbations since last visit 1/2023 -refilled anoro and albuterol, pt advised to use nebulizer prn although has not used for some time -denies current smoking  #RML/RLL nodule #Tobacco abuse, in remission - stable nodule - repeat CT scan Q yearly for now - Next LDCT due Jan 2025  RTC in 8 months. We will order CT chest non con for lung cancer screening at the next visit.

## 2024-03-05 DIAGNOSIS — J44.9 CHRONIC OBSTRUCTIVE PULMONARY DISEASE, UNSPECIFIED: ICD-10-CM

## 2024-03-05 DIAGNOSIS — F17.201 NICOTINE DEPENDENCE, UNSPECIFIED, IN REMISSION: ICD-10-CM

## 2024-03-05 DIAGNOSIS — R91.1 SOLITARY PULMONARY NODULE: ICD-10-CM

## 2024-03-20 ENCOUNTER — RESULT REVIEW (OUTPATIENT)
Age: 69
End: 2024-03-20

## 2024-03-20 ENCOUNTER — OUTPATIENT (OUTPATIENT)
Dept: OUTPATIENT SERVICES | Facility: HOSPITAL | Age: 69
LOS: 1 days | End: 2024-03-20
Payer: MEDICARE

## 2024-03-20 DIAGNOSIS — J38.1 POLYP OF VOCAL CORD AND LARYNX: Chronic | ICD-10-CM

## 2024-03-20 DIAGNOSIS — R91.1 SOLITARY PULMONARY NODULE: Chronic | ICD-10-CM

## 2024-03-20 DIAGNOSIS — Z98.890 OTHER SPECIFIED POSTPROCEDURAL STATES: Chronic | ICD-10-CM

## 2024-03-20 DIAGNOSIS — Z90.710 ACQUIRED ABSENCE OF BOTH CERVIX AND UTERUS: Chronic | ICD-10-CM

## 2024-03-20 DIAGNOSIS — Z90.49 ACQUIRED ABSENCE OF OTHER SPECIFIED PARTS OF DIGESTIVE TRACT: Chronic | ICD-10-CM

## 2024-03-20 DIAGNOSIS — Z12.31 ENCOUNTER FOR SCREENING MAMMOGRAM FOR MALIGNANT NEOPLASM OF BREAST: ICD-10-CM

## 2024-03-20 PROCEDURE — 77067 SCR MAMMO BI INCL CAD: CPT

## 2024-03-20 PROCEDURE — 77063 BREAST TOMOSYNTHESIS BI: CPT | Mod: 26

## 2024-03-20 PROCEDURE — 77067 SCR MAMMO BI INCL CAD: CPT | Mod: 26

## 2024-03-20 PROCEDURE — 77063 BREAST TOMOSYNTHESIS BI: CPT

## 2024-03-21 DIAGNOSIS — Z12.31 ENCOUNTER FOR SCREENING MAMMOGRAM FOR MALIGNANT NEOPLASM OF BREAST: ICD-10-CM

## 2024-03-27 ENCOUNTER — APPOINTMENT (OUTPATIENT)
Dept: BREAST CENTER | Facility: CLINIC | Age: 69
End: 2024-03-27
Payer: MEDICARE

## 2024-03-27 VITALS
WEIGHT: 163 LBS | DIASTOLIC BLOOD PRESSURE: 80 MMHG | SYSTOLIC BLOOD PRESSURE: 130 MMHG | HEIGHT: 64 IN | BODY MASS INDEX: 27.83 KG/M2 | HEART RATE: 92 BPM

## 2024-03-27 DIAGNOSIS — D24.2 BENIGN NEOPLASM OF LEFT BREAST: ICD-10-CM

## 2024-03-27 PROCEDURE — 99213 OFFICE O/P EST LOW 20 MIN: CPT

## 2024-03-27 NOTE — ASSESSMENT
[FreeTextEntry1] : Sol is a 69 F with two left breast radial scars, s/p L WLE on 8/24/2020, currently with a complex sclerosing papillary lesion in the L breast (not excised).   On exam, I was not able to palpate any suspicious abnormalities.   Her most recent imaging is as follows: B/L Screening Mammo - 03/20/2024: -There are scattered areas of fibroglandular density. -There is a stable area of benign architectural distortion corresponding to the site of surgery seen in the left breast. -No suspicious mass, grouping of calcifications, or other abnormality is identified. -There is no mammographic evidence of malignancy. BI-RADS Category 2:  Benign  We discussed BIRADS 3 lesions. These lesions have a 2% chance of harboring malignancy. There is always an option to obtain a tissue sample with a biopsy to confirm the diagnosis. The procedure was described in detail including the placement of a tissue marker clip. The risks of the procedure, including but not limited to bleeding and infection were also explained. She is not interested in biopsy at this time and agrees to continue with short-term interval imaging, which is traditionally performed at six-month intervals for approximately two years to establish stability.  --- AS REVIEW: 8/24/2020 -- L WLE of two areas  -more superior mass: proliferative type FC changes, no residual radial scar  -more inferior mass: sclerosing IP, small radial scar   Her most recent imaging was a b/l dx mammogram and US on 3/5/2021 which revealed post surgical changes in her left breast in UOQ, stable nodular asymmetry in her superior L breast, unchanged since 3/2020 and in her left breast @12-1N5, a heterogenous mass measuring 8 x 7 x 6 mm consistent with post surgical changes, deemed BIRADS 3 and in her left breast @3N3, a heterogenous nonvascular mass measuring 5 x 6 x 3 mm which was biopsy proven complex sclerosing papillary lesion.   We discussed radial scars without atypia.  These lesions are considered fibroproliferative lesions without atypia.  Patients with these lesions were found to have a slightly increased relative risk compared to the reference population.  However the lesions themselves do not have any malignant potential. There is about a 10-20% upgrade rate to a high risk lesion (including atypical hyperplasias) and a <3% upgrade to cancer.  However, when atypia is present there is up to a 15-25% upgrade rate to cancer.  For this reason, we have discussed observation vs. surgical excision of this lesion.  I have recommended that we move forward with surgical excision at this time.  In regards to her family history of breast cancer in her sister and maternal grandmother, her risk assessment is as follows:   RISK ASSESSMENT:  Babs 5yr -- 4.7% lifetime -- 15.8%  TC v6 5yr -- 3.1% lifetime -- 8.7%  This puts her in an average to intermediate risk for breast cancer. She does not quite meet criteria for screening breast MRIs.  I spent a total of 20 minutes of face to face time with this patient, greater than 50% of which was spent in counseling and/or coordination of care. All of her questions were appropriately answered. She knows to call with any concerns.   PLAN:  -B/L Screening Mammo - March 2025. -f/up after

## 2024-03-27 NOTE — DATA REVIEWED
[FreeTextEntry1] : B/L Screening Mammo - 03/20/2024: MAMMOGRAM FINDINGS: Mammography was performed including the following views: bilateral craniocaudal with tomosynthesis, bilateral mediolateral oblique with tomosynthesis.  The examination includes digital synthetic 2D and digital tomosynthesis 3D images. Additional imaging analysis was performed using CAD (computer-aided detection) software.  There are scattered areas of fibroglandular density.  There is a stable area of benign architectural distortion corresponding to the site of surgery seen in the left breast.  No suspicious mass, grouping of calcifications, or other abnormality is identified.  IMPRESSION: There is no mammographic evidence of malignancy.  RECOMMENDATION: Unless otherwise indicated by clinical findings, annual screening mammography recommended.  ASSESSMENT: BI-RADS Category 2:  Benign

## 2024-03-27 NOTE — HISTORY OF PRESENT ILLNESS
[FreeTextEntry1] : Sol is a 66 F with two left breast radial scars, s/p L WLE of two areas on 2020, now with a new complex sclerosing papillary lesion in the L breast (not excised).   2020 -- L WLE of two areas  -more superior mass: proliferative type FC changes, no residual radial scar  -more inferior mass: sclerosing IP, small radial scar   Her work up was as follows:  3/2/2020 -- b/l screening mammogram  -heterogenously dense breasts -focal asymmetry in L UOQ  -no suspicious mass, calcifications, or other abnormalities in her RIGHT breast  BIRADS 0   2020 -- L dx mammogram and US  -circumscribed mass with amorphous calcifications in her left lateral breast --> no significant change  -irregular mass with amorphous calcifications in UOQ, LEFT --> BIOPSY -new circumscribed mass in UOQ, LEFT --> BIOPSY  L US  @2N6, benign cyst which correlates with lateral breast mass seen on mammogram  BIRADS 4  2020 -- L stereobx x 2  -anterior asymmetry (top hat) --> radial scar  -posterior asymmetry (minicork) --> radial scar   Prior to her biopsy, she had no breast related complaints.  She denies any breast pain, has not palpated any new palpable masses in either breast and denies any nipple discharge or retraction. However, she was a little sore in her left lateral breast after her biopsy.    HISTORICAL RISK FACTORS:  -2 prior breast biopsies as above, no prior surgeries  -family history of breast cancer in her sister and maternal grandmother  -, age at first live birth was 18  -no prior OCP use, HRT use x 2 years, stopped  -s/p GEETHA/BSO in    RISK ASSESSMENT:  Babs 5yr -- 4.7% lifetime -- 15.8%  TC v6 5yr -- 3.1% lifetime -- 8.7%  INTERVAL HISTORY:  Sol returns for her 6 month follow up VISIT, s/p L WLE of two areas on 2020.   She has no breast related complaints at this time.  She denies any breast pain, has not palpated any new palpable masses in either breast and denies any nipple discharge or retraction.    Of note, since her last visit, she was found to have carcinoma at the base of her tongue, had a tracheostomy and PEG tube placement, and underwent chemotherapy and radiation, started in mid 2020. Her trach has since been removed and her PEG has also been removed.  She is due for repeat imaging to evaluate the efficacy of her treatments.    She had the following work up:  3/5/2021 -- b/l dx mammogram and US  -scattered areas of fibroglandular densities -L: UOQ, post surgical distortion, 8mm oil cyst @surgical site  -L: superior breast, low density stable nodular asymmetry, unchanged since 3/2020  -tiny nodule in L lateral/central breast, no significant changes since 2017  -scattered coarse benign calcifications in R  b/l US  RIGHT:  -no suspicious abnormalities  LEFT:  -@2N6, benign cyst, measures 5 x 5 x 3 mm  -@12-1N5, heterogenous mass measuring 8 x 7 x 6 mm, consistent with surgical site --> BIRADS 3  -@3N3, 5 x 6 x 3 mm heterogenous nonvascular mass --> BIOPSY  BIRADS 4   2021 -- L US CNBx @3N3 (minicork)  -complex sclerosing papillary lesion    INTERVAL HISTORY: 10/21/21 Sol returns for her 6 month follow up VISIT, s/p L WLE of two areas on 2020.  She also was diagnosed with LEFT complex sclerosing papillary lesion on 2021 that was not excised.   She has no breast related complaints at this time.  She denies any breast pain, has not palpated any new palpable masses in either breast and denies any nipple discharge or retraction.    Her imaging as follows: 21 LEFT US and mammo  -@3:00N 3 stable heterogeneous mass measuring 0.8 x 0.8 x 0.4 cm, consistent with prior biopsy proven papillary      lesion. Deemed BIRADS2  2022 -- Sol returns for her 6 month follow up visit, s/p L WLE of two areas on 2020.  She also was diagnosed with LEFT complex sclerosing papillary lesion on 2021 that was not excised.   She has no breast related complaints at this time.  She denies any breast pain, has not palpated any new palpable masses in either breast and denies any nipple discharge or retraction.  Her most recent imaging is as follows: 2022 - B/L Dx Mammo & Sono: -There are scattered areas of fibroglandular density. -Postsurgical architectural distortion noted in the left breast. Bilateral whole breast ultrasound was performed. -At 12:00 N6 of the left breast there is a stable hypoechoic mass measuring 0.5 x 0.7 x 0.5 cm. Short interval follow-up recommended. -At 3:00 N3 of the left breast there is redemonstration of stable previously biopsied mass (radial scar) measuring 0.5 x 0.5 x 0.2 cm.  BI-RADS category 3: Probably Benign   09/15/2022 -- Sol returns for her 6 month follow up visit, s/p L WLE of two areas on 2020.  She also was diagnosed with LEFT complex sclerosing papillary lesion on 2021 that was not excised.   She has no breast related complaints at this time.  She denies any breast pain, has not palpated any new palpable masses in either breast and denies any nipple discharge or retraction.  Her most recent imaging is as follows: Left Breast Sono - 2022: -At the 12:00 position 6 cm from the nipple, there is a stable hypoechoic mass measuring 0.4 x 0.5 x 0.3 cm. Short interval follow-up is recommended. -At the 3:00 position 3 cm from the nipple, there is a stable biopsy-proven radial scar measuring 0.5 x 0.5 x 0.3 cm. BI-RADS category 3: Probably Benign   2023 -- Sol returns for her 6 month follow up visit, s/p L WLE of two areas on 2020.  She also was diagnosed with LEFT complex sclerosing papillary lesion on 2021 that was not excised.   She has no breast related complaints at this time.  She denies any breast pain, has not palpated any new palpable masses in either breast and denies any nipple discharge or retraction.  Her most recent imaging is as follows: B/L Dx Mammo & Left Breast Sono - 03/15/2023: -There are scattered areas of fibroglandular density. -There is stable postsurgical distortion in the left breast upper outer quadrant.  -Stable bilateral asymmetries, benign.  Targeted unilateral left breast ultrasound was performed. -At the 12:00 position 6 cm from the nipple, there is a stable circumscribed hypoechoic mass measuring 0.3 x 0.4 x 0.2 cm, probably benign. This is unchanged since 2022. -At the 3:00 position 3 cm from the nipple, there is a circumscribed hypoechoic mass measuring 0.5 x 0.5 x 0.3 cm. This was biopsy-proven to be a radial scar and is unchanged since 2021. Recommendation: Follow-up breast ultrasound in 6 months. BI-RADS category 3: Probably Benign  2023 -- Sol returns for her 6 month follow up visit, s/p L WLE of two areas on 2020.  She also was diagnosed with LEFT complex sclerosing papillary lesion on 2021 that was not excised.   She has no breast related complaints at this time.  She denies any breast pain, has not palpated any new palpable masses in either breast and denies any nipple discharge or retraction.  Her most recent imaging is as follows: Left Breast Sono - 2023: -Benign cyst in the left breast, 12:00 axis, 6 cm from the nipple has decreased in size from 3/7/2022 measuring 0.3 x 0.4 x 0.2 cm, previously measuring 0.5 x 0.7 x 0.5 cm. -Biopsy-proven radial scar in the left breast, 3:00 axis, 3 cm from the nipple is not significantly changed in size from 3/5/2021, measuring 0.5 x 0.5 x 0.3 cm, previously measuring 0.5 x 0.6 x 0.3 cm. BI-RADS Category 2: Benign  She presents today for evaluation and imaging review.   2024 -- Sol returns for her follow up visit, s/p L WLE of two areas on 2020.  She also was diagnosed with LEFT complex sclerosing papillary lesion on 2021 that was not excised.   She has no breast related complaints at this time.  She denies any breast pain, has not palpated any new palpable masses in either breast and denies any nipple discharge or retraction.  Her most recent imaging is as follows: B/L Screening Mammo - 2024: -There are scattered areas of fibroglandular density. -There is a stable area of benign architectural distortion corresponding to the site of surgery seen in the left breast. -No suspicious mass, grouping of calcifications, or other abnormality is identified. -There is no mammographic evidence of malignancy. BI-RADS Category 2:  Benign  She presents today for evaluation and imaging review.

## 2024-03-27 NOTE — PHYSICAL EXAM
[Normocephalic] : normocephalic [Atraumatic] : atraumatic [No Supraclavicular Adenopathy] : no supraclavicular adenopathy [Examined in the supine and seated position] : examined in the supine and seated position [No dominant masses] : no dominant masses in right breast  [No dominant masses] : no dominant masses left breast [No Nipple Discharge] : no left nipple discharge [Breast Nipple Inversion] : nipples not inverted [Breast Nipple Retraction] : nipples not retracted [No Rashes] : no rashes [No Ulceration] : no ulceration [de-identified] : no suspicious mass palpated within either breast [de-identified] : no suspicious lesions palpated  [de-identified] : No axillary lymphadenopathy appreciated. [de-identified] : No axillary lymphadenopathy appreciated.

## 2024-04-16 ENCOUNTER — EMERGENCY (EMERGENCY)
Facility: HOSPITAL | Age: 69
LOS: 0 days | Discharge: HOME | End: 2024-04-16
Attending: STUDENT IN AN ORGANIZED HEALTH CARE EDUCATION/TRAINING PROGRAM
Payer: MEDICARE

## 2024-04-16 VITALS
DIASTOLIC BLOOD PRESSURE: 90 MMHG | SYSTOLIC BLOOD PRESSURE: 130 MMHG | HEART RATE: 94 BPM | OXYGEN SATURATION: 95 % | TEMPERATURE: 97 F | RESPIRATION RATE: 16 BRPM

## 2024-04-16 DIAGNOSIS — Z98.890 OTHER SPECIFIED POSTPROCEDURAL STATES: Chronic | ICD-10-CM

## 2024-04-16 DIAGNOSIS — M79.671 PAIN IN RIGHT FOOT: ICD-10-CM

## 2024-04-16 DIAGNOSIS — Z90.49 ACQUIRED ABSENCE OF OTHER SPECIFIED PARTS OF DIGESTIVE TRACT: Chronic | ICD-10-CM

## 2024-04-16 DIAGNOSIS — S92.311A DISPLACED FRACTURE OF FIRST METATARSAL BONE, RIGHT FOOT, INITIAL ENCOUNTER FOR CLOSED FRACTURE: ICD-10-CM

## 2024-04-16 DIAGNOSIS — Z90.710 ACQUIRED ABSENCE OF BOTH CERVIX AND UTERUS: Chronic | ICD-10-CM
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
## 2024-04-16 DIAGNOSIS — R91.1 SOLITARY PULMONARY NODULE: Chronic | ICD-10-CM

## 2024-04-16 DIAGNOSIS — Z91.041 RADIOGRAPHIC DYE ALLERGY STATUS: ICD-10-CM

## 2024-04-16 DIAGNOSIS — J38.1 POLYP OF VOCAL CORD AND LARYNX: Chronic | ICD-10-CM

## 2024-04-16 PROCEDURE — 73600 X-RAY EXAM OF ANKLE: CPT | Mod: 26,RT

## 2024-04-16 PROCEDURE — 90715 TDAP VACCINE 7 YRS/> IM: CPT

## 2024-04-16 PROCEDURE — 99284 EMERGENCY DEPT VISIT MOD MDM: CPT | Mod: 25

## 2024-04-16 PROCEDURE — 73630 X-RAY EXAM OF FOOT: CPT | Mod: 26,RT

## 2024-04-16 PROCEDURE — 73630 X-RAY EXAM OF FOOT: CPT | Mod: RT

## 2024-04-16 PROCEDURE — 28470 CLTX METATARSAL FX WO MNP EA: CPT | Mod: 54,RT

## 2024-04-16 PROCEDURE — 90471 IMMUNIZATION ADMIN: CPT

## 2024-04-16 PROCEDURE — 73600 X-RAY EXAM OF ANKLE: CPT | Mod: RT

## 2024-04-16 PROCEDURE — 99284 EMERGENCY DEPT VISIT MOD MDM: CPT | Mod: 57

## 2024-04-16 PROCEDURE — 29515 APPLICATION SHORT LEG SPLINT: CPT | Mod: RT

## 2024-04-16 RX ORDER — ACETAMINOPHEN 500 MG
975 TABLET ORAL ONCE
Refills: 0 | Status: COMPLETED | OUTPATIENT
Start: 2024-04-16 | End: 2024-04-16

## 2024-04-16 RX ORDER — OXYMETAZOLINE HYDROCHLORIDE 0.5 MG/ML
2 SPRAY NASAL ONCE
Refills: 0 | Status: DISCONTINUED | OUTPATIENT
Start: 2024-04-16 | End: 2024-04-16

## 2024-04-16 RX ORDER — TETANUS TOXOID, REDUCED DIPHTHERIA TOXOID AND ACELLULAR PERTUSSIS VACCINE, ADSORBED 5; 2.5; 8; 8; 2.5 [IU]/.5ML; [IU]/.5ML; UG/.5ML; UG/.5ML; UG/.5ML
0.5 SUSPENSION INTRAMUSCULAR ONCE
Refills: 0 | Status: COMPLETED | OUTPATIENT
Start: 2024-04-16 | End: 2024-04-16

## 2024-04-16 RX ADMIN — TETANUS TOXOID, REDUCED DIPHTHERIA TOXOID AND ACELLULAR PERTUSSIS VACCINE, ADSORBED 0.5 MILLILITER(S): 5; 2.5; 8; 8; 2.5 SUSPENSION INTRAMUSCULAR at 15:10

## 2024-04-16 RX ADMIN — Medication 975 MILLIGRAM(S): at 15:10

## 2024-04-16 NOTE — ED PROVIDER NOTE - PHYSICAL EXAMINATION
Vital Signs: I have reviewed the initial vital signs.  Constitutional: appears stated age, no acute distress  Head: NCAT  Eyes: Sclera clear, EOMI.  Cardiovascular: S1 and S2, regular rate, regular rhythm, well-perfused extremities, radial pulses equal and 2+, pedal pulses 2+ and equal  Respiratory: unlabored respiratory effort, clear to auscultation bilaterally no wheezing, rales, or rhonchi  Musculoskeletal: supple neck, no lower extremity edema, full range of motion 5/5 strength and sensation intact to right ankle, no midline spinal tenderness  Integumentary: warm, dry, superficial abrasions to left knee, left dorsal surface of hand, + ecchymosis to dorsal surface of right foot  Neurologic: awake, alert, oriented x3, extremities’ motor and sensory functions grossly intact

## 2024-04-16 NOTE — ED PROVIDER NOTE - PATIENT PORTAL LINK FT
You can access the FollowMyHealth Patient Portal offered by [facility] by registering at the following website: [email or web address]. By joining [employer]’s FollowMyHealth portal, you will also be able to view your health information using other applications (apps) compatible with our system.

## 2024-04-16 NOTE — ED PROVIDER NOTE - NSFOLLOWUPINSTRUCTIONS_ED_ALL_ED_FT
Our Emergency Department Referral Coordinators will be reaching out to you in the next 24-48 hours from 9:00am to 5:00pm to schedule a follow up appointment. Please expect a phone call from the hospital in that time frame. If you do not receive a call or if you have any questions or concerns, you can reach them at   [phone].      Fracture    A fracture is a break in one of your bones. This can occur from a variety of injuries, especially traumatic ones. Symptoms include pain, bruising, or swelling. Do not use the injured limb. If a fracture is in one of the bones below your waist, do not put weight on that limb unless instructed to do so by your healthcare provider. Crutches or a cane may have been provided. A splint or cast may have been applied by your health care provider. Make sure to keep it dry and follow up with an orthopedist as instructed.    SEEK IMMEDIATE MEDICAL CARE IF YOU HAVE ANY OF THE FOLLOWING SYMPTOMS: numbness, tingling, increasing pain, or weakness in any part of the injured limb.

## 2024-04-16 NOTE — ED PROVIDER NOTE - CARE PROVIDER_API CALL
Doc Wise  Podiatric Medicine  [address] [place] [address], [facility] [address] [place] [address]  Phone: [phone]  Fax: [phone]  Follow Up Time: 4-6 Days
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
## 2024-04-18 NOTE — ASSESSMENT
[FreeTextEntry1] : 68-year female patient is here for screening colonoscopy. Last colon more than 5 years ago, patient with family history of colon cancer in sister at age of 30. Following after screening colonoscopy.   #Screening for colon cancer # Diverticulosis  # hemorrhoids.  High risk Colonoscopy in 11/30 showed one small tubular adenoma, diverticulosis and hemorrhoids.   REC: Repeat colonoscopy in 5 years (Family hx and colon polyps)  Avoid constipation.  High fiber diet  follow up as needed.

## 2024-04-18 NOTE — REVIEW OF SYSTEMS
[As Noted in HPI] : as noted in HPI [Fever] : no fever [Chills] : no chills [Recent Weight Gain (___ Lbs)] : no recent weight gain [Recent Weight Loss (___ Lbs)] : no recent weight loss [Scleral Icterus (Yellow Eyes)] : no scleral icterus [Loss Of Hearing] : no hearing loss [Chest Pain] : no chest pain [Palpitations] : no palpitations [SOB on Exertion] : no shortness of breath during exertion [Abdominal Pain] : no abdominal pain [Vomiting] : no vomiting [Constipation] : no constipation [Diarrhea] : no diarrhea [Heartburn] : no heartburn [Melena (black stool)] : no melena [Bleeding] : no bleeding [Fecal Incontinence (soiling)] : no fecal incontinence [Arthralgias (joint pain)] : no arthralgias [Limb Swelling] : no limb swelling [Jaundice (yellowing of skin)] : no jaundice [Confused] : no confusion [Convulsions] : no convulsions [Anxiety] : no anxiety [Muscle Weakness] : no muscle weakness [Easy Bruising] : no tendency for easy bruising

## 2024-04-18 NOTE — HISTORY OF PRESENT ILLNESS
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 Patient denies any GI complains. Patient with family history of colon cancer in sister at age of 30.  Colonoscopy in 11/30 good prep showed one small tubular adenoma, diverticulosis and hemorrhoids.

## 2024-04-22 ENCOUNTER — APPOINTMENT (OUTPATIENT)
Dept: PODIATRY | Facility: CLINIC | Age: 69
End: 2024-04-22
Payer: MEDICARE

## 2024-04-22 ENCOUNTER — OUTPATIENT (OUTPATIENT)
Dept: OUTPATIENT SERVICES | Facility: HOSPITAL | Age: 69
LOS: 1 days | End: 2024-04-22
Payer: MEDICARE

## 2024-04-22 DIAGNOSIS — Z98.890 OTHER SPECIFIED POSTPROCEDURAL STATES: Chronic | ICD-10-CM

## 2024-04-22 DIAGNOSIS — M79.671 PAIN IN RIGHT FOOT: ICD-10-CM

## 2024-04-22 DIAGNOSIS — T14.8XXA OTHER INJURY OF UNSPECIFIED BODY REGION, INITIAL ENCOUNTER: ICD-10-CM

## 2024-04-22 DIAGNOSIS — Z90.49 ACQUIRED ABSENCE OF OTHER SPECIFIED PARTS OF DIGESTIVE TRACT: Chronic | ICD-10-CM

## 2024-04-22 DIAGNOSIS — R91.1 SOLITARY PULMONARY NODULE: Chronic | ICD-10-CM

## 2024-04-22 DIAGNOSIS — J38.1 POLYP OF VOCAL CORD AND LARYNX: Chronic | ICD-10-CM

## 2024-04-22 DIAGNOSIS — Z90.710 ACQUIRED ABSENCE OF BOTH CERVIX AND UTERUS: Chronic | ICD-10-CM

## 2024-04-22 PROCEDURE — 99204 OFFICE O/P NEW MOD 45 MIN: CPT

## 2024-04-22 PROCEDURE — 99204 OFFICE O/P NEW MOD 45 MIN: CPT | Mod: GC

## 2024-04-28 NOTE — END OF VISIT
[] : Resident [FreeTextEntry3] : right foot injury. Mechanism of injury moted above noted right foot edema with ecchymosis to the medial forefoot and serous blister xrays reviewed suspicious of multiple fractures, possible lisfranc pt should be NWB right foot rx rolling knee walker and CAM boot referred for CT with 3d recon follow up one week

## 2024-04-28 NOTE — PHYSICAL EXAM
[2+] : left foot dorsalis pedis 2+ [de-identified] : pain to RF on all ranges of motion  Tender to touch throughout foot. [FreeTextEntry1] : severe echymosis noted to RF fracture blistering to dorsum of forefoot and lateral aspect of rearfoot.  Superficial skin abrasians to medial aspect of forefoot on dorsum [Diminished Throughout Right Foot] : normal sensation with monofilament testing throughout right foot [Diminished Throughout Left Foot] : normal sensation with monofilament testing throughout left foot
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
## 2024-04-28 NOTE — ASSESSMENT
[Verbal] : verbal [FreeTextEntry1] : Due to clinical presentation, there is a high suspicion of further fractures throughout the further due to the ecchymosis throughout foot with fracture blistering  Advised patient to be completely NWB using a knee scooter; placed patient in splint  Advised pt to obtain a CT scan and RTC 10 days to review maging

## 2024-04-29 DIAGNOSIS — S92.314A NONDISPLACED FRACTURE OF FIRST METATARSAL BONE, RIGHT FOOT, INITIAL ENCOUNTER FOR CLOSED FRACTURE: ICD-10-CM
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
## 2024-04-29 DIAGNOSIS — S90.31XA CONTUSION OF RIGHT FOOT, INITIAL ENCOUNTER: ICD-10-CM
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
## 2024-05-01 ENCOUNTER — APPOINTMENT (OUTPATIENT)
Dept: PODIATRY | Facility: CLINIC | Age: 69
End: 2024-05-01
Payer: MEDICARE

## 2024-05-01 ENCOUNTER — OUTPATIENT (OUTPATIENT)
Dept: OUTPATIENT SERVICES | Facility: HOSPITAL | Age: 69
LOS: 1 days | End: 2024-05-01
Payer: MEDICARE

## 2024-05-01 DIAGNOSIS — Z98.890 OTHER SPECIFIED POSTPROCEDURAL STATES: Chronic | ICD-10-CM

## 2024-05-01 DIAGNOSIS — Z00.00 ENCOUNTER FOR GENERAL ADULT MEDICAL EXAMINATION WITHOUT ABNORMAL FINDINGS: ICD-10-CM

## 2024-05-01 DIAGNOSIS — J38.1 POLYP OF VOCAL CORD AND LARYNX: Chronic | ICD-10-CM

## 2024-05-01 DIAGNOSIS — Z90.49 ACQUIRED ABSENCE OF OTHER SPECIFIED PARTS OF DIGESTIVE TRACT: Chronic | ICD-10-CM

## 2024-05-01 DIAGNOSIS — R91.1 SOLITARY PULMONARY NODULE: Chronic | ICD-10-CM

## 2024-05-01 DIAGNOSIS — S90.31XA CONTUSION OF RIGHT FOOT, INITIAL ENCOUNTER: ICD-10-CM

## 2024-05-01 PROCEDURE — 99213 OFFICE O/P EST LOW 20 MIN: CPT | Mod: GC

## 2024-05-01 PROCEDURE — 99213 OFFICE O/P EST LOW 20 MIN: CPT

## 2024-05-01 RX ORDER — COMPR.STOCKING,KNEE,LONG,LARGE
EACH MISCELLANEOUS
Qty: 1 | Refills: 0 | Status: ACTIVE | COMMUNITY
Start: 2024-05-01 | End: 1900-01-01

## 2024-05-01 NOTE — ASSESSMENT
[Verbal] : verbal [Patient] : patient [FreeTextEntry1] : Patient removed from posterior splint She was advised to get a CAM boot once again for ambulating CT scan cannot be moved up at this time due to scheduling back up skin and surrounding tissue appears healthy and showing improvement with no blisterin

## 2024-05-01 NOTE — PHYSICAL EXAM
[2+] : left foot dorsalis pedis 2+ [de-identified] : right foot with some mild swelling and erythema throughout the foot  [FreeTextEntry1] : superficial abrasive wounds with no signs of infection no drainage noted  localized erythema to right foot  [Diminished Throughout Right Foot] : normal sensation with monofilament testing throughout right foot [Diminished Throughout Left Foot] : normal sensation with monofilament testing throughout left foot
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
## 2024-05-01 NOTE — END OF VISIT
[] : Resident [FreeTextEntry3] : pt presents fully WB in posterior splint pt unable to obtain rolling knee walker and awaiting authorization for CAM boot needs to be NWB  f/u CT  superficial skin sloughing on the dorsum of the foot secondary from deroofed blister. .infection return 2 weeks
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
## 2024-05-03 DIAGNOSIS — S90.31XA CONTUSION OF RIGHT FOOT, INITIAL ENCOUNTER: ICD-10-CM
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
## 2024-05-03 DIAGNOSIS — S99.929A UNSPECIFIED INJURY OF UNSPECIFIED FOOT, INITIAL ENCOUNTER: ICD-10-CM

## 2024-05-03 DIAGNOSIS — S92.314A NONDISPLACED FRACTURE OF FIRST METATARSAL BONE, RIGHT FOOT, INITIAL ENCOUNTER FOR CLOSED FRACTURE: ICD-10-CM

## 2024-05-15 ENCOUNTER — APPOINTMENT (OUTPATIENT)
Dept: PODIATRY | Facility: CLINIC | Age: 69
End: 2024-05-15
Payer: MEDICARE

## 2024-05-15 ENCOUNTER — OUTPATIENT (OUTPATIENT)
Dept: OUTPATIENT SERVICES | Facility: HOSPITAL | Age: 69
LOS: 1 days | End: 2024-05-15
Payer: MEDICARE

## 2024-05-15 DIAGNOSIS — R91.1 SOLITARY PULMONARY NODULE: Chronic | ICD-10-CM

## 2024-05-15 DIAGNOSIS — Z98.890 OTHER SPECIFIED POSTPROCEDURAL STATES: Chronic | ICD-10-CM
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
## 2024-05-15 DIAGNOSIS — S99.929A UNSPECIFIED INJURY OF UNSPECIFIED FOOT, INITIAL ENCOUNTER: ICD-10-CM

## 2024-05-15 DIAGNOSIS — Z90.710 ACQUIRED ABSENCE OF BOTH CERVIX AND UTERUS: Chronic | ICD-10-CM

## 2024-05-15 DIAGNOSIS — S92.314A NONDISPLACED FRACTURE OF FIRST METATARSAL BONE, RIGHT FOOT, INITIAL ENCOUNTER FOR CLOSED FRACTURE: ICD-10-CM

## 2024-05-15 DIAGNOSIS — Z00.00 ENCOUNTER FOR GENERAL ADULT MEDICAL EXAMINATION WITHOUT ABNORMAL FINDINGS: ICD-10-CM
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
## 2024-05-15 DIAGNOSIS — J38.1 POLYP OF VOCAL CORD AND LARYNX: Chronic | ICD-10-CM

## 2024-05-15 DIAGNOSIS — Z90.49 ACQUIRED ABSENCE OF OTHER SPECIFIED PARTS OF DIGESTIVE TRACT: Chronic | ICD-10-CM

## 2024-05-15 PROCEDURE — 99213 OFFICE O/P EST LOW 20 MIN: CPT

## 2024-05-15 PROCEDURE — 99213 OFFICE O/P EST LOW 20 MIN: CPT | Mod: GC

## 2024-05-16 PROBLEM — S99.929A FOOT INJURY: Status: ACTIVE | Noted: 2024-05-01

## 2024-05-16 NOTE — PHYSICAL EXAM
[General Appearance - Alert] : alert [General Appearance - In No Acute Distress] : in no acute distress [Ankle Swelling (On Exam)] : present [Ankle Swelling On The Right] : mild [2+] : left foot dorsalis pedis 2+ [Sensation] : the sensory exam was normal to light touch and pinprick [Delayed in the Right Toes] : capillary refills normal in right toes [Delayed in the Left Toes] : capillary refills normal in the left toes [de-identified] : Right foot with some mild swelling and erythema throughout the foot  [FreeTextEntry1] : superficial abrasive wounds with no signs of infection no drainage noted  localized erythema to right foot - improved Mild maceration to 4th webspace [Diminished Throughout Right Foot] : normal sensation with monofilament testing throughout right foot [Diminished Throughout Left Foot] : normal sensation with monofilament testing throughout left foot
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
## 2024-05-16 NOTE — END OF VISIT
[] : Resident [FreeTextEntry3] : dorsal foot wounds improving pt has not obtained a cam boot. has been ambulating with surgical shoe. reports some pain with WB scheduled for CT 5/16 strongly encouraged to use a CAM boot. discussed risks of painful non-union follow up 4 weeks return 2 weeks

## 2024-05-16 NOTE — ASSESSMENT
[Verbal] : verbal [Patient] : patient [FreeTextEntry1] : -First metatarsal base fracture, right foot -Contusion injury, right foot  Plan: -Continue WBAT with surgical shoe -She was advised to get a CAM boot once again for ambulating-->patient declines -Will call after CT results -Skin and surrounding tissue appears healthy and continuing improvement with no blistering -RTC 1 month

## 2024-05-24 ENCOUNTER — NON-APPOINTMENT (OUTPATIENT)
Age: 69
End: 2024-05-24

## 2024-06-03 ENCOUNTER — APPOINTMENT (OUTPATIENT)
Dept: ORTHOPEDIC SURGERY | Facility: CLINIC | Age: 69
End: 2024-06-03
Payer: COMMERCIAL

## 2024-06-03 VITALS — WEIGHT: 163 LBS | HEIGHT: 64 IN | BODY MASS INDEX: 27.83 KG/M2

## 2024-06-03 DIAGNOSIS — S93.324D DISLOCATION OF TARSOMETATARSAL JOINT OF RIGHT FOOT, SUBSEQUENT ENCOUNTER: ICD-10-CM

## 2024-06-03 PROCEDURE — 99204 OFFICE O/P NEW MOD 45 MIN: CPT
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
## 2024-06-03 NOTE — DISCUSSION/SUMMARY
[de-identified] : I had a lengthy discussion with the patient regarding her condition and treatment options available.  Certainly this can continued to be treated nonsurgically.  The patient would likely develop worsened arthritis given the trauma to the area.  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  The risk of surgery include but are not limited to infection, nonunion, malunion, need for revision surgery, persistent pain, DVT, PE, loss of limb, loss of life.  The patient would like to think about it and get back to us.

## 2024-06-03 NOTE — DATA REVIEWED
[FreeTextEntry1] : 3 views of the patient's right foot ordered and reviewed by me personally.  I also reviewed the patient's CT scan she had done recently.  X-rays demonstrate evidence of multiple metatarsal base fractures.  Alignment is grossly maintained without significant disruption of the Lisfranc complex although these are nonweightbearing films.  There is significant arthritis present at the tarsometatarsal joints.

## 2024-06-03 NOTE — PHYSICAL EXAM
[de-identified] : She is alert oriented x 3.  She is pleasant cooperative.  I examined her right lower extremity.  Her alignment is grossly anatomic.  She is tender over the midfoot.  She has moderate soft tissue swelling.  Compartments are soft compressible.  Neurovascular intact distally.

## 2024-06-05 RX ORDER — HYDROXYCHLOROQUINE SULFATE 200 MG/1
200 TABLET, FILM COATED ORAL
Qty: 132 | Refills: 3 | Status: ACTIVE | COMMUNITY
Start: 2023-04-07 | End: 1900-01-01

## 2024-06-05 RX ORDER — DENOSUMAB 60 MG/ML
60 INJECTION SUBCUTANEOUS
Qty: 1 | Refills: 0 | Status: DISCONTINUED | COMMUNITY
Start: 2024-04-11 | End: 2024-06-05

## 2024-06-05 RX ORDER — MYCOPHENOLATE MOFETIL 500 MG/1
500 TABLET ORAL TWICE DAILY
Qty: 360 | Refills: 2 | Status: ACTIVE | COMMUNITY
Start: 2022-10-07 | End: 1900-01-01

## 2024-06-05 RX ORDER — DENOSUMAB 60 MG/ML
60 INJECTION SUBCUTANEOUS
Qty: 1 | Refills: 1 | Status: ACTIVE | COMMUNITY
Start: 2024-06-05 | End: 1900-01-01

## 2024-06-05 RX ORDER — DENOSUMAB 60 MG/ML
60 INJECTION SUBCUTANEOUS
Qty: 1 | Refills: 0 | Status: DISCONTINUED | COMMUNITY
Start: 2023-08-18 | End: 2024-06-05

## 2024-06-12 ENCOUNTER — APPOINTMENT (OUTPATIENT)
Dept: PODIATRY | Facility: CLINIC | Age: 69
End: 2024-06-12
Payer: MEDICARE

## 2024-06-12 ENCOUNTER — OUTPATIENT (OUTPATIENT)
Dept: OUTPATIENT SERVICES | Facility: HOSPITAL | Age: 69
LOS: 1 days | End: 2024-06-12
Payer: MEDICARE

## 2024-06-12 DIAGNOSIS — S92.334D NONDISPLACED FRACTURE OF THIRD METATARSAL BONE, RIGHT FOOT, SUBSEQUENT ENCOUNTER FOR FRACTURE WITH ROUTINE HEALING: ICD-10-CM

## 2024-06-12 DIAGNOSIS — S92.314A NONDISPLACED FRACTURE OF FIRST METATARSAL BONE, RIGHT FOOT, INITIAL ENCOUNTER FOR CLOSED FRACTURE: ICD-10-CM

## 2024-06-12 DIAGNOSIS — S92.344D NONDISPLACED FRACTURE OF FOURTH METATARSAL BONE, RIGHT FOOT, SUBSEQUENT ENCOUNTER FOR FRACTURE WITH ROUTINE HEALING: ICD-10-CM

## 2024-06-12 DIAGNOSIS — R91.1 SOLITARY PULMONARY NODULE: Chronic | ICD-10-CM

## 2024-06-12 DIAGNOSIS — J38.1 POLYP OF VOCAL CORD AND LARYNX: Chronic | ICD-10-CM

## 2024-06-12 DIAGNOSIS — S92.324D NONDISPLACED FRACTURE OF SECOND METATARSAL BONE, RIGHT FOOT, SUBSEQUENT ENCOUNTER FOR FRACTURE WITH ROUTINE HEALING: ICD-10-CM

## 2024-06-12 DIAGNOSIS — Z98.890 OTHER SPECIFIED POSTPROCEDURAL STATES: Chronic | ICD-10-CM

## 2024-06-12 DIAGNOSIS — Z90.49 ACQUIRED ABSENCE OF OTHER SPECIFIED PARTS OF DIGESTIVE TRACT: Chronic | ICD-10-CM

## 2024-06-12 DIAGNOSIS — Z00.00 ENCOUNTER FOR GENERAL ADULT MEDICAL EXAMINATION WITHOUT ABNORMAL FINDINGS: ICD-10-CM

## 2024-06-12 PROCEDURE — 99213 OFFICE O/P EST LOW 20 MIN: CPT

## 2024-06-13 PROBLEM — S92.344D CLOSED NONDISPLACED FRACTURE OF FOURTH METATARSAL BONE OF RIGHT FOOT WITH ROUTINE HEALING, SUBSEQUENT ENCOUNTER: Status: ACTIVE | Noted: 2024-06-03

## 2024-06-13 PROBLEM — S92.334D CLOSED NONDISPLACED FRACTURE OF THIRD METATARSAL BONE OF RIGHT FOOT WITH ROUTINE HEALING, SUBSEQUENT ENCOUNTER: Status: ACTIVE | Noted: 2024-06-03

## 2024-06-13 PROBLEM — S92.324D CLOSED NONDISPLACED FRACTURE OF SECOND METATARSAL BONE OF RIGHT FOOT WITH ROUTINE HEALING, SUBSEQUENT ENCOUNTER: Status: ACTIVE | Noted: 2024-06-03

## 2024-06-13 PROBLEM — S92.314A CLOSED NONDISPLACED FRACTURE OF FIRST METATARSAL BONE OF RIGHT FOOT, INITIAL ENCOUNTER: Status: ACTIVE | Noted: 2024-04-28

## 2024-06-13 NOTE — PHYSICAL EXAM
[2+] : left foot dorsalis pedis 2+ [de-identified] : right foot with some mild swelling and erythema throughout the foot  [FreeTextEntry1] : superficial abrasive wounds with no signs of infection no drainage noted  localized erythema to right foot  [Diminished Throughout Right Foot] : normal sensation with monofilament testing throughout right foot [Diminished Throughout Left Foot] : normal sensation with monofilament testing throughout left foot

## 2024-06-13 NOTE — ASSESSMENT
[Verbal] : verbal [Patient] : patient [FreeTextEntry1] : She was advised to get a CAM boot once again for ambulating; patient refusal to obtain long CAM boot for the previous 3 weeks skin and surrounding tissue appears healthy and showing improvement with no blistering Discussed with patient that at this time it would be advisable for her to ambulate with CAM boot. For now this will be treated non-surgically taking into consideration the risks involving surgery including but not limited to patient non compliance, non union, wound dehiscence, hardware infection, malunion, DVT, PE. Non surgical treatment will likely result in long term worsening of arthritic pain and post-traumatic arthritis.

## 2024-06-13 NOTE — END OF VISIT
[] : Resident [FreeTextEntry3] : -pt returns for follow up. Pt has not obtained a CAM boot. reports pain with WB. I discussed with patient the need for off-loading. I also discussed the potential risks of surgery, including painful non-union, hardware failure, infection. Pt is agreeing to wear the boot.  -would benefit from a bone stimulator -follow up one month
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
## 2024-06-17 ENCOUNTER — APPOINTMENT (OUTPATIENT)
Dept: INTERNAL MEDICINE | Facility: CLINIC | Age: 69
End: 2024-06-17
Payer: MEDICARE

## 2024-06-17 ENCOUNTER — OUTPATIENT (OUTPATIENT)
Dept: OUTPATIENT SERVICES | Facility: HOSPITAL | Age: 69
LOS: 1 days | End: 2024-06-17
Payer: MEDICARE

## 2024-06-17 VITALS
BODY MASS INDEX: 28.17 KG/M2 | WEIGHT: 165 LBS | DIASTOLIC BLOOD PRESSURE: 85 MMHG | HEART RATE: 78 BPM | SYSTOLIC BLOOD PRESSURE: 137 MMHG | HEIGHT: 64 IN | TEMPERATURE: 98 F | OXYGEN SATURATION: 97 %

## 2024-06-17 DIAGNOSIS — E78.5 HYPERLIPIDEMIA, UNSPECIFIED: ICD-10-CM

## 2024-06-17 DIAGNOSIS — C02.9 MALIGNANT NEOPLASM OF TONGUE, UNSPECIFIED: ICD-10-CM

## 2024-06-17 DIAGNOSIS — J38.1 POLYP OF VOCAL CORD AND LARYNX: Chronic | ICD-10-CM

## 2024-06-17 DIAGNOSIS — S92.314D NONDISPLACED FRACTURE OF FIRST METATARSAL BONE, RIGHT FOOT, SUBSEQUENT ENCOUNTER FOR FRACTURE WITH ROUTINE HEALING: ICD-10-CM

## 2024-06-17 DIAGNOSIS — Z90.49 ACQUIRED ABSENCE OF OTHER SPECIFIED PARTS OF DIGESTIVE TRACT: Chronic | ICD-10-CM

## 2024-06-17 DIAGNOSIS — J44.9 CHRONIC OBSTRUCTIVE PULMONARY DISEASE, UNSPECIFIED: ICD-10-CM

## 2024-06-17 DIAGNOSIS — Z98.890 OTHER SPECIFIED POSTPROCEDURAL STATES: Chronic | ICD-10-CM

## 2024-06-17 DIAGNOSIS — R91.8 OTHER NONSPECIFIC ABNORMAL FINDING OF LUNG FIELD: ICD-10-CM

## 2024-06-17 DIAGNOSIS — R91.1 SOLITARY PULMONARY NODULE: Chronic | ICD-10-CM

## 2024-06-17 DIAGNOSIS — Z90.710 ACQUIRED ABSENCE OF BOTH CERVIX AND UTERUS: Chronic | ICD-10-CM

## 2024-06-17 DIAGNOSIS — Z00.00 ENCOUNTER FOR GENERAL ADULT MEDICAL EXAMINATION WITHOUT ABNORMAL FINDINGS: ICD-10-CM

## 2024-06-17 PROCEDURE — 99214 OFFICE O/P EST MOD 30 MIN: CPT

## 2024-06-17 PROCEDURE — G2211 COMPLEX E/M VISIT ADD ON: CPT

## 2024-06-17 RX ORDER — ATORVASTATIN CALCIUM 40 MG/1
40 TABLET, FILM COATED ORAL
Qty: 30 | Refills: 6 | Status: ACTIVE | COMMUNITY
Start: 2023-06-14 | End: 1900-01-01

## 2024-06-17 RX ORDER — LOSARTAN POTASSIUM 100 MG/1
100 TABLET, FILM COATED ORAL DAILY
Qty: 30 | Refills: 6 | Status: ACTIVE | COMMUNITY
Start: 2023-12-14 | End: 1900-01-01

## 2024-06-17 RX ORDER — OMEPRAZOLE 20 MG/1
20 CAPSULE, DELAYED RELEASE ORAL
Qty: 30 | Refills: 6 | Status: ACTIVE | COMMUNITY
Start: 2022-05-18 | End: 1900-01-01
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
## 2024-06-17 NOTE — PHYSICAL EXAM
[No Acute Distress] : no acute distress [No Respiratory Distress] : no respiratory distress  [No Accessory Muscle Use] : no accessory muscle use [Clear to Auscultation] : lungs were clear to auscultation bilaterally [Normal Rate] : normal rate  [Regular Rhythm] : with a regular rhythm [No Edema] : there was no peripheral edema [Soft] : abdomen soft [Non Tender] : non-tender [No CVA Tenderness] : no CVA  tenderness [No Spinal Tenderness] : no spinal tenderness [Normal Gait] : normal gait [Normal Affect] : the affect was normal [Normal Insight/Judgement] : insight and judgment were intact

## 2024-06-17 NOTE — REVIEW OF SYSTEMS
[Anxiety] : anxiety [Fever] : no fever [Chills] : no chills [Chest Pain] : no chest pain [Palpitations] : no palpitations [Lower Ext Edema] : no lower extremity edema [Shortness Of Breath] : no shortness of breath [Wheezing] : no wheezing [Cough] : no cough [Dyspnea on Exertion] : no dyspnea on exertion [Abdominal Pain] : no abdominal pain [Nausea] : no nausea [Constipation] : no constipation [Diarrhea] : diarrhea [Vomiting] : no vomiting [Melena] : no melena [Dysuria] : no dysuria [Dizziness] : no dizziness
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
## 2024-06-20 ENCOUNTER — NON-APPOINTMENT (OUTPATIENT)
Age: 69
End: 2024-06-20

## 2024-06-20 DIAGNOSIS — C02.9 MALIGNANT NEOPLASM OF TONGUE, UNSPECIFIED: ICD-10-CM

## 2024-06-20 DIAGNOSIS — S92.314D NONDISPLACED FRACTURE OF FIRST METATARSAL BONE, RIGHT FOOT, SUBSEQUENT ENCOUNTER FOR FRACTURE WITH ROUTINE HEALING: ICD-10-CM

## 2024-06-20 DIAGNOSIS — E78.5 HYPERLIPIDEMIA, UNSPECIFIED: ICD-10-CM

## 2024-06-20 DIAGNOSIS — R91.8 OTHER NONSPECIFIC ABNORMAL FINDING OF LUNG FIELD: ICD-10-CM

## 2024-06-20 DIAGNOSIS — J44.9 CHRONIC OBSTRUCTIVE PULMONARY DISEASE, UNSPECIFIED: ICD-10-CM

## 2024-06-21 DIAGNOSIS — S92.344D NONDISPLACED FRACTURE OF FOURTH METATARSAL BONE, RIGHT FOOT, SUBSEQUENT ENCOUNTER FOR FRACTURE WITH ROUTINE HEALING: ICD-10-CM

## 2024-06-21 DIAGNOSIS — S92.314D NONDISPLACED FRACTURE OF FIRST METATARSAL BONE, RIGHT FOOT, SUBSEQUENT ENCOUNTER FOR FRACTURE WITH ROUTINE HEALING: ICD-10-CM

## 2024-06-21 DIAGNOSIS — S93.334D OTHER DISLOCATION OF RIGHT FOOT, SUBSEQUENT ENCOUNTER: ICD-10-CM

## 2024-06-21 DIAGNOSIS — S92.324D NONDISPLACED FRACTURE OF SECOND METATARSAL BONE, RIGHT FOOT, SUBSEQUENT ENCOUNTER FOR FRACTURE WITH ROUTINE HEALING: ICD-10-CM
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
## 2024-06-21 DIAGNOSIS — S92.314A NONDISPLACED FRACTURE OF FIRST METATARSAL BONE, RIGHT FOOT, INITIAL ENCOUNTER FOR CLOSED FRACTURE: ICD-10-CM

## 2024-07-03 ENCOUNTER — APPOINTMENT (OUTPATIENT)
Dept: ORTHOPEDIC SURGERY | Facility: CLINIC | Age: 69
End: 2024-07-03
Payer: COMMERCIAL

## 2024-07-03 DIAGNOSIS — S92.314D NONDISPLACED FRACTURE OF FIRST METATARSAL BONE, RIGHT FOOT, SUBSEQUENT ENCOUNTER FOR FRACTURE WITH ROUTINE HEALING: ICD-10-CM

## 2024-07-03 DIAGNOSIS — S92.344D NONDISPLACED FRACTURE OF FOURTH METATARSAL BONE, RIGHT FOOT, SUBSEQUENT ENCOUNTER FOR FRACTURE WITH ROUTINE HEALING: ICD-10-CM

## 2024-07-03 DIAGNOSIS — S92.334D NONDISPLACED FRACTURE OF THIRD METATARSAL BONE, RIGHT FOOT, SUBSEQUENT ENCOUNTER FOR FRACTURE WITH ROUTINE HEALING: ICD-10-CM

## 2024-07-03 DIAGNOSIS — S92.324D NONDISPLACED FRACTURE OF SECOND METATARSAL BONE, RIGHT FOOT, SUBSEQUENT ENCOUNTER FOR FRACTURE WITH ROUTINE HEALING: ICD-10-CM

## 2024-07-03 PROCEDURE — 28470 CLTX METATARSAL FX WO MNP EA: CPT | Mod: RT

## 2024-07-03 PROCEDURE — 99214 OFFICE O/P EST MOD 30 MIN: CPT | Mod: 57,25

## 2024-07-03 PROCEDURE — 73630 X-RAY EXAM OF FOOT: CPT | Mod: RT

## 2024-07-09 ENCOUNTER — OUTPATIENT (OUTPATIENT)
Dept: OUTPATIENT SERVICES | Facility: HOSPITAL | Age: 69
LOS: 1 days | End: 2024-07-09

## 2024-07-09 ENCOUNTER — APPOINTMENT (OUTPATIENT)
Dept: GASTROENTEROLOGY | Facility: CLINIC | Age: 69
End: 2024-07-09

## 2024-07-09 VITALS
DIASTOLIC BLOOD PRESSURE: 82 MMHG | BODY MASS INDEX: 28.51 KG/M2 | WEIGHT: 167 LBS | HEART RATE: 64 BPM | OXYGEN SATURATION: 95 % | TEMPERATURE: 98.3 F | SYSTOLIC BLOOD PRESSURE: 148 MMHG | HEIGHT: 64 IN

## 2024-07-09 DIAGNOSIS — Z00.00 ENCOUNTER FOR GENERAL ADULT MEDICAL EXAMINATION WITHOUT ABNORMAL FINDINGS: ICD-10-CM

## 2024-07-09 DIAGNOSIS — Z98.890 OTHER SPECIFIED POSTPROCEDURAL STATES: Chronic | ICD-10-CM

## 2024-07-09 DIAGNOSIS — R14.0 ABDOMINAL DISTENSION (GASEOUS): ICD-10-CM

## 2024-07-09 DIAGNOSIS — Z90.710 ACQUIRED ABSENCE OF BOTH CERVIX AND UTERUS: Chronic | ICD-10-CM

## 2024-07-09 DIAGNOSIS — R91.1 SOLITARY PULMONARY NODULE: Chronic | ICD-10-CM

## 2024-07-09 DIAGNOSIS — Z90.49 ACQUIRED ABSENCE OF OTHER SPECIFIED PARTS OF DIGESTIVE TRACT: Chronic | ICD-10-CM

## 2024-07-09 PROCEDURE — 99214 OFFICE O/P EST MOD 30 MIN: CPT

## 2024-07-10 ENCOUNTER — APPOINTMENT (OUTPATIENT)
Dept: PODIATRY | Facility: CLINIC | Age: 69
End: 2024-07-10

## 2024-07-19 RX ORDER — DENOSUMAB 60 MG/ML
60 INJECTION SUBCUTANEOUS
Qty: 1 | Refills: 1 | Status: ACTIVE | COMMUNITY
Start: 2024-07-19 | End: 1900-01-01

## 2024-07-28 NOTE — DISCHARGE NOTE PROVIDER - NSDCFUADDAPPT_GEN_ALL_CORE_FT
Hospitalist Progress Note               Daily Progress Note: 7/28/2024      Hospital Day: 3     Chief complaint:   Chief Complaint   Patient presents with    Abdominal Pain    Nausea & Vomiting        Subjective:   Hospital course to date:    59-year-old with history of remote gastric bypass in 2006, presenting to the hospital for intractable abdominal discomfort, nausea, vomiting of 3 weeks duration.  Also complaining of mild headache.  She has history of hypertension and atrial fibrillation.  Has history of an unspecified stricture causing similar symptoms years ago  --------  Patient is seen today for follow-up.    Overnight events.  7/27  Not much vomiting, but she still nauseous, on clear liquid diet.  Complaining of having intractable retching days duration.  Abdomen is somewhat discomfortable, but no definite abdominal pain.  She has been constipated and her last bowel movement was many days ago.  She still passing gas.  She has been tolerated clears.  Spoke with Dr. Vazquez, plan to start scheduled antiemetics, EGD on Monday 7/28  Patient felt well overnight, but had recurrent nausea and vomiting this morning.  When I saw her she is retching.  She appears miserable.    Medications reviewed  Current Facility-Administered Medications   Medication Dose Route Frequency    bisacodyl (DULCOLAX) suppository 10 mg  10 mg Rectal Daily PRN    scopolamine (TRANSDERM-SCOP) transdermal patch 1 patch  1 patch TransDERmal Q72H    lactated ringers IV soln infusion   IntraVENous Continuous    pantoprazole (PROTONIX) injection 40 mg  40 mg IntraVENous Daily    amiodarone (CORDARONE) tablet 200 mg  200 mg Oral Daily    apixaban (ELIQUIS) tablet 5 mg  5 mg Oral BID    amLODIPine (NORVASC) tablet 5 mg  5 mg Oral Daily    fluticasone (FLONASE) 50 MCG/ACT nasal spray 1 spray  1 spray Nasal PRN    cetirizine (ZYRTEC) tablet 10 mg  10 mg Oral Daily PRN    sodium chloride flush 0.9 % injection 5-40 mL  5-40 mL IntraVENous 2  appreciate bowel sounds      Data Review:       Recent Days:  Recent Labs     07/26/24  1701 07/27/24  0504 07/28/24  0946   WBC 8.9 6.3 5.1   HGB 12.8 10.8* 11.9   HCT 37.9 32.4* 36.9    216 263       Recent Labs     07/26/24  1701 07/26/24  1708 07/27/24  0504 07/27/24  1609 07/28/24  0946   *  --  134* 135* 136   K 2.6*  --  2.9* 3.6 3.5   CL 88*  --  96* 99 101   CO2 31  --  31 29 28   GLUCOSE 140*  --  92 102* 112*   BUN 22*  --  17 14 10   CREATININE 0.87  --  0.66 0.65 0.90   CALCIUM 9.9  --  8.8 8.7 9.1   MG  --  1.9 1.8 1.7  --    BILITOT 0.8  --   --   --   --    AST 24  --   --   --   --    ALT 36  --   --   --   --        No results for input(s): \"PHART\", \"OUA8LGO\", \"PO2ART\", \"CQB3TYC\", \"BEART\", \"XDQ3GRMA\", \"HGBART\", \"KC4HSKZJO\", \"FIO2A\", \"P0VOMPLE\", \"OXYHEM\", \"CARBOXHGBART\", \"METHGBART\", \"C6BDXGUTL\", \"PHCORART\", \"TEMP\" in the last 72 hours.    Invalid input(s): \"ZNN3BGQSH\"    24 Hour Results:  Recent Results (from the past 24 hour(s))   Basic Metabolic Panel w/ Reflex to MG    Collection Time: 07/28/24  9:46 AM   Result Value Ref Range    Sodium 136 136 - 145 mmol/L    Potassium 3.5 3.5 - 5.1 mmol/L    Chloride 101 97 - 108 mmol/L    CO2 28 21 - 32 mmol/L    Anion Gap 7 5 - 15 mmol/L    Glucose 112 (H) 65 - 100 mg/dL    BUN 10 6 - 20 mg/dL    Creatinine 0.90 0.55 - 1.02 mg/dL    BUN/Creatinine Ratio 11 (L) 12 - 20      Est, Glom Filt Rate 73 >60 ml/min/1.73m2    Calcium 9.1 8.5 - 10.1 mg/dL   CBC with Auto Differential    Collection Time: 07/28/24  9:46 AM   Result Value Ref Range    WBC 5.1 3.6 - 11.0 K/uL    RBC 4.12 3.80 - 5.20 M/uL    Hemoglobin 11.9 11.5 - 16.0 g/dL    Hematocrit 36.9 35.0 - 47.0 %    MCV 89.6 80.0 - 99.0 FL    MCH 28.9 26.0 - 34.0 PG    MCHC 32.2 30.0 - 36.5 g/dL    RDW 14.5 11.5 - 14.5 %    Platelets 263 150 - 400 K/uL    MPV 9.6 8.9 - 12.9 FL    Nucleated RBCs 0.0 0.0  WBC    nRBC 0.00 0.00 - 0.01 K/uL    Neutrophils % 63 32 - 75 %    Lymphocytes % 27 12 - 49  Follow up as scheduled for PET scan Monday    Follow up as scheduled for PET scan Monday at 10:30 am  Dr Najera's office will call you to set up an appointment  Please call to make an appointment to see Dr Bright and Dr Mendoza as outpatient within the next few weeks.  Need to see Dr Juárez in 2 weeks, will arrange appointment.

## 2024-07-31 ENCOUNTER — APPOINTMENT (OUTPATIENT)
Dept: ORTHOPEDIC SURGERY | Facility: CLINIC | Age: 69
End: 2024-07-31
Payer: COMMERCIAL

## 2024-07-31 DIAGNOSIS — S92.344D NONDISPLACED FRACTURE OF FOURTH METATARSAL BONE, RIGHT FOOT, SUBSEQUENT ENCOUNTER FOR FRACTURE WITH ROUTINE HEALING: ICD-10-CM

## 2024-07-31 DIAGNOSIS — S92.314A NONDISPLACED FRACTURE OF FIRST METATARSAL BONE, RIGHT FOOT, INITIAL ENCOUNTER FOR CLOSED FRACTURE: ICD-10-CM

## 2024-07-31 DIAGNOSIS — S92.314D NONDISPLACED FRACTURE OF FIRST METATARSAL BONE, RIGHT FOOT, SUBSEQUENT ENCOUNTER FOR FRACTURE WITH ROUTINE HEALING: ICD-10-CM

## 2024-07-31 DIAGNOSIS — S92.324D NONDISPLACED FRACTURE OF SECOND METATARSAL BONE, RIGHT FOOT, SUBSEQUENT ENCOUNTER FOR FRACTURE WITH ROUTINE HEALING: ICD-10-CM

## 2024-07-31 DIAGNOSIS — S92.334D NONDISPLACED FRACTURE OF THIRD METATARSAL BONE, RIGHT FOOT, SUBSEQUENT ENCOUNTER FOR FRACTURE WITH ROUTINE HEALING: ICD-10-CM

## 2024-07-31 PROCEDURE — 99024 POSTOP FOLLOW-UP VISIT: CPT

## 2024-07-31 PROCEDURE — 73630 X-RAY EXAM OF FOOT: CPT | Mod: RT

## 2024-07-31 NOTE — PHYSICAL EXAM
[de-identified] : Minimally tender throughout the midfoot.  No instability of the Lisfranc joint.  Compartment soft and compressible.  Neurovascular intact.

## 2024-07-31 NOTE — HISTORY OF PRESENT ILLNESS
[de-identified] : Patient comes today for follow-up of her right foot fractures/Lisfranc injury.  She is doing quite well.  She is wearing a postop shoe.  No longer has any significant pain with walking.  Does not endorse any calf pain chest pain or shortness of breath.

## 2024-07-31 NOTE — DISCUSSION/SUMMARY
[de-identified] : At this point the patient can transition to a normal shoe and resume activity as tolerated.  There were no restrictions.  I will see her back in as-needed basis.  All questions sought and answered.

## 2024-07-31 NOTE — DATA REVIEWED
[FreeTextEntry1] : 3 views of the patient's right foot ordered and reviewed by me personally.  X-rays demonstrate evidence of healed fractures involving the midfoot.  No displacement noted.

## 2024-08-06 ENCOUNTER — OUTPATIENT (OUTPATIENT)
Dept: OUTPATIENT SERVICES | Facility: HOSPITAL | Age: 69
LOS: 1 days | End: 2024-08-06
Payer: MEDICARE

## 2024-08-06 ENCOUNTER — APPOINTMENT (OUTPATIENT)
Dept: OPHTHALMOLOGY | Facility: CLINIC | Age: 69
End: 2024-08-06

## 2024-08-06 DIAGNOSIS — H53.8 OTHER VISUAL DISTURBANCES: ICD-10-CM

## 2024-08-06 DIAGNOSIS — Z90.49 ACQUIRED ABSENCE OF OTHER SPECIFIED PARTS OF DIGESTIVE TRACT: Chronic | ICD-10-CM

## 2024-08-06 DIAGNOSIS — J38.1 POLYP OF VOCAL CORD AND LARYNX: Chronic | ICD-10-CM

## 2024-08-06 DIAGNOSIS — Z98.890 OTHER SPECIFIED POSTPROCEDURAL STATES: Chronic | ICD-10-CM

## 2024-08-06 PROCEDURE — 92014 COMPRE OPH EXAM EST PT 1/>: CPT

## 2024-08-06 PROCEDURE — 92134 CPTRZ OPH DX IMG PST SGM RTA: CPT

## 2024-08-06 PROCEDURE — 92134 CPTRZ OPH DX IMG PST SGM RTA: CPT | Mod: 26

## 2024-08-15 ENCOUNTER — APPOINTMENT (OUTPATIENT)
Dept: OTOLARYNGOLOGY | Facility: CLINIC | Age: 69
End: 2024-08-15
Payer: MEDICARE

## 2024-08-15 DIAGNOSIS — C77.0 SECONDARY AND UNSPECIFIED MALIGNANT NEOPLASM OF LYMPH NODES OF HEAD, FACE AND NECK: ICD-10-CM

## 2024-08-15 DIAGNOSIS — C02.9 MALIGNANT NEOPLASM OF TONGUE, UNSPECIFIED: ICD-10-CM

## 2024-08-15 DIAGNOSIS — H92.02 OTALGIA, LEFT EAR: ICD-10-CM

## 2024-08-15 DIAGNOSIS — R13.12 DYSPHAGIA, OROPHARYNGEAL PHASE: ICD-10-CM

## 2024-08-15 PROCEDURE — 31575 DIAGNOSTIC LARYNGOSCOPY: CPT

## 2024-08-15 PROCEDURE — 99214 OFFICE O/P EST MOD 30 MIN: CPT | Mod: 25

## 2024-08-15 NOTE — PROCEDURE
[Dysphagia] : dysphagia not clearly evaluated by indirect laryngoscopy [None] : none [Flexible Endoscope] : examined with the flexible endoscope [de-identified] : Flexible laryngoscopy performed. Nasal cavity, nasopharynx, oropharynx, hypopharynx, and larynx evaluated. No masses or lesions, bilateral true vocal folds symmetric and mobile.

## 2024-08-15 NOTE — HISTORY OF PRESENT ILLNESS
[de-identified] : Oncology Summary Stage: T3N1M0 Site: Tongue base Pathology: p16-positive SCC Treatment: Chemoradiation completed 2/2021 Disease status: ARMANDO Swallow: Mild dysphagia and occ pain Airway: Decannulated Smoking: Former Thyroid: TSH 3/2022 0.92 [FreeTextEntry1] : Patient returns today following up on carcinoma base of tongue. Denies trouble swallowing, oral pain or bleeding. Reports L ear discomfort that started 2 weeks. Denies hearing complaints, pressure or popping. Reports also sensation of pain in left neck.

## 2024-08-15 NOTE — PHYSICAL EXAM
[Midline] : trachea located in midline position [Edentulous] : edentulous [Normal] : temporomandibular joint is normal [de-identified] : pain to palpation left parotid and left level 2, no discrete mass, radiation fibrosis

## 2024-08-26 NOTE — ED ADULT NURSE NOTE - NS ED NURSE RECORD ANOTHER VITAL SIGN
[FreeTextEntry1] : ADELAIDE is a pleasant 2-year-old female who presents on follow-up to pediatric PM&R for further management recommendations regarding hypertonia.   PLAN: 1) Increase Baclofen dose to 1mL TID  2.) Continue with PT post-operatively.  3) Follow up in 3 months to further evaluate hypertonia   Plan was reviewed with mom as described above and all questions answered accordingly.  Mom demonstrated understanding of therapy options and was in agreement with treatment plan.  ---- This note was created using Dragon Voice Recognition Software and may have been partially created using [employer] software which was then reviewed and edited to the best of my ability. Sporadic inaccurate translation may have occurred.  Yes

## 2024-09-03 ENCOUNTER — OUTPATIENT (OUTPATIENT)
Dept: OUTPATIENT SERVICES | Facility: HOSPITAL | Age: 69
LOS: 1 days | End: 2024-09-03
Payer: MEDICARE

## 2024-09-03 DIAGNOSIS — Z00.8 ENCOUNTER FOR OTHER GENERAL EXAMINATION: ICD-10-CM

## 2024-09-03 DIAGNOSIS — R91.1 SOLITARY PULMONARY NODULE: Chronic | ICD-10-CM

## 2024-09-03 DIAGNOSIS — Z90.49 ACQUIRED ABSENCE OF OTHER SPECIFIED PARTS OF DIGESTIVE TRACT: Chronic | ICD-10-CM

## 2024-09-03 DIAGNOSIS — C01 MALIGNANT NEOPLASM OF BASE OF TONGUE: ICD-10-CM

## 2024-09-03 DIAGNOSIS — J38.1 POLYP OF VOCAL CORD AND LARYNX: Chronic | ICD-10-CM

## 2024-09-03 DIAGNOSIS — J44.9 CHRONIC OBSTRUCTIVE PULMONARY DISEASE, UNSPECIFIED: ICD-10-CM

## 2024-09-03 DIAGNOSIS — Z98.890 OTHER SPECIFIED POSTPROCEDURAL STATES: Chronic | ICD-10-CM

## 2024-09-03 DIAGNOSIS — Z90.710 ACQUIRED ABSENCE OF BOTH CERVIX AND UTERUS: Chronic | ICD-10-CM

## 2024-09-03 PROCEDURE — 71250 CT THORAX DX C-: CPT | Mod: 26

## 2024-09-03 PROCEDURE — 70490 CT SOFT TISSUE NECK W/O DYE: CPT | Mod: 26

## 2024-09-03 PROCEDURE — 71250 CT THORAX DX C-: CPT

## 2024-09-03 PROCEDURE — 70490 CT SOFT TISSUE NECK W/O DYE: CPT

## 2024-09-04 DIAGNOSIS — J44.9 CHRONIC OBSTRUCTIVE PULMONARY DISEASE, UNSPECIFIED: ICD-10-CM

## 2024-09-04 DIAGNOSIS — C01 MALIGNANT NEOPLASM OF BASE OF TONGUE: ICD-10-CM

## 2024-09-09 ENCOUNTER — APPOINTMENT (OUTPATIENT)
Dept: ORTHOPEDIC SURGERY | Facility: CLINIC | Age: 69
End: 2024-09-09
Payer: COMMERCIAL

## 2024-09-09 DIAGNOSIS — S92.314A NONDISPLACED FRACTURE OF FIRST METATARSAL BONE, RIGHT FOOT, INITIAL ENCOUNTER FOR CLOSED FRACTURE: ICD-10-CM

## 2024-09-09 DIAGNOSIS — S92.324D NONDISPLACED FRACTURE OF SECOND METATARSAL BONE, RIGHT FOOT, SUBSEQUENT ENCOUNTER FOR FRACTURE WITH ROUTINE HEALING: ICD-10-CM

## 2024-09-09 DIAGNOSIS — S92.314D NONDISPLACED FRACTURE OF FIRST METATARSAL BONE, RIGHT FOOT, SUBSEQUENT ENCOUNTER FOR FRACTURE WITH ROUTINE HEALING: ICD-10-CM

## 2024-09-09 DIAGNOSIS — S92.334D NONDISPLACED FRACTURE OF THIRD METATARSAL BONE, RIGHT FOOT, SUBSEQUENT ENCOUNTER FOR FRACTURE WITH ROUTINE HEALING: ICD-10-CM

## 2024-09-09 DIAGNOSIS — S92.344D NONDISPLACED FRACTURE OF FOURTH METATARSAL BONE, RIGHT FOOT, SUBSEQUENT ENCOUNTER FOR FRACTURE WITH ROUTINE HEALING: ICD-10-CM

## 2024-09-09 PROCEDURE — 99213 OFFICE O/P EST LOW 20 MIN: CPT | Mod: 24

## 2024-09-09 NOTE — PHYSICAL EXAM
[de-identified] : Her foot is well aligned.  No evidence of interval collapse.  She is nontender throughout the midfoot and Lisfranc ligament.  She has no pain with pronation abduction.  She has moderate soft tissue swelling compared to the contralateral side.  She is neurovascular intact distally.

## 2024-09-09 NOTE — HISTORY OF PRESENT ILLNESS
[de-identified] : Patient is here for follow-up of her right foot metatarsal fractures.  She is doing better.  She still has swelling which is really her only complaint at this point.  She has some discomfort with prolonged weightbearing but this is much improved.

## 2024-09-09 NOTE — DISCUSSION/SUMMARY
[de-identified] : I reassured the patient that she is healing well for this point the post injury course.  I will continue with stretching and icing.  I recommended using a compression stocking.  I will see her back in 2 months for reassessment.  All questions sought and answered.

## 2024-09-23 ENCOUNTER — APPOINTMENT (OUTPATIENT)
Dept: OTOLARYNGOLOGY | Facility: CLINIC | Age: 69
End: 2024-09-23
Payer: MEDICARE

## 2024-09-23 VITALS — WEIGHT: 167 LBS | HEIGHT: 64 IN | BODY MASS INDEX: 28.51 KG/M2

## 2024-09-23 DIAGNOSIS — C77.0 SECONDARY AND UNSPECIFIED MALIGNANT NEOPLASM OF LYMPH NODES OF HEAD, FACE AND NECK: ICD-10-CM

## 2024-09-23 DIAGNOSIS — E04.1 NONTOXIC SINGLE THYROID NODULE: ICD-10-CM

## 2024-09-23 DIAGNOSIS — C01 MALIGNANT NEOPLASM OF BASE OF TONGUE: ICD-10-CM

## 2024-09-23 PROCEDURE — 31575 DIAGNOSTIC LARYNGOSCOPY: CPT

## 2024-09-23 PROCEDURE — 99214 OFFICE O/P EST MOD 30 MIN: CPT | Mod: 25

## 2024-09-23 NOTE — PROCEDURE
[Complicated Symptoms] : complicated symptoms requiring more thorough examination than provided by mirror [None] : none [Flexible Endoscope] : examined with the flexible endoscope [de-identified] : Flexible laryngoscopy performed. Nasal cavity, nasopharynx, oropharynx, hypopharynx, and larynx evaluated. No masses or lesions, bilateral true vocal folds symmetric and mobile.  Nasopharynx clear

## 2024-09-23 NOTE — DATA REVIEWED
[de-identified] : CT neck personally reviewed and interpreted demonstrating nasopharynx mass, right thyroid nodule

## 2024-09-23 NOTE — REASON FOR VISIT
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

## 2024-09-23 NOTE — HISTORY OF PRESENT ILLNESS
[de-identified] : Oncology Summary Stage: T3N1M0 Site: Tongue base Pathology: p16-positive SCC Treatment: Chemoradiation completed 2/2021 Disease status: ARMANDO Swallow: Mild dysphagia and occ pain Airway: Decannulated Smoking: Former Thyroid: TSH 3/2022 0.92 [FreeTextEntry1] : Patient returns today following up on carcinoma base of tongue. Denies trouble swallowing, oral pain or bleeding. Patient  has a CT Neck soft tissue .

## 2024-10-07 ENCOUNTER — RESULT REVIEW (OUTPATIENT)
Age: 69
End: 2024-10-07

## 2024-10-07 ENCOUNTER — OUTPATIENT (OUTPATIENT)
Dept: OUTPATIENT SERVICES | Facility: HOSPITAL | Age: 69
LOS: 1 days | Discharge: HOME | End: 2024-10-07
Payer: MEDICARE

## 2024-10-07 DIAGNOSIS — E04.1 NONTOXIC SINGLE THYROID NODULE: ICD-10-CM

## 2024-10-07 DIAGNOSIS — Z85.89 PERSONAL HISTORY OF MALIGNANT NEOPLASM OF OTHER ORGANS AND SYSTEMS: ICD-10-CM

## 2024-10-07 DIAGNOSIS — R91.1 SOLITARY PULMONARY NODULE: Chronic | ICD-10-CM

## 2024-10-07 DIAGNOSIS — J38.1 POLYP OF VOCAL CORD AND LARYNX: Chronic | ICD-10-CM

## 2024-10-07 DIAGNOSIS — Z90.710 ACQUIRED ABSENCE OF BOTH CERVIX AND UTERUS: Chronic | ICD-10-CM

## 2024-10-07 DIAGNOSIS — Z98.890 OTHER SPECIFIED POSTPROCEDURAL STATES: Chronic | ICD-10-CM

## 2024-10-07 DIAGNOSIS — Z90.49 ACQUIRED ABSENCE OF OTHER SPECIFIED PARTS OF DIGESTIVE TRACT: Chronic | ICD-10-CM

## 2024-10-07 PROCEDURE — 88172 CYTP DX EVAL FNA 1ST EA SITE: CPT | Mod: 26

## 2024-10-07 PROCEDURE — 88172 CYTP DX EVAL FNA 1ST EA SITE: CPT

## 2024-10-07 PROCEDURE — 88173 CYTOPATH EVAL FNA REPORT: CPT | Mod: 26

## 2024-10-07 PROCEDURE — 76942 ECHO GUIDE FOR BIOPSY: CPT | Mod: 26

## 2024-10-07 PROCEDURE — 60100 BIOPSY OF THYROID: CPT

## 2024-10-07 PROCEDURE — 88173 CYTOPATH EVAL FNA REPORT: CPT

## 2024-10-07 PROCEDURE — 76942 ECHO GUIDE FOR BIOPSY: CPT

## 2024-10-07 PROCEDURE — 88177 CYTP FNA EVAL EA ADDL: CPT | Mod: 26

## 2024-10-07 PROCEDURE — 88177 CYTP FNA EVAL EA ADDL: CPT

## 2024-10-08 LAB — NON-GYNECOLOGICAL CYTOLOGY STUDY: SIGNIFICANT CHANGE UP

## 2024-10-11 NOTE — SWALLOW BEDSIDE ASSESSMENT ADULT - SWALLOW EVAL: FUNCTIONAL LEVEL AT TIME OF EVAL
+toleration of PMSV
Patient REYES from [facility] for nausea per [facility] home staff. Patient has no complaints at this time. AxO1 at baseline.

## 2024-10-21 ENCOUNTER — APPOINTMENT (OUTPATIENT)
Dept: RHEUMATOLOGY | Facility: CLINIC | Age: 69
End: 2024-10-21
Payer: MEDICARE

## 2024-10-21 VITALS
DIASTOLIC BLOOD PRESSURE: 101 MMHG | SYSTOLIC BLOOD PRESSURE: 174 MMHG | OXYGEN SATURATION: 96 % | HEART RATE: 95 BPM | TEMPERATURE: 97.6 F | HEIGHT: 64 IN | BODY MASS INDEX: 29.37 KG/M2 | WEIGHT: 172 LBS

## 2024-10-21 DIAGNOSIS — M94.1 RELAPSING POLYCHONDRITIS: ICD-10-CM

## 2024-10-21 PROCEDURE — 99215 OFFICE O/P EST HI 40 MIN: CPT

## 2024-10-23 LAB
ALBUMIN SERPL ELPH-MCNC: 4.5 G/DL
ALP BLD-CCNC: 72 U/L
ALT SERPL-CCNC: 16 U/L
ANION GAP SERPL CALC-SCNC: 10 MMOL/L
AST SERPL-CCNC: 19 U/L
BASOPHILS # BLD AUTO: 0.04 K/UL
BASOPHILS NFR BLD AUTO: 0.6 %
BILIRUB SERPL-MCNC: 0.3 MG/DL
BUN SERPL-MCNC: 13 MG/DL
CALCIUM SERPL-MCNC: 10.2 MG/DL
CHLORIDE SERPL-SCNC: 101 MMOL/L
CO2 SERPL-SCNC: 29 MMOL/L
CREAT SERPL-MCNC: 0.9 MG/DL
CRP SERPL-MCNC: <3 MG/L
EGFR: 69 ML/MIN/1.73M2
EOSINOPHIL # BLD AUTO: 0.09 K/UL
EOSINOPHIL NFR BLD AUTO: 1.4 %
ERYTHROCYTE [SEDIMENTATION RATE] IN BLOOD BY WESTERGREN METHOD: 1 MM/HR
GLUCOSE SERPL-MCNC: 106 MG/DL
HCT VFR BLD CALC: 45.5 %
HGB BLD-MCNC: 14.4 G/DL
IMM GRANULOCYTES NFR BLD AUTO: 1.2 %
LYMPHOCYTES # BLD AUTO: 0.83 K/UL
LYMPHOCYTES NFR BLD AUTO: 12.8 %
MAN DIFF?: NORMAL
MCHC RBC-ENTMCNC: 29.9 PG
MCHC RBC-ENTMCNC: 31.6 G/DL
MCV RBC AUTO: 94.4 FL
MONOCYTES # BLD AUTO: 0.52 K/UL
MONOCYTES NFR BLD AUTO: 8 %
NEUTROPHILS # BLD AUTO: 4.9 K/UL
NEUTROPHILS NFR BLD AUTO: 76 %
PLATELET # BLD AUTO: 261 K/UL
PMV BLD AUTO: 0 /100 WBCS
POTASSIUM SERPL-SCNC: 4.6 MMOL/L
PROT SERPL-MCNC: 6.2 G/DL
RBC # BLD: 4.82 M/UL
RBC # FLD: 14.3 %
SODIUM SERPL-SCNC: 140 MMOL/L
WBC # FLD AUTO: 6.46 K/UL

## 2024-10-29 ENCOUNTER — OUTPATIENT (OUTPATIENT)
Dept: OUTPATIENT SERVICES | Facility: HOSPITAL | Age: 69
LOS: 1 days | End: 2024-10-29
Payer: MEDICARE

## 2024-10-29 ENCOUNTER — APPOINTMENT (OUTPATIENT)
Dept: PULMONOLOGY | Facility: CLINIC | Age: 69
End: 2024-10-29
Payer: MEDICARE

## 2024-10-29 VITALS
SYSTOLIC BLOOD PRESSURE: 125 MMHG | TEMPERATURE: 97.6 F | BODY MASS INDEX: 29.37 KG/M2 | DIASTOLIC BLOOD PRESSURE: 87 MMHG | OXYGEN SATURATION: 95 % | HEART RATE: 85 BPM | HEIGHT: 64 IN | WEIGHT: 172 LBS

## 2024-10-29 DIAGNOSIS — R91.8 OTHER NONSPECIFIC ABNORMAL FINDING OF LUNG FIELD: ICD-10-CM

## 2024-10-29 DIAGNOSIS — Z90.710 ACQUIRED ABSENCE OF BOTH CERVIX AND UTERUS: Chronic | ICD-10-CM

## 2024-10-29 DIAGNOSIS — J38.1 POLYP OF VOCAL CORD AND LARYNX: Chronic | ICD-10-CM

## 2024-10-29 DIAGNOSIS — F17.201 NICOTINE DEPENDENCE, UNSPECIFIED, IN REMISSION: ICD-10-CM

## 2024-10-29 DIAGNOSIS — R91.1 SOLITARY PULMONARY NODULE: Chronic | ICD-10-CM

## 2024-10-29 DIAGNOSIS — Z00.00 ENCOUNTER FOR GENERAL ADULT MEDICAL EXAMINATION WITHOUT ABNORMAL FINDINGS: ICD-10-CM

## 2024-10-29 DIAGNOSIS — J44.9 CHRONIC OBSTRUCTIVE PULMONARY DISEASE, UNSPECIFIED: ICD-10-CM

## 2024-10-29 PROCEDURE — 99214 OFFICE O/P EST MOD 30 MIN: CPT | Mod: 25

## 2024-10-29 PROCEDURE — G0296 VISIT TO DETERM LDCT ELIG: CPT | Mod: GC

## 2024-10-29 PROCEDURE — G0296: CPT

## 2024-10-29 PROCEDURE — 99214 OFFICE O/P EST MOD 30 MIN: CPT | Mod: GC

## 2024-10-29 PROCEDURE — G2211 COMPLEX E/M VISIT ADD ON: CPT | Mod: GC

## 2024-10-30 DIAGNOSIS — R91.8 OTHER NONSPECIFIC ABNORMAL FINDING OF LUNG FIELD: ICD-10-CM

## 2024-10-30 DIAGNOSIS — F17.201 NICOTINE DEPENDENCE, UNSPECIFIED, IN REMISSION: ICD-10-CM

## 2024-10-30 DIAGNOSIS — J44.9 CHRONIC OBSTRUCTIVE PULMONARY DISEASE, UNSPECIFIED: ICD-10-CM

## 2024-11-05 ENCOUNTER — OUTPATIENT (OUTPATIENT)
Dept: OUTPATIENT SERVICES | Facility: HOSPITAL | Age: 69
LOS: 1 days | End: 2024-11-05

## 2024-11-05 ENCOUNTER — APPOINTMENT (OUTPATIENT)
Dept: GASTROENTEROLOGY | Facility: CLINIC | Age: 69
End: 2024-11-05

## 2024-11-05 VITALS
OXYGEN SATURATION: 100 % | HEART RATE: 83 BPM | TEMPERATURE: 97.5 F | HEIGHT: 64 IN | WEIGHT: 170 LBS | BODY MASS INDEX: 29.02 KG/M2 | SYSTOLIC BLOOD PRESSURE: 146 MMHG | DIASTOLIC BLOOD PRESSURE: 76 MMHG

## 2024-11-05 DIAGNOSIS — Z00.00 ENCOUNTER FOR GENERAL ADULT MEDICAL EXAMINATION WITHOUT ABNORMAL FINDINGS: ICD-10-CM

## 2024-11-05 DIAGNOSIS — Z98.890 OTHER SPECIFIED POSTPROCEDURAL STATES: Chronic | ICD-10-CM

## 2024-11-05 DIAGNOSIS — J38.1 POLYP OF VOCAL CORD AND LARYNX: Chronic | ICD-10-CM

## 2024-11-05 DIAGNOSIS — Z90.710 ACQUIRED ABSENCE OF BOTH CERVIX AND UTERUS: Chronic | ICD-10-CM

## 2024-11-05 DIAGNOSIS — Z90.49 ACQUIRED ABSENCE OF OTHER SPECIFIED PARTS OF DIGESTIVE TRACT: Chronic | ICD-10-CM

## 2024-11-05 PROCEDURE — 99212 OFFICE O/P EST SF 10 MIN: CPT

## 2024-11-18 ENCOUNTER — APPOINTMENT (OUTPATIENT)
Facility: CLINIC | Age: 69
End: 2024-11-18
Payer: COMMERCIAL

## 2024-11-18 ENCOUNTER — RESULT CHARGE (OUTPATIENT)
Age: 69
End: 2024-11-18

## 2024-11-18 DIAGNOSIS — S92.314D NONDISPLACED FRACTURE OF FIRST METATARSAL BONE, RIGHT FOOT, SUBSEQUENT ENCOUNTER FOR FRACTURE WITH ROUTINE HEALING: ICD-10-CM

## 2024-11-18 DIAGNOSIS — S92.334D NONDISPLACED FRACTURE OF THIRD METATARSAL BONE, RIGHT FOOT, SUBSEQUENT ENCOUNTER FOR FRACTURE WITH ROUTINE HEALING: ICD-10-CM

## 2024-11-18 DIAGNOSIS — S92.344D NONDISPLACED FRACTURE OF FOURTH METATARSAL BONE, RIGHT FOOT, SUBSEQUENT ENCOUNTER FOR FRACTURE WITH ROUTINE HEALING: ICD-10-CM

## 2024-11-18 DIAGNOSIS — S92.324D NONDISPLACED FRACTURE OF SECOND METATARSAL BONE, RIGHT FOOT, SUBSEQUENT ENCOUNTER FOR FRACTURE WITH ROUTINE HEALING: ICD-10-CM

## 2024-11-18 DIAGNOSIS — S92.314A NONDISPLACED FRACTURE OF FIRST METATARSAL BONE, RIGHT FOOT, INITIAL ENCOUNTER FOR CLOSED FRACTURE: ICD-10-CM

## 2024-11-18 PROCEDURE — 99213 OFFICE O/P EST LOW 20 MIN: CPT

## 2024-11-18 PROCEDURE — 73630 X-RAY EXAM OF FOOT: CPT | Mod: RT

## 2024-11-19 ENCOUNTER — APPOINTMENT (OUTPATIENT)
Dept: OPHTHALMOLOGY | Facility: CLINIC | Age: 69
End: 2024-11-19
Payer: MEDICARE

## 2024-11-19 ENCOUNTER — OUTPATIENT (OUTPATIENT)
Dept: OUTPATIENT SERVICES | Facility: HOSPITAL | Age: 69
LOS: 1 days | End: 2024-11-19
Payer: MEDICARE

## 2024-11-19 DIAGNOSIS — Z90.49 ACQUIRED ABSENCE OF OTHER SPECIFIED PARTS OF DIGESTIVE TRACT: Chronic | ICD-10-CM

## 2024-11-19 DIAGNOSIS — Z90.710 ACQUIRED ABSENCE OF BOTH CERVIX AND UTERUS: Chronic | ICD-10-CM

## 2024-11-19 DIAGNOSIS — Z98.890 OTHER SPECIFIED POSTPROCEDURAL STATES: Chronic | ICD-10-CM

## 2024-11-19 DIAGNOSIS — R91.1 SOLITARY PULMONARY NODULE: Chronic | ICD-10-CM

## 2024-11-19 DIAGNOSIS — H40.033 ANATOMICAL NARROW ANGLE, BILATERAL: ICD-10-CM

## 2024-11-19 DIAGNOSIS — H53.8 OTHER VISUAL DISTURBANCES: ICD-10-CM

## 2024-11-19 DIAGNOSIS — H25.13 AGE-RELATED NUCLEAR CATARACT, BILATERAL: ICD-10-CM

## 2024-11-19 DIAGNOSIS — H40.039 ANATOMICAL NARROW ANGLE, UNSPECIFIED EYE: ICD-10-CM

## 2024-11-19 DIAGNOSIS — H04.123 DRY EYE SYNDROME OF BILATERAL LACRIMAL GLANDS: ICD-10-CM

## 2024-11-19 DIAGNOSIS — J38.1 POLYP OF VOCAL CORD AND LARYNX: Chronic | ICD-10-CM

## 2024-11-19 PROCEDURE — 99213 OFFICE O/P EST LOW 20 MIN: CPT

## 2024-12-10 ENCOUNTER — OUTPATIENT (OUTPATIENT)
Dept: OUTPATIENT SERVICES | Facility: HOSPITAL | Age: 69
LOS: 1 days | End: 2024-12-10
Payer: MEDICARE

## 2024-12-10 DIAGNOSIS — Z00.00 ENCOUNTER FOR GENERAL ADULT MEDICAL EXAMINATION WITHOUT ABNORMAL FINDINGS: ICD-10-CM

## 2024-12-10 DIAGNOSIS — Z90.710 ACQUIRED ABSENCE OF BOTH CERVIX AND UTERUS: Chronic | ICD-10-CM

## 2024-12-10 DIAGNOSIS — Z90.49 ACQUIRED ABSENCE OF OTHER SPECIFIED PARTS OF DIGESTIVE TRACT: Chronic | ICD-10-CM

## 2024-12-10 DIAGNOSIS — R91.1 SOLITARY PULMONARY NODULE: Chronic | ICD-10-CM

## 2024-12-10 DIAGNOSIS — Z98.890 OTHER SPECIFIED POSTPROCEDURAL STATES: Chronic | ICD-10-CM

## 2024-12-10 DIAGNOSIS — J38.1 POLYP OF VOCAL CORD AND LARYNX: Chronic | ICD-10-CM

## 2024-12-10 PROCEDURE — 80053 COMPREHEN METABOLIC PANEL: CPT

## 2024-12-10 PROCEDURE — 84443 ASSAY THYROID STIM HORMONE: CPT

## 2024-12-10 PROCEDURE — 80061 LIPID PANEL: CPT

## 2024-12-10 PROCEDURE — 83036 HEMOGLOBIN GLYCOSYLATED A1C: CPT

## 2024-12-10 PROCEDURE — 82306 VITAMIN D 25 HYDROXY: CPT

## 2024-12-11 DIAGNOSIS — Z00.00 ENCOUNTER FOR GENERAL ADULT MEDICAL EXAMINATION WITHOUT ABNORMAL FINDINGS: ICD-10-CM

## 2024-12-18 ENCOUNTER — OUTPATIENT (OUTPATIENT)
Dept: OUTPATIENT SERVICES | Facility: HOSPITAL | Age: 69
LOS: 1 days | End: 2024-12-18
Payer: MEDICARE

## 2024-12-18 ENCOUNTER — APPOINTMENT (OUTPATIENT)
Dept: INTERNAL MEDICINE | Facility: CLINIC | Age: 69
End: 2024-12-18
Payer: MEDICARE

## 2024-12-18 VITALS
HEIGHT: 64 IN | WEIGHT: 171 LBS | HEART RATE: 86 BPM | TEMPERATURE: 97.7 F | OXYGEN SATURATION: 97 % | BODY MASS INDEX: 29.19 KG/M2 | SYSTOLIC BLOOD PRESSURE: 130 MMHG | DIASTOLIC BLOOD PRESSURE: 86 MMHG

## 2024-12-18 DIAGNOSIS — I10 ESSENTIAL (PRIMARY) HYPERTENSION: ICD-10-CM

## 2024-12-18 DIAGNOSIS — E78.5 HYPERLIPIDEMIA, UNSPECIFIED: ICD-10-CM

## 2024-12-18 DIAGNOSIS — Z00.00 ENCOUNTER FOR GENERAL ADULT MEDICAL EXAMINATION WITHOUT ABNORMAL FINDINGS: ICD-10-CM

## 2024-12-18 DIAGNOSIS — Z90.710 ACQUIRED ABSENCE OF BOTH CERVIX AND UTERUS: Chronic | ICD-10-CM

## 2024-12-18 DIAGNOSIS — J44.9 CHRONIC OBSTRUCTIVE PULMONARY DISEASE, UNSPECIFIED: ICD-10-CM

## 2024-12-18 DIAGNOSIS — Z90.49 ACQUIRED ABSENCE OF OTHER SPECIFIED PARTS OF DIGESTIVE TRACT: Chronic | ICD-10-CM

## 2024-12-18 DIAGNOSIS — R91.1 SOLITARY PULMONARY NODULE: Chronic | ICD-10-CM

## 2024-12-18 DIAGNOSIS — Z98.890 OTHER SPECIFIED POSTPROCEDURAL STATES: Chronic | ICD-10-CM

## 2024-12-18 PROCEDURE — 99214 OFFICE O/P EST MOD 30 MIN: CPT

## 2024-12-18 PROCEDURE — G2211 COMPLEX E/M VISIT ADD ON: CPT

## 2024-12-18 PROCEDURE — 90471 IMMUNIZATION ADMIN: CPT | Mod: 25

## 2024-12-19 DIAGNOSIS — I10 ESSENTIAL (PRIMARY) HYPERTENSION: ICD-10-CM

## 2024-12-19 DIAGNOSIS — Z23 ENCOUNTER FOR IMMUNIZATION: ICD-10-CM

## 2024-12-19 DIAGNOSIS — J44.9 CHRONIC OBSTRUCTIVE PULMONARY DISEASE, UNSPECIFIED: ICD-10-CM

## 2024-12-19 DIAGNOSIS — E78.5 HYPERLIPIDEMIA, UNSPECIFIED: ICD-10-CM

## 2025-01-10 ENCOUNTER — INPATIENT (INPATIENT)
Facility: HOSPITAL | Age: 70
LOS: 30 days | Discharge: NURSING, REHAB OR LONG-TERM CARE | DRG: 153 | End: 2025-02-10
Attending: HOSPITALIST | Admitting: INTERNAL MEDICINE
Payer: MEDICARE

## 2025-01-10 VITALS
WEIGHT: 169.98 LBS | RESPIRATION RATE: 22 BRPM | TEMPERATURE: 98 F | HEART RATE: 97 BPM | DIASTOLIC BLOOD PRESSURE: 63 MMHG | SYSTOLIC BLOOD PRESSURE: 115 MMHG | OXYGEN SATURATION: 94 %

## 2025-01-10 DIAGNOSIS — J38.1 POLYP OF VOCAL CORD AND LARYNX: Chronic | ICD-10-CM

## 2025-01-10 DIAGNOSIS — R91.1 SOLITARY PULMONARY NODULE: Chronic | ICD-10-CM

## 2025-01-10 DIAGNOSIS — J06.9 ACUTE UPPER RESPIRATORY INFECTION, UNSPECIFIED: ICD-10-CM

## 2025-01-10 DIAGNOSIS — Z90.49 ACQUIRED ABSENCE OF OTHER SPECIFIED PARTS OF DIGESTIVE TRACT: Chronic | ICD-10-CM

## 2025-01-10 DIAGNOSIS — Z98.890 OTHER SPECIFIED POSTPROCEDURAL STATES: Chronic | ICD-10-CM

## 2025-01-10 DIAGNOSIS — Z90.710 ACQUIRED ABSENCE OF BOTH CERVIX AND UTERUS: Chronic | ICD-10-CM

## 2025-01-10 LAB
ALBUMIN SERPL ELPH-MCNC: 3.9 G/DL — SIGNIFICANT CHANGE UP (ref 3.5–5.2)
ALP SERPL-CCNC: 68 U/L — SIGNIFICANT CHANGE UP (ref 30–115)
ALT FLD-CCNC: 21 U/L — SIGNIFICANT CHANGE UP (ref 0–41)
ANION GAP SERPL CALC-SCNC: 16 MMOL/L — HIGH (ref 7–14)
ANISOCYTOSIS BLD QL: SLIGHT — SIGNIFICANT CHANGE UP
AST SERPL-CCNC: 23 U/L — SIGNIFICANT CHANGE UP (ref 0–41)
BASE EXCESS BLDV CALC-SCNC: -1.6 MMOL/L — SIGNIFICANT CHANGE UP (ref -2–3)
BASOPHILS # BLD AUTO: 0.09 K/UL — SIGNIFICANT CHANGE UP (ref 0–0.2)
BASOPHILS NFR BLD AUTO: 0.8 % — SIGNIFICANT CHANGE UP (ref 0–1)
BILIRUB SERPL-MCNC: 0.4 MG/DL — SIGNIFICANT CHANGE UP (ref 0.2–1.2)
BUN SERPL-MCNC: 21 MG/DL — HIGH (ref 10–20)
BURR CELLS BLD QL SMEAR: PRESENT — SIGNIFICANT CHANGE UP
CA-I SERPL-SCNC: 1.24 MMOL/L — SIGNIFICANT CHANGE UP (ref 1.15–1.33)
CALCIUM SERPL-MCNC: 9.8 MG/DL — SIGNIFICANT CHANGE UP (ref 8.4–10.5)
CHLORIDE SERPL-SCNC: 99 MMOL/L — SIGNIFICANT CHANGE UP (ref 98–110)
CO2 SERPL-SCNC: 21 MMOL/L — SIGNIFICANT CHANGE UP (ref 17–32)
CREAT SERPL-MCNC: 1 MG/DL — SIGNIFICANT CHANGE UP (ref 0.7–1.5)
EGFR: 61 ML/MIN/1.73M2 — SIGNIFICANT CHANGE UP
EOSINOPHIL # BLD AUTO: 0 K/UL — SIGNIFICANT CHANGE UP (ref 0–0.7)
EOSINOPHIL NFR BLD AUTO: 0 % — SIGNIFICANT CHANGE UP (ref 0–8)
FLUAV AG NPH QL: SIGNIFICANT CHANGE UP
FLUBV AG NPH QL: SIGNIFICANT CHANGE UP
GAS PNL BLDA: SIGNIFICANT CHANGE UP
GAS PNL BLDV: 130 MMOL/L — LOW (ref 136–145)
GAS PNL BLDV: SIGNIFICANT CHANGE UP
GLUCOSE SERPL-MCNC: 180 MG/DL — HIGH (ref 70–99)
HCO3 BLDV-SCNC: 23 MMOL/L — SIGNIFICANT CHANGE UP (ref 22–29)
HCT VFR BLD CALC: 46 % — SIGNIFICANT CHANGE UP (ref 37–47)
HCT VFR BLDA CALC: 60 % — CRITICAL HIGH (ref 34.5–46.5)
HGB BLD CALC-MCNC: 19.9 G/DL — CRITICAL HIGH (ref 11.7–16.1)
HGB BLD-MCNC: 15.1 G/DL — SIGNIFICANT CHANGE UP (ref 12–16)
LACTATE BLDV-MCNC: 3 MMOL/L — HIGH (ref 0.5–2)
LYMPHOCYTES # BLD AUTO: 0.1 K/UL — LOW (ref 1.2–3.4)
LYMPHOCYTES # BLD AUTO: 0.9 % — LOW (ref 20.5–51.1)
MACROCYTES BLD QL: SLIGHT — SIGNIFICANT CHANGE UP
MANUAL SMEAR VERIFICATION: SIGNIFICANT CHANGE UP
MCHC RBC-ENTMCNC: 28.4 PG — SIGNIFICANT CHANGE UP (ref 27–31)
MCHC RBC-ENTMCNC: 32.8 G/DL — SIGNIFICANT CHANGE UP (ref 32–37)
MCV RBC AUTO: 86.6 FL — SIGNIFICANT CHANGE UP (ref 81–99)
MONOCYTES # BLD AUTO: 0.48 K/UL — SIGNIFICANT CHANGE UP (ref 0.1–0.6)
MONOCYTES NFR BLD AUTO: 4.3 % — SIGNIFICANT CHANGE UP (ref 1.7–9.3)
NEUTROPHILS # BLD AUTO: 10.53 K/UL — HIGH (ref 1.4–6.5)
NEUTROPHILS NFR BLD AUTO: 94 % — HIGH (ref 42.2–75.2)
NT-PROBNP SERPL-SCNC: 9810 PG/ML — HIGH (ref 0–300)
PCO2 BLDV: 38 MMHG — LOW (ref 39–42)
PH BLDV: 7.39 — SIGNIFICANT CHANGE UP (ref 7.32–7.43)
PLAT MORPH BLD: NORMAL — SIGNIFICANT CHANGE UP
PLATELET # BLD AUTO: 346 K/UL — SIGNIFICANT CHANGE UP (ref 130–400)
PMV BLD: 9.9 FL — SIGNIFICANT CHANGE UP (ref 7.4–10.4)
PO2 BLDV: 36 MMHG — SIGNIFICANT CHANGE UP (ref 25–45)
POIKILOCYTOSIS BLD QL AUTO: SLIGHT — SIGNIFICANT CHANGE UP
POLYCHROMASIA BLD QL SMEAR: SLIGHT — SIGNIFICANT CHANGE UP
POTASSIUM BLDV-SCNC: 3.3 MMOL/L — LOW (ref 3.5–5.1)
POTASSIUM SERPL-MCNC: 4 MMOL/L — SIGNIFICANT CHANGE UP (ref 3.5–5)
POTASSIUM SERPL-SCNC: 4 MMOL/L — SIGNIFICANT CHANGE UP (ref 3.5–5)
PROT SERPL-MCNC: 5.8 G/DL — LOW (ref 6–8)
RBC # BLD: 5.31 M/UL — SIGNIFICANT CHANGE UP (ref 4.2–5.4)
RBC # FLD: 13.7 % — SIGNIFICANT CHANGE UP (ref 11.5–14.5)
RBC BLD AUTO: ABNORMAL
RSV RNA NPH QL NAA+NON-PROBE: SIGNIFICANT CHANGE UP
SAO2 % BLDV: 52.9 % — LOW (ref 67–88)
SARS-COV-2 RNA SPEC QL NAA+PROBE: SIGNIFICANT CHANGE UP
SODIUM SERPL-SCNC: 136 MMOL/L — SIGNIFICANT CHANGE UP (ref 135–146)
TROPONIN T, HIGH SENSITIVITY RESULT: 75 NG/L — CRITICAL HIGH (ref 6–13)
TROPONIN T, HIGH SENSITIVITY RESULT: 96 NG/L — CRITICAL HIGH (ref 6–13)
WBC # BLD: 11.2 K/UL — HIGH (ref 4.8–10.8)
WBC # FLD AUTO: 11.2 K/UL — HIGH (ref 4.8–10.8)

## 2025-01-10 PROCEDURE — 86022 PLATELET ANTIBODIES: CPT

## 2025-01-10 PROCEDURE — 99222 1ST HOSP IP/OBS MODERATE 55: CPT | Mod: 25

## 2025-01-10 PROCEDURE — 85730 THROMBOPLASTIN TIME PARTIAL: CPT

## 2025-01-10 PROCEDURE — 83605 ASSAY OF LACTIC ACID: CPT

## 2025-01-10 PROCEDURE — 83036 HEMOGLOBIN GLYCOSYLATED A1C: CPT

## 2025-01-10 PROCEDURE — 84100 ASSAY OF PHOSPHORUS: CPT

## 2025-01-10 PROCEDURE — 87507 IADNA-DNA/RNA PROBE TQ 12-25: CPT

## 2025-01-10 PROCEDURE — 71045 X-RAY EXAM CHEST 1 VIEW: CPT | Mod: 26,77

## 2025-01-10 PROCEDURE — 97110 THERAPEUTIC EXERCISES: CPT | Mod: GP

## 2025-01-10 PROCEDURE — 87040 BLOOD CULTURE FOR BACTERIA: CPT

## 2025-01-10 PROCEDURE — 93005 ELECTROCARDIOGRAM TRACING: CPT

## 2025-01-10 PROCEDURE — C1726: CPT

## 2025-01-10 PROCEDURE — 88312 SPECIAL STAINS GROUP 1: CPT | Mod: 26

## 2025-01-10 PROCEDURE — 88112 CYTOPATH CELL ENHANCE TECH: CPT | Mod: 26

## 2025-01-10 PROCEDURE — 99497 ADVNCD CARE PLAN 30 MIN: CPT | Mod: 25

## 2025-01-10 PROCEDURE — 80061 LIPID PANEL: CPT

## 2025-01-10 PROCEDURE — 86403 PARTICLE AGGLUT ANTBDY SCRN: CPT

## 2025-01-10 PROCEDURE — 99285 EMERGENCY DEPT VISIT HI MDM: CPT

## 2025-01-10 PROCEDURE — 86900 BLOOD TYPING SEROLOGIC ABO: CPT

## 2025-01-10 PROCEDURE — 74230 X-RAY XM SWLNG FUNCJ C+: CPT

## 2025-01-10 PROCEDURE — 0241U: CPT

## 2025-01-10 PROCEDURE — 87641 MR-STAPH DNA AMP PROBE: CPT

## 2025-01-10 PROCEDURE — 85018 HEMOGLOBIN: CPT

## 2025-01-10 PROCEDURE — 85027 COMPLETE CBC AUTOMATED: CPT

## 2025-01-10 PROCEDURE — 99223 1ST HOSP IP/OBS HIGH 75: CPT

## 2025-01-10 PROCEDURE — 85025 COMPLETE CBC W/AUTO DIFF WBC: CPT

## 2025-01-10 PROCEDURE — 85610 PROTHROMBIN TIME: CPT

## 2025-01-10 PROCEDURE — 87077 CULTURE AEROBIC IDENTIFY: CPT

## 2025-01-10 PROCEDURE — 80053 COMPREHEN METABOLIC PANEL: CPT

## 2025-01-10 PROCEDURE — 87305 ASPERGILLUS AG IA: CPT

## 2025-01-10 PROCEDURE — 31575 DIAGNOSTIC LARYNGOSCOPY: CPT

## 2025-01-10 PROCEDURE — 92610 EVALUATE SWALLOWING FUNCTION: CPT | Mod: GN

## 2025-01-10 PROCEDURE — 94640 AIRWAY INHALATION TREATMENT: CPT

## 2025-01-10 PROCEDURE — 94760 N-INVAS EAR/PLS OXIMETRY 1: CPT

## 2025-01-10 PROCEDURE — 84145 PROCALCITONIN (PCT): CPT

## 2025-01-10 PROCEDURE — 85014 HEMATOCRIT: CPT

## 2025-01-10 PROCEDURE — 71250 CT THORAX DX C-: CPT | Mod: MC

## 2025-01-10 PROCEDURE — 83880 ASSAY OF NATRIURETIC PEPTIDE: CPT

## 2025-01-10 PROCEDURE — 71275 CT ANGIOGRAPHY CHEST: CPT | Mod: MC

## 2025-01-10 PROCEDURE — 97530 THERAPEUTIC ACTIVITIES: CPT | Mod: GP

## 2025-01-10 PROCEDURE — 87070 CULTURE OTHR SPECIMN AEROBIC: CPT

## 2025-01-10 PROCEDURE — 87206 SMEAR FLUORESCENT/ACID STAI: CPT

## 2025-01-10 PROCEDURE — 71260 CT THORAX DX C+: CPT | Mod: MC

## 2025-01-10 PROCEDURE — 87205 SMEAR GRAM STAIN: CPT

## 2025-01-10 PROCEDURE — 87186 SC STD MICRODIL/AGAR DIL: CPT

## 2025-01-10 PROCEDURE — 93306 TTE W/DOPPLER COMPLETE: CPT

## 2025-01-10 PROCEDURE — 82553 CREATINE MB FRACTION: CPT

## 2025-01-10 PROCEDURE — 84295 ASSAY OF SERUM SODIUM: CPT

## 2025-01-10 PROCEDURE — 71045 X-RAY EXAM CHEST 1 VIEW: CPT | Mod: 26

## 2025-01-10 PROCEDURE — 86901 BLOOD TYPING SEROLOGIC RH(D): CPT

## 2025-01-10 PROCEDURE — 82550 ASSAY OF CK (CPK): CPT

## 2025-01-10 PROCEDURE — 94660 CPAP INITIATION&MGMT: CPT

## 2025-01-10 PROCEDURE — 70491 CT SOFT TISSUE NECK W/DYE: CPT | Mod: MC

## 2025-01-10 PROCEDURE — 84484 ASSAY OF TROPONIN QUANT: CPT

## 2025-01-10 PROCEDURE — 93970 EXTREMITY STUDY: CPT

## 2025-01-10 PROCEDURE — 85379 FIBRIN DEGRADATION QUANT: CPT

## 2025-01-10 PROCEDURE — 87385 HISTOPLASMA CAPSUL AG IA: CPT

## 2025-01-10 PROCEDURE — 87102 FUNGUS ISOLATION CULTURE: CPT

## 2025-01-10 PROCEDURE — 36415 COLL VENOUS BLD VENIPUNCTURE: CPT

## 2025-01-10 PROCEDURE — 74018 RADEX ABDOMEN 1 VIEW: CPT | Mod: 26

## 2025-01-10 PROCEDURE — 87116 MYCOBACTERIA CULTURE: CPT

## 2025-01-10 PROCEDURE — 84132 ASSAY OF SERUM POTASSIUM: CPT

## 2025-01-10 PROCEDURE — 84443 ASSAY THYROID STIM HORMONE: CPT

## 2025-01-10 PROCEDURE — 93010 ELECTROCARDIOGRAM REPORT: CPT

## 2025-01-10 PROCEDURE — 82803 BLOOD GASES ANY COMBINATION: CPT

## 2025-01-10 PROCEDURE — 83735 ASSAY OF MAGNESIUM: CPT

## 2025-01-10 PROCEDURE — 87015 SPECIMEN INFECT AGNT CONCNTJ: CPT

## 2025-01-10 PROCEDURE — 70450 CT HEAD/BRAIN W/O DYE: CPT | Mod: MC

## 2025-01-10 PROCEDURE — 82330 ASSAY OF CALCIUM: CPT

## 2025-01-10 PROCEDURE — 86850 RBC ANTIBODY SCREEN: CPT

## 2025-01-10 PROCEDURE — 92611 MOTION FLUOROSCOPY/SWALLOW: CPT | Mod: GN

## 2025-01-10 PROCEDURE — 80048 BASIC METABOLIC PNL TOTAL CA: CPT

## 2025-01-10 PROCEDURE — 97162 PT EVAL MOD COMPLEX 30 MIN: CPT | Mod: GP

## 2025-01-10 PROCEDURE — 92526 ORAL FUNCTION THERAPY: CPT | Mod: GN

## 2025-01-10 PROCEDURE — 71045 X-RAY EXAM CHEST 1 VIEW: CPT

## 2025-01-10 PROCEDURE — 87640 STAPH A DNA AMP PROBE: CPT

## 2025-01-10 PROCEDURE — 74018 RADEX ABDOMEN 1 VIEW: CPT

## 2025-01-10 PROCEDURE — 87449 NOS EACH ORGANISM AG IA: CPT

## 2025-01-10 PROCEDURE — 82962 GLUCOSE BLOOD TEST: CPT

## 2025-01-10 RX ORDER — DIPHENHYDRAMINE HCL 25 MG
50 CAPSULE ORAL ONCE
Refills: 0 | Status: COMPLETED | OUTPATIENT
Start: 2025-01-10 | End: 2025-01-10

## 2025-01-10 RX ORDER — MYCOPHENOLATE MOFETIL 200 MG/ML
1000 POWDER, FOR SUSPENSION ORAL
Refills: 0 | Status: DISCONTINUED | OUTPATIENT
Start: 2025-01-10 | End: 2025-01-10

## 2025-01-10 RX ORDER — PANTOPRAZOLE 20 MG/1
40 TABLET, DELAYED RELEASE ORAL
Refills: 0 | Status: DISCONTINUED | OUTPATIENT
Start: 2025-01-10 | End: 2025-01-14

## 2025-01-10 RX ORDER — SODIUM CHLORIDE 9 G/ML
1000 INJECTION, SOLUTION INTRAVENOUS
Refills: 0 | Status: DISCONTINUED | OUTPATIENT
Start: 2025-01-10 | End: 2025-01-10

## 2025-01-10 RX ORDER — IPRATROPIUM BROMIDE AND ALBUTEROL SULFATE .5; 2.5 MG/3ML; MG/3ML
3 SOLUTION RESPIRATORY (INHALATION) EVERY 6 HOURS
Refills: 0 | Status: DISCONTINUED | OUTPATIENT
Start: 2025-01-10 | End: 2025-01-14

## 2025-01-10 RX ORDER — UMECLIDINIUM BROMIDE AND VILANTEROL TRIFENATATE 62.5; 25 UG/1; UG/1
1 POWDER RESPIRATORY (INHALATION)
Refills: 0 | DISCHARGE

## 2025-01-10 RX ORDER — METHYLPREDNISOLONE ACETATE 40 MG/ML
40 VIAL (ML) INJECTION EVERY 12 HOURS
Refills: 0 | Status: DISCONTINUED | OUTPATIENT
Start: 2025-01-10 | End: 2025-01-11

## 2025-01-10 RX ORDER — ATORVASTATIN CALCIUM 80 MG/1
1 TABLET, FILM COATED ORAL
Refills: 0 | DISCHARGE

## 2025-01-10 RX ORDER — AZITHROMYCIN DIHYDRATE 500 MG/1
500 TABLET, FILM COATED ORAL ONCE
Refills: 0 | Status: COMPLETED | OUTPATIENT
Start: 2025-01-10 | End: 2025-01-10

## 2025-01-10 RX ORDER — AZITHROMYCIN DIHYDRATE 500 MG/1
500 TABLET, FILM COATED ORAL EVERY 24 HOURS
Refills: 0 | Status: DISCONTINUED | OUTPATIENT
Start: 2025-01-11 | End: 2025-01-14

## 2025-01-10 RX ORDER — METHYLPREDNISOLONE ACETATE 40 MG/ML
125 VIAL (ML) INJECTION ONCE
Refills: 0 | Status: COMPLETED | OUTPATIENT
Start: 2025-01-10 | End: 2025-01-10

## 2025-01-10 RX ORDER — CEFTRIAXONE 250 MG/1
1000 INJECTION, POWDER, FOR SOLUTION INTRAMUSCULAR; INTRAVENOUS EVERY 24 HOURS
Refills: 0 | Status: DISCONTINUED | OUTPATIENT
Start: 2025-01-11 | End: 2025-01-13

## 2025-01-10 RX ORDER — BACTERIOSTATIC SODIUM CHLORIDE 0.9 %
1000 VIAL (ML) INJECTION ONCE
Refills: 0 | Status: COMPLETED | OUTPATIENT
Start: 2025-01-10 | End: 2025-01-10

## 2025-01-10 RX ORDER — IPRATROPIUM BROMIDE AND ALBUTEROL SULFATE .5; 2.5 MG/3ML; MG/3ML
3 SOLUTION RESPIRATORY (INHALATION)
Refills: 0 | Status: DISCONTINUED | OUTPATIENT
Start: 2025-01-10 | End: 2025-02-10

## 2025-01-10 RX ORDER — AZITHROMYCIN DIHYDRATE 500 MG/1
500 TABLET, FILM COATED ORAL EVERY 24 HOURS
Refills: 0 | Status: DISCONTINUED | OUTPATIENT
Start: 2025-01-10 | End: 2025-01-10

## 2025-01-10 RX ORDER — HEPARIN SODIUM,PORCINE 10000/ML
5000 VIAL (ML) INJECTION EVERY 12 HOURS
Refills: 0 | Status: DISCONTINUED | OUTPATIENT
Start: 2025-01-10 | End: 2025-01-14

## 2025-01-10 RX ORDER — SODIUM CHLORIDE 9 G/ML
1000 INJECTION, SOLUTION INTRAVENOUS ONCE
Refills: 0 | Status: COMPLETED | OUTPATIENT
Start: 2025-01-10 | End: 2025-01-10

## 2025-01-10 RX ORDER — LOSARTAN POTASSIUM 100 MG
100 TABLET ORAL DAILY
Refills: 0 | Status: DISCONTINUED | OUTPATIENT
Start: 2025-01-10 | End: 2025-01-10

## 2025-01-10 RX ORDER — CEFTRIAXONE 250 MG/1
1000 INJECTION, POWDER, FOR SOLUTION INTRAMUSCULAR; INTRAVENOUS ONCE
Refills: 0 | Status: COMPLETED | OUTPATIENT
Start: 2025-01-10 | End: 2025-01-10

## 2025-01-10 RX ADMIN — Medication 125 MILLIGRAM(S): at 10:05

## 2025-01-10 RX ADMIN — IPRATROPIUM BROMIDE AND ALBUTEROL SULFATE 3 MILLILITER(S): .5; 2.5 SOLUTION RESPIRATORY (INHALATION) at 20:28

## 2025-01-10 RX ADMIN — IPRATROPIUM BROMIDE AND ALBUTEROL SULFATE 3 MILLILITER(S): .5; 2.5 SOLUTION RESPIRATORY (INHALATION) at 17:26

## 2025-01-10 RX ADMIN — AZITHROMYCIN DIHYDRATE 255 MILLIGRAM(S): 500 TABLET, FILM COATED ORAL at 15:30

## 2025-01-10 RX ADMIN — SODIUM CHLORIDE 75 MILLILITER(S): 9 INJECTION, SOLUTION INTRAVENOUS at 17:26

## 2025-01-10 RX ADMIN — SODIUM CHLORIDE 1000 MILLILITER(S): 9 INJECTION, SOLUTION INTRAVENOUS at 16:30

## 2025-01-10 RX ADMIN — SODIUM CHLORIDE 1000 MILLILITER(S): 9 INJECTION, SOLUTION INTRAVENOUS at 15:31

## 2025-01-10 RX ADMIN — Medication 1000 MILLILITER(S): at 11:00

## 2025-01-10 RX ADMIN — Medication 5000 UNIT(S): at 17:27

## 2025-01-10 RX ADMIN — CEFTRIAXONE 100 MILLIGRAM(S): 250 INJECTION, POWDER, FOR SOLUTION INTRAMUSCULAR; INTRAVENOUS at 15:31

## 2025-01-10 RX ADMIN — Medication 50 MILLIGRAM(S): at 17:49

## 2025-01-10 RX ADMIN — Medication 40 MILLIGRAM(S): at 17:27

## 2025-01-10 RX ADMIN — Medication 40 MILLIGRAM(S): at 20:28

## 2025-01-10 RX ADMIN — Medication 1000 MILLILITER(S): at 10:04

## 2025-01-10 RX ADMIN — IPRATROPIUM BROMIDE AND ALBUTEROL SULFATE 3 MILLILITER(S): .5; 2.5 SOLUTION RESPIRATORY (INHALATION) at 10:04

## 2025-01-10 NOTE — ED ADULT NURSE REASSESSMENT NOTE - NS ED NURSE REASSESS COMMENT FT1
P6t reassessed A/O times 4 Vs stable report felling slight better after respiratory treatment order is given , Ed attending aware all medication and antibiotic order is given and tolerated well, pt admitted to medicine waiting for bed report is given to nex shift in ED 3 ,on going nursing observation .

## 2025-01-10 NOTE — ED PROVIDER NOTE - CLINICAL SUMMARY MEDICAL DECISION MAKING FREE TEXT BOX
69-year-old female past medical history as documented complaining 3 days of shortness of breath, hoarse voice and diarrhea.  Chest x-ray with bilateral opacities.  EKG with sinus tachycardia.  All labs reviewed.  Patient with elevated troponin.  Patient treated for COPD exacerbation.  ENT consulted for prior trach and with hoarse voice.  ENT recommendations appreciated.  IV antibiotics given and patient admitted to medicine.

## 2025-01-10 NOTE — H&P ADULT - ASSESSMENT
# COPD exacerbation  # Possible PNA  # Intermittent CP   # HAGMA 2//2 Lactic Acidosis   # CAD   # Hx of PTx   # cough hoarseness   - WBC 11.2 K (neutrophilic), Tachycardic to 126   - Lactate 3.0,  AG 16   -  VBG - pH: 7.39  | pCO2: 38    | pO2: 36    | Lactate: 3.0    - at the time of exam no CP,   - EKG showed  PACs, with MAT, trop stable 96 > 75   - requiring 4 L N C   - sp 2 L  fluid bolus, Azithromycin and rocephin, duoneb and solu-medrol  - CXR showed reticular infiltrates   - cw rocpehin and azithromycin   - FU MRSA PRocal, Strep and legionella  - FU RVP   - FU CBC CMP TSH A1c Lipid panel  - cw solumedrol 40 mg IV BID, duoneb, albuterol   - ABG in AM, CXR  in AM   - FU TTE, BNP   - FU EKG in AM   - Trend troponins   - wean off O2   - FU lactate   - RVP   - blood culture, Sputum Cx,   - Galactomannan, fungitel   - LR @ 75 cc/ hr     # Abdominal pain   # Diarrhea  - FU CT AP   - FU   - KUB for abdominal distention  - C.Diff and GI PCR   -     # HX of SCC sp surgery, CRT   # hx of Trach/ PEG sp reversal   - follows with ENT Dr. Louis, last seen 9/23/24,  - CT Neck 9/2024 showed Abnormal soft tissue within the posterior nasopharynx greater the right, new since the reference exams and nonspecific in appearance. The abnormal submucosal soft tissue in the preepiglottic space greater on the left, asymmetric thickening of the left aryepiglottic fold, and mild epiglottic thickening; similar when correlated with the PET/CT dated 5/12/2022.  Stable rim-calcified right thyroid nodule measuring 1.8 cm.   - US guided FNA by IR 10/7/24 of  Stable rim-calcified right thyroid nodule measuring 1.8 cm. Showed atypia but not malignant cells.   - ENT evaluated the pt for hoarseness,   - FFL showed, thick purulent secretions on larynx, airway patent.   - ENT Recommended CT Neck with IVC for further evaluation, Albuterol neb treatment, Humidified O2 ( on 3L of O2 via NC)   - FU CT CHEST and CT Neck with contrast     # HTN  # HLD  - hold home Anti-HTN as BP is soft   - cw other home meds     # GERD:  - protonix     Diet: DASH,   Activity: As tolerated   DVT Prophylaxis: heparin   GI Prophylaxis: protonix   Code Status: full code   Disposition: Telemetry    A 68yo Female with PMH of HTN, HLD, GERD, CAD, COPD not on Home O2, Hx of Pneumothorax, SCC of BOT s/p Trach/PEG (reversed in 4/2021), s/p CRT (7 sessions in 2021) who presents with SOB for 1 week duration. History goes back to around 1 week ago When the patient started having Shortness of breath, productive cough, and malaise. Patient dnies any fever. Patient reported that her son was coughing. Patient reported some chest pain previously when coughing . Patient reports diarrhea for 4 days. which resolved now. PAtient denies an urinary symptoms. Patient is admitted for PNA, and COPD excacerbation .     # COPD exacerbation  # Possible PNA  # Intermittent CP   # HAGMA 2//2 Lactic Acidosis   # CAD   # Hx of PTx   # cough hoarseness   - WBC 11.2 K (neutrophilic), Tachycardic to 126   - Lactate 3.0,  AG 16   -  VBG - pH: 7.39  | pCO2: 38    | pO2: 36    | Lactate: 3.0    - at the time of exam no CP,   - EKG showed  PACs, with MAT, trop stable 96 > 75   - requiring 4 L N C   - sp 2 L  fluid bolus, Azithromycin and rocephin, duoneb and solu-medrol  - CXR showed reticular infiltrates   - cw rocpehin and azithromycin   - FU MRSA PRocal, Strep and legionella  - FU RVP   - FU CBC CMP TSH A1c Lipid panel  - cw solumedrol 40 mg IV BID, duoneb, albuterol   - ABG in AM, CXR  in AM   - FU TTE, BNP   - FU EKG in AM   - Trend troponins   - wean off O2   - FU lactate   - RVP   - blood culture, Sputum Cx,   - Galactomannan, fungitel   - LR @ 75 cc/ hr   - CT chest with IV contrast     # Abdominal pain   # Diarrhea  - FU CT AP   - FU   - KUB for abdominal distention  - GI PCR   -     # HX of SCC sp surgery, CRT   # hx of Trach/ PEG sp reversal   - follows with ENT Dr. Louis, last seen 9/23/24,  - CT Neck 9/2024 showed Abnormal soft tissue within the posterior nasopharynx greater the right, new since the reference exams and nonspecific in appearance. The abnormal submucosal soft tissue in the preepiglottic space greater on the left, asymmetric thickening of the left aryepiglottic fold, and mild epiglottic thickening; similar when correlated with the PET/CT dated 5/12/2022.  Stable rim-calcified right thyroid nodule measuring 1.8 cm.   - US guided FNA by IR 10/7/24 of  Stable rim-calcified right thyroid nodule measuring 1.8 cm. Showed atypia but not malignant cells.   - ENT evaluated the pt for hoarseness,   - FFL showed, thick purulent secretions on larynx, airway patent.   - ENT Recommended CT Neck with IVC for further evaluation, Albuterol neb treatment, Humidified O2 ( on 3L of O2 via NC)   - FU CT CHEST and CT Neck with contrast     # HTN  # HLD  - hold home Anti-HTN as BP is soft   - cw other home meds     # GERD:  - protonix     Diet: DASH,   Activity: As tolerated   DVT Prophylaxis: heparin   GI Prophylaxis: protonix   Code Status: full code (I spoke to the patient and she confirmed that she wants to be full code)   Disposition: Telemetry    A 70yo Female with PMH of HTN, HLD, GERD, CAD, COPD not on Home O2, Hx of Pneumothorax, SCC of BOT s/p Trach/PEG (reversed in 4/2021), s/p CRT (7 sessions in 2021) who presents with SOB for 1 week duration. History goes back to around 1 week ago When the patient started having Shortness of breath, productive cough, and malaise. Patient dnies any fever. Patient reported that her son was coughing. Patient reported some chest pain previously when coughing . Patient reports diarrhea for 4 days. which resolved now. PAtient denies an urinary symptoms. Patient is admitted for PNA, and COPD excacerbation .     # COPD exacerbation  # Possible PNA  # Intermittent CP   # HAGMA 2//2 Lactic Acidosis   # CAD   # Hx of PTx   # cough hoarseness   - WBC 11.2 K (neutrophilic), Tachycardic to 126   - Lactate 3.0,  AG 16   -  VBG - pH: 7.39  | pCO2: 38    | pO2: 36    | Lactate: 3.0    - at the time of exam no CP,   - EKG showed  PACs, with MAT, trop stable 96 > 75   - requiring 4 L N C   - sp 2 L  fluid bolus, Azithromycin and rocephin, duoneb and solu-medrol  - CXR showed reticular infiltrates   - cw rocpehin and azithromycin   - FU MRSA PRocal, Strep and legionella  - FU RVP   - FU CBC CMP TSH A1c Lipid panel  - cw solumedrol 40 mg IV BID, duoneb, albuterol   - ABG in AM, CXR  in AM   - FU TTE, BNP   - FU EKG in AM   - Trend troponins   - wean off O2   - FU lactate   - RVP   - blood culture, Sputum Cx,   - Galactomannan, fungitel   - LR @ 75 cc/ hr   - CT chest with IV contrast     # Abdominal pain   # Diarrhea  - FU CT AP   - FU   - KUB for abdominal distention  - GI PCR     # Polychondiritis   - as per Rheum note, patient is on cellcept 1000 G BID and Dcd hydroxychloroquine  - DC immunosuppresants in setting of infection, restart on DC     # HX of SCC sp surgery, CRT   # hx of Trach/ PEG sp reversal   - follows with ENT Dr. Louis, last seen 9/23/24,  - CT Neck 9/2024 showed Abnormal soft tissue within the posterior nasopharynx greater the right, new since the reference exams and nonspecific in appearance. The abnormal submucosal soft tissue in the preepiglottic space greater on the left, asymmetric thickening of the left aryepiglottic fold, and mild epiglottic thickening; similar when correlated with the PET/CT dated 5/12/2022.  Stable rim-calcified right thyroid nodule measuring 1.8 cm.   - US guided FNA by IR 10/7/24 of  Stable rim-calcified right thyroid nodule measuring 1.8 cm. Showed atypia but not malignant cells.   - ENT evaluated the pt for hoarseness,   - FFL showed, thick purulent secretions on larynx, airway patent.   - ENT Recommended CT Neck with IVC for further evaluation, Albuterol neb treatment, Humidified O2 ( on 3L of O2 via NC)   - FU CT CHEST and CT Neck with contrast     # HTN  # HLD  - hold home Anti-HTN as BP is soft   - cw other home meds     # GERD:  - protonix     Diet: DASH,   Activity: As tolerated   DVT Prophylaxis: heparin   GI Prophylaxis: protonix   Code Status: full code (I spoke to the patient and she confirmed that she wants to be full code)   Disposition: Telemetry

## 2025-01-10 NOTE — H&P ADULT - ATTENDING COMMENTS
My note supersedes the resident's note in the event of a discrepancy -    Patient seen and examined this evening  lying in bed and in nad   on nasal cannula; denies feeling SOB    T(C): 37.3 (01-10-25 @ 15:36), Max: 37.8 (01-10-25 @ 10:41)  HR: 76 (01-10-25 @ 16:42) (76 - 126)  BP: 106/60 (01-10-25 @ 16:42) (95/66 - 116/86)  RR: 20 (01-10-25 @ 16:42) (20 - 22)  SpO2: 96% (01-10-25 @ 16:42) (94% - 96%)    MEDICATIONS  (STANDING):  albuterol/ipratropium for Nebulization 3 milliLiter(s) Nebulizer every 20 minutes  albuterol/ipratropium for Nebulization 3 milliLiter(s) Nebulizer every 6 hours  cefTRIAXone   IVPB 1000 milliGRAM(s) IV Intermittent every 24 hours  heparin   Injectable 5000 Unit(s) SubCutaneous every 12 hours  lactated ringers. 1000 milliLiter(s) (75 mL/Hr) IV Continuous <Continuous>  losartan 100 milliGRAM(s) Oral daily  methylPREDNISolone sodium succinate Injectable 40 milliGRAM(s) IV Push every 12 hours  pantoprazole    Tablet 40 milliGRAM(s) Oral before breakfast    A 68yo Female with PMH of HTN, HLD, GERD, CAD, COPD not on Home O2, Hx of Pneumothorax, SCC of BOT s/p Trach/PEG (reversed in 4/2021), s/p CRT (7 sessions in 2021) who presents with SOB for 1 week duration. History goes back to around 1 week ago When the patient started having Shortness of breath, productive cough, and malaise. Patient dnies any fever. Patient reported that her son was coughing. Patient reported some chest pain previously when coughing . Patient reports diarrhea for 4 days. which resolved now. Patient denies an urinary symptoms. Patient is admitted for PNA, and COPD exacerbation .     # Acute hypoxic respiratory failure / Suspected  COPD exacerbation due to viral illness  # R/O pneumonia  # Chest pain - resolved  # SCC of BOT  # hx of Trach/ PEG s/p reversal   # HAGMA 2/2 Lactic Acidosis   # CAD/HTN/DL  # chronically hoarse voice    - RVP negative  - EKG - MAT; patient denies CP and Trop downtrending; admit to tele  - currently on 4L nc -taper as tolerated   - repeat labs in am  - start IVF; IV Rocephin/Azithro, IV steroids, Nebs q6  - repeat trop and lactic acid in am   - repeat CXR to demonstrate resolution   - f/u MRSA, Strep, legionella and procal   - repeat ABG in am   - send blood cultures today  - ENT cs appreciated - recommended CT neck with IV contrast; will also add on CT chest to evaluate infiltrates   - holding BP meds due to soft BP  - s/s eval ordered  - patient states she is independent from home and does not use assistive devices for ambulation; hold off on PT eval at this time     # Abdominal pain and Diarrhea PTA  - resolved  - follow up KUB  - send stool studies if diarrhea recurs   - start diet once evaluated by s/s    # Polychondritis   - holding CellCept in setting of infection, re-evaluate in am     # GERD  - protonix     #GOC - full code    Progress Note Handoff  Pending Consults: s/s  Pending Tests: labs, ct scans  Pending Results: as above  Family Discussion: Discussed labs, meds, scans, GOC, ENT cs and overall plan of care with pt and medical staff. All questions answered. PFD for 1/14  Disposition: Home__x___/SNF______/Other_____/Unknown at this time_____  Spent over 75 min reviewing chart, speaking with patient/family and on coordinating patient care during interdisciplinary rounds

## 2025-01-10 NOTE — H&P ADULT - CONVERSATION DETAILS
69 year old female with multiple medical problems presents for shortness of breath. GOC discussed with patient in detail. She stated she would like to remain a full code.

## 2025-01-10 NOTE — H&P ADULT - HISTORY OF PRESENT ILLNESS
A 68yo Female with PMH of HTN, HLD, GERD, CAD, COPD not on Home O2, Hx of Pneumothorax, SCC of BOT s/p Trach/PEG (reversed in 4/2021), s/p CRT (7 sessions in 2021) who presents with SOB for 3 days duration, cough, hoarseness and diarrhea.    Patient reports some intermittent left sided chest pain when coughing. and abdominal distention as well.  Patient follows with ENT Dr. Louis, last seen 9/23/24, CT Neck 9/2024 showed Abnormal soft tissue within the posterior nasopharynx greater the right, new since the reference exams and nonspecific in appearance. The abnormal submucosal soft tissue in the preepiglottic space greater on the left, asymmetric thickening of the left aryepiglottic fold, and mild epiglottic thickening; similar when correlated with the PET/CT dated 5/12/2022.  Stable rim-calcified right thyroid nodule measuring 1.8 cm. US guided FNA by IR 10/7/24 of  Stable rim-calcified right thyroid nodule measuring 1.8 cm. Showed atypia but not malignant cells.     ENT evaluated the pt for hoarseness, FFL showed, thick purulent secretions on larynx, airway patent. Recommended CT Neck with IVC for further evaluation, Albuterol neb treatment, Humidified O2 ( on 3L of O2 via NC)     Labs significant for WBC 11.2 K (neutrophilic), troponin 96 > 75, AG 16     09:57 - VBG - pH: 7.39  | pCO2: 38    | pO2: 36    | Lactate: 3.0      In Ed the patient was given 2 L  fluid bolus, Azithromycin and rocephin, duoneb and solu-medrol     CXR showed reticular infiltrates     EKG shows PACs, with MAT     VS significant for using 4 L NC, Tachycardia 126     ICU Vital Signs Last 24 Hrs  T(C): 37.3 (10 Zeyad 2025 15:36), Max: 37.8 (10 Zeyad 2025 10:41)  T(F): 99.1 (10 Zeyad 2025 15:36), Max: 100 (10 Zeyad 2025 10:41)  HR: 112 (10 Zeyad 2025 15:36) (97 - 126)  BP: 95/66 (10 Zeyad 2025 15:36) (95/66 - 116/86)  BP(mean): 76 (10 Zeyad 2025 15:36) (76 - 76)  RR: 20 (10 Zeyad 2025 15:36) (20 - 22)  SpO2: 94% (10 Zeyad 2025 15:36) (94% - 95%)  Patient On (Oxygen Delivery Method): nasal cannula  O2 Flow (L/min): 4             A 70yo Female with PMH of HTN, HLD, GERD, CAD, COPD not on Home O2, Hx of Pneumothorax, SCC of BOT s/p Trach/PEG (reversed in 4/2021), s/p CRT (7 sessions in 2021) who presents with SOB for 1 week duration. History goes back to around 1 week ago When the patient started having Shortness of breath, productive cough, and malaise. Patient dnies any fever. Patient reported that her son was coughing. Patient reported some chest pain previously when coughing . Patient reports diarrhea for 4 days. which resolved now. PAtient denies an urinary symptoms. Patient is admitted for PNA, and COPD excacerbation .     Patient reports some intermittent left sided chest pain when coughing. and abdominal distention as well.  Patient follows with ENT Dr. Louis, last seen 9/23/24, CT Neck 9/2024 showed Abnormal soft tissue within the posterior nasopharynx greater the right, new since the reference exams and nonspecific in appearance. The abnormal submucosal soft tissue in the preepiglottic space greater on the left, asymmetric thickening of the left aryepiglottic fold, and mild epiglottic thickening; similar when correlated with the PET/CT dated 5/12/2022.  Stable rim-calcified right thyroid nodule measuring 1.8 cm. US guided FNA by IR 10/7/24 of  Stable rim-calcified right thyroid nodule measuring 1.8 cm. Showed atypia but not malignant cells.     ENT evaluated the pt for hoarseness, FFL showed, thick purulent secretions on larynx, airway patent. Recommended CT Neck with IVC for further evaluation, Albuterol neb treatment, Humidified O2 ( on 3L of O2 via NC)     Labs significant for WBC 11.2 K (neutrophilic), troponin 96 > 75, AG 16     09:57 - VBG - pH: 7.39  | pCO2: 38    | pO2: 36    | Lactate: 3.0      In Ed the patient was given 2 L  fluid bolus, Azithromycin and rocephin, duoneb and solu-medrol     CXR showed reticular infiltrates     EKG shows PACs, with MAT     VS significant for using 4 L NC, Tachycardia 126     ICU Vital Signs Last 24 Hrs  T(C): 37.3 (10 Zeyad 2025 15:36), Max: 37.8 (10 Zeyad 2025 10:41)  T(F): 99.1 (10 Zeyad 2025 15:36), Max: 100 (10 Zeyad 2025 10:41)  HR: 112 (10 Zeyad 2025 15:36) (97 - 126)  BP: 95/66 (10 Zeyad 2025 15:36) (95/66 - 116/86)  BP(mean): 76 (10 Zeyad 2025 15:36) (76 - 76)  RR: 20 (10 Zeyad 2025 15:36) (20 - 22)  SpO2: 94% (10 Zeyad 2025 15:36) (94% - 95%)  Patient On (Oxygen Delivery Method): nasal cannula  O2 Flow (L/min): 4             A 70yo Female with PMH of HTN, HLD, GERD, CAD, COPD not on Home O2, Hx of Pneumothorax, polychondirits, SCC of BOT s/p Trach/PEG (reversed in 4/2021), s/p CRT (7 sessions in 2021) who presents with SOB for 1 week duration. History goes back to around 1 week ago When the patient started having Shortness of breath, productive cough, and malaise. Patient dnies any fever. Patient reported that her son was coughing. Patient reported some chest pain previously when coughing . Patient reports diarrhea for 4 days. which resolved now. PAtient denies an urinary symptoms. Patient is admitted for PNA, and COPD excacerbation .     Patient reports some intermittent left sided chest pain when coughing. and abdominal distention as well.  Patient follows with ENT Dr. Louis, last seen 9/23/24, CT Neck 9/2024 showed Abnormal soft tissue within the posterior nasopharynx greater the right, new since the reference exams and nonspecific in appearance. The abnormal submucosal soft tissue in the preepiglottic space greater on the left, asymmetric thickening of the left aryepiglottic fold, and mild epiglottic thickening; similar when correlated with the PET/CT dated 5/12/2022.  Stable rim-calcified right thyroid nodule measuring 1.8 cm. US guided FNA by IR 10/7/24 of  Stable rim-calcified right thyroid nodule measuring 1.8 cm. Showed atypia but not malignant cells.     ENT evaluated the pt for hoarseness, FFL showed, thick purulent secretions on larynx, airway patent. Recommended CT Neck with IVC for further evaluation, Albuterol neb treatment, Humidified O2 ( on 3L of O2 via NC)     Labs significant for WBC 11.2 K (neutrophilic), troponin 96 > 75, AG 16     09:57 - VBG - pH: 7.39  | pCO2: 38    | pO2: 36    | Lactate: 3.0      In Ed the patient was given 2 L  fluid bolus, Azithromycin and rocephin, duoneb and solu-medrol     CXR showed reticular infiltrates     EKG shows PACs, with MAT     VS significant for using 4 L NC, Tachycardia 126     ICU Vital Signs Last 24 Hrs  T(C): 37.3 (10 Zeyad 2025 15:36), Max: 37.8 (10 Zeyad 2025 10:41)  T(F): 99.1 (10 Zeyad 2025 15:36), Max: 100 (10 Zeyad 2025 10:41)  HR: 112 (10 Zeyad 2025 15:36) (97 - 126)  BP: 95/66 (10 Zeyad 2025 15:36) (95/66 - 116/86)  BP(mean): 76 (10 Zeyad 2025 15:36) (76 - 76)  RR: 20 (10 Zeyad 2025 15:36) (20 - 22)  SpO2: 94% (10 Zeyad 2025 15:36) (94% - 95%)  Patient On (Oxygen Delivery Method): nasal cannula  O2 Flow (L/min): 4

## 2025-01-10 NOTE — ED ADULT NURSE NOTE - NSFALLRISKINTERV_ED_ALL_ED
Communicate fall risk and risk factors to all staff, patient, and family/Provide visual cue: yellow wristband, yellow gown, etc/Reinforce activity limits and safety measures with patient and family/Use of alarms - bed, stretcher, chair and/or video monitoring/Call bell, personal items and telephone in reach/Instruct patient to call for assistance before getting out of bed/chair/stretcher/Non-slip footwear applied when patient is off stretcher/[place] to call system/Physically safe environment - no spills, clutter or unnecessary equipment/Purposeful Proactive Rounding/Room/bathroom lighting operational, light cord in reach

## 2025-01-10 NOTE — ED PROVIDER NOTE - OBJECTIVE STATEMENT
69-year-old female past medical history significant for hypertension, polychondritis, COPD, former smoker, lung cancer, status post GEETHA/BSO, history of SCC of base of tongue/vallecula (history of trach/PEG), complaining of 3 days of shortness of breath, hoarse voice and diarrhea.  Patient complaining of productive cough.  No hemoptysis.  No fever, chills.  Patient reports multiple episodes of loose stool per day, no melena or blood per rectum.  No nausea, vomiting.  No abdominal pain.  Patient reports abdominal distention.  No sick contacts or travel.  No rash.  Patient complaining of intermittent left-sided chest pain with coughing.

## 2025-01-10 NOTE — ED PROVIDER NOTE - PHYSICAL EXAMINATION
CONSTITUTIONAL: Well-appearing; well-nourished; in no apparent distress. + hoarse voice  HEAD: Normocephalic; atraumatic.   EYES: PERRL; EOM intact. Conjunctiva normal B/L.   ENT: Normal pharynx with no tonsillar hypertrophy. MMM.  NECK: Supple; non-tender; no cervical lymphadenopathy.   CHEST: Normal chest excursion with respiration.   CARDIOVASCULAR: Normal S1, S2; no murmurs, rubs, or gallops.   RESPIRATORY: B/L rhonchi  GI/: Normal bowel sounds; non-distended; non-tender.  BACK: No evidence of trauma or deformity. Non-tender to palpation. No CVA tenderness.   EXT: Normal ROM in all four extremities; non-tender to palpation; distal pulses are normal. No leg edema B/L.   SKIN: Normal for age and race; warm; dry; good turgor.  NEURO: A & O x 4; CN 2-12 intact. Grossly unremarkable.

## 2025-01-10 NOTE — H&P ADULT - NSHPLABSRESULTS_GEN_ALL_CORE
LABS:                        15.1   11.20 )-----------( 346      ( 10 Zeyad 2025 10:10 )             46.0     01-10    136  |  99  |  21[H]  ----------------------------<  180[H]  4.0   |  21  |  1.0    Ca    9.8      10 Zeyad 2025 10:10    TPro  5.8[L]  /  Alb  3.9  /  TBili  0.4  /  DBili  x   /  AST  23  /  ALT  21  /  AlkPhos  68  01-10

## 2025-01-10 NOTE — ED ADULT NURSE NOTE - NS PRO PASSIVE SMOKE EXP
RHEUMATOLOGY CONSULT VISIT     PATIENT INFORMATION     Josie Claros   69 y o  male   MRN: [id number]    ASSESSMENT/PLAN     Diagnoses and all orders for this visit:    Inflammatory arthritis  -     Sedimentation rate, automated; Future  -     C-reactive protein; Future  -     co-enzyme Q-10 30 MG capsule; Take 3 capsules (90 mg total) by mouth 2 (two) times a day  -     CK (with reflex to MB); Future  -     LD,Blood; Future  -     Aldolase; Future    Muscle weakness  -     Sedimentation rate, automated; Future  -     C-reactive protein; Future  -     co-enzyme Q-10 30 MG capsule; Take 3 capsules (90 mg total) by mouth 2 (two) times a day  -     CK (with reflex to MB); Future  -     LD,Blood; Future  -     Aldolase; Future  -     CBC and differential; Future  -     Comprehensive metabolic panel; Future  -     TSH, 3rd generation with Free T4 reflex; Future      A/P:    Inflammatory arthritis versus osteoarthritis  -obtain blood work  -healthy lifestyle/dietary modifications  -over-the-counter coenzyme Q 200 mg daily  -over-the-counter topical NSAIDs as needed on painful areas  -follow-up in clinic in 3 months  -continue routine follow-up with PCP    Schedule a follow-up appointment in 3 months  HEALTH MAINTENANCE     Immunization History   Administered Date(s) Administered    Pneumococcal Polysaccharide PPV23 03/08/2016    SARS-CoV-2 / COVID-19 mRNA IM (Tennille Vargas) 12/29/2020, 03/12/2021     CHIEF COMPLAINT     No chief complaint on file  HISTORY OF PRESENT ILLNESS     Pt is a 76yo M w hx of plantar fascitis, acquired hypothyroidism, HTN, HLD who presents to the clinic with complaint of b/l ankle/ knees, and shoulder pain and stiffness for the last 6 months  Also has right first MCP joint pain after slip and fall  Denies any back pain or stiffness  Take aleve 2-3 tablets about twice/ day with some alleviation  Admits to good appetite, denies wt loss   States he used to be very active with taking long walk however unable to do so due to pain  Pain in b/l MTP joints and reproduced with palpation  Denies b/l hip pains/ stiffness, fevers, chills  SOB, CP, ,N/V, abd pain, rashes  SHx: Social alcohol intake, no tobacco use  Fhx: OA  Allergies- NKDA  Surgeries- hernia repairs (multiple)    REVIEW OF SYSTEMS     Review of Systems   Constitutional: Positive for fatigue  Respiratory: Negative  Cardiovascular: Negative  Musculoskeletal: Positive for arthralgias  Skin: Positive for rash ("hives" on b/l extenser surface of elbows, resolved with lotion )  All other systems reviewed and are negative  OBJECTIVE     Vitals:    08/11/21 1056   BP: 110/65   Pulse: 75   Weight: 93 5 kg (206 lb 3 2 oz)   Height: 5' 9" (1 753 m)     Physical Exam  Vitals and nursing note reviewed  Constitutional:       General: He is not in acute distress  Appearance: Normal appearance  He is obese  He is not ill-appearing or toxic-appearing  HENT:      Head: Normocephalic and atraumatic  Eyes:      General: No scleral icterus  Extraocular Movements: Extraocular movements intact  Conjunctiva/sclera: Conjunctivae normal    Cardiovascular:      Rate and Rhythm: Normal rate and regular rhythm  Pulses: Normal pulses  Heart sounds: Normal heart sounds  No murmur heard  Pulmonary:      Effort: Pulmonary effort is normal  No respiratory distress  Breath sounds: Normal breath sounds  No wheezing  Abdominal:      General: Bowel sounds are normal  There is no distension  Palpations: Abdomen is soft  Tenderness: There is no abdominal tenderness  Musculoskeletal:         General: Tenderness (TMP b/l, ) present  Normal range of motion  Cervical back: Normal range of motion and neck supple  Comments: B/l flat feet and bunions without tenderness    Skin:     General: Skin is warm and dry  Capillary Refill: Capillary refill takes less than 2 seconds  Findings: No rash  Comments: Horizontal ridges in toenails  Mild Depression in of fingernails  Neurological:      General: No focal deficit present  Mental Status: He is alert and oriented to person, place, and time  Psychiatric:         Mood and Affect: Mood normal          Behavior: Behavior normal          Thought Content: Thought content normal          Judgment: Judgment normal        CURRENT MEDICATIONS     Current Outpatient Medications:     aspirin 81 MG tablet, Take 81 mg by mouth daily  , Disp: , Rfl:     escitalopram (LEXAPRO) 10 mg tablet, , Disp: , Rfl:     finasteride (PROSCAR) 5 mg tablet, Take 1 tablet (5 mg total) by mouth daily (Patient not taking: Reported on 5/26/2021), Disp: 90 tablet, Rfl: 1    finasteride (PROSCAR) 5 mg tablet, Take 1 tablet (5 mg total) by mouth daily, Disp: 90 tablet, Rfl: 3    irbesartan (AVAPRO) 150 mg tablet, Take 150 mg by mouth daily at bedtime, Disp: , Rfl:     levothyroxine 125 mcg tablet, Take 125 mcg by mouth daily  , Disp: , Rfl:     methocarbamol (ROBAXIN) 750 mg tablet, Take 1,500 mg by mouth 2 (two) times a day, Disp: , Rfl:     rosuvastatin (CRESTOR) 20 MG tablet, Take 20 mg by mouth daily, Disp: , Rfl:     tamsulosin (FLOMAX) 0 4 mg, Take 0 4 mg by mouth 2 (two) times a day Indications: Enlarged Prostate , Disp: , Rfl:     Current Facility-Administered Medications:     cefTRIAXone (ROCEPHIN) injection 1,000 mg, 1,000 mg, Intramuscular, Q24H, Lisa Arora MD, 1,000 mg at 06/08/21 1416    PAST MEDICAL & SURGICAL HISTORY     Past Medical History:   Diagnosis Date    Disease of thyroid gland     GERD (gastroesophageal reflux disease)     Hypertension     Tinnitus      Past Surgical History:   Procedure Laterality Date    ADENOIDECTOMY      COLONOSCOPY N/A 7/16/2016    Procedure: COLONOSCOPY;  Surgeon: John Lares MD;  [facility];  Service:    [facility] HERNIA REPAIR      TONSILLECTOMY      [address] [place] [address] HISTORY     Social History Socioeconomic History    Marital status: /Civil Union     Spouse name: Not on file    Number of children: Not on file    Years of education: Not on file    Highest education level: Not on file   Occupational History    Not on file   Tobacco Use    Smoking status: Never Smoker    Smokeless tobacco: Never Used   Vaping Use    Vaping Use: Never used   Substance and Sexual Activity    Alcohol use: Yes     Comment: Rarely    Drug use: Never    Sexual activity: Not on file   Other Topics Concern    Not on file   Social History Narrative    Not on file     Social Determinants of Health     Financial Resource Strain:     Difficulty of Paying Living Expenses:    Food Insecurity:     Worried About Running Out of Food in the Last Year:     [address] in the Last Year:    Transportation Needs:     Lack of Transportation (Medical):  Lack of Transportation (Non-Medical):    Physical Activity:     Days of Exercise per Week:     Minutes of Exercise per Session:    Stress:     Feeling of Stress :    Social Connections:     Frequency of Communication with Friends and Family:     Frequency of Social Gatherings with Friends and Family:     Attends Church Services:     Active Member of Clubs or Organizations:     Attends Club or Organization Meetings:     Marital Status:    Intimate Partner Violence:     Fear of Current or Ex-Partner:     Emotionally Abused:     Physically Abused:     Sexually Abused:      Social History     Substance and Sexual Activity   Alcohol Use Yes    Comment: Rarely     Social History     Substance and Sexual Activity   Drug Use Never     Social History     Tobacco Use   Smoking Status Never Smoker   Smokeless Tobacco Never Used     History reviewed   No pertinent family history   ==    Lance Rosales MD  Chief Resident, PGY-3  [facility] Res[facility] No

## 2025-01-10 NOTE — H&P ADULT - NSHPPHYSICALEXAM_GEN_ALL_CORE
General: AOx3, NAD   Chest: GBAE , no crackles or wheezes   Heart: RRR, no murmur  Abdomen: soft, non-tender ,non-distended  Extremities: + PP , no edema General: AOx3, NAD   Chest: GBAE , diffuse crackles, mild wheezing, tahcypneic   Heart: RRR, no murmur  Abdomen: soft, non-tender , mildly distended   Extremities: + PP , no edema

## 2025-01-10 NOTE — ED ADULT NURSE NOTE - OBJECTIVE STATEMENT
Pt A/O times 4 ,report felling weak C/O  coughing congested ,SOB ,hoarsen voice  chest pain on and diarrhea, no N/V  off for 3 days ,pt former smoker ,lung CA ,tong mass removal ,denies fever at home .

## 2025-01-10 NOTE — CONSULT NOTE ADULT - ASSESSMENT
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     Plan:   Case discussed with Dr. Louis, plan as follows:  - FFL noted with thick purulent secretions on larynx, airway patent.   - Obtain CT Neck with IVC for further evaluation   - Consider Albuterol neb treatment   - Humidified O2 ( on 3L of O2 via NC)   - Encourage to clear secretions    - care as per medicine   - d/w attending    Pt is a 68yo Female with PMH of HTN, HLD, GERD, CAD, COPB not on Home O2, Hx of Pneumothorax, SCC of BOT s/p Trach/PEG (reversed in 4/2021), s/p CRT (7 sessions in 2021) who presents with SOB for 3 days - ENT c/s for hoarseness.     Plan:   Case discussed with Dr. Louis, plan as follows:  - FFL noted with thick purulent secretions on larynx, airway patent.   - Obtain CT Neck with IVC for further evaluation   - Consider Albuterol neb treatment   - Humidified O2 ( on 3L of O2 via NC)   - Encourage to clear secretions    - care as per medicine   - d/w attending     ADDENDUM   Patient seen and examined at bedside with Dr. Vu.     Plan:  - Start IV abx per ID   - Start Steroids

## 2025-01-10 NOTE — CONSULT NOTE ADULT - SUBJECTIVE AND OBJECTIVE BOX
ENT CONSULT:    Pt is a 70yo Female with PMH of HTN, HLD, GERD, CAD, COPB not on Home O2, Hx of Pneumothorax, SCC of BOT s/p Trach/PEG (reversed in 4/2021), s/p CRT (7 sessions in 2021) who presents with SOB for 3 days - ENT c/s for hoarseness. Patient seen and examined at bedside. Patient reports she started have difficulty breathing 3 days ago with associated dry cough, worsening hoarseness, diarrhea. Patient reports she had taken Mucinex, however, feels "raw" in her throat. She reports she feels dry throat, attempted to cough up thick secretions. Patient also endorses intermittent Left sided CP.  Denies any fever, chills, N/V, decrease tolerance to own secretions.     Of Note, Patient follows with Dr. Urena, last seen 9/23/24, CT Neck 9/2024 Abnormal soft tissue within the posterior nasopharynx greater the right, new since the reference exams and nonspecific in appearance.    Recommend correlation with exam.The abnormal submucosal soft tissue in the preepiglottic space greater on the left, asymmetric thickening of the left aryepiglottic fold, and mild epiglottic thickening; similar when correlated with the PET/CT dated 5/12/2022.  Stable rim-calcified right thyroid nodule measuring 1.8 cm. US guided FNA by IR 10/7/24 of  Stable rim-calcified right thyroid nodule measuring 1.8 cm.      PAST MEDICAL & SURGICAL HISTORY:  HTN (hypertension)      Hypercholesterolemia      GERD (gastroesophageal reflux disease)      Pulmonary nodule      CAD (coronary artery disease)      COPD (chronic obstructive pulmonary disease)      Relapsing polychondritis      Female cystocele      H/O pneumothorax  2016      S/P GEETHA-BSO      S/P colon resection      Vocal cord polyps  removal of same 2005      Pulmonary nodule      History of bladder surgery  2019      H/O breast biopsy  LEFT          MEDICATIONS  (STANDING):  albuterol/ipratropium for Nebulization 3 milliLiter(s) Nebulizer every 20 minutes    MEDICATIONS  (PRN):      Allergies    No Known Drug Allergies  CONTRAST DYS (Nausea)    Intolerances          SOCIAL HISTORY:    FAMILY HISTORY:  Family history of diabetes mellitus (DM)        REVIEW OF SYSTEMS   [x] A ten-point review of systems was otherwise negative except as noted.    Vital Signs Last 24 Hrs  T(C): 37.8 (10 Zeyad 2025 10:41), Max: 37.8 (10 Zeyad 2025 10:41)  T(F): 100 (10 Zeyad 2025 10:41), Max: 100 (10 Zeyad 2025 10:41)  HR: 97 (10 Zeyad 2025 08:46) (97 - 97)  BP: 115/63 (10 Zeyad 2025 08:46) (115/63 - 115/63)  BP(mean): --  RR: 22 (10 Zeyad 2025 08:46) (22 - 22)  SpO2: 94% (10 Zeyad 2025 08:46) (94% - 94%)    Parameters below as of 10 Zeyad 2025 08:46  Patient On (Oxygen Delivery Method): nasal cannula  O2 Flow (L/min): 4      GEN: NAD, awake and alert. No drooling or pooling of secretions. No stridor or stertor. Good vocal quality, no hoarseness.   SKIN: Good color, non diaphoretic.  HEENT: NC/AT; Oral mucosa dry. No erythema or edema noted to buccal mucosa, tongue, FOM, uvula. Uvula midline. +Posterior OP with thick yellow secretion.  NECK: Traceha midline, Neck supple, no TTP to B/L lateral neck, no cervical LAD.  RESP: 2L of O2% via NC, satting at 92-93% No dyspnea, non-labored breathing. No use of accessory muscles.   CARDIO: +S1/S2  ABDO: Soft, NT.  EXT: WOLFE x 4    Fiberoptic Laryngoscopy: No masses or lesions noted to NP/OP/HP. ++Thick purulent secretions overlying Laryngeal structures. Epiglottis crisp, no edema. TVC/FVC mobile and intact, no glottic gap noted.     LABS:                        15.1   11.20 )-----------( 346      ( 10 Zeyad 2025 10:10 )             46.0     01-10    136  |  99  |  21[H]  ----------------------------<  180[H]  4.0   |  21  |  1.0    Ca    9.8      10 Zeyad 2025 10:10    TPro  5.8[L]  /  Alb  3.9  /  TBili  0.4  /  DBili  x   /  AST  23  /  ALT  21  /  AlkPhos  68  01-10      Urinalysis Basic - ( 10 Zeyad 2025 10:10 )    Color: x / Appearance: x / SG: x / pH: x  Gluc: 180 mg/dL / Ketone: x  / Bili: x / Urobili: x   Blood: x / Protein: x / Nitrite: x   Leuk Esterase: x / RBC: x / WBC x   Sq Epi: x / Non Sq Epi: x / Bacteria: x        RADIOLOGY & ADDITIONAL STUDIES:  < from: CT Neck Soft Tissue No Cont (09.03.24 @ 10:29) >    ACC: [id number] EXAM:  CT NECK SOFT TISSUE   ORDERED BY: RILEY URENA     PROCEDURE DATE:  09/03/2024          INTERPRETATION:  Clinical history: h/o of tongue base cancer, left neck   and ear pain;    TECHNIQUE:  CT neck without contrast. Contiguous CT axial images of the   neck with coronal and sagittal reformats. The patient declined IV   contrast administration.    COMPARISON/CORRELATION: PET/CT dated 5/12/2022. MRIs of the neck   10/26/2020 and 5/4/2021.    FINDINGS:    There is abnormal submucosal soft tissue in the preepiglottic space   greater on the left, asymmetric thickening of the left aryepiglottic   fold, and mild epiglottic thickening; similar when correlated with the   PET/CT dated 5/12/2022.    There is abnormal soft tissuewithin the nasopharynx greater the right,   new.    The visualized intracranial and intraorbital contents are grossly   unremarkable.    There is trace scattered paranasal sinus mucosal thickening. The mastoid   air cells are clear.    The parotid and submandibular glands are atrophic but otherwise   unremarkable.    There is no CT evidence of pathologic cervical lymphadenopathy.    There is a stable right thyroid nodule demonstrating rim calcification,   measuring about 1.8 cm.    There are multilevel degenerative changes of the spine with mild   anterolisthesis C4-5. Diffuse osteopenia. Patient is edentulous.    IMPRESSION:    1.  Abnormal soft tissue within the posterior nasopharynx greater the   right, new since the reference exams and nonspecific in appearance.    Recommend correlation with exam.    2.  The abnormal submucosal soft tissue in the preepiglottic space   greater on the left, asymmetric thickening of the left aryepiglottic   fold, and mild epiglottic thickening; similar when correlated with the   PET/CT dated 5/12/2022.    3.  Stable rim-calcified right thyroid nodule measuring 1.8 cm.    --- End of Report ---    AMOR STEIN MD; Attending Radiologist  This document has been electronically signed. Sep  4 22396:45PM    < end of copied text >    < from: IR US Procedure (10.07.24 @ 10:45) >  ACC: [id number] EXAM:  IR PROCEDURE US   ORDERED BY: VADIM PEREZ     PROCEDURE DATE:  10/07/2024          INTERPRETATION:  Clinical information: 69-year-old female with a history   of head and neck cancer with a suspicious appearing nodule in the right   lobe of the thyroid.    Radiologist:  Dr. Vadim Perez.  Procedure:  Ultrasound guided thyroid biopsy.  Technique and findings:  Prior US scans were reviewed.    Informed consent was obtained.    A preliminary localizing ultrasound scan was performed with the patient   in a supine position. Examination demonstrated 2 cm hypoechoic nodule   with peripheral calcification within the right lobe of the thyroid..    The skin overlying the region was prepped.    One percent lidocaine was administered for local anesthesia.    Using US guidance, a 22g needle was used to biopsy the thyroid nodule.   Slides were prepared by the pathology technologist. The needle was   reinserted and a second specimen was obtained. Slides were again prepared   by the pathology technologist.    A total 4 passes were made      The procedure was well tolerated.    IMPRESSION:    SUCCESSFUL US DIRECTED BIOPSY OF THE RIGHT THYROID NODULE.  The patient tolerated the procedure well.    Thank you for the courtesy of this consult.    Sincerely,    Vadim Perez MD    --- End of Report ---    VADIM PEREZ MD; Attending Interventional Radiologist  This document has been electronically signed. Oct  7 2024 12:25PM    < end of copied text >     ENT CONSULT:    Pt is a 70yo Female with PMH of HTN, HLD, GERD, CAD, COPB not on Home O2, Hx of Pneumothorax, SCC of BOT s/p Trach/PEG (reversed in 4/2021), s/p CRT (7 sessions in 2021) who presents with SOB for 3 days - ENT c/s for hoarseness. Patient seen and examined at bedside. Patient reports she started have difficulty breathing 3 days ago with associated dry cough, worsening hoarseness, diarrhea. Patient reports she had taken Mucinex, however, feels "raw" in her throat. She reports she feels dry throat, attempted to cough up thick secretions. Patient also endorses intermittent Left sided CP.  Denies any fever, chills, N/V, decrease tolerance to own secretions.     Of Note, Patient follows with Dr. Urena, last seen 9/23/24, CT Neck 9/2024 Abnormal soft tissue within the posterior nasopharynx greater the right, new since the reference exams and nonspecific in appearance. The abnormal submucosal soft tissue in the preepiglottic space greater on the left, asymmetric thickening of the left aryepiglottic fold, and mild epiglottic thickening; similar when correlated with the PET/CT dated 5/12/2022.  Stable rim-calcified right thyroid nodule measuring 1.8 cm. US guided FNA by IR 10/7/24 of  Stable rim-calcified right thyroid nodule measuring 1.8 cm.      PAST MEDICAL & SURGICAL HISTORY:  HTN (hypertension)      Hypercholesterolemia      GERD (gastroesophageal reflux disease)      Pulmonary nodule      CAD (coronary artery disease)      COPD (chronic obstructive pulmonary disease)      Relapsing polychondritis      Female cystocele      H/O pneumothorax  2016      S/P GEETHA-BSO      S/P colon resection      Vocal cord polyps  removal of same 2005      Pulmonary nodule      History of bladder surgery  2019      H/O breast biopsy  LEFT          MEDICATIONS  (STANDING):  albuterol/ipratropium for Nebulization 3 milliLiter(s) Nebulizer every 20 minutes    MEDICATIONS  (PRN):      Allergies    No Known Drug Allergies  CONTRAST DYS (Nausea)    Intolerances          SOCIAL HISTORY:    FAMILY HISTORY:  Family history of diabetes mellitus (DM)        REVIEW OF SYSTEMS   [x] A ten-point review of systems was otherwise negative except as noted.    Vital Signs Last 24 Hrs  T(C): 37.8 (10 Zeyad 2025 10:41), Max: 37.8 (10 Zeyad 2025 10:41)  T(F): 100 (10 Zeyad 2025 10:41), Max: 100 (10 Zeyad 2025 10:41)  HR: 97 (10 Zeyad 2025 08:46) (97 - 97)  BP: 115/63 (10 Zeyad 2025 08:46) (115/63 - 115/63)  BP(mean): --  RR: 22 (10 Zeyad 2025 08:46) (22 - 22)  SpO2: 94% (10 Zeyad 2025 08:46) (94% - 94%)    Parameters below as of 10 Zeyad 2025 08:46  Patient On (Oxygen Delivery Method): nasal cannula  O2 Flow (L/min): 4      GEN: NAD, awake and alert. No drooling or pooling of secretions. No stridor or stertor. Good vocal quality, no hoarseness.   SKIN: Good color, non diaphoretic.  HEENT: NC/AT; Oral mucosa dry. No erythema or edema noted to buccal mucosa, tongue, FOM, uvula. Uvula midline. +Posterior OP with thick yellow secretion.  NECK: Traceha midline, Neck supple, no TTP to B/L lateral neck, no cervical LAD.  RESP: 2L of O2% via NC, satting at 92-93% No dyspnea, non-labored breathing. No use of accessory muscles.   CARDIO: +S1/S2  ABDO: Soft, NT.  EXT: WOLFE x 4    Fiberoptic Laryngoscopy: No masses or lesions noted to NP/OP/HP. ++Thick purulent secretions overlying Laryngeal structures. Epiglottis crisp, no edema. TVC/FVC mobile and intact, no glottic gap noted.     LABS:                        15.1   11.20 )-----------( 346      ( 10 Zeyad 2025 10:10 )             46.0     01-10    136  |  99  |  21[H]  ----------------------------<  180[H]  4.0   |  21  |  1.0    Ca    9.8      10 Zeyad 2025 10:10    TPro  5.8[L]  /  Alb  3.9  /  TBili  0.4  /  DBili  x   /  AST  23  /  ALT  21  /  AlkPhos  68  01-10      Urinalysis Basic - ( 10 Zeyad 2025 10:10 )    Color: x / Appearance: x / SG: x / pH: x  Gluc: 180 mg/dL / Ketone: x  / Bili: x / Urobili: x   Blood: x / Protein: x / Nitrite: x   Leuk Esterase: x / RBC: x / WBC x   Sq Epi: x / Non Sq Epi: x / Bacteria: x        RADIOLOGY & ADDITIONAL STUDIES:  < from: CT Neck Soft Tissue No Cont (09.03.24 @ 10:29) >    ACC: [id number] EXAM:  CT NECK SOFT TISSUE   ORDERED BY: RLIEY URENA     PROCEDURE DATE:  09/03/2024          INTERPRETATION:  Clinical history: h/o of tongue base cancer, left neck   and ear pain;    TECHNIQUE:  CT neck without contrast. Contiguous CT axial images of the   neck with coronal and sagittal reformats. The patient declined IV   contrast administration.    COMPARISON/CORRELATION: PET/CT dated 5/12/2022. MRIs of the neck   10/26/2020 and 5/4/2021.    FINDINGS:    There is abnormal submucosal soft tissue in the preepiglottic space   greater on the left, asymmetric thickening of the left aryepiglottic   fold, and mild epiglottic thickening; similar when correlated with the   PET/CT dated 5/12/2022.    There is abnormal soft tissuewithin the nasopharynx greater the right,   new.    The visualized intracranial and intraorbital contents are grossly   unremarkable.    There is trace scattered paranasal sinus mucosal thickening. The mastoid   air cells are clear.    The parotid and submandibular glands are atrophic but otherwise   unremarkable.    There is no CT evidence of pathologic cervical lymphadenopathy.    There is a stable right thyroid nodule demonstrating rim calcification,   measuring about 1.8 cm.    There are multilevel degenerative changes of the spine with mild   anterolisthesis C4-5. Diffuse osteopenia. Patient is edentulous.    IMPRESSION:    1.  Abnormal soft tissue within the posterior nasopharynx greater the   right, new since the reference exams and nonspecific in appearance.    Recommend correlation with exam.    2.  The abnormal submucosal soft tissue in the preepiglottic space   greater on the left, asymmetric thickening of the left aryepiglottic   fold, and mild epiglottic thickening; similar when correlated with the   PET/CT dated 5/12/2022.    3.  Stable rim-calcified right thyroid nodule measuring 1.8 cm.    --- End of Report ---    AMOR STEIN MD; Attending Radiologist  This document has been electronically signed. Sep  4 15554:45PM    < end of copied text >    < from: IR US Procedure (10.07.24 @ 10:45) >  ACC: [id number] EXAM:  IR PROCEDURE US   ORDERED BY: VADIM PEREZ     PROCEDURE DATE:  10/07/2024          INTERPRETATION:  Clinical information: 69-year-old female with a history   of head and neck cancer with a suspicious appearing nodule in the right   lobe of the thyroid.    Radiologist:  Dr. Vadim Perez.  Procedure:  Ultrasound guided thyroid biopsy.  Technique and findings:  Prior US scans were reviewed.    Informed consent was obtained.    A preliminary localizing ultrasound scan was performed with the patient   in a supine position. Examination demonstrated 2 cm hypoechoic nodule   with peripheral calcification within the right lobe of the thyroid..    The skin overlying the region was prepped.    One percent lidocaine was administered for local anesthesia.    Using US guidance, a 22g needle was used to biopsy the thyroid nodule.   Slides were prepared by the pathology technologist. The needle was   reinserted and a second specimen was obtained. Slides were again prepared   by the pathology technologist.    A total 4 passes were made      The procedure was well tolerated.    IMPRESSION:    SUCCESSFUL US DIRECTED BIOPSY OF THE RIGHT THYROID NODULE.  The patient tolerated the procedure well.    Thank you for the courtesy of this consult.    Sincerely,    Vadim Perez MD    --- End of Report ---    VADIM PEREZ MD; Attending Interventional Radiologist  This document has been electronically signed. Oct  7 2024 12:25PM    < end of copied text >

## 2025-01-10 NOTE — ED PROVIDER NOTE - CARE PLAN
1 Principal Discharge DX:	Respiratory infection  Secondary Diagnosis:	Diarrhea  Secondary Diagnosis:	Elevated troponin

## 2025-01-11 ENCOUNTER — RESULT REVIEW (OUTPATIENT)
Age: 70
End: 2025-01-11

## 2025-01-11 LAB
A1C WITH ESTIMATED AVERAGE GLUCOSE RESULT: 6.3 % — HIGH (ref 4–5.6)
ALBUMIN SERPL ELPH-MCNC: 3.7 G/DL — SIGNIFICANT CHANGE UP (ref 3.5–5.2)
ALP SERPL-CCNC: 58 U/L — SIGNIFICANT CHANGE UP (ref 30–115)
ALT FLD-CCNC: 22 U/L — SIGNIFICANT CHANGE UP (ref 0–41)
ANION GAP SERPL CALC-SCNC: 15 MMOL/L — HIGH (ref 7–14)
ANION GAP SERPL CALC-SCNC: 16 MMOL/L — HIGH (ref 7–14)
APTT BLD: 26.1 SEC — LOW (ref 27–39.2)
AST SERPL-CCNC: 21 U/L — SIGNIFICANT CHANGE UP (ref 0–41)
BASOPHILS # BLD AUTO: 0.01 K/UL — SIGNIFICANT CHANGE UP (ref 0–0.2)
BASOPHILS NFR BLD AUTO: 0.1 % — SIGNIFICANT CHANGE UP (ref 0–1)
BILIRUB SERPL-MCNC: 0.3 MG/DL — SIGNIFICANT CHANGE UP (ref 0.2–1.2)
BUN SERPL-MCNC: 22 MG/DL — HIGH (ref 10–20)
BUN SERPL-MCNC: 27 MG/DL — HIGH (ref 10–20)
CALCIUM SERPL-MCNC: 9 MG/DL — SIGNIFICANT CHANGE UP (ref 8.4–10.5)
CALCIUM SERPL-MCNC: 9.4 MG/DL — SIGNIFICANT CHANGE UP (ref 8.4–10.5)
CHLORIDE SERPL-SCNC: 104 MMOL/L — SIGNIFICANT CHANGE UP (ref 98–110)
CHLORIDE SERPL-SCNC: 99 MMOL/L — SIGNIFICANT CHANGE UP (ref 98–110)
CHOLEST SERPL-MCNC: 138 MG/DL — SIGNIFICANT CHANGE UP
CK MB CFR SERPL CALC: 14.5 NG/ML — HIGH (ref 0.6–6.3)
CK SERPL-CCNC: 121 U/L — SIGNIFICANT CHANGE UP (ref 0–225)
CO2 SERPL-SCNC: 23 MMOL/L — SIGNIFICANT CHANGE UP (ref 17–32)
CO2 SERPL-SCNC: 23 MMOL/L — SIGNIFICANT CHANGE UP (ref 17–32)
CREAT SERPL-MCNC: 0.9 MG/DL — SIGNIFICANT CHANGE UP (ref 0.7–1.5)
CREAT SERPL-MCNC: 1 MG/DL — SIGNIFICANT CHANGE UP (ref 0.7–1.5)
EGFR: 61 ML/MIN/1.73M2 — SIGNIFICANT CHANGE UP
EGFR: 69 ML/MIN/1.73M2 — SIGNIFICANT CHANGE UP
EOSINOPHIL # BLD AUTO: 0 K/UL — SIGNIFICANT CHANGE UP (ref 0–0.7)
EOSINOPHIL NFR BLD AUTO: 0 % — SIGNIFICANT CHANGE UP (ref 0–8)
ESTIMATED AVERAGE GLUCOSE: 134 MG/DL — HIGH (ref 68–114)
GLUCOSE SERPL-MCNC: 159 MG/DL — HIGH (ref 70–99)
GLUCOSE SERPL-MCNC: 184 MG/DL — HIGH (ref 70–99)
HCT VFR BLD CALC: 43.8 % — SIGNIFICANT CHANGE UP (ref 37–47)
HDLC SERPL-MCNC: 45 MG/DL — LOW
HGB BLD-MCNC: 14.2 G/DL — SIGNIFICANT CHANGE UP (ref 12–16)
IMM GRANULOCYTES NFR BLD AUTO: 0.6 % — HIGH (ref 0.1–0.3)
INR BLD: 1.15 RATIO — SIGNIFICANT CHANGE UP (ref 0.65–1.3)
LIPID PNL WITH DIRECT LDL SERPL: 68 MG/DL — SIGNIFICANT CHANGE UP
LYMPHOCYTES # BLD AUTO: 0.26 K/UL — LOW (ref 1.2–3.4)
LYMPHOCYTES # BLD AUTO: 1.9 % — LOW (ref 20.5–51.1)
MAGNESIUM SERPL-MCNC: 1.4 MG/DL — LOW (ref 1.8–2.4)
MAGNESIUM SERPL-MCNC: 1.8 MG/DL — SIGNIFICANT CHANGE UP (ref 1.8–2.4)
MCHC RBC-ENTMCNC: 28.3 PG — SIGNIFICANT CHANGE UP (ref 27–31)
MCHC RBC-ENTMCNC: 32.4 G/DL — SIGNIFICANT CHANGE UP (ref 32–37)
MCV RBC AUTO: 87.3 FL — SIGNIFICANT CHANGE UP (ref 81–99)
MONOCYTES # BLD AUTO: 0.65 K/UL — HIGH (ref 0.1–0.6)
MONOCYTES NFR BLD AUTO: 4.8 % — SIGNIFICANT CHANGE UP (ref 1.7–9.3)
NEUTROPHILS # BLD AUTO: 12.63 K/UL — HIGH (ref 1.4–6.5)
NEUTROPHILS NFR BLD AUTO: 92.6 % — HIGH (ref 42.2–75.2)
NON HDL CHOLESTEROL: 93 MG/DL — SIGNIFICANT CHANGE UP
NRBC # BLD: 0 /100 WBCS — SIGNIFICANT CHANGE UP (ref 0–0)
NRBC BLD-RTO: 0 /100 WBCS — SIGNIFICANT CHANGE UP (ref 0–0)
PHOSPHATE SERPL-MCNC: 4.4 MG/DL — SIGNIFICANT CHANGE UP (ref 2.1–4.9)
PLATELET # BLD AUTO: 365 K/UL — SIGNIFICANT CHANGE UP (ref 130–400)
PMV BLD: 9.7 FL — SIGNIFICANT CHANGE UP (ref 7.4–10.4)
POTASSIUM SERPL-MCNC: 3.4 MMOL/L — LOW (ref 3.5–5)
POTASSIUM SERPL-MCNC: 3.8 MMOL/L — SIGNIFICANT CHANGE UP (ref 3.5–5)
POTASSIUM SERPL-SCNC: 3.4 MMOL/L — LOW (ref 3.5–5)
POTASSIUM SERPL-SCNC: 3.8 MMOL/L — SIGNIFICANT CHANGE UP (ref 3.5–5)
PROCALCITONIN SERPL-MCNC: 0.08 NG/ML — SIGNIFICANT CHANGE UP (ref 0.02–0.1)
PROT SERPL-MCNC: 5.7 G/DL — LOW (ref 6–8)
PROTHROM AB SERPL-ACNC: 13.6 SEC — HIGH (ref 9.95–12.87)
RBC # BLD: 5.02 M/UL — SIGNIFICANT CHANGE UP (ref 4.2–5.4)
RBC # FLD: 13.9 % — SIGNIFICANT CHANGE UP (ref 11.5–14.5)
SODIUM SERPL-SCNC: 137 MMOL/L — SIGNIFICANT CHANGE UP (ref 135–146)
SODIUM SERPL-SCNC: 143 MMOL/L — SIGNIFICANT CHANGE UP (ref 135–146)
TRIGL SERPL-MCNC: 147 MG/DL — SIGNIFICANT CHANGE UP
TROPONIN T, HIGH SENSITIVITY RESULT: 46 NG/L — HIGH (ref 6–13)
TSH SERPL-MCNC: 0.62 UIU/ML — SIGNIFICANT CHANGE UP (ref 0.27–4.2)
WBC # BLD: 13.63 K/UL — HIGH (ref 4.8–10.8)
WBC # FLD AUTO: 13.63 K/UL — HIGH (ref 4.8–10.8)

## 2025-01-11 PROCEDURE — 71045 X-RAY EXAM CHEST 1 VIEW: CPT | Mod: 26

## 2025-01-11 PROCEDURE — 70491 CT SOFT TISSUE NECK W/DYE: CPT | Mod: 26

## 2025-01-11 PROCEDURE — 99233 SBSQ HOSP IP/OBS HIGH 50: CPT

## 2025-01-11 PROCEDURE — 93306 TTE W/DOPPLER COMPLETE: CPT | Mod: 26

## 2025-01-11 PROCEDURE — 71260 CT THORAX DX C+: CPT | Mod: 26

## 2025-01-11 RX ORDER — POTASSIUM CHLORIDE 750 MG/1
20 TABLET, EXTENDED RELEASE ORAL ONCE
Refills: 0 | Status: COMPLETED | OUTPATIENT
Start: 2025-01-11 | End: 2025-01-11

## 2025-01-11 RX ORDER — MAGNESIUM SULFATE 0.8 MEQ/ML
2 AMPUL (ML) INJECTION
Refills: 0 | Status: COMPLETED | OUTPATIENT
Start: 2025-01-11 | End: 2025-01-11

## 2025-01-11 RX ORDER — DIPHENHYDRAMINE HCL 25 MG
50 CAPSULE ORAL ONCE
Refills: 0 | Status: COMPLETED | OUTPATIENT
Start: 2025-01-11 | End: 2025-01-11

## 2025-01-11 RX ORDER — METHYLPREDNISOLONE ACETATE 40 MG/ML
60 VIAL (ML) INJECTION EVERY 8 HOURS
Refills: 0 | Status: DISCONTINUED | OUTPATIENT
Start: 2025-01-11 | End: 2025-01-14

## 2025-01-11 RX ADMIN — POTASSIUM CHLORIDE 50 MILLIEQUIVALENT(S): 750 TABLET, EXTENDED RELEASE ORAL at 10:17

## 2025-01-11 RX ADMIN — IPRATROPIUM BROMIDE AND ALBUTEROL SULFATE 3 MILLILITER(S): .5; 2.5 SOLUTION RESPIRATORY (INHALATION) at 21:22

## 2025-01-11 RX ADMIN — Medication 5000 UNIT(S): at 06:39

## 2025-01-11 RX ADMIN — CEFTRIAXONE 100 MILLIGRAM(S): 250 INJECTION, POWDER, FOR SOLUTION INTRAMUSCULAR; INTRAVENOUS at 15:12

## 2025-01-11 RX ADMIN — IPRATROPIUM BROMIDE AND ALBUTEROL SULFATE 3 MILLILITER(S): .5; 2.5 SOLUTION RESPIRATORY (INHALATION) at 02:30

## 2025-01-11 RX ADMIN — Medication 40 MILLIGRAM(S): at 06:37

## 2025-01-11 RX ADMIN — Medication 25 GRAM(S): at 15:55

## 2025-01-11 RX ADMIN — AZITHROMYCIN DIHYDRATE 255 MILLIGRAM(S): 500 TABLET, FILM COATED ORAL at 15:12

## 2025-01-11 RX ADMIN — Medication 40 MILLIGRAM(S): at 00:11

## 2025-01-11 RX ADMIN — Medication 25 GRAM(S): at 10:17

## 2025-01-11 RX ADMIN — Medication 50 MILLIGRAM(S): at 06:38

## 2025-01-11 RX ADMIN — Medication 60 MILLIGRAM(S): at 12:03

## 2025-01-11 RX ADMIN — Medication 60 MILLIGRAM(S): at 21:20

## 2025-01-11 RX ADMIN — IPRATROPIUM BROMIDE AND ALBUTEROL SULFATE 3 MILLILITER(S): .5; 2.5 SOLUTION RESPIRATORY (INHALATION) at 14:56

## 2025-01-11 RX ADMIN — Medication 40 MILLIGRAM(S): at 10:18

## 2025-01-11 RX ADMIN — Medication 5000 UNIT(S): at 17:23

## 2025-01-11 NOTE — PROGRESS NOTE ADULT - SUBJECTIVE AND OBJECTIVE BOX
SUBJECTIVE / OVERNIGHT EVENTS  Patient slept well overnight. No acute complaints this AM. Patient does not report fevers, chills, CP, SOB, or n/v/d    MEDICATIONS  albuterol/ipratropium for Nebulization 3 milliLiter(s) Nebulizer every 20 minutes  albuterol/ipratropium for Nebulization 3 milliLiter(s) Nebulizer every 6 hours  azithromycin  IVPB 500 milliGRAM(s) IV Intermittent every 24 hours  cefTRIAXone   IVPB 1000 milliGRAM(s) IV Intermittent every 24 hours  furosemide   Injectable 40 milliGRAM(s) IV Push daily  heparin   Injectable 5000 Unit(s) SubCutaneous every 12 hours  magnesium sulfate  IVPB 2 Gram(s) IV Intermittent every 2 hours  methylPREDNISolone sodium succinate Injectable 40 milliGRAM(s) IV Push every 12 hours  pantoprazole    Tablet 40 milliGRAM(s) Oral before breakfast      VITALS /  EXAM    T(C): 36.4 (01-11-25 @ 08:24), Max: 37.3 (01-10-25 @ 15:36)  HR: 88 (01-11-25 @ 08:24) (67 - 126)  BP: 129/86 (01-11-25 @ 08:24) (95/66 - 129/86)  RR: 18 (01-11-25 @ 08:24) (18 - 20)  SpO2: 96% (01-11-25 @ 08:24) (94% - 99%)    GENERAL: NAD, looks ill  CHEST/LUNG: bl diffuse wheezing  HEART: Regular rate and rhythm; No murmurs, rubs, or gallops  ABDOMEN: Soft, Nontender, Nondistended  EXTREMITIES:  2+ Peripheral Pulses, No clubbing, cyanosis, or edema    I's & O's     LABS             14.2   13.63 )-----------( 365      ( 01-11-25 @ 06:54 )             43.8     143  |  104  |  22  -------------------------<  159   01-11-25 @ 06:54  3.4  |  23  |  0.9    Ca      9.4     01-11-25 @ 06:54  Phos   4.4     01-11-25 @ 06:54  Mg     1.4     01-11-25 @ 06:54    TPro  5.7  /  Alb  3.7  /  TBili  0.3  /  DBili  x   /  AST  21  /  ALT  22  /  AlkPhos  58  /  GGT  x     01-11-25 @ 06:54    PT/INR - ( 01-11-25 @ 06:54 )   PT: 13.60 sec[H];   INR: 1.15 ratio  PTT - ( 01-11-25 @ 06:54 )  PTT:26.1 sec    Troponin T, High Sensitivity Result: 46 ng/L (01-11-25 @ 06:54)  Troponin T, High Sensitivity Result: 75 ng/L (01-10-25 @ 13:45)  Troponin T, High Sensitivity Result: 96 ng/L (01-10-25 @ 10:10)    CKMB Units: 14.5 ng/mL (01-11-25 @ 06:54)        Urinalysis Basic - ( 11 Jan 2025 06:54 )    Color: x / Appearance: x / SG: x / pH: x  Gluc: 159 mg/dL / Ketone: x  / Bili: x / Urobili: x   Blood: x / Protein: x / Nitrite: x   Leuk Esterase: x / RBC: x / WBC x   Sq Epi: x / Non Sq Epi: x / Bacteria: x      MICRO / IMAGING / CARDIOLOGY  Telemetry: Reviewed   EKG: Reviewed    CULTURES    IMAGING  PACS Image:  (01-11-25 @ 08:29)  PACS Image:  (01-11-25 @ 08:27)  PACS Image:  (01-10-25 @ 10:13)    CARDIOLOGY

## 2025-01-11 NOTE — SWALLOW BEDSIDE ASSESSMENT ADULT - SWALLOW EVAL: PROGNOSIS
with with known dysphagia s/p radiation and several swallow evaluations in the past. No record of pt seeing SLP within this dept since 2021
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
## 2025-01-11 NOTE — PROGRESS NOTE ADULT - ASSESSMENT
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 Stable mild thickening of the epiglottis and small preepiglottic soft tissue density. Resolved nasopharyngeal soft tissue thickening and left aryepiglottic fold thickening.    Plan:  - Continue IV abx per ID   - Continue steroids   - Continue Humidified O2 - now HFNC   - Encourage to clear secretions   - Continue Duonebs   - continue care per primary team   - Will review imagining with attending

## 2025-01-11 NOTE — CHART NOTE - NSCHARTNOTEFT_GEN_A_CORE
For med rec: Called [facility] @  [phone] 3x since am with no answer. Most meds resumed as per prior chart. For med rec: 3 attempts to call [facility] @  [phone]  since morning with no answer. Most meds resumed as per prior chart. Will try again later

## 2025-01-11 NOTE — PATIENT PROFILE ADULT - FALL HARM RISK - HARM RISK INTERVENTIONS

## 2025-01-11 NOTE — PROGRESS NOTE ADULT - SUBJECTIVE AND OBJECTIVE BOX
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       REVIEW OF SYSTEMS   [x] A ten-point review of systems was otherwise negative except as noted.      Allergies    No Known Drug Allergies  CONTRAST DYS (Nausea)    Intolerances        MEDICATIONS:  albuterol/ipratropium for Nebulization 3 milliLiter(s) Nebulizer every 20 minutes  albuterol/ipratropium for Nebulization 3 milliLiter(s) Nebulizer every 6 hours  azithromycin  IVPB 500 milliGRAM(s) IV Intermittent every 24 hours  cefTRIAXone   IVPB 1000 milliGRAM(s) IV Intermittent every 24 hours  furosemide   Injectable 40 milliGRAM(s) IV Push daily  heparin   Injectable 5000 Unit(s) SubCutaneous every 12 hours  magnesium sulfate  IVPB 2 Gram(s) IV Intermittent every 2 hours  methylPREDNISolone sodium succinate Injectable 60 milliGRAM(s) IV Push every 8 hours  pantoprazole    Tablet 40 milliGRAM(s) Oral before breakfast      Vital Signs Last 24 Hrs  T(C): 36.4 (11 Jan 2025 08:24), Max: 37.3 (10 Zeyad 2025 15:36)  T(F): 97.5 (11 Jan 2025 08:24), Max: 99.1 (10 Zeyad 2025 15:36)  HR: 88 (11 Jan 2025 08:24) (67 - 126)  BP: 129/86 (11 Jan 2025 08:24) (95/66 - 129/86)  BP(mean): 94 (11 Jan 2025 00:02) (75 - 94)  RR: 18 (11 Jan 2025 08:24) (18 - 20)  SpO2: 96% (11 Jan 2025 08:24) (94% - 99%)    Parameters below as of 11 Jan 2025 00:02  Patient On (Oxygen Delivery Method): BiPAP/CPAP            PHYSICAL EXAM:      LABS:  CBC-                        14.2   13.63 )-----------( 365      ( 11 Jan 2025 06:54 )             43.8     BMP/CMP-  11 Jan 2025 06:54    143    |  104    |  22     ----------------------------<  159    3.4     |  23     |  0.9      Ca    9.4        11 Jan 2025 06:54  Phos  4.4       11 Jan 2025 06:54  Mg     1.4       11 Jan 2025 06:54    TPro  5.7    /  Alb  3.7    /  TBili  0.3    /  DBili  x      /  AST  21     /  ALT  22     /  AlkPhos  58     11 Jan 2025 06:54    Coagulation Studies-  PT/INR - ( 11 Jan 2025 06:54 )   PT: 13.60 sec;   INR: 1.15 ratio         PTT - ( 11 Jan 2025 06:54 )  PTT:26.1 sec  Endocrine Panel-  Calcium: 9.4 mg/dL (01-11 @ 06:54)              RADIOLOGY & ADDITIONAL STUDIES:  < from: CT Neck Soft Tissue w/ IV Cont (01.11.25 @ 08:27) >  ACC: [id number] EXAM:  CT NECK SOFT TISSUE IC   ORDERED BY: VADIM DUNLAP     PROCEDURE DATE:  01/11/2025          INTERPRETATION:  Clinical history: History of tongue base cancer,   follow-up.    Technique:  Multidetector axial CT images of the neckwere obtained   following the intravenous administration of 50 ml of nonionic contrast.   Images were reformatted on multiple planes and reviewed in bone and   soft-tissue windows.    Comparison: CT neck dated 9/3/2024. Correlated with the chest CT dated   1/26/2024.    Findings:    Aerodigestive structures: The previously observed nasopharyngeal soft   tissue thickening has also resolved since the prior CT neck from   9/3/2024. There are stable mild thickening of the epiglottis and   ill-defined1 cm preepiglottic soft tissue density. There is resolved   mild thickening of the left aryepiglottic fold since the prior exam.    There has been an interval development of focal moderate tracheal   stenosis at the level of the thyroid (7-125) with partially collapsed   appearance of the right tracheal wall. The stenotic transverse width   measures about 0.7 cm versus a patent distal tracheal airway measuring   1.6 cm.    Thyroid gland: Stable rim calcified 1.8 cm hypodense nodule in the right   thyroid.    Parotid and submandibular glands:  Fatty atrophy of the bilateral   submandibular glands. Unremarkable parotid glands.    Lymph nodes: No pathologic adenopathy by imaging size criteria.    Vascular structures: Stable extensive atherosclerotic calcifications    Osseous structures: No fracture, dislocation or destructive lesion.   Stable diffuse osteopenia with unchanged spondylosis and   spondylolisthesis. Stable small bone islands in C7 vertebral body and   spinous process. Stable mild compression deformity of T5.    Paranasal sinuses: Clear.  Mastoid air cells:  Clear.    Partially visualized intracranial structures: Unremarkable.  Orbits: Unremarkable.    Partially visualized lung apices: Increased biapical reticular changes.   Please see separately dictated report of the concurrent chest CT.      Impression:    Interval development of focal moderate tracheal stenosis at the level of   the thyroid with partially collapsed appearance of the right tracheal   wall since the prior CT neck from 9/3/2024, possibly associated with the   history of prior tracheostomy.    Stable mild thickening of the epiglottis and small preepiglottic soft   tissue density. Resolved nasopharyngeal soft tissue thickening and left   aryepiglottic fold thickening.    Increased biapical reticular changes. Please see separately dictated   concurrent chest CT.    --- End of Report ---    TAHMINA FAGAN MD; Attending Radiologist  This document has been electronically signed. 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 Patient reports her breathing has improved now on HFNC and antibiotics. Patient also endorses mild improvement of her hoarseness. Denies any fever, chills, N/V. Overnight, pt was desatitng on the NC, was placed on NRFM then on bipap. CXR noted to be congested as well>> given 2 doses of IV lasix & started on lasix 40IV of for now. Pt with apparent wheezing on auscultation, medical team increased steroids to 60mg IV tid. Patient now upgraded to SDU.         REVIEW OF SYSTEMS   [x] A ten-point review of systems was otherwise negative except as noted.      Allergies    No Known Drug Allergies  CONTRAST DYS (Nausea)    Intolerances        MEDICATIONS:  albuterol/ipratropium for Nebulization 3 milliLiter(s) Nebulizer every 20 minutes  albuterol/ipratropium for Nebulization 3 milliLiter(s) Nebulizer every 6 hours  azithromycin  IVPB 500 milliGRAM(s) IV Intermittent every 24 hours  cefTRIAXone   IVPB 1000 milliGRAM(s) IV Intermittent every 24 hours  furosemide   Injectable 40 milliGRAM(s) IV Push daily  heparin   Injectable 5000 Unit(s) SubCutaneous every 12 hours  magnesium sulfate  IVPB 2 Gram(s) IV Intermittent every 2 hours  methylPREDNISolone sodium succinate Injectable 60 milliGRAM(s) IV Push every 8 hours  pantoprazole    Tablet 40 milliGRAM(s) Oral before breakfast      Vital Signs Last 24 Hrs  T(C): 36.4 (11 Jan 2025 08:24), Max: 37.3 (10 Zeyad 2025 15:36)  T(F): 97.5 (11 Jan 2025 08:24), Max: 99.1 (10 Zeyad 2025 15:36)  HR: 88 (11 Jan 2025 08:24) (67 - 126)  BP: 129/86 (11 Jan 2025 08:24) (95/66 - 129/86)  BP(mean): 94 (11 Jan 2025 00:02) (75 - 94)  RR: 18 (11 Jan 2025 08:24) (18 - 20)  SpO2: 96% (11 Jan 2025 08:24) (94% - 99%)    Parameters below as of 11 Jan 2025 00:02  Patient On (Oxygen Delivery Method): BiPAP/CPAP            PHYSICAL EXAM:      LABS:  CBC-                        14.2   13.63 )-----------( 365      ( 11 Jan 2025 06:54 )             43.8     BMP/CMP-  11 Jan 2025 06:54    143    |  104    |  22     ----------------------------<  159    3.4     |  23     |  0.9      Ca    9.4        11 Jan 2025 06:54  Phos  4.4       11 Jan 2025 06:54  Mg     1.4       11 Jan 2025 06:54    TPro  5.7    /  Alb  3.7    /  TBili  0.3    /  DBili  x      /  AST  21     /  ALT  22     /  AlkPhos  58     11 Jan 2025 06:54    Coagulation Studies-  PT/INR - ( 11 Jan 2025 06:54 )   PT: 13.60 sec;   INR: 1.15 ratio         PTT - ( 11 Jan 2025 06:54 )  PTT:26.1 sec  Endocrine Panel-  Calcium: 9.4 mg/dL (01-11 @ 06:54)              RADIOLOGY & ADDITIONAL STUDIES:  < from: CT Neck Soft Tissue w/ IV Cont (01.11.25 @ 08:27) >  ACC: [id number] EXAM:  CT NECK SOFT TISSUE IC   ORDERED BY: VADIM DUNLAP     PROCEDURE DATE:  01/11/2025          INTERPRETATION:  Clinical history: History of tongue base cancer,   follow-up.    Technique:  Multidetector axial CT images of the neckwere obtained   following the intravenous administration of 50 ml of nonionic contrast.   Images were reformatted on multiple planes and reviewed in bone and   soft-tissue windows.    Comparison: CT neck dated 9/3/2024. Correlated with the chest CT dated   1/26/2024.    Findings:    Aerodigestive structures: The previously observed nasopharyngeal soft   tissue thickening has also resolved since the prior CT neck from   9/3/2024. There are stable mild thickening of the epiglottis and   ill-defined1 cm preepiglottic soft tissue density. There is resolved   mild thickening of the left aryepiglottic fold since the prior exam.    There has been an interval development of focal moderate tracheal   stenosis at the level of the thyroid (7-125) with partially collapsed   appearance of the right tracheal wall. The stenotic transverse width   measures about 0.7 cm versus a patent distal tracheal airway measuring   1.6 cm.    Thyroid gland: Stable rim calcified 1.8 cm hypodense nodule in the right   thyroid.    Parotid and submandibular glands:  Fatty atrophy of the bilateral   submandibular glands. Unremarkable parotid glands.    Lymph nodes: No pathologic adenopathy by imaging size criteria.    Vascular structures: Stable extensive atherosclerotic calcifications    Osseous structures: No fracture, dislocation or destructive lesion.   Stable diffuse osteopenia with unchanged spondylosis and   spondylolisthesis. Stable small bone islands in C7 vertebral body and   spinous process. Stable mild compression deformity of T5.    Paranasal sinuses: Clear.  Mastoid air cells:  Clear.    Partially visualized intracranial structures: Unremarkable.  Orbits: Unremarkable.    Partially visualized lung apices: Increased biapical reticular changes.   Please see separately dictated report of the concurrent chest CT.      Impression:    Interval development of focal moderate tracheal stenosis at the level of   the thyroid with partially collapsed appearance of the right tracheal   wall since the prior CT neck from 9/3/2024, possibly associated with the   history of prior tracheostomy.    Stable mild thickening of the epiglottis and small preepiglottic soft   tissue density. Resolved nasopharyngeal soft tissue thickening and left   aryepiglottic fold thickening.    Increased biapical reticular changes. Please see separately dictated   concurrent chest CT.    --- End of Report ---    TAHMINA FAGAN MD; Attending Radiologist  This document has been electronically signed. Jan 11 2025  9:42AM    < end of copied text >

## 2025-01-11 NOTE — PROGRESS NOTE ADULT - ASSESSMENT
A 70yo Female with PMH of HTN, HLD, GERD, CAD, COPD not on Home O2, Hx of Pneumothorax, SCC of BOT s/p Trach/PEG (reversed in 4/2021), s/p CRT (7 sessions in 2021) who presents with SOB for 1 week duration. History goes back to around 1 week ago When the patient started having Shortness of breath, productive cough, and malaise. Patient dnies any fever. Patient reported that her son was coughing. Patient reported some chest pain previously when coughing . Patient reports diarrhea for 4 days. which resolved now. PAtient denies an urinary symptoms. Patient is admitted for PNA, and COPD excacerbation .     # COPD exacerbation  # Possible PNA  # Intermittent CP   # HAGMA 2//2 Lactic Acidosis   # CAD  #likely HF exacerbation  # Hx of PTx   # cough hoarseness   #hypoxemic resp failure   - WBC 11.2 K (neutrophilic), Tachycardic to 126   - Lactate 3.0,  AG 16   -  VBG - pH: 7.39  | pCO2: 38    | pO2: 36    | Lactate: 3.0    - at the time of exam no CP,   - EKG showed  PACs, with MAT, trop stable 96 > 75   - requiring 4 L N C   - sp 2 L  fluid bolus, Azithromycin and rocephin, duoneb and solu-medrol  - CXR showed reticular infiltrates   - cw rocpehin and azithromycin   - FU MRSA PRocal, Strep and legionella  - FU RVP   - FU CBC CMP TSH A1c Lipid panel  - cw solumedrol 40 mg IV BID, duoneb, albuterol   - ABG in AM, CXR  in AM   - FU TTE, BNP   - FU EKG in AM   - Trend troponins   - wean off O2   - FU lactate   - RVP   - blood culture, Sputum Cx,   - Galactomannan, fungitel   - LR @ 75 cc/ hr   - CT chest with IV contrast: Extensive emphysema with superimposed mild groundglass opacities/septal thickening suggesting underlying edema( not present on earlier study).Interval development of focal moderate tracheal stenosis at the level of the thyroid with partially collapsed appearance of the right tracheal wall since the prior CT neck from 9/3/2024, possibly associated with the history of prior tracheostomy.  - CT Neck with contrast:   Stable mild thickening of the epiglottis and small preepiglottic soft tissue density. Resolved nasopharyngeal soft tissue thickening and left aryepiglottic fold thickening. >>> F/U ENT   TTE ordered  -pt was desating last night, was placed on NRFM then on bipap last night. CXR noted to be congested as well>> given 2 doses of IV lasix & started on lasix 40IV of for now. pt wheezing on ausc, will increase steroids to 60mg IV tid  -pt currently on HFNC 50-50  -will reach out for possible upgrade    # Abdominal pain   # Diarrhea  - FU CT AP   - FU   - KUB for abdominal distention  - GI PCR     # Polychondiritis   - as per Rheum note, patient is on cellcept 1000 G BID and Dcd hydroxychloroquine  - DC immunosuppresants in setting of infection, restart on DC     # HX of SCC sp surgery, CRT   # hx of Trach/ PEG sp reversal   - follows with ENT Dr. Louis, last seen 9/23/24,  - CT Neck 9/2024 showed Abnormal soft tissue within the posterior nasopharynx greater the right, new since the reference exams and nonspecific in appearance. The abnormal submucosal soft tissue in the preepiglottic space greater on the left, asymmetric thickening of the left aryepiglottic fold, and mild epiglottic thickening; similar when correlated with the PET/CT dated 5/12/2022.  Stable rim-calcified right thyroid nodule measuring 1.8 cm.   - US guided FNA by IR 10/7/24 of  Stable rim-calcified right thyroid nodule measuring 1.8 cm. Showed atypia but not malignant cells.   - ENT evaluated the pt for hoarseness,   - FFL showed, thick purulent secretions on larynx, airway patent.   - ENT Recommended CT Neck with IVC for further evaluation, Albuterol neb treatment, Humidified O2 ( on 3L of O2 via NC)   - CT Neck with contrast:   ·	Stable mild thickening of the epiglottis and small preepiglottic soft tissue density. Resolved nasopharyngeal soft tissue thickening and left aryepiglottic fold thickening.      # HTN  # HLD  - hold home Anti-HTN as BP is soft   - cw other home meds     # GERD:  - protonix     Diet: DASH,   Activity: As tolerated   DVT Prophylaxis: heparin   GI Prophylaxis: protonix   Code Status: full code (I spoke to the patient and she confirmed that she wants to be full code)   Disposition: Telemetry      A 68yo Female with PMH of HTN, HLD, GERD, CAD, COPD not on Home O2, Hx of Pneumothorax, SCC of BOT s/p Trach/PEG (reversed in 4/2021), s/p CRT (7 sessions in 2021) who presents with SOB for 1 week duration. History goes back to around 1 week ago When the patient started having Shortness of breath, productive cough, and malaise. Patient dnies any fever. Patient reported that her son was coughing. Patient reported some chest pain previously when coughing . Patient reports diarrhea for 4 days. which resolved now. PAtient denies an urinary symptoms. Patient is admitted for PNA, and COPD excacerbation .     # Acute hypoxic resp failure 2/2 COPD exacerbation 2/2 Possible pneumonia, with tracheal stenosis   # Intermittent CP   # HAGMA 2//2 Lactic Acidosis   # CAD  #likely HF exacerbation  # Hx of PTx   # cough hoarseness   #hypoxemic resp failure   - WBC 11.2 K (neutrophilic), Tachycardic to 126   - Lactate 3.0,  AG 16   -  VBG - pH: 7.39  | pCO2: 38    | pO2: 36    | Lactate: 3.0    - at the time of exam no CP,   - EKG showed  PACs, with MAT, trop stable 96 > 75   - requiring 4 L N C   - sp 2 L  fluid bolus, Azithromycin and rocephin, duoneb and solu-medrol  - CXR showed reticular infiltrates   - cw rocpehin and azithromycin   - FU MRSA PRocal, Strep and legionella  - FU RVP   - FU CBC CMP TSH A1c Lipid panel  - cw solumedrol 40 mg IV BID, duoneb, albuterol   - ABG in AM, CXR  in AM   - FU TTE, BNP   - FU EKG in AM   - Trend troponins   - wean off O2   - FU lactate   - RVP   - blood culture, Sputum Cx,   - Galactomannan, fungitel   - LR @ 75 cc/ hr   - CT chest with IV contrast: Extensive emphysema with superimposed mild groundglass opacities/septal thickening suggesting underlying edema( not present on earlier study).Interval development of focal moderate tracheal stenosis at the level of the thyroid with partially collapsed appearance of the right tracheal wall since the prior CT neck from 9/3/2024, possibly associated with the history of prior tracheostomy.  - CT Neck with contrast:   Stable mild thickening of the epiglottis and small preepiglottic soft tissue density. Resolved nasopharyngeal soft tissue thickening and left aryepiglottic fold thickening. >>> F/U ENT   TTE ordered  -pt was desating last night, was placed on NRFM then on bipap last night. CXR noted to be congested as well>> given 2 doses of IV lasix & started on lasix 40IV of for now. pt wheezing on ausc, will increase steroids to 60mg IV tid  -pt currently on HFNC 50-50  -will reach out for possible upgrade    # Abdominal pain   # Diarrhea  - FU CT AP   - FU   - KUB for abdominal distention  - GI PCR     # Polychondiritis   - as per Rheum note, patient is on cellcept 1000 G BID and Dcd hydroxychloroquine  - DC immunosuppresants in setting of infection, restart on DC     # HX of SCC sp surgery, CRT   # hx of Trach/ PEG sp reversal   - follows with ENT Dr. Louis, last seen 9/23/24,  - CT Neck 9/2024 showed Abnormal soft tissue within the posterior nasopharynx greater the right, new since the reference exams and nonspecific in appearance. The abnormal submucosal soft tissue in the preepiglottic space greater on the left, asymmetric thickening of the left aryepiglottic fold, and mild epiglottic thickening; similar when correlated with the PET/CT dated 5/12/2022.  Stable rim-calcified right thyroid nodule measuring 1.8 cm.   - US guided FNA by IR 10/7/24 of  Stable rim-calcified right thyroid nodule measuring 1.8 cm. Showed atypia but not malignant cells.   - ENT evaluated the pt for hoarseness,   - FFL showed, thick purulent secretions on larynx, airway patent.   - ENT Recommended CT Neck with IVC for further evaluation, Albuterol neb treatment, Humidified O2 ( on 3L of O2 via NC)   - CT Neck with contrast:   ·	Stable mild thickening of the epiglottis and small preepiglottic soft tissue density. Resolved nasopharyngeal soft tissue thickening and left aryepiglottic fold thickening.      # HTN  # HLD  - hold home Anti-HTN as BP is soft   - cw other home meds     # GERD:  - protonix     Diet: DASH,   Activity: As tolerated   DVT Prophylaxis: heparin   GI Prophylaxis: protonix   Code Status: full code (I spoke to the patient and she confirmed that she wants to be full code)   Disposition: Telemetry      A 70yo Female with PMH of HTN, HLD, GERD, CAD, COPD not on Home O2, Hx of Pneumothorax, SCC of BOT s/p Trach/PEG (reversed in 4/2021), s/p CRT (7 sessions in 2021) who presents with SOB for 1 week duration. History goes back to around 1 week ago When the patient started having Shortness of breath, productive cough, and malaise. Patient dnies any fever. Patient reported that her son was coughing. Patient reported some chest pain previously when coughing . Patient reports diarrhea for 4 days. which resolved now. PAtient denies an urinary symptoms. Patient is admitted for PNA, and COPD excacerbation .     # Acute hypoxic resp failure 2/2 COPD exacerbation 2/2 Possible pneumonia, with tracheal stenosis   # Intermittent CP   # HAGMA 2//2 Lactic Acidosis   # CAD  #likely HF exacerbation  # Hx of PTx   # cough hoarseness   #hypoxemic resp failure   - WBC 11.2 K (neutrophilic), Tachycardic to 126   - Lactate 3.0,  AG 16   -  VBG - pH: 7.39  | pCO2: 38    | pO2: 36    | Lactate: 3.0    - at the time of exam no CP,   - EKG showed  PACs, with MAT, trop stable 96 > 75   - requiring 4 L N C   - sp 2 L  fluid bolus, Azithromycin and rocephin, duoneb and solu-medrol  - CXR showed reticular infiltrates   - cw rocpehin and azithromycin   - FU MRSA PRocal, Strep and legionella  - FU RVP   - FU CBC CMP TSH A1c Lipid panel  - cw solumedrol 40 mg IV BID, duoneb, albuterol   - ABG in AM, CXR  in AM   - FU TTE, BNP   - FU EKG in AM   - Trend troponins   - wean off O2   - FU lactate   - RVP   - blood culture, Sputum Cx,   - Galactomannan, fungitel   - LR @ 75 cc/ hr   - CT chest with IV contrast: Extensive emphysema with superimposed mild groundglass opacities/septal thickening suggesting underlying edema( not present on earlier study).Interval development of focal moderate tracheal stenosis at the level of the thyroid with partially collapsed appearance of the right tracheal wall since the prior CT neck from 9/3/2024, possibly associated with the history of prior tracheostomy.  - CT Neck with contrast:   Stable mild thickening of the epiglottis and small preepiglottic soft tissue density. Resolved nasopharyngeal soft tissue thickening and left aryepiglottic fold thickening. >>> F/U ENT   TTE ordered  -pt was desating last night, was placed on NRFM then on bipap last night. CXR noted to be congested as well>> given 2 doses of IV lasix & started on lasix 40IV of for now. pt wheezing on ausc, will increase steroids to 60mg IV tid  -pt currently on HFNC 50-50  -will reach out for possible upgrade    # Abdominal pain   # Diarrhea  - FU CT AP   - FU   - KUB for abdominal distention: Nonobstructive bowel gas pattern. Bony degenerative changes. External   tubing projects over the image.  - GI PCR     # Polychondiritis   - as per Rheum note, patient is on cellcept 1000 G BID and Dcd hydroxychloroquine  - DC immunosuppresants in setting of infection, restart on DC     # HX of SCC sp surgery, CRT   # hx of Trach/ PEG sp reversal   - follows with ENT Dr. Louis, last seen 9/23/24,  - CT Neck 9/2024 showed Abnormal soft tissue within the posterior nasopharynx greater the right, new since the reference exams and nonspecific in appearance. The abnormal submucosal soft tissue in the preepiglottic space greater on the left, asymmetric thickening of the left aryepiglottic fold, and mild epiglottic thickening; similar when correlated with the PET/CT dated 5/12/2022.  Stable rim-calcified right thyroid nodule measuring 1.8 cm.   - US guided FNA by IR 10/7/24 of  Stable rim-calcified right thyroid nodule measuring 1.8 cm. Showed atypia but not malignant cells.   - ENT evaluated the pt for hoarseness,   - FFL showed, thick purulent secretions on larynx, airway patent.   - ENT Recommended CT Neck with IVC for further evaluation, Albuterol neb treatment, Humidified O2 ( on 3L of O2 via NC)   - CT Neck with contrast:   ·	Stable mild thickening of the epiglottis and small preepiglottic soft tissue density. Resolved nasopharyngeal soft tissue thickening and left aryepiglottic fold thickening.      # HTN  # HLD  - hold home Anti-HTN as BP is soft   - cw other home meds     # GERD:  - protonix     Diet: DASH,   Activity: As tolerated   DVT Prophylaxis: heparin   GI Prophylaxis: protonix   Code Status: full code (I spoke to the patient and she confirmed that she wants to be full code)   Disposition: Telemetry

## 2025-01-11 NOTE — CHART NOTE - NSCHARTNOTEFT_GEN_A_CORE
Transfer from: Medical Floor    Transfer to:  SDU  Accepting Physician: Dr Cintron    HPI:  A 68yo Female with PMH of HTN, HLD, GERD, CAD, COPD not on Home O2, Hx of Pneumothorax, polychondirits, SCC of BOT s/p Trach/PEG (reversed in 4/2021), s/p CRT (7 sessions in 2021) who presents with SOB for 1 week duration. History goes back to around 1 week ago When the patient started having Shortness of breath, productive cough, and malaise. Patient dnies any fever. Patient reported that her son was coughing. Patient reported some chest pain previously when coughing . Patient reports diarrhea for 4 days. which resolved now. PAtient denies an urinary symptoms. Patient is admitted for PNA, and COPD excacerbation .     Patient reports some intermittent left sided chest pain when coughing. and abdominal distention as well.  Patient follows with ENT Dr. Louis, last seen 9/23/24, CT Neck 9/2024 showed Abnormal soft tissue within the posterior nasopharynx greater the right, new since the reference exams and nonspecific in appearance. The abnormal submucosal soft tissue in the preepiglottic space greater on the left, asymmetric thickening of the left aryepiglottic fold, and mild epiglottic thickening; similar when correlated with the PET/CT dated 5/12/2022.  Stable rim-calcified right thyroid nodule measuring 1.8 cm. US guided FNA by IR 10/7/24 of  Stable rim-calcified right thyroid nodule measuring 1.8 cm. Showed atypia but not malignant cells.     ENT evaluated the pt for hoarseness, FFL showed, thick purulent secretions on larynx, airway patent. Recommended CT Neck with IVC for further evaluation, Albuterol neb treatment, Humidified O2 ( on 3L of O2 via NC)     Labs significant for WBC 11.2 K (neutrophilic), troponin 96 > 75, AG 16     09:57 - VBG - pH: 7.39  | pCO2: 38    | pO2: 36    | Lactate: 3.0      In Ed the patient was given 2 L  fluid bolus, Azithromycin and rocephin, duoneb and solu-medrol     CXR showed reticular infiltrates     EKG shows PACs, with MAT     VS significant for using 4 L NC, Tachycardia 126     ICU Vital Signs Last 24 Hrs  T(C): 37.3 (10 Zeyad 2025 15:36), Max: 37.8 (10 Zeyad 2025 10:41)  T(F): 99.1 (10 Zeyad 2025 15:36), Max: 100 (10 Zeyad 2025 10:41)  HR: 112 (10 Zeyad 2025 15:36) (97 - 126)  BP: 95/66 (10 Zeyad 2025 15:36) (95/66 - 116/86)  BP(mean): 76 (10 Zeyad 2025 15:36) (76 - 76)  RR: 20 (10 Zeyad 2025 15:36) (20 - 22)  SpO2: 94% (10 Zeyad 2025 15:36) (94% - 95%)  Patient On (Oxygen Delivery Method): nasal cannula  O2 Flow (L/min): 4      Pt was admitted with working dx of COPD exac 2/2 pna, started on azithro, rocephin, IV solumedrol 40q12h.  ENT consulted for hoarsness & recommended getting CT neck w contrast  Overnight, pt was desating on the NC, was placed on NRFM then on bipap. CXR noted to be congested as well>> given 2 doses of IV lasix & started on lasix 40IV of for now. pt wheezing on ausc, will increase steroids to 60mg IV tid  -Pt currently on HFNC 50-50.  -CT Neck with contrast: Stable mild thickening of the epiglottis and small preepiglottic soft tissue density. Resolved nasopharyngeal soft tissue thickening and left aryepiglottic fold thickening.   PLAN:   >>>  - F/U ENT recs  - TTE ordered, f/u   - lasix 40IV od  - solumedrol 60 iv tid   - will upgrade to SDU               (10 Zeyad 2025 15:46)          LABS:   CARDIAC MARKERS ( 11 Jan 2025 06:54 )  x     / x     / x     / x     / 14.5 ng/mL                              14.2   13.63 )-----------( 365      ( 11 Jan 2025 06:54 )             43.8       01-11    143  |  104  |  22[H]  ----------------------------<  159[H]  3.4[L]   |  23  |  0.9    Ca    9.4      11 Jan 2025 06:54  Phos  4.4     01-11  Mg     1.4     01-11    TPro  5.7[L]  /  Alb  3.7  /  TBili  0.3  /  DBili  x   /  AST  21  /  ALT  22  /  AlkPhos  58  01-11      PT/INR - ( 11 Jan 2025 06:54 )   PT: 13.60 sec;   INR: 1.15 ratio         PTT - ( 11 Jan 2025 06:54 )  PTT:26.1 sec    ABG - ( 10 Zeyad 2025 19:06 )  pH, Arterial: 7.35  pH, Blood: x     /  pCO2: 39    /  pO2: 172   / HCO3: 22    / Base Excess: -3.8  /  SaO2: 99.2                ASSESSMENT & PLAN: Transfer from: Medical Floor    Transfer to:  SDU  Accepting Physician: Dr Cintron    HPI:  A 68yo Female with PMH of HTN, HLD, GERD, CAD, COPD not on Home O2, Hx of Pneumothorax, polychondirits, SCC of BOT s/p Trach/PEG (reversed in 4/2021), s/p CRT (7 sessions in 2021) who presents with SOB for 1 week duration. History goes back to around 1 week ago When the patient started having Shortness of breath, productive cough, and malaise. Patient dnies any fever. Patient reported that her son was coughing. Patient reported some chest pain previously when coughing . Patient reports diarrhea for 4 days. which resolved now. PAtient denies an urinary symptoms. Patient is admitted for PNA, and COPD excacerbation .     Patient reports some intermittent left sided chest pain when coughing. and abdominal distention as well.  Patient follows with ENT Dr. Louis, last seen 9/23/24, CT Neck 9/2024 showed Abnormal soft tissue within the posterior nasopharynx greater the right, new since the reference exams and nonspecific in appearance. The abnormal submucosal soft tissue in the preepiglottic space greater on the left, asymmetric thickening of the left aryepiglottic fold, and mild epiglottic thickening; similar when correlated with the PET/CT dated 5/12/2022.  Stable rim-calcified right thyroid nodule measuring 1.8 cm. US guided FNA by IR 10/7/24 of  Stable rim-calcified right thyroid nodule measuring 1.8 cm. Showed atypia but not malignant cells.     ENT evaluated the pt for hoarseness, FFL showed, thick purulent secretions on larynx, airway patent. Recommended CT Neck with IVC for further evaluation, Albuterol neb treatment, Humidified O2 ( on 3L of O2 via NC)     Labs significant for WBC 11.2 K (neutrophilic), troponin 96 > 75, AG 16     09:57 - VBG - pH: 7.39  | pCO2: 38    | pO2: 36    | Lactate: 3.0      In Ed the patient was given 2 L  fluid bolus, Azithromycin and rocephin, duoneb and solu-medrol     CXR showed reticular infiltrates     EKG shows PACs, with MAT     VS significant for using 4 L NC, Tachycardia 126     ICU Vital Signs Last 24 Hrs  T(C): 37.3 (10 Zeyad 2025 15:36), Max: 37.8 (10 Zeyad 2025 10:41)  T(F): 99.1 (10 Zeyad 2025 15:36), Max: 100 (10 Zeyad 2025 10:41)  HR: 112 (10 Zeyad 2025 15:36) (97 - 126)  BP: 95/66 (10 Zeyad 2025 15:36) (95/66 - 116/86)  BP(mean): 76 (10 Zeyad 2025 15:36) (76 - 76)  RR: 20 (10 Zeyad 2025 15:36) (20 - 22)  SpO2: 94% (10 Zeyad 2025 15:36) (94% - 95%)  Patient On (Oxygen Delivery Method): nasal cannula  O2 Flow (L/min): 4      Pt was admitted with working dx of COPD exac 2/2 pna, started on azithro, rocephin, IV solumedrol 40q12h.  ENT consulted for hoarsness & recommended getting CT neck w contrast  Overnight, pt was desating on the NC, was placed on NRFM then on bipap. CXR noted to be congested as well>> given 2 doses of IV lasix & started on lasix 40IV of for now. pt wheezing on ausc, will increase steroids to 60mg IV tid  -Pt currently on HFNC 50-50.  -CT Neck with contrast: Stable mild thickening of the epiglottis and small preepiglottic soft tissue density. Resolved nasopharyngeal soft tissue thickening and left aryepiglottic fold thickening.   PLAN:   >>>  - F/U ENT recs  - TTE ordered, f/u   - lasix 40IV od  - solumedrol 60 iv tid   - will upgrade to SDU               (10 Zeyad 2025 15:46)          LABS:   CARDIAC MARKERS ( 11 Jan 2025 06:54 )  x     / x     / x     / x     / 14.5 ng/mL                              14.2   13.63 )-----------( 365      ( 11 Jan 2025 06:54 )             43.8       01-11    143  |  104  |  22[H]  ----------------------------<  159[H]  3.4[L]   |  23  |  0.9    Ca    9.4      11 Jan 2025 06:54  Phos  4.4     01-11  Mg     1.4     01-11    TPro  5.7[L]  /  Alb  3.7  /  TBili  0.3  /  DBili  x   /  AST  21  /  ALT  22  /  AlkPhos  58  01-11      PT/INR - ( 11 Jan 2025 06:54 )   PT: 13.60 sec;   INR: 1.15 ratio         PTT - ( 11 Jan 2025 06:54 )  PTT:26.1 sec    ABG - ( 10 Zeyad 2025 19:06 )  pH, Arterial: 7.35  pH, Blood: x     /  pCO2: 39    /  pO2: 172   / HCO3: 22    / Base Excess: -3.8  /  SaO2: 99.2 Transfer from: Medical Floor    Transfer to:  SDU  Accepting Physician: Dr Cintron    HPI:  A 70yo Female with PMH of HTN, HLD, GERD, CAD, COPD not on Home O2, Hx of Pneumothorax, polychondirits, SCC of BOT s/p Trach/PEG (reversed in 4/2021), s/p CRT (7 sessions in 2021) who presents with SOB for 1 week duration. History goes back to around 1 week ago When the patient started having Shortness of breath, productive cough, and malaise. Patient dnies any fever. Patient reported that her son was coughing. Patient reported some chest pain previously when coughing . Patient reports diarrhea for 4 days. which resolved now. PAtient denies an urinary symptoms. Patient is admitted for PNA, and COPD excacerbation .     Patient reports some intermittent left sided chest pain when coughing. and abdominal distention as well.  Patient follows with ENT Dr. Louis, last seen 9/23/24, CT Neck 9/2024 showed Abnormal soft tissue within the posterior nasopharynx greater the right, new since the reference exams and nonspecific in appearance. The abnormal submucosal soft tissue in the preepiglottic space greater on the left, asymmetric thickening of the left aryepiglottic fold, and mild epiglottic thickening; similar when correlated with the PET/CT dated 5/12/2022.  Stable rim-calcified right thyroid nodule measuring 1.8 cm. US guided FNA by IR 10/7/24 of  Stable rim-calcified right thyroid nodule measuring 1.8 cm. Showed atypia but not malignant cells.     ENT evaluated the pt for hoarseness, FFL showed, thick purulent secretions on larynx, airway patent. Recommended CT Neck with IVC for further evaluation, Albuterol neb treatment, Humidified O2 ( on 3L of O2 via NC)     Labs significant for WBC 11.2 K (neutrophilic), troponin 96 > 75, AG 16     09:57 - VBG - pH: 7.39  | pCO2: 38    | pO2: 36    | Lactate: 3.0      In Ed the patient was given 2 L  fluid bolus, Azithromycin and rocephin, duoneb and solu-medrol     CXR showed reticular infiltrates     EKG shows PACs, with MAT     VS significant for using 4 L NC, Tachycardia 126     ICU Vital Signs Last 24 Hrs  T(C): 37.3 (10 Zeyad 2025 15:36), Max: 37.8 (10 Zeyad 2025 10:41)  T(F): 99.1 (10 Zeyad 2025 15:36), Max: 100 (10 Zeyad 2025 10:41)  HR: 112 (10 Zeyad 2025 15:36) (97 - 126)  BP: 95/66 (10 Zeyad 2025 15:36) (95/66 - 116/86)  BP(mean): 76 (10 Zeyad 2025 15:36) (76 - 76)  RR: 20 (10 Zyead 2025 15:36) (20 - 22)  SpO2: 94% (10 Zeyad 2025 15:36) (94% - 95%)  Patient On (Oxygen Delivery Method): nasal cannula  O2 Flow (L/min): 4      Pt was admitted with working dx of COPD exac 2/2 pna, started on azithro, rocephin, IV solumedrol 40q12h.  ENT consulted for hoarsness & recommended getting CT neck w contrast  Overnight, pt was desating on the NC, was placed on NRFM then on bipap. CXR noted to be congested as well>> given 2 doses of IV lasix & started on lasix 40IV of for now. pt wheezing on ausc, will increase steroids to 60mg IV tid  -Pt currently on HFNC 50-50.  -CT Neck with contrast: Stable mild thickening of the epiglottis and small preepiglottic soft tissue density. Resolved nasopharyngeal soft tissue thickening and left aryepiglottic fold thickening.   PLAN:   >>>  - F/U ENT recs  - TTE ordered, f/u   - lasix 40IV od  - solumedrol 60 iv tid   - will upgrade to SDU        For med rec: called [facility] @  [phone] twice with no answer. Most meds resumed as per chart (recent refills). will try calling again later.           LABS:   CARDIAC MARKERS ( 11 Jan 2025 06:54 )  x     / x     / x     / x     / 14.5 ng/mL                              14.2   13.63 )-----------( 365      ( 11 Jan 2025 06:54 )             43.8       01-11    143  |  104  |  22[H]  ----------------------------<  159[H]  3.4[L]   |  23  |  0.9    Ca    9.4      11 Jan 2025 06:54  Phos  4.4     01-11  Mg     1.4     01-11    TPro  5.7[L]  /  Alb  3.7  /  TBili  0.3  /  DBili  x   /  AST  21  /  ALT  22  /  AlkPhos  58  01-11      PT/INR - ( 11 Jan 2025 06:54 )   PT: 13.60 sec;   INR: 1.15 ratio         PTT - ( 11 Jan 2025 06:54 )  PTT:26.1 sec    ABG - ( 10 Zeyad 2025 19:06 )  pH, Arterial: 7.35  pH, Blood: x     /  pCO2: 39    /  pO2: 172   / HCO3: 22    / Base Excess: -3.8  /  SaO2: 99.2 Transfer from: Medical Floor    Transfer to:  SDU  Accepting Physician: Dr Cintron    HPI:  A 68yo Female with PMH of HTN, HLD, GERD, CAD, COPD not on Home O2, Hx of Pneumothorax, polychondirits, SCC of BOT s/p Trach/PEG (reversed in 4/2021), s/p CRT (7 sessions in 2021) who presents with SOB for 1 week duration. History goes back to around 1 week ago When the patient started having Shortness of breath, productive cough, and malaise. Patient dnies any fever. Patient reported that her son was coughing. Patient reported some chest pain previously when coughing . Patient reports diarrhea for 4 days. which resolved now. PAtient denies an urinary symptoms. Patient is admitted for PNA, and COPD excacerbation .     Patient reports some intermittent left sided chest pain when coughing. and abdominal distention as well.  Patient follows with ENT Dr. Louis, last seen 9/23/24, CT Neck 9/2024 showed Abnormal soft tissue within the posterior nasopharynx greater the right, new since the reference exams and nonspecific in appearance. The abnormal submucosal soft tissue in the preepiglottic space greater on the left, asymmetric thickening of the left aryepiglottic fold, and mild epiglottic thickening; similar when correlated with the PET/CT dated 5/12/2022.  Stable rim-calcified right thyroid nodule measuring 1.8 cm. US guided FNA by IR 10/7/24 of  Stable rim-calcified right thyroid nodule measuring 1.8 cm. Showed atypia but not malignant cells.     ENT evaluated the pt for hoarseness, FFL showed, thick purulent secretions on larynx, airway patent. Recommended CT Neck with IVC for further evaluation, Albuterol neb treatment, Humidified O2 ( on 3L of O2 via NC)     Labs significant for WBC 11.2 K (neutrophilic), troponin 96 > 75, AG 16     09:57 - VBG - pH: 7.39  | pCO2: 38    | pO2: 36    | Lactate: 3.0      In Ed the patient was given 2 L  fluid bolus, Azithromycin and rocephin, duoneb and solu-medrol     CXR showed reticular infiltrates     EKG shows PACs, with MAT     VS significant for using 4 L NC, Tachycardia 126     ICU Vital Signs Last 24 Hrs  T(C): 37.3 (10 Zeyad 2025 15:36), Max: 37.8 (10 Zeyad 2025 10:41)  T(F): 99.1 (10 Zeyad 2025 15:36), Max: 100 (10 Zeyad 2025 10:41)  HR: 112 (10 Zeyad 2025 15:36) (97 - 126)  BP: 95/66 (10 Zeyad 2025 15:36) (95/66 - 116/86)  BP(mean): 76 (10 Zeyad 2025 15:36) (76 - 76)  RR: 20 (10 Zeyad 2025 15:36) (20 - 22)  SpO2: 94% (10 Zeyad 2025 15:36) (94% - 95%)  Patient On (Oxygen Delivery Method): nasal cannula  O2 Flow (L/min): 4      Pt was admitted with working dx of COPD exac 2/2 pna, started on azithro, rocephin, IV solumedrol 40q12h.  ENT consulted for hoarsness & recommended getting CT neck w contrast  Overnight, pt was desating on the NC, was placed on NRFM then on bipap. CXR noted to be congested as well>> given 2 doses of IV lasix & started on lasix 40IV of for now. pt wheezing on ausc, will increase steroids to 60mg IV tid  -Pt currently on HFNC 50-50.  -CT Neck with contrast: Stable mild thickening of the epiglottis and small preepiglottic soft tissue density. Resolved nasopharyngeal soft tissue thickening and left aryepiglottic fold thickening.   PLAN:   >>>  - F/U ENT recs  - TTE ordered, f/u   - lasix 40IV od  - solumedrol 60 iv tid   - will upgrade to SDU        For med rec: called [facility] @  [phone] twice with no answer. Most meds resumed as per chart. will try calling again later.           LABS:   CARDIAC MARKERS ( 11 Jan 2025 06:54 )  x     / x     / x     / x     / 14.5 ng/mL                              14.2   13.63 )-----------( 365      ( 11 Jan 2025 06:54 )             43.8       01-11    143  |  104  |  22[H]  ----------------------------<  159[H]  3.4[L]   |  23  |  0.9    Ca    9.4      11 Jan 2025 06:54  Phos  4.4     01-11  Mg     1.4     01-11    TPro  5.7[L]  /  Alb  3.7  /  TBili  0.3  /  DBili  x   /  AST  21  /  ALT  22  /  AlkPhos  58  01-11      PT/INR - ( 11 Jan 2025 06:54 )   PT: 13.60 sec;   INR: 1.15 ratio         PTT - ( 11 Jan 2025 06:54 )  PTT:26.1 sec    ABG - ( 10 Zeyad 2025 19:06 )  pH, Arterial: 7.35  pH, Blood: x     /  pCO2: 39    /  pO2: 172   / HCO3: 22    / Base Excess: -3.8  /  SaO2: 99.2 Transfer from: Medical Floor    Transfer to:  SDU  Accepting Physician: Dr Cintron    HPI:  A 70yo Female with PMH of HTN, HLD, GERD, CAD, COPD not on Home O2, Hx of Pneumothorax, polychondirits, SCC of BOT s/p Trach/PEG (reversed in 4/2021), s/p CRT (7 sessions in 2021) who presents with SOB for 1 week duration. History goes back to around 1 week ago When the patient started having Shortness of breath, productive cough, and malaise. Patient dnies any fever. Patient reported that her son was coughing. Patient reported some chest pain previously when coughing . Patient reports diarrhea for 4 days. which resolved now. PAtient denies an urinary symptoms. Patient is admitted for PNA, and COPD excacerbation .     Patient reports some intermittent left sided chest pain when coughing. and abdominal distention as well.  Patient follows with ENT Dr. Louis, last seen 9/23/24, CT Neck 9/2024 showed Abnormal soft tissue within the posterior nasopharynx greater the right, new since the reference exams and nonspecific in appearance. The abnormal submucosal soft tissue in the preepiglottic space greater on the left, asymmetric thickening of the left aryepiglottic fold, and mild epiglottic thickening; similar when correlated with the PET/CT dated 5/12/2022.  Stable rim-calcified right thyroid nodule measuring 1.8 cm. US guided FNA by IR 10/7/24 of  Stable rim-calcified right thyroid nodule measuring 1.8 cm. Showed atypia but not malignant cells.     ENT evaluated the pt for hoarseness, FFL showed, thick purulent secretions on larynx, airway patent. Recommended CT Neck with IVC for further evaluation, Albuterol neb treatment, Humidified O2 ( on 3L of O2 via NC)     Labs significant for WBC 11.2 K (neutrophilic), troponin 96 > 75, AG 16     09:57 - VBG - pH: 7.39  | pCO2: 38    | pO2: 36    | Lactate: 3.0      In Ed the patient was given 2 L  fluid bolus, Azithromycin and rocephin, duoneb and solu-medrol     CXR showed reticular infiltrates     EKG shows PACs, with MAT     VS significant for using 4 L NC, Tachycardia 126     ICU Vital Signs Last 24 Hrs  T(C): 37.3 (10 Zeyad 2025 15:36), Max: 37.8 (10 Zeyad 2025 10:41)  T(F): 99.1 (10 Zeyad 2025 15:36), Max: 100 (10 Zeyad 2025 10:41)  HR: 112 (10 Zeyad 2025 15:36) (97 - 126)  BP: 95/66 (10 Zeyad 2025 15:36) (95/66 - 116/86)  BP(mean): 76 (10 Zeyad 2025 15:36) (76 - 76)  RR: 20 (10 Zeyad 2025 15:36) (20 - 22)  SpO2: 94% (10 Zeyad 2025 15:36) (94% - 95%)  Patient On (Oxygen Delivery Method): nasal cannula  O2 Flow (L/min): 4      Pt was admitted with working dx of COPD exac 2/2 pna, started on azithro, rocephin, IV solumedrol 40q12h.  ENT consulted for hoarsness & recommended getting CT neck w contrast  Overnight, pt was desating on the NC, was placed on NRFM then on bipap. CXR noted to be congested as well>> given 2 doses of IV lasix & started on lasix 40IV of for now. pt wheezing on ausc, will increase steroids to 60mg IV tid  -Pt currently on HFNC 50-50.  -CT Neck with contrast: Stable mild thickening of the epiglottis and small preepiglottic soft tissue density. Resolved nasopharyngeal soft tissue thickening and left aryepiglottic fold thickening.   PLAN:   >>>  - F/U ENT recs  - TTE ordered, f/u   - lasix 40IV od  - solumedrol 60 iv tid   - will upgrade to SDU                LABS:   CARDIAC MARKERS ( 11 Jan 2025 06:54 )  x     / x     / x     / x     / 14.5 ng/mL                              14.2   13.63 )-----------( 365      ( 11 Jan 2025 06:54 )             43.8       01-11    143  |  104  |  22[H]  ----------------------------<  159[H]  3.4[L]   |  23  |  0.9    Ca    9.4      11 Jan 2025 06:54  Phos  4.4     01-11  Mg     1.4     01-11    TPro  5.7[L]  /  Alb  3.7  /  TBili  0.3  /  DBili  x   /  AST  21  /  ALT  22  /  AlkPhos  58  01-11      PT/INR - ( 11 Jan 2025 06:54 )   PT: 13.60 sec;   INR: 1.15 ratio         PTT - ( 11 Jan 2025 06:54 )  PTT:26.1 sec    ABG - ( 10 Zeyad 2025 19:06 )  pH, Arterial: 7.35  pH, Blood: x     /  pCO2: 39    /  pO2: 172   / HCO3: 22    / Base Excess: -3.8  /  SaO2: 99.2

## 2025-01-11 NOTE — PROGRESS NOTE ADULT - ATTENDING COMMENTS
Pt seen and examined at bedside. No cp or sob.  On HFNC 50/50  Vital Signs (24 Hrs):  T(C): 36.4 (01-11-25 @ 08:24), Max: 37.3 (01-10-25 @ 15:36)  HR: 88 (01-11-25 @ 08:24) (67 - 112)  BP: 129/86 (01-11-25 @ 08:24) (95/66 - 129/86)  RR: 18 (01-11-25 @ 08:24) (18 - 20)  SpO2: 96% (01-11-25 @ 08:24) (94% - 99%)  Wt(kg): --  Daily     Daily     I&O's Summary    PHYSICAL EXAM:  GENERAL: NAD, well-developed  HEAD:  Atraumatic, Normocephalic  EYES: EOMI, PERRLA, conjunctiva and sclera clear  NECK: Supple, No JVD  CHEST/LUNG: Clear to auscultation bilaterally; No wheeze  HEART: Regular rate and rhythm; No murmurs, rubs, or gallops  ABDOMEN: Soft, Nontender, Nondistended; Bowel sounds present  EXTREMITIES:  2+ Peripheral Pulses, No clubbing, cyanosis, or edema  PSYCH: AAOx3  NEUROLOGY: non-focal  SKIN: No rashes or lesions  Labs reviewed  Imaging reviewed independently and reviewed read  < from: CT Chest w/ IV Cont (01.11.25 @ 08:29) >    IMPRESSION:    Since  September 3, 2024    Bilateral basilar, partial right middle lobe atelectasis.    Extensive emphysema with superimposed mild groundglass opacities/septal   thickening suggesting underlying edema( not present on earlier study).      CT neck performed on the same day, see separate report    < end of copied text >      EKG reviewed independently and reviewed read  < from: 12 Lead ECG (01.10.25 @ 19:11) >    Diagnosis Line Sinus tachycardia with Premature atrial complexes  Left axis deviation  ST & T wave abnormality, consider anterior ischemia  Abnormal ECG    < end of copied text >    Plan as above. DW resident. Agree to plan. Made edits    #Progress Note Handoff  Pending (specify):  upgraded to SDU   Family discussion: house staff updated pt family  Disposition: [facility], SDU  Decision to admit the pt is based on acuity as above

## 2025-01-12 LAB
ANION GAP SERPL CALC-SCNC: 17 MMOL/L — HIGH (ref 7–14)
BUN SERPL-MCNC: 34 MG/DL — HIGH (ref 10–20)
CALCIUM SERPL-MCNC: 9.2 MG/DL — SIGNIFICANT CHANGE UP (ref 8.4–10.4)
CHLORIDE SERPL-SCNC: 100 MMOL/L — SIGNIFICANT CHANGE UP (ref 98–110)
CO2 SERPL-SCNC: 23 MMOL/L — SIGNIFICANT CHANGE UP (ref 17–32)
CREAT SERPL-MCNC: 0.9 MG/DL — SIGNIFICANT CHANGE UP (ref 0.7–1.5)
EGFR: 69 ML/MIN/1.73M2 — SIGNIFICANT CHANGE UP
GLUCOSE SERPL-MCNC: 186 MG/DL — HIGH (ref 70–99)
HCT VFR BLD CALC: 42.9 % — SIGNIFICANT CHANGE UP (ref 37–47)
HGB BLD-MCNC: 14.1 G/DL — SIGNIFICANT CHANGE UP (ref 12–16)
MAGNESIUM SERPL-MCNC: 2.2 MG/DL — SIGNIFICANT CHANGE UP (ref 1.8–2.4)
MCHC RBC-ENTMCNC: 29 PG — SIGNIFICANT CHANGE UP (ref 27–31)
MCHC RBC-ENTMCNC: 32.9 G/DL — SIGNIFICANT CHANGE UP (ref 32–37)
MCV RBC AUTO: 88.3 FL — SIGNIFICANT CHANGE UP (ref 81–99)
NRBC # BLD: 0 /100 WBCS — SIGNIFICANT CHANGE UP (ref 0–0)
NRBC BLD-RTO: 0 /100 WBCS — SIGNIFICANT CHANGE UP (ref 0–0)
PLATELET # BLD AUTO: 316 K/UL — SIGNIFICANT CHANGE UP (ref 130–400)
PMV BLD: 9.9 FL — SIGNIFICANT CHANGE UP (ref 7.4–10.4)
POTASSIUM SERPL-MCNC: 4.2 MMOL/L — SIGNIFICANT CHANGE UP (ref 3.5–5)
POTASSIUM SERPL-SCNC: 4.2 MMOL/L — SIGNIFICANT CHANGE UP (ref 3.5–5)
RBC # BLD: 4.86 M/UL — SIGNIFICANT CHANGE UP (ref 4.2–5.4)
RBC # FLD: 13.9 % — SIGNIFICANT CHANGE UP (ref 11.5–14.5)
SODIUM SERPL-SCNC: 140 MMOL/L — SIGNIFICANT CHANGE UP (ref 135–146)
WBC # BLD: 11.52 K/UL — HIGH (ref 4.8–10.8)
WBC # FLD AUTO: 11.52 K/UL — HIGH (ref 4.8–10.8)

## 2025-01-12 PROCEDURE — 99233 SBSQ HOSP IP/OBS HIGH 50: CPT

## 2025-01-12 PROCEDURE — 93970 EXTREMITY STUDY: CPT | Mod: 26

## 2025-01-12 PROCEDURE — 99223 1ST HOSP IP/OBS HIGH 75: CPT

## 2025-01-12 RX ORDER — ACETAMINOPHEN 160 MG/5ML
650 SUSPENSION ORAL EVERY 6 HOURS
Refills: 0 | Status: DISCONTINUED | OUTPATIENT
Start: 2025-01-12 | End: 2025-01-14

## 2025-01-12 RX ORDER — METOPROLOL SUCCINATE 25 MG
12.5 TABLET, EXTENDED RELEASE 24 HR ORAL THREE TIMES A DAY
Refills: 0 | Status: DISCONTINUED | OUTPATIENT
Start: 2025-01-12 | End: 2025-01-13

## 2025-01-12 RX ADMIN — AZITHROMYCIN DIHYDRATE 255 MILLIGRAM(S): 500 TABLET, FILM COATED ORAL at 15:25

## 2025-01-12 RX ADMIN — IPRATROPIUM BROMIDE AND ALBUTEROL SULFATE 3 MILLILITER(S): .5; 2.5 SOLUTION RESPIRATORY (INHALATION) at 15:26

## 2025-01-12 RX ADMIN — Medication 60 MILLIGRAM(S): at 06:10

## 2025-01-12 RX ADMIN — CEFTRIAXONE 100 MILLIGRAM(S): 250 INJECTION, POWDER, FOR SOLUTION INTRAMUSCULAR; INTRAVENOUS at 14:38

## 2025-01-12 RX ADMIN — IPRATROPIUM BROMIDE AND ALBUTEROL SULFATE 3 MILLILITER(S): .5; 2.5 SOLUTION RESPIRATORY (INHALATION) at 09:30

## 2025-01-12 RX ADMIN — ACETAMINOPHEN 650 MILLIGRAM(S): 160 SUSPENSION ORAL at 12:07

## 2025-01-12 RX ADMIN — Medication 60 MILLIGRAM(S): at 13:11

## 2025-01-12 RX ADMIN — IPRATROPIUM BROMIDE AND ALBUTEROL SULFATE 3 MILLILITER(S): .5; 2.5 SOLUTION RESPIRATORY (INHALATION) at 21:35

## 2025-01-12 RX ADMIN — Medication 40 MILLIGRAM(S): at 06:10

## 2025-01-12 RX ADMIN — Medication 5000 UNIT(S): at 06:10

## 2025-01-12 RX ADMIN — Medication 12.5 MILLIGRAM(S): at 21:35

## 2025-01-12 RX ADMIN — Medication 5000 UNIT(S): at 17:03

## 2025-01-12 RX ADMIN — Medication 60 MILLIGRAM(S): at 21:35

## 2025-01-12 RX ADMIN — Medication 12.5 MILLIGRAM(S): at 13:11

## 2025-01-12 RX ADMIN — PANTOPRAZOLE 40 MILLIGRAM(S): 20 TABLET, DELAYED RELEASE ORAL at 06:11

## 2025-01-12 NOTE — CONSULT NOTE ADULT - SUBJECTIVE AND OBJECTIVE BOX
Outpt cardiologist:    HISTORY OF PRESENT ILLNESS:  A 70yo Female with PMH of HTN, HLD, GERD, CAD, COPD not on Home O2, Hx of Pneumothorax, polychondirits, SCC of BOT s/p Trach/PEG (reversed in 2021), s/p CRT (7 sessions in ) who presents with SOB for 1 week duration. History goes back to around 1 week ago When the patient started having Shortness of breath, productive cough, and malaise. Patient dnies any fever. Patient reported that her son was coughing. Patient reported some chest pain previously when coughing . Patient reports diarrhea for 4 days. which resolved now. PAtient denies an urinary symptoms. Patient is admitted for PNA, and COPD excacerbation .     Patient reports some intermittent left sided chest pain when coughing. and abdominal distention as well.  Patient follows with ENT Dr. Louis, last seen 24, CT Neck 2024 showed Abnormal soft tissue within the posterior nasopharynx greater the right, new since the reference exams and nonspecific in appearance. The abnormal submucosal soft tissue in the preepiglottic space greater on the left, asymmetric thickening of the left aryepiglottic fold, and mild epiglottic thickening; similar when correlated with the PET/CT dated 2022.  Stable rim-calcified right thyroid nodule measuring 1.8 cm. US guided FNA by IR 10/7/24 of  Stable rim-calcified right thyroid nodule measuring 1.8 cm. Showed atypia but not malignant cells.     ENT evaluated the pt for hoarseness, FFL showed, thick purulent secretions on larynx, airway patent. Recommended CT Neck with IVC for further evaluation, Albuterol neb treatment, Humidified O2 ( on 3L of O2 via NC)     Labs significant for WBC 11.2 K (neutrophilic), troponin 96 > 75, AG 16     09:57 - VBG - pH: 7.39  | pCO2: 38    | pO2: 36    | Lactate: 3.0      In Ed the patient was given 2 L  fluid bolus, Azithromycin and rocephin, duoneb and solu-medrol     CXR showed reticular infiltrates     EKG shows PACs, with MAT     VS significant for using 4 L NC, Tachycardia 126     ICU Vital Signs Last 24 Hrs  T(C): 37.3 (10 Zeyad 2025 15:36), Max: 37.8 (10 Zeyad 2025 10:41)  T(F): 99.1 (10 Zeyad 2025 15:36), Max: 100 (10 Zeyad 2025 10:41)  HR: 112 (10 Zeyad 2025 15:36) (97 - 126)  BP: 95/66 (10 Zeyad 2025 15:36) (95/66 - 116/86)  BP(mean): 76 (10 Zeyad 2025 15:36) (76 - 76)  RR: 20 (10 Ezyad 2025 15:36) (20 - 22)  SpO2: 94% (10 Zeyad 2025 15:36) (94% - 95%)  Patient On (Oxygen Delivery Method): nasal cannula  O2 Flow (L/min): 4             (10 Zeyad 2025 15:46)      Cardiology Fellow Notes.    Significant Cardiovascular History:  - HTN   - DLD   - CAD     Notable Comorbidities and Risk Factors:  - COPD   - Trach and peg in the past reversed     HPI:  Presented for 1 weak symptoms of upper respiratory tract infection     Previous Cardiac Workup.  - TTE : Normal       PAST MEDICAL & SURGICAL HISTORY.  HTN (hypertension)    Hypercholesterolemia    GERD (gastroesophageal reflux disease)    Pulmonary nodule    CAD (coronary artery disease)    COPD (chronic obstructive pulmonary disease)    Relapsing polychondritis    Female cystocele    H/O pneumothorax  2016    S/P GEETHA-BSO    S/P colon resection    Vocal cord polyps  removal of same 2005    Pulmonary nodule    History of bladder surgery  2019    H/O breast biopsy  LEFT        FAMILY HISTORY:  FAMILY HISTORY:  Family history of diabetes mellitus (DM)        SOCIAL HISTORY:  Social History:      ALLERGIES:  No Known Drug Allergies  CONTRAST DYS (Nausea)      MEDICATIONS:  albuterol/ipratropium for Nebulization 3 milliLiter(s) Nebulizer every 20 minutes  albuterol/ipratropium for Nebulization 3 milliLiter(s) Nebulizer every 6 hours  azithromycin  IVPB 500 milliGRAM(s) IV Intermittent every 24 hours  cefTRIAXone   IVPB 1000 milliGRAM(s) IV Intermittent every 24 hours  furosemide   Injectable 40 milliGRAM(s) IV Push daily  heparin   Injectable 5000 Unit(s) SubCutaneous every 12 hours  methylPREDNISolone sodium succinate Injectable 60 milliGRAM(s) IV Push every 8 hours  metoprolol tartrate 12.5 milliGRAM(s) Oral three times a day  pantoprazole    Tablet 40 milliGRAM(s) Oral before breakfast    PRN:  acetaminophen     Tablet .. 650 milliGRAM(s) Oral every 6 hours PRN      HOME MEDICATIONS:  Home Medications:  Anoro Ellipta 62.5 mcg-25 mcg/inh inhalation powder: 1 puff(s) inhaled once a day (10 Zeyad 2025 16:56)  Lipitor 40 mg oral tablet: 1 tab(s) orally once a day (10 Zeyad 2025 16:56)  losartan 100 mg oral tablet: 1 tab(s) orally once a day (10 Zeyad 2025 16:56)  mycophenolate mofetil 500 mg oral tablet: 2 tab(s) by gastrostomy tube 2 times a day (2023 09:00)  omeprazole 40 mg oral delayed release capsule: 1 cap(s) by gastrostomy tube once a day (2023 09:00)  Ventolin HFA 90 mcg/inh inhalation aerosol: 2 puff(s) inhaled 4 times a day (2023 09:00)      VITALS :  T(F): 97.8 ( @ 07:55), Max: 100 (01-10 @ 10:41)  HR: 86 ( @ 07:55) (67 - 126)  BP: 108/68 ( @ 07:55) (95/66 - 129/86)  BP(mean): 94 ( @ 00:02) (75 - 94)  RR: 18 ( @ 07:55) (18 - 22)  SpO2: 96% ( @ 07:55) (93% - 99%)    I&O's Summary      REVIEW OF SYSTEMS:  CONSTITUTIONAL: No weakness, fevers or chills  EYES/ENT: No visual changes;  No vertigo or throat pain   NECK: No pain or stiffness  RESPIRATORY: No cough, wheezing, hemoptysis; No shortness of breath  CARDIOVASCULAR: No chest pain, no palpitations, no presyncope or presyncope. No LE edema. No orthopnea, bendopnea or PND.   GASTROINTESTINAL: No abdominal or epigastric pain. No nausea, vomiting, or hematemesis; No diarrhea or constipation. No melena or hematochezia.  GENITOURINARY: No dysuria, frequency or hematuria  NEUROLOGICAL: No numbness or weakness  SKIN: No itching, no new rashes      PHYSICAL EXAM:  *General: Not in distress.  Non-toxic appearing.   *Cardio: regular, S1, S2, no murmur.  *Pulm: B/L BS.  No wheezing / crackles / rales  *Abdomen: Soft, non-tender, non-distended. Normoactive bowel sounds  *Extremities: No edema b/l le. Warm. Knee caps warm. Normal capillary refill.   *Neuro: A&O to persons, location, time and events. No focal deficits    LABS:                        14.1   11.52 )-----------( 316      ( 2025 07:42 )             42.9         140  |  100  |  34[H]  ----------------------------<  186[H]  4.2   |  23  |  0.9    Ca    9.2      2025 07:42  Phos  4.4       Mg     2.2         TPro  5.7[L]  /  Alb  3.7  /  TBili  0.3  /  DBili  x   /  AST  21  /  ALT  22  /  AlkPhos  58  -11    PT/INR - ( 2025 06:54 )   PT: 13.60 sec;   INR: 1.15 ratio         PTT - ( 2025 06:54 )  PTT:26.1 sec    CARDIAC MARKERS ( 2025 06:54 )  x     / x     / x     / x     / 14.5 ng/mL        Troponin trend:       Chol 138 LDL -- HDL 45[L] Trig 147      EC Lead ECG:   Ventricular Rate 120 BPM    Atrial Rate 120 BPM    P-R Interval 124 ms    QRS Duration 76 ms    Q-T Interval 334 ms    QTC Calculation(Bazett) 472 ms    P Axis 85 degrees    R Axis -34 degrees    T Axis 55 degrees    Diagnosis Line Sinus tachycardia with Premature atrial complexes  Left axis deviation  ST & T wave abnormality, consider anterior ischemia  Abnormal ECG    Confirmed by LAISHA SHANNON, TOMMY ([id number]) on 2025 12:20:18 AM (01-10 @ 19:11)      TELEMETRY EVENTS:

## 2025-01-12 NOTE — PROGRESS NOTE ADULT - ATTENDING COMMENTS
Pt seen and examined at bedside. No cp or sob. felling better  Vital Signs (24 Hrs):  T(C): 36.6 (01-12-25 @ 07:55), Max: 36.7 (01-12-25 @ 00:24)  HR: 86 (01-12-25 @ 07:55) (86 - 102)  BP: 108/68 (01-12-25 @ 07:55) (108/68 - 122/75)  RR: 18 (01-12-25 @ 07:55) (18 - 18)  SpO2: 96% (01-12-25 @ 07:55) (93% - 96%)    PHYSICAL EXAM:  GENERAL: NAD, well-developed  HEAD:  Atraumatic, Normocephalic  EYES: EOMI, PERRLA, conjunctiva and sclera clear  NECK: Supple, No JVD, no strider   CHEST/LUNG: Clear to auscultation bilaterally; No wheeze  HEART: Regular rate and rhythm; No murmurs, rubs, or gallops  ABDOMEN: Soft, Nontender, Nondistended; Bowel sounds present  EXTREMITIES:  2+ Peripheral Pulses, No clubbing, cyanosis, or edema  PSYCH: AAOx3  Imaging reviewed independently and reviewed read  #Echo noted: EF45%, G1DD    Plan  A 68yo Female with PMH of HTN, HLD, GERD, CAD, COPD not on Home O2, Hx of Pneumothorax, SCC of BOT s/p Trach/PEG (reversed in 4/2021), s/p CRT (7 sessions in 2021) who presents with SOB for 1 week duration. History goes back to around 1 week ago When the patient started having Shortness of breath, productive cough, and malaise. Patient denies any fever. Patient reported that her son was coughing. Patient reported some chest pain previously when coughing . Patient reports diarrhea for 4 days. which resolved now. PAtient denies an urinary symptoms. Patient is admitted for PNA, and COPD excacerbation.     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%  #H/o Pneumothorax  - WBC 11.2 K (neutrophilic), Tachycardic to 126, Lactate 3.0, AG 16, VBG - pH: 7.39  | pCO2: 38    | pO2: 36    | Lactate: 3.0  on admission  - No CP at this time  - EKG- PACs, with MAT  - trop 96 --> 75   - s/p 2L fluid bolus, Azithromycin and rocephin, duoneb and solu-medrol  - CXR- reticular infiltrates   - c/w Rocephin and azithromycin   - c/w solumedrol 60 mg IV TID, duoneb, albuterol   - Trend troponins   - wean off O2 as tolerated  - C/w LR @ 75 cc/ hr   - CT chest with IV contrast: Extensive emphysema with superimposed mild groundglass opacities/septal thickening suggesting underlying edema( not present on earlier study).Interval development of focal moderate tracheal stenosis at the level of the thyroid with partially collapsed appearance of the right tracheal wall since the prior CT neck from 9/3/2024, possibly associated with the history of prior tracheostomy.  - CT Neck with contrast:   Stable mild thickening of the epiglottis and small preepiglottic soft tissue density. Resolved nasopharyngeal soft tissue thickening and left aryepiglottic fold thickening.   - pt was desating 1/10, was placed on NRFM then on bipap. CXR noted to be congested as well--> given 2 doses of IV lasix & started on lasix 40IV. pt wheezing on ausc, increased steroids to 60mg IV tid  - pt currently on HFNC 50-50  - Upgraded to SDU  - F/U MRSA, Procal, Strep and legionella, RVP, blood culture, Sputum Cx, Galactomannan, fungitel  - CTS consult   - Echo 22- EF 60%, now EF 40-45%, follow up cardiology consult       #Abdominal pain   #Diarrhea  - KUB- Nonobstructive bowel gas pattern. Bony degenerative changes. External tubing projects over the image.  - resolved     #Polychondritis   - as per Rheum note, patient is on cellcept 1000 G BID and Dcd hydroxychloroquine  - D/C immunosuppresants in setting of infection, restart on DC     #H/o SCC s/p surgery, CRT   #H/o Trach/ PEG s p reversal   - follows with ENT Dr. Louis, last seen 9/23/24,  - CT Neck 9/2024 showed Abnormal soft tissue within the posterior nasopharynx greater the right, new since the reference exams and nonspecific in appearance. The abnormal submucosal soft tissue in the preepiglottic space greater on the left, asymmetric thickening of the left aryepiglottic fold, and mild epiglottic thickening; similar when correlated with the PET/CT dated 5/12/2022.  Stable rim-calcified right thyroid nodule measuring 1.8 cm.   - US guided FNA by IR 10/7/24 of  Stable rim-calcified right thyroid nodule measuring 1.8 cm. Showed atypia but not malignant cells.   - ENT recs appreciated       - Encourage to clear secretions        - Continue current management  - FFL showed, thick purulent secretions on larynx, airway patent.   - CT Neck with contrast: Stable mild thickening of the epiglottis and small preepiglottic soft tissue density. Resolved nasopharyngeal soft tissue thickening and left aryepiglottic fold thickening.  - CTS consult    #Progress Note Handoff  Pending (specify):  follow up med recc, SDU, CC  Family discussion: house staff updated pt family  Disposition: SDU   Decision to admit the pt is based on acuity as above

## 2025-01-12 NOTE — PROGRESS NOTE ADULT - SUBJECTIVE AND OBJECTIVE BOX
SUBJECTIVE/OVERNIGHT EVENTS  Today is hospital day 2d. This morning patient was seen and examined at bedside, resting comfortably in bed. No acute or major events overnight. Patient has no complaints at this time.     CODE STATUS: FULL    MEDICATIONS  STANDING MEDICATIONS  albuterol/ipratropium for Nebulization 3 milliLiter(s) Nebulizer every 20 minutes  albuterol/ipratropium for Nebulization 3 milliLiter(s) Nebulizer every 6 hours  azithromycin  IVPB 500 milliGRAM(s) IV Intermittent every 24 hours  cefTRIAXone   IVPB 1000 milliGRAM(s) IV Intermittent every 24 hours  furosemide   Injectable 40 milliGRAM(s) IV Push daily  heparin   Injectable 5000 Unit(s) SubCutaneous every 12 hours  methylPREDNISolone sodium succinate Injectable 60 milliGRAM(s) IV Push every 8 hours  pantoprazole    Tablet 40 milliGRAM(s) Oral before breakfast    VITALS  T(F): 98 (01-12-25 @ 00:24), Max: 98 (01-12-25 @ 00:24)  HR: 97 (01-12-25 @ 00:24) (88 - 102)  BP: 122/75 (01-12-25 @ 00:24) (117/81 - 129/86)  RR: 18 (01-12-25 @ 00:24) (18 - 18)  SpO2: 95% (01-12-25 @ 00:24) (95% - 96%)      LABS             14.2   13.63 )-----------( 365      ( 01-11-25 @ 06:54 )             43.8     137  |  99  |  27  -------------------------<  184   01-11-25 @ 16:35  3.8  |  23  |  1.0    Ca      9.0     01-11-25 @ 16:35  Phos   4.4     01-11-25 @ 06:54  Mg     1.8     01-11-25 @ 16:35    TPro  5.7  /  Alb  3.7  /  TBili  0.3  /  DBili  x   /  AST  21  /  ALT  22  /  AlkPhos  58  /  GGT  x     01-11-25 @ 06:54    PT/INR - ( 01-11-25 @ 06:54 )   PT: 13.60 sec[H];   INR: 1.15 ratio  PTT - ( 01-11-25 @ 06:54 )  PTT:26.1 sec    CKMB Units: 14.5 ng/mL (01-11-25 @ 06:54)  Troponin T, High Sensitivity Result: 46 ng/L (01-11-25 @ 06:54)  Pro-Brain Natriuretic Peptide: 9810 pg/mL (01-10-25 @ 21:09)    Urinalysis Basic - ( 11 Jan 2025 16:35 )    Color: x / Appearance: x / SG: x / pH: x  Gluc: 184 mg/dL / Ketone: x  / Bili: x / Urobili: x   Blood: x / Protein: x / Nitrite: x   Leuk Esterase: x / RBC: x / WBC x   Sq Epi: x / Non Sq Epi: x / Bacteria: x      ABG - ( 10 Zeyad 2025 19:06 )  pH, Arterial: 7.35  pH, Blood: x     /  pCO2: 39    /  pO2: 172   / HCO3: 22    / Base Excess: -3.8  /  SaO2: 99.2                IMAGING  < from: CT Chest w/ IV Cont (01.11.25 @ 08:29) >  IMPRESSION:    Since  September 3, 2024    Bilateral basilar, partial right middle lobe atelectasis.    Extensive emphysema with superimposed mild groundglass opacities/septal   thickening suggesting underlying edema( not present on earlier study).      CT neck performed on the same day, see separate report    < end of copied text >    < from: CT Neck Soft Tissue w/ IV Cont (01.11.25 @ 08:27) >  Impression:    Interval development of focal moderate tracheal stenosis at the level of   the thyroid with partially collapsed appearance of the right tracheal   wall since the prior CT neck from 9/3/2024, possibly associated with the   history of prior tracheostomy.    Stable mild thickening of the epiglottis and small preepiglottic soft   tissue density. Resolved nasopharyngeal soft tissue thickening and left   aryepiglottic fold thickening.    Increased biapical reticular changes. Please see separately dictated   concurrent chest CT.    < end of copied text >    < from: Xray Chest 1 View- PORTABLE-Routine (01.11.25 @ 06:06) >    IMPRESSION:    Bilateral opacities without difference.    < end of copied text >

## 2025-01-12 NOTE — CONSULT NOTE ADULT - ASSESSMENT
Summary.  69 year old woman with CAD presented with acute hypoxemic respiratory failure     Cardiac Studies.    * ECG:  - NSR. Tachycardia with PACs. No ischemia   * Echo:  1/2024: Mildly reduced LV and RV function. IVC > 50% collapsible.   * CCTA / Nulclear:  * Cardiac Cath:  * cMRI      Impression.  # New mildly reduced EF in the setting of viral illness   Elevated troponin. No ACS     Recommendations.     Summary.  69 year old woman with CAD presented with acute hypoxemic respiratory failure     Cardiac Studies.    * ECG:  - NSR. Tachycardia with PACs. No ischemia   * Echo:  Bedside IVC: 1.6 cm > 50% collapsibility   1/2024: Mildly reduced LV and RV function. IVC > 50% collapsible.   * CCTA / Nulclear:  * Cardiac Cath:  * cMRI      Impression.  # New mildly reduced EF in the setting of viral illness   Elevated troponin. No ACS     Recommendations.  - Continue Losartan   - Change metoprolol tartrate to succinate   - Consider adding SGLTi   - Defer inpatient ischemic workup at this time. Can be considered as outpatient.   - Change Lasix to 20mg PO and monitor UOP on oral diuretics   - Consider weaning O2 and pulmonary evaluation if she is unable to be weaned      Summary.  69 year old woman with CAD presented with acute hypoxemic respiratory failure     Cardiac Studies.    * ECG:  - NSR. Tachycardia with PACs. No ischemia   * Echo:  Bedside IVC: 1.6 cm > 50% collapsibility   1/2024: Mildly reduced LV and RV function. IVC > 50% collapsible.   * CCTA / Nulclear:  * Cardiac Cath:  * cMRI      Impression.  # New mildly reduced EF in the setting of viral illness   Elevated troponin. No ACS     Recommendations.  - Continue Losartan   - Change metoprolol tartrate to succinate   - Consider adding SGLTi at discharge   - Defer inpatient ischemic workup at this time. Can be considered as outpatient.   - Change Lasix to 40mg PO and monitor UOP on oral diuretics   - Consider weaning O2 and pulmonary evaluation if she is unable to be weaned

## 2025-01-13 LAB
ALBUMIN SERPL ELPH-MCNC: 3.8 G/DL — SIGNIFICANT CHANGE UP (ref 3.5–5.2)
ALBUMIN SERPL ELPH-MCNC: 3.9 G/DL — SIGNIFICANT CHANGE UP (ref 3.5–5.2)
ALP SERPL-CCNC: 65 U/L — SIGNIFICANT CHANGE UP (ref 30–115)
ALP SERPL-CCNC: 67 U/L — SIGNIFICANT CHANGE UP (ref 30–115)
ALT FLD-CCNC: 31 U/L — SIGNIFICANT CHANGE UP (ref 0–41)
ALT FLD-CCNC: 34 U/L — SIGNIFICANT CHANGE UP (ref 0–41)
ANION GAP SERPL CALC-SCNC: 16 MMOL/L — HIGH (ref 7–14)
ANION GAP SERPL CALC-SCNC: 16 MMOL/L — HIGH (ref 7–14)
APTT BLD: 22.1 SEC — CRITICAL LOW (ref 27–39.2)
APTT BLD: 22.4 SEC — CRITICAL LOW (ref 27–39.2)
AST SERPL-CCNC: 18 U/L — SIGNIFICANT CHANGE UP (ref 0–41)
AST SERPL-CCNC: 22 U/L — SIGNIFICANT CHANGE UP (ref 0–41)
BASOPHILS # BLD AUTO: 0.01 K/UL — SIGNIFICANT CHANGE UP (ref 0–0.2)
BASOPHILS # BLD AUTO: 0.01 K/UL — SIGNIFICANT CHANGE UP (ref 0–0.2)
BASOPHILS NFR BLD AUTO: 0.1 % — SIGNIFICANT CHANGE UP (ref 0–1)
BASOPHILS NFR BLD AUTO: 0.1 % — SIGNIFICANT CHANGE UP (ref 0–1)
BILIRUB SERPL-MCNC: 0.3 MG/DL — SIGNIFICANT CHANGE UP (ref 0.2–1.2)
BILIRUB SERPL-MCNC: 0.3 MG/DL — SIGNIFICANT CHANGE UP (ref 0.2–1.2)
BUN SERPL-MCNC: 38 MG/DL — HIGH (ref 10–20)
BUN SERPL-MCNC: 39 MG/DL — HIGH (ref 10–20)
CALCIUM SERPL-MCNC: 9.6 MG/DL — SIGNIFICANT CHANGE UP (ref 8.4–10.4)
CALCIUM SERPL-MCNC: 9.8 MG/DL — SIGNIFICANT CHANGE UP (ref 8.4–10.5)
CHLORIDE SERPL-SCNC: 96 MMOL/L — LOW (ref 98–110)
CHLORIDE SERPL-SCNC: 97 MMOL/L — LOW (ref 98–110)
CO2 SERPL-SCNC: 25 MMOL/L — SIGNIFICANT CHANGE UP (ref 17–32)
CO2 SERPL-SCNC: 28 MMOL/L — SIGNIFICANT CHANGE UP (ref 17–32)
CREAT SERPL-MCNC: 0.9 MG/DL — SIGNIFICANT CHANGE UP (ref 0.7–1.5)
CREAT SERPL-MCNC: 0.9 MG/DL — SIGNIFICANT CHANGE UP (ref 0.7–1.5)
EGFR: 69 ML/MIN/1.73M2 — SIGNIFICANT CHANGE UP
EGFR: 69 ML/MIN/1.73M2 — SIGNIFICANT CHANGE UP
EOSINOPHIL # BLD AUTO: 0 K/UL — SIGNIFICANT CHANGE UP (ref 0–0.7)
EOSINOPHIL # BLD AUTO: 0 K/UL — SIGNIFICANT CHANGE UP (ref 0–0.7)
EOSINOPHIL NFR BLD AUTO: 0 % — SIGNIFICANT CHANGE UP (ref 0–8)
EOSINOPHIL NFR BLD AUTO: 0 % — SIGNIFICANT CHANGE UP (ref 0–8)
GLUCOSE SERPL-MCNC: 183 MG/DL — HIGH (ref 70–99)
GLUCOSE SERPL-MCNC: 221 MG/DL — HIGH (ref 70–99)
HCT VFR BLD CALC: 43.6 % — SIGNIFICANT CHANGE UP (ref 37–47)
HCT VFR BLD CALC: 45.8 % — SIGNIFICANT CHANGE UP (ref 37–47)
HGB BLD-MCNC: 14.4 G/DL — SIGNIFICANT CHANGE UP (ref 12–16)
HGB BLD-MCNC: 14.9 G/DL — SIGNIFICANT CHANGE UP (ref 12–16)
IMM GRANULOCYTES NFR BLD AUTO: 0.5 % — HIGH (ref 0.1–0.3)
IMM GRANULOCYTES NFR BLD AUTO: 0.6 % — HIGH (ref 0.1–0.3)
INR BLD: 1.03 RATIO — SIGNIFICANT CHANGE UP (ref 0.65–1.3)
INR BLD: 1.03 RATIO — SIGNIFICANT CHANGE UP (ref 0.65–1.3)
LYMPHOCYTES # BLD AUTO: 0.14 K/UL — LOW (ref 1.2–3.4)
LYMPHOCYTES # BLD AUTO: 0.14 K/UL — LOW (ref 1.2–3.4)
LYMPHOCYTES # BLD AUTO: 1.2 % — LOW (ref 20.5–51.1)
LYMPHOCYTES # BLD AUTO: 1.4 % — LOW (ref 20.5–51.1)
MAGNESIUM SERPL-MCNC: 2.1 MG/DL — SIGNIFICANT CHANGE UP (ref 1.8–2.4)
MAGNESIUM SERPL-MCNC: 2.2 MG/DL — SIGNIFICANT CHANGE UP (ref 1.8–2.4)
MCHC RBC-ENTMCNC: 28.5 PG — SIGNIFICANT CHANGE UP (ref 27–31)
MCHC RBC-ENTMCNC: 28.7 PG — SIGNIFICANT CHANGE UP (ref 27–31)
MCHC RBC-ENTMCNC: 32.5 G/DL — SIGNIFICANT CHANGE UP (ref 32–37)
MCHC RBC-ENTMCNC: 33 G/DL — SIGNIFICANT CHANGE UP (ref 32–37)
MCV RBC AUTO: 87 FL — SIGNIFICANT CHANGE UP (ref 81–99)
MCV RBC AUTO: 87.6 FL — SIGNIFICANT CHANGE UP (ref 81–99)
MONOCYTES # BLD AUTO: 0.55 K/UL — SIGNIFICANT CHANGE UP (ref 0.1–0.6)
MONOCYTES # BLD AUTO: 0.76 K/UL — HIGH (ref 0.1–0.6)
MONOCYTES NFR BLD AUTO: 4.8 % — SIGNIFICANT CHANGE UP (ref 1.7–9.3)
MONOCYTES NFR BLD AUTO: 7.5 % — SIGNIFICANT CHANGE UP (ref 1.7–9.3)
MRSA PCR RESULT.: NEGATIVE — SIGNIFICANT CHANGE UP
NEUTROPHILS # BLD AUTO: 10.72 K/UL — HIGH (ref 1.4–6.5)
NEUTROPHILS # BLD AUTO: 9.17 K/UL — HIGH (ref 1.4–6.5)
NEUTROPHILS NFR BLD AUTO: 90.5 % — HIGH (ref 42.2–75.2)
NEUTROPHILS NFR BLD AUTO: 93.3 % — HIGH (ref 42.2–75.2)
NRBC # BLD: 0 /100 WBCS — SIGNIFICANT CHANGE UP (ref 0–0)
NRBC # BLD: 0 /100 WBCS — SIGNIFICANT CHANGE UP (ref 0–0)
NRBC BLD-RTO: 0 /100 WBCS — SIGNIFICANT CHANGE UP (ref 0–0)
NRBC BLD-RTO: 0 /100 WBCS — SIGNIFICANT CHANGE UP (ref 0–0)
PLATELET # BLD AUTO: 356 K/UL — SIGNIFICANT CHANGE UP (ref 130–400)
PLATELET # BLD AUTO: 365 K/UL — SIGNIFICANT CHANGE UP (ref 130–400)
PMV BLD: 9.6 FL — SIGNIFICANT CHANGE UP (ref 7.4–10.4)
PMV BLD: 9.8 FL — SIGNIFICANT CHANGE UP (ref 7.4–10.4)
POTASSIUM SERPL-MCNC: 4.2 MMOL/L — SIGNIFICANT CHANGE UP (ref 3.5–5)
POTASSIUM SERPL-MCNC: 4.3 MMOL/L — SIGNIFICANT CHANGE UP (ref 3.5–5)
POTASSIUM SERPL-SCNC: 4.2 MMOL/L — SIGNIFICANT CHANGE UP (ref 3.5–5)
POTASSIUM SERPL-SCNC: 4.3 MMOL/L — SIGNIFICANT CHANGE UP (ref 3.5–5)
PROT SERPL-MCNC: 5.9 G/DL — LOW (ref 6–8)
PROT SERPL-MCNC: 5.9 G/DL — LOW (ref 6–8)
PROTHROM AB SERPL-ACNC: 12.2 SEC — SIGNIFICANT CHANGE UP (ref 9.95–12.87)
PROTHROM AB SERPL-ACNC: 12.2 SEC — SIGNIFICANT CHANGE UP (ref 9.95–12.87)
RBC # BLD: 5.01 M/UL — SIGNIFICANT CHANGE UP (ref 4.2–5.4)
RBC # BLD: 5.23 M/UL — SIGNIFICANT CHANGE UP (ref 4.2–5.4)
RBC # FLD: 13.4 % — SIGNIFICANT CHANGE UP (ref 11.5–14.5)
RBC # FLD: 13.6 % — SIGNIFICANT CHANGE UP (ref 11.5–14.5)
SODIUM SERPL-SCNC: 137 MMOL/L — SIGNIFICANT CHANGE UP (ref 135–146)
SODIUM SERPL-SCNC: 141 MMOL/L — SIGNIFICANT CHANGE UP (ref 135–146)
WBC # BLD: 10.13 K/UL — SIGNIFICANT CHANGE UP (ref 4.8–10.8)
WBC # BLD: 11.49 K/UL — HIGH (ref 4.8–10.8)
WBC # FLD AUTO: 10.13 K/UL — SIGNIFICANT CHANGE UP (ref 4.8–10.8)
WBC # FLD AUTO: 11.49 K/UL — HIGH (ref 4.8–10.8)

## 2025-01-13 PROCEDURE — 93010 ELECTROCARDIOGRAM REPORT: CPT

## 2025-01-13 PROCEDURE — 99291 CRITICAL CARE FIRST HOUR: CPT

## 2025-01-13 PROCEDURE — 99233 SBSQ HOSP IP/OBS HIGH 50: CPT

## 2025-01-13 PROCEDURE — 99222 1ST HOSP IP/OBS MODERATE 55: CPT

## 2025-01-13 RX ORDER — METOPROLOL SUCCINATE 25 MG
25 TABLET, EXTENDED RELEASE 24 HR ORAL DAILY
Refills: 0 | Status: DISCONTINUED | OUTPATIENT
Start: 2025-01-13 | End: 2025-01-14

## 2025-01-13 RX ORDER — SODIUM CHLORIDE 9 G/ML
1000 INJECTION, SOLUTION INTRAVENOUS
Refills: 0 | Status: DISCONTINUED | OUTPATIENT
Start: 2025-01-13 | End: 2025-01-14

## 2025-01-13 RX ADMIN — IPRATROPIUM BROMIDE AND ALBUTEROL SULFATE 3 MILLILITER(S): .5; 2.5 SOLUTION RESPIRATORY (INHALATION) at 13:46

## 2025-01-13 RX ADMIN — Medication 5000 UNIT(S): at 05:35

## 2025-01-13 RX ADMIN — PANTOPRAZOLE 40 MILLIGRAM(S): 20 TABLET, DELAYED RELEASE ORAL at 05:35

## 2025-01-13 RX ADMIN — Medication 60 MILLIGRAM(S): at 21:18

## 2025-01-13 RX ADMIN — IPRATROPIUM BROMIDE AND ALBUTEROL SULFATE 3 MILLILITER(S): .5; 2.5 SOLUTION RESPIRATORY (INHALATION) at 19:42

## 2025-01-13 RX ADMIN — AZITHROMYCIN DIHYDRATE 255 MILLIGRAM(S): 500 TABLET, FILM COATED ORAL at 14:59

## 2025-01-13 RX ADMIN — Medication 60 MILLIGRAM(S): at 05:34

## 2025-01-13 RX ADMIN — Medication 40 MILLIGRAM(S): at 05:35

## 2025-01-13 RX ADMIN — Medication 5000 UNIT(S): at 17:49

## 2025-01-13 RX ADMIN — Medication 12.5 MILLIGRAM(S): at 05:35

## 2025-01-13 RX ADMIN — Medication 60 MILLIGRAM(S): at 12:31

## 2025-01-13 RX ADMIN — IPRATROPIUM BROMIDE AND ALBUTEROL SULFATE 3 MILLILITER(S): .5; 2.5 SOLUTION RESPIRATORY (INHALATION) at 01:05

## 2025-01-13 NOTE — PROGRESS NOTE ADULT - ASSESSMENT
A 70yo Female with PMH of HTN, HLD, GERD, CAD, COPD not on Home O2, Hx of Pneumothorax, SCC of BOT s/p Trach/PEG (reversed in 4/2021), s/p CRT (7 sessions in 2021) who presents with SOB for 1 week duration. History goes back to around 1 week ago When the patient started having Shortness of breath, productive cough, and malaise. Patient denies any fever. Patient reported that her son was coughing. Patient reported some chest pain previously when coughing . Patient reports diarrhea for 4 days. which resolved now. PAtient denies an urinary symptoms. Patient is admitted for PNA, and COPD excacerbation.     #Acute hypoxic resp failure 2/2 COPD exacerbation 2/2 Possible pneumonia, with tracheal stenosis   #HAGMA 2/2 Lactic Acidosis   #CAD  #likely HF exacerbation  #H/o Pneumothorax  - WBC 11.2 K (neutrophilic), Tachycardic to 126, Lactate 3.0, AG 16, VBG - pH: 7.39  | pCO2: 38    | pO2: 36    | Lactate: 3.0  on admission  - No CP at this time  - EKG- PACs, with MAT  - trop 96 --> 75   - s/p 2L fluid bolus, Azithromycin and rocephin, duoneb and solu-medrol  - CXR- reticular infiltrates   - c/w Rocephin and azithromycin   - c/w solumedrol 60 mg IV TID, duoneb, albuterol   - Trend troponins   - wean off O2 as tolerated  - C/w LR @ 75 cc/ hr   - CT chest with IV contrast: Extensive emphysema with superimposed mild groundglass opacities/septal thickening suggesting underlying edema( not present on earlier study).Interval development of focal moderate tracheal stenosis at the level of the thyroid with partially collapsed appearance of the right tracheal wall since the prior CT neck from 9/3/2024, possibly associated with the history of prior tracheostomy.  - CT Neck with contrast:   Stable mild thickening of the epiglottis and small preepiglottic soft tissue density. Resolved nasopharyngeal soft tissue thickening and left aryepiglottic fold thickening.   - pt was desating 1/10, was placed on NRFM then on bipap. CXR noted to be congested as well--> given 2 doses of IV lasix & started on lasix 40IV. pt wheezing on ausc, increased steroids to 60mg IV tid  - pt currently on HFNC 50-50  - Upgraded to SDU  - F/U MRSA, Procal, Strep and legionella, RVP, blood culture, Sputum Cx, Galactomannan, fungitel  - F/u ABG in AM, CXR in AM   - F/U TTE, BNP   - F/U lactate   - CTS consult appreciated, will need Trachael dilatation and debridement on 1/14, npo in evening     #Abdominal pain   #Diarrhea  - KUB- Nonobstructive bowel gas pattern. Bony degenerative changes. External tubing projects over the image.  - resolved     #Polychondritis   - as per Rheum note, patient is on cellcept 1000 G BID and Dcd hydroxychloroquine  - D/C immunosuppresants in setting of infection, restart on DC     #H/o SCC s/p surgery, CRT   #H/o Trach/ PEG s p reversal   - follows with ENT Dr. Louis, last seen 9/23/24,  - CT Neck 9/2024 showed Abnormal soft tissue within the posterior nasopharynx greater the right, new since the reference exams and nonspecific in appearance. The abnormal submucosal soft tissue in the preepiglottic space greater on the left, asymmetric thickening of the left aryepiglottic fold, and mild epiglottic thickening; similar when correlated with the PET/CT dated 5/12/2022.  Stable rim-calcified right thyroid nodule measuring 1.8 cm.   - US guided FNA by IR 10/7/24 of  Stable rim-calcified right thyroid nodule measuring 1.8 cm. Showed atypia but not malignant cells.   - ENT recs appreciated       - Encourage to clear secretions        - Continue current management  - FFL showed, thick purulent secretions on larynx, airway patent.   - CT Neck with contrast: Stable mild thickening of the epiglottis and small preepiglottic soft tissue density. Resolved nasopharyngeal soft tissue thickening and left aryepiglottic fold thickening.  - CTS consult appreciated as above      #HTN  #HLD  - hold home Anti-HTN as BP is soft   - c/w other home meds     #GERD  - C/w protonix    A 68yo Female with PMH of HTN, HLD, GERD, CAD, COPD not on Home O2, Hx of Pneumothorax, SCC of BOT s/p Trach/PEG (reversed in 4/2021), s/p CRT (7 sessions in 2021) who presents with SOB for 1 week duration. History goes back to around 1 week ago When the patient started having Shortness of breath, productive cough, and malaise. Patient denies any fever. Patient reported that her son was coughing. Patient reported some chest pain previously when coughing . Patient reports diarrhea for 4 days. which resolved now. PAtient denies an urinary symptoms. Patient is admitted for PNA, and COPD excacerbation.     #Acute hypoxic resp failure 2/2 COPD exacerbation  with tracheal stenosis   #Unlikley pneumonia   #HAGMA 2/2 Lactic Acidosis   #CAD  #likely HF exacerbation  #H/o Pneumothorax  - WBC 11.2 K (neutrophilic), Tachycardic to 126, Lactate 3.0, AG 16, VBG - pH: 7.39  | pCO2: 38    | pO2: 36    | Lactate: 3.0  on admission  - No CP at this time  - EKG- PACs, with MAT  - trop 96 --> 75 trend for now  - s/p 2.5L fluid bolus and IVF, started on Azithromycin and rocephin, duoneb and solu-medrol  - CXR- reticular infiltrates   - Complains of increasing wheezing >> increased dose of steroids   - c/w solumedrol 60 mg IV TID, duoneb, albuterol   - d/c rocephin and c/w azithro  - wean off O2 as tolerated  - CT chest with IV contrast: Extensive emphysema with superimposed mild groundglass opacities/septal thickening suggesting underlying edema( not present on earlier study).Interval development of focal moderate tracheal stenosis at the level of the thyroid with partially collapsed appearance of the right tracheal wall since the prior CT neck from 9/3/2024, possibly associated with the history of prior tracheostomy.  - CT Neck with contrast:   Stable mild thickening of the epiglottis and small preepiglottic soft tissue density. Resolved nasopharyngeal soft tissue thickening and left aryepiglottic fold thickening.   - pt currently on HFNC 50-50  - Upgraded to SDU  - F/U TTE  - CTS consult appreciated, will need Tracheal dilatation and debridement on 1/14,   - NPO after midnight and Pre -op labs     #Abdominal pain   #Diarrhea  - KUB- Nonobstructive bowel gas pattern  - resolved     #Polychondritis   - as per Rheum note, patient is on cellcept 1000 G BID and hydroxychloroquine 100 mg and 200mg alternating days   - D/C immunosuppresants in setting of infection, restart on DC     #H/o SCC at the Base of tongue s/p surgery, CRT   #H/o Trach/ PEG s p reversal   - follows with ENT Dr. Louis, last seen 9/23/24,  - CT Neck 9/2024 showed Abnormal soft tissue within the posterior nasopharynx greater the right, new since the reference exams and nonspecific in appearance. The abnormal submucosal soft tissue in the preepiglottic space greater on the left, asymmetric thickening of the left aryepiglottic fold, and mild epiglottic thickening; similar when correlated with the PET/CT dated 5/12/2022.  Stable rim-calcified right thyroid nodule measuring 1.8 cm.   - US guided FNA by IR 10/7/24 of  Stable rim-calcified right thyroid nodule measuring 1.8 cm. Showed atypia but not malignant cells.   - ENT recs appreciated       - Encourage to clear secretions        - Continue current management  - FFL showed, thick purulent secretions on larynx, airway patent.   - CT Neck with contrast: Stable mild thickening of the epiglottis and small preepiglottic soft tissue density. Resolved nasopharyngeal soft tissue thickening and left aryepiglottic fold thickening.  - CTS - tracheal dilation     #HTN  #HLD  - hold home Anti-HTN as BP is soft   - c/w other home meds     #GERD  - C/w protonix     #handoff  - c/w medical management as above  - NPO after midnight for procedure  - Pre - Op labs

## 2025-01-13 NOTE — SWALLOW BEDSIDE ASSESSMENT ADULT - SWALLOW EVAL: DIAGNOSIS
+minimal amount of PO accepted, pt reports feeling anxious, +suspected pharyngeal dysphagia across all trials.

## 2025-01-13 NOTE — CONSULT NOTE ADULT - SUBJECTIVE AND OBJECTIVE BOX
Patient is a 69y old  Female who presents with a chief complaint of SOB    A 70yo Female with PMH of HTN, HLD, GERD, CAD, COPD not on Home O2, Hx of Pneumothorax, polychondirits, SCC of BOT s/p Trach/PEG (reversed in 4/2021), s/p CRT (7 sessions in 2021) who presents with SOB for 1 week duration. History goes back to around 1 week ago When the patient started having Shortness of breath, productive cough, and malaise. Patient dnies any fever. Patient reported that her son was coughing. Patient reported some chest pain previously when coughing . Patient reports diarrhea for 4 days. which resolved now. PAtient denies an urinary symptoms. Patient is admitted for PNA, and COPD excacerbation .     Patient reports some intermittent left sided chest pain when coughing. and abdominal distention as well.  Patient follows with ENT Dr. Louis, last seen 9/23/24, CT Neck 9/2024 showed Abnormal soft tissue within the posterior nasopharynx greater the right, new since the reference exams and nonspecific in appearance. The abnormal submucosal soft tissue in the preepiglottic space greater on the left, asymmetric thickening of the left aryepiglottic fold, and mild epiglottic thickening; similar when correlated with the PET/CT dated 5/12/2022.  Stable rim-calcified right thyroid nodule measuring 1.8 cm. US guided FNA by IR 10/7/24 of  Stable rim-calcified right thyroid nodule measuring 1.8 cm. Showed atypia but not malignant cells.     ENT evaluated the pt for hoarseness, FFL showed, thick purulent secretions on larynx, airway patent. Recommended CT Neck with IVC for further evaluation, Albuterol neb treatment, Humidified O2 ( on 3L of O2 via NC)     Labs significant for WBC 11.2 K (neutrophilic), troponin 96 > 75, AG 16     09:57 - VBG - pH: 7.39  | pCO2: 38    | pO2: 36    | Lactate: 3.0      In Ed the patient was given 2 L  fluid bolus, Azithromycin and rocephin, duoneb and solu-medrol   EKG shows PACs, with MAT     VS significant for using 4 L NC, Tachycardia 126   Admitted to floor worsening SOB upgraded to SDU, this AM on HHFNC, cough, thick phlegm             (10 Zeyad 2025 15:46)      PAST MEDICAL & SURGICAL HISTORY:  HTN (hypertension)      Hypercholesterolemia      GERD (gastroesophageal reflux disease)      Pulmonary nodule      CAD (coronary artery disease)      COPD (chronic obstructive pulmonary disease)      Relapsing polychondritis      Female cystocele      H/O pneumothorax  2016      S/P GEETHA-BSO      S/P colon resection      Vocal cord polyps  removal of same 2005      Pulmonary nodule      History of bladder surgery  2019      H/O breast biopsy  LEFT          SOCIAL HX:   Smoking stopped 4 y ago    FAMILY HISTORY:  Family history of diabetes mellitus (DM)        REVIEW OF SYSTEMS see hpi    Allergies    No Known Drug Allergies  CONTRAST DYS (Nausea)    Intolerances        acetaminophen     Tablet .. 650 milliGRAM(s) Oral every 6 hours PRN  albuterol/ipratropium for Nebulization 3 milliLiter(s) Nebulizer every 20 minutes  albuterol/ipratropium for Nebulization 3 milliLiter(s) Nebulizer every 6 hours  azithromycin  IVPB 500 milliGRAM(s) IV Intermittent every 24 hours  cefTRIAXone   IVPB 1000 milliGRAM(s) IV Intermittent every 24 hours  furosemide   Injectable 40 milliGRAM(s) IV Push daily  heparin   Injectable 5000 Unit(s) SubCutaneous every 12 hours  methylPREDNISolone sodium succinate Injectable 60 milliGRAM(s) IV Push every 8 hours  metoprolol tartrate 12.5 milliGRAM(s) Oral three times a day  pantoprazole    Tablet 40 milliGRAM(s) Oral before breakfast  : Home Meds:      PHYSICAL EXAM    ICU Vital Signs Last 24 Hrs  T(C): 36.6 (13 Jan 2025 00:22), Max: 36.6 (12 Jan 2025 07:55)  T(F): 97.8 (13 Jan 2025 00:22), Max: 97.9 (12 Jan 2025 20:39)  HR: 67 (13 Jan 2025 00:22) (67 - 87)  BP: 162/89 (13 Jan 2025 00:22) (108/68 - 162/89)-  RR: 18 (13 Jan 2025 00:22) (12 - 18)  SpO2: 95% (13 Jan 2025 00:22) (95% - 98%)    O2 Parameters below as of 13 Jan 2025 00:22  Patient On (Oxygen Delivery Method): nasal cannula, high flow            General: ill looking  hoarse  Lungs: Bilateral rhonchi  Cardiovascular: RUKHSANA 2.6  Abdomen: Soft, Positive BS  Extremities: No clubbing  Skin: Warm  Neurological: Non focal         LABS:                          14.1   11.52 )-----------( 316      ( 12 Jan 2025 07:42 )             42.9                                               01-12    140  |  100  |  34[H]  ----------------------------<  186[H]  4.2   |  23  |  0.9    Ca    9.2      12 Jan 2025 07:42  Phos  4.4     01-11  Mg     2.2     01-12    TPro  5.7[L]  /  Alb  3.7  /  TBili  0.3  /  DBili  x   /  AST  21  /  ALT  22  /  AlkPhos  58  01-11      PT/INR - ( 11 Jan 2025 06:54 )   PT: 13.60 sec;   INR: 1.15 ratio         PTT - ( 11 Jan 2025 06:54 )  PTT:26.1 sec                                       Urinalysis Basic - ( 12 Jan 2025 07:42 )    Color: x / Appearance: x / SG: x / pH: x  Gluc: 186 mg/dL / Ketone: x  / Bili: x / Urobili: x   Blood: x / Protein: x / Nitrite: x   Leuk Esterase: x / RBC: x / WBC x   Sq Epi: x / Non Sq Epi: x / Bacteria: x        CARDIAC MARKERS ( 11 Jan 2025 06:54 )  x     / x     / x     / x     / 14.5 ng/mL                                            LIVER FUNCTIONS - ( 11 Jan 2025 06:54 )  Alb: 3.7 g/dL / Pro: 5.7 g/dL / ALK PHOS: 58 U/L / ALT: 22 U/L / AST: 21 U/L / GGT: x                                                                                                                    MEDICATIONS  (STANDING):  albuterol/ipratropium for Nebulization 3 milliLiter(s) Nebulizer every 20 minutes  albuterol/ipratropium for Nebulization 3 milliLiter(s) Nebulizer every 6 hours  azithromycin  IVPB 500 milliGRAM(s) IV Intermittent every 24 hours  cefTRIAXone   IVPB 1000 milliGRAM(s) IV Intermittent every 24 hours  furosemide   Injectable 40 milliGRAM(s) IV Push daily  heparin   Injectable 5000 Unit(s) SubCutaneous every 12 hours  methylPREDNISolone sodium succinate Injectable 60 milliGRAM(s) IV Push every 8 hours  metoprolol tartrate 12.5 milliGRAM(s) Oral three times a day  pantoprazole    Tablet 40 milliGRAM(s) Oral before breakfast    MEDICATIONS  (PRN):  acetaminophen     Tablet .. 650 milliGRAM(s) Oral every 6 hours PRN Mild Pain (1 - 3), Moderate Pain (4 - 6)      CT CHEST / neck NOTED

## 2025-01-13 NOTE — CONSULT NOTE ADULT - SUBJECTIVE AND OBJECTIVE BOX
GENERAL SURGERY CONSULT NOTE    Patient: GEREMIAS FULLER , 69y ([birth year])Female   MRN: [id number]  Location: [place] [address] A  Visit: 01-10-25 Inpatient  Date: 01-13-25 @ 09:52    HPI:  Patient is a 70yo Female with PMH of HTN, HLD, GERD, CAD, COPD not on Home O2, Hx of Pneumothorax, polychondirits, SCC of BOT s/p Trach/PEG (reversed in 4/2021), s/p CRT (7 sessions in 2021) who presents with SOB for 1 week duration. History goes back to around 1 week ago When the patient started having Shortness of breath, productive cough, and malaise. Patient dnies any fever. Patient reported that her son was coughing. Patient reported some chest pain previously when coughing . Patient reports diarrhea for 4 days. which resolved now. PAtient denies an urinary symptoms. Patient is admitted for PNA, and COPD exacerbation Patient follows with Dr. Louis of ENT who recommended a CT neck which showed tracheal stenosis and mucous secretions at level of thyroid.     ICU Vital Signs Last 24 Hrs  T(C): 37.3 (10 Zeyad 2025 15:36), Max: 37.8 (10 Zeyad 2025 10:41)  T(F): 99.1 (10 Zeyad 2025 15:36), Max: 100 (10 Zeyad 2025 10:41)  HR: 112 (10 Zeyad 2025 15:36) (97 - 126)  BP: 95/66 (10 Zeyad 2025 15:36) (95/66 - 116/86)  BP(mean): 76 (10 Zeyad 2025 15:36) (76 - 76)  RR: 20 (10 Zeyad 2025 15:36) (20 - 22)  SpO2: 94% (10 Zeyad 2025 15:36) (94% - 95%)  Patient On (Oxygen Delivery Method): nasal cannula  O2 Flow (L/min): 4       (10 Zeyad 2025 15:46)      PAST MEDICAL & SURGICAL HISTORY:  HTN (hypertension)      Hypercholesterolemia      GERD (gastroesophageal reflux disease)      Pulmonary nodule      CAD (coronary artery disease)      COPD (chronic obstructive pulmonary disease)      Relapsing polychondritis      Female cystocele      H/O pneumothorax  2016      S/P GEETHA-BSO      S/P colon resection      Vocal cord polyps  removal of same 2005      Pulmonary nodule      History of bladder surgery  2019      H/O breast biopsy  LEFT          Home Medications:  Anoro Ellipta 62.5 mcg-25 mcg/inh inhalation powder: 1 puff(s) inhaled once a day (10 Zeyad 2025 16:56)  Lipitor 40 mg oral tablet: 1 tab(s) orally once a day (10 Zeyad 2025 16:56)  losartan 100 mg oral tablet: 1 tab(s) orally once a day (10 Zeyad 2025 16:56)  mycophenolate mofetil 500 mg oral tablet: 2 tab(s) by gastrostomy tube 2 times a day (30 Nov 2023 09:00)  omeprazole 40 mg oral delayed release capsule: 1 cap(s) by gastrostomy tube once a day (30 Nov 2023 09:00)  Ventolin HFA 90 mcg/inh inhalation aerosol: 2 puff(s) inhaled 4 times a day (30 Nov 2023 09:00)        VITALS:  T(F): 97.8 (01-13-25 @ 08:07), Max: 98 (01-13-25 @ 05:34)  HR: 105 (01-13-25 @ 08:07) (67 - 105)  BP: 117/82 (01-13-25 @ 08:07) (111/74 - 162/89)  RR: 20 (01-13-25 @ 08:07) (12 - 20)  SpO2: 96% (01-13-25 @ 08:07) (95% - 98%)    PHYSICAL EXAM:  General: NAD, AAOx3, calm and cooperative  HEENT: NCAT, MISTI, EOMI, Trachea ML, Neck supple, old trach scar noted  Cardiac: RRR  Respiratory: Increased respiratory effort, has audible stridor and wheezing   Abdomen: Soft, non-distended, non-tender, no rebound, no guarding. +BS.  Skin: Warm/dry, normal color, no jaundice    MEDICATIONS  (STANDING):  albuterol/ipratropium for Nebulization 3 milliLiter(s) Nebulizer every 20 minutes  albuterol/ipratropium for Nebulization 3 milliLiter(s) Nebulizer every 6 hours  azithromycin  IVPB 500 milliGRAM(s) IV Intermittent every 24 hours  furosemide   Injectable 40 milliGRAM(s) IV Push daily  heparin   Injectable 5000 Unit(s) SubCutaneous every 12 hours  methylPREDNISolone sodium succinate Injectable 60 milliGRAM(s) IV Push every 8 hours  metoprolol tartrate 12.5 milliGRAM(s) Oral three times a day  pantoprazole    Tablet 40 milliGRAM(s) Oral before breakfast    MEDICATIONS  (PRN):  acetaminophen     Tablet .. 650 milliGRAM(s) Oral every 6 hours PRN Mild Pain (1 - 3), Moderate Pain (4 - 6)      LAB/STUDIES:                        14.1   11.52 )-----------( 316      ( 12 Jan 2025 07:42 )             42.9     01-12    140  |  100  |  34[H]  ----------------------------<  186[H]  4.2   |  23  |  0.9    Ca    9.2      12 Jan 2025 07:42  Mg     2.2     01-12          Urinalysis Basic - ( 12 Jan 2025 07:42 )    Color: x / Appearance: x / SG: x / pH: x  Gluc: 186 mg/dL / Ketone: x  / Bili: x / Urobili: x   Blood: x / Protein: x / Nitrite: x   Leuk Esterase: x / RBC: x / WBC x   Sq Epi: x / Non Sq Epi: x / Bacteria: x            IMAGING:  < from: CT Neck Soft Tissue w/ IV Cont (01.11.25 @ 08:27) >  CT NECK SOFT TISSUE IC   ORDERED BY: VADIM DUNLAP     PROCEDURE DATE:  01/11/2025          INTERPRETATION:  Clinical history: History of tongue base cancer,   follow-up.    Technique:  Multidetector axial CT images of the neckwere obtained   following the intravenous administration of 50 ml of nonionic contrast.   Images were reformatted on multiple planes and reviewed in bone and   soft-tissue windows.    Comparison: CT neck dated 9/3/2024. Correlated with the chest CT dated   1/26/2024.    Findings:    Aerodigestive structures: The previously observed nasopharyngeal soft   tissue thickening has also resolved since the prior CT neck from   9/3/2024. There are stable mild thickening of the epiglottis and   ill-defined1 cm preepiglottic soft tissue density. There is resolved   mild thickening of the left aryepiglottic fold since the prior exam.    There has been an interval development of focal moderate tracheal   stenosis at the level of the thyroid (7-125) with partially collapsed   appearance of the right tracheal wall. The stenotic transverse width   measures about 0.7 cm versus a patent distal tracheal airway measuring   1.6 cm.    Thyroid gland: Stable rim calcified 1.8 cm hypodense nodule in the right   thyroid.    Parotid and submandibular glands:  Fatty atrophy of the bilateral   submandibular glands. Unremarkable parotid glands.    Lymph nodes: No pathologic adenopathy by imaging size criteria.    Vascular structures: Stable extensive atherosclerotic calcifications    Osseous structures: No fracture, dislocation or destructive lesion.   Stable diffuse osteopenia with unchanged spondylosis and   spondylolisthesis. Stable small bone islands in C7 vertebral body and   spinous process. Stable mild compression deformity of T5.    Paranasal sinuses: Clear.  Mastoid air cells:  Clear.    Partially visualized intracranial structures: Unremarkable.  Orbits: Unremarkable.    Partially visualized lung apices: Increased biapical reticular changes.   Please see separately dictated report of the concurrent chest CT.      Impression:    Interval development of focal moderate tracheal stenosis at the level of   the thyroid with partially collapsed appearance of the right tracheal   wall since the prior CT neck from 9/3/2024, possibly associated with the   history of prior tracheostomy.    Stable mild thickening of the epiglottis and small preepiglottic soft   tissue density. Resolved nasopharyngeal soft tissue thickening and left   aryepiglottic fold thickening.    Increased biapical reticular changes. Please see separately dictated   concurrent chest CT.        ASSESSMENT:  Patient is a 70yo Female with PMH of HTN, HLD, GERD, CAD, COPD not on Home O2, Hx of Pneumothorax, polychondirits, SCC of BOT s/p Trach/PEG (reversed in 4/2021), s/p CRT (7 sessions in 2021) who presents with SOB for 1 week duration. History goes back to around 1 week ago When the patient started having Shortness of breath, productive cough, and malaise. Patient dnies any fever. Patient reported that her son was coughing. Patient reported some chest pain previously when coughing . Patient reports diarrhea for 4 days. which resolved now. PAtient denies an urinary symptoms. Patient is admitted for PNA, and COPD exacerbation Patient follows with Dr. Louis of ENT who recommended a CT neck which showed tracheal stenosis and mucous secretions at level of thyroid.  Physical exam findings, imaging, and labs as documented above.       PLAN:  - Case and plan discussed with surgical attending  - Plan to book patient tomorrow 1/14 for bronch, possible tracheal dilation and debridement  - consent to be obtained  - Please make sure preop with updated CBC, BMP, Mag, Phosph, type and screen, coag studies.   - Please make NPO at midnight.   - Rest of care per primary team    Lines/Tubes: PIV    Above plan discussed with Attending Surgeon Dr. Dr. Patrick Elmore, patient, and Primary team  01-13-25 @ 09:52

## 2025-01-13 NOTE — PROGRESS NOTE ADULT - ASSESSMENT
A 68yo Female with PMH of HTN, HLD, GERD, CAD, COPD not on Home O2, Hx of Pneumothorax, SCC of BOT s/p Trach/PEG (reversed in 4/2021), s/p CRT (7 sessions in 2021) who presents with SOB for 1 week duration. History goes back to around 1 week ago When the patient started having Shortness of breath, productive cough, and malaise. Patient denies any fever. Patient reported that her son was coughing. Patient reported some chest pain previously when coughing . Patient reports diarrhea for 4 days. which resolved now. PAtient denies an urinary symptoms. Patient is admitted for PNA, and COPD excacerbation.     #Acute hypoxic resp failure 2/2 COPD exacerbation 2/2 Possible pneumonia, with tracheal stenosis   #HAGMA 2/2 Lactic Acidosis   #CAD  #likely HF exacerbation  #H/o Pneumothorax  - WBC 11.2 K (neutrophilic), Tachycardic to 126, Lactate 3.0, AG 16, VBG - pH: 7.39  | pCO2: 38    | pO2: 36    | Lactate: 3.0  on admission  - No CP at this time  - EKG- PACs, with MAT  - trop 96 --> 75   - s/p 2L fluid bolus, Azithromycin and rocephin, duoneb and solu-medrol  - CXR- reticular infiltrates   - c/w Rocephin and azithromycin   - c/w solumedrol 60 mg IV TID, duoneb, albuterol   - Trend troponins   - wean off O2 as tolerated  - C/w LR @ 75 cc/ hr   - CT chest with IV contrast: Extensive emphysema with superimposed mild groundglass opacities/septal thickening suggesting underlying edema( not present on earlier study).Interval development of focal moderate tracheal stenosis at the level of the thyroid with partially collapsed appearance of the right tracheal wall since the prior CT neck from 9/3/2024, possibly associated with the history of prior tracheostomy.  - CT Neck with contrast:   Stable mild thickening of the epiglottis and small preepiglottic soft tissue density. Resolved nasopharyngeal soft tissue thickening and left aryepiglottic fold thickening.   - pt was desating 1/10, was placed on NRFM then on bipap. CXR noted to be congested as well--> given 2 doses of IV lasix & started on lasix 40IV. pt wheezing on ausc, increased steroids to 60mg IV tid  - pt currently on HFNC 50-50  - Upgraded to SDU  - F/U MRSA, Procal, Strep and legionella, RVP, blood culture, Sputum Cx, Galactomannan, fungitel  - F/u ABG in AM, CXR in AM   - F/U TTE, BNP   - F/U lactate   - CTS consult appreciated, will need Trachael dilatation and debridement on 1/14, npo in evening     #Abdominal pain   #Diarrhea  - KUB- Nonobstructive bowel gas pattern. Bony degenerative changes. External tubing projects over the image.  - resolved     #Polychondritis   - as per Rheum note, patient is on cellcept 1000 G BID and Dcd hydroxychloroquine  - D/C immunosuppresants in setting of infection, restart on DC     #H/o SCC s/p surgery, CRT   #H/o Trach/ PEG s p reversal   - follows with ENT Dr. Louis, last seen 9/23/24,  - CT Neck 9/2024 showed Abnormal soft tissue within the posterior nasopharynx greater the right, new since the reference exams and nonspecific in appearance. The abnormal submucosal soft tissue in the preepiglottic space greater on the left, asymmetric thickening of the left aryepiglottic fold, and mild epiglottic thickening; similar when correlated with the PET/CT dated 5/12/2022.  Stable rim-calcified right thyroid nodule measuring 1.8 cm.   - US guided FNA by IR 10/7/24 of  Stable rim-calcified right thyroid nodule measuring 1.8 cm. Showed atypia but not malignant cells.   - ENT recs appreciated       - Encourage to clear secretions        - Continue current management  - FFL showed, thick purulent secretions on larynx, airway patent.   - CT Neck with contrast: Stable mild thickening of the epiglottis and small preepiglottic soft tissue density. Resolved nasopharyngeal soft tissue thickening and left aryepiglottic fold thickening.  - CTS consult appreciated as above      #HTN  #HLD  - hold home Anti-HTN as BP is soft   - c/w other home meds     #GERD  - C/w protonix     #Progress Note Handoff  Pending (specify):  follow up med recc, SDU, CC, CTS  Family discussion: house staff updated pt family  Disposition: SDU   Decision to admit the pt is based on acuity as above .

## 2025-01-13 NOTE — PROGRESS NOTE ADULT - SUBJECTIVE AND OBJECTIVE BOX
Patient is a 69y old  Female who presents with a chief complaint of     Pt seen and examined at bedside. No CP or SOB.          PAST MEDICAL & SURGICAL HISTORY:  HTN (hypertension)    Hypercholesterolemia    GERD (gastroesophageal reflux disease)    Pulmonary nodule    CAD (coronary artery disease)    COPD (chronic obstructive pulmonary disease)    Relapsing polychondritis    Female cystocele    H/O pneumothorax  2016    S/P GEETHA-BSO    S/P colon resection    Vocal cord polyps  removal of same 2005    Pulmonary nodule    History of bladder surgery  2019    H/O breast biopsy  LEFT        VITAL SIGNS (Last 24 hrs):  T(C): 36.6 (25 @ 08:07), Max: 36.7 (25 @ 05:34)  HR: 105 (25 @ 08:07) (67 - 105)  BP: 117/82 (25 @ 08:07) (111/74 - 162/89)  RR: 20 (25 @ 08:07) (12 - 20)  SpO2: 96% (25 @ 08:07) (95% - 98%)  Wt(kg): --  Daily     Daily Weight in k.2 (2025 08:07)    I&O's Summary      PHYSICAL EXAM:  GENERAL: NAD, well-developed  HEAD:  Atraumatic, Normocephalic  EYES: EOMI, PERRLA, conjunctiva and sclera clear  NECK: Supple, No JVD  CHEST/LUNG: Clear to auscultation bilaterally; No wheeze  HEART: Regular rate and rhythm; No murmurs, rubs, or gallops  ABDOMEN: Soft, Nontender, Nondistended; Bowel sounds present  EXTREMITIES:  2+ Peripheral Pulses, No clubbing, cyanosis, or edema  PSYCH: AAOx3  NEUROLOGY: non-focal  SKIN: No rashes or lesions    Labs Reviewed  Spoke to patient in regards to abnormal labs.    CBC Full  -  ( 2025 07:42 )  WBC Count : 11.52 K/uL  Hemoglobin : 14.1 g/dL  Hematocrit : 42.9 %  Platelet Count - Automated : 316 K/uL  Mean Cell Volume : 88.3 fL  Mean Cell Hemoglobin : 29.0 pg  Mean Cell Hemoglobin Concentration : 32.9 g/dL  Auto Neutrophil # : x  Auto Lymphocyte # : x  Auto Monocyte # : x  Auto Eosinophil # : x  Auto Basophil # : x  Auto Neutrophil % : x  Auto Lymphocyte % : x  Auto Monocyte % : x  Auto Eosinophil % : x  Auto Basophil % : x    BMP:     @ 07:42    Blood Urea Nitrogen - 34  Calcium - 9.2  Carbond Dioxide - 23  Chloride - 100  Creatinine - 0.9  Glucose - 186  Potassium - 4.2  Sodium - 140      Hemoglobin A1c -   PT/INR - ( 2025 06:54 )   PT: 13.60 sec;   INR: 1.15 ratio         PTT - ( 2025 06:54 )  PTT:26.1 sec  Urine Culture:        COVID Labs  CRP:    Procalcitonin: 0.08 ng/mL (01-10-25 @ 21:09)    D-Dimer:            Imaging reviewed independently and reviewed read        MEDICATIONS  (STANDING):  albuterol/ipratropium for Nebulization 3 milliLiter(s) Nebulizer every 20 minutes  albuterol/ipratropium for Nebulization 3 milliLiter(s) Nebulizer every 6 hours  azithromycin  IVPB 500 milliGRAM(s) IV Intermittent every 24 hours  furosemide   Injectable 40 milliGRAM(s) IV Push daily  heparin   Injectable 5000 Unit(s) SubCutaneous every 12 hours  methylPREDNISolone sodium succinate Injectable 60 milliGRAM(s) IV Push every 8 hours  metoprolol tartrate 12.5 milliGRAM(s) Oral three times a day  pantoprazole    Tablet 40 milliGRAM(s) Oral before breakfast    MEDICATIONS  (PRN):  acetaminophen     Tablet .. 650 milliGRAM(s) Oral every 6 hours PRN Mild Pain (1 - 3), Moderate Pain (4 - 6)

## 2025-01-13 NOTE — PROGRESS NOTE ADULT - NS ATTEST RISK PROBLEM GEN_ALL_CORE FT
dw CTS, cc, trach dilation, sdu, on hfnc, labs reviewed, labs ordered
GURJIT CC, labs reviewed, HFNC, labs reviewed, scans reviewed

## 2025-01-13 NOTE — CONSULT NOTE ADULT - ASSESSMENT
IMPRESSION:    Acute hypoxemic resp failure  COPD exacerbation  New density around trac was not present few month ago suspect mucus  HO trach   History of lung nodules  History of relapsing polychondritis     PLAN:    CNS: Avoid CNS depressant    HEENT:  Oral care    PULMONARY:  HOB @ 45 degrees, HHFNC, keep SaO2 92 TO 96%, ct sx eval, solumedrol 60 q 12, Neb q 4    CARDIOVASCULAR: avoid overload    GI: GI prophylaxis                                          Feeding per speech    RENAL:  F/u  lytes.  Correct as needed. accurate I/O    INFECTIOUS DISEASE: abx, procal, sputum gram stain/ cx    HEMATOLOGICAL:  DVT prophylaxis. LE doppler    ENDOCRINE:  Follow up FS.  Insulin protocol if needed.    SDU

## 2025-01-13 NOTE — PROGRESS NOTE ADULT - SUBJECTIVE AND OBJECTIVE BOX
SUBJECTIVE/OVERNIGHT EVENTS  Today is hospital day 3d. This morning patient was seen and examined at bedside, resting in bed. No acute or major events overnight. CT surgery to do possible trach dilation and stenting tomorrow. Patient is NPO after midnight and Pree-op labs. Med rec confiremd patient on meds for polychondritis.- mycophenolate and hydoxychloroquine alternating 100mg and 200mg.     HOSPITAL COURSE  A 68yo Female with PMH of HTN, HLD, GERD, CAD, COPD not on Home O2, Hx of Pneumothorax, polychondirits, SCC of BOT s/p Trach/PEG (reversed in 4/2021), s/p CRT (7 sessions in 2021) who presents with SOB for 1 week duration. History goes back to around 1 week ago When the patient started having Shortness of breath, productive cough, and malaise. Patient dnies any fever. Patient reported that her son was coughing. Patient reported some chest pain previously when coughing . Patient reports diarrhea for 4 days. which resolved now. PAtient denies an urinary symptoms. Patient is admitted for PNA, and COPD excacerbation .     Patient reports some intermittent left sided chest pain when coughing. and abdominal distention as well.  Patient follows with ENT Dr. Louis, last seen 9/23/24, CT Neck 9/2024 showed Abnormal soft tissue within the posterior nasopharynx greater the right, new since the reference exams and nonspecific in appearance. The abnormal submucosal soft tissue in the preepiglottic space greater on the left, asymmetric thickening of the left aryepiglottic fold, and mild epiglottic thickening; similar when correlated with the PET/CT dated 5/12/2022.  Stable rim-calcified right thyroid nodule measuring 1.8 cm. US guided FNA by IR 10/7/24 of  Stable rim-calcified right thyroid nodule measuring 1.8 cm. Showed atypia but not malignant cells.     ENT evaluated the pt for hoarseness, FFL showed, thick purulent secretions on larynx, airway patent. Recommended CT Neck with IVC for further evaluation, Albuterol neb treatment, Humidified O2 ( on 3L of O2 via NC)     Labs significant for WBC 11.2 K (neutrophilic), troponin 96 > 75, AG 16     09:57 - VBG - pH: 7.39  | pCO2: 38    | pO2: 36    | Lactate: 3.0      In Ed the patient was given 2 L  fluid bolus, Azithromycin and rocephin, duoneb and solu-medrol     CXR showed reticular infiltrates     EKG shows PACs, with MAT     VS significant for using 4 L NC, Tachycardia 126       MEDICATIONS  STANDING MEDICATIONS  albuterol/ipratropium for Nebulization 3 milliLiter(s) Nebulizer every 20 minutes  albuterol/ipratropium for Nebulization 3 milliLiter(s) Nebulizer every 6 hours  azithromycin  IVPB 500 milliGRAM(s) IV Intermittent every 24 hours  furosemide    Tablet 40 milliGRAM(s) Oral daily  heparin   Injectable 5000 Unit(s) SubCutaneous every 12 hours  lactated ringers. 1000 milliLiter(s) IV Continuous <Continuous>  methylPREDNISolone sodium succinate Injectable 60 milliGRAM(s) IV Push every 8 hours  metoprolol succinate ER 25 milliGRAM(s) Oral daily  pantoprazole    Tablet 40 milliGRAM(s) Oral before breakfast    PRN MEDICATIONS  acetaminophen     Tablet .. 650 milliGRAM(s) Oral every 6 hours PRN    VITALS  T(F): 97.8 (01-13-25 @ 08:07), Max: 98 (01-13-25 @ 05:34)  HR: 105 (01-13-25 @ 08:07) (67 - 105)  BP: 117/82 (01-13-25 @ 08:07) (111/74 - 162/89)  RR: 20 (01-13-25 @ 08:07) (12 - 20)  SpO2: 96% (01-13-25 @ 08:07) (95% - 98%)    PHYSICAL EXAM  GENERAL  GENERAL: NAD, lying in bed comfortably  HEAD:  Atraumatic, normocephalic  EYES: EOMI, PERRL  NECK: Supple, trachea midline, no JVD  HEART: Regular rate and rhythm  LUNGS: Unlabored respirations.  Clear to auscultation bilaterally, no crackles, wheezing, or rhonchi  ABDOMEN: Soft, nontender, nondistended, +BS  EXTREMITIES: 2+ peripheral pulses bilaterally. No clubbing, cyanosis, or edema  NERVOUS SYSTEM:  A&Ox3, moving all extremities, no focal deficits     LABS             14.1   11.52 )-----------( 316      ( 01-12-25 @ 07:42 )             42.9     140  |  100  |  34  -------------------------<  186   01-12-25 @ 07:42  4.2  |  23  |  0.9    Ca      9.2     01-12-25 @ 07:42  Mg     2.2     01-12-25 @ 07:42        CKMB Units: 14.5 ng/mL (01-11-25 @ 06:54)  Troponin T, High Sensitivity Result: 46 ng/L (01-11-25 @ 06:54)  Pro-Brain Natriuretic Peptide: 9810 pg/mL (01-10-25 @ 21:09)    Urinalysis Basic - ( 12 Jan 2025 07:42 )    Color: x / Appearance: x / SG: x / pH: x  Gluc: 186 mg/dL / Ketone: x  / Bili: x / Urobili: x   Blood: x / Protein: x / Nitrite: x   Leuk Esterase: x / RBC: x / WBC x   Sq Epi: x / Non Sq Epi: x / Bacteria: x        IMAGING SUBJECTIVE/OVERNIGHT EVENTS  Today is hospital day 3d. This morning patient was seen and examined at bedside, resting in bed. No acute or major events overnight. CT surgery to do possible trach dilation and stenting tomorrow. Patient is NPO after midnight and Pree-op labs. Med rec confiremd patient on meds for polychondritis.- mycophenolate and hydoxychloroquine alternating 100mg and 200mg.     HOSPITAL COURSE  A 68yo Female with PMH of HTN, HLD, GERD, CAD, COPD not on Home O2, Hx of Pneumothorax, polychondirits, SCC of BOT s/p Trach/PEG (reversed in 4/2021), s/p CRT (7 sessions in 2021) who presents with SOB for 1 week duration. History goes back to around 1 week ago When the patient started having Shortness of breath, productive cough, and malaise. Patient dnies any fever. Patient reported that her son was coughing. Patient reported some chest pain previously when coughing . Patient reports diarrhea for 4 days. which resolved now. PAtient denies an urinary symptoms. Patient is admitted for PNA, and COPD excacerbation .     Patient reports some intermittent left sided chest pain when coughing. and abdominal distention as well.  Patient follows with ENT Dr. Louis, last seen 9/23/24, CT Neck 9/2024 showed Abnormal soft tissue within the posterior nasopharynx greater the right, new since the reference exams and nonspecific in appearance. The abnormal submucosal soft tissue in the preepiglottic space greater on the left, asymmetric thickening of the left aryepiglottic fold, and mild epiglottic thickening; similar when correlated with the PET/CT dated 5/12/2022.  Stable rim-calcified right thyroid nodule measuring 1.8 cm. US guided FNA by IR 10/7/24 of  Stable rim-calcified right thyroid nodule measuring 1.8 cm. Showed atypia but not malignant cells.     ENT evaluated the pt for hoarseness, FFL showed, thick purulent secretions on larynx, airway patent. Recommended CT Neck with IVC for further evaluation, Albuterol neb treatment, Humidified O2 ( on 3L of O2 via NC)     Labs significant for WBC 11.2 K (neutrophilic), troponin 96 > 75, AG 16     09:57 - VBG - pH: 7.39  | pCO2: 38    | pO2: 36    | Lactate: 3.0      In Ed the patient was given 2 L  fluid bolus, Azithromycin and rocephin, duoneb and solu-medrol     CXR showed reticular infiltrates     EKG shows PACs, with MAT     VS significant for using 4 L NC, Tachycardia 126       MEDICATIONS  STANDING MEDICATIONS  albuterol/ipratropium for Nebulization 3 milliLiter(s) Nebulizer every 20 minutes  albuterol/ipratropium for Nebulization 3 milliLiter(s) Nebulizer every 6 hours  azithromycin  IVPB 500 milliGRAM(s) IV Intermittent every 24 hours  furosemide    Tablet 40 milliGRAM(s) Oral daily  heparin   Injectable 5000 Unit(s) SubCutaneous every 12 hours  lactated ringers. 1000 milliLiter(s) IV Continuous <Continuous>  methylPREDNISolone sodium succinate Injectable 60 milliGRAM(s) IV Push every 8 hours  metoprolol succinate ER 25 milliGRAM(s) Oral daily  pantoprazole    Tablet 40 milliGRAM(s) Oral before breakfast    PRN MEDICATIONS  acetaminophen     Tablet .. 650 milliGRAM(s) Oral every 6 hours PRN    VITALS  T(F): 97.8 (01-13-25 @ 08:07), Max: 98 (01-13-25 @ 05:34)  HR: 105 (01-13-25 @ 08:07) (67 - 105)  BP: 117/82 (01-13-25 @ 08:07) (111/74 - 162/89)  RR: 20 (01-13-25 @ 08:07) (12 - 20)  SpO2: 96% (01-13-25 @ 08:07) (95% - 98%)    PHYSICAL EXAM  GENERAL  GENERAL: NAD, lying in bed comfortably  HEAD:  Atraumatic, normocephalic  EYES: EOMI, PERRL  NECK: Supple, trachea midline, no JVD  HEART: Regular rate and rhythm  LUNGS: Unlabored respirations.  Clear to auscultation bilaterally, no crackles, wheezing, or rhonchi  ABDOMEN: Soft, nontender, nondistended, +BS  EXTREMITIES: 2+ peripheral pulses bilaterally. No clubbing, cyanosis, or edema  NERVOUS SYSTEM:  A&Ox3, moving all extremities, no focal deficits     LABS             14.1   11.52 )-----------( 316      ( 01-12-25 @ 07:42 )             42.9     140  |  100  |  34  -------------------------<  186   01-12-25 @ 07:42  4.2  |  23  |  0.9    Ca      9.2     01-12-25 @ 07:42  Mg     2.2     01-12-25 @ 07:42        CKMB Units: 14.5 ng/mL (01-11-25 @ 06:54)  Troponin T, High Sensitivity Result: 46 ng/L (01-11-25 @ 06:54)  Pro-Brain Natriuretic Peptide: 9810 pg/mL (01-10-25 @ 21:09)    Urinalysis Basic - ( 12 Jan 2025 07:42 )    Color: x / Appearance: x / SG: x / pH: x  Gluc: 186 mg/dL / Ketone: x  / Bili: x / Urobili: x   Blood: x / Protein: x / Nitrite: x   Leuk Esterase: x / RBC: x / WBC x   Sq Epi: x / Non Sq Epi: x / Bacteria: x        IMAGING    CT CHEST    IMPRESSION:    Since  September 3, 2024    Bilateral basilar, partial right middle lobe atelectasis.    Extensive emphysema with superimposed mild groundglass opacities/septal   thickening suggesting underlying edema( not present on earlier study).      CT neck performed on the same day, see separate report      CT NECK AND SOFT TISSUES    Impression:    Interval development of focal moderate tracheal stenosis at the level of   the thyroid with partially collapsed appearance of the right tracheal   wall since the prior CT neck from 9/3/2024, possibly associated with the   history of prior tracheostomy.    Stable mild thickening of the epiglottis and small preepiglottic soft   tissue density. Resolved nasopharyngeal soft tissue thickening and left   aryepiglottic fold thickening.    Increased biapical reticular changes. Please see separately dictated   concurrent chest CT.

## 2025-01-13 NOTE — SWALLOW BEDSIDE ASSESSMENT ADULT - COMMENTS
Pt known to [facility] dept from previous admission, VFSS in 2020, recs for PEG as 1' means of nutrition and hydration, ice chips and controlled single sips of water as tolerated.  Pt planned for OR today w/ CT surgery for bronch, possible tracheal dilation and debridement.

## 2025-01-14 DIAGNOSIS — J39.8 OTHER SPECIFIED DISEASES OF UPPER RESPIRATORY TRACT: ICD-10-CM

## 2025-01-14 LAB
FUNGITELL: >500 PG/ML — HIGH
GLUCOSE BLDC GLUCOMTR-MCNC: 190 MG/DL — HIGH (ref 70–99)
GLUCOSE BLDC GLUCOMTR-MCNC: 253 MG/DL — HIGH (ref 70–99)

## 2025-01-14 PROCEDURE — 31630 BRONCHOSCOPY DILATE/FX REPR: CPT

## 2025-01-14 PROCEDURE — 93010 ELECTROCARDIOGRAM REPORT: CPT

## 2025-01-14 PROCEDURE — 71045 X-RAY EXAM CHEST 1 VIEW: CPT | Mod: 26

## 2025-01-14 PROCEDURE — 99233 SBSQ HOSP IP/OBS HIGH 50: CPT

## 2025-01-14 PROCEDURE — 31641 BRONCHOSCOPY TREAT BLOCKAGE: CPT

## 2025-01-14 RX ORDER — METOPROLOL SUCCINATE 25 MG
25 TABLET, EXTENDED RELEASE 24 HR ORAL DAILY
Refills: 0 | Status: DISCONTINUED | OUTPATIENT
Start: 2025-01-14 | End: 2025-01-17

## 2025-01-14 RX ORDER — SODIUM CHLORIDE 9 G/ML
1000 INJECTION, SOLUTION INTRAVENOUS
Refills: 0 | Status: DISCONTINUED | OUTPATIENT
Start: 2025-01-14 | End: 2025-02-10

## 2025-01-14 RX ORDER — PANTOPRAZOLE 20 MG/1
40 TABLET, DELAYED RELEASE ORAL
Refills: 0 | Status: DISCONTINUED | OUTPATIENT
Start: 2025-01-14 | End: 2025-02-10

## 2025-01-14 RX ORDER — ACETAMINOPHEN 160 MG/5ML
650 SUSPENSION ORAL EVERY 6 HOURS
Refills: 0 | Status: DISCONTINUED | OUTPATIENT
Start: 2025-01-14 | End: 2025-02-10

## 2025-01-14 RX ORDER — DM/PSEUDOEPHED/ACETAMINOPHEN 10-30-250
25 CAPSULE ORAL ONCE
Refills: 0 | Status: DISCONTINUED | OUTPATIENT
Start: 2025-01-14 | End: 2025-02-10

## 2025-01-14 RX ORDER — SODIUM CHLORIDE 9 G/ML
1000 INJECTION, SOLUTION INTRAVENOUS
Refills: 0 | Status: DISCONTINUED | OUTPATIENT
Start: 2025-01-14 | End: 2025-01-14

## 2025-01-14 RX ORDER — DM/PSEUDOEPHED/ACETAMINOPHEN 10-30-250
12.5 CAPSULE ORAL ONCE
Refills: 0 | Status: DISCONTINUED | OUTPATIENT
Start: 2025-01-14 | End: 2025-02-10

## 2025-01-14 RX ORDER — BACTERIOSTATIC SODIUM CHLORIDE 0.9 %
1000 VIAL (ML) INJECTION
Refills: 0 | Status: DISCONTINUED | OUTPATIENT
Start: 2025-01-14 | End: 2025-01-14

## 2025-01-14 RX ORDER — DM/PSEUDOEPHED/ACETAMINOPHEN 10-30-250
15 CAPSULE ORAL ONCE
Refills: 0 | Status: DISCONTINUED | OUTPATIENT
Start: 2025-01-14 | End: 2025-02-10

## 2025-01-14 RX ORDER — GLUCAGON 3 MG/1
1 POWDER NASAL ONCE
Refills: 0 | Status: DISCONTINUED | OUTPATIENT
Start: 2025-01-14 | End: 2025-02-10

## 2025-01-14 RX ORDER — INSULIN LISPRO 100/ML
VIAL (ML) SUBCUTANEOUS
Refills: 0 | Status: DISCONTINUED | OUTPATIENT
Start: 2025-01-14 | End: 2025-02-10

## 2025-01-14 RX ORDER — ANTISEPTIC SURGICAL SCRUB 0.04 MG/ML
15 SOLUTION TOPICAL EVERY 12 HOURS
Refills: 0 | Status: DISCONTINUED | OUTPATIENT
Start: 2025-01-14 | End: 2025-01-17

## 2025-01-14 RX ORDER — ACETAMINOPHEN 160 MG/5ML
650 SUSPENSION ORAL ONCE
Refills: 0 | Status: COMPLETED | OUTPATIENT
Start: 2025-01-14 | End: 2025-01-14

## 2025-01-14 RX ORDER — METHYLPREDNISOLONE ACETATE 40 MG/ML
60 VIAL (ML) INJECTION EVERY 8 HOURS
Refills: 0 | Status: DISCONTINUED | OUTPATIENT
Start: 2025-01-14 | End: 2025-01-15

## 2025-01-14 RX ORDER — ANTISEPTIC SURGICAL SCRUB 0.04 MG/ML
1 SOLUTION TOPICAL DAILY
Refills: 0 | Status: DISCONTINUED | OUTPATIENT
Start: 2025-01-14 | End: 2025-02-10

## 2025-01-14 RX ADMIN — Medication 25 MILLIGRAM(S): at 05:03

## 2025-01-14 RX ADMIN — Medication 40 MILLIGRAM(S): at 05:03

## 2025-01-14 RX ADMIN — Medication 6: at 16:53

## 2025-01-14 RX ADMIN — ACETAMINOPHEN 650 MILLIGRAM(S): 160 SUSPENSION ORAL at 21:17

## 2025-01-14 RX ADMIN — Medication 60 MILLIGRAM(S): at 05:02

## 2025-01-14 RX ADMIN — Medication 60 MILLIGRAM(S): at 21:17

## 2025-01-14 RX ADMIN — SODIUM CHLORIDE 75 MILLILITER(S): 9 INJECTION, SOLUTION INTRAVENOUS at 00:25

## 2025-01-14 RX ADMIN — Medication 60 MILLIGRAM(S): at 13:11

## 2025-01-14 RX ADMIN — ANTISEPTIC SURGICAL SCRUB 1 APPLICATION(S): 0.04 SOLUTION TOPICAL at 11:44

## 2025-01-14 NOTE — PROGRESS NOTE ADULT - ASSESSMENT
ASSESSMENT:  69y F w/ PMHx of  HTN, HLD, GERD, CAD, COPD not on Home O2, Hx of Pneumothorax, polychondirits, SCC of BOT s/p Trach/PEG (reversed in 4/2021), s/p CRT (7 sessions in 2021) who presents with SOB for 1 week duration. CT neck showed tracheal stenosis and mucous secretions at level of thyroid.       PLAN:  - patient is booked today for bronch, possible tracheal dilation and debridement  - keep NPO, IVF  - Rest of care per primary team      spectra [phone]

## 2025-01-14 NOTE — CHART NOTE - NSCHARTNOTEFT_GEN_A_CORE
Post Operative Note  Patient: GEREMIAS FULLER 69y (1955) Female   MRN: [id number]  Location: [address]  Visit: 01-10-25 Inpatient  Date: 01-14-25 @ 15:32    Procedure: Tracheal stenosis S/P Bronchoscopy, with tracheal dilation and debridement     Subjective:   Nausea: no, Vomiting: no, Ambulating: no    Objective:  Vitals: T(F): 96.8 (01-14-25 @ 11:39), Max: 97.9 (01-14-25 @ 10:00)  HR: 84 (01-14-25 @ 11:39)  BP: 104/66 (01-14-25 @ 11:39) (99/57 - 136/96)  RR: 20 (01-14-25 @ 11:39)  SpO2: 94% (01-14-25 @ 11:39)  Vent Settings:     In:   01-13-25 @ 07:01  -  01-14-25 @ 07:00  --------------------------------------------------------  IN: 225 mL    01-14-25 @ 07:01  -  01-14-25 @ 15:32  --------------------------------------------------------  IN: 0 mL      IV Fluids: dextrose 5%. 1000 milliLiter(s) (100 mL/Hr) IV Continuous <Continuous>  dextrose 5%. 1000 milliLiter(s) (50 mL/Hr) IV Continuous <Continuous>      Out:   01-13-25 @ 07:01  -  01-14-25 @ 07:00  --------------------------------------------------------  OUT: 1200 mL    01-14-25 @ 07:01  -  01-14-25 @ 15:32  --------------------------------------------------------  OUT: 100 mL      EBL:     Voided Urine:   01-13-25 @ 07:01  -  01-14-25 @ 07:00  --------------------------------------------------------  OUT: 1200 mL    01-14-25 @ 07:01  -  01-14-25 @ 15:32  --------------------------------------------------------  OUT: 100 mL      Physical Examination:  General Appearance: NAD  Heart: RRR  Lungs: On high flow NC   Abdomen:  Soft, non tender, No rigidity, guarding, or rebound tenderness.   MSK/Extremities: Warm & well-perfused.   Skin: Warm, dry    Medications: [Standing]  acetaminophen     Tablet .. 650 milliGRAM(s) Oral every 6 hours PRN  albuterol/ipratropium for Nebulization 3 milliLiter(s) Nebulizer every 20 minutes  chlorhexidine 0.12% Liquid 15 milliLiter(s) Oral Mucosa every 12 hours  chlorhexidine 2% Cloths 1 Application(s) Topical daily  dextrose 5%. 1000 milliLiter(s) IV Continuous <Continuous>  dextrose 5%. 1000 milliLiter(s) IV Continuous <Continuous>  dextrose 50% Injectable 25 Gram(s) IV Push once  dextrose 50% Injectable 12.5 Gram(s) IV Push once  dextrose 50% Injectable 25 Gram(s) IV Push once  dextrose Oral Gel 15 Gram(s) Oral once PRN  furosemide    Tablet 40 milliGRAM(s) Oral daily  glucagon  Injectable 1 milliGRAM(s) IntraMuscular once  insulin lispro (ADMELOG) corrective regimen sliding scale   SubCutaneous two times a day before meals  methylPREDNISolone sodium succinate Injectable 60 milliGRAM(s) IV Push every 8 hours  metoprolol succinate ER 25 milliGRAM(s) Oral daily  pantoprazole    Tablet 40 milliGRAM(s) Oral before breakfast    Medications: [PRN]  acetaminophen     Tablet .. 650 milliGRAM(s) Oral every 6 hours PRN  albuterol/ipratropium for Nebulization 3 milliLiter(s) Nebulizer every 20 minutes  chlorhexidine 0.12% Liquid 15 milliLiter(s) Oral Mucosa every 12 hours  chlorhexidine 2% Cloths 1 Application(s) Topical daily  dextrose 5%. 1000 milliLiter(s) IV Continuous <Continuous>  dextrose 5%. 1000 milliLiter(s) IV Continuous <Continuous>  dextrose 50% Injectable 25 Gram(s) IV Push once  dextrose 50% Injectable 12.5 Gram(s) IV Push once  dextrose 50% Injectable 25 Gram(s) IV Push once  dextrose Oral Gel 15 Gram(s) Oral once PRN  furosemide    Tablet 40 milliGRAM(s) Oral daily  glucagon  Injectable 1 milliGRAM(s) IntraMuscular once  insulin lispro (ADMELOG) corrective regimen sliding scale   SubCutaneous two times a day before meals  methylPREDNISolone sodium succinate Injectable 60 milliGRAM(s) IV Push every 8 hours  metoprolol succinate ER 25 milliGRAM(s) Oral daily  pantoprazole    Tablet 40 milliGRAM(s) Oral before breakfast    GI PROPHYLAXIS: pantoprazole    Tablet 40 milliGRAM(s) Oral before breakfast    Labs:                        14.4   10.13 )-----------( 356      ( 13 Jan 2025 20:00 )             43.6     01-13    137  |  96[L]  |  39[H]  ----------------------------<  221[H]  4.3   |  25  |  0.9    Ca    9.8      13 Jan 2025 20:00  Mg     2.1     01-13    TPro  5.9[L]  /  Alb  3.9  /  TBili  0.3  /  DBili  x   /  AST  18  /  ALT  31  /  AlkPhos  67  01-13    PT/INR - ( 13 Jan 2025 20:00 )   PT: 12.20 sec;   INR: 1.03 ratio         PTT - ( 13 Jan 2025 20:00 )  PTT:22.4 sec    Assessment:  69yF s/p Tracheal stenosis S/P Bronchoscopy, with tracheal dilation and debridement     Plan:  - Monitor vitals  - Monitor post-op labs and replete as necessary  - Monitor for bowel function  - Continue Pain Medications if necessary  - Continue Antibiotics if necessary  - Encourage ambulation as tolerated  - Monitor urine output and trial of void once Romo removed  - DVT and GI Prophylaxis  - Monitor wound and dressing for changes, redress as needed.   [phone]    Date/Time: 01-14-25 @ 15:32

## 2025-01-14 NOTE — BRIEF OPERATIVE NOTE - NSICDXBRIEFPROCEDURE_GEN_ALL_CORE_FT
Subjective:       Reyes Emmer is a 11 y.o. male   who presents for a well-child visit and school sports physical exam.  History was provided by the patient and was brought in by his self for this visit. He plans to participate in football     No past medical history on file. There are no problems to display for this patient. Immunization History   Administered Date(s) Administered    COVID-19, Pfizer, PF, 30mcg/0.3mL 05/02/2021, 05/30/2021    DTaP vaccine 2005, 2005, 2005, 02/02/2009    HPV 9-valent Arrie Leaver) 11/27/2017, 09/13/2018    Hepatitis B Ped/Adol (Engerix-B, Recombivax HB) 2005, 2005, 2005    Influenza Vaccine, unspecified formulation 09/18/2016    Influenza Virus Vaccine 08/16/2018    Influenza, MDCK Quadv, IM, PF (Flucelvax 4 yrs and older) 12/31/2019    Influenza, Quadv, IM, (6 mo and older Fluzone, Flulaval, Fluarix and 3 yrs and older Afluria) 12/31/2019    Influenza, Quadv, IM, PF (6 mo and older Fluzone, Flulaval, Fluarix, and 3 yrs and older Afluria) 08/25/2018, 08/22/2020    MMR 02/03/2006    Meningococcal MCV4P (Menactra) 11/27/2017    Polio IPV (IPOL) 2005, 2005, 02/03/2006, 02/02/2009    Tdap (Boostrix, Adacel) 11/27/2017    Varicella (Varivax) 02/03/2006       Current Issues:  Current concerns on the part of Dagoberto's self include none. Patient's current concerns include none. Does patient snore? no    Review of Lifestyle habits:   Patient has the following healthy dietary habits:  limits juice, soda, fried and fast foods  Current unhealthy dietary habits: Skips breakfast  Are you hungry due to lack of food? no    Amount of screen time daily: 4 hours  Amount of daily physical activity:  2 hours    Amount of Sleep each night: 8 hours  Quality of sleep:  normal    How often does patient see the dentist?  Every unknown length of time. How many times a day does patient brush their teeth? 2  Does patient floss? a family history of : hypertrophic cardiomyopathy,  long-QT syndrome or other ion channelopathies, Marfan syndrome, clinically significant arrhythmias, or premature cardiac death     ROS:    Constitutional:  Negative for fatigue  HENT:  Negative for congestion, rhinitis, sore throat, normal hearing  Eyes:  No vision issues  Resp:  Negative for SOB, wheezing, cough  Cardiovascular: Negative for CP,   Gastrointestinal: Negative for abd pain and N/V, normal BMs  :  Negative for dysuria and enuresis   Musculoskeletal:  Negative for myalgias  Skin: Negative for rash, change in moles, and sunburn. Acne:none   Neuro:  Negative for dizziness, headache, syncopal episodes  Psych: negative for depression or anxiety    Objective:         Vitals:    08/20/21 0818   BP: 110/68   Pulse: 72   Temp: 96.8 °F (36 °C)   TempSrc: Temporal   SpO2: 98%   Weight: 138 lb (62.6 kg)   Height: 5' 8.5\" (1.74 m)     Growth parameters are noted and are appropriate for age. No LMP for male patient. Constitutional: Alert, appears stated age, cooperative, No Marfan Stigmata (no kyphoscoliosis, nl arched palate, no pectus excavatum, no archnodactyly, arm span is less than height, no hyperlaxity)  Ears: Tympanic membrane, external ear and ear canal normal bilaterally  Nose: nasal mucosa w/o erythema or edema. Mouth/Throat: Oropharynx is clear and moist, and mucous membranes are normal.  No dental decay. Gingiva without erythema or swelling  Eyes: white sclera, extraocular motions are intact. PERRL, red reflex present bilaterally  Neck: Neck supple. No JVD present. Carotid bruits are not present. No mass and no thyromegaly present. No cervical adenopathy. Cardiovascular: Normal rate, regular rhythm, normal heart sounds and intact distal pulses. No murmur, rubs or gallops. Normal/equal and bilateral femoral pulses.  Radial and femoral pulse are both simultaneous,  PMI located at fifth intercostal space at the midclavicular line  Pulmonary/Chest: Effort normal.  Clear to auscultation bilaterally. He has no wheezes, rhonchi or rales. Abdominal: Soft, non-tender. Bowel sounds and aorta are normal. He exhibits no organomegaly, mass or bruit. Musculoskeletal: Normal Gait. Cervical and lumbar spine with full ROM w/o pain. No scoliosis. Bilateral shoulders/elbows/wrists/fingers, bilateral hips/knees/ankles/toes all w/o swelling and full ROM w/o pain. Neurological: Grossly normal without focal deficits. Alert and oriented x 3. Reflexes normal and symmetric. Skin: Skin is warm and dry. There is no rash or erythema. No suspicious lesions noted. Acne:none. No acanthosis nigrans, no signs of abuse or self inflicted injury. Psychiatric: He has a normal mood and affect. His speech is normal and behavior is normal. Judgment, cognition and memory are normal.      Assessment:       Well adolescent exam.      Satisfactory school sports physical exam.  Decreased visual acuity right eye. Plan:      Recommend routine f/u with optometry. Cleared to play sports.     Preventive Plan/anticipatory guidance: Discussed the following with patient and parent(s)/guardian and educational materials provided:     [x] Nutrition/feeding- eat 5 fruits/veg daily, limit fried foods, fast food, junk food and sugary drinks, Drink water or fat free milk (20-24 ounces daily to get recommended calcium)   [x]  Participate in > 1 hour of physical activity or active play daily   [x]  Effects of second hand smoke   []  Avoid direct sunlight, sun protective clothing, sunscreen   []  Safety in the car: Seatbelt use, never enter car if  is under the influence of alcohol or drugs, once one earns their license: never using phone/texting while driving   []  Bicycle helmet use   []  Importance of caring/supportive relationships with family and friends   []  Importance of reporting bullying, stalking, abuse, and any threat to one's safety ASAP   []  Importance of appropriate sleep amount and sleep hygiene   []  Importance of responsibility with school work; impact on one's future   []  Conflict resolution should always be non-violent   []  Internet safety and cyberbullying   []  Hearing protection at loud concerts to prevent permanent hearing loss   []  Proper dental care. If no fluoride in water, need for oral fluoride supplementation   []  Signs of depression and anxiety;  Importance of reaching out for help if one ever develops these signs   [x]  Age/experience appropriate counseling concerning sexual, STD and pregnancy prevention, peer pressure, drug/alcohol/tobacco use, prevention strategy: to prevent making decisions one will later regret   []  Smoke alarms/carbon monoxide detectors   []  Firearms safety: parents keep firearms locked up and unloaded   []  Normal development   [x]  When to call   [x]  Well child visit schedule PROCEDURES:  Bronchoscopy, with tracheal dilation 14-Jan-2025 09:59:25  Andrez Walton  Bronchoscopy, rigid, with excision of lesion causing tracheal stenosis 14-Jan-2025 10:00:42  Andrez Walton

## 2025-01-14 NOTE — PROGRESS NOTE ADULT - SUBJECTIVE AND OBJECTIVE BOX
JONNYPOLINA  69y Female    CHIEF COMPLAINT:    Patient is a 69y old  Female who presents with a chief complaint of     INTERVAL HPI/OVERNIGHT EVENTS:    Patient seen and examined. No acute events overnight. for OR with Ct surgery today    ROS: All other systems are negative.    Vital Signs:    T(F): 96.8 (01-14-25 @ 11:39), Max: 97.9 (01-14-25 @ 10:00)  HR: 84 (01-14-25 @ 11:39) (76 - 100)  BP: 104/66 (01-14-25 @ 11:39) (99/57 - 136/96)  RR: 20 (01-14-25 @ 11:39) (18 - 38)  SpO2: 94% (01-14-25 @ 11:39) (91% - 100%)    13 Jan 2025 07:01  -  14 Jan 2025 07:00  --------------------------------------------------------  IN: 225 mL / OUT: 1200 mL / NET: -975 mL    14 Jan 2025 07:01  -  14 Jan 2025 12:01  --------------------------------------------------------  IN: 0 mL / OUT: 100 mL / NET: -100 mL  Daily Height in cm: 162.56 (14 Jan 2025 07:14)      POCT Blood Glucose.: 190 mg/dL (14 Jan 2025 10:53)    PHYSICAL EXAM:    GENERAL:  NAD on HFNC   SKIN: No rashes or lesions  HEENT: Atraumatic. Normocephalic.   NECK: Supple, No JVD.   PULMONARY: CTA B/L. No wheezing. No rales  CVS: Normal S1, S2. Rate and Rhythm are regular.  ABDOMEN/GI: Soft, Nontender, Nondistended  MSK:  No clubbing or cyanosis  NEUROLOGIC:  moves all extremities   PSYCH: Alert & oriented x 3, normal affect    Consultant(s) Notes Reviewed:  [x ] YES  [ ] NO  Care Discussed with Consultants/Other Providers [ x] YES  [ ] NO    LABS:                        14.4   10.13 )-----------( 356      ( 13 Jan 2025 20:00 )             43.6     137  |  96[L]  |  39[H]  ----------------------------<  221[H]  4.3   |  25  |  0.9    Ca    9.8      13 Jan 2025 20:00  Mg     2.1     01-13    TPro  5.9[L]  /  Alb  3.9  /  TBili  0.3  /  DBili  x   /  AST  18  /  ALT  31  /  AlkPhos  67  01-13    PT/INR - ( 13 Jan 2025 20:00 )   PT: 12.20 sec;   INR: 1.03 ratio      PTT - ( 13 Jan 2025 20:00 )  PTT:22.4 sec    RADIOLOGY & ADDITIONAL TESTS:  Imaging or report Personally Reviewed:  [x] YES  [ ] NO  EKG reviewed: [x] YES  [ ] NO    Medications:  Standing  albuterol/ipratropium for Nebulization 3 milliLiter(s) Nebulizer every 20 minutes  chlorhexidine 0.12% Liquid 15 milliLiter(s) Oral Mucosa every 12 hours  chlorhexidine 2% Cloths 1 Application(s) Topical daily  dextrose 5%. 1000 milliLiter(s) IV Continuous <Continuous>  dextrose 5%. 1000 milliLiter(s) IV Continuous <Continuous>  dextrose 50% Injectable 25 Gram(s) IV Push once  dextrose 50% Injectable 12.5 Gram(s) IV Push once  dextrose 50% Injectable 25 Gram(s) IV Push once  furosemide    Tablet 40 milliGRAM(s) Oral daily  glucagon  Injectable 1 milliGRAM(s) IntraMuscular once  insulin lispro (ADMELOG) corrective regimen sliding scale   SubCutaneous two times a day before meals  methylPREDNISolone sodium succinate Injectable 60 milliGRAM(s) IV Push every 8 hours  metoprolol succinate ER 25 milliGRAM(s) Oral daily  pantoprazole    Tablet 40 milliGRAM(s) Oral before breakfast  sodium chloride 0.9%. 1000 milliLiter(s) IV Continuous <Continuous>    PRN Meds  acetaminophen     Tablet .. 650 milliGRAM(s) Oral every 6 hours PRN  dextrose Oral Gel 15 Gram(s) Oral once PRN

## 2025-01-14 NOTE — BRIEF OPERATIVE NOTE - TYPE OF ANESTHESIA
HPI:    Patient ID: Ella Le is a 63year old male. Patient presents with:  Hip Pain: LEFT HIP PAIN--    HPI patient complains of continued left anterior hip pain and left lateral lower leg pain.   He reports the pain seems to come around from his supple. Cardiovascular: Normal rate, regular rhythm, normal heart sounds and intact distal pulses. No murmur heard.   Pulmonary/Chest: Effort normal and breath sounds normal.   Musculoskeletal:        Left shoulder: He exhibits decreased range of motio Referrals:  ORTHOPEDIC - EXTERNAL       PS#[id number] General

## 2025-01-14 NOTE — PROGRESS NOTE ADULT - SUBJECTIVE AND OBJECTIVE BOX
Patient is a 69y old  Female who presents with a chief complaint of       Over Night Events:  On HFNC. Afebrile. Planned for OR today with CTSx.       ROS:     All ROS are negative except HPI         PHYSICAL EXAM    ICU Vital Signs Last 24 Hrs  T(C): 36 (14 Jan 2025 08:09), Max: 36.6 (13 Jan 2025 11:51)  T(F): 96.8 (14 Jan 2025 08:09), Max: 97.9 (13 Jan 2025 11:51)  HR: 83 (14 Jan 2025 08:09) (76 - 100)  BP: 131/89 (14 Jan 2025 08:09) (114/72 - 133/81)  BP(mean): 107 (14 Jan 2025 08:09) (87 - 107)  ABP: --  ABP(mean): --  RR: 20 (14 Jan 2025 08:09) (18 - 20)  SpO2: 96% (14 Jan 2025 08:09) (95% - 100%)    O2 Parameters below as of 14 Jan 2025 08:09  Patient On (Oxygen Delivery Method): nasal cannula, high flow            CONSTITUTIONAL:  in NAD    ENT:   Airway patent,   Mouth with normal mucosa.      EYES:   Pupils equal,   Round and reactive to light.    CARDIAC:   Normal rate,   Regular rhythm.    No edema    Vascular:  Normal systolic impulse  No Carotid bruits    RESPIRATORY:   Bilateral BS  Normal chest expansion  Not tachypneic,  No use of accessory muscles    GASTROINTESTINAL:  Abdomen soft,   Non-tender,   No guarding,   + BS    MUSCULOSKELETAL:   Range of motion is not limited,  No clubbing, cyanosis    NEUROLOGICAL:   Alert and oriented   No motor  deficits.    SKIN:   Skin normal color for race,   Warm and dry      01-13-25 @ 07:01  -  01-14-25 @ 07:00  --------------------------------------------------------  IN:    Lactated Ringers: 225 mL  Total IN: 225 mL    OUT:    Voided (mL): 1200 mL  Total OUT: 1200 mL    Total NET: -975 mL      01-14-25 @ 07:01  -  01-14-25 @ 09:16  --------------------------------------------------------  IN:  Total IN: 0 mL    OUT:    Voided (mL): 100 mL  Total OUT: 100 mL    Total NET: -100 mL          LABS:                            14.4   10.13 )-----------( 356      ( 13 Jan 2025 20:00 )             43.6                                               01-13    137  |  96[L]  |  39[H]  ----------------------------<  221[H]  4.3   |  25  |  0.9    Ca    9.8      13 Jan 2025 20:00  Mg     2.1     01-13    TPro  5.9[L]  /  Alb  3.9  /  TBili  0.3  /  DBili  x   /  AST  18  /  ALT  31  /  AlkPhos  67  01-13      PT/INR - ( 13 Jan 2025 20:00 )   PT: 12.20 sec;   INR: 1.03 ratio         PTT - ( 13 Jan 2025 20:00 )  PTT:22.4 sec                                       Urinalysis Basic - ( 13 Jan 2025 20:00 )    Color: x / Appearance: x / SG: x / pH: x  Gluc: 221 mg/dL / Ketone: x  / Bili: x / Urobili: x   Blood: x / Protein: x / Nitrite: x   Leuk Esterase: x / RBC: x / WBC x   Sq Epi: x / Non Sq Epi: x / Bacteria: x                                                  LIVER FUNCTIONS - ( 13 Jan 2025 20:00 )  Alb: 3.9 g/dL / Pro: 5.9 g/dL / ALK PHOS: 67 U/L / ALT: 31 U/L / AST: 18 U/L / GGT: x                                                                                                                                       MEDICATIONS  (STANDING):  albuterol/ipratropium for Nebulization 3 milliLiter(s) Nebulizer every 20 minutes  dextrose 5%. 1000 milliLiter(s) (100 mL/Hr) IV Continuous <Continuous>  dextrose 5%. 1000 milliLiter(s) (50 mL/Hr) IV Continuous <Continuous>  dextrose 50% Injectable 25 Gram(s) IV Push once  dextrose 50% Injectable 12.5 Gram(s) IV Push once  dextrose 50% Injectable 25 Gram(s) IV Push once  glucagon  Injectable 1 milliGRAM(s) IntraMuscular once  insulin lispro (ADMELOG) corrective regimen sliding scale   SubCutaneous two times a day before meals    MEDICATIONS  (PRN):  dextrose Oral Gel 15 Gram(s) Oral once PRN Blood Glucose LESS THAN 70 milliGRAM(s)/deciliter      New X-rays reviewed:                                                                                  ECHO     Patient is a 69y old  Female who presents with a chief complaint of sob      Over Night Events:  On HFNC. Afebrile. Planned for OR today with CTSx.     PHYSICAL EXAM    ICU Vital Signs Last 24 Hrs  T(C): 36 (14 Jan 2025 08:09), Max: 36.6 (13 Jan 2025 11:51)  T(F): 96.8 (14 Jan 2025 08:09), Max: 97.9 (13 Jan 2025 11:51)  HR: 83 (14 Jan 2025 08:09) (76 - 100)  BP: 131/89 (14 Jan 2025 08:09) (114/72 - 133/81)  BP(mean): 107 (14 Jan 2025 08:09) (87 - 107)  RR: 20 (14 Jan 2025 08:09) (18 - 20)  SpO2: 96% (14 Jan 2025 08:09) (95% - 100%)    O2 Parameters below as of 14 Jan 2025 08:09  Patient On (Oxygen Delivery Method): nasal cannula, high flow            CONSTITUTIONAL:  ill looking    CARDIAC:   RUKHSANA 2.6    RESPIRATORY:   BL Rhonchi    GASTROINTESTINAL:  Abdomen soft,   Non-tender,   No guarding,   + BS    MUSCULOSKELETAL:   Range of motion is not limited,  No clubbing, cyanosis    NEUROLOGICAL:   Alert and oriented   No motor  deficits.          01-13-25 @ 07:01  -  01-14-25 @ 07:00  --------------------------------------------------------  IN:    Lactated Ringers: 225 mL  Total IN: 225 mL    OUT:    Voided (mL): 1200 mL  Total OUT: 1200 mL    Total NET: -975 mL      01-14-25 @ 07:01  -  01-14-25 @ 09:16  --------------------------------------------------------  IN:  Total IN: 0 mL    OUT:    Voided (mL): 100 mL  Total OUT: 100 mL    Total NET: -100 mL          LABS:                            14.4   10.13 )-----------( 356      ( 13 Jan 2025 20:00 )             43.6                                               01-13    137  |  96[L]  |  39[H]  ----------------------------<  221[H]  4.3   |  25  |  0.9    Ca    9.8      13 Jan 2025 20:00  Mg     2.1     01-13    TPro  5.9[L]  /  Alb  3.9  /  TBili  0.3  /  DBili  x   /  AST  18  /  ALT  31  /  AlkPhos  67  01-13      PT/INR - ( 13 Jan 2025 20:00 )   PT: 12.20 sec;   INR: 1.03 ratio         PTT - ( 13 Jan 2025 20:00 )  PTT:22.4 sec                                       Urinalysis Basic - ( 13 Jan 2025 20:00 )    Color: x / Appearance: x / SG: x / pH: x  Gluc: 221 mg/dL / Ketone: x  / Bili: x / Urobili: x   Blood: x / Protein: x / Nitrite: x   Leuk Esterase: x / RBC: x / WBC x   Sq Epi: x / Non Sq Epi: x / Bacteria: x                                                  LIVER FUNCTIONS - ( 13 Jan 2025 20:00 )  Alb: 3.9 g/dL / Pro: 5.9 g/dL / ALK PHOS: 67 U/L / ALT: 31 U/L / AST: 18 U/L / GGT: x                                                                                                                                       MEDICATIONS  (STANDING):  albuterol/ipratropium for Nebulization 3 milliLiter(s) Nebulizer every 20 minutes  dextrose 5%. 1000 milliLiter(s) (100 mL/Hr) IV Continuous <Continuous>  dextrose 5%. 1000 milliLiter(s) (50 mL/Hr) IV Continuous <Continuous>  dextrose 50% Injectable 25 Gram(s) IV Push once  dextrose 50% Injectable 12.5 Gram(s) IV Push once  dextrose 50% Injectable 25 Gram(s) IV Push once  glucagon  Injectable 1 milliGRAM(s) IntraMuscular once  insulin lispro (ADMELOG) corrective regimen sliding scale   SubCutaneous two times a day before meals    MEDICATIONS  (PRN):  dextrose Oral Gel 15 Gram(s) Oral once PRN Blood Glucose LESS THAN 70 milliGRAM(s)/deciliter

## 2025-01-14 NOTE — PROGRESS NOTE ADULT - ASSESSMENT
68yo Female with PMH of HTN, HLD, GERD, CAD, COPD not on Home O2, Hx of Pneumothorax, SCC of BOT s/p Trach/PEG (reversed in 4/2021), s/p CRT (7 sessions in 2021) who presents with SOB for 1 week duration. Currently on HFNC on SDU     Acute hypoxic resp failure due to COPD exacerbation   complicated by tracheal stenosis    H/o SCC s/p surgery, CRT   H/o Trach/ PEG s p reversal   H/o Pneumothorax  - - CT chest with IV contrast: Extensive emphysema with superimposed mild groundglass opacities/septal thickening suggesting underlying edema( not present on earlier study).Interval development of focal moderate tracheal stenosis at the level of the thyroid with partially collapsed appearance of the right tracheal wall since the prior CT neck from 9/3/2024, possibly associated with the history of prior tracheostomy.  - CT Neck with contrast: Stable mild thickening of the epiglottis and small preepiglottic soft tissue density. Resolved nasopharyngeal soft tissue thickening and left aryepiglottic fold thickening.   - Improving slowly on HFNC   - for bronchoscopy with tracheal dilatation with CT surgery today   - s/p 2L fluid bolus, Azithromycin and Rocephin, duoneb and solu-medrol  - CXR- reticular infiltrates   - off antibiotics at this time. Procal negative, fungitell >500. Blood cx cancelled. Recheck   - will ask for ID input   - c/w solumedrol 60 mg IV BID, duoneb, albuterol per pulmonary team   - s/p ENT eval:      - Continue current management-> FFL showed, thick purulent secretions on larynx, airway patent.   - patient follows with Dr Louis as OP     Abdominal pain / Diarrhea - resolved, monitor BM    Polychondritis   - as per Rheum note, patient is on cellcept 1000 G BID and Dcd hydroxychloroquine  - off immunosuppresants at this time, resume post dc     HTN/HLD/CAD  - hold home Anti-HTN as BP is soft   - c/w other home meds     GERD - C/w protonix     Pending: fu ct surgery, taper o2, ID eval, check blood cx, monitor BP    Patient seen at bedside, total time spent to evaluate and treat the patient's acute illness and chronic medical conditions as well as time spent reviewing prior records, labs, radiology, documenting in electronic medical records,  discussing medical plan with   medical team was 51  minutes with >50% of time spent face to face with patient, discussing with patient/family as well as coordination of care

## 2025-01-14 NOTE — PROGRESS NOTE ADULT - SUBJECTIVE AND OBJECTIVE BOX
INTERVAL HPI/OVERNIGHT EVENTS:  Patient was seen and examined at bedside. As per nurse and patient, no o/n events, patient resting comfortably. No complaints at this time. Patient denies: fever, chills, dizziness, weakness, HA, Changes in vision, CP, palpitations, SOB, cough, N/V/D/C, dysuria, changes in bowel movements, LE edema. ROS otherwise negative.    VITAL SIGNS:  T(F): 97 (01-14-25 @ 06:32)  HR: 76 (01-14-25 @ 07:14)  BP: 124/71 (01-14-25 @ 07:14)  RR: 18 (01-14-25 @ 07:14)  SpO2: 97% (01-14-25 @ 07:14)  Wt(kg): --    PHYSICAL EXAM:    Constitutional: WDWN, NAD  HEENT: PERRL, EOMI, sclera non-icteric, neck supple, trachea midline, no masses, no JVD, MMM, good dentition  Respiratory: CTA b/l, good air entry b/l, no wheezing, no rhonchi, no rales, without accessory muscle use and no intercostal retractions  Cardiovascular: RRR, normal S1S2, no M/R/G  Gastrointestinal: soft, NTND, no masses palpable, BS normal  Extremities: Warm, well perfused, pulses equal bilateral upper and lower extremities, no edema, no clubbing  Neurological: AAOx3, CN Grossly intact  Skin: Normal temperature, warm, dry    MEDICATIONS  (STANDING):  albuterol/ipratropium for Nebulization 3 milliLiter(s) Nebulizer every 20 minutes  albuterol/ipratropium for Nebulization 3 milliLiter(s) Nebulizer every 6 hours  azithromycin  IVPB 500 milliGRAM(s) IV Intermittent every 24 hours  furosemide    Tablet 40 milliGRAM(s) Oral daily  heparin   Injectable 5000 Unit(s) SubCutaneous every 12 hours  lactated ringers. 1000 milliLiter(s) (75 mL/Hr) IV Continuous <Continuous>  methylPREDNISolone sodium succinate Injectable 60 milliGRAM(s) IV Push every 8 hours  metoprolol succinate ER 25 milliGRAM(s) Oral daily  pantoprazole    Tablet 40 milliGRAM(s) Oral before breakfast    MEDICATIONS  (PRN):  acetaminophen     Tablet .. 650 milliGRAM(s) Oral every 6 hours PRN Mild Pain (1 - 3), Moderate Pain (4 - 6)      Allergies    No Known Drug Allergies  CONTRAST DYS (Nausea)    Intolerances        LABS:                        14.4   10.13 )-----------( 356      ( 13 Jan 2025 20:00 )             43.6     01-13    137  |  96[L]  |  39[H]  ----------------------------<  221[H]  4.3   |  25  |  0.9    Ca    9.8      13 Jan 2025 20:00  Mg     2.1     01-13    TPro  5.9[L]  /  Alb  3.9  /  TBili  0.3  /  DBili  x   /  AST  18  /  ALT  31  /  AlkPhos  67  01-13    PT/INR - ( 13 Jan 2025 20:00 )   PT: 12.20 sec;   INR: 1.03 ratio         PTT - ( 13 Jan 2025 20:00 )  PTT:22.4 sec  Urinalysis Basic - ( 13 Jan 2025 20:00 )    Color: x / Appearance: x / SG: x / pH: x  Gluc: 221 mg/dL / Ketone: x  / Bili: x / Urobili: x   Blood: x / Protein: x / Nitrite: x   Leuk Esterase: x / RBC: x / WBC x   Sq Epi: x / Non Sq Epi: x / Bacteria: x        RADIOLOGY & ADDITIONAL TESTS:  Reviewed

## 2025-01-14 NOTE — PROGRESS NOTE ADULT - ASSESSMENT
A 70yo Female with PMH of HTN, HLD, GERD, CAD, COPD not on Home O2, Hx of Pneumothorax, SCC of BOT s/p Trach/PEG (reversed in 4/2021), s/p CRT (7 sessions in 2021) who presents with SOB for 1 week duration. History goes back to around 1 week ago When the patient started having Shortness of breath, productive cough, and malaise. Patient denies any fever. Patient reported that her son was coughing. Patient reported some chest pain previously when coughing . Patient reports diarrhea for 4 days. which resolved now. PAtient denies an urinary symptoms. Patient is admitted for PNA, and COPD excacerbation.     #Acute hypoxic resp failure 2/2 COPD exacerbation  with tracheal stenosis   #Unlikley pneumonia   #HAGMA 2/2 Lactic Acidosis   #CAD  #likely HF exacerbation  #H/o Pneumothorax  - WBC 11.2 K (neutrophilic), Tachycardic to 126, Lactate 3.0, AG 16, VBG - pH: 7.39  | pCO2: 38    | pO2: 36    | Lactate: 3.0  on admission  - No CP at this time  - EKG- PACs, with MAT  - trop 96 --> 75 trend for now  - s/p 2.5L fluid bolus and IVF, started on Azithromycin and rocephin, duoneb and solu-medrol  - CXR- reticular infiltrates   - Complains of increasing wheezing >> increased dose of steroids, wheezing improving  - c/w solumedrol 60 mg IV TID, duoneb, albuterol   - d/c rocephin and c/w azithro  - wean off O2 as tolerated  - CT chest with IV contrast: Extensive emphysema with superimposed mild groundglass opacities/septal thickening suggesting underlying edema( not present on earlier study).Interval development of focal moderate tracheal stenosis at the level of the thyroid with partially collapsed appearance of the right tracheal wall since the prior CT neck from 9/3/2024, possibly associated with the history of prior tracheostomy.  - CT Neck with contrast:   Stable mild thickening of the epiglottis and small preepiglottic soft tissue density. Resolved nasopharyngeal soft tissue thickening and left aryepiglottic fold thickening.   - pt currently on HFNC 50-50  - Upgraded to SDU  - F/U TTE (1/11/2025): LVEF 40-45%, GIDD, mildly enlarged right ventricle, enlarged right ventricle, stimated pulmonary artery systolic pressure is 40.3 mmHg assuming a  right atrial pressure of 8 mmHg, which is consistent with mild pulmonary  hypertension.  - CTS consult appreciated, planned for Tracheal dilatation and debridement on 1/14     #Abdominal pain   #Diarrhea  - KUB- Nonobstructive bowel gas pattern  - resolved     #Polychondritis   - as per Rheum note, patient is on cellcept 1000 G BID and hydroxychloroquine 100 mg and 200mg alternating days   - D/C immunosuppresants in setting of infection, restart on DC     #H/o SCC at the Base of tongue s/p surgery, CRT   #H/o Trach/ PEG s p reversal   - follows with ENT Dr. Louis, last seen 9/23/24,  - CT Neck 9/2024 showed Abnormal soft tissue within the posterior nasopharynx greater the right, new since the reference exams and nonspecific in appearance. The abnormal submucosal soft tissue in the preepiglottic space greater on the left, asymmetric thickening of the left aryepiglottic fold, and mild epiglottic thickening; similar when correlated with the PET/CT dated 5/12/2022.  Stable rim-calcified right thyroid nodule measuring 1.8 cm.   - US guided FNA by IR 10/7/24 of  Stable rim-calcified right thyroid nodule measuring 1.8 cm. Showed atypia but not malignant cells.   - ENT recs appreciated       - Encourage to clear secretions        - Continue current management  - FFL showed, thick purulent secretions on larynx, airway patent.   - CT Neck with contrast: Stable mild thickening of the epiglottis and small preepiglottic soft tissue density. Resolved nasopharyngeal soft tissue thickening and left aryepiglottic fold thickening.  - CTS - tracheal dilation     #HTN  #HLD  - hold home Anti-HTN as BP is soft   - c/w other home meds     #GERD  - C/w protonix     #handoff  - Pending bronchoscopy and tracheal dilation

## 2025-01-14 NOTE — PROGRESS NOTE ADULT - ASSESSMENT
IMPRESSION:    Acute hypoxemic resp failure  COPD exacerbation  New density around trac was not present few month ago suspect mucus  HO trach   History of lung nodules  History of relapsing polychondritis     PLAN:    CNS: Avoid CNS depressant    HEENT:  Oral care    PULMONARY:  HOB @ 45 degrees, HHFNC, keep SaO2 92 TO 96%, CTSx follow up , solumedrol 60 q 12, Neb q 4    CARDIOVASCULAR: avoid overload    GI: GI prophylaxis. Feeding per speech    RENAL:  F/u  lytes.  Correct as needed. accurate I/O    INFECTIOUS DISEASE: abx, procal, sputum gram stain/ cx    HEMATOLOGICAL:  DVT prophylaxis. LE doppler prelim negative    ENDOCRINE:  Follow up FS.  Insulin protocol if needed.    SDU

## 2025-01-14 NOTE — PROGRESS NOTE ADULT - SUBJECTIVE AND OBJECTIVE BOX
GENERAL SURGERY PROGRESS NOTE    Patient: GEREMIAS FULLER , 69y ([birth year])Female   MRN: [id number]  Location: [place] [address] A  Visit: 01-10-25 Inpatient  Date: 01-14-25 @ 04:17      Procedure/Dx/Injuries: tracheal stenosis    Events of past 24 hours: on high flow nasal cannula     PAST MEDICAL & SURGICAL HISTORY:  HTN (hypertension)      Hypercholesterolemia      GERD (gastroesophageal reflux disease)      Pulmonary nodule      CAD (coronary artery disease)      COPD (chronic obstructive pulmonary disease)      Relapsing polychondritis      Female cystocele      H/O pneumothorax  2016      S/P GEETHA-BSO      S/P colon resection      Vocal cord polyps  removal of same 2005      Pulmonary nodule      History of bladder surgery  2019      H/O breast biopsy  LEFT          Vitals:   T(F): 96.2 (01-14-25 @ 00:04), Max: 98 (01-13-25 @ 05:34)  HR: 100 (01-14-25 @ 00:04)  BP: 133/81 (01-14-25 @ 00:04)  RR: 18 (01-13-25 @ 20:00)  SpO2: 100% (01-14-25 @ 00:04)      Diet, NPO after Midnight:      NPO Start Date: 13-Jan-2025,   NPO Start Time: 23:59  Except Medications      Fluids: lactated ringers.: Solution, 1000 milliLiter(s) infuse at 75 mL/Hr  Provider's Contact #: [phone]      I & O's:      Bowel Movement: : [] YES [] NO  Flatus: : [] YES [] NO    PHYSICAL EXAM:  General: NAD,   HEENT:  Neck supple  Cardiac:  S1, S2,   Respiratory: bilateral breath sounds   Abdomen: Soft, non-distended, non-tender,     MEDICATIONS  (STANDING):  albuterol/ipratropium for Nebulization 3 milliLiter(s) Nebulizer every 20 minutes  albuterol/ipratropium for Nebulization 3 milliLiter(s) Nebulizer every 6 hours  azithromycin  IVPB 500 milliGRAM(s) IV Intermittent every 24 hours  furosemide    Tablet 40 milliGRAM(s) Oral daily  heparin   Injectable 5000 Unit(s) SubCutaneous every 12 hours  lactated ringers. 1000 milliLiter(s) (75 mL/Hr) IV Continuous <Continuous>  methylPREDNISolone sodium succinate Injectable 60 milliGRAM(s) IV Push every 8 hours  metoprolol succinate ER 25 milliGRAM(s) Oral daily  pantoprazole    Tablet 40 milliGRAM(s) Oral before breakfast    MEDICATIONS  (PRN):  acetaminophen     Tablet .. 650 milliGRAM(s) Oral every 6 hours PRN Mild Pain (1 - 3), Moderate Pain (4 - 6)      DVT PROPHYLAXIS: heparin   Injectable 5000 Unit(s) SubCutaneous every 12 hours    GI PROPHYLAXIS: pantoprazole    Tablet 40 milliGRAM(s) Oral before breakfast    ANTICOAGULATION:   ANTIBIOTICS:  azithromycin  IVPB 500 milliGRAM(s)            LAB/STUDIES:  Labs:  CAPILLARY BLOOD GLUCOSE                              14.4   10.13 )-----------( 356      ( 13 Jan 2025 20:00 )             43.6       Auto Neutrophil %: 90.5 % (01-13-25 @ 20:00)  Auto Immature Granulocyte %: 0.5 % (01-13-25 @ 20:00)  Auto Neutrophil %: 93.3 % (01-13-25 @ 11:43)  Auto Immature Granulocyte %: 0.6 % (01-13-25 @ 11:43)    01-13    137  |  96[L]  |  39[H]  ----------------------------<  221[H]  4.3   |  25  |  0.9      Calcium: 9.8 mg/dL (01-13-25 @ 20:00)      LFTs:             5.9  | 0.3  | 18       ------------------[67      ( 13 Jan 2025 20:00 )  3.9  | x    | 31          Lipase:x      Amylase:x           ABG - ( 10 Zeyad 2025 19:06 )  pH: 7.35  /  pCO2: 39    /  pO2: 172   / HCO3: 22    / Base Excess: -3.8  /  SaO2: 99.2              Coags:     12.20  ----< 1.03    ( 13 Jan 2025 20:00 )     22.4                Urinalysis Basic - ( 13 Jan 2025 20:00 )    Color: x / Appearance: x / SG: x / pH: x  Gluc: 221 mg/dL / Ketone: x  / Bili: x / Urobili: x   Blood: x / Protein: x / Nitrite: x   Leuk Esterase: x / RBC: x / WBC x   Sq Epi: x / Non Sq Epi: x / Bacteria: x        IMAGING:  none

## 2025-01-14 NOTE — PRE-OP CHECKLIST - VERIFY SURGICAL SITE/SIDE WITH PATIENT
done Pustules and 3 abscesses of b/l buttocks. The 3 abscess are mildly indurated, tender, but open and draining. No cellulitis.

## 2025-01-15 LAB
ALBUMIN SERPL ELPH-MCNC: 3.5 G/DL — SIGNIFICANT CHANGE UP (ref 3.5–5.2)
ALP SERPL-CCNC: 60 U/L — SIGNIFICANT CHANGE UP (ref 30–115)
ALT FLD-CCNC: 28 U/L — SIGNIFICANT CHANGE UP (ref 0–41)
ANION GAP SERPL CALC-SCNC: 10 MMOL/L — SIGNIFICANT CHANGE UP (ref 7–14)
AST SERPL-CCNC: 15 U/L — SIGNIFICANT CHANGE UP (ref 0–41)
BASOPHILS # BLD AUTO: 0 K/UL — SIGNIFICANT CHANGE UP (ref 0–0.2)
BASOPHILS NFR BLD AUTO: 0 % — SIGNIFICANT CHANGE UP (ref 0–1)
BILIRUB SERPL-MCNC: 0.4 MG/DL — SIGNIFICANT CHANGE UP (ref 0.2–1.2)
BUN SERPL-MCNC: 38 MG/DL — HIGH (ref 10–20)
CALCIUM SERPL-MCNC: 9.3 MG/DL — SIGNIFICANT CHANGE UP (ref 8.4–10.4)
CHLORIDE SERPL-SCNC: 99 MMOL/L — SIGNIFICANT CHANGE UP (ref 98–110)
CO2 SERPL-SCNC: 31 MMOL/L — SIGNIFICANT CHANGE UP (ref 17–32)
CREAT SERPL-MCNC: 0.9 MG/DL — SIGNIFICANT CHANGE UP (ref 0.7–1.5)
EGFR: 69 ML/MIN/1.73M2 — SIGNIFICANT CHANGE UP
EOSINOPHIL # BLD AUTO: 0 K/UL — SIGNIFICANT CHANGE UP (ref 0–0.7)
EOSINOPHIL NFR BLD AUTO: 0 % — SIGNIFICANT CHANGE UP (ref 0–8)
GLUCOSE BLDC GLUCOMTR-MCNC: 139 MG/DL — HIGH (ref 70–99)
GLUCOSE BLDC GLUCOMTR-MCNC: 155 MG/DL — HIGH (ref 70–99)
GLUCOSE BLDC GLUCOMTR-MCNC: 190 MG/DL — HIGH (ref 70–99)
GLUCOSE BLDC GLUCOMTR-MCNC: 249 MG/DL — HIGH (ref 70–99)
GLUCOSE SERPL-MCNC: 193 MG/DL — HIGH (ref 70–99)
HCT VFR BLD CALC: 44.5 % — SIGNIFICANT CHANGE UP (ref 37–47)
HGB BLD-MCNC: 14.3 G/DL — SIGNIFICANT CHANGE UP (ref 12–16)
IMM GRANULOCYTES NFR BLD AUTO: 0.4 % — HIGH (ref 0.1–0.3)
LYMPHOCYTES # BLD AUTO: 0.1 K/UL — LOW (ref 1.2–3.4)
LYMPHOCYTES # BLD AUTO: 0.9 % — LOW (ref 20.5–51.1)
MAGNESIUM SERPL-MCNC: 2.2 MG/DL — SIGNIFICANT CHANGE UP (ref 1.8–2.4)
MCHC RBC-ENTMCNC: 28.3 PG — SIGNIFICANT CHANGE UP (ref 27–31)
MCHC RBC-ENTMCNC: 32.1 G/DL — SIGNIFICANT CHANGE UP (ref 32–37)
MCV RBC AUTO: 88.1 FL — SIGNIFICANT CHANGE UP (ref 81–99)
MONOCYTES # BLD AUTO: 0.28 K/UL — SIGNIFICANT CHANGE UP (ref 0.1–0.6)
MONOCYTES NFR BLD AUTO: 2.4 % — SIGNIFICANT CHANGE UP (ref 1.7–9.3)
NEUTROPHILS # BLD AUTO: 11.29 K/UL — HIGH (ref 1.4–6.5)
NEUTROPHILS NFR BLD AUTO: 96.3 % — HIGH (ref 42.2–75.2)
NRBC # BLD: 0 /100 WBCS — SIGNIFICANT CHANGE UP (ref 0–0)
NRBC BLD-RTO: 0 /100 WBCS — SIGNIFICANT CHANGE UP (ref 0–0)
PHOSPHATE SERPL-MCNC: 4.2 MG/DL — SIGNIFICANT CHANGE UP (ref 2.1–4.9)
PLATELET # BLD AUTO: 290 K/UL — SIGNIFICANT CHANGE UP (ref 130–400)
PMV BLD: 9.6 FL — SIGNIFICANT CHANGE UP (ref 7.4–10.4)
POTASSIUM SERPL-MCNC: 4.7 MMOL/L — SIGNIFICANT CHANGE UP (ref 3.5–5)
POTASSIUM SERPL-SCNC: 4.7 MMOL/L — SIGNIFICANT CHANGE UP (ref 3.5–5)
PROT SERPL-MCNC: 5.2 G/DL — LOW (ref 6–8)
RBC # BLD: 5.05 M/UL — SIGNIFICANT CHANGE UP (ref 4.2–5.4)
RBC # FLD: 13.4 % — SIGNIFICANT CHANGE UP (ref 11.5–14.5)
SODIUM SERPL-SCNC: 140 MMOL/L — SIGNIFICANT CHANGE UP (ref 135–146)
WBC # BLD: 11.72 K/UL — HIGH (ref 4.8–10.8)
WBC # FLD AUTO: 11.72 K/UL — HIGH (ref 4.8–10.8)

## 2025-01-15 PROCEDURE — 99233 SBSQ HOSP IP/OBS HIGH 50: CPT

## 2025-01-15 PROCEDURE — 71045 X-RAY EXAM CHEST 1 VIEW: CPT | Mod: 26

## 2025-01-15 RX ORDER — METHYLPREDNISOLONE ACETATE 40 MG/ML
60 VIAL (ML) INJECTION EVERY 12 HOURS
Refills: 0 | Status: DISCONTINUED | OUTPATIENT
Start: 2025-01-15 | End: 2025-01-16

## 2025-01-15 RX ORDER — ACETAMINOPHEN, DIPHENHYDRAMINE HCL, PHENYLEPHRINE HCL 325; 25; 5 MG/1; MG/1; MG/1
3 TABLET ORAL AT BEDTIME
Refills: 0 | Status: DISCONTINUED | OUTPATIENT
Start: 2025-01-15 | End: 2025-01-19

## 2025-01-15 RX ORDER — HEPARIN SODIUM,PORCINE 10000/ML
5000 VIAL (ML) INJECTION EVERY 12 HOURS
Refills: 0 | Status: DISCONTINUED | OUTPATIENT
Start: 2025-01-15 | End: 2025-01-29

## 2025-01-15 RX ADMIN — ACETAMINOPHEN, DIPHENHYDRAMINE HCL, PHENYLEPHRINE HCL 3 MILLIGRAM(S): 325; 25; 5 TABLET ORAL at 22:18

## 2025-01-15 RX ADMIN — Medication 60 MILLIGRAM(S): at 05:20

## 2025-01-15 RX ADMIN — Medication 5000 UNIT(S): at 17:34

## 2025-01-15 RX ADMIN — Medication 25 MILLIGRAM(S): at 05:20

## 2025-01-15 RX ADMIN — Medication 4: at 16:47

## 2025-01-15 RX ADMIN — ANTISEPTIC SURGICAL SCRUB 1 APPLICATION(S): 0.04 SOLUTION TOPICAL at 11:21

## 2025-01-15 RX ADMIN — Medication 60 MILLIGRAM(S): at 16:45

## 2025-01-15 RX ADMIN — ACETAMINOPHEN 650 MILLIGRAM(S): 160 SUSPENSION ORAL at 06:29

## 2025-01-15 RX ADMIN — Medication 40 MILLIGRAM(S): at 05:20

## 2025-01-15 RX ADMIN — PANTOPRAZOLE 40 MILLIGRAM(S): 20 TABLET, DELAYED RELEASE ORAL at 05:20

## 2025-01-15 RX ADMIN — ANTISEPTIC SURGICAL SCRUB 15 MILLILITER(S): 0.04 SOLUTION TOPICAL at 05:20

## 2025-01-15 RX ADMIN — Medication 2: at 07:54

## 2025-01-15 NOTE — DIETITIAN INITIAL EVALUATION ADULT - OTHER CALCULATIONS
Using ABW: ENERGY: 7682-2686 kcal/day (20-25 kcal/kg); PROTEIN:  g/day (1.2-1.4 g/kg); FLUID: 1mL/kcal -- with consideration for age, BMI, acuity of illness

## 2025-01-15 NOTE — DIETITIAN INITIAL EVALUATION ADULT - FLUID ACCUMULATION
11/8/2023         RE: Amos Walker  [address] [facility] [address]        Dear Colleague,    Thank you for referring your patient, Amos Walker, to the [facility] [place]. Please see a copy of my visit note below.    I was present with the resident during the history and exam.  I discussed the case with the resident and agree with the findings as documented in the assessment and plan.    Orthopedic clinic note    Date of surgery: 6/30/2023  Surgery: Right hip hemiarthroplasty, cemented  Surgeon: Dr. Keller    Subjective: Mr. Walker is a 75-year-old male who presents for 4-month follow-up after the above procedure.  He was last seen at the 2-week follow-up at which time he was making a good recovery at [facility].  He presents today for follow-up.  [place].  He continues with physical therapy at [facility]. He has been very happy with his therapy and has seen progress.  For example, he had trouble initially with internal rotation of his leg.  This has since resolved.  He is having right groin pain he brings a note from his physical therapist describing the symptoms.  He thinks that this pain started approximately last week after a physical therapy session.  He wonders if he strained a muscle.  From the description, it sounds like a possible iliopsoas strain.  However, he is having difficulty getting in and out of a car and has had continued right groin pain since this incident.  He discontinued his walker a couple of weeks ago.  He is using a cane today.  He has been taking his calcium and vitamin D and is on Fosamax.    He did drive himself here today.    Objective:   No acute distress, appears relatively thin but vivacious  Nonlabored breathing on room air  Musculoskeletal:  Ambulates with a cane.  Gait relatively nonantalgic.  He has groin pain with passive hip range of motion.  He has groin pain with resisted hip flexion.  He has 5 out of 5  "strength in the quads, hamstrings.  Neurovascularly intact in the foot.  No pain over the greater trochanter.    Imaging:  Radiographs of the right hip demonstrate a cemented hemiarthroplasty.  There is been no interval change in position of the implants.  There is may be some sclerosis of the acetabulum.  No fractures.  Overall, components appear in good position.    Assessment/plan:  Amos is a 75-year-old male who is 4 months status post right hip hemiarthroplasty, cemented.    He is reporting, and his physical therapist is describing, right groin pain that is relatively new.  Etiology seems likely to be an iliopsoas strain versus progressive acetabular arthritis.  We discussed options.  It is possible that there is impingement of the iliopsoas with activation.  We would hope that this would settle down with time.  We gave him instructions on some hip flexor stretches that he can also work on with his therapist.  A corticosteroid injection, image guided, might be of utility in the future.  He would like to manage things conservatively for now.  It is also possible that he has progressive acetabular arthritis.  Because the \"socket\" was not replaced he is at risk for developing wear.  The solution here is a repeat surgery in which we implant a cup and revise the femoral head.  We would not have to revise the stem.  Again, he is interested in conservative management.    We will see him again in a few months virtually.  In the meantime, he will continue his work with his physical therapist.    All questions were answered.    The patient was seen and discussed with Dr. Keller.    Jayleen Aburto, PGY-4        Abdi Keller MD  " No edema noted

## 2025-01-15 NOTE — DIETITIAN INITIAL EVALUATION ADULT - PERTINENT LABORATORY DATA
01-15    140  |  99  |  38[H]  ----------------------------<  193[H]  4.7   |  31  |  0.9    Ca    9.3      15 Zeyad 2025 05:22  Phos  4.2     01-15  Mg     2.2     01-15    TPro  5.2[L]  /  Alb  3.5  /  TBili  0.4  /  DBili  x   /  AST  15  /  ALT  28  /  AlkPhos  60  01-15  POCT Blood Glucose.: 139 mg/dL (01-15-25 @ 11:21)  A1C with Estimated Average Glucose Result: 6.3 % (01-11-25 @ 06:54)

## 2025-01-15 NOTE — DIETITIAN INITIAL EVALUATION ADULT - ORAL INTAKE PTA/DIET HISTORY
Pt reports having good appetite prior to admit; consumed 2 meals daily. Pt takes a multivitamin daily. Denies drinking protein shake supplements. NKFA; denies any restrictive cultural or [employer] food preferences.

## 2025-01-15 NOTE — CONSULT NOTE ADULT - SUBJECTIVE AND OBJECTIVE BOX
GEREMIAS FULLER  69y, Female  Allergy: No Known Drug Allergies  CONTRAST DYS (Nausea)      CHIEF COMPLAINT:    LOS  5d    HPI:  A 68yo Female with PMH of HTN, HLD, GERD, CAD, COPD not on Home O2, Hx of Pneumothorax, polychondirits, SCC of BOT s/p Trach/PEG (reversed in 4/2021), s/p CRT (7 sessions in 2021) who presents with SOB for 1 week duration. History goes back to around 1 week ago When the patient started having Shortness of breath, productive cough, and malaise. Patient dnies any fever. Patient reported that her son was coughing. Patient reported some chest pain previously when coughing . Patient reports diarrhea for 4 days. which resolved now. PAtient denies an urinary symptoms. Patient is admitted for PNA, and COPD excacerbation .    Patient reports some intermittent left sided chest pain when coughing. and abdominal distention as well.  Patient follows with ENT Dr. Louis, last seen 9/23/24, CT Neck 9/2024 showed Abnormal soft tissue within the posterior nasopharynx greater the right, new since the reference exams and nonspecific in appearance. The abnormal submucosal soft tissue in the preepiglottic space greater on the left, asymmetric thickening of the left aryepiglottic fold, and mild epiglottic thickening; similar when correlated with the PET/CT dated 5/12/2022.  Stable rim-calcified right thyroid nodule measuring 1.8 cm. US guided FNA by IR 10/7/24 of  Stable rim-calcified right thyroid nodule measuring 1.8 cm. Showed atypia but not malignant cells.    ENT evaluated the pt for hoarseness, FFL showed, thick purulent secretions on larynx, airway patent. Recommended CT Neck with IVC for further evaluation, Albuterol neb treatment, Humidified O2 ( on 3L of O2 via NC)    Labs significant for WBC 11.2 K (neutrophilic), troponin 96 > 75, AG 16    09:57 - VBG - pH: 7.39  | pCO2: 38    | pO2: 36    | Lactate: 3.0      In Ed the patient was given 2 L  fluid bolus, Azithromycin and rocephin, duoneb and solu-medrol    CXR showed reticular infiltrates    EKG shows PACs, with MAT    VS significant for using 4 L NC, Tachycardia 126    ICU Vital Signs Last 24 Hrs  T(C): 37.3 (10 Zeyad 2025 15:36), Max: 37.8 (10 Zeyad 2025 10:41)  T(F): 99.1 (10 Zeyad 2025 15:36), Max: 100 (10 Zeyad 2025 10:41)  HR: 112 (10 Zeyad 2025 15:36) (97 - 126)  BP: 95/66 (10 Zeyad 2025 15:36) (95/66 - 116/86)  BP(mean): 76 (10 Zeyad 2025 15:36) (76 - 76)  RR: 20 (10 Zeyad 2025 15:36) (20 - 22)  SpO2: 94% (10 Zeyad 2025 15:36) (94% - 95%)  Patient On (Oxygen Delivery Method): nasal cannula  O2 Flow (L/min): 4             (10 Zeyad 2025 15:46)      INFECTIOUS DISEASE HISTORY:  History as above.  ID consulted for elevated fungitell  Patient with Hx of SCC of BOT s/p Trach/PEG, reversed in 2001, who presents withs hortness of breath  CT Neck showing development of focal moderate tracheal stenosis at level of thyroid with partially collapsed appearance of right tracheal walkl.  Also with mild thickening of epiglottis and small pre-epiglottic soft tissue sensity  CT Chest 1/11 with bilateral basilar, partial RML atelectasis and extensive emphysema   Has been on ceftriaxone 1/10-1/12, Azithromycin 1/10-1/13  No Fevers, leukocytosis        PAST MEDICAL & SURGICAL HISTORY:  HTN (hypertension)      Hypercholesterolemia      GERD (gastroesophageal reflux disease)      Pulmonary nodule      CAD (coronary artery disease)      COPD (chronic obstructive pulmonary disease)      Relapsing polychondritis      Female cystocele      H/O pneumothorax  2016      S/P GEETHA-BSO      S/P colon resection      Vocal cord polyps  removal of same 2005      Pulmonary nodule      History of bladder surgery  2019      H/O breast biopsy  LEFT          FAMILY HISTORY  Family history of diabetes mellitus (DM)        SOCIAL HISTORY  Social History:        ROS  General: Denies rigors, nightsweats  HEENT: Denies headache, rhinorrhea, sore throat, eye pain  CV: Denies CP, palpitations  PULM: Denies wheezing, hemoptysis  GI: Denies hematemesis, hematochezia, melena  : Denies discharge, hematuria  MSK: Denies arthralgias, myalgias  SKIN: Denies rash, lesions  NEURO: Denies paresthesias, weakness  PSYCH: Denies depression, anxiety    VITALS:  T(F): 97.4, Max: 97.9 (01-14-25 @ 10:00)  HR: 65  BP: 102/74  RR: 20Vital Signs Last 24 Hrs  T(C): 36.3 (15 Zeyad 2025 04:16), Max: 36.6 (14 Jan 2025 10:00)  T(F): 97.4 (15 Zeyad 2025 04:16), Max: 97.9 (14 Jan 2025 10:00)  HR: 65 (15 Zeyad 2025 04:16) (64 - 92)  BP: 102/74 (15 Zeyad 2025 04:16) (99/57 - 136/96)  BP(mean): 92 (14 Jan 2025 20:00) (72 - 113)  RR: 20 (15 Zeyad 2025 04:16) (18 - 38)  SpO2: 95% (15 Zeyad 2025 04:16) (91% - 98%)    Parameters below as of 14 Jan 2025 20:00  Patient On (Oxygen Delivery Method): nasal cannula, high flow        PHYSICAL EXAM:  Gen: NAD, resting in bed  HEENT: Normocephalic, atraumatic  Neck: supple, no lymphadenopathy  CV: Regular rate & regular rhythm  Lungs: decreased BS at bases, no fremitus  Abdomen: Soft, BS present  Ext: Warm, well perfused  Neuro: non focal, awake  Skin: no rash, no erythema  Lines: no phlebitis    TESTS & MEASUREMENTS:                        14.3  11.72 )-----------( 290      ( 15 Zeyad 2025 05:22 )             44.5    01-13    137  |  96[L]  |  39[H]  ----------------------------<  221[H]  4.3   |  25  |  0.9    Ca    9.8      13 Jan 2025 20:00  Mg     2.1     01-13    TPro  5.9[L]  /  Alb  3.9  /  TBili  0.3  /  DBili  x   /  AST  18  /  ALT  31  /  AlkPhos  67  01-13      LIVER FUNCTIONS - ( 13 Jan 2025 20:00 )  Alb: 3.9 g/dL / Pro: 5.9 g/dL / ALK PHOS: 67 U/L / ALT: 31 U/L / AST: 18 U/L / GGT: x          Urinalysis Basic - ( 13 Jan 2025 20:00 )    Color: x / Appearance: x / SG: x / pH: x  Gluc: 221 mg/dL / Ketone: x  / Bili: x / Urobili: x  Blood: x / Protein: x / Nitrite: x  Leuk Esterase: x / RBC: x / WBC x  Sq Epi: x / Non Sq Epi: x / Bacteria: x          Blood Gas Venous - Lactate: 3.0 mmol/L (01-10-25 @ 09:57)      INFECTIOUS DISEASES TESTING  MRSA PCR Result.: Negative (01-13-25 @ 12:40)  Procalcitonin: 0.08 ng/mL (01-10-25 @ 21:09)  Fungitell: >500 pg/mL (01-10-25 @ 21:09)      RADIOLOGY & ADDITIONAL TESTS:  I have personally reviewed the last Chest xray  CXR  Xray Chest 1 View- PORTABLE-Urgent:  ACC: [id number] EXAM:  XR CHEST PORTABLE URGENT 1V   ORDERED BY: AMI BARON    PROCEDURE DATE:  01/14/2025          INTERPRETATION:  CLINICAL HISTORY: Post cardiac surgery    COMPARISON: Chest x-ray January 11, 2025.    TECHNIQUE: Portable frontal chest radiograph.    FINDINGS:    Support devices: EKG leads overlie thorax, obscuring anatomy.    Cardiac/mediastinum/hilum: Stable.    Lung parenchyma/Pleura: Bilateral diffuse interstitial opacities. No  pneumothorax    Skeleton/soft tissues: Stable.      IMPRESSION:    Bilateral diffuse interstitial opacities. No pneumothorax    --- End of Report ---            SD JOSUE MD; Attending Radiologist  This document has been electronically signed. Jan 14 2025  3:15PM (01-14-25 @ 10:08)      CT      CARDIOLOGY TESTING  12 Lead ECG:  Ventricular Rate 74 BPM    Atrial Rate 74 BPM    P-R Interval 124 ms    QRS Duration 88 ms    Q-T Interval 448 ms    QTC Calculation(Bazett) 497 ms    P Axis 63 degrees    R Axis 11 degrees    T Axis 146 degrees    Diagnosis Line Sinus rhythm with Premature ventricular complexes or Fusion complexes  T wave abnormality, consider anterolateral ischemia  Abnormal ECG    Confirmed by LAISHA SHANNON, [facility] ([id number]) on 1/15/2025 12:05:56 AM (01-14-25 @ 19:48)  12 Lead ECG:  Ventricular Rate 95 BPM    Atrial Rate 95 BPM    P-R Interval 128 ms    QRS Duration 88 ms    Q-T Interval 368 ms    QTC Calculation(Bazett) 462 ms    P Axis 66 degrees    R Axis 23 degrees    T Axis 42 degrees    Diagnosis Line Sinus rhythm with occasional Premature ventricular complexes and Premature  atrial complexes  Nonspecific T wave abnormality  Abnormal ECG    Confirmed by LAISHA SHANNON, [facility] ([id number]) on 1/15/2025 12:05:44 AM (01-13-25 @ 20:36)      MEDICATIONS  albuterol/ipratropium for Nebulization 3 Nebulizer every 20 minutes  chlorhexidine 0.12% Liquid 15 Oral Mucosa every 12 hours  chlorhexidine 2% Cloths 1 Topical daily  dextrose 5%. 1000 IV Continuous <Continuous>  dextrose 5%. 1000 IV Continuous <Continuous>  dextrose 50% Injectable 25 IV Push once  dextrose 50% Injectable 12.5 IV Push once  dextrose 50% Injectable 25 IV Push once  furosemide    Tablet 40 Oral daily  glucagon  Injectable 1 IntraMuscular once  insulin lispro (ADMELOG) corrective regimen sliding scale  SubCutaneous two times a day before meals  methylPREDNISolone sodium succinate Injectable 60 IV Push every 8 hours  metoprolol succinate ER 25 Oral daily  pantoprazole    Tablet 40 Oral before breakfast      Weight  Weight (kg): 77.1 (01-14-25 @ 07:26)    ANTIBIOTICS:      ALLERGIES:  No Known Drug Allergies  CONTRAST DYS (Nausea)

## 2025-01-15 NOTE — CONSULT NOTE ADULT - ASSESSMENT
ASSESSMENT  A 70yo Female with PMH of HTN, HLD, GERD, CAD, COPD not on Home O2, Hx of Pneumothorax, polychondirits, SCC of BOT s/p Trach/PEG (reversed in 4/2021), s/p CRT (7 sessions in 2021) who presents with SOB for 1 week duration.    IMPRESSION  #SCC of BOT s/p Trach/PEG (reversed 2021) s/p CRT  #Dyspnea/Acute Hypoxemic respiratory failure  #Tracheal Stenosis  - CT Neck Soft Tissue w/ IV Cont (01.11.25 @ 08:27): Interval development of focal moderate tracheal stenosis at the level of  the thyroid with partially collapsed appearance of the right tracheal  wall since the prior CT neck from 9/3/2024, possibly associated with the  history of prior tracheostomy. Stable mild thickening of the epiglottis and small preepiglottic soft  tissue density. Resolved nasopharyngeal soft tissue thickening and left  aryepiglottic fold thickening. Increased biapical reticular changes. Please see separately dictated  concurrent chest CT.  - CT Chest w/ IV Cont (01.11.25 @ 08:29): Since  September 3, 2024 Bilateral basilar, partial right middle lobe atelectasis. Extensive emphysema with superimposed mild groundglass opacities/septal  thickening suggesting underlying edema( not present on earlier study). CT neck performed on the same day, see separate report  - s/p bronch 1/14 -- tracheal dilation with excision of legion causing tracheal stenosis     #Elevated Fungitell    #CAD  #COPD    #Obesity BMI (kg/m2): 29.2  #DM  #Abx allergy: No Known Drug Allergies        RECOMMENDATIONS  - elevated fungitell -- no clear evidence of invasive Candida/Mold infection on CT imaging; No oral thursh seen; denies simon dysphagia/odynophagia, although scared to take food by mouth due to previous tracheal stenosis   - CT Chest reviewed -- faint Ground glass opacities, although non-specific   - fungitell possible false positive; does not have clear risk factors/prolonged steroid use, but will keep PJP in differential as she is currently on HFNC   -- currently with dry cough, but if able to produce sputum can send for PJP PCR; if O2 requirements continue to improve, unlikely to be PJP     Please call or message on Microsoft Teams if with any questions.  Spectra [phone]

## 2025-01-15 NOTE — PROGRESS NOTE ADULT - SUBJECTIVE AND OBJECTIVE BOX
INTERVAL HPI/OVERNIGHT EVENTS:  Patient was seen and examined at bedside. As per nurse and patient, no o/n events, patient resting comfortably. No complaints at this time. Patient denies: fever, chills, dizziness, weakness, HA, Changes in vision, CP, palpitations, SOB, cough, N/V/D/C, dysuria, changes in bowel movements, LE edema. ROS otherwise negative.    VITAL SIGNS:  T(F): 96.4 (01-15-25 @ 08:00)  HR: 70 (01-15-25 @ 08:00)  BP: 111/76 (01-15-25 @ 08:00)  RR: 20 (01-15-25 @ 08:00)  SpO2: 98% (01-15-25 @ 08:00)  Wt(kg): --    PHYSICAL EXAM:    Constitutional: WDWN, NAD  HEENT: PERRL, EOMI, sclera non-icteric, neck supple, trachea midline, no masses, no JVD, MMM, good dentition  Respiratory: CTA b/l, good air entry b/l, no wheezing, no rhonchi, no rales, without accessory muscle use and no intercostal retractions  Cardiovascular: RRR, normal S1S2, no M/R/G  Gastrointestinal: soft, NTND, no masses palpable, BS normal  Extremities: Warm, well perfused, pulses equal bilateral upper and lower extremities, no edema, no clubbing  Neurological: AAOx3, CN Grossly intact  Skin: Normal temperature, warm, dry    MEDICATIONS  (STANDING):  albuterol/ipratropium for Nebulization 3 milliLiter(s) Nebulizer every 20 minutes  chlorhexidine 0.12% Liquid 15 milliLiter(s) Oral Mucosa every 12 hours  chlorhexidine 2% Cloths 1 Application(s) Topical daily  dextrose 5%. 1000 milliLiter(s) (100 mL/Hr) IV Continuous <Continuous>  dextrose 5%. 1000 milliLiter(s) (50 mL/Hr) IV Continuous <Continuous>  dextrose 50% Injectable 25 Gram(s) IV Push once  dextrose 50% Injectable 25 Gram(s) IV Push once  dextrose 50% Injectable 12.5 Gram(s) IV Push once  furosemide    Tablet 40 milliGRAM(s) Oral daily  glucagon  Injectable 1 milliGRAM(s) IntraMuscular once  insulin lispro (ADMELOG) corrective regimen sliding scale   SubCutaneous two times a day before meals  methylPREDNISolone sodium succinate Injectable 60 milliGRAM(s) IV Push every 8 hours  metoprolol succinate ER 25 milliGRAM(s) Oral daily  pantoprazole    Tablet 40 milliGRAM(s) Oral before breakfast    MEDICATIONS  (PRN):  acetaminophen     Tablet .. 650 milliGRAM(s) Oral every 6 hours PRN Mild Pain (1 - 3), Moderate Pain (4 - 6)  dextrose Oral Gel 15 Gram(s) Oral once PRN Blood Glucose LESS THAN 70 milliGRAM(s)/deciliter      Allergies    No Known Drug Allergies  CONTRAST DYS (Nausea)    Intolerances        LABS:                        14.3   11.72 )-----------( 290      ( 15 Zeyad 2025 05:22 )             44.5     01-15    140  |  99  |  38[H]  ----------------------------<  193[H]  4.7   |  31  |  0.9    Ca    9.3      15 Zeyad 2025 05:22  Phos  4.2     01-15  Mg     2.2     01-15    TPro  5.2[L]  /  Alb  3.5  /  TBili  0.4  /  DBili  x   /  AST  15  /  ALT  28  /  AlkPhos  60  01-15    PT/INR - ( 13 Jan 2025 20:00 )   PT: 12.20 sec;   INR: 1.03 ratio         PTT - ( 13 Jan 2025 20:00 )  PTT:22.4 sec  Urinalysis Basic - ( 15 Zeyad 2025 05:22 )    Color: x / Appearance: x / SG: x / pH: x  Gluc: 193 mg/dL / Ketone: x  / Bili: x / Urobili: x   Blood: x / Protein: x / Nitrite: x   Leuk Esterase: x / RBC: x / WBC x   Sq Epi: x / Non Sq Epi: x / Bacteria: x        RADIOLOGY & ADDITIONAL TESTS:  Reviewed

## 2025-01-15 NOTE — PHYSICAL THERAPY INITIAL EVALUATION ADULT - GENERAL OBSERVATIONS, REHAB EVAL
pt. encountered in bed in NAD + tele,  +O2 via nc, +sequentials, +primafit +IV lock. pt eager to get OOB [id number] pt. encountered in bed in NAD + tele,  +O2 via nc, +sequentials, +primafit +IV lock. Pt eager to get OOB and motivated to participate. [phone]

## 2025-01-15 NOTE — PROGRESS NOTE ADULT - ASSESSMENT
A 68yo Female with PMH of HTN, HLD, GERD, CAD, COPD not on Home O2, Hx of Pneumothorax, SCC of BOT s/p Trach/PEG (reversed in 4/2021), s/p CRT (7 sessions in 2021) who presents with SOB for 1 week duration. History goes back to around 1 week ago When the patient started having Shortness of breath, productive cough, and malaise. Patient denies any fever. Patient reported that her son was coughing. Patient reported some chest pain previously when coughing . Patient reports diarrhea for 4 days. which resolved now. PAtient denies an urinary symptoms. Patient is admitted for PNA, and COPD excacerbation.     #Acute hypoxic resp failure 2/2 COPD exacerbation  with tracheal stenosis   #Unlikley pneumonia   #HAGMA 2/2 Lactic Acidosis   #CAD  #likely HF exacerbation  #H/o Pneumothorax  - WBC 11.2 K (neutrophilic), Tachycardic to 126, Lactate 3.0, AG 16, VBG - pH: 7.39  | pCO2: 38    | pO2: 36    | Lactate: 3.0  on admission  - No CP at this time  - EKG- PACs, with MAT  - trop 96 --> 75 trend for now  - s/p 2.5L fluid bolus and IVF, started on Azithromycin and rocephin, duoneb and solu-medrol  - CXR- reticular infiltrates   - Complains of increasing wheezing >> increased dose of steroids, wheezing improving  - c/w solumedrol 60 mg IV TID, duoneb, albuterol   - d/c rocephin and c/w azithro  - wean off O2 as tolerated  - CT chest with IV contrast: Extensive emphysema with superimposed mild groundglass opacities/septal thickening suggesting underlying edema( not present on earlier study).Interval development of focal moderate tracheal stenosis at the level of the thyroid with partially collapsed appearance of the right tracheal wall since the prior CT neck from 9/3/2024, possibly associated with the history of prior tracheostomy.  - CT Neck with contrast: Stable mild thickening of the epiglottis and small preepiglottic soft tissue density. Resolved nasopharyngeal soft tissue thickening and left aryepiglottic fold thickening.   - pt currently on HFNC 50-50  - Upgraded to SDU  - F/U TTE (1/11/2025): LVEF 40-45%, GIDD, mildly enlarged right ventricle, enlarged right ventricle, stimated pulmonary artery systolic pressure is 40.3 mmHg assuming a  right atrial pressure of 8 mmHg, which is consistent with mild pulmonary  hypertension.  - CTS consult appreciated, s/p bronchoscopy with tracheal dilatation and debridement on 1/14     #Abdominal pain   #Diarrhea  - KUB- Nonobstructive bowel gas pattern  - resolved     #Polychondritis   - as per Rheum note, patient is on cellcept 1000 G BID and hydroxychloroquine 100 mg and 200mg alternating days   - D/C immunosuppresants in setting of infection, restart on DC     #H/o SCC at the Base of tongue s/p surgery, CRT   #H/o Trach/ PEG s p reversal   - follows with ENT Dr. Louis, last seen 9/23/24,  - CT Neck 9/2024 showed Abnormal soft tissue within the posterior nasopharynx greater the right, new since the reference exams and nonspecific in appearance. The abnormal submucosal soft tissue in the preepiglottic space greater on the left, asymmetric thickening of the left aryepiglottic fold, and mild epiglottic thickening; similar when correlated with the PET/CT dated 5/12/2022.  Stable rim-calcified right thyroid nodule measuring 1.8 cm.   - US guided FNA by IR 10/7/24 of  Stable rim-calcified right thyroid nodule measuring 1.8 cm. Showed atypia but not malignant cells.   - ENT recs appreciated       - Encourage to clear secretions        - Continue current management  - FFL showed, thick purulent secretions on larynx, airway patent.   - CT Neck with contrast: Stable mild thickening of the epiglottis and small preepiglottic soft tissue density. Resolved nasopharyngeal soft tissue thickening and left aryepiglottic fold thickening.  - CTS - s/p bronchoscopy with tracheal dilatation and debridement on 1/14     #HTN  #HLD  - hold home Anti-HTN as BP is soft   - c/w other home meds     #GERD  - C/w protonix  A 68yo Female with PMH of HTN, HLD, GERD, CAD, COPD not on Home O2, Hx of Pneumothorax, SCC of BOT s/p Trach/PEG (reversed in 4/2021), s/p CRT (7 sessions in 2021) who presents with SOB for 1 week duration. History goes back to around 1 week ago When the patient started having Shortness of breath, productive cough, and malaise. Patient denies any fever. Patient reported that her son was coughing. Patient reported some chest pain previously when coughing . Patient reports diarrhea for 4 days. which resolved now. PAtient denies an urinary symptoms. Patient is admitted for PNA, and COPD excacerbation.     #Acute hypoxic resp failure 2/2 COPD exacerbation  with tracheal stenosis   #Unlikley pneumonia   #HAGMA 2/2 Lactic Acidosis   #CAD  #likely HF exacerbation  #H/o Pneumothorax  - WBC 11.2 K (neutrophilic), Tachycardic to 126, Lactate 3.0, AG 16, VBG - pH: 7.39  | pCO2: 38    | pO2: 36    | Lactate: 3.0  on admission  - No CP at this time  - EKG- PACs, with MAT  - trop 96 --> 75 trend for now  - s/p 2.5L fluid bolus and IVF, started on Azithromycin and rocephin, duoneb and solu-medrol  - CXR- reticular infiltrates   - Complains of increasing wheezing >> increased dose of steroids, wheezing improving  - c/w solumedrol 60 mg IV TID, duoneb, albuterol   - d/c rocephin and c/w azithro  - wean off O2 as tolerated  - CT chest with IV contrast: Extensive emphysema with superimposed mild groundglass opacities/septal thickening suggesting underlying edema( not present on earlier study).Interval development of focal moderate tracheal stenosis at the level of the thyroid with partially collapsed appearance of the right tracheal wall since the prior CT neck from 9/3/2024, possibly associated with the history of prior tracheostomy.  - CT Neck with contrast: Stable mild thickening of the epiglottis and small preepiglottic soft tissue density. Resolved nasopharyngeal soft tissue thickening and left aryepiglottic fold thickening.   - pt currently on HFNC 50-50  - Upgraded to SDU  - F/U TTE (1/11/2025): LVEF 40-45%, GIDD, mildly enlarged right ventricle, enlarged right ventricle, stimated pulmonary artery systolic pressure is 40.3 mmHg assuming a  right atrial pressure of 8 mmHg, which is consistent with mild pulmonary  hypertension.  - CTS consult appreciated, s/p bronchoscopy with tracheal dilatation and debridement on 1/14     #Positive fungitell  - > 500 (1/10/2025)  - Pending ID eval  #Abdominal pain   #Diarrhea  - KUB- Nonobstructive bowel gas pattern  - resolved     #Polychondritis   - as per Rheum note, patient is on cellcept 1000 G BID and hydroxychloroquine 100 mg and 200mg alternating days   - D/C immunosuppresants in setting of infection, restart on DC     #H/o SCC at the Base of tongue s/p surgery, CRT   #H/o Trach/ PEG s p reversal   - follows with ENT Dr. Louis, last seen 9/23/24,  - CT Neck 9/2024 showed Abnormal soft tissue within the posterior nasopharynx greater the right, new since the reference exams and nonspecific in appearance. The abnormal submucosal soft tissue in the preepiglottic space greater on the left, asymmetric thickening of the left aryepiglottic fold, and mild epiglottic thickening; similar when correlated with the PET/CT dated 5/12/2022.  Stable rim-calcified right thyroid nodule measuring 1.8 cm.   - US guided FNA by IR 10/7/24 of  Stable rim-calcified right thyroid nodule measuring 1.8 cm. Showed atypia but not malignant cells.   - ENT recs appreciated       - Encourage to clear secretions        - Continue current management  - FFL showed, thick purulent secretions on larynx, airway patent.   - CT Neck with contrast: Stable mild thickening of the epiglottis and small preepiglottic soft tissue density. Resolved nasopharyngeal soft tissue thickening and left aryepiglottic fold thickening.  - CTS - s/p bronchoscopy with tracheal dilatation and debridement on 1/14     #HTN  #HLD  - hold home Anti-HTN as BP is soft   - c/w other home meds     #GERD  - C/w protonix

## 2025-01-15 NOTE — PROGRESS NOTE ADULT - ASSESSMENT
ASSESSMENT:  69y F w/ PMHx of  HTN, HLD, GERD, CAD, COPD not on Home O2, Hx of Pneumothorax, polychondirits, SCC of BOT s/p Trach/PEG (reversed in 4/2021), s/p CRT (7 sessions in 2021) who presents with SOB for 1 week duration. CT neck showed tracheal stenosis and mucous secretions at level of thyroid, now POD#1 s/p bronchoscopy and tracheal dilation    PLAN:  continue soft and bite sized diet  monitor respiratory status: currently on HFNC   rest of the care per primary team     spectra [phone]   ASSESSMENT:  69y F w/ PMHx of  HTN, HLD, GERD, CAD, COPD not on Home O2, Hx of Pneumothorax, polychondirits, SCC of BOT s/p Trach/PEG (reversed in 4/2021), s/p CRT (7 sessions in 2021) who presents with SOB for 1 week duration. CT neck showed tracheal stenosis and mucous secretions at level of thyroid, now POD#1 s/p bronchoscopy and tracheal dilation    PLAN:  continue soft and bite sized diet  rest of the care per primary team   thoracic surgery to sign off, recall as needed  follow up with Dr. Patrick Elmore in outpatient clinic in 2-3 weeks     spectra [phone]

## 2025-01-15 NOTE — DIETITIAN INITIAL EVALUATION ADULT - OTHER INFO
Pertinent Medical Information: Per H&P, pt is a 70 y/o female with PMHx of HTN, HLD, GERD, CAD, COPD not on Home O2, Hx of Pneumothorax, polychondirits, SCC of BOT s/p Trach/PEG (reversed in 4/2021), s/p CRT (7 sessions in 2021) who presents with SOB for 1 week duration. s/p bronchoscopy with tracheal dilatation and debridement on 1/14.   # Acute hypoxic resp failure 2/2 COPD exacerbation with tracheal stenosis   # Diarrhea - resolved     Per SLP note on 14-Jan-2025, continue current diet: soft + bite sized with thin liquids.    Pertinent Subjective Information: Pt states her appetite is improving. Pt states she usually has a good appetite and wants to eat the whole meal, however afraid to eat d/t fear of choking. Pt consumed <50% of breakfast. Observed pt consuming lunch at time of visit; no swallowing difficulties observed. Pt tolerating current diet texture/consistency. No nausea or vomiting reported. Advised pt to eat slowly and chew thoroughly.     Weight hx: #/78 KG -- checked 1 month ago per pt. Current dosing weight is 77.1 KG. 1% unintentional weight loss in 1 month compared to current dosing weight. Pt does not meet weight criteria for malnutrition at this time.

## 2025-01-15 NOTE — DIETITIAN INITIAL EVALUATION ADULT - NAME AND PHONE
Ilana [phone] or TEAMS    Nutrition Interventions: Meals, snacks, supplements; Nutrition Monitoring: Diet order, PO intake, weights, labs, NFPF, body composition, BM and tolerance to medical food supplements.

## 2025-01-15 NOTE — PROGRESS NOTE ADULT - ASSESSMENT
IMPRESSION:    Acute hypoxemic resp failure  COPD exacerbation  Tracheal stenosis SP dilation   HO trach   History of lung nodules  History of relapsing polychondritis     PLAN:    CNS: Avoid CNS depressant    HEENT:  Oral care    PULMONARY:  HOB @ 45 degrees, HHFNC,  wean to 40/40. Can trial low flow NC if tolerating 40/40. keep SaO2 92 TO 96%, CTSx follow up , solumedrol 60 q 12, Neb q 4. Incentive rita.     CARDIOVASCULAR: avoid overload    GI: GI prophylaxis. Feeding per speech    RENAL:  F/u  lytes.  Correct as needed. accurate I/O    INFECTIOUS DISEASE: Fungitell noted. ID eval. abx, procal noted, sputum gram stain/ cx    HEMATOLOGICAL:  DVT prophylaxis. LE doppler prelim negative    ENDOCRINE:  Follow up FS.  Insulin protocol if needed.    SDU

## 2025-01-15 NOTE — DIETITIAN INITIAL EVALUATION ADULT - EDUCATION DIETARY MODIFICATIONS
Discussed importance of PO intake, current diet order and supplements if appetite declines./(1) partially meets; needs review/practice/verbalization

## 2025-01-15 NOTE — PHYSICAL THERAPY INITIAL EVALUATION ADULT - ADDITIONAL COMMENTS
family, apartment elevator, independent Pt lives with son in an apartment with 5 steps to enter and 2 flights up to apartment. she was independent prior to admission.

## 2025-01-15 NOTE — DIETITIAN INITIAL EVALUATION ADULT - ADD RECOMMEND
1. Continue with current diet order; if appetite declines, supplement with Ensure Plus HP (provides 350 kcal, 20g pro per carton)  2. Encourage PO intake     Moderate risk f/u

## 2025-01-15 NOTE — PROGRESS NOTE ADULT - ATTENDING COMMENTS
70yo Female with PMH of HTN, HLD, GERD, CAD, COPD not on Home O2, Hx of Pneumothorax, SCC of BOT s/p Trach/PEG (reversed in 4/2021), s/p CRT (7 sessions in 2021) who presents with SOB for 1 week duration, admitted to SDU for ARF , required HFNC, consulted by thoracic Sx, underwent bronchoscopy with tracheal dilation on 1/14, clinically improved.  Today pt feels much better, denies any specific complaints.     A/P   # Acute hypoxic resp failure due to COPD exacerbation / complicated by tracheal stenosis  /H/o SCC s/p surgery, CRT   H/o Trach/ PEG s p reversal / H/o Pneumothorax  - - CT chest with IV contrast: Extensive emphysema with superimposed mild groundglass opacities/septal thickening suggesting underlying edema( not present on earlier study).Interval development of focal moderate tracheal stenosis at the level of the thyroid with partially collapsed appearance of the right tracheal wall since the prior CT neck from 9/3/2024, possibly associated with the history of prior tracheostomy.  - CT Neck with contrast: Stable mild thickening of the epiglottis and small preepiglottic soft tissue density. Resolved nasopharyngeal soft tissue thickening and left aryepiglottic fold thickening.   - s/p  bronchoscopy with tracheal dilatation with CT surgery on 1/14  - clinically improved, comfortable on NC today   - treated with Azithromycin and Rocephin, duoneb and solu-medrol   - off antibiotics at this time. Procal negative, fungitell >500  - f/u repeat blood Cx   - ID consulted, recommendations noted:   - fungitell possible false positive; does not have clear risk factors/prolonged steroid use, but will keep PJP in differential as she is currently on HFNC   -- currently with dry cough, but if able to produce sputum can send for PJP PCR; if O2 requirements continue to improve, unlikely to be PJP   - c/w solumedrol 60 mg IV BID, duoneb, albuterol per pulmonary team   - s/p ENT eval:        - Continue current management-> FFL showed, thick purulent secretions on larynx, airway patent.   - patient follows with Dr Louis as OP     Abdominal pain / Diarrhea - resolved, monitor BM    Polychondritis   - as per Rheum note, patient is on cellcept 1000 G BID and Dcd hydroxychloroquine  - off immunosuppresants at this time, resume post dc     HTN/HLD/CAD  -  continue to hold home Anti-HTN as BP is soft   - c/w other home meds     GERD - C/w protonix     Pending: f/u ID and pulmonary recommendations, incentive spirometry, OOB to chair, PT eval, supportive care, taper steroids     I spoke with pt, she agreed with a plan of care     # Dispo: DGTF

## 2025-01-15 NOTE — PROGRESS NOTE ADULT - SUBJECTIVE AND OBJECTIVE BOX
GENERAL SURGERY PROGRESS NOTE    Patient: GEREMIAS FULLER , 69y ([birth year])Female   MRN: [id number]  Location: [place] [address] A  Visit: 01-10-25 Inpatient  Date: 01-15-25 @ 00:13      Procedure/Dx/Injuries: tracheal stenosis, s/p bronchoscopy and tracheal dilation    Events of past 24 hours: patient feeling well post op, tolerated soft bite sized diet    PAST MEDICAL & SURGICAL HISTORY:  HTN (hypertension)      Hypercholesterolemia      GERD (gastroesophageal reflux disease)      Pulmonary nodule      CAD (coronary artery disease)      COPD (chronic obstructive pulmonary disease)      Relapsing polychondritis      Female cystocele      H/O pneumothorax  2016      S/P GEETHA-BSO      S/P colon resection      Vocal cord polyps  removal of same 2005      Pulmonary nodule      History of bladder surgery  2019      H/O breast biopsy  LEFT          Vitals:   T(F): 97.5 (01-14-25 @ 23:49), Max: 97.9 (01-14-25 @ 10:00)  HR: 71 (01-14-25 @ 23:49)  BP: 109/59 (01-14-25 @ 23:49)  RR: 20 (01-14-25 @ 23:49)  SpO2: 96% (01-14-25 @ 23:49)          Fluids:     I & O's:    01-13-25 @ 07:01  -  01-14-25 @ 07:00  --------------------------------------------------------  IN:    Lactated Ringers: 225 mL  Total IN: 225 mL    OUT:    Voided (mL): 1200 mL  Total OUT: 1200 mL    Total NET: -975 mL        Bowel Movement: : [] YES [] NO  Flatus: : [] YES [] NO    PHYSICAL EXAM:  General: NAD, AAOx3, calm and cooperative, on HFNC  HEENT:  Neck supple  Cardiac: RRR S1, S2,   Respiratory: bilateral breath sounds  Abdomen: Soft, non-distended, non-tender,      MEDICATIONS  (STANDING):  albuterol/ipratropium for Nebulization 3 milliLiter(s) Nebulizer every 20 minutes  chlorhexidine 0.12% Liquid 15 milliLiter(s) Oral Mucosa every 12 hours  chlorhexidine 2% Cloths 1 Application(s) Topical daily  dextrose 5%. 1000 milliLiter(s) (100 mL/Hr) IV Continuous <Continuous>  dextrose 5%. 1000 milliLiter(s) (50 mL/Hr) IV Continuous <Continuous>  dextrose 50% Injectable 25 Gram(s) IV Push once  dextrose 50% Injectable 12.5 Gram(s) IV Push once  dextrose 50% Injectable 25 Gram(s) IV Push once  furosemide    Tablet 40 milliGRAM(s) Oral daily  glucagon  Injectable 1 milliGRAM(s) IntraMuscular once  insulin lispro (ADMELOG) corrective regimen sliding scale   SubCutaneous two times a day before meals  methylPREDNISolone sodium succinate Injectable 60 milliGRAM(s) IV Push every 8 hours  metoprolol succinate ER 25 milliGRAM(s) Oral daily  pantoprazole    Tablet 40 milliGRAM(s) Oral before breakfast    MEDICATIONS  (PRN):  acetaminophen     Tablet .. 650 milliGRAM(s) Oral every 6 hours PRN Mild Pain (1 - 3), Moderate Pain (4 - 6)  dextrose Oral Gel 15 Gram(s) Oral once PRN Blood Glucose LESS THAN 70 milliGRAM(s)/deciliter      DVT PROPHYLAXIS:   GI PROPHYLAXIS: pantoprazole    Tablet 40 milliGRAM(s) Oral before breakfast    ANTICOAGULATION:   ANTIBIOTICS:      LAB/STUDIES:  Labs:  CAPILLARY BLOOD GLUCOSE      POCT Blood Glucose.: 253 mg/dL (14 Jan 2025 16:26)  POCT Blood Glucose.: 190 mg/dL (14 Jan 2025 10:53)                          14.4   10.13 )-----------( 356      ( 13 Jan 2025 20:00 )             43.6         01-13    137  |  96[L]  |  39[H]  ----------------------------<  221[H]  4.3   |  25  |  0.9      LFTs:             5.9  | 0.3  | 18       ------------------[67      ( 13 Jan 2025 20:00 )  3.9  | x    | 31          Lipase:x      Amylase:x           ABG - ( 10 Zeyad 2025 19:06 )  pH: 7.35  /  pCO2: 39    /  pO2: 172   / HCO3: 22    / Base Excess: -3.8  /  SaO2: 99.2        Coags:     12.20  ----< 1.03    ( 13 Jan 2025 20:00 )     22.4       Urinalysis Basic - ( 13 Jan 2025 20:00 )    Color: x / Appearance: x / SG: x / pH: x  Gluc: 221 mg/dL / Ketone: x  / Bili: x / Urobili: x   Blood: x / Protein: x / Nitrite: x   Leuk Esterase: x / RBC: x / WBC x   Sq Epi: x / Non Sq Epi: x / Bacteria: x      IMAGING:  < from: Xray Chest 1 View- PORTABLE-Urgent (01.14.25 @ 10:08) >  IMPRESSION:    Bilateral diffuse interstitial opacities. No pneumothorax    --- End of Report ---    < end of copied text >

## 2025-01-15 NOTE — DIETITIAN INITIAL EVALUATION ADULT - PERTINENT MEDS FT
MEDICATIONS  (STANDING):  albuterol/ipratropium for Nebulization 3 milliLiter(s) Nebulizer every 20 minutes  chlorhexidine 0.12% Liquid 15 milliLiter(s) Oral Mucosa every 12 hours  chlorhexidine 2% Cloths 1 Application(s) Topical daily  dextrose 5%. 1000 milliLiter(s) (100 mL/Hr) IV Continuous <Continuous>  dextrose 5%. 1000 milliLiter(s) (50 mL/Hr) IV Continuous <Continuous>  dextrose 50% Injectable 25 Gram(s) IV Push once  dextrose 50% Injectable 25 Gram(s) IV Push once  dextrose 50% Injectable 12.5 Gram(s) IV Push once  furosemide    Tablet 40 milliGRAM(s) Oral daily  glucagon  Injectable 1 milliGRAM(s) IntraMuscular once  heparin   Injectable 5000 Unit(s) SubCutaneous every 12 hours  insulin lispro (ADMELOG) corrective regimen sliding scale   SubCutaneous two times a day before meals  methylPREDNISolone sodium succinate Injectable 60 milliGRAM(s) IV Push every 12 hours  metoprolol succinate ER 25 milliGRAM(s) Oral daily  pantoprazole    Tablet 40 milliGRAM(s) Oral before breakfast    MEDICATIONS  (PRN):  acetaminophen     Tablet .. 650 milliGRAM(s) Oral every 6 hours PRN Mild Pain (1 - 3), Moderate Pain (4 - 6)  dextrose Oral Gel 15 Gram(s) Oral once PRN Blood Glucose LESS THAN 70 milliGRAM(s)/deciliter

## 2025-01-15 NOTE — PROGRESS NOTE ADULT - SUBJECTIVE AND OBJECTIVE BOX
Patient is a 69y old  Female who presents with a chief complaint of       Over Night Events:  SP OR with CTSx yesterday. Afebrile. On HFNC 60/40      ROS:     All ROS are negative except HPI         PHYSICAL EXAM    ICU Vital Signs Last 24 Hrs  T(C): 35.8 (15 Zeyad 2025 08:00), Max: 36.4 (14 Jan 2025 20:00)  T(F): 96.4 (15 Zeyad 2025 08:00), Max: 97.6 (14 Jan 2025 20:00)  HR: 70 (15 Zeyad 2025 08:00) (64 - 86)  BP: 111/76 (15 Zeyad 2025 08:00) (99/57 - 116/71)  BP(mean): 90 (15 Zeyad 2025 08:00) (72 - 92)  ABP: --  ABP(mean): --  RR: 20 (15 Zeyad 2025 08:00) (20 - 22)  SpO2: 98% (15 Zeyad 2025 08:00) (94% - 98%)    O2 Parameters below as of 15 Zeyad 2025 08:00  Patient On (Oxygen Delivery Method): nasal cannula, high flow            CONSTITUTIONAL:  in NAD    ENT:   Airway patent,   Mouth with normal mucosa.     EYES:   Pupils equal,   Round and reactive to light.    CARDIAC:   Normal rate,   Regular rhythm.    No edema    Vascular:  Normal systolic impulse  No Carotid bruits    RESPIRATORY:   No wheezing  Bilateral BS  Normal chest expansion  Not tachypneic,  No use of accessory muscles    GASTROINTESTINAL:  Abdomen soft,   Non-tender,   No guarding,   + BS    MUSCULOSKELETAL:   Range of motion is not limited,  No clubbing, cyanosis    NEUROLOGICAL:   Alert and oriented   No motor  deficits.    SKIN:   Skin normal color for race,   Warm and dry     01-14-25 @ 07:01  -  01-15-25 @ 07:00  --------------------------------------------------------  IN:  Total IN: 0 mL    OUT:    Voided (mL): 500 mL  Total OUT: 500 mL    Total NET: -500 mL          LABS:                            14.3   11.72 )-----------( 290      ( 15 Zeyad 2025 05:22 )             44.5                                               01-15    140  |  99  |  38[H]  ----------------------------<  193[H]  4.7   |  31  |  0.9    Ca    9.3      15 Zeyad 2025 05:22  Phos  4.2     01-15  Mg     2.2     01-15    TPro  5.2[L]  /  Alb  3.5  /  TBili  0.4  /  DBili  x   /  AST  15  /  ALT  28  /  AlkPhos  60  01-15      PT/INR - ( 13 Jan 2025 20:00 )   PT: 12.20 sec;   INR: 1.03 ratio         PTT - ( 13 Jan 2025 20:00 )  PTT:22.4 sec                                       Urinalysis Basic - ( 15 Zeyad 2025 05:22 )    Color: x / Appearance: x / SG: x / pH: x  Gluc: 193 mg/dL / Ketone: x  / Bili: x / Urobili: x   Blood: x / Protein: x / Nitrite: x   Leuk Esterase: x / RBC: x / WBC x   Sq Epi: x / Non Sq Epi: x / Bacteria: x                                                  LIVER FUNCTIONS - ( 15 Zeyad 2025 05:22 )  Alb: 3.5 g/dL / Pro: 5.2 g/dL / ALK PHOS: 60 U/L / ALT: 28 U/L / AST: 15 U/L / GGT: x                                                                                                                                       MEDICATIONS  (STANDING):  albuterol/ipratropium for Nebulization 3 milliLiter(s) Nebulizer every 20 minutes  chlorhexidine 0.12% Liquid 15 milliLiter(s) Oral Mucosa every 12 hours  chlorhexidine 2% Cloths 1 Application(s) Topical daily  dextrose 5%. 1000 milliLiter(s) (100 mL/Hr) IV Continuous <Continuous>  dextrose 5%. 1000 milliLiter(s) (50 mL/Hr) IV Continuous <Continuous>  dextrose 50% Injectable 25 Gram(s) IV Push once  dextrose 50% Injectable 25 Gram(s) IV Push once  dextrose 50% Injectable 12.5 Gram(s) IV Push once  furosemide    Tablet 40 milliGRAM(s) Oral daily  glucagon  Injectable 1 milliGRAM(s) IntraMuscular once  insulin lispro (ADMELOG) corrective regimen sliding scale   SubCutaneous two times a day before meals  methylPREDNISolone sodium succinate Injectable 60 milliGRAM(s) IV Push every 8 hours  metoprolol succinate ER 25 milliGRAM(s) Oral daily  pantoprazole    Tablet 40 milliGRAM(s) Oral before breakfast    MEDICATIONS  (PRN):  acetaminophen     Tablet .. 650 milliGRAM(s) Oral every 6 hours PRN Mild Pain (1 - 3), Moderate Pain (4 - 6)  dextrose Oral Gel 15 Gram(s) Oral once PRN Blood Glucose LESS THAN 70 milliGRAM(s)/deciliter      New X-rays reviewed:                                                                                  ECHO         Patient is a 69y old  Female who presents with a chief complaint of sob      Over Night Events:  SP OR with CTSx yesterday. Afebrile. On HFNC 60/40      PHYSICAL EXAM    ICU Vital Signs Last 24 Hrs  T(C): 35.8 (15 Zeyad 2025 08:00), Max: 36.4 (14 Jan 2025 20:00)  T(F): 96.4 (15 Zeyad 2025 08:00), Max: 97.6 (14 Jan 2025 20:00)  HR: 70 (15 Ezyad 2025 08:00) (64 - 86)  BP: 111/76 (15 Zeyad 2025 08:00) (99/57 - 116/71)  BP(mean): 90 (15 Zeyad 2025 08:00) (72 - 92)  RR: 20 (15 Zeyad 2025 08:00) (20 - 22)  SpO2: 98% (15 Zeyad 2025 08:00) (94% - 98%)    O2 Parameters below as of 15 Zeyad 2025 08:00  Patient On (Oxygen Delivery Method): nasal cannula, high flow            CONSTITUTIONAL:  ill looking      CARDIAC:   SEM2/6        RESPIRATORY:   DEC BS both bases    GASTROINTESTINAL:  Abdomen soft,   Non-tender,   No guarding,   + BS    MUSCULOSKELETAL:   Range of motion is not limited,  No clubbing, cyanosis    NEUROLOGICAL:   Alert and oriented     01-14-25 @ 07:01  -  01-15-25 @ 07:00  --------------------------------------------------------  IN:  Total IN: 0 mL    OUT:    Voided (mL): 500 mL  Total OUT: 500 mL    Total NET: -500 mL          LABS:                            14.3   11.72 )-----------( 290      ( 15 Zeyad 2025 05:22 )             44.5                                               01-15    140  |  99  |  38[H]  ----------------------------<  193[H]  4.7   |  31  |  0.9    Ca    9.3      15 Zeyad 2025 05:22  Phos  4.2     01-15  Mg     2.2     01-15    TPro  5.2[L]  /  Alb  3.5  /  TBili  0.4  /  DBili  x   /  AST  15  /  ALT  28  /  AlkPhos  60  01-15      PT/INR - ( 13 Jan 2025 20:00 )   PT: 12.20 sec;   INR: 1.03 ratio         PTT - ( 13 Jan 2025 20:00 )  PTT:22.4 sec                                       Urinalysis Basic - ( 15 Zeyad 2025 05:22 )    Color: x / Appearance: x / SG: x / pH: x  Gluc: 193 mg/dL / Ketone: x  / Bili: x / Urobili: x   Blood: x / Protein: x / Nitrite: x   Leuk Esterase: x / RBC: x / WBC x   Sq Epi: x / Non Sq Epi: x / Bacteria: x                                                  LIVER FUNCTIONS - ( 15 Zeyad 2025 05:22 )  Alb: 3.5 g/dL / Pro: 5.2 g/dL / ALK PHOS: 60 U/L / ALT: 28 U/L / AST: 15 U/L / GGT: x                                                                                                                                       MEDICATIONS  (STANDING):  albuterol/ipratropium for Nebulization 3 milliLiter(s) Nebulizer every 20 minutes  chlorhexidine 0.12% Liquid 15 milliLiter(s) Oral Mucosa every 12 hours  chlorhexidine 2% Cloths 1 Application(s) Topical daily  dextrose 5%. 1000 milliLiter(s) (100 mL/Hr) IV Continuous <Continuous>  dextrose 5%. 1000 milliLiter(s) (50 mL/Hr) IV Continuous <Continuous>  dextrose 50% Injectable 25 Gram(s) IV Push once  dextrose 50% Injectable 25 Gram(s) IV Push once  dextrose 50% Injectable 12.5 Gram(s) IV Push once  furosemide    Tablet 40 milliGRAM(s) Oral daily  glucagon  Injectable 1 milliGRAM(s) IntraMuscular once  insulin lispro (ADMELOG) corrective regimen sliding scale   SubCutaneous two times a day before meals  methylPREDNISolone sodium succinate Injectable 60 milliGRAM(s) IV Push every 8 hours  metoprolol succinate ER 25 milliGRAM(s) Oral daily  pantoprazole    Tablet 40 milliGRAM(s) Oral before breakfast    MEDICATIONS  (PRN):  acetaminophen     Tablet .. 650 milliGRAM(s) Oral every 6 hours PRN Mild Pain (1 - 3), Moderate Pain (4 - 6)  dextrose Oral Gel 15 Gram(s) Oral once PRN Blood Glucose LESS THAN 70 milliGRAM(s)/deciliter

## 2025-01-16 LAB
ALBUMIN SERPL ELPH-MCNC: 3.6 G/DL — SIGNIFICANT CHANGE UP (ref 3.5–5.2)
ALP SERPL-CCNC: 61 U/L — SIGNIFICANT CHANGE UP (ref 30–115)
ALT FLD-CCNC: 25 U/L — SIGNIFICANT CHANGE UP (ref 0–41)
ANION GAP SERPL CALC-SCNC: 12 MMOL/L — SIGNIFICANT CHANGE UP (ref 7–14)
AST SERPL-CCNC: 15 U/L — SIGNIFICANT CHANGE UP (ref 0–41)
BASOPHILS # BLD AUTO: 0.01 K/UL — SIGNIFICANT CHANGE UP (ref 0–0.2)
BASOPHILS NFR BLD AUTO: 0.1 % — SIGNIFICANT CHANGE UP (ref 0–1)
BILIRUB SERPL-MCNC: 0.5 MG/DL — SIGNIFICANT CHANGE UP (ref 0.2–1.2)
BUN SERPL-MCNC: 38 MG/DL — HIGH (ref 10–20)
CALCIUM SERPL-MCNC: 8.6 MG/DL — SIGNIFICANT CHANGE UP (ref 8.4–10.5)
CHLORIDE SERPL-SCNC: 93 MMOL/L — LOW (ref 98–110)
CO2 SERPL-SCNC: 30 MMOL/L — SIGNIFICANT CHANGE UP (ref 17–32)
CREAT SERPL-MCNC: 0.8 MG/DL — SIGNIFICANT CHANGE UP (ref 0.7–1.5)
EGFR: 79 ML/MIN/1.73M2 — SIGNIFICANT CHANGE UP
EOSINOPHIL # BLD AUTO: 0 K/UL — SIGNIFICANT CHANGE UP (ref 0–0.7)
EOSINOPHIL NFR BLD AUTO: 0 % — SIGNIFICANT CHANGE UP (ref 0–8)
GLUCOSE BLDC GLUCOMTR-MCNC: 148 MG/DL — HIGH (ref 70–99)
GLUCOSE BLDC GLUCOMTR-MCNC: 167 MG/DL — HIGH (ref 70–99)
GLUCOSE BLDC GLUCOMTR-MCNC: 174 MG/DL — HIGH (ref 70–99)
GLUCOSE BLDC GLUCOMTR-MCNC: 186 MG/DL — HIGH (ref 70–99)
GLUCOSE SERPL-MCNC: 184 MG/DL — HIGH (ref 70–99)
HCT VFR BLD CALC: 44.9 % — SIGNIFICANT CHANGE UP (ref 37–47)
HGB BLD-MCNC: 14.7 G/DL — SIGNIFICANT CHANGE UP (ref 12–16)
IMM GRANULOCYTES NFR BLD AUTO: 0.7 % — HIGH (ref 0.1–0.3)
LYMPHOCYTES # BLD AUTO: 0.09 K/UL — LOW (ref 1.2–3.4)
LYMPHOCYTES # BLD AUTO: 0.9 % — LOW (ref 20.5–51.1)
MAGNESIUM SERPL-MCNC: 2 MG/DL — SIGNIFICANT CHANGE UP (ref 1.8–2.4)
MCHC RBC-ENTMCNC: 28.5 PG — SIGNIFICANT CHANGE UP (ref 27–31)
MCHC RBC-ENTMCNC: 32.7 G/DL — SIGNIFICANT CHANGE UP (ref 32–37)
MCV RBC AUTO: 87.2 FL — SIGNIFICANT CHANGE UP (ref 81–99)
MONOCYTES # BLD AUTO: 0.46 K/UL — SIGNIFICANT CHANGE UP (ref 0.1–0.6)
MONOCYTES NFR BLD AUTO: 4.5 % — SIGNIFICANT CHANGE UP (ref 1.7–9.3)
NEUTROPHILS # BLD AUTO: 9.64 K/UL — HIGH (ref 1.4–6.5)
NEUTROPHILS NFR BLD AUTO: 93.8 % — HIGH (ref 42.2–75.2)
NRBC # BLD: 0 /100 WBCS — SIGNIFICANT CHANGE UP (ref 0–0)
NRBC BLD-RTO: 0 /100 WBCS — SIGNIFICANT CHANGE UP (ref 0–0)
PHOSPHATE SERPL-MCNC: 3.8 MG/DL — SIGNIFICANT CHANGE UP (ref 2.1–4.9)
PLATELET # BLD AUTO: 259 K/UL — SIGNIFICANT CHANGE UP (ref 130–400)
PMV BLD: 9.6 FL — SIGNIFICANT CHANGE UP (ref 7.4–10.4)
POTASSIUM SERPL-MCNC: 4.3 MMOL/L — SIGNIFICANT CHANGE UP (ref 3.5–5)
POTASSIUM SERPL-SCNC: 4.3 MMOL/L — SIGNIFICANT CHANGE UP (ref 3.5–5)
PROT SERPL-MCNC: 5.2 G/DL — LOW (ref 6–8)
RBC # BLD: 5.15 M/UL — SIGNIFICANT CHANGE UP (ref 4.2–5.4)
RBC # FLD: 13.2 % — SIGNIFICANT CHANGE UP (ref 11.5–14.5)
SODIUM SERPL-SCNC: 135 MMOL/L — SIGNIFICANT CHANGE UP (ref 135–146)
WBC # BLD: 10.27 K/UL — SIGNIFICANT CHANGE UP (ref 4.8–10.8)
WBC # FLD AUTO: 10.27 K/UL — SIGNIFICANT CHANGE UP (ref 4.8–10.8)

## 2025-01-16 PROCEDURE — 71045 X-RAY EXAM CHEST 1 VIEW: CPT | Mod: 26

## 2025-01-16 PROCEDURE — 99233 SBSQ HOSP IP/OBS HIGH 50: CPT

## 2025-01-16 RX ORDER — METHYLPREDNISOLONE ACETATE 40 MG/ML
40 VIAL (ML) INJECTION DAILY
Refills: 0 | Status: DISCONTINUED | OUTPATIENT
Start: 2025-01-17 | End: 2025-01-17

## 2025-01-16 RX ADMIN — ANTISEPTIC SURGICAL SCRUB 1 APPLICATION(S): 0.04 SOLUTION TOPICAL at 11:38

## 2025-01-16 RX ADMIN — PANTOPRAZOLE 40 MILLIGRAM(S): 20 TABLET, DELAYED RELEASE ORAL at 05:44

## 2025-01-16 RX ADMIN — Medication 2: at 18:20

## 2025-01-16 RX ADMIN — Medication 40 MILLIGRAM(S): at 05:44

## 2025-01-16 RX ADMIN — Medication 40 MILLIGRAM(S): at 11:34

## 2025-01-16 RX ADMIN — ANTISEPTIC SURGICAL SCRUB 15 MILLILITER(S): 0.04 SOLUTION TOPICAL at 05:44

## 2025-01-16 RX ADMIN — Medication 60 MILLIGRAM(S): at 05:44

## 2025-01-16 RX ADMIN — Medication 5000 UNIT(S): at 18:03

## 2025-01-16 RX ADMIN — Medication 5000 UNIT(S): at 05:44

## 2025-01-16 RX ADMIN — ANTISEPTIC SURGICAL SCRUB 15 MILLILITER(S): 0.04 SOLUTION TOPICAL at 18:03

## 2025-01-16 RX ADMIN — Medication 2: at 08:16

## 2025-01-16 RX ADMIN — ACETAMINOPHEN, DIPHENHYDRAMINE HCL, PHENYLEPHRINE HCL 3 MILLIGRAM(S): 325; 25; 5 TABLET ORAL at 20:42

## 2025-01-16 RX ADMIN — Medication 25 MILLIGRAM(S): at 05:44

## 2025-01-16 NOTE — PROGRESS NOTE ADULT - ATTENDING COMMENTS
70yo Female with PMH of HTN, HLD, GERD, CAD, COPD not on Home O2, Hx of Pneumothorax, SCC of BOT s/p Trach/PEG (reversed in 4/2021), s/p CRT (7 sessions in 2021) who presents with SOB for 1 week duration, admitted to SDU for ARF , required HFNC, consulted by thoracic Sx, underwent bronchoscopy with tracheal dilation on 1/14.     Pt transferred from SDU to  on 1/16/25.  Pt was complaining of watery diarrhea.  Pt was on 5L NC sats 100%.  CXR reviewed looks congested   Labs reviewed.   BipAP at bedside has been using at nights    # Suspecting HFpEF Exarc  - keep sats 88-90%  - Insentive Sami  - c/w Bipap   - Lasix 40mg IV daily   - TTE Reviewed MR TRUJILLO, Mod PHTN     # Acute Hypoxic Resp Failure  # COPD exacerbation   # Tracheal stenosis    # H/o SCC s/p surgery, CRT   # H/o Trach/ PEG s/p reversal   # H/o Pneumothorax     - - CT chest with IV contrast: Extensive emphysema with superimposed mild ground glass opacities/septal thickening suggesting underlying edema( not present on earlier study).Interval development of focal moderate tracheal stenosis at the level of the thyroid with partially collapsed appearance of the right tracheal wall since the prior CT neck from 9/3/2024, possibly associated with the history of prior tracheostomy.  - CT Neck with contrast: Stable mild thickening of the epiglottis and small preepiglottic soft tissue density. Resolved nasopharyngeal soft tissue thickening and left aryepiglottic fold thickening.   - s/p  bronchoscopy with tracheal dilatation with CT surgery on 1/14  - clinically improved and pt was comfortable on NC 5L in 3A   - s/p Azithromycin and Rocephin, duoneb and solu-medrol  - off antibiotics  - Procal negative, fungitell >500  - f/u repeat blood Cx   - ID consulted, recommendations noted:  - fungitell possible false positive; does not have clear risk factors/prolonged steroid use, but will keep PJP in differential as she is currently on HFNC   -- currently with dry cough, but if able to produce sputum can send for PJP PCR; if O2 requirements continue to improve, unlikely to be PJP   - c/w solumedrol 60 mg IV BID, duoneb, albuterol per pulmonary team   - s/p ENT eval:      - Continue current management-> FFL showed, thick purulent secretions on larynx, airway patent.   - patient follows with Dr Louis as OP     Abdominal Pain / Diarrhea   - Ongoing   - Off laxatives  - Suspect Abx associated (ceftr/azithro) now off abx doubt cdiff   - continue to monitor diarrhea no loperamide at this time   - If worsens check CT A/P to r/o colitis and check stool studies including Cdiff and place on contact isolation till cdiff ruled out.     Polychondritis   - as per Rheum note, patient is on cellcept 1000 G BID and Dcd hydroxychloroquine  - off immunosuppresants at this time, resume post dc     HTN/HLD/CAD  -  continue to hold home Anti-HTN as BP is soft   - c/w other home meds     GERD - C/w protonix     Poor Prognosis    Pending: clinical improvement / resolution of diarrhea / taper down O2 for goal sats 88-90% / c/w bipap at nights / f/u Pulm  Dispo: acute

## 2025-01-16 NOTE — PROGRESS NOTE ADULT - SUBJECTIVE AND OBJECTIVE BOX
Patient is a 69y old Female who presents with a chief complaint of shortness of breath, productive cough  and malaise for one week  Patient was admitted for management of pneumonia and COPD. Today is hospital day 6d. Post-op day 2 from bronchoscopy with tracheal dilation. This morning patient was seen and examined at bedside, resting comfortably in bed. Patient endorses various episodes of non-watery, non-bloody diarrhea overnight. She reports having intermittent bouts of diarrhea for the past three days. Patient denies any fevers, chills, shortness of breath, chest pain, abdominal pain, urinary symptoms, or blood in the stool.      PAST MEDICAL & SURGICAL HISTORY  HTN (hypertension)    Hypercholesterolemia    GERD (gastroesophageal reflux disease)    Pulmonary nodule    CAD (coronary artery disease)    COPD (chronic obstructive pulmonary disease)    Relapsing polychondritis    Female cystocele    H/O pneumothorax  2016    S/P GEETHA-BSO    S/P colon resection    Vocal cord polyps  removal of same 2005    Pulmonary nodule    History of bladder surgery  2019    H/O breast biopsy  LEFT      SOCIAL HISTORY:  Social History:      ALLERGIES:  No Known Drug Allergies  CONTRAST DYS (Nausea)    MEDICATIONS:  STANDING MEDICATIONS  albuterol/ipratropium for Nebulization 3 milliLiter(s) Nebulizer every 20 minutes  chlorhexidine 0.12% Liquid 15 milliLiter(s) Oral Mucosa every 12 hours  chlorhexidine 2% Cloths 1 Application(s) Topical daily  dextrose 5%. 1000 milliLiter(s) IV Continuous <Continuous>  dextrose 5%. 1000 milliLiter(s) IV Continuous <Continuous>  dextrose 50% Injectable 25 Gram(s) IV Push once  dextrose 50% Injectable 12.5 Gram(s) IV Push once  dextrose 50% Injectable 25 Gram(s) IV Push once  furosemide    Tablet 40 milliGRAM(s) Oral daily  glucagon  Injectable 1 milliGRAM(s) IntraMuscular once  heparin   Injectable 5000 Unit(s) SubCutaneous every 12 hours  insulin lispro (ADMELOG) corrective regimen sliding scale   SubCutaneous two times a day before meals  metoprolol succinate ER 25 milliGRAM(s) Oral daily  pantoprazole    Tablet 40 milliGRAM(s) Oral before breakfast    PRN MEDICATIONS  acetaminophen     Tablet .. 650 milliGRAM(s) Oral every 6 hours PRN  dextrose Oral Gel 15 Gram(s) Oral once PRN  melatonin 3 milliGRAM(s) Oral at bedtime PRN    VITALS:   T(F): 97.7  HR: 69  BP: 126/85  RR: 18  SpO2: 100%    PHYSICAL EXAM:  GENERAL:   (  ) NAD, lying in bed comfortably     (  ) obtunded     (  ) lethargic     (  ) somnolent    HEAD:   (  ) Atraumatic     (  ) hematoma     (  ) laceration (specify location:       )     NECK:  (  ) Supple     (  ) neck stiffness     (  ) nuchal rigidity     (  )  no JVD     (  ) JVD present ( -- cm)    HEART:  Rate -->     (  ) normal rate     (  ) bradycardic     (  ) tachycardic  Rhythm -->     (  ) regular     (  ) regularly irregular     (  ) irregularly irregular  Murmurs -->     (  ) normal s1s2     (  ) systolic murmur     (  ) diastolic murmur     (  ) continuous murmur      (  ) S3 present     (  ) S4 present    LUNGS:   (  )Unlabored respirations     (  ) tachypnea  (  ) B/L air entry     (  ) decreased breath sounds in:  (location     )    (  ) no adventitious sound     (  ) crackles     (  ) wheezing      (  ) rhonchi      (specify location:       )  (  ) chest wall tenderness (specify location:       )    ABDOMEN:   (  ) Soft     (  ) tense   |   (  ) nondistended     (  ) distended   |   (  ) +BS     (  ) hypoactive bowel sounds     (  ) hyperactive bowel sounds  (  ) nontender     (  ) RUQ tenderness     (  ) RLQ tenderness     (  ) LLQ tenderness     (  ) epigastric tenderness     (  ) diffuse tenderness  (  ) Splenomegaly      (  ) Hepatomegaly      (  ) Jaundice     (  ) ecchymosis     EXTREMITIES:  (  ) Normal     (  ) Rash     (  ) ecchymosis     (  ) varicose veins      (  ) pitting edema     (  ) non-pitting edema   (  ) ulceration     (  ) gangrene:     (location:     )    NERVOUS SYSTEM:    (  ) A&Ox3     (  ) confused     (  ) lethargic  CN II-XII:     (  ) Intact     (  ) deficits found     (Specify:     )   Upper extremities:     (  ) no sensorimotor deficits     (  ) weakness     (  ) loss of proprioception/vibration     (  ) loss of touch/temperature (specify:    )  Lower extremities:     (  ) no sensorimotor deficits     (  ) weakness     (  ) loss of proprioception/vibration     (  ) loss of touch/temperature (specify:    )    SKIN:   (  ) No rashes or lesions     (  ) maculopapular rash     (  ) pustules     (  ) vesicles     (  ) ulcer     (  ) ecchymosis     (specify location:     )    AMPAC score:    (  ) Indwelling Romo Catheter:   Date insterted:    Reason (  ) Critical illness     (  ) urinary retention    (  ) Accurate Ins/Outs Monitoring     (  ) CMO patient    (  ) Central Line:   Date inserted:  Location: (  ) Right IJ     (  ) Left IJ     (  ) Right Fem     (  ) Left Fem    (  ) SPC        (  ) pigtail       (  ) PEG tube       (  ) colostomy       (  ) jejunostomy  (  ) U-Dall    LABS:                        14.7   10.27 )-----------( 259      ( 16 Jan 2025 08:20 )             44.9     01-16    135  |  93[L]  |  38[H]  ----------------------------<  184[H]  4.3   |  30  |  0.8    Ca    8.6      16 Jan 2025 08:20  Phos  3.8     01-16  Mg     2.0     01-16    TPro  5.2[L]  /  Alb  3.6  /  TBili  0.5  /  DBili  x   /  AST  15  /  ALT  25  /  AlkPhos  61  01-16      Urinalysis Basic - ( 16 Jan 2025 08:20 )    Color: x / Appearance: x / SG: x / pH: x  Gluc: 184 mg/dL / Ketone: x  / Bili: x / Urobili: x   Blood: x / Protein: x / Nitrite: x   Leuk Esterase: x / RBC: x / WBC x   Sq Epi: x / Non Sq Epi: x / Bacteria: x            Culture - Blood (collected 14 Jan 2025 18:22)  Source: .Blood BLOOD  Preliminary Report (15 Zeyad 2025 23:07):    No growth at 24 hours    Culture - Blood (collected 14 Jan 2025 18:22)  Source: .Blood BLOOD  Preliminary Report (15 Zeyad 2025 23:07):    No growth at 24 hours                 Patient is a 69y old Female who presents with a chief complaint of shortness of breath, productive cough  and malaise for one week  Patient was admitted for management of pneumonia and COPD. Today is hospital day 6d. Post-op day 2 from bronchoscopy with tracheal dilation. This morning patient was seen and examined at bedside, resting comfortably in bed. Patient endorses various episodes of non-watery, non-bloody diarrhea overnight. She reports having intermittent bouts of diarrhea for the past three days. Patient denies any fevers, chills, shortness of breath, chest pain, abdominal pain, urinary symptoms, or blood in the stool.      PAST MEDICAL & SURGICAL HISTORY  HTN (hypertension)    Hypercholesterolemia    GERD (gastroesophageal reflux disease)    Pulmonary nodule    CAD (coronary artery disease)    COPD (chronic obstructive pulmonary disease)    Relapsing polychondritis    Female cystocele    H/O pneumothorax  2016    S/P GEETHA-BSO    S/P colon resection    Vocal cord polyps  removal of same 2005    Pulmonary nodule    History of bladder surgery  2019    H/O breast biopsy  LEFT      SOCIAL HISTORY:  Social History:      ALLERGIES:  No Known Drug Allergies  CONTRAST DYS (Nausea)    MEDICATIONS:  STANDING MEDICATIONS  albuterol/ipratropium for Nebulization 3 milliLiter(s) Nebulizer every 20 minutes  chlorhexidine 0.12% Liquid 15 milliLiter(s) Oral Mucosa every 12 hours  chlorhexidine 2% Cloths 1 Application(s) Topical daily  dextrose 5%. 1000 milliLiter(s) IV Continuous <Continuous>  dextrose 5%. 1000 milliLiter(s) IV Continuous <Continuous>  dextrose 50% Injectable 25 Gram(s) IV Push once  dextrose 50% Injectable 12.5 Gram(s) IV Push once  dextrose 50% Injectable 25 Gram(s) IV Push once  furosemide    Tablet 40 milliGRAM(s) Oral daily  glucagon  Injectable 1 milliGRAM(s) IntraMuscular once  heparin   Injectable 5000 Unit(s) SubCutaneous every 12 hours  insulin lispro (ADMELOG) corrective regimen sliding scale   SubCutaneous two times a day before meals  metoprolol succinate ER 25 milliGRAM(s) Oral daily  pantoprazole    Tablet 40 milliGRAM(s) Oral before breakfast    PRN MEDICATIONS  acetaminophen     Tablet .. 650 milliGRAM(s) Oral every 6 hours PRN  dextrose Oral Gel 15 Gram(s) Oral once PRN  melatonin 3 milliGRAM(s) Oral at bedtime PRN    VITALS:   T(F): 97.7  HR: 69  BP: 126/85  RR: 18  SpO2: 100%    PHYSICAL EXAM:  GENERAL:   ( X ) NAD, lying in bed comfortably     (  ) obtunded     (  ) lethargic     (  ) somnolent    HEAD:   (X  ) Atraumatic     (  ) hematoma     (  ) laceration (specify location:       )     NECK:  (X  ) Supple     (  ) neck stiffness     (  ) nuchal rigidity     (  )  no JVD     (  ) JVD present ( -- cm)    HEART:  Rate -->     (X  ) normal rate     (  ) bradycardic     (  ) tachycardic  Rhythm -->     (X  ) regular     (  ) regularly irregular     (  ) irregularly irregular  Murmurs -->     (X  ) normal s1s2     (  ) systolic murmur     (  ) diastolic murmur     (  ) continuous murmur      (  ) S3 present     (  ) S4 present    LUNGS:   ( X )Unlabored respirations     (  ) tachypnea  (X  ) B/L air entry     (  ) decreased breath sounds in:  (location     )    ( X ) no adventitious sound     (  ) crackles     (  ) wheezing      (  ) rhonchi      (specify location:       )  (  ) chest wall tenderness (specify location:       )    ABDOMEN:   ( X ) Soft     (  ) tense   |   ( X ) nondistended     (  ) distended   |   (X  ) +BS     (  ) hypoactive bowel sounds     (  ) hyperactive bowel sounds  ( X ) nontender     (  ) RUQ tenderness     (  ) RLQ tenderness     (  ) LLQ tenderness     (  ) epigastric tenderness     (  ) diffuse tenderness  (  ) Splenomegaly      (  ) Hepatomegaly      (  ) Jaundice     (  ) ecchymosis     EXTREMITIES:  (X  ) Normal     (  ) Rash     (  ) ecchymosis     (  ) varicose veins      (  ) pitting edema     (  ) non-pitting edema   (  ) ulceration     (  ) gangrene:     (location:     )    NERVOUS SYSTEM:    (X  ) A&Ox3     (  ) confused     (  ) lethargic  CN II-XII:     (  ) Intact     (  ) deficits found     (Specify:     )   Upper extremities:     (  ) no sensorimotor deficits     (  ) weakness     (  ) loss of proprioception/vibration     (  ) loss of touch/temperature (specify:    )  Lower extremities:     (  ) no sensorimotor deficits     (  ) weakness     (  ) loss of proprioception/vibration     (  ) loss of touch/temperature (specify:    )    SKIN:   (X  ) No rashes or lesions     (  ) maculopapular rash     (  ) pustules     (  ) vesicles     (  ) ulcer     (  ) ecchymosis     (specify location:     )      LABS:                        14.7   10.27 )-----------( 259      ( 16 Jan 2025 08:20 )             44.9     01-16    135  |  93[L]  |  38[H]  ----------------------------<  184[H]  4.3   |  30  |  0.8    Ca    8.6      16 Jan 2025 08:20  Phos  3.8     01-16  Mg     2.0     01-16    TPro  5.2[L]  /  Alb  3.6  /  TBili  0.5  /  DBili  x   /  AST  15  /  ALT  25  /  AlkPhos  61  01-16      Urinalysis Basic - ( 16 Jan 2025 08:20 )    Color: x / Appearance: x / SG: x / pH: x  Gluc: 184 mg/dL / Ketone: x  / Bili: x / Urobili: x   Blood: x / Protein: x / Nitrite: x   Leuk Esterase: x / RBC: x / WBC x   Sq Epi: x / Non Sq Epi: x / Bacteria: x      Culture - Blood (collected 14 Jan 2025 18:22)  Source: .Blood BLOOD  Preliminary Report (15 Zeyad 2025 23:07):    No growth at 24 hours

## 2025-01-16 NOTE — PROGRESS NOTE ADULT - ASSESSMENT
70yo Female with PMH of HTN, HLD, GERD, CAD, COPD not on Home O2, Hx of Pneumothorax, SCC of BOT s/p Trach/PEG (reversed in 4/2021), s/p CRT (7 sessions in 2021) who presents with SOB for 1 week duration, admitted to SDU for ARF , required HFNC, consulted by thoracic Sx, underwent bronchoscopy with tracheal dilation on 1/14, clinically improved.  Today, endorses episodes of non bloody, non watery diarrhea overnight.    # Acute hypoxic resp failure due to COPD exacerbation / complicated by tracheal stenosis  /H/o SCC s/p surgery, CRT   #Diarrhea  #Polychondritis  #HTN/HLD/CAD  #GERD 68yo Female with PMH of HTN, HLD, GERD, CAD, COPD not on Home O2, Hx of Pneumothorax, SCC of BOT s/p Trach/PEG (reversed in 4/2021), s/p CRT (7 sessions in 2021) who presents with SOB for 1 week duration, admitted to SDU for ARF , required HFNC, consulted by thoracic Sx, underwent bronchoscopy with tracheal dilation on 1/14, clinically improved. Patient endorses episodes of non bloody, non watery diarrhea overnight.    # Acute hypoxic resp failure due to COPD exacerbation / complicated by tracheal stenosis  /H/o SCC s/p surgery, CRT   #Diarrhea  #Polychondritis  #HTN/HLD/CAD  #GERD  #COPD    - Continue weaning off O2 as tolerated (targeted spO2 88-92%)  - Diuresis with IV lasix 40mg  - Taper steroids to 40mg daily  - Duonebs q6  - Stool PCR sent, avoid pro-diarrheal agents.  - Sputum sample PJP ordered  - Follow up FS.  Insulin protocol if needed.  - Encourage ambulation, incentive spirometry    DVT ppx: Heparin Subq  Diet: Soft, bite sized  GI prophylaxis: Protonix 40mg   68yo Female with PMH of HTN, HLD, GERD, CAD, COPD not on Home O2, Hx of Pneumothorax, SCC of BOT s/p Trach/PEG (reversed in 4/2021), s/p CRT (7 sessions in 2021) who presents with SOB for 1 week duration, admitted to SDU for ARF , required HFNC, consulted by thoracic Sx, underwent bronchoscopy with tracheal dilation on 1/14, clinically improved. Patient endorses episodes of non bloody, non watery diarrhea overnight.    # Acute hypoxic resp failure due to COPD exacerbation / complicated by tracheal stenosis  /H/o SCC s/p surgery, CRT   #Diarrhea  #Polychondritis  #HTN/HLD/CAD  #GERD  #COPD    - Continue weaning off O2 as tolerated (targeted spO2 88-92%)  - Diuresis with IV lasix 40mg  - Taper steroids to 40mg daily  - Duonebs q6  - Stool PCR sent, avoid pro-diarrheal agents.   - Sputum sample PJP ordered  - Follow up FS.  Insulin protocol if needed.  - Encourage ambulation, incentive spirometry    DVT ppx: Heparin Subq  Diet: Soft, bite sized  GI prophylaxis: Protonix 40mg   normal...

## 2025-01-17 LAB
ALBUMIN SERPL ELPH-MCNC: 4 G/DL — SIGNIFICANT CHANGE UP (ref 3.5–5.2)
ALP SERPL-CCNC: 81 U/L — SIGNIFICANT CHANGE UP (ref 30–115)
ALT FLD-CCNC: 61 U/L — HIGH (ref 0–41)
ANION GAP SERPL CALC-SCNC: 16 MMOL/L — HIGH (ref 7–14)
AST SERPL-CCNC: 52 U/L — HIGH (ref 0–41)
BASOPHILS # BLD AUTO: 0 K/UL — SIGNIFICANT CHANGE UP (ref 0–0.2)
BASOPHILS NFR BLD AUTO: 0 % — SIGNIFICANT CHANGE UP (ref 0–1)
BILIRUB SERPL-MCNC: 0.7 MG/DL — SIGNIFICANT CHANGE UP (ref 0.2–1.2)
BUN SERPL-MCNC: 36 MG/DL — HIGH (ref 10–20)
CALCIUM SERPL-MCNC: 9.6 MG/DL — SIGNIFICANT CHANGE UP (ref 8.4–10.4)
CHLORIDE SERPL-SCNC: 91 MMOL/L — LOW (ref 98–110)
CO2 SERPL-SCNC: 33 MMOL/L — HIGH (ref 17–32)
CREAT SERPL-MCNC: 1.1 MG/DL — SIGNIFICANT CHANGE UP (ref 0.7–1.5)
EGFR: 54 ML/MIN/1.73M2 — LOW
EOSINOPHIL # BLD AUTO: 0 K/UL — SIGNIFICANT CHANGE UP (ref 0–0.7)
EOSINOPHIL NFR BLD AUTO: 0 % — SIGNIFICANT CHANGE UP (ref 0–8)
GAS PNL BLDA: SIGNIFICANT CHANGE UP
GI PCR PANEL: SIGNIFICANT CHANGE UP
GLUCOSE BLDC GLUCOMTR-MCNC: 143 MG/DL — HIGH (ref 70–99)
GLUCOSE BLDC GLUCOMTR-MCNC: 171 MG/DL — HIGH (ref 70–99)
GLUCOSE BLDC GLUCOMTR-MCNC: 178 MG/DL — HIGH (ref 70–99)
GLUCOSE BLDC GLUCOMTR-MCNC: 218 MG/DL — HIGH (ref 70–99)
GLUCOSE BLDC GLUCOMTR-MCNC: 266 MG/DL — HIGH (ref 70–99)
GLUCOSE SERPL-MCNC: 276 MG/DL — HIGH (ref 70–99)
GRAM STN FLD: ABNORMAL
HCT VFR BLD CALC: 56.2 % — HIGH (ref 37–47)
HGB BLD-MCNC: 17 G/DL — HIGH (ref 12–16)
LACTATE SERPL-SCNC: 4.6 MMOL/L — CRITICAL HIGH (ref 0.7–2)
LYMPHOCYTES # BLD AUTO: 11.5 % — LOW (ref 20.5–51.1)
LYMPHOCYTES # BLD AUTO: 2.46 K/UL — SIGNIFICANT CHANGE UP (ref 1.2–3.4)
MAGNESIUM SERPL-MCNC: 2.5 MG/DL — HIGH (ref 1.8–2.4)
MCHC RBC-ENTMCNC: 28.1 PG — SIGNIFICANT CHANGE UP (ref 27–31)
MCHC RBC-ENTMCNC: 30.2 G/DL — LOW (ref 32–37)
MCV RBC AUTO: 92.7 FL — SIGNIFICANT CHANGE UP (ref 81–99)
MONOCYTES # BLD AUTO: 1.52 K/UL — HIGH (ref 0.1–0.6)
MONOCYTES NFR BLD AUTO: 7.1 % — SIGNIFICANT CHANGE UP (ref 1.7–9.3)
MRSA PCR RESULT.: NEGATIVE — SIGNIFICANT CHANGE UP
MRSA PCR RESULT.: NEGATIVE — SIGNIFICANT CHANGE UP
NEUTROPHILS # BLD AUTO: 17.39 K/UL — HIGH (ref 1.4–6.5)
NEUTROPHILS NFR BLD AUTO: 80.5 % — HIGH (ref 42.2–75.2)
NRBC # BLD: SIGNIFICANT CHANGE UP /100 WBCS (ref 0–0)
NRBC BLD-RTO: SIGNIFICANT CHANGE UP /100 WBCS (ref 0–0)
PHOSPHATE SERPL-MCNC: 8.3 MG/DL — HIGH (ref 2.1–4.9)
PLATELET # BLD AUTO: 451 K/UL — HIGH (ref 130–400)
PMV BLD: 10.2 FL — SIGNIFICANT CHANGE UP (ref 7.4–10.4)
POTASSIUM SERPL-MCNC: 4.4 MMOL/L — SIGNIFICANT CHANGE UP (ref 3.5–5)
POTASSIUM SERPL-SCNC: 4.4 MMOL/L — SIGNIFICANT CHANGE UP (ref 3.5–5)
PROT SERPL-MCNC: 6.3 G/DL — SIGNIFICANT CHANGE UP (ref 6–8)
RBC # BLD: 6.06 M/UL — HIGH (ref 4.2–5.4)
RBC # FLD: 13.5 % — SIGNIFICANT CHANGE UP (ref 11.5–14.5)
SODIUM SERPL-SCNC: 140 MMOL/L — SIGNIFICANT CHANGE UP (ref 135–146)
SPECIMEN SOURCE: SIGNIFICANT CHANGE UP
WBC # BLD: 21.36 K/UL — HIGH (ref 4.8–10.8)
WBC # FLD AUTO: 21.36 K/UL — HIGH (ref 4.8–10.8)

## 2025-01-17 PROCEDURE — 99291 CRITICAL CARE FIRST HOUR: CPT | Mod: 25

## 2025-01-17 PROCEDURE — 71045 X-RAY EXAM CHEST 1 VIEW: CPT | Mod: 26

## 2025-01-17 PROCEDURE — 93010 ELECTROCARDIOGRAM REPORT: CPT

## 2025-01-17 PROCEDURE — 70450 CT HEAD/BRAIN W/O DYE: CPT | Mod: 26

## 2025-01-17 PROCEDURE — 99232 SBSQ HOSP IP/OBS MODERATE 35: CPT

## 2025-01-17 PROCEDURE — 93010 ELECTROCARDIOGRAM REPORT: CPT | Mod: 77

## 2025-01-17 PROCEDURE — 31624 DX BRONCHOSCOPE/LAVAGE: CPT

## 2025-01-17 RX ORDER — ACETAMINOPHEN 160 MG/5ML
650 SUSPENSION ORAL ONCE
Refills: 0 | Status: COMPLETED | OUTPATIENT
Start: 2025-01-17 | End: 2025-01-19

## 2025-01-17 RX ORDER — VANCOMYCIN HYDROCHLORIDE 50 MG/ML
1000 KIT ORAL EVERY 12 HOURS
Refills: 0 | Status: DISCONTINUED | OUTPATIENT
Start: 2025-01-17 | End: 2025-01-17

## 2025-01-17 RX ORDER — FENTANYL CITRATE 50 UG/ML
0.5 INJECTION INTRAMUSCULAR; INTRAVENOUS
Qty: 2500 | Refills: 0 | Status: DISCONTINUED | OUTPATIENT
Start: 2025-01-17 | End: 2025-01-18

## 2025-01-17 RX ORDER — SODIUM CHLORIDE 9 G/ML
1000 INJECTION, SOLUTION INTRAVENOUS
Refills: 0 | Status: DISCONTINUED | OUTPATIENT
Start: 2025-01-17 | End: 2025-01-18

## 2025-01-17 RX ORDER — METHYLPREDNISOLONE ACETATE 40 MG/ML
60 VIAL (ML) INJECTION
Refills: 0 | Status: DISCONTINUED | OUTPATIENT
Start: 2025-01-17 | End: 2025-01-19

## 2025-01-17 RX ORDER — DIPHENHYDRAMINE HCL 25 MG
50 CAPSULE ORAL ONCE
Refills: 0 | Status: DISCONTINUED | OUTPATIENT
Start: 2025-01-17 | End: 2025-01-17

## 2025-01-17 RX ORDER — METHYLPREDNISOLONE ACETATE 40 MG/ML
60 VIAL (ML) INJECTION ONCE
Refills: 0 | Status: COMPLETED | OUTPATIENT
Start: 2025-01-17 | End: 2025-01-17

## 2025-01-17 RX ORDER — FENTANYL CITRATE 50 UG/ML
25 INJECTION INTRAMUSCULAR; INTRAVENOUS ONCE
Refills: 0 | Status: DISCONTINUED | OUTPATIENT
Start: 2025-01-17 | End: 2025-01-17

## 2025-01-17 RX ORDER — DEXMEDETOMIDINE HYDROCHLORIDE 4 UG/ML
0.2 INJECTION, SOLUTION INTRAVENOUS
Qty: 200 | Refills: 0 | Status: DISCONTINUED | OUTPATIENT
Start: 2025-01-17 | End: 2025-01-18

## 2025-01-17 RX ORDER — ANTISEPTIC SURGICAL SCRUB 0.04 MG/ML
15 SOLUTION TOPICAL EVERY 12 HOURS
Refills: 0 | Status: DISCONTINUED | OUTPATIENT
Start: 2025-01-17 | End: 2025-01-17

## 2025-01-17 RX ORDER — PROPOFOL 10 MG/ML
5 INJECTION, EMULSION INTRAVENOUS
Qty: 1000 | Refills: 0 | Status: DISCONTINUED | OUTPATIENT
Start: 2025-01-17 | End: 2025-01-17

## 2025-01-17 RX ORDER — LIDOCAINE HYDROCHLORIDE 30 MG/G
100 CREAM TOPICAL ONCE
Refills: 0 | Status: COMPLETED | OUTPATIENT
Start: 2025-01-17 | End: 2025-01-17

## 2025-01-17 RX ORDER — DIPHENHYDRAMINE HCL 25 MG
50 CAPSULE ORAL ONCE
Refills: 0 | Status: COMPLETED | OUTPATIENT
Start: 2025-01-18 | End: 2025-01-18

## 2025-01-17 RX ORDER — SODIUM CHLORIDE 9 G/ML
500 INJECTION, SOLUTION INTRAVENOUS ONCE
Refills: 0 | Status: COMPLETED | OUTPATIENT
Start: 2025-01-17 | End: 2025-01-17

## 2025-01-17 RX ORDER — NOREPINEPHRINE BITARTRATE 1 MG/ML
0.05 INJECTION, SOLUTION, CONCENTRATE INTRAVENOUS
Qty: 8 | Refills: 0 | Status: DISCONTINUED | OUTPATIENT
Start: 2025-01-17 | End: 2025-01-18

## 2025-01-17 RX ORDER — METHYLPREDNISOLONE ACETATE 40 MG/ML
40 VIAL (ML) INJECTION ONCE
Refills: 0 | Status: DISCONTINUED | OUTPATIENT
Start: 2025-01-17 | End: 2025-01-17

## 2025-01-17 RX ORDER — DEXMEDETOMIDINE HYDROCHLORIDE 4 UG/ML
0.05 INJECTION, SOLUTION INTRAVENOUS
Qty: 200 | Refills: 0 | Status: DISCONTINUED | OUTPATIENT
Start: 2025-01-17 | End: 2025-01-17

## 2025-01-17 RX ORDER — PIPERACILLIN SODIUM AND TAZOBACTAM SODIUM 2; 250 G/50ML; MG/50ML
3.38 INJECTION, POWDER, FOR SOLUTION INTRAVENOUS EVERY 8 HOURS
Refills: 0 | Status: DISCONTINUED | OUTPATIENT
Start: 2025-01-17 | End: 2025-01-20

## 2025-01-17 RX ORDER — FENTANYL CITRATE 50 UG/ML
0.5 INJECTION INTRAMUSCULAR; INTRAVENOUS
Qty: 5000 | Refills: 0 | Status: DISCONTINUED | OUTPATIENT
Start: 2025-01-17 | End: 2025-01-17

## 2025-01-17 RX ADMIN — DEXMEDETOMIDINE HYDROCHLORIDE 3.86 MICROGRAM(S)/KG/HR: 4 INJECTION, SOLUTION INTRAVENOUS at 05:49

## 2025-01-17 RX ADMIN — Medication 60 MILLIGRAM(S): at 17:31

## 2025-01-17 RX ADMIN — ACETAMINOPHEN 650 MILLIGRAM(S): 160 SUSPENSION ORAL at 05:50

## 2025-01-17 RX ADMIN — ACETAMINOPHEN 650 MILLIGRAM(S): 160 SUSPENSION ORAL at 05:56

## 2025-01-17 RX ADMIN — Medication 40 MILLIGRAM(S): at 05:51

## 2025-01-17 RX ADMIN — PIPERACILLIN SODIUM AND TAZOBACTAM SODIUM 25 GRAM(S): 2; 250 INJECTION, POWDER, FOR SOLUTION INTRAVENOUS at 16:03

## 2025-01-17 RX ADMIN — ACETAMINOPHEN 650 MILLIGRAM(S): 160 SUSPENSION ORAL at 12:30

## 2025-01-17 RX ADMIN — PANTOPRAZOLE 40 MILLIGRAM(S): 20 TABLET, DELAYED RELEASE ORAL at 05:50

## 2025-01-17 RX ADMIN — ANTISEPTIC SURGICAL SCRUB 15 MILLILITER(S): 0.04 SOLUTION TOPICAL at 17:31

## 2025-01-17 RX ADMIN — Medication 5000 UNIT(S): at 17:31

## 2025-01-17 RX ADMIN — FENTANYL CITRATE 3.86 MICROGRAM(S)/KG/HR: 50 INJECTION INTRAMUSCULAR; INTRAVENOUS at 02:48

## 2025-01-17 RX ADMIN — ACETAMINOPHEN 650 MILLIGRAM(S): 160 SUSPENSION ORAL at 12:00

## 2025-01-17 RX ADMIN — Medication 60 MILLIGRAM(S): at 22:24

## 2025-01-17 RX ADMIN — LIDOCAINE HYDROCHLORIDE 100 MILLILITER(S): 30 CREAM TOPICAL at 12:11

## 2025-01-17 RX ADMIN — SODIUM CHLORIDE 500 MILLILITER(S): 9 INJECTION, SOLUTION INTRAVENOUS at 10:23

## 2025-01-17 RX ADMIN — PIPERACILLIN SODIUM AND TAZOBACTAM SODIUM 25 GRAM(S): 2; 250 INJECTION, POWDER, FOR SOLUTION INTRAVENOUS at 22:24

## 2025-01-17 RX ADMIN — ANTISEPTIC SURGICAL SCRUB 1 APPLICATION(S): 0.04 SOLUTION TOPICAL at 12:21

## 2025-01-17 RX ADMIN — DEXMEDETOMIDINE HYDROCHLORIDE 3.86 MICROGRAM(S)/KG/HR: 4 INJECTION, SOLUTION INTRAVENOUS at 10:32

## 2025-01-17 RX ADMIN — ANTISEPTIC SURGICAL SCRUB 15 MILLILITER(S): 0.04 SOLUTION TOPICAL at 05:49

## 2025-01-17 RX ADMIN — Medication 2: at 17:32

## 2025-01-17 RX ADMIN — Medication 5000 UNIT(S): at 05:50

## 2025-01-17 RX ADMIN — Medication 25 MILLIGRAM(S): at 05:50

## 2025-01-17 RX ADMIN — Medication 4 MILLIGRAM(S): at 02:59

## 2025-01-17 RX ADMIN — Medication 4: at 06:24

## 2025-01-17 NOTE — PROGRESS NOTE ADULT - SUBJECTIVE AND OBJECTIVE BOX
ENT DAILY PROGRESS NOTE    Pt is a 69y Female with PMH of HTN, HLD, GERD, CAD, COPB not on Home O2, Hx of Pneumothorax, SCC of BOT s/p Trach/PEG (reversed in 4/2021), s/p CRT (7 sessions in 2021) who presents with SOB for 3 days - ENT c/s for hoarseness. Pt CT with tracheal stenosis now s/p Dilation with CT Sx. Recalled by CCU after episode of desaturation requiring intubation. Patient intubated in CCU.      REVIEW OF SYSTEMS   [x] Due to altered mental status/intubation, subjective information were not able to be obtained from patient. History was obtained, to the extent possible, from review of the chart and collateral sources of information.    Allergies    No Known Drug Allergies  CONTRAST DYS (Nausea)    Intolerances        MEDICATIONS:  acetaminophen     Tablet .. 650 milliGRAM(s) Oral every 6 hours PRN  acetaminophen     Tablet .. 650 milliGRAM(s) Oral once PRN  albuterol/ipratropium for Nebulization 3 milliLiter(s) Nebulizer every 20 minutes  chlorhexidine 0.12% Liquid 15 milliLiter(s) Oral Mucosa every 12 hours  chlorhexidine 2% Cloths 1 Application(s) Topical daily  dexMEDEtomidine Infusion 0.2 MICROgram(s)/kG/Hr IV Continuous <Continuous>  dextrose 5%. 1000 milliLiter(s) IV Continuous <Continuous>  dextrose 5%. 1000 milliLiter(s) IV Continuous <Continuous>  dextrose 50% Injectable 25 Gram(s) IV Push once  dextrose 50% Injectable 12.5 Gram(s) IV Push once  dextrose 50% Injectable 25 Gram(s) IV Push once  dextrose Oral Gel 15 Gram(s) Oral once PRN  fentaNYL   Infusion 0.5 MICROgram(s)/kG/Hr IV Continuous <Continuous>  glucagon  Injectable 1 milliGRAM(s) IntraMuscular once  heparin   Injectable 5000 Unit(s) SubCutaneous every 12 hours  insulin lispro (ADMELOG) corrective regimen sliding scale   SubCutaneous two times a day before meals  lactated ringers. 1000 milliLiter(s) IV Continuous <Continuous>  melatonin 3 milliGRAM(s) Oral at bedtime PRN  methylPREDNISolone sodium succinate Injectable 60 milliGRAM(s) IV Push two times a day  metoprolol succinate ER 25 milliGRAM(s) Oral daily  norepinephrine Infusion 0.05 MICROgram(s)/kG/Min IV Continuous <Continuous>  pantoprazole    Tablet 40 milliGRAM(s) Oral before breakfast  piperacillin/tazobactam IVPB.. 3.375 Gram(s) IV Intermittent every 8 hours      Vital Signs Last 24 Hrs  T(C): 38.9 (17 Jan 2025 12:00), Max: 38.9 (17 Jan 2025 10:00)  T(F): 102 (17 Jan 2025 12:00), Max: 102 (17 Jan 2025 10:00)  HR: 73 (17 Jan 2025 13:59) (66 - 94)  BP: 102/65 (17 Jan 2025 12:00) (80/53 - 136/87)  BP(mean): 78 (17 Jan 2025 12:00) (62 - 96)  RR: 21 (17 Jan 2025 12:00) (16 - 38)  SpO2: 99% (17 Jan 2025 13:59) (87% - 100%)    Parameters below as of 17 Jan 2025 12:00  Patient On (Oxygen Delivery Method): ventilator    O2 Concentration (%): 50      01-17 @ 07:01  -  01-17 @ 15:18  --------------------------------------------------------  IN:    Lactated Ringers: 300 mL    Lactated Ringers Bolus: 500 mL  Total IN: 800 mL    OUT:    Indwelling Catheter - Urethral (mL): 270 mL  Total OUT: 270 mL    Total NET: 530 mL          PHYSICAL EXAM:    GEN: NAD, intubated and sedated.  SKIN: Good color, non diaphoretic  HEENT: Oral mucosa pink and moist. ETT in place.  NECK: Trachea midline.   RESP: Non-labored breathing. No use of accessory muscles.  CARDIO: +S1/S2  ABDO: Soft, NT.  EXT: WOLFE x 4    LABS:  CBC-                        17.0   21.36 )-----------( 451      ( 17 Jan 2025 03:55 )             56.2     BMP/CMP-  17 Jan 2025 03:55    140    |  91     |  36     ----------------------------<  276    4.4     |  33     |  1.1      Ca    9.6        17 Jan 2025 03:55  Phos  8.3       17 Jan 2025 03:55  Mg     2.5       17 Jan 2025 03:55    TPro  6.3    /  Alb  4.0    /  TBili  0.7    /  DBili  x      /  AST  52     /  ALT  61     /  AlkPhos  81     17 Jan 2025 03:55    Coagulation Studies-    Endocrine Panel-  Calcium: 9.6 mg/dL (01-17 @ 03:55)              RADIOLOGY & ADDITIONAL STUDIES:

## 2025-01-17 NOTE — PROGRESS NOTE ADULT - ASSESSMENT
1yo Female with PMH of HTN, HLD, GERD, CAD, COPD not on Home O2, Hx of Pneumothorax, SCC of BOT s/p Trach/PEG (reversed in 4/2021), s/p CRT (7 sessions in 2021) who presents with SOB for 1 week duration, admitted to SDU for ARF , required HFNC, consulted by thoracic Sx, underwent bronchoscopy with tracheal dilation on 1/14, clinically improved. Patient endorses episodes of non bloody, non watery diarrhea overnight.    IMPRESSION:    Acute hypoxemic resp failure s/p extubation   COPD exacerbation  Tracheal stenosis SP dilation   HO trach   History of lung nodules  History of relapsing polychondritis   possible PNA   emphysema   PLAN:    CNS: Avoid CNS depressant    HEENT:  Oral care    PULMONARY:   now on fio2 50 in need increase peep to 10   CTSx follow up , solumedrol 60 q 12, Neb q 4. Incentive rita.     CARDIOVASCULAR: avoid overload  do cheetah if positive start iv fluid   hold lasix     GI: GI prophylaxis. Feeding per speech    RENAL:  F/u  lytes.  Correct as needed. accurate I/O    INFECTIOUS DISEASE: Fungitell noted. ID eval. abx, procal noted, sputum gram stain/ cx  procal   DTA   start zosyn, vanco   nasal mrsa   HEMATOLOGICAL:  DVT prophylaxis. LE doppler prelim negative    ENDOCRINE:  Follow up FS.  Insulin protocol if needed.    MICU

## 2025-01-17 NOTE — PROGRESS NOTE ADULT - ASSESSMENT
68yo Female with PMH of HTN, HLD, GERD, CAD, COPD not on Home O2, Hx of Pneumothorax, SCC of BOT s/p Trach/PEG (reversed in 4/2021), s/p CRT (7 sessions in 2021) who presents with SOB for 3 days - ENT c/s for hoarseness. 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 Stable mild thickening of the epiglottis and small preepiglottic soft tissue density. Resolved nasopharyngeal soft tissue thickening and left aryepiglottic fold thickening. Patient desaturated requiring Intubation.     Plan:  - f/u ordered repeat CT imaging of neck  - f/u CT surgery recs post dilation  Patient seen and examined on rounds with attending

## 2025-01-17 NOTE — RAPID RESPONSE TEAM SUMMARY - NSSITUATIONBACKGROUNDRRT_GEN_ALL_CORE
Pt had been on bipap, called for assistance as she was not tolerating bipap and seemed to be having difficulty breathing. Bipap was removed. Pt was having poor air entry on NC, became unresponsive and desat to 14% rapidly. HR increased to 190s and BP to 200s/100s.   Rapid was called. Pt was placed on nonrebreather with little improvement and was then intubated.   ABG was pH 7.21, pCO2 82, pO2 141, HCO3 33, O2 sat 99.7. Lactate 6.1  CXR confirmed placement of ET tube.   Saturation improved, HR improved.   Pt sedated initially with fentanyl and precedex.   Propofol was added and low dose levo was started.   Stat labs sent.   CT PE, CT neck, CT head ordered.   Pt is being transferred to CCU.  Pt had been on bipap, called for assistance as she was not tolerating bipap and seemed to be having difficulty breathing. Bipap was removed. Pt was having poor air entry on NC, became unresponsive and desat to 14% rapidly. HR increased to 190s and BP to 200s/100s.   Rapid was called. Pt was placed on nonrebreather with little improvement. Decision made to intubate at bedside.    ABG was pH 7.21, pCO2 82, pO2 141, HCO3 33, O2 sat 99.7. Lactate 6.1  CXR confirmed placement of ET tube.   Saturation improved, HR improved. BP improved.   Pt sedated initially with fentanyl and precedex.   Propofol was added and low dose levo was started.   Stat labs sent.   CTA PE, CT neck, CT head ordered.   Pt is being transferred to CCU.

## 2025-01-17 NOTE — CHART NOTE - NSCHARTNOTEFT_GEN_A_CORE
Transfer Note    Transfer from: 3A     Transfer to: CCU    HOSPITAL COURSE:  HPI:  A 68yo Female with PMH of HTN, HLD, GERD, CAD, COPD not on Home O2, Hx of Pneumothorax, polychondirits, SCC of BOT s/p Trach/PEG (reversed in 4/2021), s/p CRT (7 sessions in 2021) who presents with SOB for 1 week duration. History goes back to around 1 week ago When the patient started having Shortness of breath, productive cough, and malaise. Patient dnies any fever. Patient reported that her son was coughing. Patient reported some chest pain previously when coughing . Patient reports diarrhea for 4 days. which resolved now. PAtient denies an urinary symptoms. Patient is admitted for PNA, and COPD excacerbation .     Patient reports some intermittent left sided chest pain when coughing. and abdominal distention as well.  Patient follows with ENT Dr. Louis, last seen 9/23/24, CT Neck 9/2024 showed Abnormal soft tissue within the posterior nasopharynx greater the right, new since the reference exams and nonspecific in appearance. The abnormal submucosal soft tissue in the preepiglottic space greater on the left, asymmetric thickening of the left aryepiglottic fold, and mild epiglottic thickening; similar when correlated with the PET/CT dated 5/12/2022.  Stable rim-calcified right thyroid nodule measuring 1.8 cm. US guided FNA by IR 10/7/24 of  Stable rim-calcified right thyroid nodule measuring 1.8 cm. Showed atypia but not malignant cells.     ENT evaluated the pt for hoarseness, FFL showed, thick purulent secretions on larynx, airway patent. Recommended CT Neck with IVC for further evaluation, Albuterol neb treatment, Humidified O2 ( on 3L of O2 via NC)     Labs significant for WBC 11.2 K (neutrophilic), troponin 96 > 75, AG 16     09:57 - VBG - pH: 7.39  | pCO2: 38    | pO2: 36    | Lactate: 3.0      In Ed the patient was given 2 L  fluid bolus, Azithromycin and rocephin, duoneb and solu-medrol     CXR showed reticular infiltrates     EKG shows PACs, with MAT     VS significant for using 4 L NC, Tachycardia 126     Hospital course:   Pt had hypoxic resp failure due to COPD exacerbation complicated by tracheal stenosis, H/o SCC s/p surgery. Had been on NC. Steroids were being tapered. Sputum sample and PJP had been ordered. Pt had also been having diarrhea and stool PCR had been sent.     On 1/17: Rapid was called0 Pt had been on bipap, called for assistance as she was not tolerating bipap and seemed to be having difficulty breathing. Bipap was removed. Pt was having poor air entry on NC, became unresponsive and desat to 14% rapidly. HR increased to 190s and BP to 200s/100s.   Rapid was called. Pt was placed on nonrebreather with little improvement and was then intubated.   ABG was pH 7.21, pCO2 82, pO2 141, HCO3 33, O2 sat 99.7. Lactate 6.1  CXR confirmed placement of ET tube.   Saturation improved, HR improved.   Pt sedated initially with fentanyl and precedex.   Propofol was added and low dose levo was started.   Stat labs sent.   CT PE, CT neck, CT head ordered.   Pt is being transferred to CCU.    ICU Vital Signs Last 24 Hrs  T(C): 37.3 (10 Zeyad 2025 15:36), Max: 37.8 (10 Zeyad 2025 10:41)  T(F): 99.1 (10 Zeyad 2025 15:36), Max: 100 (10 Zeyad 2025 10:41)  HR: 112 (10 Zeyad 2025 15:36) (97 - 126)  BP: 95/66 (10 Zeyad 2025 15:36) (95/66 - 116/86)  BP(mean): 76 (10 Zeyad 2025 15:36) (76 - 76)  RR: 20 (10 Zeyad 2025 15:36) (20 - 22)  SpO2: 94% (10 Zeyad 2025 15:36) (94% - 95%)  Patient On (Oxygen Delivery Method): nasal cannula  O2 Flow (L/min): 4         (10 Zeyad 2025 15:46)        Vital Signs Last 24 Hrs  T(C): 38.2 (17 Jan 2025 05:00), Max: 38.2 (17 Jan 2025 05:00)  T(F): 100.8 (17 Jan 2025 05:00), Max: 100.8 (17 Jan 2025 05:00)  HR: 71 (17 Jan 2025 05:00) (71 - 94)  BP: 94/65 (17 Jan 2025 05:00) (94/65 - 136/87)  BP(mean): 75 (17 Jan 2025 05:00) (75 - 75)  RR: 16 (17 Jan 2025 05:00) (16 - 18)  SpO2: 100% (17 Jan 2025 05:00) (87% - 100%)    Parameters below as of 17 Jan 2025 05:00  Patient On (Oxygen Delivery Method): ventilator    O2 Concentration (%): 100    I&O's Summary    15 Zeyad 2025 07:01  -  16 Jan 2025 07:00  --------------------------------------------------------  IN: 0 mL / OUT: 700 mL / NET: -700 mL              LABS:                               17.0   21.36 )-----------( 451      ( 17 Jan 2025 03:55 )             56.2       01-17    140  |  91[L]  |  36[H]  ----------------------------<  276[H]  4.4   |  33[H]  |  1.1    Ca    9.6      17 Jan 2025 03:55  Phos  8.3     01-17  Mg     2.5     01-17    TPro  6.3  /  Alb  4.0  /  TBili  0.7  /  DBili  x   /  AST  52[H]  /  ALT  61[H]  /  AlkPhos  81  01-17          ABG - ( 17 Jan 2025 02:41 )  pH, Arterial: 7.21  pH, Blood: x     /  pCO2: 82    /  pO2: 141   / HCO3: 33    / Base Excess: 1.6   /  SaO2: 99.7            ASSESSMENT & PLAN:     68yo Female with PMH of HTN, HLD, GERD, CAD, COPD not on Home O2, Hx of Pneumothorax, SCC of BOT s/p Trach/PEG (reversed in 4/2021), s/p CRT (7 sessions in 2021) who presents with SOB for 1 week duration, admitted to SDU for ARF , required HFNC, consulted by thoracic Sx, underwent bronchoscopy with tracheal dilation on 1/14, clinically improved. Patient endorses episodes of non bloody, non watery diarrhea overnight.    # Acute hypoxic resp failure due to COPD exacerbation / complicated by tracheal stenosis  /H/o SCC s/p surgery, CRT - now intubated and sedated   #Diarrhea  #Polychondritis  #HTN/HLD/CAD  #GERD  #COPD    - Intubated and sedated  - f/u CTH, neck, CT PE   - Diuresis with IV lasix 40mg  - steroids increased to solumedrol 60 BID  - Duonebs q6  - Stool PCR sent, avoid pro-diarrheal agents.  - Sputum sample PJP ordered  - Follow up FS.  Insulin protocol if needed.  - Encourage ambulation, incentive spirometry    DVT ppx: Heparin Subq  Diet: Soft, bite sized  GI prophylaxis: Protonix 40mg    Pending:  - intubated and sedated  - f/u stat labs (CBC, CMP, Mg, repeat lactate)  - Repeat ABG  - Repeat CXR  - f/u CTH, neck, CT PE

## 2025-01-17 NOTE — PROGRESS NOTE ADULT - ASSESSMENT
ASSESSMENT  A 68yo Female with PMH of HTN, HLD, GERD, CAD, COPD not on Home O2, Hx of Pneumothorax, polychondirits, SCC of BOT s/p Trach/PEG (reversed in 4/2021), s/p CRT (7 sessions in 2021) who presents with SOB for 1 week duration.    IMPRESSION  #SCC of BOT s/p Trach/PEG (reversed 2021) s/p CRT  #Dyspnea/Acute Hypoxemic respiratory failure  #Intubated 1/17 with Septic shock   - CXR 1/17 - unchanged bilateral intersitial opacities     #Tracheal Stenosis  - CT Neck Soft Tissue w/ IV Cont (01.11.25 @ 08:27): Interval development of focal moderate tracheal stenosis at the level of  the thyroid with partially collapsed appearance of the right tracheal  wall since the prior CT neck from 9/3/2024, possibly associated with the  history of prior tracheostomy. Stable mild thickening of the epiglottis and small preepiglottic soft  tissue density. Resolved nasopharyngeal soft tissue thickening and left  aryepiglottic fold thickening. Increased biapical reticular changes. Please see separately dictated  concurrent chest CT.  - CT Chest w/ IV Cont (01.11.25 @ 08:29): Since  September 3, 2024 Bilateral basilar, partial right middle lobe atelectasis. Extensive emphysema with superimposed mild groundglass opacities/septal  thickening suggesting underlying edema( not present on earlier study). CT neck performed on the same day, see separate report  - s/p bronch 1/14 -- tracheal dilation with excision of legion causing tracheal stenosis     #Elevated Fungitell    #CAD  #COPD    #Obesity BMI (kg/m2): 29.2  #DM  #Abx allergy: No Known Drug Allergies      RECOMMENDATIONS  - continue zosyn 3.375 mg q 8 hours   - on vancomycin 1g q 12 hours-- if MRSA nares negative, stop vancomycin   - check deep tracheal Cx   - had elevated fungitell -- Previous CT Chest with faint Ground glass opacities, although non-specific -- unclear of what to make of elevated fungitell, could be false positive, but if worsening vent requirements, would consider Bronch to look for PJP PCR  - repeat fungitell     Please call or message on [employer] Teams if with any questions.  Spectra [phone]

## 2025-01-17 NOTE — PROCEDURE NOTE - NSBRONCHPROCDETAILS_GEN_A_CORE_FT
The patient had been previously intubated and was on mechanical ventilatory support. She was on sedation, which was continued and adjusted during the procedure. her FiO2 was increased to 100% during the procedure. The  fiberoptic bronchoscope was introduced through an endotracheal tube adaptor and the tip of the endotracheal tube was noted to be in good position above the jaylyn.   The Right tracheobronchial tree was inspected closely to the level of the subsegmental bronchi. All bronchi are patent with no endobronchial lesions and no mucosal lesions noted. there is moderate thick mucus with jelly like consistency which was suctioned and cleaned    The bronchoscope was then introduced to the distal right lower lobe and washings/brushings were taken from that area.    The procedure was completed and all samples were submitted for appropriate studies  After adequate clearing of secretions was accomplished, the bronchoscope was removed from the patient and the procedure was ended.   The patient tolerated the procedure well and there were no complications.    The BAL is send for fungal culture

## 2025-01-17 NOTE — PROCEDURE NOTE - SUPERVISORY STATEMENT
Bronch BAL of the RLL done with no issues. Secretions noted in the right mainstem bronchus; thick and jelly like. Tolerated well.

## 2025-01-17 NOTE — PROGRESS NOTE ADULT - SUBJECTIVE AND OBJECTIVE BOX
GEREMIAS FULLER  69y, Female  Allergy: No Known Drug Allergies  CONTRAST DYS (Nausea)      LOS  7d    CHIEF COMPLAINT: shortness of breath, malaise (16 Jan 2025 11:51)      INTERVAL EVENTS/HPI  - T(F): , Max: 100.8 (01-17-25 @ 05:00)  - downgraded, was put on 4L NC after being on high flow, but worsening hypoxemia and intubated   - febrile, on levophed   - WBC Count: 21.36 (01-17-25 @ 03:55)  WBC Count: 10.27 (01-16-25 @ 08:20)     - Creatinine: 1.1 (01-17-25 @ 03:55)  Creatinine: 0.8 (01-16-25 @ 08:20)       ROS  unable to obtain history secondary to patient's mental status and/or sedation    VITALS:  T(F): 100.8, Max: 100.8 (01-17-25 @ 05:00)  HR: 67  BP: 96/66  RR: 18Vital Signs Last 24 Hrs  T(C): 38.2 (17 Jan 2025 05:55), Max: 38.2 (17 Jan 2025 05:00)  T(F): 100.8 (17 Jan 2025 05:55), Max: 100.8 (17 Jan 2025 05:00)  HR: 67 (17 Jan 2025 08:15) (67 - 94)  BP: 96/66 (17 Jan 2025 08:00) (80/53 - 136/87)  BP(mean): 76 (17 Jan 2025 08:00) (62 - 96)  RR: 18 (17 Jan 2025 08:00) (16 - 18)  SpO2: 98% (17 Jan 2025 08:15) (87% - 100%)    Parameters below as of 17 Jan 2025 08:00  Patient On (Oxygen Delivery Method): ventilator    O2 Concentration (%): 50    PHYSICAL EXAM:  Gen: intubated  HEENT: Normocephalic, atraumatic  Neck: supple, no lymphadenopathy  CV: Regular rate & regular rhythm  Lungs: decreased BS at bases, no fremitus  Abdomen: Soft, BS present  Ext: Warm, well perfused  Neuro: non focal, awake  Skin: no rash, no erythema  Lines: no phlebitis    FH: Non-contributory  Social Hx: Non-contributory    TESTS & MEASUREMENTS:                        17.0   21.36 )-----------( 451      ( 17 Jan 2025 03:55 )             56.2     01-17    140  |  91[L]  |  36[H]  ----------------------------<  276[H]  4.4   |  33[H]  |  1.1    Ca    9.6      17 Jan 2025 03:55  Phos  8.3     01-17  Mg     2.5     01-17    TPro  6.3  /  Alb  4.0  /  TBili  0.7  /  DBili  x   /  AST  52[H]  /  ALT  61[H]  /  AlkPhos  81  01-17      LIVER FUNCTIONS - ( 17 Jan 2025 03:55 )  Alb: 4.0 g/dL / Pro: 6.3 g/dL / ALK PHOS: 81 U/L / ALT: 61 U/L / AST: 52 U/L / GGT: x           Urinalysis Basic - ( 17 Jan 2025 03:55 )    Color: x / Appearance: x / SG: x / pH: x  Gluc: 276 mg/dL / Ketone: x  / Bili: x / Urobili: x   Blood: x / Protein: x / Nitrite: x   Leuk Esterase: x / RBC: x / WBC x   Sq Epi: x / Non Sq Epi: x / Bacteria: x        Culture - Blood (collected 01-14-25 @ 18:22)  Source: .Blood BLOOD  Preliminary Report (01-16-25 @ 23:01):    No growth at 48 Hours    Culture - Blood (collected 01-14-25 @ 18:22)  Source: .Blood BLOOD  Preliminary Report (01-16-25 @ 23:01):    No growth at 48 Hours        Lactate, Blood: 4.6 mmol/L (01-17-25 @ 03:55)      INFECTIOUS DISEASES TESTING  MRSA PCR Result.: Negative (01-13-25 @ 12:40)  Procalcitonin: 0.08 (01-10-25 @ 21:09)  Fungitell: >500 (01-10-25 @ 21:09)      INFLAMMATORY MARKERS      RADIOLOGY & ADDITIONAL TESTS:  I have personally reviewed the last available Chest xray  CXR  Xray Chest 1 View- PORTABLE-Urgent:   ACC: [id number] EXAM:  XR CHEST PORTABLE URGENT 1V   ORDERED BY: BENTLEY HARRIS     PROCEDURE DATE:  01/17/2025          INTERPRETATION:  CLINICAL HISTORY / REASON FOR EXAM: Shortness of breath.    COMPARISON: Chest radiograph from January 17, 2025.    TECHNIQUE/POSITIONING: Satisfactory. Single image, AP chest radiograph.    FINDINGS:    SUPPORT DEVICES: Endotracheal tube with distal tip approximately 4 cm   above the jaylyn. Enteric tube courses below the left hemidiaphragm, with   distaltip out of the field of view.    CARDIAC/MEDIASTINUM/HILUM: Unchanged cardiac silhouette.    LUNG PARENCHYMA/PLEURA: Unchanged bilateral interstitial opacities.    SKELETON/SOFT TISSUES: Unchanged.      IMPRESSION:    Interval placement of enteric tube.    Unchanged bilateral interstitial opacities.    --- End of Report ---            ANNE ALSTON MD; Attending Radiologist  This document has been electronically signed. Jan 17 2025  6:28AM (01-17-25 @ 06:09)      CT      CARDIOLOGY TESTING  12 Lead ECG:   Ventricular Rate 147 BPM    Atrial Rate 147 BPM    P-R Interval 122 ms    QRS Duration 78 ms    Q-T Interval 336 ms    QTC Calculation(Bazett) 525 ms    P Axis 78 degrees    R Axis 33 degrees    T Axis 86 degrees    Diagnosis Line *** Critical TestResult: High HR  Sinus tachycardia  Possible Left atrial enlargement  Nonspecific ST and T wave abnormality  Abnormal ECG    Confirmed by ULYSSES HUI MD ([id number]) on 1/17/2025 7:27:58 AM (01-17-25 @ 02:51)  12 Lead ECG:   Ventricular Rate 74 BPM    Atrial Rate 74 BPM    P-R Interval 124 ms    QRS Duration 88 ms    Q-T Interval 448 ms    QTC Calculation(Bazett) 497 ms    P Axis 63 degrees    R Axis 11 degrees    T Axis 146 degrees    Diagnosis Line Sinus rhythm with Premature ventricular complexes or Fusion complexes  T wave abnormality, consider anterolateral ischemia  Abnormal ECG    Confirmed by TOMMY INTERIANO MD [id number]) on 1/15/2025 12:05:56 AM (01-14-25 @ 19:48)      MEDICATIONS  albuterol/ipratropium for Nebulization 3 Nebulizer every 20 minutes  chlorhexidine 0.12% Liquid 15 Oral Mucosa every 12 hours  chlorhexidine 2% Cloths 1 Topical daily  dexMEDEtomidine Infusion 0.2 IV Continuous <Continuous>  dextrose 5%. 1000 IV Continuous <Continuous>  dextrose 5%. 1000 IV Continuous <Continuous>  dextrose 50% Injectable 25 IV Push once  dextrose 50% Injectable 12.5 IV Push once  dextrose 50% Injectable 25 IV Push once  fentaNYL   Infusion 0.5 IV Continuous <Continuous>  glucagon  Injectable 1 IntraMuscular once  heparin   Injectable 5000 SubCutaneous every 12 hours  insulin lispro (ADMELOG) corrective regimen sliding scale  SubCutaneous two times a day before meals  lactated ringers Bolus 500 IV Bolus once  lactated ringers. 1000 IV Continuous <Continuous>  methylPREDNISolone sodium succinate Injectable 60 IV Push two times a day  metoprolol succinate ER 25 Oral daily  pantoprazole    Tablet 40 Oral before breakfast  piperacillin/tazobactam IVPB.. 3.375 IV Intermittent every 8 hours  vancomycin  IVPB 1000 IV Intermittent every 12 hours      WEIGHT  Weight (kg): 72.6 (01-17-25 @ 05:00)  Creatinine: 1.1 mg/dL (01-17-25 @ 03:55)      ANTIBIOTICS:  piperacillin/tazobactam IVPB.. 3.375 Gram(s) IV Intermittent every 8 hours  vancomycin  IVPB 1000 milliGRAM(s) IV Intermittent every 12 hours      All available historical records have been reviewed

## 2025-01-17 NOTE — PROGRESS NOTE ADULT - SUBJECTIVE AND OBJECTIVE BOX
Patient is a 69y old  Female who presents with a chief complaint of shortness of breath, malaise (16 Jan 2025 11:51)      Over Night Events:  Patient seen and examined.   chart reviewed   over night patient has hypoxia get intubated   and transfer to ccu   on vent precedex and fentanyl     ROS:  See HPI    PHYSICAL EXAM    ICU Vital Signs Last 24 Hrs  T(C): 38.2 (17 Jan 2025 05:55), Max: 38.2 (17 Jan 2025 05:00)  T(F): 100.8 (17 Jan 2025 05:55), Max: 100.8 (17 Jan 2025 05:00)  HR: 69 (17 Jan 2025 08:00) (69 - 94)  BP: 96/66 (17 Jan 2025 08:00) (80/53 - 136/87)  BP(mean): 76 (17 Jan 2025 08:00) (62 - 96)  ABP: --  ABP(mean): --  RR: 18 (17 Jan 2025 08:00) (16 - 18)  SpO2: 97% (17 Jan 2025 08:00) (87% - 100%)    O2 Parameters below as of 17 Jan 2025 05:55  Patient On (Oxygen Delivery Method): ventilator    O2 Concentration (%): 70        General: on sedation   HEENT:  et tube                  Lungs: Bilateral BS  Cardiovascular: Regular   Abdomen: Soft, Positive BS  Extremities: No clubbing   Skin: warm   Neurological: no focal   Musculoskeletal: move all ext     I&O's Detail      LABS:                          17.0   21.36 )-----------( 451      ( 17 Jan 2025 03:55 )             56.2         17 Jan 2025 03:55    140    |  91     |  36     ----------------------------<  276    4.4     |  33     |  1.1      Ca    9.6        17 Jan 2025 03:55  Phos  8.3       17 Jan 2025 03:55  Mg     2.5       17 Jan 2025 03:55    TPro  6.3    /  Alb  4.0    /  TBili  0.7    /  DBili  x      /  AST  52     /  ALT  61     /  AlkPhos  81     17 Jan 2025 03:55  Amylase x     lipase x                                                                                        Urinalysis Basic - ( 17 Jan 2025 03:55 )    Color: x / Appearance: x / SG: x / pH: x  Gluc: 276 mg/dL / Ketone: x  / Bili: x / Urobili: x   Blood: x / Protein: x / Nitrite: x   Leuk Esterase: x / RBC: x / WBC x   Sq Epi: x / Non Sq Epi: x / Bacteria: x        Lactate, Blood: 4.6 mmol/L (01-17-25 @ 03:55)                                                          Culture - Blood (collected 14 Jan 2025 18:22)  Source: .Blood BLOOD  Preliminary Report (16 Jan 2025 23:01):    No growth at 48 Hours    Culture - Blood (collected 14 Jan 2025 18:22)  Source: .Blood BLOOD  Preliminary Report (16 Jan 2025 23:01):    No growth at 48 Hours                                                   Mode: AC/ CMV (Assist Control/ Continuous Mandatory Ventilation)  RR (machine): 18  TV (machine): 400  FiO2: 70  PEEP: 8  ITime: 0.9  MAP: 11  PIP: 24                                      ABG - ( 17 Jan 2025 06:09 )  pH, Arterial: 7.41  pH, Blood: x     /  pCO2: 55    /  pO2: 78    / HCO3: 35    / Base Excess: 8.0   /  SaO2: 94.8                MEDICATIONS  (STANDING):  albuterol/ipratropium for Nebulization 3 milliLiter(s) Nebulizer every 20 minutes  chlorhexidine 0.12% Liquid 15 milliLiter(s) Oral Mucosa every 12 hours  chlorhexidine 2% Cloths 1 Application(s) Topical daily  dexMEDEtomidine Infusion 0.2 MICROgram(s)/kG/Hr (3.86 mL/Hr) IV Continuous <Continuous>  dextrose 5%. 1000 milliLiter(s) (100 mL/Hr) IV Continuous <Continuous>  dextrose 5%. 1000 milliLiter(s) (50 mL/Hr) IV Continuous <Continuous>  dextrose 50% Injectable 25 Gram(s) IV Push once  dextrose 50% Injectable 12.5 Gram(s) IV Push once  dextrose 50% Injectable 25 Gram(s) IV Push once  fentaNYL   Infusion 0.5 MICROgram(s)/kG/Hr (3.86 mL/Hr) IV Continuous <Continuous>  furosemide    Tablet 40 milliGRAM(s) Oral daily  glucagon  Injectable 1 milliGRAM(s) IntraMuscular once  heparin   Injectable 5000 Unit(s) SubCutaneous every 12 hours  insulin lispro (ADMELOG) corrective regimen sliding scale   SubCutaneous two times a day before meals  methylPREDNISolone sodium succinate Injectable 60 milliGRAM(s) IV Push two times a day  metoprolol succinate ER 25 milliGRAM(s) Oral daily  pantoprazole    Tablet 40 milliGRAM(s) Oral before breakfast    MEDICATIONS  (PRN):  acetaminophen     Tablet .. 650 milliGRAM(s) Oral every 6 hours PRN Mild Pain (1 - 3), Moderate Pain (4 - 6)  dextrose Oral Gel 15 Gram(s) Oral once PRN Blood Glucose LESS THAN 70 milliGRAM(s)/deciliter  melatonin 3 milliGRAM(s) Oral at bedtime PRN Insomnia          Xrays:   increase interstitial changes b/l , left lower opacity                                                                                  ECHO:  [facility] ICU:

## 2025-01-17 NOTE — PROGRESS NOTE ADULT - SUBJECTIVE AND OBJECTIVE BOX
SUBJECTIVE/OVERNIGHT EVENTS  Today is hospital day 7d. This morning patient was seen and examined at bedside, resting comfortably in bed. No acute or major events overnight.    HOSPITAL COURSE      CODE STATUS:    FAMILY COMMUNICATION  Contact date:  Name of person contacted:  Relationship to patient:  Communication details:    MEDICATIONS  STANDING MEDICATIONS  albuterol/ipratropium for Nebulization 3 milliLiter(s) Nebulizer every 20 minutes  chlorhexidine 0.12% Liquid 15 milliLiter(s) Oral Mucosa every 12 hours  chlorhexidine 2% Cloths 1 Application(s) Topical daily  dexMEDEtomidine Infusion 0.2 MICROgram(s)/kG/Hr IV Continuous <Continuous>  dextrose 5%. 1000 milliLiter(s) IV Continuous <Continuous>  dextrose 5%. 1000 milliLiter(s) IV Continuous <Continuous>  dextrose 50% Injectable 25 Gram(s) IV Push once  dextrose 50% Injectable 12.5 Gram(s) IV Push once  dextrose 50% Injectable 25 Gram(s) IV Push once  fentaNYL   Infusion 0.5 MICROgram(s)/kG/Hr IV Continuous <Continuous>  glucagon  Injectable 1 milliGRAM(s) IntraMuscular once  heparin   Injectable 5000 Unit(s) SubCutaneous every 12 hours  insulin lispro (ADMELOG) corrective regimen sliding scale   SubCutaneous two times a day before meals  lactated ringers Bolus 500 milliLiter(s) IV Bolus once  lactated ringers. 1000 milliLiter(s) IV Continuous <Continuous>  lidocaine 2% Viscous 100 milliLiter(s) Swish and Spit once  methylPREDNISolone sodium succinate Injectable 60 milliGRAM(s) IV Push two times a day  metoprolol succinate ER 25 milliGRAM(s) Oral daily  norepinephrine Infusion 0.05 MICROgram(s)/kG/Min IV Continuous <Continuous>  pantoprazole    Tablet 40 milliGRAM(s) Oral before breakfast  piperacillin/tazobactam IVPB.. 3.375 Gram(s) IV Intermittent every 8 hours  vancomycin  IVPB 1000 milliGRAM(s) IV Intermittent every 12 hours    PRN MEDICATIONS  acetaminophen     Tablet .. 650 milliGRAM(s) Oral every 6 hours PRN  dextrose Oral Gel 15 Gram(s) Oral once PRN  melatonin 3 milliGRAM(s) Oral at bedtime PRN    VITALS  T(F): 100.8 (01-17-25 @ 05:55), Max: 100.8 (01-17-25 @ 05:00)  HR: 67 (01-17-25 @ 08:15) (67 - 94)  BP: 96/66 (01-17-25 @ 08:00) (80/53 - 136/87)  RR: 18 (01-17-25 @ 08:00) (16 - 18)  SpO2: 98% (01-17-25 @ 08:15) (87% - 100%)  POCT Blood Glucose.: 218 mg/dL (01-17-25 @ 06:18)  POCT Blood Glucose.: 266 mg/dL (01-17-25 @ 04:13)  POCT Blood Glucose.: 143 mg/dL (01-17-25 @ 02:16)  POCT Blood Glucose.: 186 mg/dL (01-16-25 @ 22:15)  POCT Blood Glucose.: 174 mg/dL (01-16-25 @ 18:06)  POCT Blood Glucose.: 148 mg/dL (01-16-25 @ 11:19)    PHYSICAL EXAM  GENERAL  (  x) NAD, lying in bed comfortably     (  ) obtunded     (  ) lethargic     (  ) somnolent    HEAD  ( x ) Atraumatic     (  ) hematoma     (  ) laceration (specify location:       )     NECK  (x  ) Supple     (  ) neck stiffness     (  ) nuchal rigidity     (  )  no JVD     (  ) JVD present ( -- cm)    HEART  Rate -->  ( x ) normal rate    (  ) bradycardic    (  ) tachycardic  Rhythm -->  (  ) regular    (  ) regularly irregular    (  ) irregularly irregular  Murmurs -->  (  ) normal s1/s2    (  ) systolic murmur    (  ) diastolic murmur    (  ) continuous murmur     (  ) S3 present    (  ) S4 present    LUNGS  ( x )Unlabored respirations     (  ) tachypnea  (  ) B/L air entry     (  ) decreased breath sounds in:  (location     )    (  ) no adventitious sound     (  ) crackles     (  ) wheezing      (  ) rhonchi      (specify location:       )  (  ) chest wall tenderness (specify location:       )    ABDOMEN  ( x ) Soft     (  ) tense   |   (  ) nondistended     (  ) distended   |   (  ) +BS     (  ) hypoactive bowel sounds     (  ) hyperactive bowel sounds  (  ) nontender     (  ) RUQ tenderness     (  ) RLQ tenderness     (  ) LLQ tenderness     (  ) epigastric tenderness     (  ) diffuse tenderness  (  ) Splenomegaly      (  ) Hepatomegaly      (  ) Jaundice     (  ) ecchymosis     EXTREMITIES  (  x) Normal     (  ) Rash     (  ) ecchymosis     (  ) varicose veins      (  ) pitting edema     (  ) non-pitting edema   (  ) ulceration     (  ) gangrene:     (location:     )    NERVOUS SYSTEM  ( x ) A&Ox3     (  ) confused     (  ) lethargic  CN II-XII:     (  ) Intact     (  ) focal deficits  (Specify:     )   Upper extremities:     (  ) strength X/5     (  ) focal deficit (specify:    )  Lower extremities:     (  ) strength  X/5    (  ) focal deficit (specify:    )    SKIN  ( x ) No rashes or lesions     (  ) maculopapular rash     (  ) pustules     (  ) vesicles     (  ) ulcer     (  ) ecchymosis     (specify location:     )    ( x) Indwelling Romo Catheter   Date insterted:    Reason (  ) Critical illness     (  ) urinary retention    (  ) Accurate Ins/Outs Monitoring     (  ) CMO patient    (  ) Central Line  Date inserted:  Location: (  ) Right IJ   (  ) Left IJ   (  ) Right Fem   (  ) Left Fem    (  ) SPC  (  ) pigtail  (  ) PEG tube  (  ) colostomy  (  ) jejunostomy  (  ) U-Dall    LABS             17.0   21.36 )-----------( 451      ( 01-17-25 @ 03:55 )             56.2     140  |  91  |  36  -------------------------<  276   01-17-25 @ 03:55  4.4  |  33  |  1.1    Ca      9.6     01-17-25 @ 03:55  Phos   8.3     01-17-25 @ 03:55  Mg     2.5     01-17-25 @ 03:55    TPro  6.3  /  Alb  4.0  /  TBili  0.7  /  DBili  x   /  AST  52  /  ALT  61  /  AlkPhos  81  /  GGT  x     01-17-25 @ 03:55        Urinalysis Basic - ( 17 Jan 2025 03:55 )    Color: x / Appearance: x / SG: x / pH: x  Gluc: 276 mg/dL / Ketone: x  / Bili: x / Urobili: x   Blood: x / Protein: x / Nitrite: x   Leuk Esterase: x / RBC: x / WBC x   Sq Epi: x / Non Sq Epi: x / Bacteria: x      ABG - ( 17 Jan 2025 06:09 )  pH, Arterial: 7.41  pH, Blood: x     /  pCO2: 55    /  pO2: 78    / HCO3: 35    / Base Excess: 8.0   /  SaO2: 94.8                Culture - Blood (collected 14 Jan 2025 18:22)  Source: .Blood BLOOD  Preliminary Report (16 Jan 2025 23:01):    No growth at 48 Hours    Culture - Blood (collected 14 Jan 2025 18:22)  Source: .Blood BLOOD  Preliminary Report (16 Jan 2025 23:01):    No growth at 48 Hours      IMAGING

## 2025-01-17 NOTE — CHART NOTE - NSCHARTNOTEFT_GEN_A_CORE
Pt self extubated.    put on BiPAP sat 96.    ABG ON bIpap: PH 7.54, pO2 81, pCO2 41, lactate 1.8, HCO3 35.    will repeat ABG in 1 hr to see if there is need for reintubation. If not Keep on BiPAP overnight.

## 2025-01-17 NOTE — PHYSICAL THERAPY INITIAL EVALUATION ADULT - SPECIFY REASON(S)
Hold PT at this time. Pt. is now intubated/sedated. S/p rapid response this am (1/17/2024). Will need new consult and activity orders when appropriate.

## 2025-01-17 NOTE — PROGRESS NOTE ADULT - ASSESSMENT
IMPRESSION:    Acute hypoxemic resp failure s/p extubation   COPD exacerbation  Tracheal stenosis SP dilation   HO trach   History of lung nodules  History of relapsing polychondritis   possible PNA   emphysema   PLAN:    CNS: Avoid CNS depressant    HEENT:  Oral care    PULMONARY:   now on fio2 50 in need increase peep to 10   CTSx follow up , solumedrol 60 q 12, Neb q 4. Incentive rita.     CARDIOVASCULAR: avoid overload  do cheetah if positive start iv fluid   hold lasix     GI: GI prophylaxis. Feeding per speech    RENAL:  F/u  lytes.  Correct as needed. accurate I/O    INFECTIOUS DISEASE: Fungitell noted. ID eval. abx, procal noted, sputum gram stain/ cx  procal   DTA   start zosyn, vanco   nasal mrsa   HEMATOLOGICAL:  DVT prophylaxis. LE doppler prelim negative    ENDOCRINE:  Follow up FS.  Insulin protocol if needed.    MICU

## 2025-01-18 LAB
ALBUMIN SERPL ELPH-MCNC: 2.9 G/DL — LOW (ref 3.5–5.2)
ALP SERPL-CCNC: 52 U/L — SIGNIFICANT CHANGE UP (ref 30–115)
ALT FLD-CCNC: 39 U/L — SIGNIFICANT CHANGE UP (ref 0–41)
ANION GAP SERPL CALC-SCNC: 9 MMOL/L — SIGNIFICANT CHANGE UP (ref 7–14)
AST SERPL-CCNC: 18 U/L — SIGNIFICANT CHANGE UP (ref 0–41)
BASOPHILS # BLD AUTO: 0.01 K/UL — SIGNIFICANT CHANGE UP (ref 0–0.2)
BASOPHILS NFR BLD AUTO: 0.1 % — SIGNIFICANT CHANGE UP (ref 0–1)
BILIRUB SERPL-MCNC: 0.8 MG/DL — SIGNIFICANT CHANGE UP (ref 0.2–1.2)
BUN SERPL-MCNC: 36 MG/DL — HIGH (ref 10–20)
CALCIUM SERPL-MCNC: 8.4 MG/DL — SIGNIFICANT CHANGE UP (ref 8.4–10.4)
CHLORIDE SERPL-SCNC: 99 MMOL/L — SIGNIFICANT CHANGE UP (ref 98–110)
CO2 SERPL-SCNC: 32 MMOL/L — SIGNIFICANT CHANGE UP (ref 17–32)
CREAT SERPL-MCNC: 0.8 MG/DL — SIGNIFICANT CHANGE UP (ref 0.7–1.5)
EGFR: 79 ML/MIN/1.73M2 — SIGNIFICANT CHANGE UP
EOSINOPHIL # BLD AUTO: 0 K/UL — SIGNIFICANT CHANGE UP (ref 0–0.7)
EOSINOPHIL NFR BLD AUTO: 0 % — SIGNIFICANT CHANGE UP (ref 0–8)
GAS PNL BLDA: SIGNIFICANT CHANGE UP
GLUCOSE BLDC GLUCOMTR-MCNC: 175 MG/DL — HIGH (ref 70–99)
GLUCOSE BLDC GLUCOMTR-MCNC: 242 MG/DL — HIGH (ref 70–99)
GLUCOSE SERPL-MCNC: 198 MG/DL — HIGH (ref 70–99)
HCT VFR BLD CALC: 42.3 % — SIGNIFICANT CHANGE UP (ref 37–47)
HGB BLD-MCNC: 13.8 G/DL — SIGNIFICANT CHANGE UP (ref 12–16)
IMM GRANULOCYTES NFR BLD AUTO: 0.8 % — HIGH (ref 0.1–0.3)
LACTATE SERPL-SCNC: 1.4 MMOL/L — SIGNIFICANT CHANGE UP (ref 0.7–2)
LYMPHOCYTES # BLD AUTO: 0.15 K/UL — LOW (ref 1.2–3.4)
LYMPHOCYTES # BLD AUTO: 1.6 % — LOW (ref 20.5–51.1)
MAGNESIUM SERPL-MCNC: 2 MG/DL — SIGNIFICANT CHANGE UP (ref 1.8–2.4)
MCHC RBC-ENTMCNC: 28.5 PG — SIGNIFICANT CHANGE UP (ref 27–31)
MCHC RBC-ENTMCNC: 32.6 G/DL — SIGNIFICANT CHANGE UP (ref 32–37)
MCV RBC AUTO: 87.2 FL — SIGNIFICANT CHANGE UP (ref 81–99)
MONOCYTES # BLD AUTO: 0.11 K/UL — SIGNIFICANT CHANGE UP (ref 0.1–0.6)
MONOCYTES NFR BLD AUTO: 1.2 % — LOW (ref 1.7–9.3)
NEUTROPHILS # BLD AUTO: 8.92 K/UL — HIGH (ref 1.4–6.5)
NEUTROPHILS NFR BLD AUTO: 96.3 % — HIGH (ref 42.2–75.2)
NIGHT BLUE STAIN TISS: SIGNIFICANT CHANGE UP
NRBC # BLD: 0 /100 WBCS — SIGNIFICANT CHANGE UP (ref 0–0)
NRBC BLD-RTO: 0 /100 WBCS — SIGNIFICANT CHANGE UP (ref 0–0)
PHOSPHATE SERPL-MCNC: 3.3 MG/DL — SIGNIFICANT CHANGE UP (ref 2.1–4.9)
PLATELET # BLD AUTO: 203 K/UL — SIGNIFICANT CHANGE UP (ref 130–400)
PMV BLD: 10.1 FL — SIGNIFICANT CHANGE UP (ref 7.4–10.4)
POTASSIUM SERPL-MCNC: 4.1 MMOL/L — SIGNIFICANT CHANGE UP (ref 3.5–5)
POTASSIUM SERPL-SCNC: 4.1 MMOL/L — SIGNIFICANT CHANGE UP (ref 3.5–5)
PROCALCITONIN SERPL-MCNC: 0.1 NG/ML — SIGNIFICANT CHANGE UP (ref 0.02–0.1)
PROT SERPL-MCNC: 4.4 G/DL — LOW (ref 6–8)
RBC # BLD: 4.85 M/UL — SIGNIFICANT CHANGE UP (ref 4.2–5.4)
RBC # FLD: 13.6 % — SIGNIFICANT CHANGE UP (ref 11.5–14.5)
SODIUM SERPL-SCNC: 140 MMOL/L — SIGNIFICANT CHANGE UP (ref 135–146)
SPECIMEN SOURCE: SIGNIFICANT CHANGE UP
WBC # BLD: 9.26 K/UL — SIGNIFICANT CHANGE UP (ref 4.8–10.8)
WBC # FLD AUTO: 9.26 K/UL — SIGNIFICANT CHANGE UP (ref 4.8–10.8)

## 2025-01-18 PROCEDURE — 70491 CT SOFT TISSUE NECK W/DYE: CPT | Mod: 26

## 2025-01-18 PROCEDURE — 71045 X-RAY EXAM CHEST 1 VIEW: CPT | Mod: 26

## 2025-01-18 PROCEDURE — 71275 CT ANGIOGRAPHY CHEST: CPT | Mod: 26

## 2025-01-18 PROCEDURE — 99291 CRITICAL CARE FIRST HOUR: CPT

## 2025-01-18 RX ORDER — IPRATROPIUM BROMIDE AND ALBUTEROL SULFATE .5; 2.5 MG/3ML; MG/3ML
3 SOLUTION RESPIRATORY (INHALATION) EVERY 6 HOURS
Refills: 0 | Status: DISCONTINUED | OUTPATIENT
Start: 2025-01-18 | End: 2025-01-19

## 2025-01-18 RX ADMIN — PIPERACILLIN SODIUM AND TAZOBACTAM SODIUM 25 GRAM(S): 2; 250 INJECTION, POWDER, FOR SOLUTION INTRAVENOUS at 14:19

## 2025-01-18 RX ADMIN — Medication 50 MILLIGRAM(S): at 01:12

## 2025-01-18 RX ADMIN — IPRATROPIUM BROMIDE AND ALBUTEROL SULFATE 3 MILLILITER(S): .5; 2.5 SOLUTION RESPIRATORY (INHALATION) at 19:39

## 2025-01-18 RX ADMIN — Medication 4: at 16:37

## 2025-01-18 RX ADMIN — Medication 60 MILLIGRAM(S): at 06:52

## 2025-01-18 RX ADMIN — Medication 60 MILLIGRAM(S): at 17:08

## 2025-01-18 RX ADMIN — NOREPINEPHRINE BITARTRATE 6.81 MICROGRAM(S)/KG/MIN: 1 INJECTION, SOLUTION, CONCENTRATE INTRAVENOUS at 00:17

## 2025-01-18 RX ADMIN — Medication 5000 UNIT(S): at 17:09

## 2025-01-18 RX ADMIN — ANTISEPTIC SURGICAL SCRUB 1 APPLICATION(S): 0.04 SOLUTION TOPICAL at 12:31

## 2025-01-18 RX ADMIN — SODIUM CHLORIDE 75 MILLILITER(S): 9 INJECTION, SOLUTION INTRAVENOUS at 00:16

## 2025-01-18 RX ADMIN — Medication 40 MILLIGRAM(S): at 10:36

## 2025-01-18 RX ADMIN — IPRATROPIUM BROMIDE AND ALBUTEROL SULFATE 3 MILLILITER(S): .5; 2.5 SOLUTION RESPIRATORY (INHALATION) at 13:30

## 2025-01-18 RX ADMIN — Medication 5000 UNIT(S): at 06:52

## 2025-01-18 RX ADMIN — PIPERACILLIN SODIUM AND TAZOBACTAM SODIUM 25 GRAM(S): 2; 250 INJECTION, POWDER, FOR SOLUTION INTRAVENOUS at 06:53

## 2025-01-18 RX ADMIN — PIPERACILLIN SODIUM AND TAZOBACTAM SODIUM 25 GRAM(S): 2; 250 INJECTION, POWDER, FOR SOLUTION INTRAVENOUS at 21:24

## 2025-01-18 NOTE — PROGRESS NOTE ADULT - ASSESSMENT
Female with PMH of HTN, HLD, GERD, CAD, COPD not on Home O2, Hx of Pneumothorax, SCC of BOT s/p Trach/PEG (reversed in 4/2021), s/p CRT (7 sessions in 2021) who presents with SOB for 1 week duration, admitted to SDU for ARF , required HFNC, consulted by thoracic Sx, underwent bronchoscopy with tracheal dilation on 1/14, clinically improved. Patient endorses episodes of non bloody, non watery diarrhea overnight.    IMPRESSION:    Acute hypoxemic resp failure s/p extubation   COPD exacerbation  Tracheal stenosis SP dilation   HO trach   History of lung nodules  History of relapsing polychondritis   possible PNA   emphysema   PLAN:    CNS: Avoid CNS depressant    HEENT:  Oral care    PULMONARY:   now on fio2 50 in need increase peep to 10   CTSx follow up , solumedrol 60 q 12, Neb q 4. Incentive rita.     CARDIOVASCULAR: avoid overload  do cheetah if positive start iv fluid   hold lasix     GI: GI prophylaxis. Feeding per speech    RENAL:  F/u  lytes.  Correct as needed. accurate I/O    INFECTIOUS DISEASE: Fungitell noted. ID eval. abx, procal noted, sputum gram stain/ cx  procal   DTA   start zosyn, vanco   nasal mrsa   HEMATOLOGICAL:  DVT prophylaxis. LE doppler prelim negative    ENDOCRINE:  Follow up FS.  Insulin protocol if needed.    MICU

## 2025-01-18 NOTE — PROGRESS NOTE ADULT - SUBJECTIVE AND OBJECTIVE BOX
SUBJECTIVE/OVERNIGHT EVENTS  Today is hospital day 8d. This morning patient was seen and examined at bedside, resting comfortably in bed. No acute or major events overnight.    No overnight events. Patient on BIPAP.       VITALS  T(F): 98 (01-17-25 @ 20:00), Max: 102 (01-17-25 @ 10:00)  HR: 66 (01-18-25 @ 00:00) (58 - 100)  BP: 112/65 (01-18-25 @ 00:00) (76/50 - 135/77)  RR: 28 (01-18-25 @ 00:00) (18 - 42)  SpO2: 96% (01-18-25 @ 00:00) (92% - 100%)  POCT Blood Glucose.: 178 mg/dL (01-17-25 @ 17:07)  POCT Blood Glucose.: 171 mg/dL (01-17-25 @ 11:22)  POCT Blood Glucose.: 218 mg/dL (01-17-25 @ 06:18)        LABS             13.8   9.26  )-----------( 203      ( 01-18-25 @ 04:27 )             42.3     140  |  91  |  36  -------------------------<  276   01-17-25 @ 03:55  4.4  |  33  |  1.1    Ca      9.6     01-17-25 @ 03:55  Phos   8.3     01-17-25 @ 03:55  Mg     2.5     01-17-25 @ 03:55    TPro  6.3  /  Alb  4.0  /  TBili  0.7  /  DBili  x   /  AST  52  /  ALT  61  /  AlkPhos  81  /  GGT  x     01-17-25 @ 03:55        Urinalysis Basic - ( 17 Jan 2025 03:55 )    Color: x / Appearance: x / SG: x / pH: x  Gluc: 276 mg/dL / Ketone: x  / Bili: x / Urobili: x   Blood: x / Protein: x / Nitrite: x   Leuk Esterase: x / RBC: x / WBC x   Sq Epi: x / Non Sq Epi: x / Bacteria: x      ABG - ( 17 Jan 2025 22:22 )  pH, Arterial: 7.56  pH, Blood: x     /  pCO2: 39    /  pO2: 82    / HCO3: 35    / Base Excess: 11.7  /  SaO2: 97.7                Culture - Bronchial (collected 17 Jan 2025 11:37)  Source: Lavage  Gram Stain (17 Jan 2025 23:40):    Few polymorphonuclear leukocytes per low power field    Rare Squamous epithelial cells per low power field    Moderate Gram Positive Cocci in Clusters per oil power field      IMAGING    MEDICATIONS  STANDING MEDICATIONS  albuterol/ipratropium for Nebulization 3 milliLiter(s) Nebulizer every 20 minutes  chlorhexidine 2% Cloths 1 Application(s) Topical daily  dexMEDEtomidine Infusion 0.2 MICROgram(s)/kG/Hr IV Continuous <Continuous>  dextrose 5%. 1000 milliLiter(s) IV Continuous <Continuous>  dextrose 5%. 1000 milliLiter(s) IV Continuous <Continuous>  dextrose 50% Injectable 25 Gram(s) IV Push once  dextrose 50% Injectable 12.5 Gram(s) IV Push once  dextrose 50% Injectable 25 Gram(s) IV Push once  fentaNYL   Infusion 0.5 MICROgram(s)/kG/Hr IV Continuous <Continuous>  glucagon  Injectable 1 milliGRAM(s) IntraMuscular once  heparin   Injectable 5000 Unit(s) SubCutaneous every 12 hours  insulin lispro (ADMELOG) corrective regimen sliding scale   SubCutaneous two times a day before meals  lactated ringers. 1000 milliLiter(s) IV Continuous <Continuous>  methylPREDNISolone sodium succinate Injectable 60 milliGRAM(s) IV Push two times a day  norepinephrine Infusion 0.05 MICROgram(s)/kG/Min IV Continuous <Continuous>  pantoprazole    Tablet 40 milliGRAM(s) Oral before breakfast  piperacillin/tazobactam IVPB.. 3.375 Gram(s) IV Intermittent every 8 hours    PRN MEDICATIONS  acetaminophen     Tablet .. 650 milliGRAM(s) Oral every 6 hours PRN  acetaminophen     Tablet .. 650 milliGRAM(s) Oral once PRN  dextrose Oral Gel 15 Gram(s) Oral once PRN  melatonin 3 milliGRAM(s) Oral at bedtime PRN

## 2025-01-18 NOTE — CHART NOTE - NSCHARTNOTEFT_GEN_A_CORE
Registered Dietitian Follow-Up     Patient Profile Reviewed                           Yes [x]   No []     Nutrition History Previously Obtained        Yes [x]  No []       Pertinent Subjective Information: NPO at this time; on Bipap. Per RN, waiting for SLP to evaluate pt for appropriate diet texture/consistency.      Pertinent Medical Information:   Per MD note on : Pt self extubated  Per MD note on :  # Acute hypoxemic resp failure s/p extubation   # COPD exacerbation  # History of lung nodules  # possible PNA      Diet order: Diet, NPO:   Except Medications (25 @ 05:56) [Active]    Anthropometrics:  Height (cm): 162 (25 @ 05:00)  Weight (kg): 72.6 (25 @ 05:00)  BMI (kg/m2): 27.7 (25 @ 05:00)  IBW: 54.5 KG     Daily Weight in k (01-15), Weight in k.2 ()    MEDICATIONS  (STANDING):  albuterol/ipratropium for Nebulization 3 milliLiter(s) Nebulizer every 6 hours  albuterol/ipratropium for Nebulization 3 milliLiter(s) Nebulizer every 20 minutes  chlorhexidine 2% Cloths 1 Application(s) Topical daily  dextrose 5%. 1000 milliLiter(s) (100 mL/Hr) IV Continuous <Continuous>  dextrose 5%. 1000 milliLiter(s) (50 mL/Hr) IV Continuous <Continuous>  dextrose 50% Injectable 25 Gram(s) IV Push once  dextrose 50% Injectable 12.5 Gram(s) IV Push once  dextrose 50% Injectable 25 Gram(s) IV Push once  glucagon  Injectable 1 milliGRAM(s) IntraMuscular once  heparin   Injectable 5000 Unit(s) SubCutaneous every 12 hours  insulin lispro (ADMELOG) corrective regimen sliding scale   SubCutaneous two times a day before meals  methylPREDNISolone sodium succinate Injectable 60 milliGRAM(s) IV Push two times a day  pantoprazole    Tablet 40 milliGRAM(s) Oral before breakfast  piperacillin/tazobactam IVPB.. 3.375 Gram(s) IV Intermittent every 8 hours    MEDICATIONS  (PRN):  acetaminophen     Tablet .. 650 milliGRAM(s) Oral every 6 hours PRN Mild Pain (1 - 3), Moderate Pain (4 - 6)  acetaminophen     Tablet .. 650 milliGRAM(s) Oral once PRN Temp greater or equal to 38C (100.4F)  dextrose Oral Gel 15 Gram(s) Oral once PRN Blood Glucose LESS THAN 70 milliGRAM(s)/deciliter  melatonin 3 milliGRAM(s) Oral at bedtime PRN Insomnia    Pertinent Labs:  @ 04:27: Na 140, BUN 36[H], Cr 0.8, [H], K+ 4.1, Phos 3.3, Mg 2.0, Alk Phos 52, ALT/SGPT 39, AST/SGOT 18, HbA1c --    Finger Sticks:  POCT Blood Glucose.: 175 mg/dL ( @ 06:49)  POCT Blood Glucose.: 178 mg/dL ( @ 17:07)    Physical Findings:  - Appearance: alert  - GI function: last BM on   - Tubes: n/a  - Oral/Mouth cavity: NPO  - Skin: stage I Bilateral:  buttocks   - Edema: no edema noted      Nutrition Requirements:  Weight Used: 77.1 KG -- with consideration for age, BMI, acuity of illness    Estimated Energy Needs    Continue [x]  Adjust []  ENERGY: 3673-8682 kcal/day (20-25 kcal/kg)     Estimated Protein Needs    Continue [x]  Adjust []  PROTEIN:  g/day (1.2-1.4 g/kg)     Estimated Fluid Needs        Continue [x]  Adjust []  FLUID: 1mL/kcal     Nutrient Intake: NPO at this time; waiting for SLP evaluation     [x] Nutrition Diagnosis #1:  Problem: Unable to meet estimated needs on current diet order  Etiology: NPO at this time  Goal: Pt to meet at least 50-75% of estimated needs within 3-5 days if medically feasible.      Nutrition Education: Discussed current diet order      Goal/Expected Outcome: Pt to meet at least 50-75% of estimated needs within 3-5 days if medically feasible.     Indicator/Monitoring: Diet order, PO intake, weights, labs, NFPF, body composition, BM, SLP recs    Recommendation:  1. Continue with current diet order until medically feasible to advance once SLP evaluates; will monitor SLP recs and add appropriate nutrition recs prn.  2. Recommend to add consistent carbohydrate (evening snack) once appropriate diet texture/consistency is determined (elevated A1C, BG, POCT)  3. Adjust insulin regimen prn    High risk f/u     RD to remain available: Ilana Patel [phone] or TEAMS

## 2025-01-18 NOTE — SWALLOW BEDSIDE ASSESSMENT ADULT - COMMENTS
Pt known to SLP dept from previous admission, VFSS in 2020, recs for PEG as 1' means of nutrition and hydration, ice chips and controlled single sips of water as tolerated.  -Pt is s/p Bronchoscopy, s/p tracheal stenosis with tracheal dilation and debridement on 1/14/25. Pt was RR code on 1/17. Pt  was not tolerating Bipap, had difficulty breathing, became unresponsive and desaturated rapidly. Decision was made to intubate at bedside. Pt is now s/p self extubation on 1/18. Pt is known to SLP dept from previous admission, VFSS in 2020, recs for PEG as 1' means of nutrition and hydration, ice chips and controlled single sips of water as tolerated.  -Pt is s/p Bronchoscopy, s/p tracheal stenosis with tracheal dilation and debridement on 1/14/25. Pt was RR code on 1/17. Pt  was not tolerating Bipap, had difficulty breathing, became unresponsive and desaturated rapidly. Decision was made to intubate at bedside. Pt is now s/p self extubation on 1/18.  -CT neck soft Tissue 1/18-->At the level of the previously described tracheal stenosis there is Improved tracheal patency consistent with interval dilation.

## 2025-01-18 NOTE — CHART NOTE - NSCHARTNOTEFT_GEN_A_CORE
CT neck completed:  < from: CT Neck Soft Tissue w/ IV Cont (01.18.25 @ 02:54) >    INTERPRETATION:  Clinical history: Rule out obstruction. Tracheal   stenosis SP dilation on 1/14    TECHNIQUE:  CT neck with IV contrast. Contiguous CT axial images of the   neck following the intravenous administration of 100 cc Omnipaque 350   contrast with coronal and sagittal reformats.    COMPARISON/CORRELATION: CT neck 1/11/2025    FINDINGS:    At the level of the previously described tracheal stenosis of the level   of the thyroid, there is improved tracheal patency consistent with   interval dilation. There is submucosal gas along the right tracheal wall   from the 7-12 o'clock positions. This communicates with the trachea.    Stable mild thickening of the epiglottis and ill-defined 1 cm   preepiglottic soft tissue density.    Stable rim calcified right thyroid nodule measuring 1.8 cm.    The remainder of the exam is stable: Atrophy of the submandibular glands.   Extensive atherosclerotic calcifications. Degenerative changes of the   spine. Stable small bone islands in C7 vertebral body and spinous   process. Stable mild compression deformity of T5. Reticular changes at   the lung apices.    IMPRESSION:    At the level of the previously described tracheal stenosis (level of the   thyroid), there is improved tracheal patency consistent with interval   dilation. Gas collection external to the right tracheal wall with   tracheal communication is noted.    --- End of Report ---      < end of copied text >      Recommendation:   Recall CT surgery as patient is post dilation on 1/14  Discussed with CCU and attending

## 2025-01-18 NOTE — SWALLOW BEDSIDE ASSESSMENT ADULT - SPECIFY REASON(S)
Dysphagia re-consult 2/2 s/p self extubation. Dysphagia re-consult 2/2 s/p self extubation. (Pt seen at 14:50pm)

## 2025-01-18 NOTE — SWALLOW BEDSIDE ASSESSMENT ADULT - SWALLOW EVAL: PATIENT/FAMILY GOALS STATEMENT
Pt reports wishes to initiate an oral diet. SLP provided education about rationale to maintain NPO prior to FEES. Pt reports understanding/agreement.

## 2025-01-18 NOTE — SWALLOW BEDSIDE ASSESSMENT ADULT - CONSISTENCIES ADMINISTERED
Po trials are not indicated until FEES is completed. PO trials are not indicated until FEES is completed.

## 2025-01-18 NOTE — SWALLOW BEDSIDE ASSESSMENT ADULT - SWALLOW EVAL: DIAGNOSIS
PO trials are not indicated at this time 2/2 patient's extensive h/o tracheal stenosis, prior tracheostomy and recent intubation for airway protection. FEES instrumental swallow study is indicated to evaluate integrity/physiology of swallow prior to diet advancement. PO trials are not indicated at this time 2/2 patient's h/o tracheal stenosis, prior tracheostomy and recent intubation for airway protection. FEES instrumental swallow study is indicated to evaluate integrity/physiology of swallow prior to diet advancement.

## 2025-01-18 NOTE — SWALLOW BEDSIDE ASSESSMENT ADULT - SWALLOW EVAL: FUNCTIONAL LEVEL AT TIME OF EVAL
Received awake, HFNC 60%/60L, hypophonic, +hoarse vocal quality. Received awake, HFNC 60L/60% o2, hypophonic, +hoarse vocal quality.

## 2025-01-18 NOTE — PROGRESS NOTE ADULT - SUBJECTIVE AND OBJECTIVE BOX
Patient is a 69y old  Female who presents with a chief complaint of shortness of breath, malaise (16 Jan 2025 11:51)        Over Night Events:    On Zosyn  Not on pressors         ROS:  See HPI    PHYSICAL EXAM    ICU Vital Signs Last 24 Hrs  T(C): 36.7 (17 Jan 2025 20:00), Max: 38.9 (17 Jan 2025 10:00)  T(F): 98 (17 Jan 2025 20:00), Max: 102 (17 Jan 2025 10:00)  HR: 47 (18 Jan 2025 09:00) (46 - 100)  BP: 96/55 (18 Jan 2025 09:00) (76/50 - 135/77)  BP(mean): 73 (18 Jan 2025 09:00) (58 - 100)  ABP: --  ABP(mean): --  RR: 29 (18 Jan 2025 09:00) (20 - 54)  SpO2: 92% (18 Jan 2025 09:00) (92% - 100%)    O2 Parameters below as of 18 Jan 2025 08:00  Patient On (Oxygen Delivery Method): BiPAP/CPAP            General: NAD, awake   HEENT: MISTI             Lymphatic system: No cervical LN   Lungs: Bilateral BS, BIPAP   Cardiovascular: Regular   Gastrointestinal: Soft, Positive BS  Extremities: No clubbing.  Moves extremities.  Full Range of motion   Skin: Warm, intact  Neurological: No motor or sensory deficit       01-17-25 @ 07:01  -  01-18-25 @ 07:00  --------------------------------------------------------  IN:    Dexmedetomidine: 75.9 mL    IV PiggyBack: 100 mL    Lactated Ringers: 1200 mL    Lactated Ringers Bolus: 500 mL    Norepinephrine: 21.7 mL  Total IN: 1897.6 mL    OUT:    FentaNYL: 0 mL    Indwelling Catheter - Urethral (mL): 1430 mL  Total OUT: 1430 mL    Total NET: 467.6 mL          LABS:                            13.8   9.26  )-----------( 203      ( 18 Jan 2025 04:27 )             42.3                                               01-18    140  |  99  |  36[H]  ----------------------------<  198[H]  4.1   |  32  |  0.8    Ca    8.4      18 Jan 2025 04:27  Phos  3.3     01-18  Mg     2.0     01-18    TPro  4.4[L]  /  Alb  2.9[L]  /  TBili  0.8  /  DBili  x   /  AST  18  /  ALT  39  /  AlkPhos  52  01-18                                             Urinalysis Basic - ( 18 Jan 2025 04:27 )    Color: x / Appearance: x / SG: x / pH: x  Gluc: 198 mg/dL / Ketone: x  / Bili: x / Urobili: x   Blood: x / Protein: x / Nitrite: x   Leuk Esterase: x / RBC: x / WBC x   Sq Epi: x / Non Sq Epi: x / Bacteria: x                                                  LIVER FUNCTIONS - ( 18 Jan 2025 04:27 )  Alb: 2.9 g/dL / Pro: 4.4 g/dL / ALK PHOS: 52 U/L / ALT: 39 U/L / AST: 18 U/L / GGT: x                                                  Culture - Fungal, Bronchial (collected 17 Jan 2025 11:37)  Source: BAL  Preliminary Report (18 Jan 2025 08:03):    Testing in progress    Culture - Bronchial (collected 17 Jan 2025 11:37)  Source: Lavage  Gram Stain (17 Jan 2025 23:40):    Few polymorphonuclear leukocytes per low power field    Rare Squamous epithelial cells per low power field    Moderate Gram Positive Cocci in Clusters per oil power field                                                   Mode: AC/ CMV (Assist Control/ Continuous Mandatory Ventilation)  RR (machine): 18  TV (machine): 400  FiO2: 50  PEEP: 8  ITime: 0.9  MAP: 12  PIP: 20                                      ABG - ( 17 Jan 2025 22:22 )  pH, Arterial: 7.56  pH, Blood: x     /  pCO2: 39    /  pO2: 82    / HCO3: 35    / Base Excess: 11.7  /  SaO2: 97.7                MEDICATIONS  (STANDING):  albuterol/ipratropium for Nebulization 3 milliLiter(s) Nebulizer every 20 minutes  chlorhexidine 2% Cloths 1 Application(s) Topical daily  dexMEDEtomidine Infusion 0.2 MICROgram(s)/kG/Hr (3.86 mL/Hr) IV Continuous <Continuous>  dextrose 5%. 1000 milliLiter(s) (100 mL/Hr) IV Continuous <Continuous>  dextrose 5%. 1000 milliLiter(s) (50 mL/Hr) IV Continuous <Continuous>  dextrose 50% Injectable 25 Gram(s) IV Push once  dextrose 50% Injectable 12.5 Gram(s) IV Push once  dextrose 50% Injectable 25 Gram(s) IV Push once  fentaNYL   Infusion 0.5 MICROgram(s)/kG/Hr (3.86 mL/Hr) IV Continuous <Continuous>  glucagon  Injectable 1 milliGRAM(s) IntraMuscular once  heparin   Injectable 5000 Unit(s) SubCutaneous every 12 hours  insulin lispro (ADMELOG) corrective regimen sliding scale   SubCutaneous two times a day before meals  lactated ringers. 1000 milliLiter(s) (75 mL/Hr) IV Continuous <Continuous>  methylPREDNISolone sodium succinate Injectable 60 milliGRAM(s) IV Push two times a day  norepinephrine Infusion 0.05 MICROgram(s)/kG/Min (6.81 mL/Hr) IV Continuous <Continuous>  pantoprazole    Tablet 40 milliGRAM(s) Oral before breakfast  piperacillin/tazobactam IVPB.. 3.375 Gram(s) IV Intermittent every 8 hours    MEDICATIONS  (PRN):  acetaminophen     Tablet .. 650 milliGRAM(s) Oral every 6 hours PRN Mild Pain (1 - 3), Moderate Pain (4 - 6)  acetaminophen     Tablet .. 650 milliGRAM(s) Oral once PRN Temp greater or equal to 38C (100.4F)  dextrose Oral Gel 15 Gram(s) Oral once PRN Blood Glucose LESS THAN 70 milliGRAM(s)/deciliter  melatonin 3 milliGRAM(s) Oral at bedtime PRN Insomnia      Xrays:                                                                                     ECHO

## 2025-01-18 NOTE — PROGRESS NOTE ADULT - ASSESSMENT
IMPRESSION:    Acute hypoxemic resp failure s/p extubation   COPD exacerbation  Tracheal stenosis SP dilation   HO trach   History of lung nodules  History of relapsing polychondritis   possible PNA   emphysema   PLAN:    CNS: SC sedation.     HEENT:  Oral care; ENT follow up for moderate tracheal stenosis.     PULMONARY: Self extubated overnight. CXR stable. NC 6 LPM. Alternate with BIPAP as needed and during sleep. Solumedrol 60 BID. Incentive rita. Nebs as needed.     CARDIOVASCULAR: DC IV fluids. Avoid overload.     GI: Speech and swallow eval today.     RENAL:  F/u  lytes.  Correct as needed. DC goddard.     INFECTIOUS DISEASE: On Zosyn and ID is following. No fevers noted today. Trend fever curve. Fungitell noted. Follow up bronch cultures.     HEMATOLOGICAL:  DVT prophylaxis. LE doppler negative    ENDOCRINE:  Follow up FS.  Insulin protocol if needed.    MICU care for now; PT OT; Speech swallow; Peripheral lines; DC goddard.

## 2025-01-19 LAB
-  CLINDAMYCIN: SIGNIFICANT CHANGE UP
-  ERYTHROMYCIN: SIGNIFICANT CHANGE UP
-  GENTAMICIN: SIGNIFICANT CHANGE UP
-  OXACILLIN: SIGNIFICANT CHANGE UP
-  PENICILLIN: SIGNIFICANT CHANGE UP
-  RIFAMPIN: SIGNIFICANT CHANGE UP
-  TETRACYCLINE: SIGNIFICANT CHANGE UP
-  TRIMETHOPRIM/SULFAMETHOXAZOLE: SIGNIFICANT CHANGE UP
-  VANCOMYCIN: SIGNIFICANT CHANGE UP
ALBUMIN SERPL ELPH-MCNC: 3.1 G/DL — LOW (ref 3.5–5.2)
ALP SERPL-CCNC: 53 U/L — SIGNIFICANT CHANGE UP (ref 30–115)
ALT FLD-CCNC: 29 U/L — SIGNIFICANT CHANGE UP (ref 0–41)
ANION GAP SERPL CALC-SCNC: 9 MMOL/L — SIGNIFICANT CHANGE UP (ref 7–14)
AST SERPL-CCNC: 19 U/L — SIGNIFICANT CHANGE UP (ref 0–41)
BASOPHILS # BLD AUTO: 0.01 K/UL — SIGNIFICANT CHANGE UP (ref 0–0.2)
BASOPHILS NFR BLD AUTO: 0.1 % — SIGNIFICANT CHANGE UP (ref 0–1)
BILIRUB SERPL-MCNC: 0.7 MG/DL — SIGNIFICANT CHANGE UP (ref 0.2–1.2)
BUN SERPL-MCNC: 36 MG/DL — HIGH (ref 10–20)
CALCIUM SERPL-MCNC: 8.6 MG/DL — SIGNIFICANT CHANGE UP (ref 8.4–10.5)
CHLORIDE SERPL-SCNC: 102 MMOL/L — SIGNIFICANT CHANGE UP (ref 98–110)
CO2 SERPL-SCNC: 33 MMOL/L — HIGH (ref 17–32)
CREAT SERPL-MCNC: 0.7 MG/DL — SIGNIFICANT CHANGE UP (ref 0.7–1.5)
CULTURE RESULTS: ABNORMAL
CULTURE RESULTS: SIGNIFICANT CHANGE UP
CULTURE RESULTS: SIGNIFICANT CHANGE UP
D DIMER BLD IA.RAPID-MCNC: 1176 NG/ML DDU — HIGH
EGFR: 93 ML/MIN/1.73M2 — SIGNIFICANT CHANGE UP
EOSINOPHIL # BLD AUTO: 0 K/UL — SIGNIFICANT CHANGE UP (ref 0–0.7)
EOSINOPHIL NFR BLD AUTO: 0 % — SIGNIFICANT CHANGE UP (ref 0–8)
GLUCOSE BLDC GLUCOMTR-MCNC: 183 MG/DL — HIGH (ref 70–99)
GLUCOSE BLDC GLUCOMTR-MCNC: 200 MG/DL — HIGH (ref 70–99)
GLUCOSE BLDC GLUCOMTR-MCNC: 219 MG/DL — HIGH (ref 70–99)
GLUCOSE SERPL-MCNC: 195 MG/DL — HIGH (ref 70–99)
HCT VFR BLD CALC: 41.5 % — SIGNIFICANT CHANGE UP (ref 37–47)
HGB BLD-MCNC: 13.4 G/DL — SIGNIFICANT CHANGE UP (ref 12–16)
IMM GRANULOCYTES NFR BLD AUTO: 0.6 % — HIGH (ref 0.1–0.3)
LYMPHOCYTES # BLD AUTO: 0.2 K/UL — LOW (ref 1.2–3.4)
LYMPHOCYTES # BLD AUTO: 1.8 % — LOW (ref 20.5–51.1)
MAGNESIUM SERPL-MCNC: 2 MG/DL — SIGNIFICANT CHANGE UP (ref 1.8–2.4)
MCHC RBC-ENTMCNC: 28.6 PG — SIGNIFICANT CHANGE UP (ref 27–31)
MCHC RBC-ENTMCNC: 32.3 G/DL — SIGNIFICANT CHANGE UP (ref 32–37)
MCV RBC AUTO: 88.7 FL — SIGNIFICANT CHANGE UP (ref 81–99)
METHOD TYPE: SIGNIFICANT CHANGE UP
MONOCYTES # BLD AUTO: 0.51 K/UL — SIGNIFICANT CHANGE UP (ref 0.1–0.6)
MONOCYTES NFR BLD AUTO: 4.5 % — SIGNIFICANT CHANGE UP (ref 1.7–9.3)
NEUTROPHILS # BLD AUTO: 10.6 K/UL — HIGH (ref 1.4–6.5)
NEUTROPHILS NFR BLD AUTO: 93 % — HIGH (ref 42.2–75.2)
NRBC # BLD: 0 /100 WBCS — SIGNIFICANT CHANGE UP (ref 0–0)
NRBC BLD-RTO: 0 /100 WBCS — SIGNIFICANT CHANGE UP (ref 0–0)
ORGANISM # SPEC MICROSCOPIC CNT: ABNORMAL
ORGANISM # SPEC MICROSCOPIC CNT: SIGNIFICANT CHANGE UP
PHOSPHATE SERPL-MCNC: 2.9 MG/DL — SIGNIFICANT CHANGE UP (ref 2.1–4.9)
PLATELET # BLD AUTO: 176 K/UL — SIGNIFICANT CHANGE UP (ref 130–400)
PMV BLD: 10.3 FL — SIGNIFICANT CHANGE UP (ref 7.4–10.4)
POTASSIUM SERPL-MCNC: 3.8 MMOL/L — SIGNIFICANT CHANGE UP (ref 3.5–5)
POTASSIUM SERPL-SCNC: 3.8 MMOL/L — SIGNIFICANT CHANGE UP (ref 3.5–5)
PROT SERPL-MCNC: 4.7 G/DL — LOW (ref 6–8)
RBC # BLD: 4.68 M/UL — SIGNIFICANT CHANGE UP (ref 4.2–5.4)
RBC # FLD: 13.7 % — SIGNIFICANT CHANGE UP (ref 11.5–14.5)
SODIUM SERPL-SCNC: 144 MMOL/L — SIGNIFICANT CHANGE UP (ref 135–146)
SPECIMEN SOURCE: SIGNIFICANT CHANGE UP
WBC # BLD: 11.39 K/UL — HIGH (ref 4.8–10.8)
WBC # FLD AUTO: 11.39 K/UL — HIGH (ref 4.8–10.8)

## 2025-01-19 PROCEDURE — 71045 X-RAY EXAM CHEST 1 VIEW: CPT | Mod: 26

## 2025-01-19 PROCEDURE — 99291 CRITICAL CARE FIRST HOUR: CPT

## 2025-01-19 RX ORDER — METHYLPREDNISOLONE ACETATE 40 MG/ML
40 VIAL (ML) INJECTION
Refills: 0 | Status: DISCONTINUED | OUTPATIENT
Start: 2025-01-19 | End: 2025-01-20

## 2025-01-19 RX ORDER — IPRATROPIUM BROMIDE AND ALBUTEROL SULFATE .5; 2.5 MG/3ML; MG/3ML
3 SOLUTION RESPIRATORY (INHALATION) EVERY 4 HOURS
Refills: 0 | Status: DISCONTINUED | OUTPATIENT
Start: 2025-01-19 | End: 2025-02-10

## 2025-01-19 RX ORDER — ACETAMINOPHEN, DIPHENHYDRAMINE HCL, PHENYLEPHRINE HCL 325; 25; 5 MG/1; MG/1; MG/1
5 TABLET ORAL AT BEDTIME
Refills: 0 | Status: DISCONTINUED | OUTPATIENT
Start: 2025-01-19 | End: 2025-01-27

## 2025-01-19 RX ADMIN — PIPERACILLIN SODIUM AND TAZOBACTAM SODIUM 25 GRAM(S): 2; 250 INJECTION, POWDER, FOR SOLUTION INTRAVENOUS at 06:33

## 2025-01-19 RX ADMIN — Medication 40 MILLIGRAM(S): at 17:31

## 2025-01-19 RX ADMIN — Medication 4: at 17:33

## 2025-01-19 RX ADMIN — IPRATROPIUM BROMIDE AND ALBUTEROL SULFATE 3 MILLILITER(S): .5; 2.5 SOLUTION RESPIRATORY (INHALATION) at 07:35

## 2025-01-19 RX ADMIN — PIPERACILLIN SODIUM AND TAZOBACTAM SODIUM 25 GRAM(S): 2; 250 INJECTION, POWDER, FOR SOLUTION INTRAVENOUS at 14:39

## 2025-01-19 RX ADMIN — IPRATROPIUM BROMIDE AND ALBUTEROL SULFATE 3 MILLILITER(S): .5; 2.5 SOLUTION RESPIRATORY (INHALATION) at 11:30

## 2025-01-19 RX ADMIN — IPRATROPIUM BROMIDE AND ALBUTEROL SULFATE 3 MILLILITER(S): .5; 2.5 SOLUTION RESPIRATORY (INHALATION) at 15:14

## 2025-01-19 RX ADMIN — Medication 60 MILLIGRAM(S): at 07:01

## 2025-01-19 RX ADMIN — ACETAMINOPHEN, DIPHENHYDRAMINE HCL, PHENYLEPHRINE HCL 5 MILLIGRAM(S): 325; 25; 5 TABLET ORAL at 20:36

## 2025-01-19 RX ADMIN — Medication 5000 UNIT(S): at 07:01

## 2025-01-19 RX ADMIN — Medication 5000 UNIT(S): at 17:31

## 2025-01-19 RX ADMIN — IPRATROPIUM BROMIDE AND ALBUTEROL SULFATE 3 MILLILITER(S): .5; 2.5 SOLUTION RESPIRATORY (INHALATION) at 21:15

## 2025-01-19 RX ADMIN — ACETAMINOPHEN 650 MILLIGRAM(S): 160 SUSPENSION ORAL at 17:35

## 2025-01-19 RX ADMIN — PANTOPRAZOLE 40 MILLIGRAM(S): 20 TABLET, DELAYED RELEASE ORAL at 07:03

## 2025-01-19 RX ADMIN — ANTISEPTIC SURGICAL SCRUB 1 APPLICATION(S): 0.04 SOLUTION TOPICAL at 14:38

## 2025-01-19 RX ADMIN — ACETAMINOPHEN 650 MILLIGRAM(S): 160 SUSPENSION ORAL at 18:48

## 2025-01-19 RX ADMIN — PIPERACILLIN SODIUM AND TAZOBACTAM SODIUM 25 GRAM(S): 2; 250 INJECTION, POWDER, FOR SOLUTION INTRAVENOUS at 21:34

## 2025-01-19 NOTE — PROGRESS NOTE ADULT - SUBJECTIVE AND OBJECTIVE BOX
SUBJECTIVE/OVERNIGHT EVENTS  Today is hospital day 9d. This morning patient was seen and examined at bedside, resting comfortably in bed. No acute or major events overnight.      CODE STATUS:      PMH:  PVD (peripheral vascular disease)    HTN (hypertension)    Hypercholesterolemia    GERD (gastroesophageal reflux disease)    Chronic polychondritis    Pulmonary nodule    CAD (coronary artery disease)    CAD (coronary artery disease)    COPD (chronic obstructive pulmonary disease)    H/O gastroesophageal reflux (GERD)    Relapsing polychondritis    Female cystocele    H/O pneumothorax          PSH:  S/P GEETHA-BSO    S/P colon resection    Vocal cord polyps    Pulmonary nodule    History of bladder surgery    H/O breast biopsy          MEDICATIONS  STANDING MEDICATIONS  albuterol/ipratropium for Nebulization 3 milliLiter(s) Nebulizer every 6 hours  albuterol/ipratropium for Nebulization 3 milliLiter(s) Nebulizer every 20 minutes  chlorhexidine 2% Cloths 1 Application(s) Topical daily  dextrose 5%. 1000 milliLiter(s) IV Continuous <Continuous>  dextrose 5%. 1000 milliLiter(s) IV Continuous <Continuous>  dextrose 50% Injectable 25 Gram(s) IV Push once  dextrose 50% Injectable 12.5 Gram(s) IV Push once  dextrose 50% Injectable 25 Gram(s) IV Push once  glucagon  Injectable 1 milliGRAM(s) IntraMuscular once  heparin   Injectable 5000 Unit(s) SubCutaneous every 12 hours  insulin lispro (ADMELOG) corrective regimen sliding scale   SubCutaneous two times a day before meals  methylPREDNISolone sodium succinate Injectable 60 milliGRAM(s) IV Push two times a day  pantoprazole    Tablet 40 milliGRAM(s) Oral before breakfast  piperacillin/tazobactam IVPB.. 3.375 Gram(s) IV Intermittent every 8 hours    PRN MEDICATIONS  acetaminophen     Tablet .. 650 milliGRAM(s) Oral every 6 hours PRN  acetaminophen     Tablet .. 650 milliGRAM(s) Oral once PRN  dextrose Oral Gel 15 Gram(s) Oral once PRN  melatonin 3 milliGRAM(s) Oral at bedtime PRN    VITALS  T(F): 97 (01-18-25 @ 19:00), Max: 97 (01-18-25 @ 19:00)  HR: 88 (01-18-25 @ 23:37) (46 - 97)  BP: 130/71 (01-18-25 @ 22:00) (94/63 - 141/65)  RR: 24 (01-18-25 @ 22:00) (20 - 36)  SpO2: 93% (01-18-25 @ 23:37) (90% - 96%)  POCT Blood Glucose.: 242 mg/dL (01-18-25 @ 16:22)  POCT Blood Glucose.: 175 mg/dL (01-18-25 @ 06:49)    PHYSICAL EXAM  General: NAD, awake   HEENT: MISTI             Lymphatic system: No cervical LN   Lungs: Bilateral BS, BIPAP   Cardiovascular: Regular   Gastrointestinal: Soft, Positive BS  Extremities: No clubbing.  Moves extremities.  Full Range of motion   Skin: Warm, intact  Neurological: No motor or sensory deficit       LABS             13.8   9.26  )-----------( 203      ( 01-18-25 @ 04:27 )             42.3     140  |  99  |  36  -------------------------<  198   01-18-25 @ 04:27  4.1  |  32  |  0.8    Ca      8.4     01-18-25 @ 04:27  Phos   3.3     01-18-25 @ 04:27  Mg     2.0     01-18-25 @ 04:27    TPro  4.4  /  Alb  2.9  /  TBili  0.8  /  DBili  x   /  AST  18  /  ALT  39  /  AlkPhos  52  /  GGT  x     01-18-25 @ 04:27        Urinalysis Basic - ( 18 Jan 2025 04:27 )    Color: x / Appearance: x / SG: x / pH: x  Gluc: 198 mg/dL / Ketone: x  / Bili: x / Urobili: x   Blood: x / Protein: x / Nitrite: x   Leuk Esterase: x / RBC: x / WBC x   Sq Epi: x / Non Sq Epi: x / Bacteria: x      ABG - ( 18 Jan 2025 10:15 )  pH, Arterial: 7.57  pH, Blood: x     /  pCO2: 41    /  pO2: 58    / HCO3: 38    / Base Excess: 14.1  /  SaO2: 89.9                Culture - Fungal, Bronchial (collected 17 Jan 2025 11:37)  Source: BAL  Preliminary Report (18 Jan 2025 23:03):    Culture is being performed. Fungal cultures are held for 4 weeks.    Culture - Acid Fast - Bronchial w/Smear (collected 17 Jan 2025 11:37)  Source: BAL  Preliminary Report (18 Jan 2025 23:07):    Culture is being performed.    Culture - Bronchial (collected 17 Jan 2025 11:37)  Source: Lavage  Gram Stain (17 Jan 2025 23:40):    Few polymorphonuclear leukocytes per low power field    Rare Squamous epithelial cells per low power field    Moderate Gram Positive Cocci in Clusters per oil power field  Preliminary Report (18 Jan 2025 18:04):    Moderate Staphylococcus aureus    Commensal kaushik consistent with body site      IMAGING

## 2025-01-19 NOTE — PROGRESS NOTE ADULT - SUBJECTIVE AND OBJECTIVE BOX
Patient is a 69y old  Female who presents with a chief complaint of shortness of breath, malaise (16 Jan 2025 11:51)        Over Night Events:    No fevers   Fio2 60%         ROS:  See HPI    PHYSICAL EXAM    ICU Vital Signs Last 24 Hrs  T(C): 36.3 (19 Jan 2025 05:00), Max: 36.3 (19 Jan 2025 05:00)  T(F): 97.4 (19 Jan 2025 05:00), Max: 97.4 (19 Jan 2025 05:00)  HR: 96 (19 Jan 2025 08:00) (67 - 97)  BP: 122/98 (19 Jan 2025 08:00) (84/55 - 141/65)  BP(mean): 108 (19 Jan 2025 08:00) (65 - 108)  ABP: --  ABP(mean): --  RR: 47 (19 Jan 2025 08:00) (20 - 47)  SpO2: 92% (19 Jan 2025 08:00) (90% - 97%)    O2 Parameters below as of 19 Jan 2025 08:00  Patient On (Oxygen Delivery Method): nasal cannula, high flow            General: NAD   HEENT: MISTI             Lymphatic system: No cervical LN   Lungs: Bilateral BS  Cardiovascular: Regular   Gastrointestinal: Soft, Positive BS  Extremities: No clubbing.  Moves extremities.  Full Range of motion   Skin: Warm, intact  Neurological: No motor or sensory deficit       01-18-25 @ 07:01  -  01-19-25 @ 07:00  --------------------------------------------------------  IN:    IV PiggyBack: 100 mL  Total IN: 100 mL    OUT:    Indwelling Catheter - Urethral (mL): 1050 mL    Intermittent Catheterization - Urethral (mL): 500 mL  Total OUT: 1550 mL    Total NET: -1450 mL          LABS:                            13.4   11.39 )-----------( 176      ( 19 Jan 2025 04:26 )             41.5                                               01-19    144  |  102  |  36[H]  ----------------------------<  195[H]  3.8   |  33[H]  |  0.7    Ca    8.6      19 Jan 2025 04:26  Phos  2.9     01-19  Mg     2.0     01-19    TPro  4.7[L]  /  Alb  3.1[L]  /  TBili  0.7  /  DBili  x   /  AST  19  /  ALT  29  /  AlkPhos  53  01-19                                             Urinalysis Basic - ( 19 Jan 2025 04:26 )    Color: x / Appearance: x / SG: x / pH: x  Gluc: 195 mg/dL / Ketone: x  / Bili: x / Urobili: x   Blood: x / Protein: x / Nitrite: x   Leuk Esterase: x / RBC: x / WBC x   Sq Epi: x / Non Sq Epi: x / Bacteria: x                                                  LIVER FUNCTIONS - ( 19 Jan 2025 04:26 )  Alb: 3.1 g/dL / Pro: 4.7 g/dL / ALK PHOS: 53 U/L / ALT: 29 U/L / AST: 19 U/L / GGT: x                                                  Culture - Fungal, Bronchial (collected 17 Jan 2025 11:37)  Source: BAL  Preliminary Report (18 Jan 2025 23:03):    Culture is being performed. Fungal cultures are held for 4 weeks.    Culture - Acid Fast - Bronchial w/Smear (collected 17 Jan 2025 11:37)  Source: BAL  Preliminary Report (18 Jan 2025 23:07):    Culture is being performed.    Culture - Bronchial (collected 17 Jan 2025 11:37)  Source: Lavage  Gram Stain (17 Jan 2025 23:40):    Few polymorphonuclear leukocytes per low power field    Rare Squamous epithelial cells per low power field    Moderate Gram Positive Cocci in Clusters per oil power field  Preliminary Report (18 Jan 2025 18:04):    Moderate Staphylococcus aureus    Commensal kaushik consistent with body site                                                                                       ABG - ( 18 Jan 2025 10:15 )  pH, Arterial: 7.57  pH, Blood: x     /  pCO2: 41    /  pO2: 58    / HCO3: 38    / Base Excess: 14.1  /  SaO2: 89.9                MEDICATIONS  (STANDING):  albuterol/ipratropium for Nebulization 3 milliLiter(s) Nebulizer every 6 hours  albuterol/ipratropium for Nebulization 3 milliLiter(s) Nebulizer every 20 minutes  chlorhexidine 2% Cloths 1 Application(s) Topical daily  dextrose 5%. 1000 milliLiter(s) (100 mL/Hr) IV Continuous <Continuous>  dextrose 5%. 1000 milliLiter(s) (50 mL/Hr) IV Continuous <Continuous>  dextrose 50% Injectable 25 Gram(s) IV Push once  dextrose 50% Injectable 12.5 Gram(s) IV Push once  dextrose 50% Injectable 25 Gram(s) IV Push once  glucagon  Injectable 1 milliGRAM(s) IntraMuscular once  heparin   Injectable 5000 Unit(s) SubCutaneous every 12 hours  insulin lispro (ADMELOG) corrective regimen sliding scale   SubCutaneous two times a day before meals  methylPREDNISolone sodium succinate Injectable 60 milliGRAM(s) IV Push two times a day  pantoprazole    Tablet 40 milliGRAM(s) Oral before breakfast  piperacillin/tazobactam IVPB.. 3.375 Gram(s) IV Intermittent every 8 hours    MEDICATIONS  (PRN):  acetaminophen     Tablet .. 650 milliGRAM(s) Oral every 6 hours PRN Mild Pain (1 - 3), Moderate Pain (4 - 6)  acetaminophen     Tablet .. 650 milliGRAM(s) Oral once PRN Temp greater or equal to 38C (100.4F)  dextrose Oral Gel 15 Gram(s) Oral once PRN Blood Glucose LESS THAN 70 milliGRAM(s)/deciliter  melatonin 3 milliGRAM(s) Oral at bedtime PRN Insomnia      Xrays: no acute infiltrates                                                                                     ECHO

## 2025-01-19 NOTE — PROGRESS NOTE ADULT - ASSESSMENT
IMPRESSION:    Acute hypoxemic resp failure s/p extubation   COPD exacerbation  Tracheal stenosis SP dilation   HO trach   History of lung nodules  History of relapsing polychondritis   possible PNA   emphysema   PLAN:    CNS: SC sedation.     HEENT:  Oral care; ENT follow up for moderate tracheal stenosis.     PULMONARY: HFNC 60/60. CXR stable. Wean off Fio2. Solumedrol 40mg BID. Nebs every 4hours. Thoracic surgery follow up.     CARDIOVASCULAR: Fluid balance negative last 24 hours.     GI: Speech and swallow follow up. Advance oral diet per their recommendations.     RENAL:  F/u  lytes.  Correct as needed. Romo is out.     INFECTIOUS DISEASE: BAL culture staph aureus. ID follow up. follow sensitivities. On Zosyn.     HEMATOLOGICAL:  DVT prophylaxis. LE doppler negative; repeat and check D-dimer.     ENDOCRINE:  Follow up FS.  Insulin protocol if needed.    MICU care for now; PT OT; Speech swallow; Peripheral lines; May need Romo again.     ICU care.

## 2025-01-20 LAB
ALBUMIN SERPL ELPH-MCNC: 3.5 G/DL — SIGNIFICANT CHANGE UP (ref 3.5–5.2)
ALP SERPL-CCNC: 55 U/L — SIGNIFICANT CHANGE UP (ref 30–115)
ALT FLD-CCNC: 28 U/L — SIGNIFICANT CHANGE UP (ref 0–41)
ANION GAP SERPL CALC-SCNC: 11 MMOL/L — SIGNIFICANT CHANGE UP (ref 7–14)
AST SERPL-CCNC: 15 U/L — SIGNIFICANT CHANGE UP (ref 0–41)
BASOPHILS # BLD AUTO: 0.01 K/UL — SIGNIFICANT CHANGE UP (ref 0–0.2)
BASOPHILS NFR BLD AUTO: 0.1 % — SIGNIFICANT CHANGE UP (ref 0–1)
BILIRUB SERPL-MCNC: 0.7 MG/DL — SIGNIFICANT CHANGE UP (ref 0.2–1.2)
BUN SERPL-MCNC: 41 MG/DL — HIGH (ref 10–20)
CALCIUM SERPL-MCNC: 8.9 MG/DL — SIGNIFICANT CHANGE UP (ref 8.4–10.5)
CHLORIDE SERPL-SCNC: 98 MMOL/L — SIGNIFICANT CHANGE UP (ref 98–110)
CO2 SERPL-SCNC: 32 MMOL/L — SIGNIFICANT CHANGE UP (ref 17–32)
CREAT SERPL-MCNC: 0.7 MG/DL — SIGNIFICANT CHANGE UP (ref 0.7–1.5)
EGFR: 93 ML/MIN/1.73M2 — SIGNIFICANT CHANGE UP
EOSINOPHIL # BLD AUTO: 0 K/UL — SIGNIFICANT CHANGE UP (ref 0–0.7)
EOSINOPHIL NFR BLD AUTO: 0 % — SIGNIFICANT CHANGE UP (ref 0–8)
GLUCOSE BLDC GLUCOMTR-MCNC: 152 MG/DL — HIGH (ref 70–99)
GLUCOSE BLDC GLUCOMTR-MCNC: 177 MG/DL — HIGH (ref 70–99)
GLUCOSE BLDC GLUCOMTR-MCNC: 177 MG/DL — HIGH (ref 70–99)
GLUCOSE BLDC GLUCOMTR-MCNC: 187 MG/DL — HIGH (ref 70–99)
GLUCOSE SERPL-MCNC: 192 MG/DL — HIGH (ref 70–99)
HCT VFR BLD CALC: 43.7 % — SIGNIFICANT CHANGE UP (ref 37–47)
HGB BLD-MCNC: 14.1 G/DL — SIGNIFICANT CHANGE UP (ref 12–16)
IMM GRANULOCYTES NFR BLD AUTO: 0.6 % — HIGH (ref 0.1–0.3)
LYMPHOCYTES # BLD AUTO: 0.17 K/UL — LOW (ref 1.2–3.4)
LYMPHOCYTES # BLD AUTO: 1.8 % — LOW (ref 20.5–51.1)
MAGNESIUM SERPL-MCNC: 2.2 MG/DL — SIGNIFICANT CHANGE UP (ref 1.8–2.4)
MCHC RBC-ENTMCNC: 28.5 PG — SIGNIFICANT CHANGE UP (ref 27–31)
MCHC RBC-ENTMCNC: 32.3 G/DL — SIGNIFICANT CHANGE UP (ref 32–37)
MCV RBC AUTO: 88.3 FL — SIGNIFICANT CHANGE UP (ref 81–99)
MONOCYTES # BLD AUTO: 0.53 K/UL — SIGNIFICANT CHANGE UP (ref 0.1–0.6)
MONOCYTES NFR BLD AUTO: 5.5 % — SIGNIFICANT CHANGE UP (ref 1.7–9.3)
NEUTROPHILS # BLD AUTO: 8.89 K/UL — HIGH (ref 1.4–6.5)
NEUTROPHILS NFR BLD AUTO: 92 % — HIGH (ref 42.2–75.2)
NRBC # BLD: 0 /100 WBCS — SIGNIFICANT CHANGE UP (ref 0–0)
NRBC BLD-RTO: 0 /100 WBCS — SIGNIFICANT CHANGE UP (ref 0–0)
PHOSPHATE SERPL-MCNC: 3.5 MG/DL — SIGNIFICANT CHANGE UP (ref 2.1–4.9)
PLATELET # BLD AUTO: 175 K/UL — SIGNIFICANT CHANGE UP (ref 130–400)
PMV BLD: 10.3 FL — SIGNIFICANT CHANGE UP (ref 7.4–10.4)
POTASSIUM SERPL-MCNC: 3.8 MMOL/L — SIGNIFICANT CHANGE UP (ref 3.5–5)
POTASSIUM SERPL-SCNC: 3.8 MMOL/L — SIGNIFICANT CHANGE UP (ref 3.5–5)
PROT SERPL-MCNC: 5.2 G/DL — LOW (ref 6–8)
RBC # BLD: 4.95 M/UL — SIGNIFICANT CHANGE UP (ref 4.2–5.4)
RBC # FLD: 13.6 % — SIGNIFICANT CHANGE UP (ref 11.5–14.5)
SODIUM SERPL-SCNC: 141 MMOL/L — SIGNIFICANT CHANGE UP (ref 135–146)
WBC # BLD: 9.66 K/UL — SIGNIFICANT CHANGE UP (ref 4.8–10.8)
WBC # FLD AUTO: 9.66 K/UL — SIGNIFICANT CHANGE UP (ref 4.8–10.8)

## 2025-01-20 PROCEDURE — 93970 EXTREMITY STUDY: CPT | Mod: 26

## 2025-01-20 PROCEDURE — 71045 X-RAY EXAM CHEST 1 VIEW: CPT | Mod: 26

## 2025-01-20 PROCEDURE — 99233 SBSQ HOSP IP/OBS HIGH 50: CPT

## 2025-01-20 RX ORDER — METHYLPREDNISOLONE ACETATE 40 MG/ML
40 VIAL (ML) INJECTION DAILY
Refills: 0 | Status: DISCONTINUED | OUTPATIENT
Start: 2025-01-20 | End: 2025-01-21

## 2025-01-20 RX ORDER — CEFAZOLIN SODIUM IN 0.9 % NACL 2 G/10 ML
1000 SYRINGE (ML) INTRAVENOUS EVERY 8 HOURS
Refills: 0 | Status: DISCONTINUED | OUTPATIENT
Start: 2025-01-20 | End: 2025-01-30

## 2025-01-20 RX ORDER — CEFAZOLIN SODIUM IN 0.9 % NACL 2 G/10 ML
1000 SYRINGE (ML) INTRAVENOUS ONCE
Refills: 0 | Status: COMPLETED | OUTPATIENT
Start: 2025-01-20 | End: 2025-01-20

## 2025-01-20 RX ORDER — CEFAZOLIN SODIUM IN 0.9 % NACL 2 G/10 ML
SYRINGE (ML) INTRAVENOUS
Refills: 0 | Status: DISCONTINUED | OUTPATIENT
Start: 2025-01-20 | End: 2025-01-30

## 2025-01-20 RX ADMIN — ACETAMINOPHEN, DIPHENHYDRAMINE HCL, PHENYLEPHRINE HCL 5 MILLIGRAM(S): 325; 25; 5 TABLET ORAL at 21:13

## 2025-01-20 RX ADMIN — IPRATROPIUM BROMIDE AND ALBUTEROL SULFATE 3 MILLILITER(S): .5; 2.5 SOLUTION RESPIRATORY (INHALATION) at 19:40

## 2025-01-20 RX ADMIN — IPRATROPIUM BROMIDE AND ALBUTEROL SULFATE 3 MILLILITER(S): .5; 2.5 SOLUTION RESPIRATORY (INHALATION) at 01:10

## 2025-01-20 RX ADMIN — ANTISEPTIC SURGICAL SCRUB 1 APPLICATION(S): 0.04 SOLUTION TOPICAL at 12:22

## 2025-01-20 RX ADMIN — Medication 100 MILLIGRAM(S): at 17:15

## 2025-01-20 RX ADMIN — IPRATROPIUM BROMIDE AND ALBUTEROL SULFATE 3 MILLILITER(S): .5; 2.5 SOLUTION RESPIRATORY (INHALATION) at 11:36

## 2025-01-20 RX ADMIN — IPRATROPIUM BROMIDE AND ALBUTEROL SULFATE 3 MILLILITER(S): .5; 2.5 SOLUTION RESPIRATORY (INHALATION) at 07:23

## 2025-01-20 RX ADMIN — Medication 2: at 17:29

## 2025-01-20 RX ADMIN — Medication 100 MILLIGRAM(S): at 21:14

## 2025-01-20 RX ADMIN — Medication 5000 UNIT(S): at 17:29

## 2025-01-20 RX ADMIN — Medication 40 MILLIGRAM(S): at 05:29

## 2025-01-20 RX ADMIN — IPRATROPIUM BROMIDE AND ALBUTEROL SULFATE 3 MILLILITER(S): .5; 2.5 SOLUTION RESPIRATORY (INHALATION) at 15:03

## 2025-01-20 RX ADMIN — PIPERACILLIN SODIUM AND TAZOBACTAM SODIUM 25 GRAM(S): 2; 250 INJECTION, POWDER, FOR SOLUTION INTRAVENOUS at 05:30

## 2025-01-20 RX ADMIN — Medication 20 MILLIGRAM(S): at 09:55

## 2025-01-20 RX ADMIN — Medication 5000 UNIT(S): at 05:30

## 2025-01-20 RX ADMIN — PIPERACILLIN SODIUM AND TAZOBACTAM SODIUM 25 GRAM(S): 2; 250 INJECTION, POWDER, FOR SOLUTION INTRAVENOUS at 14:23

## 2025-01-20 RX ADMIN — Medication 2: at 07:55

## 2025-01-20 NOTE — PHYSICAL THERAPY INITIAL EVALUATION ADULT - PLANNED THERAPY INTERVENTIONS, PT EVAL
balance training/bed mobility training/gait training/transfer training
STAIR ASSESSMENT AND TRAINING AS APPROPRIATE. Goal: TBD when stair status is assessed./balance training/bed mobility training/gait training/transfer training

## 2025-01-20 NOTE — PHYSICAL THERAPY INITIAL EVALUATION ADULT - BED MOBILITY TRAINING, PT EVAL
Goal: pt will come supine to sit to supine independent by discharge to facilitate return to PLOF.
perform bed mobility with min assist  by DC

## 2025-01-20 NOTE — PROGRESS NOTE ADULT - ASSESSMENT
IMPRESSION:    Acute hypoxemic resp failure s/p extubation   COPD exacerbation  Tracheal stenosis SP dilation   HO trach   History of lung nodules  History of relapsing polychondritis   possible PNA   emphysema   PLAN:    CNS: No sedation.     HEENT:  Oral care; No interventions.     PULMONARY: HFNC 50/50. CXR stable. Wean off Fio2. Solumedrol 40mg daily. Nebs every 4hours. Thoracic surgery follow up. No interventions for now.     CARDIOVASCULAR: Fluid balance negative last 24 hours. Lasix 20mg Iv once.     GI: Speech and swallow follow up. Bedside swallow eval.     RENAL:  F/u  lytes.  Correct as needed. Romo is out.     INFECTIOUS DISEASE: finish course of zosyn. ID follow up.     HEMATOLOGICAL:  DVT prophylaxis. LE doppler pending.     ENDOCRINE:  Follow up FS.  Insulin protocol if needed.    SDU.

## 2025-01-20 NOTE — PHYSICAL THERAPY INITIAL EVALUATION ADULT - NSPTDISCHREC_GEN_A_CORE
Home PT vs Sub acute rehab, depending on pts functional progress/Sub-acute Rehab/Home PT
Patient requires assistance with functional mobility. Based on today's evaluation, PT recommends D/C to a rehabilitation facility when medically appropriate.

## 2025-01-20 NOTE — PHYSICAL THERAPY INITIAL EVALUATION ADULT - BALANCE TRAINING, PT EVAL
Improve dynamic standing balance to Fair by DC
Goal: Balance improved by 1 grade t/o by discharge to promote safety awareness and decrease risk of falling.

## 2025-01-20 NOTE — PHYSICAL THERAPY INITIAL EVALUATION ADULT - TRANSFER TRAINING, PT EVAL
Goal: Pt will come sit to stand to sit independent by discharge to facilitate return to PLOF.
perform transfers with moderate assist/RW by DC

## 2025-01-20 NOTE — PROGRESS NOTE ADULT - SUBJECTIVE AND OBJECTIVE BOX
GENERAL SURGERY PROGRESS NOTE    Patient: GEREMIAS FULLER , 69y ([birth year])Female   MRN: [id number]  Location: [address]  Visit: 01-10-25 Inpatient  Date: 01-20-25 @ 07:44      Events of past 24 hours: Patient seen and examined during bedside rounds. Patient resting comfortably in hospital bed. Patient now extubated on HFNC saturating well.     PAST MEDICAL & SURGICAL HISTORY:  HTN (hypertension)  Hypercholesterolemia  GERD (gastroesophageal reflux disease)  Pulmonary nodule  CAD (coronary artery disease)  COPD (chronic obstructive pulmonary disease)  Relapsing polychondritis  Female cystocele  H/O pneumothorax  2016  S/P GEETHA-BSO  S/P colon resection  Vocal cord polyps  removal of same 2005  Pulmonary nodule  History of bladder surgery  2019  H/O breast biopsy  LEFT    Vitals:   T(F): 98.4 (01-19-25 @ 22:00), Max: 98.4 (01-19-25 @ 22:00)  HR: 74 (01-20-25 @ 05:00)  BP: 132/100 (01-20-25 @ 05:00)  RR: 27 (01-20-25 @ 05:00)  SpO2: 96% (01-20-25 @ 05:00)      Diet, NPO:   Except Medications      Fluids:     I & O's:    01-19-25 @ 07:01  -  01-20-25 @ 07:00  --------------------------------------------------------  IN:    IV PiggyBack: 100 mL    Oral Fluid: 60 mL  Total IN: 160 mL    OUT:    Voided (mL): 430 mL  Total OUT: 430 mL    Total NET: -270 mL    PHYSICAL EXAM:  General: NAD, calm and cooperative  HEENT: NCAT  Cardiac: RRR S1, S2,  Respiratory: CTAB, normal respiratory effort,   Abdomen: Soft, non-distended, non-tender, no rebound, no guarding.  Musculoskeletal: Compartments soft  Neuro: Sensation grossly intact and equal throughout, no focal deficits  Vascular: Pulses 2+ throughout, extremities well perfused  Skin: Warm/dry, normal color, no jaundice  Incision/wound: healing well, dressings in place, clean, dry and intact    MEDICATIONS  (STANDING):  albuterol/ipratropium for Nebulization 3 milliLiter(s) Nebulizer every 4 hours  albuterol/ipratropium for Nebulization 3 milliLiter(s) Nebulizer every 20 minutes  chlorhexidine 2% Cloths 1 Application(s) Topical daily  dextrose 5%. 1000 milliLiter(s) (100 mL/Hr) IV Continuous <Continuous>  dextrose 5%. 1000 milliLiter(s) (50 mL/Hr) IV Continuous <Continuous>  dextrose 50% Injectable 25 Gram(s) IV Push once  dextrose 50% Injectable 12.5 Gram(s) IV Push once  dextrose 50% Injectable 25 Gram(s) IV Push once  glucagon  Injectable 1 milliGRAM(s) IntraMuscular once  heparin   Injectable 5000 Unit(s) SubCutaneous every 12 hours  insulin lispro (ADMELOG) corrective regimen sliding scale   SubCutaneous two times a day before meals  melatonin 5 milliGRAM(s) Oral at bedtime  methylPREDNISolone sodium succinate Injectable 40 milliGRAM(s) IV Push two times a day  pantoprazole    Tablet 40 milliGRAM(s) Oral before breakfast  piperacillin/tazobactam IVPB.. 3.375 Gram(s) IV Intermittent every 8 hours    MEDICATIONS  (PRN):  acetaminophen     Tablet .. 650 milliGRAM(s) Oral every 6 hours PRN Mild Pain (1 - 3), Moderate Pain (4 - 6)  dextrose Oral Gel 15 Gram(s) Oral once PRN Blood Glucose LESS THAN 70 milliGRAM(s)/deciliter      DVT PROPHYLAXIS: heparin   Injectable 5000 Unit(s) SubCutaneous every 12 hours    GI PROPHYLAXIS: pantoprazole    Tablet 40 milliGRAM(s) Oral before breakfast    ANTICOAGULATION:   ANTIBIOTICS:  piperacillin/tazobactam IVPB.. 3.375 Gram(s)      LAB/STUDIES:  Labs:  CAPILLARY BLOOD GLUCOSE      POCT Blood Glucose.: 177 mg/dL (20 Jan 2025 07:34)  POCT Blood Glucose.: 219 mg/dL (19 Jan 2025 17:23)  POCT Blood Glucose.: 200 mg/dL (19 Jan 2025 12:43)                          14.1   9.66  )-----------( 175      ( 20 Jan 2025 04:41 )             43.7       Auto Neutrophil %: 92.0 % (01-20-25 @ 04:41)  Auto Immature Granulocyte %: 0.6 % (01-20-25 @ 04:41)    01-20    141  |  98  |  41[H]  ----------------------------<  192[H]  3.8   |  32  |  0.7      Calcium: 8.9 mg/dL (01-20-25 @ 04:41)      LFTs:             5.2  | 0.7  | 15       ------------------[55      ( 20 Jan 2025 04:41 )  3.5  | x    | 28          Lipase:x      Amylase:x         Blood Gas Arterial, Lactate: 1.3 mmol/L (01-18-25 @ 10:15)  Lactate, Blood: 1.4 mmol/L (01-18-25 @ 04:27)  Blood Gas Arterial, Lactate: 1.9 mmol/L (01-17-25 @ 22:22)    ABG - ( 18 Jan 2025 10:15 )  pH: 7.57  /  pCO2: 41    /  pO2: 58    / HCO3: 38    / Base Excess: 14.1  /  SaO2: 89.9        ABG - ( 17 Jan 2025 22:22 )  pH: 7.56  /  pCO2: 39    /  pO2: 82    / HCO3: 35    / Base Excess: 11.7  /  SaO2: 97.7        ABG - ( 17 Jan 2025 06:09 )  pH: 7.41  /  pCO2: 55    /  pO2: 78    / HCO3: 35    / Base Excess: 8.0   /  SaO2: 94.8      Coags:      Urinalysis Basic - ( 20 Jan 2025 04:41 )    Color: x / Appearance: x / SG: x / pH: x  Gluc: 192 mg/dL / Ketone: x  / Bili: x / Urobili: x   Blood: x / Protein: x / Nitrite: x   Leuk Esterase: x / RBC: x / WBC x   Sq Epi: x / Non Sq Epi: x / Bacteria: x      Culture - Fungal, Bronchial (collected 17 Jan 2025 11:37)  Source: BAL  Preliminary Report (19 Jan 2025 11:30):    Few Yeast    Culture - Acid Fast - Bronchial w/Smear (collected 17 Jan 2025 11:37)  Source: BAL  Preliminary Report (18 Jan 2025 23:07):    Culture is being performed.    Culture - Bronchial (collected 17 Jan 2025 11:37)  Source: Lavage  Gram Stain (17 Jan 2025 23:40):    Few polymorphonuclear leukocytes per low power field    Rare Squamous epithelial cells per low power field    Moderate Gram Positive Cocci in Clusters per oil power field  Final Report (19 Jan 2025 16:13):    Moderate Staphylococcus aureus    Commensal kaushik consistent with body site  Organism: Staphylococcus aureus (19 Jan 2025 16:13)  Organism: Staphylococcus aureus (19 Jan 2025 16:13)      IMAGING:  CT Neck Soft Tissue w/ IV Cont (01.18.25 @ 02:54)   IMPRESSION:    At the level of the previously described tracheal stenosis (level of the   thyroid), there is improved tracheal patency consistent with interval   dilation. Gas collection external to the right tracheal wall with   tracheal communication is noted.      Xray Chest 1 View- PORTABLE-Routine (Xray Chest 1 View- PORTABLE-Routine in AM.) (01.19.25 @ 06:28)   Impression:    Unchanged bilateral opacities. No pneumothorax.

## 2025-01-20 NOTE — PHYSICAL THERAPY INITIAL EVALUATION ADULT - PERTINENT HX OF CURRENT PROBLEM, REHAB EVAL
Patient is a 70yo Female with PMH of HTN, HLD, GERD, CAD, COPD not on Home O2, Hx of Pneumothorax, polychondirits, SCC of BOT s/p Trach/PEG (reversed in 4/2021), s/p CRT (7 sessions in 2021) who presents with SOB for 1 week duration. History goes back to around 1 week ago When the patient started having Shortness of breath, productive cough, and malaise. Patient is admitted for PNA, and COPD exacerbation. CT neck showed tracheal stenosis and mucous secretions at level of thyroid. pt is s/p Bronchoscopy, rigid, with excision of lesion causing tracheal stenosis and tracheal dilation, 1/14.
History of Present Illness:   A 68yo Female with PMH of HTN, HLD, GERD, CAD, COPD not on Home O2, Hx of Pneumothorax, polychondirits, SCC of BOT s/p Trach/PEG (reversed in 4/2021), s/p CRT (7 sessions in 2021) who presents with SOB for 1 week duration. History goes back to around 1 week ago When the patient started having Shortness of breath, productive cough, and malaise. Patient dnies any fever. Patient reported that her son was coughing. Patient reported some chest pain previously when coughing . Patient reports diarrhea for 4 days. which resolved now. PAtient denies an urinary symptoms. Patient is admitted for PNA, and COPD excacerbation .     Patient reports some intermittent left sided chest pain when coughing. and abdominal distention as well.  Patient follows with ENT Dr. Louis, last seen 9/23/24, CT Neck 9/2024 showed Abnormal soft tissue within the posterior nasopharynx greater the right, new since the reference exams and nonspecific in appearance. The abnormal submucosal soft tissue in the preepiglottic space greater on the left, asymmetric thickening of the left aryepiglottic fold, and mild epiglottic thickening; similar when correlated with the PET/CT dated 5/12/2022.  Stable rim-calcified right thyroid nodule measuring 1.8 cm. US guided FNA by IR 10/7/24 of  Stable rim-calcified right thyroid nodule measuring 1.8 cm. Showed atypia but not malignant cells.

## 2025-01-20 NOTE — PROGRESS NOTE ADULT - NUTRITIONAL ASSESSMENT
IMPRESSION:    Acute hypoxemic resp failure s/p extubation   COPD exacerbation  Tracheal stenosis SP dilation   HO trach   History of lung nodules  History of relapsing polychondritis   possible PNA   emphysema   PLAN:    CNS: No sedation.     HEENT:  Oral care; No interventions.     PULMONARY: HFNC 50/50. CXR stable. Wean off Fio2. Solumedrol 40mg daily. Nebs every 4hours. Thoracic surgery follow up. No interventions for now.     CARDIOVASCULAR: Fluid balance negative last 24 hours. Lasix 20mg Iv once.     GI: Speech and swallow follow up. Bedside swallow eval.     RENAL:  F/u  lytes.  Correct as needed. Romo is out.     INFECTIOUS DISEASE: finish course of zosyn. ID follow up.     HEMATOLOGICAL:  DVT prophylaxis. LE doppler pending.     ENDOCRINE:  Follow up FS.  Insulin protocol if needed.    DG to SDU.

## 2025-01-20 NOTE — SWALLOW BEDSIDE ASSESSMENT ADULT - COMMENTS
Pt is known to [facility] dept from previous admission, [facility] in 2020, recs for PEG as 1' means of nutrition and hydration, ice chips and controlled single sips of water as tolerated.  -Pt is s/p Bronchoscopy, s/p tracheal stenosis with tracheal dilation and debridement on 1/14/25. Pt was RR code on 1/17. Pt  was not tolerating Bipap, had difficulty breathing, became unresponsive and desaturated rapidly. Decision was made to intubate at bedside. Pt is now s/p self extubation on 1/18.  -CT neck soft Tissue 1/18-->At the level of the previously described tracheal stenosis there is Improved tracheal patency consistent with interval dilation.

## 2025-01-20 NOTE — CHART NOTE - NSCHARTNOTEFT_GEN_A_CORE
MICU Transfer Note  ---------------------------    Transfer from: MICU  Transfer to: SDU    HPI:  A 70yo Female with PMH of HTN, HLD, GERD, CAD, COPD not on Home O2, Hx of Pneumothorax, polychondirits, SCC of BOT s/p Trach/PEG (reversed in 4/2021), s/p CRT (7 sessions in 2021) who presents with SOB for 1 week duration. History goes back to around 1 week ago When the patient started having Shortness of breath, productive cough, and malaise. Patient dnies any fever. Patient reported that her son was coughing. Patient reported some chest pain previously when coughing . Patient reports diarrhea for 4 days. which resolved now. PAtient denies an urinary symptoms. Patient is admitted for PNA, and COPD excacerbation .     Patient reports some intermittent left sided chest pain when coughing. and abdominal distention as well.  Patient follows with ENT Dr. Louis, last seen 9/23/24, CT Neck 9/2024 showed Abnormal soft tissue within the posterior nasopharynx greater the right, new since the reference exams and nonspecific in appearance. The abnormal submucosal soft tissue in the preepiglottic space greater on the left, asymmetric thickening of the left aryepiglottic fold, and mild epiglottic thickening; similar when correlated with the PET/CT dated 5/12/2022.  Stable rim-calcified right thyroid nodule measuring 1.8 cm. US guided FNA by IR 10/7/24 of  Stable rim-calcified right thyroid nodule measuring 1.8 cm. Showed atypia but not malignant cells.     ENT evaluated the pt for hoarseness, FFL showed, thick purulent secretions on larynx, airway patent. Recommended CT Neck with IVC for further evaluation, Albuterol neb treatment, Humidified O2 ( on 3L of O2 via NC)     Labs significant for WBC 11.2 K (neutrophilic), troponin 96 > 75, AG 16     09:57 - VBG - pH: 7.39  | pCO2: 38    | pO2: 36    | Lactate: 3.0      In Ed the patient was given 2 L  fluid bolus, Azithromycin and rocephin, duoneb and solu-medrol     CXR showed reticular infiltrates     EKG shows PACs, with MAT     VS significant for using 4 L NC, Tachycardia 126     Hospital course:   Pt had hypoxic resp failure due to COPD exacerbation complicated by tracheal stenosis, H/o SCC s/p surgery. Had been on NC. Steroids were being tapered. Sputum sample and PJP had been ordered. Pt had also been having diarrhea and stool PCR had been sent.     On 1/17: Rapid was called0 Pt had been on bipap, called for assistance as she was not tolerating bipap and seemed to be having difficulty breathing. Bipap was removed. Pt was having poor air entry on NC, became unresponsive and desat to 14% rapidly. HR increased to 190s and BP to 200s/100s.   Rapid was called. Pt was placed on nonrebreather with little improvement and was then intubated.   ABG was pH 7.21, pCO2 82, pO2 141, HCO3 33, O2 sat 99.7. Lactate 6.1  CXR confirmed placement of ET tube.   Saturation improved, HR improved.   Pt sedated initially with fentanyl and precedex.   Propofol was added and low dose levo was started.   Stat labs sent.   CT PE, CT neck, CT head ordered.   Pt is transferred to CCU.    Bronchoscopy on 1/17 - Bronch BAL of the RLL done with no issues. Secretions noted in the right mainstem bronchus; thick and jelly like. Tolerated well.    1/17 Pt self extubated.  put on BiPAP sat 96.  ABG ON bIpap: PH 7.54, pO2 81, pCO2 41, lactate 1.8, HCO3 35.    ENT evaluated no intervention, recommend Recall CT surgery as patient is post dilation on 1/14  CT sx eval:   - No acute thoracic surgery intervention, CT finding likely reflective of normal post operative changes  - Patient should be instructed to follow up with surgeon Dr. Patrick Elmore in 2 weeks for outpatient follow up    Speech and swallow eval:   not a candidate for bedside swallow assessment 2' hx; warrants instrumental swallow study    pt weaned off BiPAP and HFNC, on 6L nc at this time.     Dimer 1176, rpt LE doppler pending    To-Do:    [ ] follow instrumental swallow study  [ ] follow up with surgeon Dr. Patrick Elmore in 2 weeks for outpatient follow up  [ ] wean off NC as tolerated   [ ] f/u LE doppler     OBJECTIVE --  Vital Signs Last 24 Hrs  T(C): 35.4 (20 Jan 2025 08:00), Max: 36.9 (19 Jan 2025 22:00)  T(F): 95.8 (20 Jan 2025 08:00), Max: 98.4 (19 Jan 2025 22:00)  HR: 79 (20 Jan 2025 11:23) (60 - 91)  BP: 104/74 (20 Jan 2025 11:23) (84/60 - 161/88)  BP(mean): 85 (20 Jan 2025 11:23) (69 - 118)  RR: 24 (20 Jan 2025 11:23) (17 - 42)  SpO2: 94% (20 Jan 2025 11:23) (91% - 97%)    Parameters below as of 20 Jan 2025 11:23  Patient On (Oxygen Delivery Method): nasal cannula  O2 Flow (L/min): 6      I&O's Summary    19 Jan 2025 07:01  -  20 Jan 2025 07:00  --------------------------------------------------------  IN: 160 mL / OUT: 430 mL / NET: -270 mL        MEDICATIONS  (STANDING):  albuterol/ipratropium for Nebulization 3 milliLiter(s) Nebulizer every 4 hours  albuterol/ipratropium for Nebulization 3 milliLiter(s) Nebulizer every 20 minutes  chlorhexidine 2% Cloths 1 Application(s) Topical daily  dextrose 5%. 1000 milliLiter(s) (100 mL/Hr) IV Continuous <Continuous>  dextrose 5%. 1000 milliLiter(s) (50 mL/Hr) IV Continuous <Continuous>  dextrose 50% Injectable 25 Gram(s) IV Push once  dextrose 50% Injectable 12.5 Gram(s) IV Push once  dextrose 50% Injectable 25 Gram(s) IV Push once  glucagon  Injectable 1 milliGRAM(s) IntraMuscular once  heparin   Injectable 5000 Unit(s) SubCutaneous every 12 hours  insulin lispro (ADMELOG) corrective regimen sliding scale   SubCutaneous two times a day before meals  melatonin 5 milliGRAM(s) Oral at bedtime  methylPREDNISolone sodium succinate Injectable 40 milliGRAM(s) IV Push daily  pantoprazole    Tablet 40 milliGRAM(s) Oral before breakfast  piperacillin/tazobactam IVPB.. 3.375 Gram(s) IV Intermittent every 8 hours    MEDICATIONS  (PRN):  acetaminophen     Tablet .. 650 milliGRAM(s) Oral every 6 hours PRN Mild Pain (1 - 3), Moderate Pain (4 - 6)  dextrose Oral Gel 15 Gram(s) Oral once PRN Blood Glucose LESS THAN 70 milliGRAM(s)/deciliter        LABS                                            14.1                  Neurophils% (auto):   92.0   (01-20 @ 04:41):    9.66 )-----------(175          Lymphocytes% (auto):  1.8                                           43.7                   Eosinphils% (auto):   0.0      Manual%: Neutrophils x    ; Lymphocytes x    ; Eosinophils x    ; Bands%: x    ; Blasts x                                    141    |  98     |  41                  Calcium: 8.9   / iCa: x      (01-20 @ 04:41)    ----------------------------<  192       Magnesium: 2.2                              3.8     |  32     |  0.7              Phosphorous: 3.5      TPro  5.2    /  Alb  3.5    /  TBili  0.7    /  DBili  x      /  AST  15     /  ALT  28     /  AlkPhos  55     20 Jan 2025 04:41            ASSESSMENT & PLAN:     Acute hypoxemic resp failure s/p extubation   COPD exacerbation  Tracheal stenosis SP dilation   HO trach   History of lung nodules  History of relapsing polychondritis   possible PNA   emphysema     PLAN:    CNS: No sedation.     HEENT:  Oral care; No interventions.     PULMONARY: 6L NC. CXR stable. Wean off Fio2. Solumedrol 40mg daily. Nebs every 4hours. Thoracic surgery follow up- No interventions for now.     CARDIOVASCULAR: Fluid balance negative last 24 hours. Lasix 20mg Iv once today.    GI: Speech and swallow follow up.     RENAL:  F/u  lytes.  Correct as needed. Romo is out.     INFECTIOUS DISEASE: finish course of zosyn. ID follow up.     HEMATOLOGICAL:  DVT prophylaxis. LE doppler pending.     ENDOCRINE:  Follow up FS.  Insulin protocol if needed.    SDU.       For Follow-Up:

## 2025-01-20 NOTE — PROGRESS NOTE ADULT - ASSESSMENT
ASSESSMENT  A 68yo Female with PMH of HTN, HLD, GERD, CAD, COPD not on Home O2, Hx of Pneumothorax, polychondirits, SCC of BOT s/p Trach/PEG (reversed in 4/2021), s/p CRT (7 sessions in 2021) who presents with SOB for 1 week duration.    IMPRESSION  #SCC of BOT s/p Trach/PEG (reversed 2021) s/p CRT  #Dyspnea/Acute Hypoxemic respiratory failure  #Intubated 1/17 with Septic shock   - CXR 1/17 - unchanged bilateral intersitial opacities  - s/p bronch 1/17 MSSA, Yeast   - CT Angio Chest PE Protocol w/ IV Cont (01.18.25 @ 02:54): No evidence of acute pulmonary embolism. Occlusion of several right lower lobe bronchi, with increased associated   right lower lobe consolidative opacity, which may represent atelectasis  and/or pneumonia in the appropriate clinical setting. No significant change in emphysema with superimposed mild groundglass   opacities/septal thickening suggesting underlying edema.    #Tracheal Stenosis  - CT Neck Soft Tissue w/ IV Cont (01.11.25 @ 08:27): Interval development of focal moderate tracheal stenosis at the level of  the thyroid with partially collapsed appearance of the right tracheal  wall since the prior CT neck from 9/3/2024, possibly associated with the  history of prior tracheostomy. Stable mild thickening of the epiglottis and small preepiglottic soft  tissue density. Resolved nasopharyngeal soft tissue thickening and left  aryepiglottic fold thickening. Increased biapical reticular changes. Please see separately dictated  concurrent chest CT.  - CT Chest w/ IV Cont (01.11.25 @ 08:29): Since  September 3, 2024 Bilateral basilar, partial right middle lobe atelectasis. Extensive emphysema with superimposed mild groundglass opacities/septal  thickening suggesting underlying edema( not present on earlier study). CT neck performed on the same day, see separate report  - s/p bronch 1/14 -- tracheal dilation with excision of legion causing tracheal stenosis     #Elevated Fungitell    #CAD  #COPD    #Obesity BMI (kg/m2): 29.2  #DM  #Abx allergy: No Known Drug Allergies      RECOMMENDATIONS  This is a preliminary incomplete pended note, all final recommendations to follow after interview and examination of the patient.    Please call or message on [employer] Teams if with any questions.  Spectra [phone]   ASSESSMENT  A 70yo Female with PMH of HTN, HLD, GERD, CAD, COPD not on Home O2, Hx of Pneumothorax, polychondirits, SCC of BOT s/p Trach/PEG (reversed in 4/2021), s/p CRT (7 sessions in 2021) who presents with SOB for 1 week duration.    IMPRESSION  #SCC of BOT s/p Trach/PEG (reversed 2021) s/p CRT  #Dyspnea/Acute Hypoxemic respiratory failure  #Intubated 1/17 with Septic shock   - CXR 1/17 - unchanged bilateral intersitial opacities  - s/p bronch 1/17 MSSA, Yeast   - CT Angio Chest PE Protocol w/ IV Cont (01.18.25 @ 02:54): No evidence of acute pulmonary embolism. Occlusion of several right lower lobe bronchi, with increased associated   right lower lobe consolidative opacity, which may represent atelectasis  and/or pneumonia in the appropriate clinical setting. No significant change in emphysema with superimposed mild groundglass   opacities/septal thickening suggesting underlying edema.    #Tracheal Stenosis  - CT Neck Soft Tissue w/ IV Cont (01.11.25 @ 08:27): Interval development of focal moderate tracheal stenosis at the level of  the thyroid with partially collapsed appearance of the right tracheal  wall since the prior CT neck from 9/3/2024, possibly associated with the  history of prior tracheostomy. Stable mild thickening of the epiglottis and small preepiglottic soft  tissue density. Resolved nasopharyngeal soft tissue thickening and left  aryepiglottic fold thickening. Increased biapical reticular changes. Please see separately dictated  concurrent chest CT.  - CT Chest w/ IV Cont (01.11.25 @ 08:29): Since  September 3, 2024 Bilateral basilar, partial right middle lobe atelectasis. Extensive emphysema with superimposed mild groundglass opacities/septal  thickening suggesting underlying edema( not present on earlier study). CT neck performed on the same day, see separate report  - s/p bronch 1/14 -- tracheal dilation with excision of legion causing tracheal stenosis     #Elevated Fungitell    #CAD  #COPD    #Obesity BMI (kg/m2): 29.2  #DM  #Abx allergy: No Known Drug Allergies      RECOMMENDATIONS  - MSSA in [facility] cx -- narrow zosyn to cefazolin 1g q 8 hours   - trend WBC and monitor fever curve     Please call or message on [employer] Teams if with any questions.  Spectra [phone]

## 2025-01-20 NOTE — PHYSICAL THERAPY INITIAL EVALUATION ADULT - IMPAIRMENTS CONTRIBUTING IMPAIRED BED MOBILITY, REHAB EVAL
impaired balance/decreased strength
decreased functional endurance/impaired balance/decreased strength

## 2025-01-20 NOTE — PROGRESS NOTE ADULT - SUBJECTIVE AND OBJECTIVE BOX
GEREMIAS FULLER  69y, Female  Allergy: No Known Drug Allergies  CONTRAST DYS (Nausea)      LOS  10d    CHIEF COMPLAINT: shortness of breath, malaise (16 Jan 2025 11:51)      INTERVAL EVENTS/HPI  - No acute events overnight  - T(F): , Max: 98.4 (01-19-25 @ 22:00)  - sputum Cx growing MSSA -- on zosyn   - WBC Count: 9.66 (01-20-25 @ 04:41)  WBC Count: 11.39 (01-19-25 @ 04:26)     - Creatinine: 0.7 (01-20-25 @ 04:41)  Creatinine: 0.7 (01-19-25 @ 04:26)       ROS  General: Denies rigors, nightsweats  HEENT: Denies headache, rhinorrhea, sore throat, eye pain  CV: Denies CP, palpitations  PULM: Denies wheezing, hemoptysis  GI: Denies hematemesis, hematochezia, melena  : Denies discharge, hematuria  MSK: Denies arthralgias, myalgias  SKIN: Denies rash, lesions  NEURO: Denies paresthesias, weakness  PSYCH: Denies depression, anxiety    VITALS:  T(F): 98.4, Max: 98.4 (01-19-25 @ 22:00)  HR: 74  BP: 132/100  RR: 27Vital Signs Last 24 Hrs  T(C): 36.9 (19 Jan 2025 22:00), Max: 36.9 (19 Jan 2025 22:00)  T(F): 98.4 (19 Jan 2025 22:00), Max: 98.4 (19 Jan 2025 22:00)  HR: 74 (20 Jan 2025 05:00) (60 - 96)  BP: 132/100 (20 Jan 2025 05:00) (84/60 - 161/88)  BP(mean): 112 (20 Jan 2025 05:00) (69 - 116)  RR: 27 (20 Jan 2025 05:00) (17 - 47)  SpO2: 96% (20 Jan 2025 05:00) (91% - 97%)    Parameters below as of 20 Jan 2025 05:00  Patient On (Oxygen Delivery Method): nasal cannula, high flow  O2 Flow (L/min): 50  O2 Concentration (%): 50    PHYSICAL EXAM:  Gen: NAD, resting in bed  HEENT: Normocephalic, atraumatic  Neck: supple, no lymphadenopathy  CV: Regular rate & regular rhythm  Lungs: decreased BS at bases, no fremitus  Abdomen: Soft, BS present  Ext: Warm, well perfused  Neuro: non focal, awake  Skin: no rash, no erythema  Lines: no phlebitis    FH: Non-contributory  Social Hx: Non-contributory    TESTS & MEASUREMENTS:                        14.1   9.66  )-----------( 175      ( 20 Jan 2025 04:41 )             43.7     01-20    141  |  98  |  41[H]  ----------------------------<  192[H]  3.8   |  32  |  0.7    Ca    8.9      20 Jan 2025 04:41  Phos  3.5     01-20  Mg     2.2     01-20    TPro  5.2[L]  /  Alb  3.5  /  TBili  0.7  /  DBili  x   /  AST  15  /  ALT  28  /  AlkPhos  55  01-20      LIVER FUNCTIONS - ( 20 Jan 2025 04:41 )  Alb: 3.5 g/dL / Pro: 5.2 g/dL / ALK PHOS: 55 U/L / ALT: 28 U/L / AST: 15 U/L / GGT: x           Urinalysis Basic - ( 20 Jan 2025 04:41 )    Color: x / Appearance: x / SG: x / pH: x  Gluc: 192 mg/dL / Ketone: x  / Bili: x / Urobili: x   Blood: x / Protein: x / Nitrite: x   Leuk Esterase: x / RBC: x / WBC x   Sq Epi: x / Non Sq Epi: x / Bacteria: x        Culture - Fungal, Bronchial (collected 01-17-25 @ 11:37)  Source: BAL  Preliminary Report (01-19-25 @ 11:30):    Few Yeast    Culture - Acid Fast - Bronchial w/Smear (collected 01-17-25 @ 11:37)  Source: BAL  Preliminary Report (01-18-25 @ 23:07):    Culture is being performed.    Culture - Bronchial (collected 01-17-25 @ 11:37)  Source: Lavage  Gram Stain (01-17-25 @ 23:40):    Few polymorphonuclear leukocytes per low power field    Rare Squamous epithelial cells per low power field    Moderate Gram Positive Cocci in Clusters per oil power field  Final Report (01-19-25 @ 16:13):    Moderate Staphylococcus aureus    Commensal kaushik consistent with body site  Organism: Staphylococcus aureus (01-19-25 @ 16:13)  Organism: Staphylococcus aureus (01-19-25 @ 16:13)      Method Type: ALONZO      -  Clindamycin: R <=0.25 This isolate is presumed to be clindamycin resistant based on detection of inducible resistance. Clindamycin may still be effective in some patients.      -  Erythromycin: R >4      -  Gentamicin: S <=4 Should not be used as monotherapy      -  Oxacillin: S <=0.25 Oxacillin predicts susceptibility for dicloxacillin, methicillin, and nafcillin      -  Penicillin: R 2      -  Rifampin: S <=1 Should not be used as monotherapy      -  Tetracycline: S <=4      -  Trimethoprim/Sulfamethoxazole: S <=0.5/9.5      -  Vancomycin: S 1    Culture - Blood (collected 01-14-25 @ 18:22)  Source: .Blood BLOOD  Final Report (01-19-25 @ 23:00):    No growth at 5 days    Culture - Blood (collected 01-14-25 @ 18:22)  Source: .Blood BLOOD  Final Report (01-19-25 @ 23:00):    No growth at 5 days        Lactate, Blood: 1.4 mmol/L (01-18-25 @ 04:27)  Lactate, Blood: 4.6 mmol/L (01-17-25 @ 03:55)      INFECTIOUS DISEASES TESTING  Procalcitonin: 0.10 (01-17-25 @ 12:49)  MRSA PCR Result.: Negative (01-17-25 @ 12:08)  MRSA PCR Result.: Negative (01-17-25 @ 07:20)  MRSA PCR Result.: Negative (01-13-25 @ 12:40)  Procalcitonin: 0.08 (01-10-25 @ 21:09)  Fungitell: >500 (01-10-25 @ 21:09)      INFLAMMATORY MARKERS      RADIOLOGY & ADDITIONAL TESTS:  I have personally reviewed the last available Chest xray  CXR      CT  CT Angio Chest PE Protocol w/ IV Cont:   ACC: [id number] EXAM:  CT ANGIO CHEST PULM ART WAWIC   ORDERED BY: BRANNON HILLIARD     PROCEDURE DATE:  01/18/2025          INTERPRETATION:  CLINICAL HISTORY/REASON FOR EXAM: Shortness of breath.    TECHNIQUE: Multislice helical sections were obtainedfrom the thoracic   inlet to the lung bases during rapid administration of 100 cc Omnipaque   350 intravenous contrast. Thin sections were reconstructed through the   pulmonary vasculature. 3D (MIP) reformats obtained. 0 cc contrast   discarded.    COMPARISON: CT chest 1/11/2025.    FINDINGS:    PULMONARY EMBOLUS: No evidence of acute pulmonary embolism.    LUNGS, PLEURA, AIRWAYS:  Occlusion of several right lower lobe bronchi, with increased associated   right lower lobe consolidative opacity, which may represent atelectasis   and/or pneumonia in the appropriate clinical setting.    No significant change in emphysema with superimposed mild groundglass   opacities/septal thickening suggesting underlying edema.    Unchanged partial right middle lobe atelectasis. Bilateral subsegmental   atelectasis. No pleural effusion or pneumothorax.    THORACIC NODES: No mediastinal, hilar, supraclavicular, or axillary   lymphadenopathy.    MEDIASTINUM/GREAT VESSELS: No pericardial effusion. Heart size is within   normal limits. The aorta is of normal caliber. Mild dilation of main   pulmonary artery up to 3.4 cm Aortic and coronary artery calcifications.    BONES/SOFT TISSUES: Unchanged    VISUALIZED UPPER ABDOMEN: Unchanged.      IMPRESSION:  Since 1/11/2025:    No evidence of acute pulmonary embolism.    Occlusion of several right lower lobe bronchi, with increased associated   right lower lobe consolidative opacity, which may represent atelectasis   and/or pneumonia in the appropriate clinical setting.    No significant change in emphysema with superimposed mild groundglass   opacities/septal thickening suggesting underlying edema.    --- End of Report ---            DARCIE CHAKRABORTY MD; Attending Radiologist  This document has been electronically signed. Jan 18 2025  2:58AM (01-18-25 @ 02:54)      CARDIOLOGY TESTING  12 Lead ECG:   Ventricular Rate 147 BPM    Atrial Rate 147 BPM    P-R Interval 122 ms    QRS Duration 78 ms    Q-T Interval 336 ms    QTC Calculation(Bazett) 525 ms    P Axis 78 degrees    R Axis 33 degrees    T Axis 86 degrees    Diagnosis Line *** Critical TestResult: High HR  Sinus tachycardia  Possible Left atrial enlargement  Nonspecific ST and T wave abnormality  Abnormal ECG    Confirmed by ULYSSES HUI MD ([id number]) on 1/17/2025 7:27:58 AM (01-17-25 @ 02:51)  12 Lead ECG:   Ventricular Rate 74 BPM    Atrial Rate 74 BPM    P-R Interval 124 ms    QRS Duration 88 ms    Q-T Interval 448 ms    QTC Calculation(Bazett) 497 ms    P Axis 63 degrees    R Axis 11 degrees    T Axis 146 degrees    Diagnosis Line Sinus rhythm with Premature ventricular complexes or Fusion complexes  T wave abnormality, consider anterolateral ischemia  Abnormal ECG    Confirmed by TUYET INTERIANO MDFIM ([id number]) on 1/15/2025 12:05:56 AM (01-14-25 @ 19:48)      MEDICATIONS  albuterol/ipratropium for Nebulization 3 Nebulizer every 4 hours  albuterol/ipratropium for Nebulization 3 Nebulizer every 20 minutes  chlorhexidine 2% Cloths 1 Topical daily  dextrose 5%. 1000 IV Continuous <Continuous>  dextrose 5%. 1000 IV Continuous <Continuous>  dextrose 50% Injectable 25 IV Push once  dextrose 50% Injectable 12.5 IV Push once  dextrose 50% Injectable 25 IV Push once  glucagon  Injectable 1 IntraMuscular once  heparin   Injectable 5000 SubCutaneous every 12 hours  insulin lispro (ADMELOG) corrective regimen sliding scale  SubCutaneous two times a day before meals  melatonin 5 Oral at bedtime  methylPREDNISolone sodium succinate Injectable 40 IV Push two times a day  pantoprazole    Tablet 40 Oral before breakfast  piperacillin/tazobactam IVPB.. 3.375 IV Intermittent every 8 hours      WEIGHT  Weight (kg): 72.6 (01-17-25 @ 05:00)  Creatinine: 0.7 mg/dL (01-20-25 @ 04:41)      ANTIBIOTICS:  piperacillin/tazobactam IVPB.. 3.375 Gram(s) IV Intermittent every 8 hours      All available historical records have been reviewed

## 2025-01-20 NOTE — PHYSICAL THERAPY INITIAL EVALUATION ADULT - TRANSFER SAFETY CONCERNS NOTED: SIT/STAND, REHAB EVAL
losing balance
decreased balance during turns/decreased step length/decreased weight-shifting ability

## 2025-01-20 NOTE — PHYSICAL THERAPY INITIAL EVALUATION ADULT - TRANSFER SAFETY CONCERNS NOTED: BED/CHAIR, REHAB EVAL
decreased balance during turns/losing balance
decreased balance during turns/decreased step length/decreased weight-shifting ability

## 2025-01-20 NOTE — PHYSICAL THERAPY INITIAL EVALUATION ADULT - GAIT TRAINING, PT EVAL
Ambulate 20 feet moderate assist/RW by DC
Goal: pt will ambulate with least restrictive assistive device 50 feet with supervision by discharge to facilitate return to PLOF.

## 2025-01-20 NOTE — PHYSICAL THERAPY INITIAL EVALUATION ADULT - GENERAL OBSERVATIONS, REHAB EVAL
10:15-10:47 pt encountered in bed in NAD, + O2 via nasal canula, 5L/min, +tele.  Patient performed bed mobility, transfers, and ambulated with assistance, limited by deconditioning and LE weakness, s/p intubation.  SPO2 decreased to 88% after bed to chair transfer, resolved to 94% with rest and breathing exercises. Pt would benefit from continued PT to restore prior level of mobility and safety.

## 2025-01-20 NOTE — PROGRESS NOTE ADULT - SUBJECTIVE AND OBJECTIVE BOX
CHIEF COMPLAINT:  Patient is a 69y old  Female who presents with a chief complaint of shortness of breath, malaise (16 Jan 2025 11:51)      INTERVAL HISTORY/OVERNIGHT EVENTS:    HFNC 50/50   no events overnight       ======================  MEDICATIONS:  albuterol/ipratropium for Nebulization 3 milliLiter(s) Nebulizer every 4 hours  albuterol/ipratropium for Nebulization 3 milliLiter(s) Nebulizer every 20 minutes  chlorhexidine 2% Cloths 1 Application(s) Topical daily  dextrose 50% Injectable 25 Gram(s) IV Push once  dextrose 50% Injectable 12.5 Gram(s) IV Push once  dextrose 50% Injectable 25 Gram(s) IV Push once  glucagon  Injectable 1 milliGRAM(s) IntraMuscular once  heparin   Injectable 5000 Unit(s) SubCutaneous every 12 hours  insulin lispro (ADMELOG) corrective regimen sliding scale   SubCutaneous two times a day before meals  melatonin 5 milliGRAM(s) Oral at bedtime  methylPREDNISolone sodium succinate Injectable 40 milliGRAM(s) IV Push daily  pantoprazole    Tablet 40 milliGRAM(s) Oral before breakfast  piperacillin/tazobactam IVPB.. 3.375 Gram(s) IV Intermittent every 8 hours    DRIPS:  dextrose 5%. 1000 milliLiter(s) (100 mL/Hr) IV Continuous <Continuous>  dextrose 5%. 1000 milliLiter(s) (50 mL/Hr) IV Continuous <Continuous>    ======================  PHYSICAL EXAMINATION:    General: NAD   HEENT: MISTI             Lungs: Bilateral BS  Cardiovascular: Regular   Gastrointestinal: Soft, Positive BS  Extremities: No clubbing.  Moves extremities.  Full Range of motion   Skin: Warm, intact  Neurological: No motor or sensory deficit     ======================  OBJECTIVE:        VS:  T(F): 95.8 (01-20 @ 08:00), Max: 98.4 (01-19 @ 22:00)  HR: 63 (01-20 @ 09:35) (60 - 91)  BP: 150/78 (01-20 @ 09:00) (84/60 - 161/88)  RR: 23 (01-20 @ 09:35) (17 - 35)  SpO2: 93% (01-20 @ 09:35) (91% - 97%)  CVP(mm Hg): --  CO: --  CI: --  PA: --  PCWP: --    I/O:      01-17 @ 07:01  -  01-18 @ 07:00  --------------------------------------------------------  IN: 1897.6 mL / OUT: 1430 mL / NET: 467.6 mL    01-18 @ 07:01  -  01-19 @ 07:00  --------------------------------------------------------  IN: 100 mL / OUT: 1550 mL / NET: -1450 mL    01-19 @ 07:01  -  01-20 @ 07:00  --------------------------------------------------------  IN: 160 mL / OUT: 430 mL / NET: -270 mL        Weight trend:  Weight (kg): 72.6 (01-17)    ======================    LABS:                          14.1   9.66  )-----------( 175      ( 20 Jan 2025 04:41 )             43.7     01-20    141  |  98  |  41[H]  ----------------------------<  192[H]  3.8   |  32  |  0.7    Ca    8.9      20 Jan 2025 04:41  Phos  3.5     01-20  Mg     2.2     01-20    TPro  5.2[L]  /  Alb  3.5  /  TBili  0.7  /  DBili  x   /  AST  15  /  ALT  28  /  AlkPhos  55  01-20    LIVER FUNCTIONS - ( 20 Jan 2025 04:41 )  Alb: 3.5 g/dL / Pro: 5.2 g/dL / ALK PHOS: 55 U/L / ALT: 28 U/L / AST: 15 U/L / GGT: x                     Urinalysis Basic - ( 20 Jan 2025 04:41 )    Color: x / Appearance: x / SG: x / pH: x  Gluc: 192 mg/dL / Ketone: x  / Bili: x / Urobili: x   Blood: x / Protein: x / Nitrite: x   Leuk Esterase: x / RBC: x / WBC x   Sq Epi: x / Non Sq Epi: x / Bacteria: x        Cultures:

## 2025-01-20 NOTE — PHYSICAL THERAPY INITIAL EVALUATION ADULT - GAIT DEVIATIONS NOTED, PT EVAL
decreased eunice/decreased step length/decreased weight-shifting ability
losing balance posterior/decreased step length/decreased stride length

## 2025-01-20 NOTE — PHYSICAL THERAPY INITIAL EVALUATION ADULT - WEIGHT-BEARING RESTRICTIONS: SIT/STAND, REHAB EVAL
full weight-bearing How Severe Is Your Acne?: moderate Is This A New Presentation, Or A Follow-Up?: Acne Additional Comments (Use Complete Sentences): Patient c/o of acne flare on face and back. Patient not currently on any medication.

## 2025-01-20 NOTE — PHYSICAL THERAPY INITIAL EVALUATION ADULT - IMPAIRED TRANSFERS: BED/CHAIR, REHAB EVAL
decreased functional endurance/impaired balance/decreased strength
impaired balance/decreased strength

## 2025-01-20 NOTE — PROGRESS NOTE ADULT - SUBJECTIVE AND OBJECTIVE BOX
Patient is a 69y old  Female who presents with a chief complaint of shortness of breath, malaise (16 Jan 2025 11:51)        Over Night Events:    HFNC 50/50         ROS:  See HPI    PHYSICAL EXAM    ICU Vital Signs Last 24 Hrs  T(C): 36.9 (19 Jan 2025 22:00), Max: 36.9 (19 Jan 2025 22:00)  T(F): 98.4 (19 Jan 2025 22:00), Max: 98.4 (19 Jan 2025 22:00)  HR: 80 (20 Jan 2025 08:00) (60 - 96)  BP: 153/94 (20 Jan 2025 08:00) (84/60 - 161/88)  BP(mean): 118 (20 Jan 2025 08:00) (69 - 118)  ABP: --  ABP(mean): --  RR: 20 (20 Jan 2025 08:00) (17 - 37)  SpO2: 93% (20 Jan 2025 08:00) (91% - 97%)    O2 Parameters below as of 20 Jan 2025 08:00  Patient On (Oxygen Delivery Method): nasal cannula, high flow  O2 Flow (L/min): 50  O2 Concentration (%): 50        General: NAD   HEENT: MISTI             Lymphatic system: No cervical LN   Lungs: Bilateral BS, clear   Cardiovascular: Regular   Gastrointestinal: Soft, Positive BS  Extremities: No clubbing.  Moves extremities.  Full Range of motion   Skin: Warm, intact  Neurological: No motor or sensory deficit       01-19-25 @ 07:01  -  01-20-25 @ 07:00  --------------------------------------------------------  IN:    IV PiggyBack: 100 mL    Oral Fluid: 60 mL  Total IN: 160 mL    OUT:    Voided (mL): 430 mL  Total OUT: 430 mL    Total NET: -270 mL          LABS:                            14.1   9.66  )-----------( 175      ( 20 Jan 2025 04:41 )             43.7                                               01-20    141  |  98  |  41[H]  ----------------------------<  192[H]  3.8   |  32  |  0.7    Ca    8.9      20 Jan 2025 04:41  Phos  3.5     01-20  Mg     2.2     01-20    TPro  5.2[L]  /  Alb  3.5  /  TBili  0.7  /  DBili  x   /  AST  15  /  ALT  28  /  AlkPhos  55  01-20                                             Urinalysis Basic - ( 20 Jan 2025 04:41 )    Color: x / Appearance: x / SG: x / pH: x  Gluc: 192 mg/dL / Ketone: x  / Bili: x / Urobili: x   Blood: x / Protein: x / Nitrite: x   Leuk Esterase: x / RBC: x / WBC x   Sq Epi: x / Non Sq Epi: x / Bacteria: x                                                  LIVER FUNCTIONS - ( 20 Jan 2025 04:41 )  Alb: 3.5 g/dL / Pro: 5.2 g/dL / ALK PHOS: 55 U/L / ALT: 28 U/L / AST: 15 U/L / GGT: x                                                  Culture - Fungal, Bronchial (collected 17 Jan 2025 11:37)  Source: BAL  Preliminary Report (19 Jan 2025 11:30):    Few Yeast    Culture - Acid Fast - Bronchial w/Smear (collected 17 Jan 2025 11:37)  Source: BAL  Preliminary Report (18 Jan 2025 23:07):    Culture is being performed.    Culture - Bronchial (collected 17 Jan 2025 11:37)  Source: Lavage  Gram Stain (17 Jan 2025 23:40):    Few polymorphonuclear leukocytes per low power field    Rare Squamous epithelial cells per low power field    Moderate Gram Positive Cocci in Clusters per oil power field  Final Report (19 Jan 2025 16:13):    Moderate Staphylococcus aureus    Commensal kaushik consistent with body site  Organism: Staphylococcus aureus (19 Jan 2025 16:13)  Organism: Staphylococcus aureus (19 Jan 2025 16:13)                                                                                       ABG - ( 18 Jan 2025 10:15 )  pH, Arterial: 7.57  pH, Blood: x     /  pCO2: 41    /  pO2: 58    / HCO3: 38    / Base Excess: 14.1  /  SaO2: 89.9                MEDICATIONS  (STANDING):  albuterol/ipratropium for Nebulization 3 milliLiter(s) Nebulizer every 4 hours  albuterol/ipratropium for Nebulization 3 milliLiter(s) Nebulizer every 20 minutes  chlorhexidine 2% Cloths 1 Application(s) Topical daily  dextrose 5%. 1000 milliLiter(s) (100 mL/Hr) IV Continuous <Continuous>  dextrose 5%. 1000 milliLiter(s) (50 mL/Hr) IV Continuous <Continuous>  dextrose 50% Injectable 25 Gram(s) IV Push once  dextrose 50% Injectable 12.5 Gram(s) IV Push once  dextrose 50% Injectable 25 Gram(s) IV Push once  glucagon  Injectable 1 milliGRAM(s) IntraMuscular once  heparin   Injectable 5000 Unit(s) SubCutaneous every 12 hours  insulin lispro (ADMELOG) corrective regimen sliding scale   SubCutaneous two times a day before meals  melatonin 5 milliGRAM(s) Oral at bedtime  methylPREDNISolone sodium succinate Injectable 40 milliGRAM(s) IV Push two times a day  pantoprazole    Tablet 40 milliGRAM(s) Oral before breakfast  piperacillin/tazobactam IVPB.. 3.375 Gram(s) IV Intermittent every 8 hours    MEDICATIONS  (PRN):  acetaminophen     Tablet .. 650 milliGRAM(s) Oral every 6 hours PRN Mild Pain (1 - 3), Moderate Pain (4 - 6)  dextrose Oral Gel 15 Gram(s) Oral once PRN Blood Glucose LESS THAN 70 milliGRAM(s)/deciliter      Xrays:                                                                                     ECHO

## 2025-01-20 NOTE — PROGRESS NOTE ADULT - ASSESSMENT
ASSESSMENT:  69y F w/ PMHx of  HTN, HLD, GERD, CAD, COPD not on Home O2, Hx of Pneumothorax, polychondirits, SCC of BOT s/p Trach/PEG (reversed in 4/2021), s/p CRT (7 sessions in 2021) who presents with SOB for 1 week duration. CT neck showed tracheal stenosis and mucous secretions at level of thyroid, now POD#6 s/p bronchoscopy and tracheal dilation. Thoracic surgery re-called for CT findings of gas collection near trachea in the setting of recent operation and worsening respiratory status. Patient respiratory status improving, tolerating HFNC.     PLAN:  - No acute thoracic surgery intervention, CT finding likely reflective of normal post operative changes  - Patient should be instructed to follow up with surgeon Dr. Patrick Elmore in 2 weeks for outpatient follow up  - Rest of care per primary team  - Please recall thoracic surgery as needed or if patient status changes    Above plan discussed with attending surgeon Dr. Patrick Elmore

## 2025-01-21 DIAGNOSIS — R13.10 DYSPHAGIA, UNSPECIFIED: ICD-10-CM

## 2025-01-21 DIAGNOSIS — Z71.89 OTHER SPECIFIED COUNSELING: ICD-10-CM

## 2025-01-21 DIAGNOSIS — Z51.5 ENCOUNTER FOR PALLIATIVE CARE: ICD-10-CM

## 2025-01-21 DIAGNOSIS — J18.9 PNEUMONIA, UNSPECIFIED ORGANISM: ICD-10-CM

## 2025-01-21 LAB
ALBUMIN SERPL ELPH-MCNC: 3.1 G/DL — LOW (ref 3.5–5.2)
ALP SERPL-CCNC: 48 U/L — SIGNIFICANT CHANGE UP (ref 30–115)
ALT FLD-CCNC: 25 U/L — SIGNIFICANT CHANGE UP (ref 0–41)
ANION GAP SERPL CALC-SCNC: 12 MMOL/L — SIGNIFICANT CHANGE UP (ref 7–14)
AST SERPL-CCNC: 22 U/L — SIGNIFICANT CHANGE UP (ref 0–41)
BASOPHILS # BLD AUTO: 0.01 K/UL — SIGNIFICANT CHANGE UP (ref 0–0.2)
BASOPHILS NFR BLD AUTO: 0.1 % — SIGNIFICANT CHANGE UP (ref 0–1)
BILIRUB SERPL-MCNC: 0.6 MG/DL — SIGNIFICANT CHANGE UP (ref 0.2–1.2)
BUN SERPL-MCNC: 36 MG/DL — HIGH (ref 10–20)
CALCIUM SERPL-MCNC: 8.7 MG/DL — SIGNIFICANT CHANGE UP (ref 8.4–10.4)
CHLORIDE SERPL-SCNC: 96 MMOL/L — LOW (ref 98–110)
CO2 SERPL-SCNC: 33 MMOL/L — HIGH (ref 17–32)
CREAT SERPL-MCNC: 0.6 MG/DL — LOW (ref 0.7–1.5)
EGFR: 96 ML/MIN/1.73M2 — SIGNIFICANT CHANGE UP
EOSINOPHIL # BLD AUTO: 0.01 K/UL — SIGNIFICANT CHANGE UP (ref 0–0.7)
EOSINOPHIL NFR BLD AUTO: 0.1 % — SIGNIFICANT CHANGE UP (ref 0–8)
FUNGITELL: >500 PG/ML — HIGH
GLUCOSE BLDC GLUCOMTR-MCNC: 141 MG/DL — HIGH (ref 70–99)
GLUCOSE BLDC GLUCOMTR-MCNC: 172 MG/DL — HIGH (ref 70–99)
GLUCOSE BLDC GLUCOMTR-MCNC: 286 MG/DL — HIGH (ref 70–99)
GLUCOSE SERPL-MCNC: 131 MG/DL — HIGH (ref 70–99)
HCT VFR BLD CALC: 42.3 % — SIGNIFICANT CHANGE UP (ref 37–47)
HGB BLD-MCNC: 13.6 G/DL — SIGNIFICANT CHANGE UP (ref 12–16)
IMM GRANULOCYTES NFR BLD AUTO: 0.9 % — HIGH (ref 0.1–0.3)
LYMPHOCYTES # BLD AUTO: 0.35 K/UL — LOW (ref 1.2–3.4)
LYMPHOCYTES # BLD AUTO: 3.7 % — LOW (ref 20.5–51.1)
MAGNESIUM SERPL-MCNC: 2.2 MG/DL — SIGNIFICANT CHANGE UP (ref 1.8–2.4)
MCHC RBC-ENTMCNC: 28.1 PG — SIGNIFICANT CHANGE UP (ref 27–31)
MCHC RBC-ENTMCNC: 32.2 G/DL — SIGNIFICANT CHANGE UP (ref 32–37)
MCV RBC AUTO: 87.4 FL — SIGNIFICANT CHANGE UP (ref 81–99)
MONOCYTES # BLD AUTO: 0.71 K/UL — HIGH (ref 0.1–0.6)
MONOCYTES NFR BLD AUTO: 7.5 % — SIGNIFICANT CHANGE UP (ref 1.7–9.3)
NEUTROPHILS # BLD AUTO: 8.32 K/UL — HIGH (ref 1.4–6.5)
NEUTROPHILS NFR BLD AUTO: 87.7 % — HIGH (ref 42.2–75.2)
NRBC # BLD: 0 /100 WBCS — SIGNIFICANT CHANGE UP (ref 0–0)
NRBC BLD-RTO: 0 /100 WBCS — SIGNIFICANT CHANGE UP (ref 0–0)
PLATELET # BLD AUTO: 138 K/UL — SIGNIFICANT CHANGE UP (ref 130–400)
PMV BLD: 11.2 FL — HIGH (ref 7.4–10.4)
POTASSIUM SERPL-MCNC: 3.5 MMOL/L — SIGNIFICANT CHANGE UP (ref 3.5–5)
POTASSIUM SERPL-SCNC: 3.5 MMOL/L — SIGNIFICANT CHANGE UP (ref 3.5–5)
PROT SERPL-MCNC: 4.7 G/DL — LOW (ref 6–8)
RBC # BLD: 4.84 M/UL — SIGNIFICANT CHANGE UP (ref 4.2–5.4)
RBC # FLD: 13.3 % — SIGNIFICANT CHANGE UP (ref 11.5–14.5)
SODIUM SERPL-SCNC: 141 MMOL/L — SIGNIFICANT CHANGE UP (ref 135–146)
WBC # BLD: 9.49 K/UL — SIGNIFICANT CHANGE UP (ref 4.8–10.8)
WBC # FLD AUTO: 9.49 K/UL — SIGNIFICANT CHANGE UP (ref 4.8–10.8)

## 2025-01-21 PROCEDURE — 71045 X-RAY EXAM CHEST 1 VIEW: CPT | Mod: 26

## 2025-01-21 PROCEDURE — 74230 X-RAY XM SWLNG FUNCJ C+: CPT | Mod: 26

## 2025-01-21 PROCEDURE — 99233 SBSQ HOSP IP/OBS HIGH 50: CPT

## 2025-01-21 PROCEDURE — 99223 1ST HOSP IP/OBS HIGH 75: CPT

## 2025-01-21 RX ORDER — SACCHAROMYCES BOULARDII 50 MG
250 CAPSULE ORAL
Refills: 0 | Status: DISCONTINUED | OUTPATIENT
Start: 2025-01-21 | End: 2025-02-10

## 2025-01-21 RX ORDER — PREDNISONE 5 MG/1
20 TABLET ORAL DAILY
Refills: 0 | Status: COMPLETED | OUTPATIENT
Start: 2025-01-22 | End: 2025-01-24

## 2025-01-21 RX ADMIN — Medication 100 MILLIGRAM(S): at 16:17

## 2025-01-21 RX ADMIN — ACETAMINOPHEN, DIPHENHYDRAMINE HCL, PHENYLEPHRINE HCL 5 MILLIGRAM(S): 325; 25; 5 TABLET ORAL at 21:15

## 2025-01-21 RX ADMIN — Medication 6: at 16:17

## 2025-01-21 RX ADMIN — IPRATROPIUM BROMIDE AND ALBUTEROL SULFATE 3 MILLILITER(S): .5; 2.5 SOLUTION RESPIRATORY (INHALATION) at 08:29

## 2025-01-21 RX ADMIN — Medication 100 MILLIGRAM(S): at 05:29

## 2025-01-21 RX ADMIN — IPRATROPIUM BROMIDE AND ALBUTEROL SULFATE 3 MILLILITER(S): .5; 2.5 SOLUTION RESPIRATORY (INHALATION) at 20:18

## 2025-01-21 RX ADMIN — IPRATROPIUM BROMIDE AND ALBUTEROL SULFATE 3 MILLILITER(S): .5; 2.5 SOLUTION RESPIRATORY (INHALATION) at 23:38

## 2025-01-21 RX ADMIN — Medication 5000 UNIT(S): at 17:21

## 2025-01-21 RX ADMIN — Medication 100 MILLIGRAM(S): at 21:15

## 2025-01-21 RX ADMIN — Medication 40 MILLIGRAM(S): at 05:29

## 2025-01-21 RX ADMIN — IPRATROPIUM BROMIDE AND ALBUTEROL SULFATE 3 MILLILITER(S): .5; 2.5 SOLUTION RESPIRATORY (INHALATION) at 14:01

## 2025-01-21 RX ADMIN — ANTISEPTIC SURGICAL SCRUB 1 APPLICATION(S): 0.04 SOLUTION TOPICAL at 12:46

## 2025-01-21 RX ADMIN — PANTOPRAZOLE 40 MILLIGRAM(S): 20 TABLET, DELAYED RELEASE ORAL at 05:29

## 2025-01-21 RX ADMIN — Medication 250 MILLIGRAM(S): at 17:21

## 2025-01-21 RX ADMIN — IPRATROPIUM BROMIDE AND ALBUTEROL SULFATE 3 MILLILITER(S): .5; 2.5 SOLUTION RESPIRATORY (INHALATION) at 00:40

## 2025-01-21 RX ADMIN — Medication 5000 UNIT(S): at 05:28

## 2025-01-21 NOTE — CONSULT NOTE ADULT - PROBLEM/RECOMMENDATION-3
Status: Heart transplant 5/6    Neuro: A/Ox4. Has BUE tremors. Team aware.   Cardiac: . VSS. Pacer wires capped.   Respiratory: O2 sats stable on RA. Had neb and inhaler for slight SOB. LS diminished in bases. CT to suction x 5. MT's with + air leak. Team aware.   GI/: Nausea with emesis x 2. Zofran given. +BS  Diet/appetite: Small po intake.   Activity:  Up in chair, walked with PT in [place].  Pain: Oxycodone and IV dilaudid prn, scheduled tylenol, gabapentin  Skin: Midsternal wound vac, CT x 5 dressing changed. Packed old driveline site changed by team.   LDA's: PICC HL'd    Plan: Continue with POC. Notify primary team with changes.       DISPLAY PLAN FREE TEXT

## 2025-01-21 NOTE — CONSULT NOTE ADULT - NS ATTEND AMEND GEN_ALL_CORE FT
Patient seen and examined at bedside.     History of tongue SCC. s/p treatment.    Videoendoscopic images reviewed and interpreted showing a non-obstructive swelling with post-radiation changes.    Probable respiratory infection.  Recommend steroids and abx.    WIll follow.
69F with PMH including HTN, HLD, GERD, CAD, COPD (no home O2), tongue SCC s/p trach/PEG s/p reversal, here with AHRF from trachel stenosis and COPD exacerbation and PNA, on steroids, abx, s/p tracheal debridement. Palliative consulted for GOC. Patient is considering PEG per my d/w her and her son. She is now DNR/DNI. Will follow    ______________  Rakesh Oden MD  Palliative Medicine  [facility]   of Geriatric and Palliative Medicine  [phone]

## 2025-01-21 NOTE — PROGRESS NOTE ADULT - SUBJECTIVE AND OBJECTIVE BOX
68yo Female with PMH of HTN, HLD, GERD, CAD, COPD not on Home O2, Hx of Pneumothorax, polychondirits, SCC of BOT s/p Trach/PEG (reversed in 4/2021), s/p CRT (7 sessions in 2021) who presents with SOB for 1 week duration. History goes back to around 1 week ago When the patient started having Shortness of breath, productive cough, and malaise. . Patient is admitted for PNA, and COPD excacerbation .   Patient reports some intermittent left sided chest pain when coughing. and abdominal distention as well.  Patient follows with ENT Dr. Louis, last seen 9/23/24, CT Neck 9/2024 showed Abnormal soft tissue within the posterior nasopharynx greater the right, new since the reference exams and nonspecific in appearance. The abnormal submucosal soft tissue in the preepiglottic space greater on the left, asymmetric thickening of the left aryepiglottic fold, and mild epiglottic thickening; similar when correlated with the PET/CT dated 5/12/2022.  Stable rim-calcified right thyroid nodule measuring 1.8 cm. US guided FNA by IR 10/7/24 of  Stable rim-calcified right thyroid nodule measuring 1.8 cm. Showed atypia but not malignant cells.   Pt was treated for  hypoxic resp failure due to COPD exacerbation complicated by tracheal stenosis, H/o SCC s/p surgery. Had been on NC. Steroids were being tapered. Few weeks ago pt underwent bronchoscopy with tracheal dilation.   Pt has severe dysphagia, RRT was called on the floor, pt developed ARF due to aspiration, was intubated and upgraded to ICU, s/p bronch , self extubated on 1/17 and was downgraded to SDU.  Pt failed barium swallow, PEG tube recommended, pt is agreeable now, will consult GI.  Today she is stable on 6 L NC, has no specific complaints, speaking full sentences.      PAST MEDICAL & SURGICAL HISTORY:  HTN (hypertension)  Hypercholesterolemia  GERD (gastroesophageal reflux disease)  Pulmonary nodule  CAD (coronary artery disease)  COPD (chronic obstructive pulmonary disease)  Relapsing polychondritis  Female cystocele  H/O pneumothorax  2016  S/P GEETHA-BSO  S/P colon resection  Vocal cord polyps  removal of same 2005  Pulmonary nodule  History of bladder surgery  2019  H/O breast biopsy  LEFT      Vital Signs Last 24 Hrs  T(C): 35.8 (21 Jan 2025 16:00), Max: 37 (21 Jan 2025 04:00)  T(F): 96.5 (21 Jan 2025 16:00), Max: 98.6 (21 Jan 2025 04:00)  HR: 72 (21 Jan 2025 16:00) (51 - 93)  BP: 112/69 (21 Jan 2025 16:00) (90/57 - 150/81)  BP(mean): 83 (21 Jan 2025 16:00) (68 - 110)  RR: 20 (21 Jan 2025 16:00) (17 - 32)  SpO2: 88% (21 Jan 2025 16:00) (88% - 100%)    Parameters below as of 21 Jan 2025 16:00  Patient On (Oxygen Delivery Method): nasal cannula      PHYSICAL EXAM:  GENERAL: NAD, pleasant female, speaking full sentences   HEAD:  Atraumatic, Normocephalic  NECK: Supple, No JVD, Normal thyroid, scar after trach noted   NERVOUS SYSTEM:  Alert & Oriented X3, Good concentration; Motor Strength 5/5 B/L upper and lower extremities; DTRs 2+ intact and symmetric  CHEST/LUNG: decreased BS b/l, no wheezing   HEART: S1, S2   ABDOMEN: Soft, Nontender, Nondistended; Bowel sounds present  EXTREMITIES:  2+ Peripheral Pulses, No clubbing, cyanosis, or edema    LABS:                        13.6   9.49  )-----------( 138      ( 21 Jan 2025 05:26 )             42.3     01-21    141  |  96[L]  |  36[H]  ----------------------------<  131[H]  3.5   |  33[H]  |  0.6[L]    Ca    8.7      21 Jan 2025 05:26  Phos  3.5     01-20  Mg     2.2     01-21    TPro  4.7[L]  /  Alb  3.1[L]  /  TBili  0.6  /  DBili  x   /  AST  22  /  ALT  25  /  AlkPhos  48  01-21      Urinalysis Basic - ( 21 Jan 2025 05:26 )    Color: x / Appearance: x / SG: x / pH: x  Gluc: 131 mg/dL / Ketone: x  / Bili: x / Urobili: x   Blood: x / Protein: x / Nitrite: x   Leuk Esterase: x / RBC: x / WBC x   Sq Epi: x / Non Sq Epi: x / Bacteria: x      Culture - Fungal, Bronchial (01.17.25 @ 11:37)   Specimen Source: BAL  Culture Results:   Few YeastCulture - Bronchial (01.17.25 @ 11:37)   - Trimethoprim/Sulfamethoxazole: S <=0.5/9.5  - Vancomycin: S 1  - Rifampin: S <=1 Should not be used as monotherapy  - Tetracycline: S <=4  Gram Stain:   Few polymorphonuclear leukocytes per low power field   Rare Squamous epithelial cells per low power field   Moderate Gram Positive Cocci in Clusters per oil power field  - Clindamycin: R <=0.25 This isolate is presumed to be clindamycin resistant based on detection of inducible resistance. Clindamycin may still be effective in some patients.  - Erythromycin: R >4  - Gentamicin: S <=4 Should not be used as monotherapy  - Oxacillin: S <=0.25 Oxacillin predicts susceptibility for dicloxacillin, methicillin, and nafcillin  - Penicillin: R 2  Specimen Source: Lavage  Culture Results:   Moderate Staphylococcus aureus   Commensal kaushik consistent with body site  Organism Identification: Staphylococcus aureus  Organism: Staphylococcus aureus  Method Type: ALONZO      RADIOLOGY & ADDITIONAL TESTS:  < from: Xray Cinesophagram Swallow Function w/ Contrast (01.21.25 @ 09:29) >  IMPRESSION:    Fluoroscopic assistance was providedby the radiologist to the speech   pathologist for a modified barium swallow. Please refer to report from   the department of speech pathology for detailed description of the   findings and recommendations.    < end of copied text >  < from: [facility] Duplex Lower Ext Vein Scan, Bilat (01.20.25 @ 14:24) >  IMPRESSION:  No evidence of deep venous thrombosis in either lower extremity.    < end of copied text >  < from: CT Neck Soft Tissue w/ IV Cont (01.18.25 @ 02:54) >  IMPRESSION:    At the level of the previously described tracheal stenosis (level of the   thyroid), there is improved tracheal patency consistent with interval   dilation. Gas collection external to the right tracheal wall with   tracheal communication is noted.    < end of copied text >  < from: CT Head No Cont (01.17.25 @ 13:51) >  IMPRESSION:    No evidence of acute intracranial pathology.    < end of copied text >  < from: TTE Echo Complete w/o Contrast w/ Doppler (01.11.25 @ 14:40) >    Summary:   1.Technically difficult study.   2. Left ventricular ejection fraction, by visual estimation, is 40 to   45%.   3. Mildly decreased global left ventricular systolic function.   4. Spectral Doppler shows impaired relaxation pattern of left   ventricularmyocardial filling (Grade I diastolic dysfunction).   5. Mildly enlarged right ventricle.   6. Mildly reduced RV systolic function.   7. Normal left atrial size.   8. Normal right atrial size.   9. Trace mitral valve regurgitation.  10. Mild tricuspid regurgitation.  11. Estimated pulmonary artery systolic pressure is 40.3 mmHg assuming a   right atrial pressure of 8 mmHg, which is consistent with mild pulmonary   hypertension.  12. Sclerotic aortic valve with decreased opening.    MEDICATIONS  (STANDING):  albuterol/ipratropium for Nebulization 3 milliLiter(s) Nebulizer every 4 hours  albuterol/ipratropium for Nebulization 3 milliLiter(s) Nebulizer every 20 minutes  ceFAZolin   IVPB      ceFAZolin   IVPB 1000 milliGRAM(s) IV Intermittent every 8 hours  chlorhexidine 2% Cloths 1 Application(s) Topical daily  dextrose 5%. 1000 milliLiter(s) (100 mL/Hr) IV Continuous <Continuous>  dextrose 5%. 1000 milliLiter(s) (50 mL/Hr) IV Continuous <Continuous>  dextrose 50% Injectable 25 Gram(s) IV Push once  dextrose 50% Injectable 12.5 Gram(s) IV Push once  dextrose 50% Injectable 25 Gram(s) IV Push once  glucagon  Injectable 1 milliGRAM(s) IntraMuscular once  heparin   Injectable 5000 Unit(s) SubCutaneous every 12 hours  insulin lispro (ADMELOG) corrective regimen sliding scale   SubCutaneous two times a day before meals  melatonin 5 milliGRAM(s) Oral at bedtime  pantoprazole    Tablet 40 milliGRAM(s) Oral before breakfast    MEDICATIONS  (PRN):  acetaminophen     Tablet .. 650 milliGRAM(s) Oral every 6 hours PRN Mild Pain (1 - 3), Moderate Pain (4 - 6)  dextrose Oral Gel 15 Gram(s) Oral once PRN Blood Glucose LESS THAN 70 milliGRAM(s)/deciliter

## 2025-01-21 NOTE — SWALLOW VFSS/MBS ASSESSMENT ADULT - SUCCESSFUL STRATEGIES TRIALED DURING PROCEDURE
+super-supraglottic swallow was somewhat successful in reducing the amount of aspirated material +super-supraglottic swallow was somewhat successful in reducing the amount of aspirated material, however DOES not eliminate aspiration

## 2025-01-21 NOTE — ADVANCED PRACTICE NURSE CONSULT - RECOMMEDATIONS
Cleanse bilateral buttocks with soap and water.   Pat dry, apply moisture barrier cream twice a day and prn for soiling.     Maintain pressure injury prevention.   Keep skin clean.   Maintain incontinence care.   Monitor skin for changes and notify provider   Case discussed with primary RN

## 2025-01-21 NOTE — PROGRESS NOTE ADULT - ASSESSMENT
68yo Female with PMH of HTN, HLD, GERD, CAD, COPD not on Home O2, Hx of Pneumothorax, polychondirits, SCC of BOT s/p Trach/PEG (reversed in 4/2021), s/p CRT (7 sessions in 2021) who presents with SOB for 1 week duration. History goes back to around 1 week ago When the patient started having Shortness of breath, productive cough, and malaise. . Patient is admitted for PNA, and COPD excacerbation .   Patient reports some intermittent left sided chest pain when coughing. and abdominal distention as well.  Patient follows with ENT Dr. Louis, last seen 9/23/24, CT Neck 9/2024 showed Abnormal soft tissue within the posterior nasopharynx greater the right, new since the reference exams and nonspecific in appearance. The abnormal submucosal soft tissue in the preepiglottic space greater on the left, asymmetric thickening of the left aryepiglottic fold, and mild epiglottic thickening; similar when correlated with the PET/CT dated 5/12/2022.  Stable rim-calcified right thyroid nodule measuring 1.8 cm. US guided FNA by IR 10/7/24 of  Stable rim-calcified right thyroid nodule measuring 1.8 cm. Showed atypia but not malignant cells.   Pt was treated for  hypoxic resp failure due to COPD exacerbation complicated by tracheal stenosis, H/o SCC s/p surgery. Had been on NC. Steroids were being tapered. Few weeks ago pt underwent bronchoscopy with tracheal dilation.   Pt has severe dysphagia, RRT was called on the floor, pt developed ARF due to aspiration, was intubated and upgraded to ICU, s/p bronch , self extubated on 1/17 and was downgraded to SDU.  Pt failed barium swallow, PEG tube recommended, pt is agreeable now, will consult GI.      ASSESSMENT & PLAN:   # Acute hypoxemic resp failure /COPD exacerbation/ HO trach / h/o Tracheal stenosis SP dilation / History of lung nodules/ emphysema / oropharyngeal dysphagia   - stable now on 6 L NC, will taper down oxygen as tolerated   - supportive care, aspiration precautions, pulmonary toilet  - pt failed barium swallow, NPO for now, needs PEG, agreeable, consult GI   - pt self extubated on 1/17  - pulmonary is following, recommendations noted   - nebs Q 6 hours   - sputum Cx noted, ID is following, recommendations noted:  - [facility] in [facility] cx -- narrow zosyn to cefazolin 1g q 8 hours   - trend WBC and monitor fever curve   - off steroids note   - incentive spirometry     # h/o Polychondritis   - as per Rheum note, patient was on cellcept 1000 G BID in the past   -  hydroxychloroquine d/c   - off immunosuppresants at this time, resume post dc     # HTN/HLD/CAD/ combined CHF   - fluid restriction 1200 ml in 24 hours   - intake and output monitoring, daily weight  - monitor for fluid overload   - resume home meds if SBP is stable     # Hyperglycemia / prediabetes   - CC diet, monitor f/s   - not on steroids now  - on Insulin now     # GERD   - C/w Protonix  - start Probiotics      Pending: taper down oxygen as tolerated, CHF protocol, supportive care, incentive spirometry, start probiotics,  GI consult for PEG, aspiration precautions,  OOB to chair, PT kaya    I spoke with pt, she agreed with a plan of care     # Dispo: DGTF .

## 2025-01-21 NOTE — CONSULT NOTE ADULT - CONVERSATION DETAILS
Palliative Care NP spoke to patient    Reviewed pt's current condition and treatment. She is aware she has dysphagia. Previously had PEG as she has h/o SCC and neck radiation. Pt said she came into the hospital due to concern about her breathing. She said the MD this am explained aspiration to her. She said it's weird because swallowing is not uncomfortable. I explained silent aspiration. She said for now they are letting her have puree diet and liquids but if a PEG is recommended she would agree to it.     Discussed code status, DNR reviewed at length. She does not want her life artificially extended on machines if her heart stops. She would want a natural death. We discussed intubation and mechanical ventilation. She does not want to be placed on a ventilator even temporarily. She is fine with the use of NIV eg BIPAP or AVAP.     She is open to palliative care support, said her sister is coming tomorrow. She said she would consider doing a HCP but has 5 kids. So she is thinking about who she would want to be the primary proxy

## 2025-01-21 NOTE — PROGRESS NOTE ADULT - SUBJECTIVE AND OBJECTIVE BOX
Patient is a 69y old  Female who presents with a chief complaint of shortness of breath, malaise (16 Jan 2025 11:51)        Over Night Events:   Downgrade from ICU. On 6L nasal cannula. Afebrile.         ROS:     All ROS are negative except HPI         PHYSICAL EXAM    ICU Vital Signs Last 24 Hrs  T(C): 35.6 (21 Jan 2025 08:00), Max: 37 (21 Jan 2025 04:00)  T(F): 96 (21 Jan 2025 08:00), Max: 98.6 (21 Jan 2025 04:00)  HR: 54 (21 Jan 2025 08:00) (51 - 88)  BP: 150/81 (21 Jan 2025 08:00) (90/57 - 156/99)  BP(mean): 110 (21 Jan 2025 08:00) (68 - 124)  ABP: --  ABP(mean): --  RR: 20 (21 Jan 2025 08:00) (17 - 42)  SpO2: 94% (21 Jan 2025 08:00) (92% - 100%)    O2 Parameters below as of 21 Jan 2025 02:20  Patient On (Oxygen Delivery Method): nasal cannula  O2 Flow (L/min): 6          CONSTITUTIONAL:  ill appearing in NAD    ENT:   Airway patent,   Mouth with normal mucosa.     EYES:   Pupils equal,   Round and reactive to light.    CARDIAC:   Normal rate,   Regular rhythm.    No edema    RESPIRATORY:   No wheezing  Bilateral BS  Normal chest expansion  Not tachypneic,  No use of accessory muscles    GASTROINTESTINAL:  Abdomen soft,   Non-tender,   No guarding,   + BS    MUSCULOSKELETAL:   Range of motion is not limited,  No clubbing, cyanosis    NEUROLOGICAL:   Alert and oriented   No motor  deficits.    SKIN:   Skin normal color for race,   Warm and dry    01-20-25 @ 07:01  -  01-21-25 @ 07:00  --------------------------------------------------------  IN:    IV PiggyBack: 50 mL    Oral Fluid: 270 mL  Total IN: 320 mL    OUT:    Voided (mL): 510 mL  Total OUT: 510 mL    Total NET: -190 mL          LABS:                            13.6   9.49  )-----------( 138      ( 21 Jan 2025 05:26 )             42.3                                               01-21    141  |  96[L]  |  36[H]  ----------------------------<  131[H]  3.5   |  33[H]  |  0.6[L]    Ca    8.7      21 Jan 2025 05:26  Phos  3.5     01-20  Mg     2.2     01-21    TPro  4.7[L]  /  Alb  3.1[L]  /  TBili  0.6  /  DBili  x   /  AST  22  /  ALT  25  /  AlkPhos  48  01-21                                             Urinalysis Basic - ( 21 Jan 2025 05:26 )    Color: x / Appearance: x / SG: x / pH: x  Gluc: 131 mg/dL / Ketone: x  / Bili: x / Urobili: x   Blood: x / Protein: x / Nitrite: x   Leuk Esterase: x / RBC: x / WBC x   Sq Epi: x / Non Sq Epi: x / Bacteria: x                                                  LIVER FUNCTIONS - ( 21 Jan 2025 05:26 )  Alb: 3.1 g/dL / Pro: 4.7 g/dL / ALK PHOS: 48 U/L / ALT: 25 U/L / AST: 22 U/L / GGT: x                                                                                                                                       MEDICATIONS  (STANDING):  albuterol/ipratropium for Nebulization 3 milliLiter(s) Nebulizer every 4 hours  albuterol/ipratropium for Nebulization 3 milliLiter(s) Nebulizer every 20 minutes  ceFAZolin   IVPB      ceFAZolin   IVPB 1000 milliGRAM(s) IV Intermittent every 8 hours  chlorhexidine 2% Cloths 1 Application(s) Topical daily  dextrose 5%. 1000 milliLiter(s) (100 mL/Hr) IV Continuous <Continuous>  dextrose 5%. 1000 milliLiter(s) (50 mL/Hr) IV Continuous <Continuous>  dextrose 50% Injectable 25 Gram(s) IV Push once  dextrose 50% Injectable 12.5 Gram(s) IV Push once  dextrose 50% Injectable 25 Gram(s) IV Push once  glucagon  Injectable 1 milliGRAM(s) IntraMuscular once  heparin   Injectable 5000 Unit(s) SubCutaneous every 12 hours  insulin lispro (ADMELOG) corrective regimen sliding scale   SubCutaneous two times a day before meals  melatonin 5 milliGRAM(s) Oral at bedtime  methylPREDNISolone sodium succinate Injectable 40 milliGRAM(s) IV Push daily  pantoprazole    Tablet 40 milliGRAM(s) Oral before breakfast    MEDICATIONS  (PRN):  acetaminophen     Tablet .. 650 milliGRAM(s) Oral every 6 hours PRN Mild Pain (1 - 3), Moderate Pain (4 - 6)  dextrose Oral Gel 15 Gram(s) Oral once PRN Blood Glucose LESS THAN 70 milliGRAM(s)/deciliter      New X-rays reviewed:                                                                                  ECHO

## 2025-01-21 NOTE — PROGRESS NOTE ADULT - ASSESSMENT
IMPRESSION:    Acute hypoxemic resp failure s/p extubation   COPD exacerbation  Tracheal stenosis SP dilation   HO trach   History of lung nodules  History of relapsing polychondritis   possible PNA   emphysema   PLAN:    CNS: No sedation.     HEENT:  Oral care; No interventions. Extubated 1/17.    PULMONARY: On nasal cannula now. Wean off Fio2. Prednisone 20mg for 3more days. Nebs every 4hours. Thoracic surgery follow up.     CARDIOVASCULAR: Fluid balance negative last 24 hours.     GI: Speech and swallow follow up. Refusing PEG. High risk of aspiration, GURJIT SLP.       RENAL:  F/u  lytes.  Correct as needed.     INFECTIOUS DISEASE: finish course of zosyn. ID follow up.     HEMATOLOGICAL:  DVT prophylaxis. LE doppler negative.     ENDOCRINE:  Follow up FS.  Insulin protocol if needed.    Palliative care   SDU.          IMPRESSION:    Acute hypoxemic resp failure s/p extubation   COPD exacerbation  Tracheal stenosis SP dilation   HO trach   History of lung nodules  History of relapsing polychondritis   possible PNA   Emphysema     PLAN:    CNS: No sedation.     HEENT:  Oral care; No interventions. Extubated 1/17.    PULMONARY: On nasal cannula now. Wean off Fio2. Prednisone 20mg for 3more days. Nebs every 4hours. Thoracic surgery follow up.     CARDIOVASCULAR: Fluid balance negative last 24 hours.     GI: Speech and swallow follow up. Refusing PEG. High risk of aspiration, GURJIT SLP.       RENAL:  F/u  lytes.  Correct as needed.     INFECTIOUS DISEASE: finish course of zosyn. ID follow up.     HEMATOLOGICAL:  DVT prophylaxis. LE doppler negative.     ENDOCRINE:  Follow up FS.  Insulin protocol if needed.    Palliative care   SDU.

## 2025-01-21 NOTE — PROGRESS NOTE ADULT - ATTENDING COMMENTS
IMPRESSION:    Acute hypoxemic resp failure s/p extubation   COPD exacerbation  Tracheal stenosis SP dilation   HO trach   History of lung nodules  History of relapsing polychondritis   possible PNA   Emphysema     Plan as outlined above

## 2025-01-21 NOTE — PROGRESS NOTE ADULT - ASSESSMENT
A 70yo Female with PMH of HTN, HLD, GERD, CAD, COPD not on Home O2, Hx of Pneumothorax, SCC of BOT s/p Trach/PEG (reversed in 4/2021), s/p CRT (7 sessions in 2021) who presents with SOB for 1 week duration. History goes back to around 1 week ago When the patient started having Shortness of breath, productive cough, and malaise. Patient denies any fever. Patient reported that her son was coughing. Patient reported some chest pain previously when coughing . Patient reports diarrhea for 4 days. which resolved now. PAtient denies an urinary symptoms. Patient is admitted for PNA, and COPD excacerbation. Patient initially downgraded then developed AHRF requiring intubation s/p self-extubation. Weaned off from BiPAP to NC.    #Acute hypoxic resp failure 2/2 COPD exacerbation  with tracheal stenosis   #MSSA PNA  #HAGMA 2/2 Lactic Acidosis   #CAD  #likely HF exacerbation  #H/o Pneumothorax  - WBC 11.2 K (neutrophilic), Tachycardic to 126, Lactate 3.0, AG 16, VBG - pH: 7.39  | pCO2: 38    | pO2: 36    | Lactate: 3.0  on admission  - No CP at this time  - EKG- PACs, with MAT  - trop 96 --> 75 trend for now  - s/p 2.5L fluid bolus and IVF, started on Azithromycin and rocephin, duoneb and solu-medrol  - CXR- reticular infiltrates   - Complains of increasing wheezing >> increased dose of steroids, wheezing improving  - c/w solumedrol 60 mg IV TID, duoneb, albuterol   - d/c rocephin and c/w azithro  - wean off O2 as tolerated  - CT chest with IV contrast: Extensive emphysema with superimposed mild groundglass opacities/septal thickening suggesting underlying edema( not present on earlier study).Interval development of focal moderate tracheal stenosis at the level of the thyroid with partially collapsed appearance of the right tracheal wall since the prior CT neck from 9/3/2024, possibly associated with the history of prior tracheostomy.  - CT Neck with contrast: Stable mild thickening of the epiglottis and small preepiglottic soft tissue density. Resolved nasopharyngeal soft tissue thickening and left aryepiglottic fold thickening.   - pt currently on HFNC 50-50  - Upgraded to SDU  - F/U TTE (1/11/2025): LVEF 40-45%, GIDD, mildly enlarged right ventricle, enlarged right ventricle, stimated pulmonary artery systolic pressure is 40.3 mmHg assuming a  right atrial pressure of 8 mmHg, which is consistent with mild pulmonary  hypertension.  - CTS consult appreciated, s/p bronchoscopy with tracheal dilatation and debridement on 1/14     #Positive fungitell  - > 500 (1/10/2025)  - Pending ID eval  #Abdominal pain   #Diarrhea  - KUB- Nonobstructive bowel gas pattern  - resolved     #Polychondritis   - as per Rheum note, patient is on cellcept 1000 G BID and hydroxychloroquine 100 mg and 200mg alternating days   - D/C immunosuppresants in setting of infection, restart on DC     #H/o SCC at the Base of tongue s/p surgery, CRT   #H/o Trach/ PEG s p reversal   - follows with ENT Dr. Louis, last seen 9/23/24,  - CT Neck 9/2024 showed Abnormal soft tissue within the posterior nasopharynx greater the right, new since the reference exams and nonspecific in appearance. The abnormal submucosal soft tissue in the preepiglottic space greater on the left, asymmetric thickening of the left aryepiglottic fold, and mild epiglottic thickening; similar when correlated with the PET/CT dated 5/12/2022.  Stable rim-calcified right thyroid nodule measuring 1.8 cm.   - US guided FNA by IR 10/7/24 of  Stable rim-calcified right thyroid nodule measuring 1.8 cm. Showed atypia but not malignant cells.   - ENT recs appreciated       - Encourage to clear secretions        - Continue current management  - FFL showed, thick purulent secretions on larynx, airway patent.   - CT Neck with contrast: Stable mild thickening of the epiglottis and small preepiglottic soft tissue density. Resolved nasopharyngeal soft tissue thickening and left aryepiglottic fold thickening.  - CTS - s/p bronchoscopy with tracheal dilatation and debridement on 1/14     #HTN  #HLD  - hold home Anti-HTN as BP is soft   - c/w other home meds     #GERD  - C/w protonix 70yo Female with PMH of HTN, HLD, GERD, CAD, COPD not on Home O2, Hx of Pneumothorax, polychondirits, SCC of BOT s/p Trach/PEG (reversed in 4/2021), s/p CRT (7 sessions in 2021) who presents with SOB for 1 week duration. History goes back to around 1 week ago When the patient started having Shortness of breath, productive cough, and malaise. . Patient is admitted for PNA, and COPD excacerbation .   Patient reports some intermittent left sided chest pain when coughing. and abdominal distention as well.  Patient follows with ENT Dr. Louis, last seen 9/23/24, CT Neck 9/2024 showed Abnormal soft tissue within the posterior nasopharynx greater the right, new since the reference exams and nonspecific in appearance. The abnormal submucosal soft tissue in the preepiglottic space greater on the left, asymmetric thickening of the left aryepiglottic fold, and mild epiglottic thickening; similar when correlated with the PET/CT dated 5/12/2022.  Stable rim-calcified right thyroid nodule measuring 1.8 cm. US guided FNA by IR 10/7/24 of  Stable rim-calcified right thyroid nodule measuring 1.8 cm. Showed atypia but not malignant cells.   Pt was treated for  hypoxic resp failure due to COPD exacerbation complicated by tracheal stenosis, H/o SCC s/p surgery. Had been on NC. Steroids were being tapered. Few weeks ago pt underwent bronchoscopy with tracheal dilation.   Pt has severe dysphagia, RRT was called on the floor, pt developed ARF due to aspiration, was intubated and upgraded to ICU, s/p bronch , self extubated on 1/17 and was downgraded to SDU.  Pt failed barium swallow, PEG tube recommended, pt is agreeable now, will consult GI.      ASSESSMENT & PLAN:   # Acute hypoxemic resp failure /COPD exacerbation/ HO trach / h/o Tracheal stenosis SP dilation / History of lung nodules/ emphysema / oropharyngeal dysphagia   - stable now on 6 L NC, will taper down oxygen as tolerated   - supportive care, aspiration precautions, pulmonary toilet  - pt failed barium swallow, NPO for now, needs PEG, agreeable, consult GI   - pt self extubated on 1/17  - pulmonary is following, recommendations noted   - nebs Q 6 hours   - sputum Cx noted, ID is following, recommendations noted:  - MSSA in [facility] cx -- narrowed zosyn to cefazolin 1g q 8 hours   - trend WBC and monitor fever curve   - off steroids  - incentive spirometry     # h/o Polychondritis   - as per Rheum note, patient was on cellcept 1000 G BID in the past   -  hydroxychloroquine d/c   - off immunosuppresants at this time, resume post dc     # HTN/HLD/CAD/ combined CHF   - fluid restriction 1200 ml in 24 hours   - intake and output monitoring, daily weight  - monitor for fluid overload   - resume home meds if SBP is stable     # Hyperglycemia / prediabetes   - CC diet, monitor f/s   - not on steroids now  - on Insulin now     # GERD   - C/w Protonix  - started Probiotics

## 2025-01-21 NOTE — CONSULT NOTE ADULT - ASSESSMENT
69yFemale admitted for shortness of breath. Admitted for pneumonia. 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 Has since had extensive follow up w ENT. Pt was intubated and self extubated in CCU 1/17. Pt found to be aspirating - speech and swallow following. Patient may need PEG placement. Initially pt was refusing a PEG according to consult request. But today pt spoke to attending MD and when I spoke to her she said she now understands why she would need a PEG placed her only concern is not going to a SNF. She wants to go home. Pt previously independent.     Pt denies pain or dyspnea. Told this writer I just want to get out of the hospital soon. She has no complaints of nausea etc. No cough noted. Palliative care NP discussed case at length w attending and medical resident as well as speech and swallow team.      MEDD (morphine equivalent daily dose):0    - DNR/DNI - trial of NIV - MOLST in chart  - HCP to be further discussed tomorrow  - Ancef IV ongoing, monitor labs/tests/CXR as needed  - Pulmonary follow up  - GI follow up ? PEG placement  - ongoing f/u w ENT for h/o SCC   - will follow     Education about palliative care provided to patient/family.  See Recs below.    Please call [phone] with questions or concerns 24/7.   We will continue to follow.

## 2025-01-21 NOTE — CONSULT NOTE ADULT - SUBJECTIVE AND OBJECTIVE BOX
CC:     HPI:  A 68yo Female with PMH of HTN, HLD, GERD, CAD, COPD not on Home O2, Hx of Pneumothorax, polychondirits, SCC of BOT s/p Trach/PEG (reversed in 4/2021), s/p CRT (7 sessions in 2021) who presents with SOB for 1 week duration. History goes back to around 1 week ago When the patient started having Shortness of breath, productive cough, and malaise. Patient dnies any fever. Patient reported that her son was coughing. Patient reported some chest pain previously when coughing . Patient reports diarrhea for 4 days. which resolved now. PAtient denies an urinary symptoms. Patient is admitted for PNA, and COPD excacerbation .     Patient reports some intermittent left sided chest pain when coughing. and abdominal distention as well.  Patient follows with ENT Dr. Louis, last seen 9/23/24, CT Neck 9/2024 showed Abnormal soft tissue within the posterior nasopharynx greater the right, new since the reference exams and nonspecific in appearance. The abnormal submucosal soft tissue in the preepiglottic space greater on the left, asymmetric thickening of the left aryepiglottic fold, and mild epiglottic thickening; similar when correlated with the PET/CT dated 5/12/2022.  Stable rim-calcified right thyroid nodule measuring 1.8 cm. US guided FNA by IR 10/7/24 of  Stable rim-calcified right thyroid nodule measuring 1.8 cm. Showed atypia but not malignant cells.     ENT evaluated the pt for hoarseness, FFL showed, thick purulent secretions on larynx, airway patent. Recommended CT Neck with IVC for further evaluation, Albuterol neb treatment, Humidified O2 ( on 3L of O2 via NC)     Labs significant for WBC 11.2 K (neutrophilic), troponin 96 > 75, AG 16     09:57 - VBG - pH: 7.39  | pCO2: 38    | pO2: 36    | Lactate: 3.0      In Ed the patient was given 2 L  fluid bolus, Azithromycin and rocephin, duoneb and solu-medrol     CXR showed reticular infiltrates     EKG shows PACs, with MAT     VS significant for using 4 L NC, Tachycardia 126     ICU Vital Signs Last 24 Hrs  T(C): 37.3 (10 Zeyad 2025 15:36), Max: 37.8 (10 Zeyad 2025 10:41)  T(F): 99.1 (10 Zeyad 2025 15:36), Max: 100 (10 Zeyad 2025 10:41)  HR: 112 (10 Zeyad 2025 15:36) (97 - 126)  BP: 95/66 (10 Zeyad 2025 15:36) (95/66 - 116/86)  BP(mean): 76 (10 Zeyad 2025 15:36) (76 - 76)  RR: 20 (10 Zeyad 2025 15:36) (20 - 22)  SpO2: 94% (10 Zeyad 2025 15:36) (94% - 95%)  Patient On (Oxygen Delivery Method): nasal cannula  O2 Flow (L/min): 4             (10 Zeyad 2025 15:46)    PERTINENT PM/SXH:   PVD (peripheral vascular disease)    HTN (hypertension)    Hypercholesterolemia    GERD (gastroesophageal reflux disease)    Chronic polychondritis    Pulmonary nodule    CAD (coronary artery disease)    CAD (coronary artery disease)    COPD (chronic obstructive pulmonary disease)    H/O gastroesophageal reflux (GERD)    Relapsing polychondritis    Female cystocele    H/O pneumothorax      S/P GEETHA-BSO    S/P colon resection    Vocal cord polyps    Pulmonary nodule    History of bladder surgery    H/O breast biopsy      FAMILY HISTORY:  Family history of diabetes mellitus (DM)      ITEMS NOT CHECKED ARE NOT PRESENT    SOCIAL HISTORY:   Significant other/partner[ ]  Children[x ]  Taoism/Spirituality:  Substance hx:  [ ]   Tobacco hx:  [ ]   Alcohol hx: [ ]   Living Situation: [X ]Home  [ ]Long term care  [ ]Rehab [ ]Other  Home Services: [ ] HHA [ ] Visting RN [ ] Hospice  Occupation:  Home Opioid hx:  [ ] Y [ X] N [ ] I-Stop Reference No:  This report was requested by: Yuridia Polo | Reference #: [id number]    There are no results for the search terms that you entered.   ADVANCE DIRECTIVES: SEE CGO CONVERSATION BELOW   [ ] Full Code [ ] DNR  MOLST  [ ]  Living Will  [ ]   DECISION MAKER(s):  [ ] Health Care Proxy(s)  [X ] Surrogate(s)  [ ] Guardian           Name(s): Phone Number(s):    Answers: Emergency Contact Name Feliberto,  Answers: Emergency Contact Phone # [phone],  Answers: Emergency Contact Relationship son  BASELINE (I)ADL(s) (prior to admission):    [place]: [ ]Total  [ ] Moderate [ ]Dependent  Palliative Performance Status Version 2:         %    [email or web address]  Allergies    No Known Drug Allergies  CONTRAST DYS (Nausea)    Intolerances    MEDICATIONS  (STANDING):  albuterol/ipratropium for Nebulization 3 milliLiter(s) Nebulizer every 4 hours  albuterol/ipratropium for Nebulization 3 milliLiter(s) Nebulizer every 20 minutes  ceFAZolin   IVPB      ceFAZolin   IVPB 1000 milliGRAM(s) IV Intermittent every 8 hours  chlorhexidine 2% Cloths 1 Application(s) Topical daily  dextrose 5%. 1000 milliLiter(s) (100 mL/Hr) IV Continuous <Continuous>  dextrose 5%. 1000 milliLiter(s) (50 mL/Hr) IV Continuous <Continuous>  dextrose 50% Injectable 25 Gram(s) IV Push once  dextrose 50% Injectable 12.5 Gram(s) IV Push once  dextrose 50% Injectable 25 Gram(s) IV Push once  glucagon  Injectable 1 milliGRAM(s) IntraMuscular once  heparin   Injectable 5000 Unit(s) SubCutaneous every 12 hours  insulin lispro (ADMELOG) corrective regimen sliding scale   SubCutaneous two times a day before meals  melatonin 5 milliGRAM(s) Oral at bedtime  pantoprazole    Tablet 40 milliGRAM(s) Oral before breakfast    MEDICATIONS  (PRN):  acetaminophen     Tablet .. 650 milliGRAM(s) Oral every 6 hours PRN Mild Pain (1 - 3), Moderate Pain (4 - 6)  dextrose Oral Gel 15 Gram(s) Oral once PRN Blood Glucose LESS THAN 70 milliGRAM(s)/deciliter    PRESENT SYMPTOMS: [ ]Unable to obtain due to poor mentation   Source if other than patient:  [ ]Family   [ ]Team     Pain: [ ]yes [ ]no  QOL impact -   Location -                    Aggravating factors -  Quality -  Radiation -  Timing-  Severity (0-10 scale):  Minimal acceptable level (0-10 scale):     CPOT:    https://www.[facility].org/getattachment/spa96g55-8r4q-1i8u-1o3x-4573g3242f1r/Critical-Care-Pain-Observation-Tool-(CPOT)    PAIN AD Score:   [email or web address] (press ctrl +  left click to view)    Dyspnea:                           [ ]None[ ]Mild [ ]Moderate [ ]Severe     Respiratory Distress Observation Scale (RDOS):   A score of 0 to 2 signifies little or no respiratory distress, 3 signifies mild distress, scores 4 to 6 indicate moderate distress, and scores greater than 7 signify severe distress  [email or web address]    Anxiety:                             [ ]None[ ]Mild [ ]Moderate [ ]Severe   Fatigue:                             [ ]None[ ]Mild [ ]Moderate [ ]Severe   Nausea:                             [ ]None[ ]Mild [ ]Moderate [ ]Severe   Loss of appetite:              [ ]None[ ]Mild [ ]Moderate [ ]Severe   Constipation:                    [ ]None[ ]Mild [ ]Moderate [ ]Severe    Other Symptoms:  [ ]All other review of systems negative     Palliative Performance Status Version 2:         %    [email or web address]  PHYSICAL EXAM:  Vital Signs Last 24 Hrs  T(C): 36.2 (21 Jan 2025 12:18), Max: 37 (21 Jan 2025 04:00)  T(F): 97.1 (21 Jan 2025 12:18), Max: 98.6 (21 Jan 2025 04:00)  HR: 93 (21 Jan 2025 12:18) (51 - 93)  BP: 127/62 (21 Jan 2025 12:18) (90/57 - 150/81)  BP(mean): 88 (21 Jan 2025 12:18) (68 - 110)  RR: 20 (21 Jan 2025 12:18) (17 - 32)  SpO2: 96% (21 Jan 2025 12:18) (94% - 100%)    Parameters below as of 21 Jan 2025 12:18  Patient On (Oxygen Delivery Method): nasal cannula     I&O's Summary    20 Jan 2025 07:01  -  21 Jan 2025 07:00  --------------------------------------------------------  IN: 320 mL / OUT: 510 mL / NET: -190 mL        GENERAL:  [X ] No acute distress [ ]Lethargic  [ ]Unarousable  [ ]Verbal  [ ]Non-Verbal [ ]Cachexia    BEHAVIORAL/PSYCH:  [ X]Alert and Oriented x 3 [ ] Anxiety [ ] Delirium [ ] Agitation [X ] Calm   EYES: [ X] No scleral icterus [ ] Scleral icterus [ ] Closed  ENMT:  [ ]Dry mouth  [ ]No external oral lesions [ ] No external ear or nose lesions  CARDIOVASCULAR:  [ ]Regular [ ]Irregular [ ]Tachy [ ]Not Tachy  [ ]Lucho [ ] Edema [X ] No edema  PULMONARY:  [ ]Tachypnea  [ ]Audible excessive secretions [x ] No labored breathing [ ] labored breathing  GASTROINTESTINAL: [ ]Soft  [ ]Distended  [ ]Not distended [ ]Non tender [ ]Tender  MUSCULOSKELETAL: [ ]No clubbing [ ] clubbing  [x ] No cyanosis [ ] cyanosis  NEUROLOGIC: [ x]No focal deficits  [ ]Follows commands  [ ]Does not follow commands  [ ]Cognitive impairment  [ ]Dysphagia  [ ]Dysarthria  [ ]Paresis   SKIN: [ ] Jaundiced [ ] Non-jaundiced [ ]Rash [ ]No Rash [ x] Warm [x ] Dry  MISC/LINES: [ ] ET tube [ ] Trach [ ]NGT/OGT [ ]PEG [ ]Romo                          13.6   9.49  )-----------( 138      ( 21 Jan 2025 05:26 )             42.3   01-21    141  |  96[L]  |  36[H]  ----------------------------<  131[H]  3.5   |  33[H]  |  0.6[L]    Ca    8.7      21 Jan 2025 05:26  Phos  3.5     01-20  Mg     2.2     01-21    TPro  4.7[L]  /  Alb  3.1[L]  /  TBili  0.6  /  DBili  x   /  AST  22  /  ALT  25  /  AlkPhos  48  01-21      Urinalysis Basic - ( 21 Jan 2025 05:26 )    Color: x / Appearance: x / SG: x / pH: x  Gluc: 131 mg/dL / Ketone: x  / Bili: x / Urobili: x   Blood: x / Protein: x / Nitrite: x   Leuk Esterase: x / RBC: x / WBC x   Sq Epi: x / Non Sq Epi: x / Bacteria: x      RADIOLOGY & ADDITIONAL STUDIES: reviewed by me    EKG: reviewed by me        Palliative Care Spiritual/Emotional Screening Tool Question  Severity (0-4):          4          OR                    [ ] Unable to determine/NA  Score of 2 or greater indicates recommendation of Chaplaincy referral  Chaplaincy Referral: [X ] Yes [ ] Refused [ ] Following     Caregiver [place]:  [ ] Yes [ ] No [ ] Deferred  Social Work Referral [ ]  Patient and Family Centered Care Referral [ ]    Anticipatory Grief Present: [ X] Yes [ ] No [ ] Deferred  Social Work Referral [ X]  Patient and Family Centered Care Referral [ ]    Patient discussed with primary medical team MD  Palliative care education provided to patient and/or family

## 2025-01-21 NOTE — PROGRESS NOTE ADULT - SUBJECTIVE AND OBJECTIVE BOX
INTERVAL HPI/OVERNIGHT EVENTS:  Patient was seen and examined at bedside. As per nurse and patient, no o/n events, patient resting comfortably. No complaints at this time. Patient denies: fever, chills, dizziness, weakness, HA, Changes in vision, CP, palpitations, SOB, cough, N/V/D/C, dysuria, changes in bowel movements, LE edema. ROS otherwise negative.    VITAL SIGNS:  T(F): 96 (01-21-25 @ 08:00)  HR: 54 (01-21-25 @ 08:00)  BP: 150/81 (01-21-25 @ 08:00)  RR: 20 (01-21-25 @ 08:00)  SpO2: 94% (01-21-25 @ 08:00)  Wt(kg): --    PHYSICAL EXAM:    Constitutional: WDWN, NAD  HEENT: PERRL, EOMI, sclera non-icteric, neck supple, trachea midline, no masses, no JVD, MMM, good dentition  Respiratory: CTA b/l, good air entry b/l, no wheezing, no rhonchi, no rales, without accessory muscle use and no intercostal retractions  Cardiovascular: RRR, normal S1S2, no M/R/G  Gastrointestinal: soft, NTND, no masses palpable, BS normal  Extremities: Warm, well perfused, pulses equal bilateral upper and lower extremities, no edema, no clubbing  Neurological: AAOx3, CN Grossly intact  Skin: Normal temperature, warm, dry    MEDICATIONS  (STANDING):  albuterol/ipratropium for Nebulization 3 milliLiter(s) Nebulizer every 4 hours  albuterol/ipratropium for Nebulization 3 milliLiter(s) Nebulizer every 20 minutes  ceFAZolin   IVPB      ceFAZolin   IVPB 1000 milliGRAM(s) IV Intermittent every 8 hours  chlorhexidine 2% Cloths 1 Application(s) Topical daily  dextrose 5%. 1000 milliLiter(s) (100 mL/Hr) IV Continuous <Continuous>  dextrose 5%. 1000 milliLiter(s) (50 mL/Hr) IV Continuous <Continuous>  dextrose 50% Injectable 25 Gram(s) IV Push once  dextrose 50% Injectable 12.5 Gram(s) IV Push once  dextrose 50% Injectable 25 Gram(s) IV Push once  glucagon  Injectable 1 milliGRAM(s) IntraMuscular once  heparin   Injectable 5000 Unit(s) SubCutaneous every 12 hours  insulin lispro (ADMELOG) corrective regimen sliding scale   SubCutaneous two times a day before meals  melatonin 5 milliGRAM(s) Oral at bedtime  methylPREDNISolone sodium succinate Injectable 40 milliGRAM(s) IV Push daily  pantoprazole    Tablet 40 milliGRAM(s) Oral before breakfast    MEDICATIONS  (PRN):  acetaminophen     Tablet .. 650 milliGRAM(s) Oral every 6 hours PRN Mild Pain (1 - 3), Moderate Pain (4 - 6)  dextrose Oral Gel 15 Gram(s) Oral once PRN Blood Glucose LESS THAN 70 milliGRAM(s)/deciliter      Allergies    No Known Drug Allergies  CONTRAST DYS (Nausea)    Intolerances        LABS:                        13.6   9.49  )-----------( 138      ( 21 Jan 2025 05:26 )             42.3     01-21    141  |  96[L]  |  36[H]  ----------------------------<  131[H]  3.5   |  33[H]  |  0.6[L]    Ca    8.7      21 Jan 2025 05:26  Phos  3.5     01-20  Mg     2.2     01-21    TPro  4.7[L]  /  Alb  3.1[L]  /  TBili  0.6  /  DBili  x   /  AST  22  /  ALT  25  /  AlkPhos  48  01-21      Urinalysis Basic - ( 21 Jan 2025 05:26 )    Color: x / Appearance: x / SG: x / pH: x  Gluc: 131 mg/dL / Ketone: x  / Bili: x / Urobili: x   Blood: x / Protein: x / Nitrite: x   Leuk Esterase: x / RBC: x / WBC x   Sq Epi: x / Non Sq Epi: x / Bacteria: x        RADIOLOGY & ADDITIONAL TESTS:  Reviewed

## 2025-01-22 LAB
ALBUMIN SERPL ELPH-MCNC: 3.2 G/DL — LOW (ref 3.5–5.2)
ALP SERPL-CCNC: 56 U/L — SIGNIFICANT CHANGE UP (ref 30–115)
ALT FLD-CCNC: 26 U/L — SIGNIFICANT CHANGE UP (ref 0–41)
ANION GAP SERPL CALC-SCNC: 11 MMOL/L — SIGNIFICANT CHANGE UP (ref 7–14)
AST SERPL-CCNC: 24 U/L — SIGNIFICANT CHANGE UP (ref 0–41)
BASOPHILS # BLD AUTO: 0.02 K/UL — SIGNIFICANT CHANGE UP (ref 0–0.2)
BASOPHILS NFR BLD AUTO: 0.2 % — SIGNIFICANT CHANGE UP (ref 0–1)
BILIRUB SERPL-MCNC: 0.6 MG/DL — SIGNIFICANT CHANGE UP (ref 0.2–1.2)
BUN SERPL-MCNC: 31 MG/DL — HIGH (ref 10–20)
CALCIUM SERPL-MCNC: 8.9 MG/DL — SIGNIFICANT CHANGE UP (ref 8.4–10.4)
CHLORIDE SERPL-SCNC: 95 MMOL/L — LOW (ref 98–110)
CO2 SERPL-SCNC: 31 MMOL/L — SIGNIFICANT CHANGE UP (ref 17–32)
CREAT SERPL-MCNC: 0.7 MG/DL — SIGNIFICANT CHANGE UP (ref 0.7–1.5)
EGFR: 93 ML/MIN/1.73M2 — SIGNIFICANT CHANGE UP
EOSINOPHIL # BLD AUTO: 0.02 K/UL — SIGNIFICANT CHANGE UP (ref 0–0.7)
EOSINOPHIL NFR BLD AUTO: 0.2 % — SIGNIFICANT CHANGE UP (ref 0–8)
GAS PNL BLDA: SIGNIFICANT CHANGE UP
GLUCOSE BLDC GLUCOMTR-MCNC: 248 MG/DL — HIGH (ref 70–99)
GLUCOSE BLDC GLUCOMTR-MCNC: 85 MG/DL — SIGNIFICANT CHANGE UP (ref 70–99)
GLUCOSE SERPL-MCNC: 144 MG/DL — HIGH (ref 70–99)
HCT VFR BLD CALC: 44.4 % — SIGNIFICANT CHANGE UP (ref 37–47)
HGB BLD-MCNC: 14.7 G/DL — SIGNIFICANT CHANGE UP (ref 12–16)
IMM GRANULOCYTES NFR BLD AUTO: 1.8 % — HIGH (ref 0.1–0.3)
LACTATE SERPL-SCNC: 2.4 MMOL/L — HIGH (ref 0.7–2)
LACTATE SERPL-SCNC: 4.2 MMOL/L — CRITICAL HIGH (ref 0.7–2)
LYMPHOCYTES # BLD AUTO: 0.38 K/UL — LOW (ref 1.2–3.4)
LYMPHOCYTES # BLD AUTO: 3.8 % — LOW (ref 20.5–51.1)
MAGNESIUM SERPL-MCNC: 2 MG/DL — SIGNIFICANT CHANGE UP (ref 1.8–2.4)
MCHC RBC-ENTMCNC: 28.6 PG — SIGNIFICANT CHANGE UP (ref 27–31)
MCHC RBC-ENTMCNC: 33.1 G/DL — SIGNIFICANT CHANGE UP (ref 32–37)
MCV RBC AUTO: 86.4 FL — SIGNIFICANT CHANGE UP (ref 81–99)
MONOCYTES # BLD AUTO: 0.62 K/UL — HIGH (ref 0.1–0.6)
MONOCYTES NFR BLD AUTO: 6.2 % — SIGNIFICANT CHANGE UP (ref 1.7–9.3)
NEUTROPHILS # BLD AUTO: 8.74 K/UL — HIGH (ref 1.4–6.5)
NEUTROPHILS NFR BLD AUTO: 87.8 % — HIGH (ref 42.2–75.2)
NRBC # BLD: 0 /100 WBCS — SIGNIFICANT CHANGE UP (ref 0–0)
NRBC BLD-RTO: 0 /100 WBCS — SIGNIFICANT CHANGE UP (ref 0–0)
PHOSPHATE SERPL-MCNC: 2.7 MG/DL — SIGNIFICANT CHANGE UP (ref 2.1–4.9)
PLATELET # BLD AUTO: 162 K/UL — SIGNIFICANT CHANGE UP (ref 130–400)
PMV BLD: 10.4 FL — SIGNIFICANT CHANGE UP (ref 7.4–10.4)
POTASSIUM SERPL-MCNC: 3.7 MMOL/L — SIGNIFICANT CHANGE UP (ref 3.5–5)
POTASSIUM SERPL-SCNC: 3.7 MMOL/L — SIGNIFICANT CHANGE UP (ref 3.5–5)
PROT SERPL-MCNC: 4.9 G/DL — LOW (ref 6–8)
RBC # BLD: 5.14 M/UL — SIGNIFICANT CHANGE UP (ref 4.2–5.4)
RBC # FLD: 13.4 % — SIGNIFICANT CHANGE UP (ref 11.5–14.5)
SODIUM SERPL-SCNC: 137 MMOL/L — SIGNIFICANT CHANGE UP (ref 135–146)
WBC # BLD: 9.96 K/UL — SIGNIFICANT CHANGE UP (ref 4.8–10.8)
WBC # FLD AUTO: 9.96 K/UL — SIGNIFICANT CHANGE UP (ref 4.8–10.8)

## 2025-01-22 PROCEDURE — 99223 1ST HOSP IP/OBS HIGH 75: CPT

## 2025-01-22 PROCEDURE — 99233 SBSQ HOSP IP/OBS HIGH 50: CPT | Mod: 25

## 2025-01-22 PROCEDURE — 99497 ADVNCD CARE PLAN 30 MIN: CPT

## 2025-01-22 PROCEDURE — 99233 SBSQ HOSP IP/OBS HIGH 50: CPT

## 2025-01-22 PROCEDURE — 71045 X-RAY EXAM CHEST 1 VIEW: CPT | Mod: 26

## 2025-01-22 PROCEDURE — 93010 ELECTROCARDIOGRAM REPORT: CPT

## 2025-01-22 RX ORDER — BACTERIOSTATIC SODIUM CHLORIDE 0.9 %
500 VIAL (ML) INJECTION ONCE
Refills: 0 | Status: COMPLETED | OUTPATIENT
Start: 2025-01-22 | End: 2025-01-22

## 2025-01-22 RX ORDER — SODIUM CHLORIDE 9 G/ML
250 INJECTION, SOLUTION INTRAVENOUS ONCE
Refills: 0 | Status: COMPLETED | OUTPATIENT
Start: 2025-01-22 | End: 2025-01-22

## 2025-01-22 RX ADMIN — ANTISEPTIC SURGICAL SCRUB 1 APPLICATION(S): 0.04 SOLUTION TOPICAL at 12:24

## 2025-01-22 RX ADMIN — Medication 100 MILLIGRAM(S): at 05:52

## 2025-01-22 RX ADMIN — SODIUM CHLORIDE 250 MILLILITER(S): 9 INJECTION, SOLUTION INTRAVENOUS at 19:59

## 2025-01-22 RX ADMIN — PANTOPRAZOLE 40 MILLIGRAM(S): 20 TABLET, DELAYED RELEASE ORAL at 06:54

## 2025-01-22 RX ADMIN — Medication 500 MILLILITER(S): at 13:33

## 2025-01-22 RX ADMIN — Medication 5000 UNIT(S): at 17:10

## 2025-01-22 RX ADMIN — ACETAMINOPHEN, DIPHENHYDRAMINE HCL, PHENYLEPHRINE HCL 5 MILLIGRAM(S): 325; 25; 5 TABLET ORAL at 21:07

## 2025-01-22 RX ADMIN — Medication 250 MILLIGRAM(S): at 17:11

## 2025-01-22 RX ADMIN — IPRATROPIUM BROMIDE AND ALBUTEROL SULFATE 3 MILLILITER(S): .5; 2.5 SOLUTION RESPIRATORY (INHALATION) at 22:08

## 2025-01-22 RX ADMIN — Medication 100 MILLIGRAM(S): at 13:34

## 2025-01-22 RX ADMIN — Medication 100 MILLIGRAM(S): at 21:07

## 2025-01-22 RX ADMIN — PREDNISONE 20 MILLIGRAM(S): 5 TABLET ORAL at 05:52

## 2025-01-22 RX ADMIN — Medication 250 MILLIGRAM(S): at 05:54

## 2025-01-22 RX ADMIN — Medication 5000 UNIT(S): at 05:53

## 2025-01-22 RX ADMIN — Medication 4: at 17:09

## 2025-01-22 NOTE — SWALLOW BEDSIDE ASSESSMENT ADULT - SWALLOW EVAL: PATIENT/FAMILY GOALS STATEMENT
Pt has spoken at length with medical team, SLP team, and palliative team re: PEG tube placement. Pt has decided at this time that she will opt for safest PO diet and decline PEG placement, accepting the risks. SLP in agreement with POC

## 2025-01-22 NOTE — CONSULT NOTE ADULT - ASSESSMENT
Assessment and Plan  Case of a 69 year old Female patient with a history of HTN, GERD, DL, CAD, COPD not on Home O2, Hx of Pneumothorax, polychondritis, SCC of BOT s/p Trach/PEG (reversed in 4/2021), s/p CRT (7 sessions in 2021) who presented to the ED on 01/10 for evaluation of worsening SOB for 1 week duration, found to have evidence of severe emphysema/pneumonia with atelectasis/tracheal stenosis s/p bronchoscopy and tracheal dilation on 01/14. Stay was complicated by hypoxic RF requiring intubation on 01/17 s/p self extubation on 01/17. We are following for G tube placement evaluation.      Evaluation for G Tube Replacement in Setting of Severe Pharyngeal Dysphagia   History of SCC of BOT s/p Trach/PEG (reversed in 4/2021), s/p CRT (7 sessions in 2021)  * Had history of SCC of BOT s/p Trach/PEG (reversed in 4/2021)  * Vitals: on BiPAP  * Labs: WBC 9.96, Hb 14.7, Plt 162, Na 141, K 3.5, BUN 35, Cr 0.6 on 01/21  * INR 1.03 and LA 1,4 on 01/18 -> 4.6 on 01/17  * Previous CT abdomen 2020 unremarkable  * s/p VFSS 01/21: severe pharyngeal dysphagia, no mention of aspiration -> cleared for pureed with thins  * No previous EGD: last colonoscopy was in 11/2023 diverticulosis; few polyps; plan was to repeat in 5 years  * No FHx of GI cancers    RECOMMENDATIONS  - Recommend continuous goals of care discussion with son and sister (HCPs). Palliative team evaluation appreciated  - Speech and swallow evaluation appreciated: s/p VFSS as above. Cleared for pureed diet  - Since the patient is cleared for pureed diet, would recommend checking caloric count prior to scheduling EGD with PEG tube replacement as current risks outweigh benefits (patient on BiPAP with recent intubation and tracheal stenosis s/p dilation). Also, patient is currently refusing PEG tube placement. If PEG tube is indicated per caloric count, will rediscuss with patient and schedule procedure after medical optimization when benefits outweigh risks (will need ENT and pulmonary risk stratification)  - Monitor for pain: management per team  - Monitor for nausea. Consider antiemetics PRN if QTc is within normal  - Continue PO protonix 40mg QD for GI prophylaxis  - Monitor BM and avoid constipation  - Follow up with our [facility] located at [address] [place] [address]. Phone Number: [phone]        Thank you for your consult.  - Please note that plan was communicated with medical team.   - Please reach GI on [phone] during weekdays till 5pm.  - Please call the GI service line after 5pm on Weekdays and anytime on Weekends: [phone].      Vianney Barraza MD  PGY - 5 Gastroenterology Fellow   [facility]

## 2025-01-22 NOTE — PROGRESS NOTE ADULT - SUBJECTIVE AND OBJECTIVE BOX
GEREMIAS FULLER  69y Female    CHIEF COMPLAINT:    Patient is a 69y old  Female who presents with a chief complaint of shortness of breath, malaise (2025 11:51)    INTERVAL HPI/OVERNIGHT EVENTS:    Patient seen and examined. No acute events overnight. remains on 5-6L NC    ROS: All other systems are negative.    Vital Signs:    T(F): 96.8 (25 @ 11:33), Max: 97.1 (25 @ 07:46)  HR: 88 (25 @ 11:33) (67 - 89)  BP: 111/59 (25 @ 11:33) (97/61 - 137/89)  RR: 20 (25 @ 11:33) (18 - 20)  SpO2: 94% (25 @ 11:33) (88% - 99%)    2025 07:01  -  2025 07:00  --------------------------------------------------------  IN: 0 mL / OUT: 850 mL / NET: -850 mL     Daily Weight in k (2025 01:00)    POCT Blood Glucose.: 172 mg/dL (2025 21:13)  POCT Blood Glucose.: 286 mg/dL (2025 16:09)    PHYSICAL EXAM:    GENERAL:  NAD ill appearing   SKIN: No rashes or lesions  HEENT: Atraumatic. Normocephalic.    NECK: Supple, No JVD. .  PULMONARY: decreased breath sounds B/L. No wheezing   CVS: Normal S1, S2. Rate and Rhythm are regular   ABDOMEN/GI: Soft, Nontender, Nondistended   MSK:  No clubbing or cyanosis   NEUROLOGIC:  moves all extremities   PSYCH: Awake and alert     Consultant(s) Notes Reviewed:  [x ] YES  [ ] NO  Care Discussed with Consultants/Other Providers [ x] YES  [ ] NO    LABS:                        14.7   9.96  )-----------( 162      ( 2025 05:30 )             44.4     137  |  95[L]  |  31[H]  ----------------------------<  144[H]  3.7   |  31  |  0.7    Ca    8.9      2025 05:30  Phos  2.7       Mg     2.0         TPro  4.9[L]  /  Alb  3.2[L]  /  TBili  0.6  /  DBili  x   /  AST  24  /  ALT  26  /  AlkPhos  56      RADIOLOGY & ADDITIONAL TESTS:  Imaging or report Personally Reviewed:  [x] YES  [ ] NO  EKG reviewed: [x] YES  [ ] NO    Medications:  Standing  albuterol/ipratropium for Nebulization 3 milliLiter(s) Nebulizer every 4 hours  albuterol/ipratropium for Nebulization 3 milliLiter(s) Nebulizer every 20 minutes  ceFAZolin   IVPB      ceFAZolin   IVPB 1000 milliGRAM(s) IV Intermittent every 8 hours  chlorhexidine 2% Cloths 1 Application(s) Topical daily  dextrose 5%. 1000 milliLiter(s) IV Continuous <Continuous>  dextrose 5%. 1000 milliLiter(s) IV Continuous <Continuous>  dextrose 50% Injectable 25 Gram(s) IV Push once  dextrose 50% Injectable 12.5 Gram(s) IV Push once  dextrose 50% Injectable 25 Gram(s) IV Push once  glucagon  Injectable 1 milliGRAM(s) IntraMuscular once  heparin   Injectable 5000 Unit(s) SubCutaneous every 12 hours  insulin lispro (ADMELOG) corrective regimen sliding scale   SubCutaneous two times a day before meals  melatonin 5 milliGRAM(s) Oral at bedtime  pantoprazole    Tablet 40 milliGRAM(s) Oral before breakfast  predniSONE   Tablet 20 milliGRAM(s) Oral daily  saccharomyces boulardii 250 milliGRAM(s) Oral two times a day    PRN Meds  acetaminophen     Tablet .. 650 milliGRAM(s) Oral every 6 hours PRN  dextrose Oral Gel 15 Gram(s) Oral once PRN

## 2025-01-22 NOTE — PROGRESS NOTE ADULT - ASSESSMENT
69yFemale admitted for shortness of breath. Admitted for pneumonia. 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 Has since had extensive follow up w ENT. Pt was intubated and self extubated in CCU 1/17. Pt found to be aspirating - speech and swallow following. Patient may need PEG placement. Initially pt was refusing a PEG according to consult request. But today pt spoke to attending MD and when I spoke to her she said she now understands why she would need a PEG placed her only concern is not going to a SNF. She wants to go home. Pt previously independent.     Pt denies pain or dyspnea. Told this writer I just want to get out of the hospital soon. She has no complaints of nausea etc. No cough noted. Palliative care NP discussed case at length w attending and medical resident as well as speech and swallow team.      MEDD (morphine equivalent daily dose):0    - DNR/intubate - now MOLST filled out  - HCP completed - Son Feliberto # 1 and sister Maame #2  - Ancef IV ongoing, monitor labs/tests/CXR as needed  - ENT following  - Speech and Swallow following   - GI follow up ? PEG placement  - will follow   TIME SPENT 50 (+) Min aside from Advance care planning  Education about palliative care provided to patient/family.  See Recs below.    Please call [phone] with questions or concerns 24/7.   We will continue to follow.

## 2025-01-22 NOTE — SWALLOW BEDSIDE ASSESSMENT ADULT - SWALLOW EVAL: FUNCTIONAL LEVEL AT TIME OF EVAL
pt received sitting upright in bed, awake, alert. on 4L humidified NC. Pt reports good tolerance of diet. Reports using super supraglottic strategy which is written out for her on the whiteboard.

## 2025-01-22 NOTE — CONSULT NOTE ADULT - SUBJECTIVE AND OBJECTIVE BOX
Gastroenterology Initial Consult Note      Location: [facility] 2A 011 A [facility]  Patient Name: GEREMIAS FULLER  Age: 69y  Gender: Female      Chief Complaint  Patient is a 69y old Female who presents with a chief complaint of shortness of breath, malaise (16 Jan 2025 11:51)  Primary diagnosis of Acute upper respiratory infection      Reason for Consult  G tube placement evaluation      History of Present Illness  69 year old Female patient with a history of HTN, GERD, DL, CAD, COPD not on Home O2, Hx of Pneumothorax, polychondritis, SCC of BOT s/p Trach/PEG (reversed in 4/2021), s/p CRT (7 sessions in 2021) who presented to the ED on 01/10 for evaluation of worsening SOB for 1 week duration, found to have evidence of severe emphysema/pneumonia with atelectasis/tracheal stenosis s/p bronchoscopy and tracheal dilation on 01/14. Stay was complicated by hypoxic RF requiring intubation on 01/17 s/p self extubation on 01/17. We are following for G tube placement evaluation.  Summary:  * Had history of SCC of BOT s/p Trach/PEG (reversed in 4/2021)  * Vitals: on BiPAP  * Labs: WBC 9.96, Hb 14.7, Plt 162, Na 141, K 3.5, BUN 35, Cr 0.6 on 01/21  * INR 1.03 and LA 1,4 on 01/18 -> 4.6 on 01/17  * Previous CT abdomen 2020 unremarkable  * s/p VFSS 01/21: severe pharyngeal dysphagia, no mention of aspiration -> cleared for pureed with thins  * No previous EGD: last colonoscopy was in 11/2023 diverticulosis; few polyps; plan was to repeat in 5 years  * No FHx of GI cancers      Prior EGD:  no prior      Prior Colonoscopy:  as below      Past Medical and Surgical History:  HTN (hypertension)    Hypercholesterolemia    GERD (gastroesophageal reflux disease)    Pulmonary nodule    CAD (coronary artery disease)    COPD (chronic obstructive pulmonary disease)    Relapsing polychondritis    Female cystocele    H/O pneumothorax  2016    S/P GEETHA-BSO    S/P colon resection    Vocal cord polyps  removal of same 2005    Pulmonary nodule    History of bladder surgery  2019    H/O breast biopsy  LEFT        Home Medications:  Home Medications:  Anoro Ellipta 62.5 mcg-25 mcg/inh inhalation powder: 1 puff(s) inhaled once a day (10 Zeyad 2025 16:56)  hydroxychloroquine 100 mg oral tablet: 1 tab(s) orally Monday, Wednesday, and Friday (13 Jan 2025 11:32)  hydroxychloroquine 200 mg oral tablet: 1 tab(s) orally Tuesday, Thursday, Saturday (13 Jan 2025 11:32)  Lipitor 40 mg oral tablet: 1 tab(s) orally once a day (10 Zeyad 2025 16:56)  losartan 100 mg oral tablet: 1 tab(s) orally once a day (10 Zeyad 2025 16:56)  mycophenolate mofetil 500 mg oral tablet: 2 tab(s) by gastrostomy tube 2 times a day (30 Nov 2023 09:00)  omeprazole 40 mg oral delayed release capsule: 1 cap(s) by gastrostomy tube once a day (30 Nov 2023 09:00)  Ventolin HFA 90 mcg/inh inhalation aerosol: 2 puff(s) inhaled 4 times a day (30 Nov 2023 09:00)      Social History:  Tobacco: ex smoker  Alcohol: denies  Drugs: denies      Allergies:  No Known Drug Allergies  CONTRAST DYS (Nausea)      Family History:  Family history of diabetes mellitus (DM)          Vital Signs in the last 24 hours   Vitals Summary T(C): 36.3 (01-22-25 @ 16:19), Max: 36.3 (01-22-25 @ 16:19)  HR: 83 (01-22-25 @ 16:19) (67 - 89)  BP: 106/68 (01-22-25 @ 16:19) (97/61 - 137/89)  RR: 20 (01-22-25 @ 16:19) (18 - 20)  SpO2: 97% (01-22-25 @ 16:19) (90% - 99%)  Vent Data   Intake/ Output   01-21-25 @ 07:01  -  01-22-25 @ 07:00  --------------------------------------------------------  IN: 0 mL / OUT: 850 mL / NET: -850 mL    01-22-25 @ 07:01  -  01-22-25 @ 18:55  --------------------------------------------------------  IN: 550 mL / OUT: 0 mL / NET: 550 mL        Physical Exam  * General Appearance: Alert, cooperative, interactive, oriented to time, place, and person, on BiPAP  * Eyes: PERRL, conjunctiva/corneas clear, EOM's intact, fundi benign, both eyes  * Neck: Supple, symmetrical, trachea midline, no adenopathy   * Lungs: crackles; on BIPAP (Referred sounds)  * Heart: Regular Rate and Rhythm, normal S1 and S2, no audible murmur, rub, or gallop  * Abdomen: Symmetric, non-distended, soft, non-tender, bowel sounds active all four quadrants      Investigations   Laboratory Workup      - CBC:                        14.7   9.96  )-----------( 162      ( 22 Jan 2025 05:30 )             44.4       - Hgb Trend:  14.7  01-22-25 @ 05:30  13.6  01-21-25 @ 05:26  14.1  01-20-25 @ 04:41          - Chemistry:  01-22    137  |  95[L]  |  31[H]  ----------------------------<  144[H]  3.7   |  31  |  0.7    Ca    8.9      22 Jan 2025 05:30  Phos  2.7     01-22  Mg     2.0     01-22    TPro  4.9[L]  /  Alb  3.2[L]  /  TBili  0.6  /  DBili  x   /  AST  24  /  ALT  26  /  AlkPhos  56  01-22    Liver panel trend:  TBili 0.6   /   AST 24   /   ALT 26   /   AlkP 56   /   Tptn 4.9   /   Alb 3.2    /   DBili --      01-22  TBili 0.6   /   AST 22   /   ALT 25   /   AlkP 48   /   Tptn 4.7   /   Alb 3.1    /   DBili --      01-21  TBili 0.7   /   AST 15   /   ALT 28   /   AlkP 55   /   Tptn 5.2   /   Alb 3.5    /   DBili --      01-20  TBili 0.7   /   AST 19   /   ALT 29   /   AlkP 53   /   Tptn 4.7   /   Alb 3.1    /   DBili --      01-19  TBili 0.8   /   AST 18   /   ALT 39   /   AlkP 52   /   Tptn 4.4   /   Alb 2.9    /   DBili --      01-18  TBili 0.7   /   AST 52   /   ALT 61   /   AlkP 81   /   Tptn 6.3   /   Alb 4.0    /   DBili --      01-17  TBili 0.5   /   AST 15   /   ALT 25   /   AlkP 61   /   Tptn 5.2   /   Alb 3.6    /   DBili --      01-16  TBili 0.4   /   AST 15   /   ALT 28   /   AlkP 60   /   Tptn 5.2   /   Alb 3.5    /   DBili --      01-15  TBili 0.3   /   AST 18   /   ALT 31   /   AlkP 67   /   Tptn 5.9   /   Alb 3.9    /   DBili --      01-13  TBili 0.3   /   AST 22   /   ALT 34   /   AlkP 65   /   Tptn 5.9   /   Alb 3.8    /   DBili --      01-13      - Coagulation Studies:      - ABG:  ABG - ( 22 Jan 2025 10:49 )  pH, Arterial: 7.57  pH, Blood: x     /  pCO2: 33    /  pO2: 108   / HCO3: 30    / Base Excess: 8.2   /  SaO2: 98.6                - Cardiac Markers:        Microbiological Workup  Urinalysis Basic - ( 22 Jan 2025 05:30 )    Color: x / Appearance: x / SG: x / pH: x  Gluc: 144 mg/dL / Ketone: x  / Bili: x / Urobili: x   Blood: x / Protein: x / Nitrite: x   Leuk Esterase: x / RBC: x / WBC x   Sq Epi: x / Non Sq Epi: x / Bacteria: x          Radiological Workup            Current Medications  Standing Medications  albuterol/ipratropium for Nebulization 3 milliLiter(s) Nebulizer every 4 hours  albuterol/ipratropium for Nebulization 3 milliLiter(s) Nebulizer every 20 minutes  ceFAZolin   IVPB      ceFAZolin   IVPB 1000 milliGRAM(s) IV Intermittent every 8 hours  chlorhexidine 2% Cloths 1 Application(s) Topical daily  dextrose 5%. 1000 milliLiter(s) (100 mL/Hr) IV Continuous <Continuous>  dextrose 5%. 1000 milliLiter(s) (50 mL/Hr) IV Continuous <Continuous>  dextrose 50% Injectable 25 Gram(s) IV Push once  dextrose 50% Injectable 12.5 Gram(s) IV Push once  dextrose 50% Injectable 25 Gram(s) IV Push once  glucagon  Injectable 1 milliGRAM(s) IntraMuscular once  heparin   Injectable 5000 Unit(s) SubCutaneous every 12 hours  insulin lispro (ADMELOG) corrective regimen sliding scale   SubCutaneous two times a day before meals  melatonin 5 milliGRAM(s) Oral at bedtime  pantoprazole    Tablet 40 milliGRAM(s) Oral before breakfast  predniSONE   Tablet 20 milliGRAM(s) Oral daily  saccharomyces boulardii 250 milliGRAM(s) Oral two times a day    PRN Medications  acetaminophen     Tablet .. 650 milliGRAM(s) Oral every 6 hours PRN Mild Pain (1 - 3), Moderate Pain (4 - 6)  dextrose Oral Gel 15 Gram(s) Oral once PRN Blood Glucose LESS THAN 70 milliGRAM(s)/deciliter    Singles Doses Administered  (ADM OVERRIDE) 1 each &lt;see task&gt; GiveOnce  (ADM OVERRIDE) 3 each &lt;see task&gt; GiveOnce  (ADM OVERRIDE) 1 each &lt;see task&gt; GiveOnce  (ADM OVERRIDE) 1 each &lt;see task&gt; GiveOnce  (ADM OVERRIDE) 1 each &lt;see task&gt; GiveOnce  (ADM OVERRIDE) 1 each &lt;see task&gt; GiveOnce  (ADM OVERRIDE) 1 each &lt;see task&gt; GiveOnce  (ADM OVERRIDE) 1 each &lt;see task&gt; GiveOnce  (ADM OVERRIDE) 1 each &lt;see task&gt; GiveOnce  (ADM OVERRIDE) 1 each &lt;see task&gt; GiveOnce  (ADM OVERRIDE) 1 each &lt;see task&gt; GiveOnce  acetaminophen     Tablet .. 650 milliGRAM(s) Oral once  acetaminophen     Tablet .. 650 milliGRAM(s) Oral once PRN  azithromycin  IVPB 500 milliGRAM(s) IV Intermittent once  ceFAZolin   IVPB 1000 milliGRAM(s) IV Intermittent once  cefTRIAXone   IVPB 1000 milliGRAM(s) IV Intermittent once  dexAMETHasone 4 mG/mL Injectable 1 mL (Rx Quick Charge) 4 milliGRAM(s) IV Push   diphenhydrAMINE Injectable 50 milliGRAM(s) IV Push once  diphenhydrAMINE Injectable 50 milliGRAM(s) IV Push once  diphenhydrAMINE Injectable 50 milliGRAM(s) IV Push once  fentaNYL    Injectable 25 MICROGram(s) IV Push once  fentaNYL 50 MICROGram(s)/mL Injectable 2 mL (Rx Quick Charge) 100 MICROGram(s) IV Push   furosemide   Injectable 40 milliGRAM(s) IV Push once  furosemide   Injectable 40 milliGRAM(s) IV Push once  furosemide   Injectable 40 milliGRAM(s) IV Push once  furosemide   Injectable 20 milliGRAM(s) IV Push once  furosemide   Injectable 40 milliGRAM(s) IV Push once  lactated ringers Bolus 1000 milliLiter(s) IV Bolus once  lactated ringers Bolus 500 milliLiter(s) IV Bolus once  lidocaine 2% Injectable (Rx Quick Charge) 60 milliGRAM(s) IV Push   lidocaine 2% Viscous 100 milliLiter(s) Swish and Spit once  LORazepam   Injectable 4 milliGRAM(s) IV Push once  magnesium sulfate  IVPB 2 Gram(s) IV Intermittent every 2 hours  methylPREDNISolone sodium succinate Injectable 60 milliGRAM(s) IV Push once  methylPREDNISolone sodium succinate Injectable 60 milliGRAM(s) IV Push once  methylPREDNISolone sodium succinate Injectable 125 milliGRAM(s) IV Push once  ondansetron 2 mG/mL Injectable 2 mL (Rx Quick Charge) 4 milliGRAM(s) IV Push   potassium chloride  20 mEq/100 mL IVPB 20 milliEquivalent(s) IV Intermittent once  propofol 10 mG/mL Injectable 20 mL (benzyl alcohol free) (Rx Quick Charge) 200 milliGRAM(s) IV Bolus   sodium chloride 0.9% Bolus 500 milliLiter(s) IV Bolus once  sodium chloride 0.9% Bolus 1000 milliLiter(s) IV Bolus once

## 2025-01-22 NOTE — PROGRESS NOTE ADULT - SUBJECTIVE AND OBJECTIVE BOX
Patient is a 69y old  Female who presents with a chief complaint of shortness of breath, malaise (16 Jan 2025 11:51)        Over Night Events:  On nasal cannula 5L. Afebrile. No pressors.       ROS:     All ROS are negative except HPI         PHYSICAL EXAM    ICU Vital Signs Last 24 Hrs  T(C): 36.2 (22 Jan 2025 07:46), Max: 36.2 (21 Jan 2025 12:18)  T(F): 97.1 (22 Jan 2025 07:46), Max: 97.1 (21 Jan 2025 12:18)  HR: 89 (22 Jan 2025 07:46) (67 - 93)  BP: 97/61 (22 Jan 2025 07:46) (97/61 - 137/89)  BP(mean): 73 (22 Jan 2025 07:46) (73 - 109)  ABP: --  ABP(mean): --  RR: 20 (22 Jan 2025 07:46) (18 - 20)  SpO2: 97% (22 Jan 2025 07:46) (88% - 99%)    O2 Parameters below as of 22 Jan 2025 07:46  Patient On (Oxygen Delivery Method): nasal cannula      CONSTITUTIONAL:  in NAD    ENT:   Airway patent,   Mouth with normal mucosa.     EYES:   Pupils equal,   Round and reactive to light.    CARDIAC:   Normal rate,   Regular rhythm.      RESPIRATORY:   Bilateral BS  Normal chest expansion  Not tachypneic,  No use of accessory muscles    GASTROINTESTINAL:  Abdomen soft,   Non-tender,   No guarding,     MUSCULOSKELETAL:   Range of motion is not limited,  No clubbing, cyanosis    NEUROLOGICAL:   Alert and oriented   No motor  deficits.    SKIN:   Skin normal color for race,   Warm and dry       01-21-25 @ 07:01  -  01-22-25 @ 07:00  --------------------------------------------------------  IN:  Total IN: 0 mL    OUT:    Intermittent Catheterization - Urethral (mL): 0 mL    Voided (mL): 850 mL  Total OUT: 850 mL    Total NET: -850 mL          LABS:                            14.7   9.96  )-----------( 162      ( 22 Jan 2025 05:30 )             44.4                                               01-22    137  |  95[L]  |  31[H]  ----------------------------<  144[H]  3.7   |  31  |  0.7    Ca    8.9      22 Jan 2025 05:30  Phos  2.7     01-22  Mg     2.0     01-22    TPro  4.9[L]  /  Alb  3.2[L]  /  TBili  0.6  /  DBili  x   /  AST  24  /  ALT  26  /  AlkPhos  56  01-22                                             Urinalysis Basic - ( 22 Jan 2025 05:30 )    Color: x / Appearance: x / SG: x / pH: x  Gluc: 144 mg/dL / Ketone: x  / Bili: x / Urobili: x   Blood: x / Protein: x / Nitrite: x   Leuk Esterase: x / RBC: x / WBC x   Sq Epi: x / Non Sq Epi: x / Bacteria: x                                                  LIVER FUNCTIONS - ( 22 Jan 2025 05:30 )  Alb: 3.2 g/dL / Pro: 4.9 g/dL / ALK PHOS: 56 U/L / ALT: 26 U/L / AST: 24 U/L / GGT: x                                                                                                                                       MEDICATIONS  (STANDING):  albuterol/ipratropium for Nebulization 3 milliLiter(s) Nebulizer every 4 hours  albuterol/ipratropium for Nebulization 3 milliLiter(s) Nebulizer every 20 minutes  ceFAZolin   IVPB      ceFAZolin   IVPB 1000 milliGRAM(s) IV Intermittent every 8 hours  chlorhexidine 2% Cloths 1 Application(s) Topical daily  dextrose 5%. 1000 milliLiter(s) (100 mL/Hr) IV Continuous <Continuous>  dextrose 5%. 1000 milliLiter(s) (50 mL/Hr) IV Continuous <Continuous>  dextrose 50% Injectable 25 Gram(s) IV Push once  dextrose 50% Injectable 12.5 Gram(s) IV Push once  dextrose 50% Injectable 25 Gram(s) IV Push once  glucagon  Injectable 1 milliGRAM(s) IntraMuscular once  heparin   Injectable 5000 Unit(s) SubCutaneous every 12 hours  insulin lispro (ADMELOG) corrective regimen sliding scale   SubCutaneous two times a day before meals  melatonin 5 milliGRAM(s) Oral at bedtime  pantoprazole    Tablet 40 milliGRAM(s) Oral before breakfast  predniSONE   Tablet 20 milliGRAM(s) Oral daily  saccharomyces boulardii 250 milliGRAM(s) Oral two times a day    MEDICATIONS  (PRN):  acetaminophen     Tablet .. 650 milliGRAM(s) Oral every 6 hours PRN Mild Pain (1 - 3), Moderate Pain (4 - 6)  dextrose Oral Gel 15 Gram(s) Oral once PRN Blood Glucose LESS THAN 70 milliGRAM(s)/deciliter      New X-rays reviewed:                                                                                  ECHO    CXR interpreted by me:

## 2025-01-22 NOTE — SWALLOW BEDSIDE ASSESSMENT ADULT - COMMENTS
Pt is known to SLP dept from previous admission, VFSS in 2020, recs for PEG as 1' means of nutrition and hydration, ice chips and controlled single sips of water as tolerated.  -Pt is s/p Bronchoscopy, s/p tracheal stenosis with tracheal dilation and debridement on 1/14/25. Pt was RR code on 1/17. Pt  was not tolerating Bipap, had difficulty breathing, became unresponsive and desaturated rapidly. Decision was made to intubate at bedside. Pt is now s/p self extubation on 1/18.  -CT neck soft Tissue 1/18-->At the level of the previously described tracheal stenosis there is Improved tracheal patency consistent with interval dilation.    VFSS completed 1/21; severe pharyngeal dysphagia across all consistencies- thin, mildly thick, and puree, with aspiration of all consistencies.  I spoke with the pt at length after the study both in radiology suite and upstairs at pt's bedside with MD team present. Pt has made wishes clear at this time- pt does not want PEG, wishes to continue safest diet by mouth and accepts the risks of aspiration including PNA, worsening respiratory status, and even death. She also understands that with worsening respiratory status there could be a need for intubation. Suggest f/u conversation with pt and family re: code status.   Given the GOC discussed with pt and medical team today, rec Puree/thins w/ use of super-supraglottic swallow.

## 2025-01-22 NOTE — PROGRESS NOTE ADULT - ASSESSMENT
68yo Female with PMH of HTN, HLD, GERD, CAD, COPD not on Home O2, Hx of Pneumothorax, polychondirits, SCC of BOT s/p Trach/PEG (reversed in 4/2021), s/p CRT (7 sessions in 2021) who presents with SOB for 1 week duration. History goes back to around 1 week ago When the patient started having Shortness of breath, productive cough, and malaise. . Patient is admitted for PNA, and COPD excacerbation .   Patient reports some intermittent left sided chest pain when coughing. and abdominal distention as well.  Patient follows with ENT Dr. Louis, last seen 9/23/24, CT Neck 9/2024 showed Abnormal soft tissue within the posterior nasopharynx greater the right, new since the reference exams and nonspecific in appearance. The abnormal submucosal soft tissue in the preepiglottic space greater on the left, asymmetric thickening of the left aryepiglottic fold, and mild epiglottic thickening; similar when correlated with the PET/CT dated 5/12/2022.  Stable rim-calcified right thyroid nodule measuring 1.8 cm. US guided FNA by IR 10/7/24 of  Stable rim-calcified right thyroid nodule measuring 1.8 cm. Showed atypia but not malignant cells.   Pt was treated for  hypoxic resp failure due to COPD exacerbation complicated by tracheal stenosis, H/o SCC s/p surgery. Had been on NC. Steroids were being tapered. Few weeks ago pt underwent bronchoscopy with tracheal dilation.   Pt has severe dysphagia, RRT was called on the floor, pt developed ARF due to aspiration, was intubated and upgraded to ICU, s/p bronch , self extubated on 1/17 and was downgraded to SDU.  Pt failed barium swallow, PEG tube recommended, pt is agreeable now, will consult GI.      ASSESSMENT & PLAN:   # Acute hypoxemic resp failure  #COPD exacerbation  #HO trach   #h/o Tracheal stenosis SP dilation  #History of lung nodules  #mphysema  #ropharyngeal dysphagia   - O 6 L NC, will taper down oxygen as tolerated   - supportive care, aspiration precautions, pulmonary toilet  - pt failed barium swallow, NPO for now, needs PEG, agreeable, consult GI   - pt self extubated on 1/17  - nebs Q 6 hours   - sputum Cx noted, ID is following, recommendations noted:  - MSSA in [facility] cx -- narrowed zosyn to cefazolin 1g q 8 hours   - trend WBC and monitor fever curve   - incentive spirometry  - Prednisone 20 mg QD for 2 more days  - Patient declining NG tube placement, she is considering PEG tube placement    # h/o Polychondritis   - as per Rheum note, patient was on cellcept 1000 G BID in the past   -  hydroxychloroquine d/c   - off immunosuppresants at this time, resume post dc     # HTN/HLD/CAD/ combined CHF   - fluid restriction 1200 ml in 24 hours   - intake and output monitoring, daily weight  - monitor for fluid overload   - resume home meds if SBP is stable     # Hyperglycemia / prediabetes   - CC diet, monitor f/s   - not on steroids now  - on Insulin now     # GERD   - C/w Protonix  - started Probiotics

## 2025-01-22 NOTE — SWALLOW BEDSIDE ASSESSMENT ADULT - SWALLOW EVAL: DIAGNOSIS
Severe pharyngeal dysphagia across all consistencies- thin, mildly thick, and puree, with aspiration of all consistencies.

## 2025-01-22 NOTE — PROGRESS NOTE ADULT - SUBJECTIVE AND OBJECTIVE BOX
INTERVAL HPI/OVERNIGHT EVENTS:  Patient was seen and examined at bedside. As per nurse and patient, no o/n events, patient resting comfortably. No complaints at this time. Patient denies: fever, chills, dizziness, weakness, HA, Changes in vision, CP, palpitations, SOB, cough, N/V/D/C, dysuria, changes in bowel movements, LE edema. ROS otherwise negative. Patient is currently considering a PEG tube placement. Decided to switch code status to DNR/intubate. Patient requesting to eat PO despite multiple conversations and attempts not to do so as she's constantly aspirating when eating.    VITAL SIGNS:  T(F): 96.8 (01-22-25 @ 11:33)  HR: 88 (01-22-25 @ 11:33)  BP: 111/59 (01-22-25 @ 11:33)  RR: 20 (01-22-25 @ 11:33)  SpO2: 94% (01-22-25 @ 11:33)  Wt(kg): --    PHYSICAL EXAM:    Constitutional: WDWN, NAD  Respiratory: CTA b/l, good air entry b/l, no wheezing, no rhonchi, no rales, without accessory muscle use and no intercostal retractions  Cardiovascular: RRR, normal S1S2, no M/R/G  Gastrointestinal: soft, NTND, no masses palpable, BS normal  Extremities: Warm, well perfused, pulses equal bilateral upper and lower extremities, no edema, no clubbing  Neurological: AAOx3, CN Grossly intact  Skin: Normal temperature, warm, dry    MEDICATIONS  (STANDING):  albuterol/ipratropium for Nebulization 3 milliLiter(s) Nebulizer every 4 hours  albuterol/ipratropium for Nebulization 3 milliLiter(s) Nebulizer every 20 minutes  ceFAZolin   IVPB      ceFAZolin   IVPB 1000 milliGRAM(s) IV Intermittent every 8 hours  chlorhexidine 2% Cloths 1 Application(s) Topical daily  dextrose 5%. 1000 milliLiter(s) (100 mL/Hr) IV Continuous <Continuous>  dextrose 5%. 1000 milliLiter(s) (50 mL/Hr) IV Continuous <Continuous>  dextrose 50% Injectable 25 Gram(s) IV Push once  dextrose 50% Injectable 12.5 Gram(s) IV Push once  dextrose 50% Injectable 25 Gram(s) IV Push once  glucagon  Injectable 1 milliGRAM(s) IntraMuscular once  heparin   Injectable 5000 Unit(s) SubCutaneous every 12 hours  insulin lispro (ADMELOG) corrective regimen sliding scale   SubCutaneous two times a day before meals  melatonin 5 milliGRAM(s) Oral at bedtime  pantoprazole    Tablet 40 milliGRAM(s) Oral before breakfast  predniSONE   Tablet 20 milliGRAM(s) Oral daily  saccharomyces boulardii 250 milliGRAM(s) Oral two times a day    MEDICATIONS  (PRN):  acetaminophen     Tablet .. 650 milliGRAM(s) Oral every 6 hours PRN Mild Pain (1 - 3), Moderate Pain (4 - 6)  dextrose Oral Gel 15 Gram(s) Oral once PRN Blood Glucose LESS THAN 70 milliGRAM(s)/deciliter      Allergies    No Known Drug Allergies  CONTRAST DYS (Nausea)    Intolerances        LABS:                        14.7   9.96  )-----------( 162      ( 22 Jan 2025 05:30 )             44.4     01-22    137  |  95[L]  |  31[H]  ----------------------------<  144[H]  3.7   |  31  |  0.7    Ca    8.9      22 Jan 2025 05:30  Phos  2.7     01-22  Mg     2.0     01-22    TPro  4.9[L]  /  Alb  3.2[L]  /  TBili  0.6  /  DBili  x   /  AST  24  /  ALT  26  /  AlkPhos  56  01-22      Urinalysis Basic - ( 22 Jan 2025 05:30 )    Color: x / Appearance: x / SG: x / pH: x  Gluc: 144 mg/dL / Ketone: x  / Bili: x / Urobili: x   Blood: x / Protein: x / Nitrite: x   Leuk Esterase: x / RBC: x / WBC x   Sq Epi: x / Non Sq Epi: x / Bacteria: x        RADIOLOGY & ADDITIONAL TESTS:  Reviewed

## 2025-01-22 NOTE — CHART NOTE - NSCHARTNOTEFT_GEN_A_CORE
Provider:                                              Met with: [  x ] Patient  [ x  ] Family  [   ] Other:         Primary Language: [  x ] English [   ] Other*:                      *Interpretation provided by:         SUPPORT DIAGNOSES            (Check all that apply)         [   ] EOL issues    [ x  ] Advanced Illness    [   ] Cultural / spiritual concerns    [   ] Pain / suffering    [   ] Dementia / AMS    [   ] Other:    [   ] AD issues    [   ] Grief / loss / sadness    [   ] Discharge issues    [   ] Distress / coping         PSYCHOSOCIAL ASSESSMENT OF PATIENT         (Check all that apply)         [ X  ] Initial Assessment            [   ] Reassessment          [   ] Not Applicable this visit         Pain/suffering acuity:    [ x  ] None to mild (0-3)           [   ] Moderate (4-6)        [   ] High (7-10)         Mental Status:    [x   ] Alert/oriented (x3)          [   ] Confused/Altered(x2/x1)         [   ] Non-resp         Functional status:    [   ] Independent w ADLs      [  x ] Needs Assistance             [   ] Bedbound/Full Care         Coping:    [  x ] Coping well                     [  ] Coping w/difficulty            [   ] Poor coping         Support system:    [ x  ] Strong                              [   ] Adequate                        [   ] Inadequate              Past history and medications for:         [ ] Anxiety       [ ] Depression    [ ] Sleep disorders              SERVICE PROVIDED    [   ]Discharge support / facilitation    [   ]AD / goals of care counseling                                  [   ]EOL / death / bereavement counseling    [ X  ]Counseling / support                                                [   ] Family meeting    [   ]Prayer / sacrament / ritual                                      [   ] Referral    [   ]Other                                                                           NOTE and Plan of Care (PoC):      69yFemale admitted for shortness of breath. Admitted for pneumonia. 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 Has since had extensive follow up w ENT. Pt was intubated and self extubated in CCU 1/17. Pt found to be aspirating - speech and swallow following. Patient may need PEG placement. Initially pt was refusing a PEG according to consult request. But today pt spoke to attending MD and when I spoke to her she said she now understands why she would need a PEG placed her only concern is not going to a SNF. She wants to go home. Pt previously independent.   [facility] [place] and sister: Tasha at bedside. Pt is A&Ox4 and was able to provide medical hx. Pt noted she received an update from primary team and wants to have time to consider peg versus no peg. Pt's expressed that she values being able to eat and drink coffee by mouth and wants to take time to consider her options. Supportive counseling provided. Will continue to follow. [phone]

## 2025-01-22 NOTE — PROGRESS NOTE ADULT - SUBJECTIVE AND OBJECTIVE BOX
HPI:  A 70yo Female with PMH of HTN, HLD, GERD, CAD, COPD not on Home O2, Hx of Pneumothorax, polychondirits, SCC of BOT s/p Trach/PEG (reversed in 4/2021), s/p CRT (7 sessions in 2021) who presents with SOB for 1 week duration. History goes back to around 1 week ago When the patient started having Shortness of breath, productive cough, and malaise. Patient dnies any fever. Patient reported that her son was coughing. Patient reported some chest pain previously when coughing . Patient reports diarrhea for 4 days. which resolved now. PAtient denies an urinary symptoms. Patient is admitted for PNA, and COPD excacerbation .     Patient reports some intermittent left sided chest pain when coughing. and abdominal distention as well.  Patient follows with ENT Dr. Louis, last seen 9/23/24, CT Neck 9/2024 showed Abnormal soft tissue within the posterior nasopharynx greater the right, new since the reference exams and nonspecific in appearance. The abnormal submucosal soft tissue in the preepiglottic space greater on the left, asymmetric thickening of the left aryepiglottic fold, and mild epiglottic thickening; similar when correlated with the PET/CT dated 5/12/2022.  Stable rim-calcified right thyroid nodule measuring 1.8 cm. US guided FNA by IR 10/7/24 of  Stable rim-calcified right thyroid nodule measuring 1.8 cm. Showed atypia but not malignant cells.     ENT evaluated the pt for hoarseness, FFL showed, thick purulent secretions on larynx, airway patent. Recommended CT Neck with IVC for further evaluation, Albuterol neb treatment, Humidified O2 ( on 3L of O2 via NC)     Labs significant for WBC 11.2 K (neutrophilic), troponin 96 > 75, AG 16     09:57 - VBG - pH: 7.39  | pCO2: 38    | pO2: 36    | Lactate: 3.0      In Ed the patient was given 2 L  fluid bolus, Azithromycin and rocephin, duoneb and solu-medrol     CXR showed reticular infiltrates     EKG shows PACs, with MAT     VS significant for using 4 L NC, Tachycardia 126     ICU Vital Signs Last 24 Hrs  T(C): 37.3 (10 Zeyad 2025 15:36), Max: 37.8 (10 Zeyad 2025 10:41)  T(F): 99.1 (10 Zeyad 2025 15:36), Max: 100 (10 Zeyad 2025 10:41)  HR: 112 (10 Zeyad 2025 15:36) (97 - 126)  BP: 95/66 (10 Zeyad 2025 15:36) (95/66 - 116/86)  BP(mean): 76 (10 Zeyad 2025 15:36) (76 - 76)  RR: 20 (10 Zeyad 2025 15:36) (20 - 22)  SpO2: 94% (10 Zeyad 2025 15:36) (94% - 95%)  Patient On (Oxygen Delivery Method): nasal cannula  O2 Flow (L/min): 4      INTERVAL HISTORY:   - Patient see with her sister. She is alert and oriented x 4.        ADVANCE DIRECTIVES: SEE GOC CONVERSATION BELOW   [ ] Full Code [ ] DNR  MOLST  [ ]  Living Will  [ ]   DECISION MAKER(s):  [ ] Health Care Proxy(s)  [X ] Surrogate(s)  [ ] Guardian           Name(s): Phone Number(s):    Answers: Emergency Contact Name Feliberto,  Answers: Emergency Contact Phone # [phone],  Answers: Emergency Contact Relationship son  BASELINE (I)ADL(s) (prior to admission):    [place]: [ ]Total  [ ] Moderate [ ]Dependent  Palliative Performance Status Version 2:         %    http://npcrc.org/files/news/palliative_performance_scale_ppsv2.pdf  Allergies    No Known Drug Allergies  CONTRAST DYS (Nausea)    Intolerances  MEDICATIONS  (STANDING):  albuterol/ipratropium for Nebulization 3 milliLiter(s) Nebulizer every 4 hours  albuterol/ipratropium for Nebulization 3 milliLiter(s) Nebulizer every 20 minutes  ceFAZolin   IVPB      ceFAZolin   IVPB 1000 milliGRAM(s) IV Intermittent every 8 hours  chlorhexidine 2% Cloths 1 Application(s) Topical daily  dextrose 5%. 1000 milliLiter(s) (100 mL/Hr) IV Continuous <Continuous>  dextrose 5%. 1000 milliLiter(s) (50 mL/Hr) IV Continuous <Continuous>  dextrose 50% Injectable 25 Gram(s) IV Push once  dextrose 50% Injectable 12.5 Gram(s) IV Push once  dextrose 50% Injectable 25 Gram(s) IV Push once  glucagon  Injectable 1 milliGRAM(s) IntraMuscular once  heparin   Injectable 5000 Unit(s) SubCutaneous every 12 hours  insulin lispro (ADMELOG) corrective regimen sliding scale   SubCutaneous two times a day before meals  melatonin 5 milliGRAM(s) Oral at bedtime  pantoprazole    Tablet 40 milliGRAM(s) Oral before breakfast  predniSONE   Tablet 20 milliGRAM(s) Oral daily  saccharomyces boulardii 250 milliGRAM(s) Oral two times a day    MEDICATIONS  (PRN):  acetaminophen     Tablet .. 650 milliGRAM(s) Oral every 6 hours PRN Mild Pain (1 - 3), Moderate Pain (4 - 6)  dextrose Oral Gel 15 Gram(s) Oral once PRN Blood Glucose LESS THAN 70 milliGRAM(s)/deciliter    PRESENT SYMPTOMS: [ ]Unable to obtain due to poor mentation   Source if other than patient:  [ ]Family   [ ]Team     Pain: [ ]yes [ X]no  QOL impact -   Location -                    Aggravating factors -  Quality -  Radiation -  Timing-  Severity (0-10 scale):  Minimal acceptable level (0-10 scale):     CPOT:    https://[email or web address]/getattachment/[email or web address]/Critical-Care-Pain-Observation-Tool-(CPOT)    PAIN AD Score:   [email or web address]/painad.pdf (press ctrl +  left click to view)    Dyspnea:                           [X ]None[ ]Mild [ ]Moderate [ ]Severe     Respiratory Distress Observation Scale (RDOS):   A score of 0 to 2 signifies little or no respiratory distress, 3 signifies mild distress, scores 4 to 6 indicate moderate distress, and scores greater than 7 signify severe distress  [email or web address]-[email or web address]    Anxiety:                             [ ]None[ ]Mild [ ]Moderate [ ]Severe   Fatigue:                             [ ]None[ ]Mild [ ]Moderate [ ]Severe   Nausea:                             [ ]None[ ]Mild [ ]Moderate [ ]Severe   Loss of appetite:              [ ]None[ ]Mild [ ]Moderate [ ]Severe   Constipation:                    [ ]None[ ]Mild [ ]Moderate [ ]Severe    Other Symptoms:  [X ]All other review of systems negative     Palliative Performance Status Version 2:         %    http://npcrc.org/files/news/palliative_performance_scale_ppsv2.pdf    PHYSICAL EXAM:  Vital Signs Last 24 Hrs  T(C): 36 (22 Jan 2025 11:33), Max: 36.2 (22 Jan 2025 07:46)  T(F): 96.8 (22 Jan 2025 11:33), Max: 97.1 (22 Jan 2025 07:46)  HR: 88 (22 Jan 2025 11:33) (67 - 89)  BP: 111/59 (22 Jan 2025 11:33) (97/61 - 137/89)  BP(mean): 81 (22 Jan 2025 11:33) (73 - 109)  RR: 20 (22 Jan 2025 11:33) (18 - 20)  SpO2: 94% (22 Jan 2025 11:33) (88% - 99%)    Parameters below as of 22 Jan 2025 11:33  Patient On (Oxygen Delivery Method): nasal cannula     I&O's Summary    21 Jan 2025 07:01  -  22 Jan 2025 07:00  --------------------------------------------------------  IN: 0 mL / OUT: 850 mL / NET: -850 mL      GENERAL:  [X ] No acute distress [ ]Lethargic  [ ]Unarousable  [ ]Verbal  [ ]Non-Verbal [ ]Cachexia    BEHAVIORAL/PSYCH:  [ X]Alert and Oriented x 3 [ ] Anxiety [ ] Delirium [ ] Agitation [X ] Calm   EYES: [ X] No scleral icterus [ ] Scleral icterus [ ] Closed  ENMT:  [ ]Dry mouth  [ ]No external oral lesions [ ] No external ear or nose lesions  CARDIOVASCULAR:  [ ]Regular [ ]Irregular [ ]Tachy [ ]Not Tachy  [ ]Lucho [ ] Edema [X ] No edema  PULMONARY:  [ ]Tachypnea  [ ]Audible excessive secretions [x ] No labored breathing [ ] labored breathing  GASTROINTESTINAL: [ ]Soft  [ ]Distended  [ ]Not distended [ ]Non tender [ ]Tender  MUSCULOSKELETAL: [ ]No clubbing [ ] clubbing  [x ] No cyanosis [ ] cyanosis  NEUROLOGIC: [ x]No focal deficits  [ ]Follows commands  [ ]Does not follow commands  [ ]Cognitive impairment  [ ]Dysphagia  [ ]Dysarthria  [ ]Paresis   SKIN: [ ] Jaundiced [ ] Non-jaundiced [ ]Rash [ ]No Rash [ x] Warm [x ] Dry  MISC/LINES: [ ] ET tube [ ] Trach [ ]NGT/OGT [ ]PEG [ ]Romo      LABS: reviewed by me                        14.7   9.96  )-----------( 162      ( 22 Jan 2025 05:30 )             44.4   01-22    137  |  95[L]  |  31[H]  ----------------------------<  144[H]  3.7   |  31  |  0.7    Ca    8.9      22 Jan 2025 05:30  Phos  2.7     01-22  Mg     2.0     01-22    TPro  4.9[L]  /  Alb  3.2[L]  /  TBili  0.6  /  DBili  x   /  AST  24  /  ALT  26  /  AlkPhos  56  01-22      Urinalysis Basic - ( 22 Jan 2025 05:30 )    Color: x / Appearance: x / SG: x / pH: x  Gluc: 144 mg/dL / Ketone: x  / Bili: x / Urobili: x   Blood: x / Protein: x / Nitrite: x   Leuk Esterase: x / RBC: x / WBC x   Sq Epi: x / Non Sq Epi: x / Bacteria: x      RADIOLOGY & ADDITIONAL STUDIES: reviewed by me    EKG: reviewed by me      Palliative Care Spiritual/Emotional Screening Tool Question  Severity (0-4): 0  Score of 2 or greater indicates recommendation of Chaplaincy referral  Chaplaincy Referral: [ ] Yes [ ] Refused [ ] Following    Caregiver [place]:  [ ] Yes [ ] No [X ] Deferred  Social Work Referral [ ]  Patient and Family Centered Care Referral [ ]    Anticipatory Grief Present: [ X] Yes [ ] No [ ] Deferred  Social Work Referral [ X]  Patient and Family Centered Care Referral [ ]    Patient discussed with primary medical team MD  Palliative care education provided to patient and/or family

## 2025-01-22 NOTE — PROGRESS NOTE ADULT - ASSESSMENT
IMPRESSION:    Acute hypoxemic resp failure s/p extubation   COPD exacerbation  Tracheal stenosis SP dilation   HO trach   History of lung nodules  History of relapsing polychondritis   possible PNA   Emphysema     PLAN:    CNS: No sedation.     HEENT:  Oral care; No interventions. Extubated 1/17.    PULMONARY: On nasal cannula now. Wean off Fio2. Prednisone 20mg for 2 more days. Nebs every 4hours. Thoracic surgery follow up. ABG.     CARDIOVASCULAR: Fluid balance negative last 24 hours.     GI: Speech and swallow follow up. High risk of aspiration. Considering PEG. NPO until definitive decision.     RENAL:  F/u  lytes.  Correct as needed.     INFECTIOUS DISEASE: finish course of zosyn. ID follow up.     HEMATOLOGICAL:  DVT prophylaxis. LE doppler negative.     ENDOCRINE:  Follow up FS.  Insulin protocol if needed.    Palliative care follow up   Floor in PM          IMPRESSION:    Acute hypoxemic resp failure s/p extubation   COPD exacerbation  Tracheal stenosis SP dilation   HO trach   History of lung nodules  History of relapsing polychondritis   Possible PNA   Emphysema     PLAN:    CNS: No sedation.     HEENT:  Oral care; No interventions. Extubated 1/17.    PULMONARY: On nasal cannula now. Wean off Fio2. Prednisone 20mg for 2 more days. Nebs every 4hours. Thoracic surgery follow up. ABG.     CARDIOVASCULAR: Fluid balance negative last 24 hours.     GI: Speech and swallow follow up. High risk of aspiration. Considering PEG. NPO until definitive decision.     RENAL:  F/u  lytes.  Correct as needed.     INFECTIOUS DISEASE: finish course of zosyn. ID follow up.     HEMATOLOGICAL:  DVT prophylaxis. LE doppler negative.     ENDOCRINE:  Follow up FS.  Insulin protocol if needed.    Palliative care follow up   Floor in PM

## 2025-01-22 NOTE — PROGRESS NOTE ADULT - ASSESSMENT
70yo Female with PMH of HTN, HLD, GERD, CAD, COPD not on Home O2, Hx of Pneumothorax, polychondirits, SCC of BOT s/p Trach/PEG (reversed in 4/2021, follows with Dr Louis), s/p CRT (7 sessions in 2021) who presents with SOB for 1 week duratio.  Pt was treated for  hypoxic resp failure due to COPD exacerbation complicated by tracheal stenosis s/p dilatation by CT surgery.   Pt has severe dysphagia, RRT was called on the floor, pt developed ARF due to aspiration, was intubated and upgraded to ICU, s/p bronch , self extubated on 1/17 and was downgraded to SDU.     Acute hypoxemic resp failure due to COPD exacerbation, improved  MSSA PNA   Reccurent aspiration with oropharyngeal dysphagia  HO trach   h/o Tracheal stenosis SP dilation    History of lung nodules/ emphysema  - pt self extubated on 1/17, now stable now on 5-6 L NC, will taper down oxygen as tolerated   - pt failed barium swallow, NPO for now  - discussed with s/s today, dysphagia irreversible, recommending PEG  - discussed with patient re: NGT, she currently refusing  - GI consult requested to discuss PEG   - MSSA in bronch cx -- currently on cefazolin 1g q 8 hours   - tapering steroids  - incentive spirometry, out of bed to chair  - ABG today reviewed, lactate 4 - IVF bolus, repeat LA     h/o Polychondritis   - as per Rheum note, patient was on cellcept 1000 G BID in the past   -  hydroxychloroquine d/c   - off immunosuppresants at this time, resume post dc     HTN/HLD/CAD/ combined CHF   - intake and output monitoring, daily weight  - monitor for fluid overload   - resume home meds if SBP is stable     Hyperglycemia / prediabetes   - CC diet, monitor f/s   - on Insulin now, adjust based on FS     GERD - C/w Protonix    Overall prognosis is guarded, all discussed with patient and her sister during rounds  Continue monitoring in SDU    Patient seen at bedside, total time spent to evaluate and treat the patient's acute illness and chronic medical conditions as well as time spent reviewing prior records, labs, radiology, documenting in electronic medical records,  discussing medical plan with   medical team was 52 minutes with >50% of time spent face to face with patient, discussing with patient/family as well as coordination of care

## 2025-01-23 LAB
ALBUMIN SERPL ELPH-MCNC: 3.3 G/DL — LOW (ref 3.5–5.2)
ALP SERPL-CCNC: 63 U/L — SIGNIFICANT CHANGE UP (ref 30–115)
ALT FLD-CCNC: 31 U/L — SIGNIFICANT CHANGE UP (ref 0–41)
ANION GAP SERPL CALC-SCNC: 9 MMOL/L — SIGNIFICANT CHANGE UP (ref 7–14)
AST SERPL-CCNC: 38 U/L — SIGNIFICANT CHANGE UP (ref 0–41)
BASOPHILS # BLD AUTO: 0.04 K/UL — SIGNIFICANT CHANGE UP (ref 0–0.2)
BASOPHILS NFR BLD AUTO: 0.3 % — SIGNIFICANT CHANGE UP (ref 0–1)
BILIRUB SERPL-MCNC: 0.7 MG/DL — SIGNIFICANT CHANGE UP (ref 0.2–1.2)
BUN SERPL-MCNC: 25 MG/DL — HIGH (ref 10–20)
CALCIUM SERPL-MCNC: 8.6 MG/DL — SIGNIFICANT CHANGE UP (ref 8.4–10.5)
CHLORIDE SERPL-SCNC: 97 MMOL/L — LOW (ref 98–110)
CO2 SERPL-SCNC: 33 MMOL/L — HIGH (ref 17–32)
CREAT SERPL-MCNC: 0.7 MG/DL — SIGNIFICANT CHANGE UP (ref 0.7–1.5)
EGFR: 93 ML/MIN/1.73M2 — SIGNIFICANT CHANGE UP
EOSINOPHIL # BLD AUTO: 0.07 K/UL — SIGNIFICANT CHANGE UP (ref 0–0.7)
EOSINOPHIL NFR BLD AUTO: 0.6 % — SIGNIFICANT CHANGE UP (ref 0–8)
GLUCOSE BLDC GLUCOMTR-MCNC: 160 MG/DL — HIGH (ref 70–99)
GLUCOSE BLDC GLUCOMTR-MCNC: 222 MG/DL — HIGH (ref 70–99)
GLUCOSE BLDC GLUCOMTR-MCNC: 255 MG/DL — HIGH (ref 70–99)
GLUCOSE SERPL-MCNC: 113 MG/DL — HIGH (ref 70–99)
HCT VFR BLD CALC: 48.1 % — HIGH (ref 37–47)
HGB BLD-MCNC: 15.5 G/DL — SIGNIFICANT CHANGE UP (ref 12–16)
IMM GRANULOCYTES NFR BLD AUTO: 2.1 % — HIGH (ref 0.1–0.3)
LACTATE SERPL-SCNC: 1.3 MMOL/L — SIGNIFICANT CHANGE UP (ref 0.7–2)
LYMPHOCYTES # BLD AUTO: 0.5 K/UL — LOW (ref 1.2–3.4)
LYMPHOCYTES # BLD AUTO: 4 % — LOW (ref 20.5–51.1)
MAGNESIUM SERPL-MCNC: 1.8 MG/DL — SIGNIFICANT CHANGE UP (ref 1.8–2.4)
MCHC RBC-ENTMCNC: 28.4 PG — SIGNIFICANT CHANGE UP (ref 27–31)
MCHC RBC-ENTMCNC: 32.2 G/DL — SIGNIFICANT CHANGE UP (ref 32–37)
MCV RBC AUTO: 88.1 FL — SIGNIFICANT CHANGE UP (ref 81–99)
MONOCYTES # BLD AUTO: 0.57 K/UL — SIGNIFICANT CHANGE UP (ref 0.1–0.6)
MONOCYTES NFR BLD AUTO: 4.6 % — SIGNIFICANT CHANGE UP (ref 1.7–9.3)
NEUTROPHILS # BLD AUTO: 11.08 K/UL — HIGH (ref 1.4–6.5)
NEUTROPHILS NFR BLD AUTO: 88.4 % — HIGH (ref 42.2–75.2)
NON-GYNECOLOGICAL CYTOLOGY STUDY: SIGNIFICANT CHANGE UP
NRBC # BLD: 0 /100 WBCS — SIGNIFICANT CHANGE UP (ref 0–0)
NRBC BLD-RTO: 0 /100 WBCS — SIGNIFICANT CHANGE UP (ref 0–0)
PHOSPHATE SERPL-MCNC: 2.7 MG/DL — SIGNIFICANT CHANGE UP (ref 2.1–4.9)
PLATELET # BLD AUTO: 148 K/UL — SIGNIFICANT CHANGE UP (ref 130–400)
PMV BLD: 10.3 FL — SIGNIFICANT CHANGE UP (ref 7.4–10.4)
POTASSIUM SERPL-MCNC: 4.1 MMOL/L — SIGNIFICANT CHANGE UP (ref 3.5–5)
POTASSIUM SERPL-SCNC: 4.1 MMOL/L — SIGNIFICANT CHANGE UP (ref 3.5–5)
PROT SERPL-MCNC: 4.8 G/DL — LOW (ref 6–8)
RBC # BLD: 5.46 M/UL — HIGH (ref 4.2–5.4)
RBC # FLD: 13.6 % — SIGNIFICANT CHANGE UP (ref 11.5–14.5)
SODIUM SERPL-SCNC: 139 MMOL/L — SIGNIFICANT CHANGE UP (ref 135–146)
WBC # BLD: 12.52 K/UL — HIGH (ref 4.8–10.8)
WBC # FLD AUTO: 12.52 K/UL — HIGH (ref 4.8–10.8)

## 2025-01-23 PROCEDURE — 99233 SBSQ HOSP IP/OBS HIGH 50: CPT

## 2025-01-23 PROCEDURE — 71045 X-RAY EXAM CHEST 1 VIEW: CPT | Mod: 26

## 2025-01-23 PROCEDURE — 93010 ELECTROCARDIOGRAM REPORT: CPT

## 2025-01-23 RX ADMIN — Medication 4: at 09:08

## 2025-01-23 RX ADMIN — Medication 5000 UNIT(S): at 18:26

## 2025-01-23 RX ADMIN — PREDNISONE 20 MILLIGRAM(S): 5 TABLET ORAL at 05:23

## 2025-01-23 RX ADMIN — Medication 250 MILLIGRAM(S): at 05:22

## 2025-01-23 RX ADMIN — Medication 100 MILLIGRAM(S): at 13:06

## 2025-01-23 RX ADMIN — PANTOPRAZOLE 40 MILLIGRAM(S): 20 TABLET, DELAYED RELEASE ORAL at 09:10

## 2025-01-23 RX ADMIN — Medication 100 MILLIGRAM(S): at 05:23

## 2025-01-23 RX ADMIN — IPRATROPIUM BROMIDE AND ALBUTEROL SULFATE 3 MILLILITER(S): .5; 2.5 SOLUTION RESPIRATORY (INHALATION) at 19:39

## 2025-01-23 RX ADMIN — Medication 5000 UNIT(S): at 05:23

## 2025-01-23 RX ADMIN — ACETAMINOPHEN 650 MILLIGRAM(S): 160 SUSPENSION ORAL at 13:05

## 2025-01-23 RX ADMIN — ACETAMINOPHEN, DIPHENHYDRAMINE HCL, PHENYLEPHRINE HCL 5 MILLIGRAM(S): 325; 25; 5 TABLET ORAL at 22:05

## 2025-01-23 RX ADMIN — ACETAMINOPHEN 650 MILLIGRAM(S): 160 SUSPENSION ORAL at 14:05

## 2025-01-23 RX ADMIN — Medication 100 MILLIGRAM(S): at 22:06

## 2025-01-23 RX ADMIN — Medication 2: at 18:27

## 2025-01-23 RX ADMIN — Medication 250 MILLIGRAM(S): at 18:26

## 2025-01-23 RX ADMIN — ANTISEPTIC SURGICAL SCRUB 1 APPLICATION(S): 0.04 SOLUTION TOPICAL at 11:18

## 2025-01-23 NOTE — CHART NOTE - NSCHARTNOTEFT_GEN_A_CORE
A 68yo Female with PMH of HTN, HLD, GERD, CAD, COPD not on Home O2, Hx of Pneumothorax, polychondirits, SCC of BOT s/p Trach/PEG (reversed in 4/2021), s/p CRT (7 sessions in 2021) who presents with SOB for 1 week duration. History goes back to around 1 week ago When the patient started having Shortness of breath, productive cough, and malaise. Patient dnies any fever. Patient reported that her son was coughing. Patient reported some chest pain previously when coughing . Patient reports diarrhea for 4 days. which resolved now. PAtient denies an urinary symptoms. Patient is admitted for PNA, and COPD excacerbation .     Patient reports some intermittent left sided chest pain when coughing. and abdominal distention as well.  Patient follows with ENT Dr. Louis, last seen 9/23/24, CT Neck 9/2024 showed Abnormal soft tissue within the posterior nasopharynx greater the right, new since the reference exams and nonspecific in appearance. The abnormal submucosal soft tissue in the preepiglottic space greater on the left, asymmetric thickening of the left aryepiglottic fold, and mild epiglottic thickening; similar when correlated with the PET/CT dated 5/12/2022.  Stable rim-calcified right thyroid nodule measuring 1.8 cm. US guided FNA by IR 10/7/24 of  Stable rim-calcified right thyroid nodule measuring 1.8 cm. Showed atypia but not malignant cells.     ENT evaluated the pt for hoarseness, FFL showed, thick purulent secretions on larynx, airway patent. Recommended CT Neck with IVC for further evaluation, Albuterol neb treatment, Humidified O2 ( on 3L of O2 via NC)     Labs significant for WBC 11.2 K (neutrophilic), troponin 96 > 75, AG 16     09:57 - VBG - pH: 7.39  | pCO2: 38    | pO2: 36    | Lactate: 3.0      In Ed the patient was given 2 L  fluid bolus, Azithromycin and rocephin, duoneb and solu-medrol     CXR showed reticular infiltrates     EKG shows PACs, with MAT     VS significant for using 4 L NC, Tachycardia 126     ICU Vital Signs Last 24 Hrs  T(C): 37.3 (10 Zeyad 2025 15:36), Max: 37.8 (10 Zeyad 2025 10:41)  T(F): 99.1 (10 Zeyad 2025 15:36), Max: 100 (10 Zeyad 2025 10:41)  HR: 112 (10 Zeyad 2025 15:36) (97 - 126)  BP: 95/66 (10 Zeyad 2025 15:36) (95/66 - 116/86)  BP(mean): 76 (10 Zeyad 2025 15:36) (76 - 76)  RR: 20 (10 Zeyad 2025 15:36) (20 - 22)  SpO2: 94% (10 Zeyad 2025 15:36) (94% - 95%)  Patient On (Oxygen Delivery Method): nasal cannula  O2 Flow (L/min): 4    Hospital course:   Pt had hypoxic resp failure due to COPD exacerbation complicated by tracheal stenosis, H/o SCC s/p surgery. Had been on NC. Steroids were being tapered. Sputum sample and PJP had been ordered. Pt had also been having diarrhea and stool PCR had been sent.     On 1/17: Rapid was called0 Pt had been on bipap, called for assistance as she was not tolerating bipap and seemed to be having difficulty breathing. Bipap was removed. Pt was having poor air entry on NC, became unresponsive and desat to 14% rapidly. HR increased to 190s and BP to 200s/100s.   Rapid was called. Pt was placed on nonrebreather with little improvement and was then intubated.   ABG was pH 7.21, pCO2 82, pO2 141, HCO3 33, O2 sat 99.7. Lactate 6.1  CXR confirmed placement of ET tube.   Saturation improved, HR improved.   Pt sedated initially with fentanyl and precedex.   Propofol was added and low dose levo was started.   Stat labs sent.   CT PE, CT neck, CT head ordered.   Pt is transferred to CCU.    Bronchoscopy on 1/17 - Bronch BAL of the RLL done with no issues. Secretions noted in the right mainstem bronchus; thick and jelly like. Tolerated well.    1/17 Pt self extubated.  put on BiPAP sat 96.  ABG ON bIpap: PH 7.54, pO2 81, pCO2 41, lactate 1.8, HCO3 35.    ENT evaluated no intervention, recommend Recall CT surgery as patient is post dilation on 1/14  CT sx eval:   - No acute thoracic surgery intervention, CT finding likely reflective of normal post operative changes  - Patient should be instructed to follow up with surgeon Dr. Patrick Elmore in 2 weeks for outpatient follow up    Speech and swallow eval:   not a candidate for bedside swallow assessment 2' hx; warrants instrumental swallow study    pt weaned off BiPAP and HFNC, on 6L nc at this time.     Dimer 1176, rpt LE doppler pending    SDU course:  - Patient weaned off O2 requirements as tolerated  - Failed swallow test with obvious aspiration. Seen by S&S, patient aware of risks of aspiration and is eating a pureed diet with aspiration precaution measures.  - Code status made DNR/intubate by palliative care.  - Seen by ID given the MSSA growing in BAL culture, c/w cefazolin  - Path from bronchoscopy grew Aspergillus on GMS stain, given the >500 fungitell (might be a colonizer though), ID recommended CT chest w/o con to f/u  - Seen by GI for PEG tube placement as patient was declining NG tube insertion, patient prefers to wait on the PEG tube for now.    INTERVAL HPI/OVERNIGHT EVENTS:  Patient was seen and examined at bedside. As per nurse and patient, no o/n events, patient resting comfortably. No complaints at this time. Patient denies: fever, chills, dizziness, weakness, HA, Changes in vision, CP, palpitations, SOB, cough, N/V/D/C, dysuria, changes in bowel movements, LE edema. ROS otherwise negative.    VITAL SIGNS:  T(F): 98.4 (01-23-25 @ 08:09)  HR: 89 (01-23-25 @ 08:09)  BP: 121/73 (01-23-25 @ 08:09)  RR: 20 (01-23-25 @ 08:09)  SpO2: 93% (01-23-25 @ 08:09)  Wt(kg): --    PHYSICAL EXAM:    Constitutional: WDWN, NAD  Respiratory: CTA b/l, good air entry b/l, no wheezing, no rhonchi, no rales, without accessory muscle use and no intercostal retractions  Cardiovascular: RRR, normal S1S2, no M/R/G  Gastrointestinal: soft, NTND, no masses palpable, BS normal  Extremities: Warm, well perfused, pulses equal bilateral upper and lower extremities, no edema, no clubbing  Neurological: AAOx3, CN Grossly intact  Skin: Normal temperature, warm, dry    MEDICATIONS  (STANDING):  albuterol/ipratropium for Nebulization 3 milliLiter(s) Nebulizer every 4 hours  albuterol/ipratropium for Nebulization 3 milliLiter(s) Nebulizer every 20 minutes  ceFAZolin   IVPB      ceFAZolin   IVPB 1000 milliGRAM(s) IV Intermittent every 8 hours  chlorhexidine 2% Cloths 1 Application(s) Topical daily  dextrose 5%. 1000 milliLiter(s) (100 mL/Hr) IV Continuous <Continuous>  dextrose 5%. 1000 milliLiter(s) (50 mL/Hr) IV Continuous <Continuous>  dextrose 50% Injectable 25 Gram(s) IV Push once  dextrose 50% Injectable 12.5 Gram(s) IV Push once  dextrose 50% Injectable 25 Gram(s) IV Push once  glucagon  Injectable 1 milliGRAM(s) IntraMuscular once  heparin   Injectable 5000 Unit(s) SubCutaneous every 12 hours  insulin lispro (ADMELOG) corrective regimen sliding scale   SubCutaneous two times a day before meals  melatonin 5 milliGRAM(s) Oral at bedtime  pantoprazole    Tablet 40 milliGRAM(s) Oral before breakfast  predniSONE   Tablet 20 milliGRAM(s) Oral daily  saccharomyces boulardii 250 milliGRAM(s) Oral two times a day    MEDICATIONS  (PRN):  acetaminophen     Tablet .. 650 milliGRAM(s) Oral every 6 hours PRN Mild Pain (1 - 3), Moderate Pain (4 - 6)  dextrose Oral Gel 15 Gram(s) Oral once PRN Blood Glucose LESS THAN 70 milliGRAM(s)/deciliter      Allergies    No Known Drug Allergies  CONTRAST DYS (Nausea)    Intolerances        LABS:                        15.5   12.52 )-----------( 148      ( 23 Jan 2025 05:29 )             48.1     01-23    139  |  97[L]  |  25[H]  ----------------------------<  113[H]  4.1   |  33[H]  |  0.7    Ca    8.6      23 Jan 2025 05:29  Phos  2.7     01-23  Mg     1.8     01-23    TPro  4.8[L]  /  Alb  3.3[L]  /  TBili  0.7  /  DBili  x   /  AST  38  /  ALT  31  /  AlkPhos  63  01-23      Urinalysis Basic - ( 23 Jan 2025 05:29 )    Color: x / Appearance: x / SG: x / pH: x  Gluc: 113 mg/dL / Ketone: x  / Bili: x / Urobili: x   Blood: x / Protein: x / Nitrite: x   Leuk Esterase: x / RBC: x / WBC x   Sq Epi: x / Non Sq Epi: x / Bacteria: x        RADIOLOGY & ADDITIONAL TESTS:  Reviewed      Assessment and Plan:   · Assessment	  68yo Female with PMH of HTN, HLD, GERD, CAD, COPD not on Home O2, Hx of Pneumothorax, polychondirits, SCC of BOT s/p Trach/PEG (reversed in 4/2021), s/p CRT (7 sessions in 2021) who presents with SOB for 1 week duration. History goes back to around 1 week ago When the patient started having Shortness of breath, productive cough, and malaise. . Patient is admitted for PNA, and COPD excacerbation .   Patient reports some intermittent left sided chest pain when coughing. and abdominal distention as well.  Patient follows with ENT Dr. Louis, last seen 9/23/24, CT Neck 9/2024 showed Abnormal soft tissue within the posterior nasopharynx greater the right, new since the reference exams and nonspecific in appearance. The abnormal submucosal soft tissue in the preepiglottic space greater on the left, asymmetric thickening of the left aryepiglottic fold, and mild epiglottic thickening; similar when correlated with the PET/CT dated 5/12/2022.  Stable rim-calcified right thyroid nodule measuring 1.8 cm. US guided FNA by IR 10/7/24 of  Stable rim-calcified right thyroid nodule measuring 1.8 cm. Showed atypia but not malignant cells.   Pt was treated for  hypoxic resp failure due to COPD exacerbation complicated by tracheal stenosis, H/o SCC s/p surgery. Had been on NC. Steroids were being tapered. Few weeks ago pt underwent bronchoscopy with tracheal dilation.   Pt has severe dysphagia, RRT was called on the floor, pt developed ARF due to aspiration, was intubated and upgraded to ICU, s/p bronch , self extubated on 1/17 and was downgraded to SDU.  Pt failed barium swallow, PEG tube recommended, pt is agreeable now, will consult GI.      ASSESSMENT & PLAN:   # Acute hypoxemic resp failure  #COPD exacerbation  #HO trach   #h/o Tracheal stenosis SP dilation  #History of lung nodules  #mphysema  #ropharyngeal dysphagia   - O 6 L NC, will taper down oxygen as tolerated   - supportive care, aspiration precautions, pulmonary toilet  - pt failed barium swallow, NPO for now, needs PEG, agreeable, consult GI   - pt self extubated on 1/17  - nebs Q 6 hours   - sputum Cx noted, ID is following, recommendations noted:  - MSSA in [facility] cx -- narrowed zosyn to cefazolin 1g q 8 hours   - trend WBC and monitor fever curve   - incentive spirometry  - Prednisone 20 mg QD for 2 more days  - Patient declining NG tube placement, she is considering PEG tube placement    # h/o Polychondritis   - as per Rheum note, patient was on cellcept 1000 G BID in the past   -  hydroxychloroquine d/c   - off immunosuppresants at this time, resume post dc     # HTN/HLD/CAD/ combined CHF   - fluid restriction 1200 ml in 24 hours   - intake and output monitoring, daily weight  - monitor for fluid overload   - resume home meds if SBP is stable     # Hyperglycemia / prediabetes   - CC diet, monitor f/s   - not on steroids now  - on Insulin now     # GERD   - C/w Protonix  - started Probiotics

## 2025-01-23 NOTE — PROGRESS NOTE ADULT - ASSESSMENT
IMPRESSION:    Acute hypoxemic resp failure s/p extubation   COPD exacerbation  Tracheal stenosis SP dilation   HO trach   History of lung nodules  History of relapsing polychondritis   Possible PNA   Emphysema     PLAN:    CNS: No sedation.     HEENT:  Oral care; No interventions. Extubated 1/17.    PULMONARY: On nasal cannula now. Wean off Fio2. Prednisone 20mg for 2 more days. Nebs every 4hours. Thoracic surgery follow up. ABG noted.     CARDIOVASCULAR: Fluid balance negative last 24 hours. Lactate downtrended.     GI: Speech and swallow follow up. High risk of aspiration. Considering PEG. Feeding per speech. GI eval.     RENAL:  F/u  lytes.  Correct as needed.     INFECTIOUS DISEASE: finish course of zosyn. ID follow up.     HEMATOLOGICAL:  DVT prophylaxis. LE doppler negative.     ENDOCRINE:  Follow up FS.  Insulin protocol if needed.    Palliative care follow up   Floor

## 2025-01-23 NOTE — PROGRESS NOTE ADULT - SUBJECTIVE AND OBJECTIVE BOX
Patient is a 69y old  Female who presents with a chief complaint of shortness of breath, malaise (16 Jan 2025 11:51)        Over Night Events:  On 4L nasal cannula. Afebrile. No pressors.       ROS:     All ROS are negative except HPI         PHYSICAL EXAM    ICU Vital Signs Last 24 Hrs  T(C): 36.9 (23 Jan 2025 08:09), Max: 37.1 (23 Jan 2025 04:00)  T(F): 98.4 (23 Jan 2025 08:09), Max: 98.7 (23 Jan 2025 04:00)  HR: 89 (23 Jan 2025 08:09) (75 - 89)  BP: 121/73 (23 Jan 2025 08:09) (102/67 - 124/80)  BP(mean): 86 (23 Jan 2025 08:09) (81 - 86)  ABP: --  ABP(mean): --  RR: 20 (23 Jan 2025 08:09) (20 - 20)  SpO2: 93% (23 Jan 2025 08:09) (93% - 98%)    O2 Parameters below as of 23 Jan 2025 08:09  Patient On (Oxygen Delivery Method): nasal cannula            CONSTITUTIONAL:  in NAD    ENT:   Airway patent,   Mouth with normal mucosa.   No thrush    EYES:   Pupils equal,   Round and reactive to light.    CARDIAC:   Normal rate,   Regular rhythm.    No edema    RESPIRATORY:   Bilateral BS  Normal chest expansion  Not tachypneic,  No use of accessory muscles    GASTROINTESTINAL:  Abdomen soft,   Non-tender,   No guarding,     MUSCULOSKELETAL:   Range of motion is not limited,  No clubbing, cyanosis    NEUROLOGICAL:   Alert and oriented   No motor  deficits.    SKIN:   Skin normal color for race,   Warm and dry     01-22-25 @ 07:01  -  01-23-25 @ 07:00  --------------------------------------------------------  IN:    IV PiggyBack: 110 mL    Oral Fluid: 150 mL    Sodium Chloride 0.9% Bolus: 500 mL  Total IN: 760 mL    OUT:    Voided (mL): 900 mL  Total OUT: 900 mL    Total NET: -140 mL          LABS:                            15.5   12.52 )-----------( 148      ( 23 Jan 2025 05:29 )             48.1                                               01-23    139  |  97[L]  |  25[H]  ----------------------------<  113[H]  4.1   |  33[H]  |  0.7    Ca    8.6      23 Jan 2025 05:29  Phos  2.7     01-23  Mg     1.8     01-23    TPro  4.8[L]  /  Alb  3.3[L]  /  TBili  0.7  /  DBili  x   /  AST  38  /  ALT  31  /  AlkPhos  63  01-23                                             Urinalysis Basic - ( 23 Jan 2025 05:29 )    Color: x / Appearance: x / SG: x / pH: x  Gluc: 113 mg/dL / Ketone: x  / Bili: x / Urobili: x   Blood: x / Protein: x / Nitrite: x   Leuk Esterase: x / RBC: x / WBC x   Sq Epi: x / Non Sq Epi: x / Bacteria: x                                                  LIVER FUNCTIONS - ( 23 Jan 2025 05:29 )  Alb: 3.3 g/dL / Pro: 4.8 g/dL / ALK PHOS: 63 U/L / ALT: 31 U/L / AST: 38 U/L / GGT: x                                                                                                                                   ABG - ( 22 Jan 2025 10:49 )  pH, Arterial: 7.57  pH, Blood: x     /  pCO2: 33    /  pO2: 108   / HCO3: 30    / Base Excess: 8.2   /  SaO2: 98.6                MEDICATIONS  (STANDING):  albuterol/ipratropium for Nebulization 3 milliLiter(s) Nebulizer every 4 hours  albuterol/ipratropium for Nebulization 3 milliLiter(s) Nebulizer every 20 minutes  ceFAZolin   IVPB      ceFAZolin   IVPB 1000 milliGRAM(s) IV Intermittent every 8 hours  chlorhexidine 2% Cloths 1 Application(s) Topical daily  dextrose 5%. 1000 milliLiter(s) (100 mL/Hr) IV Continuous <Continuous>  dextrose 5%. 1000 milliLiter(s) (50 mL/Hr) IV Continuous <Continuous>  dextrose 50% Injectable 25 Gram(s) IV Push once  dextrose 50% Injectable 12.5 Gram(s) IV Push once  dextrose 50% Injectable 25 Gram(s) IV Push once  glucagon  Injectable 1 milliGRAM(s) IntraMuscular once  heparin   Injectable 5000 Unit(s) SubCutaneous every 12 hours  insulin lispro (ADMELOG) corrective regimen sliding scale   SubCutaneous two times a day before meals  melatonin 5 milliGRAM(s) Oral at bedtime  pantoprazole    Tablet 40 milliGRAM(s) Oral before breakfast  predniSONE   Tablet 20 milliGRAM(s) Oral daily  saccharomyces boulardii 250 milliGRAM(s) Oral two times a day    MEDICATIONS  (PRN):  acetaminophen     Tablet .. 650 milliGRAM(s) Oral every 6 hours PRN Mild Pain (1 - 3), Moderate Pain (4 - 6)  dextrose Oral Gel 15 Gram(s) Oral once PRN Blood Glucose LESS THAN 70 milliGRAM(s)/deciliter      New X-rays reviewed:                                                                                  ECHO

## 2025-01-23 NOTE — PROGRESS NOTE ADULT - SUBJECTIVE AND OBJECTIVE BOX
GEREMIAS FULLER  69y Female    CHIEF COMPLAINT:    Patient is a 69y old  Female who presents with a chief complaint of shortness of breath, malaise (2025 11:51)    INTERVAL HPI/OVERNIGHT EVENTS:    Patient seen and examined. No acute events overnight. Remains on 4l NC, no active complaints     ROS: All other systems are negative.    Vital Signs:    T(F): 98.4 (25 @ 08:09), Max: 98.7 (25 @ 04:00)  HR: 89 (25 @ 08:09) (75 - 89)  BP: 121/73 (25 @ 08:09) (102/67 - 124/80)  RR: 20 (25 @ 08:09) (20 - 20)  SpO2: 93% (25 @ 08:09) (93% - 98%)    2025 07:01  -  2025 07:00  --------------------------------------------------------  IN: 760 mL / OUT: 900 mL / NET: -140 mL    Daily Weight in k (2025 01:00)    POCT Blood Glucose.: 222 mg/dL (2025 08:56)  POCT Blood Glucose.: 85 mg/dL (2025 21:42)  POCT Blood Glucose.: 248 mg/dL (2025 16:32)    PHYSICAL EXAM:    GENERAL:  NAD  SKIN: No rashes or lesions  HEENT: Atraumatic. Normocephalic.    NECK: Supple, No JVD.    PULMONARY: CTA B/L. No wheezing. No rales  CVS: Normal S1, S2. Rate and Rhythm are regular   ABDOMEN/GI: Soft, Nontender, Nondistended   MSK:  No clubbing or cyanosis   NEUROLOGIC: moves all extremities .  PSYCH: Awake and alert     Consultant(s) Notes Reviewed:  [x ] YES  [ ] NO  Care Discussed with Consultants/Other Providers [ x] YES  [ ] NO    LABS:                        15.5   12.52 )-----------( 148      ( 2025 05:29 )             48.1    139  |  97[L]  |  25[H]  ----------------------------<  113[H]  4.1   |  33[H]  |  0.7    Ca    8.6      2025 05:29  Phos  2.7       Mg     1.8         TPro  4.8[L]  /  Alb  3.3[L]  /  TBili  0.7  /  DBili  x   /  AST  38  /  ALT  31  /  AlkPhos  63      RADIOLOGY & ADDITIONAL TESTS:  Imaging or report Personally Reviewed:  [x] YES  [ ] NO  EKG reviewed: [x] YES  [ ] NO    Medications:  Standing  albuterol/ipratropium for Nebulization 3 milliLiter(s) Nebulizer every 4 hours  albuterol/ipratropium for Nebulization 3 milliLiter(s) Nebulizer every 20 minutes  ceFAZolin   IVPB      ceFAZolin   IVPB 1000 milliGRAM(s) IV Intermittent every 8 hours  chlorhexidine 2% Cloths 1 Application(s) Topical daily  dextrose 5%. 1000 milliLiter(s) IV Continuous <Continuous>  dextrose 5%. 1000 milliLiter(s) IV Continuous <Continuous>  dextrose 50% Injectable 25 Gram(s) IV Push once  dextrose 50% Injectable 12.5 Gram(s) IV Push once  dextrose 50% Injectable 25 Gram(s) IV Push once  glucagon  Injectable 1 milliGRAM(s) IntraMuscular once  heparin   Injectable 5000 Unit(s) SubCutaneous every 12 hours  insulin lispro (ADMELOG) corrective regimen sliding scale   SubCutaneous two times a day before meals  melatonin 5 milliGRAM(s) Oral at bedtime  pantoprazole    Tablet 40 milliGRAM(s) Oral before breakfast  predniSONE   Tablet 20 milliGRAM(s) Oral daily  saccharomyces boulardii 250 milliGRAM(s) Oral two times a day    PRN Meds  acetaminophen     Tablet .. 650 milliGRAM(s) Oral every 6 hours PRN  dextrose Oral Gel 15 Gram(s) Oral once PRN

## 2025-01-23 NOTE — PROGRESS NOTE ADULT - CONVERSATION DETAILS
Palliative care NP followed up with patient and her sister    Palliative care NP spoke to both the patient and her family member who are aware of her current medical condition and treatment options. Sister concerned re: code status. Reviewed DNR and in depth discussion of DNI. The patient and the sister discussed how other than her issue with her swallowing and her trachea she is medically doing well. And because of that her life could be prolonged with quality on a ventilator. The patient reflected on that and agreed she should be just DNR at this time.   Randy OSBORNE filled out    Palliative NP discussed HCP and filled out form with patient who made her son Feliberto first HCP and sister Maame second HCP. We discussed dysphagia and risks of aspiration at length. Also discussed how hard it is mentally for the pt to accept possibly needing a PEG again after having one and then no longer needing it.     Patient is somewhat convinced that she doesn't need it since she has no problem swallowing in her mind - she silently aspirates. But she does know that's why she got sick this time. She is open to support
Palliative care team followed up with patient    Patient offered PEG to make risk of aspiration less due to tracheal stenosis and current acute PNA and s/p intubation. Pt understands that she aspirates and is high risk. She had a trach and PEG in the past due to SCC BOT - with tracheal radiation. She said she feels herself getting better and wants to work with speech and swallow and ENT and try to eat by mouth and work towards doing that as safely as possible. She said she will discuss with her ENT. She said if she really cannot live safely eating by mouth she would do the PEG.

## 2025-01-23 NOTE — PROGRESS NOTE ADULT - ASSESSMENT
70yo Female with PMH of HTN, HLD, GERD, CAD, COPD not on Home O2, Hx of Pneumothorax, polychondirits, SCC of BOT s/p Trach/PEG (reversed in 4/2021, follows with Dr Louis), s/p CRT (7 sessions in 2021) who presents with SOB for 1 week duratio.  Pt was treated for  hypoxic resp failure due to COPD exacerbation complicated by tracheal stenosis s/p dilatation by CT surgery.   Pt has severe dysphagia, RRT was called on the floor, pt developed ARF due to aspiration, was intubated and upgraded to ICU, s/p bronch , self extubated on 1/17 and was downgraded to SDU.     Acute hypoxemic resp failure due to COPD exacerbation, improved  MSSA PNA   Reccurent aspiration with oropharyngeal dysphagia  HO trach   h/o Tracheal stenosis SP dilation    History of lung nodules/ emphysema  - pt self extubated on 1/17, now stable now on 5-6 L NC, will taper down oxygen as tolerated   - pt failed barium swallow, on pureed diet   - discussed with s/s , dysphagia irreversible, recommending PEG  - discussed with patient re: NGT, she currently refusing  - GI consulted for PEG, patient now undecided   - MSSA in bronch cx -- currently on cefazolin 1g q 8 hours   - tapering steroids  - incentive spirometry, out of bed to chair, PT     h/o Polychondritis   - as per Rheum note, patient was on cellcept 1000 G BID in the past   -  hydroxychloroquine d/c   - off immunosuppresants at this time, resume post dc     HTN/HLD/CAD/ combined CHF   - intake and output monitoring, daily weight  - monitor for fluid overload   - resume home meds if SBP is stable     Hyperglycemia / prediabetes   - CC diet, monitor f/s   - on Insulin now, adjust based on FS     GERD - C/w Protonix    Overall prognosis is guarded  Pending: Taper down supplemental 02, out of bed to chair, ongoing discussions re PEG, PT fu     Patient seen at bedside, total time spent to evaluate and treat the patient's acute illness and chronic medical conditions as well as time spent reviewing prior records, labs, radiology, documenting in electronic medical records,  discussing medical plan with   medical team was 45 minutes with >50% of time spent face to face with patient, discussing with patient/family as well as coordination of care                      68yo Female with PMH of HTN, HLD, GERD, CAD, COPD not on Home O2, Hx of Pneumothorax, polychondritis SCC of BOT s/p Trach/PEG (reversed in 4/2021, follows with Dr Louis), s/p CRT (7 sessions in 2021) who presents with SOB for 1 week duratio.  Pt was treated for  hypoxic resp failure due to COPD exacerbation complicated by tracheal stenosis s/p dilatation by CT surgery.   Pt has severe dysphagia, RRT was called on the floor, pt developed ARF due to aspiration, was intubated and upgraded to ICU, s/p bronch , self extubated on 1/17 and was downgraded to SDU.     Acute hypoxemic resp failure due to COPD exacerbation, improved  MSSA PNA   Reccurent aspiration with oropharyngeal dysphagia  HO trach   h/o Tracheal stenosis SP dilation    History of lung nodules/ emphysema  - pt self extubated on 1/17, now stable now on 4 L NC, will taper down oxygen as tolerated   - pt failed barium swallow, on pureed diet   - discussed with s/s , dysphagia irreversible, recommending PEG  - discussed with patient re: NGT, she currently refusing. I also discussed at length with her re: PEG tube, she does not wish to pursue it at this time. Wants to try modified diet, understanding the risks of aspiration.   - MSSA in bronch cx -- currently on cefazolin 1g q 8 hours   - tapering steroids  - incentive spirometry, out of bed to chair, PT     h/o Polychondritis   - as per Rheum note, patient was on cellcept 1000 G BID in the past   -  hydroxychloroquine d/c   - off immunosuppresants at this time, resume post dc     HTN/HLD/CAD/ combined CHF   - intake and output monitoring, daily weight  - monitor for fluid overload   - resume home meds if SBP is stable     Hyperglycemia / prediabetes   - CC diet, monitor f/s   - on Insulin now, adjust based on FS     GERD - C/w Protonix    Overall prognosis is guarded  Pending: Taper down supplemental 02, out of bed to chair, monitor PO diet,  PT fu     Patient seen at bedside, total time spent to evaluate and treat the patient's acute illness and chronic medical conditions as well as time spent reviewing prior records, labs, radiology, documenting in electronic medical records,  discussing medical plan with   medical team was 50 minutes with >50% of time spent face to face with patient, discussing with patient/family as well as coordination of care

## 2025-01-23 NOTE — PROGRESS NOTE ADULT - SUBJECTIVE AND OBJECTIVE BOX
HPI:  A 68yo Female with PMH of HTN, HLD, GERD, CAD, COPD not on Home O2, Hx of Pneumothorax, polychondirits, SCC of BOT s/p Trach/PEG (reversed in 4/2021), s/p CRT (7 sessions in 2021) who presents with SOB for 1 week duration. History goes back to around 1 week ago When the patient started having Shortness of breath, productive cough, and malaise. Patient dnies any fever. Patient reported that her son was coughing. Patient reported some chest pain previously when coughing . Patient reports diarrhea for 4 days. which resolved now. PAtient denies an urinary symptoms. Patient is admitted for PNA, and COPD excacerbation .     Patient reports some intermittent left sided chest pain when coughing. and abdominal distention as well.  Patient follows with ENT Dr. Louis, last seen 9/23/24, CT Neck 9/2024 showed Abnormal soft tissue within the posterior nasopharynx greater the right, new since the reference exams and nonspecific in appearance. The abnormal submucosal soft tissue in the preepiglottic space greater on the left, asymmetric thickening of the left aryepiglottic fold, and mild epiglottic thickening; similar when correlated with the PET/CT dated 5/12/2022.  Stable rim-calcified right thyroid nodule measuring 1.8 cm. US guided FNA by IR 10/7/24 of  Stable rim-calcified right thyroid nodule measuring 1.8 cm. Showed atypia but not malignant cells.     ENT evaluated the pt for hoarseness, FFL showed, thick purulent secretions on larynx, airway patent. Recommended CT Neck with IVC for further evaluation, Albuterol neb treatment, Humidified O2 ( on 3L of O2 via NC)     Labs significant for WBC 11.2 K (neutrophilic), troponin 96 > 75, AG 16     09:57 - VBG - pH: 7.39  | pCO2: 38    | pO2: 36    | Lactate: 3.0      In Ed the patient was given 2 L  fluid bolus, Azithromycin and rocephin, duoneb and solu-medrol     CXR showed reticular infiltrates     EKG shows PACs, with MAT     VS significant for using 4 L NC, Tachycardia 126     ICU Vital Signs Last 24 Hrs  T(C): 37.3 (10 Zeyad 2025 15:36), Max: 37.8 (10 Zeyad 2025 10:41)  T(F): 99.1 (10 Zeyad 2025 15:36), Max: 100 (10 Zeyad 2025 10:41)  HR: 112 (10 Zeyad 2025 15:36) (97 - 126)  BP: 95/66 (10 Zeyad 2025 15:36) (95/66 - 116/86)  BP(mean): 76 (10 Zeyad 2025 15:36) (76 - 76)  RR: 20 (10 Zeyad 2025 15:36) (20 - 22)  SpO2: 94% (10 Zeyad 2025 15:36) (94% - 95%)  Patient On (Oxygen Delivery Method): nasal cannula  O2 Flow (L/min): 4             (10 Zeyad 2025 15:46)      Interval history:     - patient seen in SDU w palliative  - doing well on nasal cannula oxygen      ADVANCE DIRECTIVES:     [ ] Full Code [ X] DNR  MOLST  [ ]  Living Will  [ ]   DECISION MAKER(s):  [X ] Health Care Proxy(s)  [ ] Surrogate(s)  [ ] Guardian           Name(s): Phone Number(s):      Answers: Emergency Contact Name Feliberto,  Answers: Emergency Contact Phone # [phone],  Answers: Emergency Contact Relationship son  BASELINE (I)ADL(s) (prior to admission):    Success: [X ]Total  [ ] Moderate [ ]Dependent  Palliative Performance Status Version 2:         %    http://npcrc.org/files/news/palliative_performance_scale_ppsv2.pdf    Allergies    No Known Drug Allergies  CONTRAST DYS (Nausea)    Intolerances    MEDICATIONS  (STANDING):  albuterol/ipratropium for Nebulization 3 milliLiter(s) Nebulizer every 4 hours  albuterol/ipratropium for Nebulization 3 milliLiter(s) Nebulizer every 20 minutes  ceFAZolin   IVPB 1000 milliGRAM(s) IV Intermittent every 8 hours  ceFAZolin   IVPB      chlorhexidine 2% Cloths 1 Application(s) Topical daily  dextrose 5%. 1000 milliLiter(s) (100 mL/Hr) IV Continuous <Continuous>  dextrose 5%. 1000 milliLiter(s) (50 mL/Hr) IV Continuous <Continuous>  dextrose 50% Injectable 25 Gram(s) IV Push once  dextrose 50% Injectable 12.5 Gram(s) IV Push once  dextrose 50% Injectable 25 Gram(s) IV Push once  glucagon  Injectable 1 milliGRAM(s) IntraMuscular once  heparin   Injectable 5000 Unit(s) SubCutaneous every 12 hours  insulin lispro (ADMELOG) corrective regimen sliding scale   SubCutaneous two times a day before meals  melatonin 5 milliGRAM(s) Oral at bedtime  pantoprazole    Tablet 40 milliGRAM(s) Oral before breakfast  predniSONE   Tablet 20 milliGRAM(s) Oral daily  saccharomyces boulardii 250 milliGRAM(s) Oral two times a day    MEDICATIONS  (PRN):  acetaminophen     Tablet .. 650 milliGRAM(s) Oral every 6 hours PRN Mild Pain (1 - 3), Moderate Pain (4 - 6)  dextrose Oral Gel 15 Gram(s) Oral once PRN Blood Glucose LESS THAN 70 milliGRAM(s)/deciliter    PRESENT SYMPTOMS: [ ]Unable to obtain due to poor mentation   Source if other than patient:  [ ]Family   [ ]Team     Pain: [ ]yes [X ]no  QOL impact -   Location -                    Aggravating factors -  Quality -  Radiation -  Timing-  Severity (0-10 scale):  Minimal acceptable level (0-10 scale):     CPOT:    https://www.sccm.org/getattachment/uzm86n33-2l2z-2l7h-1b8h-8728q7348q9b/Critical-Care-Pain-Observation-Tool-(CPOT)    PAIN AD Score:   [email or web address].[email or web address]/painad.pdf (press ctrl +  left click to view)    Dyspnea:                           [ x]None[ ]Mild [ ]Moderate [ ]Severe     Respiratory Distress Observation Scale (RDOS):   A score of 0 to 2 signifies little or no respiratory distress, 3 signifies mild distress, scores 4 to 6 indicate moderate distress, and scores greater than 7 signify severe distress  [email or web address]-dypskpwwcga-wucpygwm-[email or web address]    Anxiety:                             [ ]None[ ]Mild [ ]Moderate [ ]Severe   Fatigue:                             [ ]None[ ]Mild [ ]Moderate [ ]Severe   Nausea:                             [ ]None[ ]Mild [ ]Moderate [ ]Severe   Loss of appetite:              [ ]None[ ]Mild [ ]Moderate [ ]Severe   Constipation:                    [ ]None[ ]Mild [ ]Moderate [ ]Severe    Other Symptoms:  [x ]All other review of systems negative     Palliative Performance Status Version 2:         %    http://npcrc.org/files/news/palliative_performance_scale_ppsv2.pdf    PHYSICAL EXAM:  Vital Signs Last 24 Hrs  T(C): 36.8 (23 Jan 2025 11:54), Max: 37.1 (23 Jan 2025 04:00)  T(F): 98.2 (23 Jan 2025 11:54), Max: 98.7 (23 Jan 2025 04:00)  HR: 101 (23 Jan 2025 11:54) (75 - 101)  BP: 116/74 (23 Jan 2025 11:54) (102/67 - 124/80)  BP(mean): 86 (23 Jan 2025 11:54) (86 - 86)  RR: 20 (23 Jan 2025 11:54) (20 - 20)  SpO2: 94% (23 Jan 2025 11:54) (93% - 98%)    Parameters below as of 23 Jan 2025 11:54  Patient On (Oxygen Delivery Method): nasal cannula     I&O's Summary    22 Jan 2025 07:01  -  23 Jan 2025 07:00  --------------------------------------------------------  IN: 760 mL / OUT: 900 mL / NET: -140 mL          GENERAL:  [X ] No acute distress [ ]Lethargic  [ ]Unarousable  [ ]Verbal  [ ]Non-Verbal [ ]Cachexia    BEHAVIORAL/PSYCH:  [ X]Alert and Oriented x 3 [ ] Anxiety [ ] Delirium [ ] Agitation [X ] Calm   EYES: [ X] No scleral icterus [ ] Scleral icterus [ ] Closed  ENMT:  [ ]Dry mouth  [ ]No external oral lesions [ ] No external ear or nose lesions  CARDIOVASCULAR:  [ ]Regular [ ]Irregular [ ]Tachy [ ]Not Tachy  [ ]Lucho [ ] Edema [X ] No edema  PULMONARY:  [ ]Tachypnea  [ ]Audible excessive secretions [x ] No labored breathing [ ] labored breathing  GASTROINTESTINAL: [ ]Soft  [ ]Distended  [ ]Not distended [ ]Non tender [ ]Tender  MUSCULOSKELETAL: [ ]No clubbing [ ] clubbing  [x ] No cyanosis [ ] cyanosis  NEUROLOGIC: [ x]No focal deficits  [ ]Follows commands  [ ]Does not follow commands  [ ]Cognitive impairment  [ ]Dysphagia  [ ]Dysarthria  [ ]Paresis   SKIN: [ ] Jaundiced [ ] Non-jaundiced [ ]Rash [ ]No Rash [ x] Warm [x ] Dry  MISC/LINES: [ ] ET tube [ ] Trach [ ]NGT/OGT [ ]PEG [ ]Romo      LABS: reviewed by me                        15.5   12.52 )-----------( 148      ( 23 Jan 2025 05:29 )             48.1   01-23    139  |  97[L]  |  25[H]  ----------------------------<  113[H]  4.1   |  33[H]  |  0.7    Ca    8.6      23 Jan 2025 05:29  Phos  2.7     01-23  Mg     1.8     01-23    TPro  4.8[L]  /  Alb  3.3[L]  /  TBili  0.7  /  DBili  x   /  AST  38  /  ALT  31  /  AlkPhos  63  01-23      Urinalysis Basic - ( 23 Jan 2025 05:29 )    Color: x / Appearance: x / SG: x / pH: x  Gluc: 113 mg/dL / Ketone: x  / Bili: x / Urobili: x   Blood: x / Protein: x / Nitrite: x   Leuk Esterase: x / RBC: x / WBC x   Sq Epi: x / Non Sq Epi: x / Bacteria: x      RADIOLOGY & ADDITIONAL STUDIES: reviewed by me    EKG: reviewed by me    Palliative Care Spiritual/Emotional Screening Tool Question  Severity (0-4):4  Score of 2 or greater indicates recommendation of Chaplaincy referral  Chaplaincy Referral: [X ] Yes [ ] Refused [ ] Following    Caregiver [place]:  [ ] Yes [ ] No [x ] Deferred  Social Work Referral [ ]  Patient and Family Centered Care Referral [ ]    Anticipatory Grief Present: [ x] Yes [ ] No [ ] Deferred  Social Work Referral [ x]  Patient and Family Centered Care Referral [ ]    Patient discussed with primary medical team MD  Palliative care education provided to patient and/or family

## 2025-01-23 NOTE — PROGRESS NOTE ADULT - ASSESSMENT
Chief complaint:   Chief Complaint   Patient presents with   • Worker's Compensation       Vitals:  Visit Vitals  /80 (BP Location: LUE - Left upper extremity, Patient Position: Sitting, Cuff Size: Large Adult)   Pulse 72   Temp 98.7 °F (37.1 °C) (Oral)   Resp 16   Wt 82.3 kg   LMP  (LMP Unknown)   BMI 33.19 kg/m²       HISTORY OF PRESENT ILLNESS     This patient is a 37 year old female presenting today with c/o's (complaints) of concussion and neck pain both associated with a work related injury.  Over the past couple of weeks the patient's concussion symptoms have significantly improved.  The patient no longer experiences the emotional lability and clouding of consciousness that she was having previously.  She does not feel any difficulty with concentration.  Her symptoms are not particularly worsened with at least moderate exertion at home during ADLs and light work but she has not yet been back to her regular job.  The patient does report if she exerts herself too much she will feel a headache starts to come on.  She has been able to control this by resting as soon as she notes the headache.  She denies any weakness or numbness.  She does report tightness and pain with flexion in the back of her neck.  The pain is bilateral.  The patient reports occasionally she will feel a snap when she is moving her neck.  This nap is nonpainful.  She is able to look to the left and right without any difficulty and to look up however her symptoms are worsened by flexion of the neck.  She is decreasing her use of analgesics.  She is using them p.r.n. at this time..        Other significant problems:  Patient Active Problem List    Diagnosis Date Noted   • Migraine 07/03/2018     Priority: High     7/3/2018  migraines with aura : birth control pills ( as therapeutic choice) and oral estrogen replacement therapy contraindicated - patient counseled ; Cruz Johnson       • Menorrhagia with irregular cycle 07/03/2018      Priority: Medium     7/3/2018 oligomenorrhea after last delivery : rxed with progesterone ; menstrual function regularly for few years after tubal ligation ; last few years : every 1-2 month ; past 3 months: uterine bleeding every 2 weeks x several days ; associated with dyspareunia and dysmenorrhea   exam - mild cervical motion tenderness ;  uterus 6 week size retroverted / retroflexed    rx - vibramycin   check labs and follow-up with  pelvic ultrasound   - therapeutic choices discussed with patient - see AVS (  After Visit Summary)  for details jk   7/9/2018 normal labs : cbc , tsh , std screen   7/10/2018   pelvic ultrasound :   1 normal uterine size  2 Myometrial heterogeneity -  3 endometrium normal for age and cycle  4 oavries normal size with simple small cysts / follicles    therapeutic choices discussed with patient - she wishes to proceed with endometrial ablation : current ACOG (American College of OB / GYN) guidelines  are to examine endometrium before :   thus  endometrial biopsy done ( 7/16/2018 = benign -  I called patient today  -  Cruz Johnson  )   patient will see one of my partners to schedule  - patient counseled ; Cruz Johnson           • Pap smear for cervical cancer screening 07/03/2018     Priority: Medium     7/3/2018 current ACOG (American College of OB / GYN) guidelines  discussed with patient - last pap ( co-test ) = normal  / negative  3 years ago: repeat due to symptoms  patient also wishes to  rescreen for sti's  - patient counseled ; Cruz Johnson    7/10/2018 pap ( co-test ) = normal  / negative  - discussed with patient Cruz Johnson      • Concussion with no loss of consciousness 08/18/2020     Priority: Low   • Peroneal neuritis, right 07/18/2019     Priority: Low   • Acute right-sided low back pain with right-sided sciatica 05/22/2018     Priority: Low   • Insomnia secondary to anxiety 04/07/2017     Priority: Low   • Adjustment reaction with anxiety and depression  03/23/2017     Priority: Low   • Chronic tension-type headache, not intractable 03/23/2017     Priority: Low   • Gastroesophageal reflux disease without esophagitis 10/07/2015     Priority: Low   • Exercise-induced asthma 04/03/2013     Priority: Low   • Tobacco dependence 11/01/2012     Priority: Low     7/3/2018 Smoker (counseled) - We discussed risks of smoking and strongly urged the patient to quit as soon as possible - support offered but patient understands she needs to make the first step, i.e.decide to stop. Pathways to achieve stopping and written info out ling timeline of beneficial effects of cessation given to patient and discussed with patient -  ; Cruz Johnson           • Migraine without aura and without status migrainosus, not intractable 01/17/2012     Priority: Low   • Insomnia 01/17/2012     Priority: Low   • Lipoma 01/17/2012     Priority: Low   • Vertigo 01/17/2012     Priority: Low       PAST MEDICAL, FAMILY AND SOCIAL HISTORY     Medications:  Current Outpatient Medications   Medication   • pregabalin (LYRICA) 50 MG capsule   • pregabalin (Lyrica) 25 MG capsule   • cyclobenzaprine (FLEXERIL) 10 MG tablet   • acetaminophen (TYLENOL) 500 MG tablet   • ibuprofen (MOTRIN) 200 MG tablet   • SUMAtriptan (IMITREX) 25 MG tablet   • metoCLOPramide (REGLAN) 10 MG tablet   • diclofenac (SOLARAZE) 3 % gel   • albuterol 108 (90 Base) MCG/ACT inhaler   • propranolol (INDERAL) 10 MG tablet   • nortriptyline (PAMELOR) 10 MG capsule   • DISPENSE     No current facility-administered medications for this visit.        Allergies:  ALLERGIES:   Allergen Reactions   • Codeine DIZZINESS   • Pollen Extract Other (See Comments)     environmental   • Tape [Adhesive   (Environmental)]      Blisters        Past Medical  History/Surgeries:  Past Medical History:   Diagnosis Date   • Allergic rhinitis    • Asthma    • Insomnia    • Lipoma    • Menorrhagia 09/2018   • Migraine    • Otitis media, recurrent        Past  Surgical History:   Procedure Laterality Date   • Hysterectomy     • Leg surgery  08/12/2019   • Ovarian cyst surgery      15 years of age   • Tubal ligation     • Tympanostomy  2009    both ears       Family History:  Family History   Problem Relation Age of Onset   • Diabetes Mother    • Heart disease Father    • Hypertension Father    • Asthma Sister    • Hypertension Sister    • Cancer Maternal Grandfather         brain & lung   • Heart disease Brother    • Early death Brother    • Substance abuse Brother    • Cancer Paternal Uncle         stomach   • Cancer Other         2018 neg fam hx for col, ut, ov, br CA   • Migraine Neg Hx        Social History:  Social History     Tobacco Use   • Smoking status: Current Every Day Smoker     Packs/day: 1.00     Years: 12.00     Pack years: 12.00     Types: Cigarettes   • Smokeless tobacco: Never Used   • Tobacco comment: pt. needs info.   Substance Use Topics   • Alcohol use: Yes     Alcohol/week: 0.0 standard drinks     Frequency: 2-3 times a week     Drinks per session: 1 or 2     Binge frequency: Weekly     Comment: Socially, once per week       REVIEW OF SYSTEMS     Review of Systems   Constitutional: Positive for fatigue. Negative for activity change, appetite change, chills, diaphoresis, fever and unexpected weight change.   HENT: Negative.  Negative for hearing loss.    Eyes: Negative.  Negative for visual disturbance.   Respiratory: Negative.  Negative for shortness of breath.    Cardiovascular: Negative.  Negative for chest pain, palpitations and leg swelling.   Gastrointestinal: Negative.  Negative for abdominal pain.   Endocrine: Negative.    Genitourinary: Negative.  Negative for frequency.   Musculoskeletal: Positive for neck pain and neck stiffness. Negative for arthralgias, back pain, gait problem, joint swelling and myalgias.   Skin: Negative.  Negative for rash.   Neurological: Positive for headaches. Negative for dizziness, tremors, seizures, syncope,  facial asymmetry, speech difficulty, weakness, light-headedness and numbness.   Hematological: Negative.    Psychiatric/Behavioral: Negative.  Negative for behavioral problems, confusion, decreased concentration, dysphoric mood and sleep disturbance. The patient is not nervous/anxious.        PHYSICAL EXAM     Physical Exam  Constitutional:       General: She is not in acute distress.     Appearance: Normal appearance. She is well-developed. She is obese. She is not ill-appearing, toxic-appearing or diaphoretic.   HENT:      Head: Normocephalic and atraumatic.      Nose: Nose normal.      Mouth/Throat:      Mouth: Mucous membranes are moist.      Pharynx: Oropharynx is clear.   Eyes:      General: No scleral icterus.        Right eye: No discharge.         Left eye: No discharge.      Conjunctiva/sclera: Conjunctivae normal.      Pupils: Pupils are equal, round, and reactive to light.   Neck:      Musculoskeletal: Neck rigidity and muscular tenderness present.      Comments: The neck is mildly tender to palpation over the paracervical musculature bilaterally.  Spinous processes were nontender to palpation.  Patient has full range of motion in all directions except some flexion limited by pain.  Cardiovascular:      Rate and Rhythm: Normal rate and regular rhythm.   Pulmonary:      Effort: Pulmonary effort is normal. No respiratory distress.   Abdominal:      Palpations: Abdomen is soft.      Tenderness: There is no abdominal tenderness.   Musculoskeletal:         General: No swelling, tenderness, deformity or signs of injury.      Right lower leg: No edema.      Left lower leg: No edema.   Lymphadenopathy:      Cervical: No cervical adenopathy.   Skin:     General: Skin is warm.      Coloration: Skin is not jaundiced or pale.      Findings: No bruising, erythema, lesion or rash.   Neurological:      General: No focal deficit present.      Mental Status: She is alert and oriented to person, place, and time. Mental  status is at baseline.      Cranial Nerves: No cranial nerve deficit.      Sensory: No sensory deficit.      Motor: No weakness.      Coordination: Coordination normal.      Gait: Gait normal.   Psychiatric:         Mood and Affect: Mood normal.         Behavior: Behavior normal.         Thought Content: Thought content normal.         Judgment: Judgment normal.         ASSESSMENT/PLAN     ASSESSMENT: Concussion without loss of consciousness, subsequent encounter  (primary encounter diagnosis)  Comment:  Work related.  Improved.  Ready to try returning to work on a limited basis.  Plan:  Education given.  Letter provided to the patient with instructions to be able to return to work for 6 hour days until reassessment in 1 week.  If she does well we will liberalize that restriction.  Patient was given instructions that she should rest or return home if she redevelops concussion symptoms either with cognitive or physical stresses.  Advised to avoid Valsalva at work.    Acute strain of neck muscle, initial encounter  Comment:  Related to work injury.  Patient feels she just needs to have her neck cracked.  Plan:  Range of motion exercises.  Applied heat.  Analgesics over-the-counter p.r.n..  [facility] with [facility].  Recheck if not resolving.       69yFemale admitted for shortness of breath. Admitted for pneumonia. 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 Has since had extensive follow up w ENT. Pt was intubated and self extubated in CCU 1/17. Pt found to be aspirating - speech and swallow following. Patient may need PEG placement. Initially pt was refusing a PEG according to consult request. But today pt spoke to attending MD and when I spoke to her she said she now understands why she would need a PEG placed her only concern is not going to a SNF. She wants to go home. Pt previously independent.     Pt denies pain or dyspnea. Told this writer I just want to get out of the hospital soon. She has no complaints of nausea etc. No cough noted. Palliative care NP discussed case at length w attending and medical resident as well as speech and swallow team.    Patient understands medical condition is struggling somewhat to come to terms with her swallowing being unsafe. She is working with speech and swallow to hopefully avoid PEG placement - but is open to it if it is 100% necessary.     MEDD (morphine equivalent daily dose):0    - DNR/intubate - now MOLST filled out  - HCP completed - Son Feliberto # 1 and sister Maame #2  - ENT following  - Speech and Swallow following   - GI follow up ? PEG placement  - will sign off goals are clear at this point.  TIME SPENT 50 (+) Min aside from Advance care planning - coordinating w GI medical team and speech and swallow   Education about palliative care provided to patient/family.  See Recs below.    Please call [phone] with questions or concerns 24/7.   We will continue to follow.  " I was hearing voices", "I need my medicatrions"

## 2025-01-23 NOTE — PROGRESS NOTE ADULT - SUBJECTIVE AND OBJECTIVE BOX
INTERVAL HPI/OVERNIGHT EVENTS:  Patient was seen and examined at bedside. As per nurse and patient, no o/n events, patient resting comfortably. No complaints at this time. Patient denies: fever, chills, dizziness, weakness, HA, Changes in vision, CP, palpitations, SOB, cough, N/V/D/C, dysuria, changes in bowel movements, LE edema. ROS otherwise negative.    VITAL SIGNS:  T(F): 98.4 (01-23-25 @ 08:09)  HR: 89 (01-23-25 @ 08:09)  BP: 121/73 (01-23-25 @ 08:09)  RR: 20 (01-23-25 @ 08:09)  SpO2: 93% (01-23-25 @ 08:09)  Wt(kg): --    PHYSICAL EXAM:    Constitutional: WDWN, NAD  Respiratory: CTA b/l, good air entry b/l, no wheezing, no rhonchi, no rales, without accessory muscle use and no intercostal retractions  Cardiovascular: RRR, normal S1S2, no M/R/G  Gastrointestinal: soft, NTND, no masses palpable, BS normal  Extremities: Warm, well perfused, pulses equal bilateral upper and lower extremities, no edema, no clubbing  Neurological: AAOx3, CN Grossly intact  Skin: Normal temperature, warm, dry    MEDICATIONS  (STANDING):  albuterol/ipratropium for Nebulization 3 milliLiter(s) Nebulizer every 4 hours  albuterol/ipratropium for Nebulization 3 milliLiter(s) Nebulizer every 20 minutes  ceFAZolin   IVPB      ceFAZolin   IVPB 1000 milliGRAM(s) IV Intermittent every 8 hours  chlorhexidine 2% Cloths 1 Application(s) Topical daily  dextrose 5%. 1000 milliLiter(s) (100 mL/Hr) IV Continuous <Continuous>  dextrose 5%. 1000 milliLiter(s) (50 mL/Hr) IV Continuous <Continuous>  dextrose 50% Injectable 25 Gram(s) IV Push once  dextrose 50% Injectable 12.5 Gram(s) IV Push once  dextrose 50% Injectable 25 Gram(s) IV Push once  glucagon  Injectable 1 milliGRAM(s) IntraMuscular once  heparin   Injectable 5000 Unit(s) SubCutaneous every 12 hours  insulin lispro (ADMELOG) corrective regimen sliding scale   SubCutaneous two times a day before meals  melatonin 5 milliGRAM(s) Oral at bedtime  pantoprazole    Tablet 40 milliGRAM(s) Oral before breakfast  predniSONE   Tablet 20 milliGRAM(s) Oral daily  saccharomyces boulardii 250 milliGRAM(s) Oral two times a day    MEDICATIONS  (PRN):  acetaminophen     Tablet .. 650 milliGRAM(s) Oral every 6 hours PRN Mild Pain (1 - 3), Moderate Pain (4 - 6)  dextrose Oral Gel 15 Gram(s) Oral once PRN Blood Glucose LESS THAN 70 milliGRAM(s)/deciliter      Allergies    No Known Drug Allergies  CONTRAST DYS (Nausea)    Intolerances        LABS:                        15.5   12.52 )-----------( 148      ( 23 Jan 2025 05:29 )             48.1     01-23    139  |  97[L]  |  25[H]  ----------------------------<  113[H]  4.1   |  33[H]  |  0.7    Ca    8.6      23 Jan 2025 05:29  Phos  2.7     01-23  Mg     1.8     01-23    TPro  4.8[L]  /  Alb  3.3[L]  /  TBili  0.7  /  DBili  x   /  AST  38  /  ALT  31  /  AlkPhos  63  01-23      Urinalysis Basic - ( 23 Jan 2025 05:29 )    Color: x / Appearance: x / SG: x / pH: x  Gluc: 113 mg/dL / Ketone: x  / Bili: x / Urobili: x   Blood: x / Protein: x / Nitrite: x   Leuk Esterase: x / RBC: x / WBC x   Sq Epi: x / Non Sq Epi: x / Bacteria: x        RADIOLOGY & ADDITIONAL TESTS:  Reviewed

## 2025-01-23 NOTE — PROGRESS NOTE ADULT - ASSESSMENT
Assessment and Plan  Case of a 69 year old Female patient with a history of HTN, GERD, DL, CAD, COPD not on Home O2, Hx of Pneumothorax, polychondritis, SCC of BOT s/p Trach/PEG (reversed in 4/2021), s/p CRT (7 sessions in 2021) who presented to the ED on 01/10 for evaluation of worsening SOB for 1 week duration, found to have evidence of severe emphysema/pneumonia with atelectasis/tracheal stenosis s/p bronchoscopy and tracheal dilation on 01/14. Stay was complicated by hypoxic RF requiring intubation on 01/17 s/p self extubation on 01/17. We are following for G tube placement evaluation.      Evaluation for G Tube Replacement in Setting of Severe Pharyngeal Dysphagia   History of SCC of BOT s/p Trach/PEG (reversed in 4/2021), s/p CRT (7 sessions in 2021)  * Had history of SCC of BOT s/p Trach/PEG (reversed in 4/2021)  * Vitals: on BiPAP  * Labs: WBC 9.96, Hb 14.7, Plt 162, Na 141, K 3.5, BUN 35, Cr 0.6 on 01/21  * INR 1.03 and LA 1,4 on 01/18 -> 4.6 on 01/17  * Previous CT abdomen 2020 unremarkable  * s/p VFSS 01/21: severe pharyngeal dysphagia, no mention of aspiration -> cleared for pureed with thins  * No previous EGD: last colonoscopy was in 11/2023 diverticulosis; few polyps; plan was to repeat in 5 years  * No FHx of GI cancers    RECOMMENDATIONS  - Recommend continuous goals of care discussion with son and sister (HCPs). Palliative team evaluation appreciated  - Speech and swallow evaluation appreciated: s/p VFSS as above  - If the patient is at risk for aspiration with PO feeds, then can schedule for EGD with PEG tube placement when she is agreeable and optimized from pulmonary/ENT standpoint  - If the patient is cleared for pureed diet and there is no risk for aspiration, then would recommend checking caloric count prior to scheduling EGD with PEG tube replacement   - Monitor for pain: management per team  - Monitor for nausea. Consider antiemetics PRN if QTc is within normal  - Continue PO protonix 40mg QD for GI prophylaxis  - Monitor BM and avoid constipation  - Follow up with our [facility] located at [address] [place] [address]. Phone Number: [phone]        Thank you for your consult.  - Please note that plan was communicated with medical team.   - Please reach GI on [phone] during weekdays till 5pm.  - Please call the GI service line after 5pm on Weekdays and anytime on Weekends: [phone].      Vianney Barraza MD  PGY - 5 Gastroenterology Fellow   [facility]     Assessment and Plan  Case of a 69 year old Female patient with a history of HTN, GERD, DL, CAD, COPD not on Home O2, Hx of Pneumothorax, polychondritis, SCC of BOT s/p Trach/PEG (reversed in 4/2021), s/p CRT (7 sessions in 2021) who presented to the ED on 01/10 for evaluation of worsening SOB for 1 week duration, found to have evidence of severe emphysema/pneumonia with atelectasis/tracheal stenosis s/p bronchoscopy and tracheal dilation on 01/14. Stay was complicated by hypoxic RF requiring intubation on 01/17 s/p self extubation on 01/17. We are following for G tube placement evaluation.      Evaluation for G Tube Replacement in Setting of Severe Pharyngeal Dysphagia   History of SCC of BOT s/p Trach/PEG (reversed in 4/2021), s/p CRT (7 sessions in 2021)  * Had history of SCC of BOT s/p Trach/PEG (reversed in 4/2021)  * Vitals: noted  * Labs: WBC 9.96, Hb 14.7, Plt 162, Na 141, K 3.5, BUN 35, Cr 0.6 on 01/21  * INR 1.03 and LA 1,4 on 01/18 -> 4.6 on 01/17  * Previous CT abdomen 2020 unremarkable  * s/p VFSS 01/21: severe pharyngeal dysphagia, no mention of aspiration -> cleared for pureed with thins  * No previous EGD: last colonoscopy was in 11/2023 diverticulosis; few polyps; plan was to repeat in 5 years  * No FHx of GI cancers    RECOMMENDATIONS  - Recommend continuous goals of care discussion with son and sister (HCPs). Palliative team evaluation appreciated  - Speech and swallow evaluation appreciated: s/p VFSS as above  - If the patient is at risk for aspiration with PO feeds, then can schedule for EGD with PEG tube placement when she is agreeable and optimized from pulmonary/ENT standpoint  - If the patient is cleared for pureed diet and there is no risk for aspiration, then would recommend checking caloric count prior to scheduling EGD with PEG tube replacement   - Monitor for pain: management per team  - Monitor for nausea. Consider antiemetics PRN if QTc is within normal  - Continue PO protonix 40mg QD for GI prophylaxis  - Monitor BM and avoid constipation  - Follow up with our [facility] located at [address] [place] [address]. Phone Number: [phone]        Thank you for your consult.  - Please note that plan was communicated with medical team.   - Please reach GI on [phone] during weekdays till 5pm.  - Please call the GI service line after 5pm on Weekdays and anytime on Weekends: [phone].      Vianney Barraza MD  PGY - 5 Gastroenterology Fellow   [facility]

## 2025-01-23 NOTE — CHART NOTE - NSCHARTNOTEFT_GEN_A_CORE
LMSW and Palliative Care NP met with PT at bedside. PT noted she wants to continue with a pureed diet and work with her ENT O/P. At this time she noted she does not want to pursue peg placement while also understanding the risk of aspiration. Questions answered and support rendered. [id number]

## 2025-01-23 NOTE — PROGRESS NOTE ADULT - SUBJECTIVE AND OBJECTIVE BOX
Gastroenterology Progress Note      Location: [facility] 2A 011 A ([facility] 2A)  Patient Name: GEREMIAS FULLER  Age: 69y  Gender: Female      Chief Complaint  Patient is a 69y old Female who presents with a chief complaint of shortness of breath, malaise (16 Jan 2025 11:51)  Primary diagnosis of Acute upper respiratory infection      Reason for Consult  G tube placement evaluation      Progress Note  Patient denies abdominal pain.  She denies nausea or vomiting.  Her last BM was on 01/21.      Vital Signs in the last 24 hours   T(C): 36.7 (23 Jan 2025 17:03), Max: 37.1 (23 Jan 2025 04:00)  T(F): 98.1 (23 Jan 2025 17:03), Max: 98.7 (23 Jan 2025 04:00)  HR: 85 (23 Jan 2025 17:03) (75 - 101)  BP: 118/77 (23 Jan 2025 17:03) (102/67 - 124/80)  BP(mean): 86 (23 Jan 2025 11:54) (86 - 86)  ABP: --  ABP(mean): --  RR: 20 (23 Jan 2025 17:03) (20 - 20)  SpO2: 100% (23 Jan 2025 17:03) (93% - 100%)    O2 Parameters below as of 23 Jan 2025 17:03  Patient On (Oxygen Delivery Method): nasal cannula        Physical Exam  * General Appearance: Alert, cooperative, interactive, oriented to time, place, and person  * Eyes: PERRL, conjunctiva/corneas clear, EOM's intact, fundi benign, both eyes  * Neck: Supple, symmetrical, trachea midline, no adenopathy   * Lungs: crackles  * Heart: Regular Rate and Rhythm, normal S1 and S2, no audible murmur, rub, or gallop  * Abdomen: Symmetric, non-distended, soft, non-tender, bowel sounds active all four quadrants      Investigations                          15.5   12.52 )-----------( 148      ( 23 Jan 2025 05:29 )             48.1     01-23    139  |  97[L]  |  25[H]  ----------------------------<  113[H]  4.1   |  33[H]  |  0.7    Ca    8.6      23 Jan 2025 05:29  Phos  2.7     01-23  Mg     1.8     01-23    TPro  4.8[L]  /  Alb  3.3[L]  /  TBili  0.7  /  DBili  x   /  AST  38  /  ALT  31  /  AlkPhos  63  01-23        LIVER FUNCTIONS - ( 23 Jan 2025 05:29 )  Alb: 3.3 g/dL / Pro: 4.8 g/dL / ALK PHOS: 63 U/L / ALT: 31 U/L / AST: 38 U/L / GGT: x               Urinalysis Basic - ( 23 Jan 2025 05:29 )    Color: x / Appearance: x / SG: x / pH: x  Gluc: 113 mg/dL / Ketone: x  / Bili: x / Urobili: x   Blood: x / Protein: x / Nitrite: x   Leuk Esterase: x / RBC: x / WBC x   Sq Epi: x / Non Sq Epi: x / Bacteria: x      ABG - ( 22 Jan 2025 10:49 )  pH, Arterial: 7.57  pH, Blood: x     /  pCO2: 33    /  pO2: 108   / HCO3: 30    / Base Excess: 8.2   /  SaO2: 98.6        Lactate, Blood: 1.3 mmol/L (01-23 @ 00:51)  Lactate, Blood: 2.4 mmol/L (01-22 @ 21:49)      Troponin Trend: Lactate, Blood: 1.3 mmol/L (01-23 @ 00:51)  Lactate, Blood: 2.4 mmol/L (01-22 @ 21:49)        Current Medications  Standing Medications  albuterol/ipratropium for Nebulization 3 milliLiter(s) Nebulizer every 4 hours  albuterol/ipratropium for Nebulization 3 milliLiter(s) Nebulizer every 20 minutes  ceFAZolin   IVPB      ceFAZolin   IVPB 1000 milliGRAM(s) IV Intermittent every 8 hours  chlorhexidine 2% Cloths 1 Application(s) Topical daily  dextrose 5%. 1000 milliLiter(s) (100 mL/Hr) IV Continuous <Continuous>  dextrose 5%. 1000 milliLiter(s) (50 mL/Hr) IV Continuous <Continuous>  dextrose 50% Injectable 25 Gram(s) IV Push once  dextrose 50% Injectable 12.5 Gram(s) IV Push once  dextrose 50% Injectable 25 Gram(s) IV Push once  glucagon  Injectable 1 milliGRAM(s) IntraMuscular once  heparin   Injectable 5000 Unit(s) SubCutaneous every 12 hours  insulin lispro (ADMELOG) corrective regimen sliding scale   SubCutaneous two times a day before meals  melatonin 5 milliGRAM(s) Oral at bedtime  pantoprazole    Tablet 40 milliGRAM(s) Oral before breakfast  predniSONE   Tablet 20 milliGRAM(s) Oral daily  saccharomyces boulardii 250 milliGRAM(s) Oral two times a day    PRN Medications  acetaminophen     Tablet .. 650 milliGRAM(s) Oral every 6 hours PRN Mild Pain (1 - 3), Moderate Pain (4 - 6)  dextrose Oral Gel 15 Gram(s) Oral once PRN Blood Glucose LESS THAN 70 milliGRAM(s)/deciliter    Singles Doses Administered  (ADM OVERRIDE) 1 each &lt;see task&gt; GiveOnce  (ADM OVERRIDE) 3 each &lt;see task&gt; GiveOnce  (ADM OVERRIDE) 1 each &lt;see task&gt; GiveOnce  (ADM OVERRIDE) 1 each &lt;see task&gt; GiveOnce  (ADM OVERRIDE) 1 each &lt;see task&gt; GiveOnce  (ADM OVERRIDE) 1 each &lt;see task&gt; GiveOnce  (ADM OVERRIDE) 1 each &lt;see task&gt; GiveOnce  (ADM OVERRIDE) 1 each &lt;see task&gt; GiveOnce  (ADM OVERRIDE) 1 each &lt;see task&gt; GiveOnce  (ADM OVERRIDE) 1 each &lt;see task&gt; GiveOnce  (ADM OVERRIDE) 1 each &lt;see task&gt; GiveOnce  acetaminophen     Tablet .. 650 milliGRAM(s) Oral once  acetaminophen     Tablet .. 650 milliGRAM(s) Oral once PRN  azithromycin  IVPB 500 milliGRAM(s) IV Intermittent once  ceFAZolin   IVPB 1000 milliGRAM(s) IV Intermittent once  cefTRIAXone   IVPB 1000 milliGRAM(s) IV Intermittent once  dexAMETHasone 4 mG/mL Injectable 1 mL (Rx Quick Charge) 4 milliGRAM(s) IV Push   diphenhydrAMINE Injectable 50 milliGRAM(s) IV Push once  diphenhydrAMINE Injectable 50 milliGRAM(s) IV Push once  diphenhydrAMINE Injectable 50 milliGRAM(s) IV Push once  fentaNYL    Injectable 25 MICROGram(s) IV Push once  fentaNYL 50 MICROGram(s)/mL Injectable 2 mL (Rx Quick Charge) 100 MICROGram(s) IV Push   furosemide   Injectable 40 milliGRAM(s) IV Push once  furosemide   Injectable 40 milliGRAM(s) IV Push once  furosemide   Injectable 40 milliGRAM(s) IV Push once  furosemide   Injectable 20 milliGRAM(s) IV Push once  furosemide   Injectable 40 milliGRAM(s) IV Push once  lactated ringers Bolus 1000 milliLiter(s) IV Bolus once  lactated ringers Bolus 500 milliLiter(s) IV Bolus once  lidocaine 2% Injectable (Rx Quick Charge) 60 milliGRAM(s) IV Push   lidocaine 2% Viscous 100 milliLiter(s) Swish and Spit once  LORazepam   Injectable 4 milliGRAM(s) IV Push once  magnesium sulfate  IVPB 2 Gram(s) IV Intermittent every 2 hours  methylPREDNISolone sodium succinate Injectable 60 milliGRAM(s) IV Push once  methylPREDNISolone sodium succinate Injectable 60 milliGRAM(s) IV Push once  methylPREDNISolone sodium succinate Injectable 125 milliGRAM(s) IV Push once  ondansetron 2 mG/mL Injectable 2 mL (Rx Quick Charge) 4 milliGRAM(s) IV Push   potassium chloride  20 mEq/100 mL IVPB 20 milliEquivalent(s) IV Intermittent once  propofol 10 mG/mL Injectable 20 mL (benzyl alcohol free) (Rx Quick Charge) 200 milliGRAM(s) IV Bolus   sodium chloride 0.9% Bolus 500 milliLiter(s) IV Bolus once  sodium chloride 0.9% Bolus 1000 milliLiter(s) IV Bolus once

## 2025-01-23 NOTE — PROGRESS NOTE ADULT - ASSESSMENT
70yo Female with PMH of HTN, HLD, GERD, CAD, COPD not on Home O2, Hx of Pneumothorax, polychondirits, SCC of BOT s/p Trach/PEG (reversed in 4/2021), s/p CRT (7 sessions in 2021) who presents with SOB for 1 week duration. History goes back to around 1 week ago When the patient started having Shortness of breath, productive cough, and malaise. . Patient is admitted for PNA, and COPD excacerbation .   Patient reports some intermittent left sided chest pain when coughing. and abdominal distention as well.  Patient follows with ENT Dr. Louis, last seen 9/23/24, CT Neck 9/2024 showed Abnormal soft tissue within the posterior nasopharynx greater the right, new since the reference exams and nonspecific in appearance. The abnormal submucosal soft tissue in the preepiglottic space greater on the left, asymmetric thickening of the left aryepiglottic fold, and mild epiglottic thickening; similar when correlated with the PET/CT dated 5/12/2022.  Stable rim-calcified right thyroid nodule measuring 1.8 cm. US guided FNA by IR 10/7/24 of  Stable rim-calcified right thyroid nodule measuring 1.8 cm. Showed atypia but not malignant cells.   Pt was treated for  hypoxic resp failure due to COPD exacerbation complicated by tracheal stenosis, H/o SCC s/p surgery. Had been on NC. Steroids were being tapered. Few weeks ago pt underwent bronchoscopy with tracheal dilation.   Pt has severe dysphagia, RRT was called on the floor, pt developed ARF due to aspiration, was intubated and upgraded to ICU, s/p bronch , self extubated on 1/17 and was downgraded to SDU.  Pt failed barium swallow, PEG tube recommended, pt is agreeable now, will consult GI.      ASSESSMENT & PLAN:   # Acute hypoxemic resp failure  #COPD exacerbation  #HO trach   #h/o Tracheal stenosis SP dilation  #History of lung nodules  #mphysema  #ropharyngeal dysphagia   - O 6 L NC, will taper down oxygen as tolerated   - supportive care, aspiration precautions, pulmonary toilet  - pt failed barium swallow, NPO for now, needs PEG, agreeable, consult GI   - pt self extubated on 1/17  - nebs Q 6 hours   - sputum Cx noted, ID is following, recommendations noted:  - MSSA in [facility] cx -- narrowed zosyn to cefazolin 1g q 8 hours   - trend WBC and monitor fever curve   - incentive spirometry  - Prednisone 20 mg QD for 2 more days  - Patient declining NG tube placement, she is considering PEG tube placement    # h/o Polychondritis   - as per Rheum note, patient was on cellcept 1000 G BID in the past   -  hydroxychloroquine d/c   - off immunosuppresants at this time, resume post dc     # HTN/HLD/CAD/ combined CHF   - fluid restriction 1200 ml in 24 hours   - intake and output monitoring, daily weight  - monitor for fluid overload   - resume home meds if SBP is stable     # Hyperglycemia / prediabetes   - CC diet, monitor f/s   - not on steroids now  - on Insulin now     # GERD   - C/w Protonix  - started Probiotics

## 2025-01-24 LAB
ALBUMIN SERPL ELPH-MCNC: 2.9 G/DL — LOW (ref 3.5–5.2)
ALP SERPL-CCNC: 65 U/L — SIGNIFICANT CHANGE UP (ref 30–115)
ALT FLD-CCNC: 24 U/L — SIGNIFICANT CHANGE UP (ref 0–41)
ANION GAP SERPL CALC-SCNC: 17 MMOL/L — HIGH (ref 7–14)
AST SERPL-CCNC: 41 U/L — SIGNIFICANT CHANGE UP (ref 0–41)
BASOPHILS # BLD AUTO: 0.07 K/UL — SIGNIFICANT CHANGE UP (ref 0–0.2)
BASOPHILS NFR BLD AUTO: 0.5 % — SIGNIFICANT CHANGE UP (ref 0–1)
BILIRUB SERPL-MCNC: 0.5 MG/DL — SIGNIFICANT CHANGE UP (ref 0.2–1.2)
BUN SERPL-MCNC: 19 MG/DL — SIGNIFICANT CHANGE UP (ref 10–20)
CALCIUM SERPL-MCNC: 8.1 MG/DL — LOW (ref 8.4–10.5)
CHLORIDE SERPL-SCNC: 98 MMOL/L — SIGNIFICANT CHANGE UP (ref 98–110)
CO2 SERPL-SCNC: 22 MMOL/L — SIGNIFICANT CHANGE UP (ref 17–32)
CREAT SERPL-MCNC: 0.6 MG/DL — LOW (ref 0.7–1.5)
EGFR: 96 ML/MIN/1.73M2 — SIGNIFICANT CHANGE UP
EOSINOPHIL # BLD AUTO: 0.13 K/UL — SIGNIFICANT CHANGE UP (ref 0–0.7)
EOSINOPHIL NFR BLD AUTO: 0.9 % — SIGNIFICANT CHANGE UP (ref 0–8)
GLUCOSE BLDC GLUCOMTR-MCNC: 154 MG/DL — HIGH (ref 70–99)
GLUCOSE BLDC GLUCOMTR-MCNC: 162 MG/DL — HIGH (ref 70–99)
GLUCOSE SERPL-MCNC: 154 MG/DL — HIGH (ref 70–99)
HCT VFR BLD CALC: 46.9 % — SIGNIFICANT CHANGE UP (ref 37–47)
HGB BLD-MCNC: 15 G/DL — SIGNIFICANT CHANGE UP (ref 12–16)
IMM GRANULOCYTES NFR BLD AUTO: 2.7 % — HIGH (ref 0.1–0.3)
LYMPHOCYTES # BLD AUTO: 0.39 K/UL — LOW (ref 1.2–3.4)
LYMPHOCYTES # BLD AUTO: 2.8 % — LOW (ref 20.5–51.1)
MAGNESIUM SERPL-MCNC: 1.7 MG/DL — LOW (ref 1.8–2.4)
MCHC RBC-ENTMCNC: 28.6 PG — SIGNIFICANT CHANGE UP (ref 27–31)
MCHC RBC-ENTMCNC: 32 G/DL — SIGNIFICANT CHANGE UP (ref 32–37)
MCV RBC AUTO: 89.3 FL — SIGNIFICANT CHANGE UP (ref 81–99)
MONOCYTES # BLD AUTO: 0.48 K/UL — SIGNIFICANT CHANGE UP (ref 0.1–0.6)
MONOCYTES NFR BLD AUTO: 3.4 % — SIGNIFICANT CHANGE UP (ref 1.7–9.3)
NEUTROPHILS # BLD AUTO: 12.64 K/UL — HIGH (ref 1.4–6.5)
NEUTROPHILS NFR BLD AUTO: 89.7 % — HIGH (ref 42.2–75.2)
NRBC # BLD: 0 /100 WBCS — SIGNIFICANT CHANGE UP (ref 0–0)
NRBC BLD-RTO: 0 /100 WBCS — SIGNIFICANT CHANGE UP (ref 0–0)
PHOSPHATE SERPL-MCNC: 2.5 MG/DL — SIGNIFICANT CHANGE UP (ref 2.1–4.9)
PLATELET # BLD AUTO: 144 K/UL — SIGNIFICANT CHANGE UP (ref 130–400)
PMV BLD: 10.8 FL — HIGH (ref 7.4–10.4)
POTASSIUM SERPL-MCNC: 4 MMOL/L — SIGNIFICANT CHANGE UP (ref 3.5–5)
POTASSIUM SERPL-SCNC: 4 MMOL/L — SIGNIFICANT CHANGE UP (ref 3.5–5)
PROT SERPL-MCNC: 4.6 G/DL — LOW (ref 6–8)
RBC # BLD: 5.25 M/UL — SIGNIFICANT CHANGE UP (ref 4.2–5.4)
RBC # FLD: 13.9 % — SIGNIFICANT CHANGE UP (ref 11.5–14.5)
SODIUM SERPL-SCNC: 137 MMOL/L — SIGNIFICANT CHANGE UP (ref 135–146)
WBC # BLD: 14.09 K/UL — HIGH (ref 4.8–10.8)
WBC # FLD AUTO: 14.09 K/UL — HIGH (ref 4.8–10.8)

## 2025-01-24 PROCEDURE — 99232 SBSQ HOSP IP/OBS MODERATE 35: CPT

## 2025-01-24 PROCEDURE — 71045 X-RAY EXAM CHEST 1 VIEW: CPT | Mod: 26

## 2025-01-24 PROCEDURE — 93010 ELECTROCARDIOGRAM REPORT: CPT

## 2025-01-24 PROCEDURE — 99233 SBSQ HOSP IP/OBS HIGH 50: CPT

## 2025-01-24 PROCEDURE — 71250 CT THORAX DX C-: CPT | Mod: 26

## 2025-01-24 RX ORDER — MAGNESIUM SULFATE 0.8 MEQ/ML
2 AMPUL (ML) INJECTION ONCE
Refills: 0 | Status: COMPLETED | OUTPATIENT
Start: 2025-01-24 | End: 2025-01-24

## 2025-01-24 RX ORDER — METOPROLOL SUCCINATE 25 MG
25 TABLET, EXTENDED RELEASE 24 HR ORAL DAILY
Refills: 0 | Status: DISCONTINUED | OUTPATIENT
Start: 2025-01-24 | End: 2025-01-28

## 2025-01-24 RX ORDER — ATORVASTATIN CALCIUM 80 MG/1
40 TABLET, FILM COATED ORAL AT BEDTIME
Refills: 0 | Status: DISCONTINUED | OUTPATIENT
Start: 2025-01-24 | End: 2025-02-10

## 2025-01-24 RX ADMIN — Medication 100 MILLIGRAM(S): at 13:19

## 2025-01-24 RX ADMIN — PREDNISONE 20 MILLIGRAM(S): 5 TABLET ORAL at 05:05

## 2025-01-24 RX ADMIN — ATORVASTATIN CALCIUM 40 MILLIGRAM(S): 80 TABLET, FILM COATED ORAL at 21:00

## 2025-01-24 RX ADMIN — PANTOPRAZOLE 40 MILLIGRAM(S): 20 TABLET, DELAYED RELEASE ORAL at 05:05

## 2025-01-24 RX ADMIN — Medication 25 GRAM(S): at 11:14

## 2025-01-24 RX ADMIN — Medication 25 MILLIGRAM(S): at 12:43

## 2025-01-24 RX ADMIN — Medication 5000 UNIT(S): at 05:05

## 2025-01-24 RX ADMIN — ACETAMINOPHEN, DIPHENHYDRAMINE HCL, PHENYLEPHRINE HCL 5 MILLIGRAM(S): 325; 25; 5 TABLET ORAL at 21:00

## 2025-01-24 RX ADMIN — Medication 2: at 17:02

## 2025-01-24 RX ADMIN — Medication 100 MILLIGRAM(S): at 05:05

## 2025-01-24 RX ADMIN — Medication 250 MILLIGRAM(S): at 17:03

## 2025-01-24 RX ADMIN — Medication 250 MILLIGRAM(S): at 05:06

## 2025-01-24 RX ADMIN — Medication 2: at 08:09

## 2025-01-24 RX ADMIN — Medication 100 MILLIGRAM(S): at 21:00

## 2025-01-24 RX ADMIN — IPRATROPIUM BROMIDE AND ALBUTEROL SULFATE 3 MILLILITER(S): .5; 2.5 SOLUTION RESPIRATORY (INHALATION) at 19:58

## 2025-01-24 RX ADMIN — ANTISEPTIC SURGICAL SCRUB 1 APPLICATION(S): 0.04 SOLUTION TOPICAL at 11:15

## 2025-01-24 RX ADMIN — Medication 5000 UNIT(S): at 17:02

## 2025-01-24 RX ADMIN — IPRATROPIUM BROMIDE AND ALBUTEROL SULFATE 3 MILLILITER(S): .5; 2.5 SOLUTION RESPIRATORY (INHALATION) at 14:53

## 2025-01-24 NOTE — PROGRESS NOTE ADULT - ASSESSMENT
70yo Female with PMH of HTN, HLD, GERD, CAD, COPD not on Home O2, Hx of Pneumothorax, polychondritis, SCC of BOT s/p Trach/PEG (reversed in 4/2021, follows with Dr Louis), s/p CRT (7 sessions in 2021) who presents with SOB for 1 week duration.  Pt was admitted for AHRF 2/2 COPD exacerbation and tracheal stenosis s/p dilation by CT surgery.   Required upgrade to MICU iso ARF 2/2 aspiration, s/p bronch, self extubated 1/17   70yo Female with PMH of HTN, HLD, GERD, CAD, COPD not on Home O2, Hx of Pneumothorax, polychondritis, SCC of BOT s/p Trach/PEG (reversed in 4/2021, follows with Dr Louis), s/p CRT (7 sessions in 2021) who presents with SOB for 1 week duration.  Pt was admitted for AHRF 2/2 COPD exacerbation and tracheal stenosis s/p dilation by CT surgery.   Required upgrade to MICU iso ARF 2/2 aspiration, s/p bronch, self extubated 1/17, transitioned to NC in SDU.   SDU Course complicated by severe pharyngeal dysphagia with recurrent aspiration s/p FEES/MBS, after several GOC discussions patient is currently on puree diet with swallow device as patient is refusing NGT and prefers to trial modified diet and outpatient therapy before considering PEG.     Currently, weaning O2 and dispo planning    #Severe oropharyngeal dysphagia w/ recurrent aspiration iso polychondritis w/ tracheal stenosis   - SLP evaluations appreciated: pt has irreversible severe oropharyngeal dysphagia with aspiration of all consistencies, recommending PEG   - Patient refusing NGT, currently wishes to pursue modified diet and will consider PEG if fails   - appreciate palliative and GI recs   - f/u OP ENT, GI  - c/w puree diet with supraglottic swallow device   - aspiration precautions, HOB >45 deg    #AHRF 2/2 aspiration and MSSA PNA   #Hx polychondritis and tracheal stenosis   #SCC of base of tongue s/p trach/PEG (reversed 2021) and CRT (2021)  #AHRF 2/2 acute on chronic COPD exacerbation - improving (not on home O2)   #Hx lung nodules  - s/p intubation 1/17, self-extubation 1/17, BiPAP/HFNC  - bronch cx (+) MSSA (1.17)  - bronch lavage path with neutrophils and fungal organisms +GMS c/f aspergillus (1.10)  - currently on NC 3L with BIPAP qhs, wean as tolerated   - s/p steroid course, completed pred taper  - c/w IV cefazolin for MSSA (bronch cx)  - ID recs appreciated, f/u discharge recs   - PT eval: [facility] vs home  - holding home cellcept due to active infection, f/u rheum outpatient for resumption  - uptrending leukocytosis, if febrile or clinical deterioration initiate sepsis workup    #Acute on chronic HFmrEF - improving   #CAD  #HTN  #HLD  - cardio eval appreciated: recommend sglt2 on dc, continue losartan and metoprolol succinate  - TTE: 1/2024: Mildly reduced LV and RV function. IVC > 50% collapsible.   - holding home losartan 100mg qd for now due to soft BP  - holding home lasix 40mg qd for now (sp diuresis earlier this admission)  - resume atorvastatin 40mg qhs   - resume metoprolol ER 25mg qd     ---------------------  DVT ppx: heparin  Diet: puree  GI ppx/Bowel regimen: PPI   Activity: IAT   GOC: dnr/intubate  Dispo: pending medical optimization, [facility] vs home PT  #Summary/Handoff:  - wean O2, f/u ID recs, resume home meds, continue PO trials

## 2025-01-24 NOTE — PROGRESS NOTE ADULT - ASSESSMENT
Assessment and Plan  Case of a 69 year old Female patient with a history of HTN, GERD, DL, CAD, COPD not on Home O2, Hx of Pneumothorax, polychondritis, SCC of BOT s/p Trach/PEG (reversed in 4/2021), s/p CRT (7 sessions in 2021) who presented to the ED on 01/10 for evaluation of worsening SOB for 1 week duration, found to have evidence of severe emphysema/pneumonia with atelectasis/tracheal stenosis s/p bronchoscopy and tracheal dilation on 01/14. Stay was complicated by hypoxic RF requiring intubation on 01/17 s/p self extubation on 01/17. We are following for G tube placement evaluation.      Evaluation for G Tube Replacement in Setting of Severe Pharyngeal Dysphagia   History of SCC of BOT s/p Trach/PEG (reversed in 4/2021), s/p CRT (7 sessions in 2021)  * Had history of SCC of BOT s/p Trach/PEG (reversed in 4/2021)  * Vitals: noted  * Labs: WBC 9.96, Hb 14.7, Plt 162, Na 141, K 3.5, BUN 35, Cr 0.6 on 01/21  * INR 1.03 and LA 1,4 on 01/18 -> 4.6 on 01/17  * Previous CT abdomen 2020 unremarkable  * s/p VFSS 01/21: severe pharyngeal dysphagia, no mention of aspiration -> cleared for pureed with thins  * No previous EGD: last colonoscopy was in 11/2023 diverticulosis; few polyps; plan was to repeat in 5 years  * No FHx of GI cancers    RECOMMENDATIONS  - Recommend continuous goals of care discussion with son and sister (HCPs). Palliative team evaluation appreciated  - Speech and swallow evaluation appreciated: s/p VFSS as above  - Since the patient is at risk for aspiration with PO feeds per speech and swallow, we can schedule for EGD with PEG tube placement when she is agreeable and optimized from pulmonary/ENT standpoint. Discussed PEG tube placement again on 01/24 as there was concern for aspiration per team, but the patient continues to refuse. Please inform GI when patient becomes agreeable. She wishes to try modified diet and understands the risk of aspiration  - Monitor for pain: management per team  - Monitor for nausea. Consider antiemetics PRN if QTc is within normal  - Continue PO protonix 40mg QD for GI prophylaxis  - Monitor BM and avoid constipation  - Follow up with our [facility] located at [address] [place] [address]. Phone Number: [phone]        Thank you for your consult.  - Please note that plan was communicated with medical team.   - Please reach GI on [phone] during weekdays till 5pm.  - Please call the GI service line after 5pm on Weekdays and anytime on Weekends: [phone].      Vianney Barraza MD  PGY - 5 Gastroenterology Fellow   [facility]

## 2025-01-24 NOTE — PROGRESS NOTE ADULT - SUBJECTIVE AND OBJECTIVE BOX
Patient is a 69y old  Female who presents with a chief complaint of shortness of breath, malaise (16 Jan 2025 11:51)      OVERNIGHT EVENTS: no major events reported     SUBJECTIVE / INTERVAL HPI: Patient seen and examined at bedside.     VITAL SIGNS:  Vital Signs Last 24 Hrs  T(C): 36.8 (24 Jan 2025 12:36), Max: 36.9 (24 Jan 2025 05:00)  T(F): 98.2 (24 Jan 2025 12:36), Max: 98.4 (24 Jan 2025 05:00)  HR: 106 (24 Jan 2025 12:36) (74 - 106)  BP: 117/81 (24 Jan 2025 12:36) (100/76 - 118/77)  BP(mean): --  RR: 18 (24 Jan 2025 12:36) (18 - 20)  SpO2: 93% (24 Jan 2025 12:36) (91% - 100%)    Parameters below as of 24 Jan 2025 12:36  Patient On (Oxygen Delivery Method): nasal cannula  O2 Flow (L/min): 1      PHYSICAL EXAM:    General: old lady chroncially looks ill   HEENT: NC/AT; PERRL, clear conjunctiva  Neck: supple  Cardiovascular: +S1/S2; RRR  Respiratory: CTA b/l; no W/R/R  Gastrointestinal: soft, NT/ND; +BSx4  Extremities: WWP; 2+ peripheral pulses; no edema   Neurological: AAOx3; globally weak, no focal deficits    MEDICATIONS:  MEDICATIONS  (STANDING):  albuterol/ipratropium for Nebulization 3 milliLiter(s) Nebulizer every 4 hours  albuterol/ipratropium for Nebulization 3 milliLiter(s) Nebulizer every 20 minutes  atorvastatin 40 milliGRAM(s) Oral at bedtime  ceFAZolin   IVPB      ceFAZolin   IVPB 1000 milliGRAM(s) IV Intermittent every 8 hours  chlorhexidine 2% Cloths 1 Application(s) Topical daily  dextrose 5%. 1000 milliLiter(s) (100 mL/Hr) IV Continuous <Continuous>  dextrose 5%. 1000 milliLiter(s) (50 mL/Hr) IV Continuous <Continuous>  dextrose 50% Injectable 25 Gram(s) IV Push once  dextrose 50% Injectable 12.5 Gram(s) IV Push once  dextrose 50% Injectable 25 Gram(s) IV Push once  glucagon  Injectable 1 milliGRAM(s) IntraMuscular once  heparin   Injectable 5000 Unit(s) SubCutaneous every 12 hours  insulin lispro (ADMELOG) corrective regimen sliding scale   SubCutaneous two times a day before meals  melatonin 5 milliGRAM(s) Oral at bedtime  metoprolol succinate ER 25 milliGRAM(s) Oral daily  pantoprazole    Tablet 40 milliGRAM(s) Oral before breakfast  saccharomyces boulardii 250 milliGRAM(s) Oral two times a day    MEDICATIONS  (PRN):  acetaminophen     Tablet .. 650 milliGRAM(s) Oral every 6 hours PRN Mild Pain (1 - 3), Moderate Pain (4 - 6)  dextrose Oral Gel 15 Gram(s) Oral once PRN Blood Glucose LESS THAN 70 milliGRAM(s)/deciliter      ALLERGIES:  Allergies    No Known Drug Allergies  CONTRAST DYS (Nausea)    Intolerances        LABS:                        15.0   14.09 )-----------( 144      ( 24 Jan 2025 06:52 )             46.9     01-24    137  |  98  |  19  ----------------------------<  154[H]  4.0   |  22  |  0.6[L]    Ca    8.1[L]      24 Jan 2025 06:52  Phos  2.5     01-24  Mg     1.7     01-24    TPro  4.6[L]  /  Alb  2.9[L]  /  TBili  0.5  /  DBili  x   /  AST  41  /  ALT  24  /  AlkPhos  65  01-24      Urinalysis Basic - ( 24 Jan 2025 06:52 )    Color: x / Appearance: x / SG: x / pH: x  Gluc: 154 mg/dL / Ketone: x  / Bili: x / Urobili: x   Blood: x / Protein: x / Nitrite: x   Leuk Esterase: x / RBC: x / WBC x   Sq Epi: x / Non Sq Epi: x / Bacteria: x      CAPILLARY BLOOD GLUCOSE      POCT Blood Glucose.: 154 mg/dL (24 Jan 2025 07:43)      RADIOLOGY & ADDITIONAL TESTS: Reviewed.    ASSESSMENT:    PLAN:

## 2025-01-24 NOTE — PROGRESS NOTE ADULT - ASSESSMENT
ASSESSMENT  A 70yo Female with PMH of HTN, HLD, GERD, CAD, COPD not on Home O2, Hx of Pneumothorax, polychondirits, SCC of BOT s/p Trach/PEG (reversed in 4/2021), s/p CRT (7 sessions in 2021) who presents with SOB for 1 week duration.    IMPRESSION  #SCC of BOT s/p Trach/PEG (reversed 2021) s/p CRT  #Dyspnea/Acute Hypoxemic respiratory failure  #Intubated 1/17 with Septic shock   - CXR 1/17 - unchanged bilateral intersitial opacities  - s/p bronch 1/17 MSSA, Yeast   - CT Angio Chest PE Protocol w/ IV Cont (01.18.25 @ 02:54): No evidence of acute pulmonary embolism. Occlusion of several right lower lobe bronchi, with increased associated   right lower lobe consolidative opacity, which may represent atelectasis  and/or pneumonia in the appropriate clinical setting. No significant change in emphysema with superimposed mild groundglass   opacities/septal thickening suggesting underlying edema.    #Tracheal Stenosis  - CT Neck Soft Tissue w/ IV Cont (01.11.25 @ 08:27): Interval development of focal moderate tracheal stenosis at the level of  the thyroid with partially collapsed appearance of the right tracheal  wall since the prior CT neck from 9/3/2024, possibly associated with the  history of prior tracheostomy. Stable mild thickening of the epiglottis and small preepiglottic soft  tissue density. Resolved nasopharyngeal soft tissue thickening and left  aryepiglottic fold thickening. Increased biapical reticular changes. Please see separately dictated  concurrent chest CT.  - CT Chest w/ IV Cont (01.11.25 @ 08:29): Since  September 3, 2024 Bilateral basilar, partial right middle lobe atelectasis. Extensive emphysema with superimposed mild groundglass opacities/septal  thickening suggesting underlying edema( not present on earlier study). 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  Spectra [phone]

## 2025-01-24 NOTE — PROGRESS NOTE ADULT - SUBJECTIVE AND OBJECTIVE BOX
Patient is a 69y old Female who presents with a chief complaint of shortness of breath, malaise (16 Jan 2025 11:51)    Today is hospital day     Overnight events/Interval History:  - No acute overnight events  - At bedside, patient feels well, has been eating PO diet, breathing on nasal cannula. Denies fevers, chills, SOB, chest pain, N/V/D/C, abdominal pain.    ROS:  - All ROS is negative unless indicated in HPI    Code Status: DNR/intubate    ALLERGIES:  No Known Drug Allergies  CONTRAST DYS (Nausea)    MEDICATIONS:  STANDING MEDICATIONS  albuterol/ipratropium for Nebulization 3 milliLiter(s) Nebulizer every 4 hours  albuterol/ipratropium for Nebulization 3 milliLiter(s) Nebulizer every 20 minutes  atorvastatin 40 milliGRAM(s) Oral at bedtime  ceFAZolin   IVPB      ceFAZolin   IVPB 1000 milliGRAM(s) IV Intermittent every 8 hours  chlorhexidine 2% Cloths 1 Application(s) Topical daily  dextrose 5%. 1000 milliLiter(s) IV Continuous <Continuous>  dextrose 5%. 1000 milliLiter(s) IV Continuous <Continuous>  dextrose 50% Injectable 25 Gram(s) IV Push once  dextrose 50% Injectable 12.5 Gram(s) IV Push once  dextrose 50% Injectable 25 Gram(s) IV Push once  glucagon  Injectable 1 milliGRAM(s) IntraMuscular once  heparin   Injectable 5000 Unit(s) SubCutaneous every 12 hours  insulin lispro (ADMELOG) corrective regimen sliding scale   SubCutaneous two times a day before meals  melatonin 5 milliGRAM(s) Oral at bedtime  pantoprazole    Tablet 40 milliGRAM(s) Oral before breakfast  saccharomyces boulardii 250 milliGRAM(s) Oral two times a day    PRN MEDICATIONS  acetaminophen     Tablet .. 650 milliGRAM(s) Oral every 6 hours PRN  dextrose Oral Gel 15 Gram(s) Oral once PRN      VITALS LAST 24H:  Vital Signs Last 24 Hrs  T(C): 36.9 (24 Jan 2025 05:00), Max: 36.9 (24 Jan 2025 05:00)  T(F): 98.4 (24 Jan 2025 05:00), Max: 98.4 (24 Jan 2025 05:00)  HR: 74 (24 Jan 2025 05:00) (74 - 101)  BP: 107/71 (24 Jan 2025 05:00) (100/76 - 118/77)  BP(mean): 86 (23 Jan 2025 11:54) (86 - 86)  RR: 18 (24 Jan 2025 05:00) (18 - 20)  SpO2: 97% (24 Jan 2025 05:00) (91% - 100%)    Parameters below as of 24 Jan 2025 05:00  Patient On (Oxygen Delivery Method): BiPAP/CPAP        PHYSICAL EXAM:  GENERAL: NAD  HEENT:  EOMI, Moist mucous membranes  CHEST/LUNG: R-sided rhonchi more prominent in lower lobe, L side CTA; normal respiratory effort  HEART: Regular rate and rhythm  ABDOMEN: Soft, nontender, nondistended  EXTREMITIES: No lower extremity edema bilaterally  NERVOUS SYSTEM:  A&Ox3, no focal deficits     LABS:                        15.5   12.52 )-----------( 148      ( 23 Jan 2025 05:29 )             48.1     01-23    139  |  97[L]  |  25[H]  ----------------------------<  113[H]  4.1   |  33[H]  |  0.7    Ca    8.6      23 Jan 2025 05:29  Phos  2.7     01-23  Mg     1.8     01-23    TPro  4.8[L]  /  Alb  3.3[L]  /  TBili  0.7  /  DBili  x   /  AST  38  /  ALT  31  /  AlkPhos  63  01-23      Urinalysis Basic - ( 23 Jan 2025 05:29 )    Color: x / Appearance: x / SG: x / pH: x  Gluc: 113 mg/dL / Ketone: x  / Bili: x / Urobili: x   Blood: x / Protein: x / Nitrite: x   Leuk Esterase: x / RBC: x / WBC x   Sq Epi: x / Non Sq Epi: x / Bacteria: x      ABG - ( 22 Jan 2025 10:49 )  pH, Arterial: 7.57  pH, Blood: x     /  pCO2: 33    /  pO2: 108   / HCO3: 30    / Base Excess: 8.2   /  SaO2: 98.6                      RADIOLOGY:  CXR      CT

## 2025-01-24 NOTE — PROGRESS NOTE ADULT - SUBJECTIVE AND OBJECTIVE BOX
JONNYGEREMIAS  69y, Female  Allergy: No Known Drug Allergies  CONTRAST DYS (Nausea)      LOS  14d    CHIEF COMPLAINT: shortness of breath, malaise (16 Jan 2025 11:51)      INTERVAL EVENTS/HPI  - No acute events overnight  - T(F): , Max: 98.4 (01-24-25 @ 05:00)   - remains on NC - 2L   - no worsening shortness of breath - dry cough unchanged  - WBC Count: 14.09 (01-24-25 @ 06:52)  WBC Count: 12.52 (01-23-25 @ 05:29)     - Creatinine: 0.6 (01-24-25 @ 06:52)  Creatinine: 0.7 (01-23-25 @ 05:29)       ROS  General: Denies rigors, nightsweats  HEENT: Denies headache, rhinorrhea, sore throat, eye pain  CV: Denies CP, palpitations  PULM: Denies wheezing, hemoptysis  GI: Denies hematemesis, hematochezia, melena  : Denies discharge, hematuria  MSK: Denies arthralgias, myalgias  SKIN: Denies rash, lesions  NEURO: Denies paresthesias, weakness  PSYCH: Denies depression, anxiety    VITALS:  T(F): 98.2, Max: 98.4 (01-24-25 @ 05:00)  HR: 106  BP: 117/81  RR: 18Vital Signs Last 24 Hrs  T(C): 36.8 (24 Jan 2025 12:36), Max: 36.9 (24 Jan 2025 05:00)  T(F): 98.2 (24 Jan 2025 12:36), Max: 98.4 (24 Jan 2025 05:00)  HR: 106 (24 Jan 2025 12:36) (74 - 106)  BP: 117/81 (24 Jan 2025 12:36) (100/76 - 117/81)  BP(mean): --  RR: 18 (24 Jan 2025 18:12) (18 - 18)  SpO2: 93% (24 Jan 2025 18:12) (91% - 97%)    Parameters below as of 24 Jan 2025 18:12  Patient On (Oxygen Delivery Method): room air        PHYSICAL EXAM:  Gen: NAD, resting in bed  HEENT: Normocephalic, atraumatic  Neck: supple, no lymphadenopathy  CV: Regular rate & regular rhythm  Lungs: decreased BS at bases, no fremitus  Abdomen: Soft, BS present  Ext: Warm, well perfused  Neuro: non focal, awake  Skin: no rash, no erythema  Lines: no phlebitis    FH: Non-contributory  Social Hx: Non-contributory    TESTS & MEASUREMENTS:                        15.0   14.09 )-----------( 144      ( 24 Jan 2025 06:52 )             46.9     01-24    137  |  98  |  19  ----------------------------<  154[H]  4.0   |  22  |  0.6[L]    Ca    8.1[L]      24 Jan 2025 06:52  Phos  2.5     01-24  Mg     1.7     01-24    TPro  4.6[L]  /  Alb  2.9[L]  /  TBili  0.5  /  DBili  x   /  AST  41  /  ALT  24  /  AlkPhos  65  01-24      LIVER FUNCTIONS - ( 24 Jan 2025 06:52 )  Alb: 2.9 g/dL / Pro: 4.6 g/dL / ALK PHOS: 65 U/L / ALT: 24 U/L / AST: 41 U/L / GGT: x           Urinalysis Basic - ( 24 Jan 2025 06:52 )    Color: x / Appearance: x / SG: x / pH: x  Gluc: 154 mg/dL / Ketone: x  / Bili: x / Urobili: x   Blood: x / Protein: x / Nitrite: x   Leuk Esterase: x / RBC: x / WBC x   Sq Epi: x / Non Sq Epi: x / Bacteria: x        Culture - Bronchial (collected 01-17-25 @ 11:37)  Source: Lavage  Gram Stain (01-17-25 @ 23:40):    Few polymorphonuclear leukocytes per low power field    Rare Squamous epithelial cells per low power field    Moderate Gram Positive Cocci in Clusters per oil power field  Final Report (01-19-25 @ 16:13):    Moderate Staphylococcus aureus    Commensal kaushik consistent with body site  Organism: Staphylococcus aureus (01-19-25 @ 16:13)  Organism: Staphylococcus aureus (01-19-25 @ 16:13)      -  Clindamycin: R <=0.25 This isolate is presumed to be clindamycin resistant based on detection of inducible resistance. Clindamycin may still be effective in some patients.      -  Oxacillin: S <=0.25 Oxacillin predicts susceptibility for dicloxacillin, methicillin, and nafcillin      -  Gentamicin: S <=4 Should not be used as monotherapy      -  Vancomycin: S 1      -  Tetracycline: S <=4      Method Type: ALONZO      -  Penicillin: R 2      -  Rifampin: S <=1 Should not be used as monotherapy      -  Erythromycin: R >4      -  Trimethoprim/Sulfamethoxazole: S <=0.5/9.5    Culture - Acid Fast - Bronchial w/Smear (collected 01-17-25 @ 11:37)  Source: BAL  Preliminary Report (01-18-25 @ 23:07):    Culture is being performed.    Culture - Fungal, Bronchial (collected 01-17-25 @ 11:37)  Source: BAL  Preliminary Report (01-19-25 @ 11:30):    Few Yeast    Culture - Blood (collected 01-14-25 @ 18:22)  Source: .Blood BLOOD  Final Report (01-19-25 @ 23:00):    No growth at 5 days    Culture - Blood (collected 01-14-25 @ 18:22)  Source: .Blood BLOOD  Final Report (01-19-25 @ 23:00):    No growth at 5 days        Lactate, Blood: 1.3 mmol/L (01-23-25 @ 00:51)  Lactate, Blood: 2.4 mmol/L (01-22-25 @ 21:49)  Lactate, Blood: 4.2 mmol/L (01-22-25 @ 16:37)      INFECTIOUS DISEASES TESTING  Fungitell: >500 (01-17-25 @ 17:57)  Procalcitonin: 0.10 (01-17-25 @ 12:49)  MRSA PCR Result.: Negative (01-17-25 @ 12:08)  MRSA PCR Result.: Negative (01-17-25 @ 07:20)  MRSA PCR Result.: Negative (01-13-25 @ 12:40)  Procalcitonin: 0.08 (01-10-25 @ 21:09)  Fungitell: >500 (01-10-25 @ 21:09)      INFLAMMATORY MARKERS      RADIOLOGY & ADDITIONAL TESTS:  I have personally reviewed the last available Chest xray  CXR      CT  CT Chest No Cont:   ACC: [id number] EXAM:  CT CHEST   ORDERED BY: BRYN ANTHONY     PROCEDURE DATE:  01/24/2025          INTERPRETATION:  CLINICAL INDICATION: Shortness of breath and malaise.   Evaluation for pulmonary aspergillosis. History of COPD.    TECHNIQUE:  A noncontrast CT of the thorax was performed. Sagittal and   coronal reformats were performed as well as MIP reconstructions.    COMPARISON: CT chest 9/3/2024, 1/18/2025    FINDINGS:    MEDIASTINUM: No significant change    AIRWAYS, LUNGS AND PLEURA: Again demonstrated bilateral trace pleural   effusions, increased since prior with decreased adjacent atelectasis.   Interval increase in bilateral groundglass opacities. Additional findings   without significant change    HEART AND VESSELS: No significant change    UPPER ABDOMEN: No significant change    BONES AND SOFT TISSUES: No significant change    IMPRESSION:  Again demonstrated bilateral trace pleural effusions, increased since   prior with decreased adjacent atelectasis. Interval increase in bilateral   groundglass opacities. Additional findings without significant change.   Recommend continued follow-up    --- End of Report ---          EVELINA BALTAZAR MD; Resident Radiologist  This document has been electronically signed.  IZZY MONTESINOS MD; Attending Radiologist  This document has been electronically signed. Jan 24 2025  3:53PM (01-24-25 @ 00:56)      CARDIOLOGY TESTING  12 Lead ECG:   Ventricular Rate 57 BPM    Atrial Rate 68 BPM    P-R Interval 112 ms    QRS Duration 70 ms    Q-T Interval 402 ms    QTC Calculation(Bazett) 391 ms    P Axis 80 degrees    R Axis 6 degrees    T Axis 159 degrees    Diagnosis Line Sinus rhythm with Blocked Premature atrial complexes  ST & T wave abnormality, consider lateral ischemia  Abnormal ECG    Confirmed by TOMMY INTERIANO MD ([id number]) on 1/24/2025 11:19:00 AM (01-24-25 @ 08:37)  12 Lead ECG:   Ventricular Rate 88 BPM    Atrial Rate 88 BPM    P-R Interval 116 ms    QRS Duration 74 ms    Q-T Interval 372 ms    QTC Calculation(Bazett) 450 ms    P Axis 64 degrees    R Axis 3 degrees    T Axis 133 degrees    Diagnosis Line Normal sinus rhythm  Left ventricular hypertrophy with repolarization abnormality ( R in aVL )  Inferior infarct , age undetermined  Anterolateral infarct , age undetermined  Abnormal ECG    Confirmed by Shai Reed ([id number]) on 1/23/2025 10:32:44 AM (01-23-25 @ 08:35)      MEDICATIONS  albuterol/ipratropium for Nebulization 3 Nebulizer every 4 hours  albuterol/ipratropium for Nebulization 3 Nebulizer every 20 minutes  atorvastatin 40 Oral at bedtime  ceFAZolin   IVPB     ceFAZolin   IVPB 1000 IV Intermittent every 8 hours  chlorhexidine 2% Cloths 1 Topical daily  dextrose 5%. 1000 IV Continuous <Continuous>  dextrose 5%. 1000 IV Continuous <Continuous>  dextrose 50% Injectable 25 IV Push once  dextrose 50% Injectable 12.5 IV Push once  dextrose 50% Injectable 25 IV Push once  glucagon  Injectable 1 IntraMuscular once  heparin   Injectable 5000 SubCutaneous every 12 hours  insulin lispro (ADMELOG) corrective regimen sliding scale  SubCutaneous two times a day before meals  melatonin 5 Oral at bedtime  metoprolol succinate ER 25 Oral daily  pantoprazole    Tablet 40 Oral before breakfast  saccharomyces boulardii 250 Oral two times a day      WEIGHT  Weight (kg): 72.6 (01-17-25 @ 05:00)  Creatinine: 0.6 mg/dL (01-24-25 @ 06:52)      ANTIBIOTICS:  ceFAZolin   IVPB      ceFAZolin   IVPB 1000 milliGRAM(s) IV Intermittent every 8 hours      All available historical records have been reviewed

## 2025-01-24 NOTE — PROGRESS NOTE ADULT - SUBJECTIVE AND OBJECTIVE BOX
Gastroenterology Progress Note      Location: [facility] 2A 011 A ([facility] 2A)  Patient Name: GEREMIAS FULLER  Age: 69y  Gender: Female      Chief Complaint  Patient is a 69y old Female who presents with a chief complaint of shortness of breath, malaise (16 Jan 2025 11:51)  Primary diagnosis of Acute upper respiratory infection      Reason for Consult  G tube placement evaluation      Progress Note  Patient denies abdominal pain.  She denies nausea or vomiting.  Her last BM was on 01/23: brown.      Vital Signs in the last 24 hours   T(C): 36.8 (24 Jan 2025 12:36), Max: 36.9 (24 Jan 2025 05:00)  T(F): 98.2 (24 Jan 2025 12:36), Max: 98.4 (24 Jan 2025 05:00)  HR: 106 (24 Jan 2025 12:36) (74 - 106)  BP: 117/81 (24 Jan 2025 12:36) (100/76 - 117/81)  BP(mean): --  ABP: --  ABP(mean): --  RR: 18 (24 Jan 2025 18:12) (18 - 18)  SpO2: 93% (24 Jan 2025 18:12) (91% - 97%)    O2 Parameters below as of 24 Jan 2025 18:12  Patient On (Oxygen Delivery Method): room air        Physical Exam  * General Appearance: Alert, cooperative, interactive, oriented to time, place, and person  * Eyes: PERRL, conjunctiva/corneas clear, EOM's intact, fundi benign, both eyes  * Neck: Supple, symmetrical, trachea midline, no adenopathy   * Lungs: crackles  * Heart: Regular Rate and Rhythm, normal S1 and S2, no audible murmur, rub, or gallop  * Abdomen: Symmetric, non-distended, soft, non-tender, bowel sounds active all four quadrants      Investigations                          15.0   14.09 )-----------( 144      ( 24 Jan 2025 06:52 )             46.9     01-24    137  |  98  |  19  ----------------------------<  154[H]  4.0   |  22  |  0.6[L]    Ca    8.1[L]      24 Jan 2025 06:52  Phos  2.5     01-24  Mg     1.7     01-24    TPro  4.6[L]  /  Alb  2.9[L]  /  TBili  0.5  /  DBili  x   /  AST  41  /  ALT  24  /  AlkPhos  65  01-24        LIVER FUNCTIONS - ( 24 Jan 2025 06:52 )  Alb: 2.9 g/dL / Pro: 4.6 g/dL / ALK PHOS: 65 U/L / ALT: 24 U/L / AST: 41 U/L / GGT: x               Urinalysis Basic - ( 24 Jan 2025 06:52 )    Color: x / Appearance: x / SG: x / pH: x  Gluc: 154 mg/dL / Ketone: x  / Bili: x / Urobili: x   Blood: x / Protein: x / Nitrite: x   Leuk Esterase: x / RBC: x / WBC x   Sq Epi: x / Non Sq Epi: x / Bacteria: x      Current Medications  Standing Medications  albuterol/ipratropium for Nebulization 3 milliLiter(s) Nebulizer every 4 hours  albuterol/ipratropium for Nebulization 3 milliLiter(s) Nebulizer every 20 minutes  ceFAZolin   IVPB      ceFAZolin   IVPB 1000 milliGRAM(s) IV Intermittent every 8 hours  chlorhexidine 2% Cloths 1 Application(s) Topical daily  dextrose 5%. 1000 milliLiter(s) (100 mL/Hr) IV Continuous <Continuous>  dextrose 5%. 1000 milliLiter(s) (50 mL/Hr) IV Continuous <Continuous>  dextrose 50% Injectable 25 Gram(s) IV Push once  dextrose 50% Injectable 12.5 Gram(s) IV Push once  dextrose 50% Injectable 25 Gram(s) IV Push once  glucagon  Injectable 1 milliGRAM(s) IntraMuscular once  heparin   Injectable 5000 Unit(s) SubCutaneous every 12 hours  insulin lispro (ADMELOG) corrective regimen sliding scale   SubCutaneous two times a day before meals  melatonin 5 milliGRAM(s) Oral at bedtime  pantoprazole    Tablet 40 milliGRAM(s) Oral before breakfast  predniSONE   Tablet 20 milliGRAM(s) Oral daily  saccharomyces boulardii 250 milliGRAM(s) Oral two times a day    PRN Medications  acetaminophen     Tablet .. 650 milliGRAM(s) Oral every 6 hours PRN Mild Pain (1 - 3), Moderate Pain (4 - 6)  dextrose Oral Gel 15 Gram(s) Oral once PRN Blood Glucose LESS THAN 70 milliGRAM(s)/deciliter    Singles Doses Administered  (ADM OVERRIDE) 1 each &lt;see task&gt; GiveOnce  (ADM OVERRIDE) 3 each &lt;see task&gt; GiveOnce  (ADM OVERRIDE) 1 each &lt;see task&gt; GiveOnce  (ADM OVERRIDE) 1 each &lt;see task&gt; GiveOnce  (ADM OVERRIDE) 1 each &lt;see task&gt; GiveOnce  (ADM OVERRIDE) 1 each &lt;see task&gt; GiveOnce  (ADM OVERRIDE) 1 each &lt;see task&gt; GiveOnce  (ADM OVERRIDE) 1 each &lt;see task&gt; GiveOnce  (ADM OVERRIDE) 1 each &lt;see task&gt; GiveOnce  (ADM OVERRIDE) 1 each &lt;see task&gt; GiveOnce  (ADM OVERRIDE) 1 each &lt;see task&gt; GiveOnce  acetaminophen     Tablet .. 650 milliGRAM(s) Oral once  acetaminophen     Tablet .. 650 milliGRAM(s) Oral once PRN  azithromycin  IVPB 500 milliGRAM(s) IV Intermittent once  ceFAZolin   IVPB 1000 milliGRAM(s) IV Intermittent once  cefTRIAXone   IVPB 1000 milliGRAM(s) IV Intermittent once  dexAMETHasone 4 mG/mL Injectable 1 mL (Rx Quick Charge) 4 milliGRAM(s) IV Push   diphenhydrAMINE Injectable 50 milliGRAM(s) IV Push once  diphenhydrAMINE Injectable 50 milliGRAM(s) IV Push once  diphenhydrAMINE Injectable 50 milliGRAM(s) IV Push once  fentaNYL    Injectable 25 MICROGram(s) IV Push once  fentaNYL 50 MICROGram(s)/mL Injectable 2 mL (Rx Quick Charge) 100 MICROGram(s) IV Push   furosemide   Injectable 40 milliGRAM(s) IV Push once  furosemide   Injectable 40 milliGRAM(s) IV Push once  furosemide   Injectable 40 milliGRAM(s) IV Push once  furosemide   Injectable 20 milliGRAM(s) IV Push once  furosemide   Injectable 40 milliGRAM(s) IV Push once  lactated ringers Bolus 1000 milliLiter(s) IV Bolus once  lactated ringers Bolus 500 milliLiter(s) IV Bolus once  lidocaine 2% Injectable (Rx Quick Charge) 60 milliGRAM(s) IV Push   lidocaine 2% Viscous 100 milliLiter(s) Swish and Spit once  LORazepam   Injectable 4 milliGRAM(s) IV Push once  magnesium sulfate  IVPB 2 Gram(s) IV Intermittent every 2 hours  methylPREDNISolone sodium succinate Injectable 60 milliGRAM(s) IV Push once  methylPREDNISolone sodium succinate Injectable 60 milliGRAM(s) IV Push once  methylPREDNISolone sodium succinate Injectable 125 milliGRAM(s) IV Push once  ondansetron 2 mG/mL Injectable 2 mL (Rx Quick Charge) 4 milliGRAM(s) IV Push   potassium chloride  20 mEq/100 mL IVPB 20 milliEquivalent(s) IV Intermittent once  propofol 10 mG/mL Injectable 20 mL (benzyl alcohol free) (Rx Quick Charge) 200 milliGRAM(s) IV Bolus   sodium chloride 0.9% Bolus 500 milliLiter(s) IV Bolus once  sodium chloride 0.9% Bolus 1000 milliLiter(s) IV Bolus once

## 2025-01-24 NOTE — PROGRESS NOTE ADULT - ASSESSMENT
70yo Female with PMH of HTN, HLD, GERD, CAD, COPD not on Home O2, Hx of Pneumothorax, polychondritis SCC of BOT s/p Trach/PEG (reversed in 4/2021, follows with Dr Louis), s/p CRT (7 sessions in 2021) who presents with SOB for 1 week duratio.  Pt was treated for  hypoxic resp failure due to COPD exacerbation complicated by tracheal stenosis s/p dilatation by CT surgery.   Pt has severe dysphagia, RRT was called on the floor, pt developed ARF due to aspiration, was intubated and upgraded to ICU, s/p bronch , self extubated on 1/17 and was downgraded to SDU.     Acute hypoxemic resp failure due to COPD exacerbation, improved  MSSA PNA   Reccurent aspiration with oropharyngeal dysphagia in setting of polychondritis  HO trach   h/o Tracheal stenosis SP dilation   History of lung nodules/ emphysema  - pt self extubated on 1/17, now stable now on 4 L NC, will taper down oxygen as tolerated   - discussed with s/s , dysphagia irreversible, recommending PEG  - pt failed barium swallow, on pureed   - discussed with patient re: NGT, she currently refusing. discussed at length with her re: PEG tube, she does not wish to pursue it at this time. Wants to try modified diet, understanding the risks of aspiration.   - MSSA in bronch cx -- currently on cefazolin 1g q 8 hours dc on keflex  - aspergillus on bronch reviewed w ID, no need for tx   - s/p steroids  - incentive spirometry, out of bed to chair, PT     Polychondritis   - as per Rheum note, patient was on cellcept 1000 G BID in the past   -  hydroxychloroquine d/c   - off immunosuppresants at this time, resume on dc     HTN/HLD/CAD/ combined CHF   - intake and output monitoring, daily weight  - monitor for fluid overload   - resume home meds if SBP is stable     Hyperglycemia / prediabetes   - CC diet, monitor f/s   - on Insulin now, adjust based on FS     GERD - C/w Protonix    Overall prognosis is guarded    pending: wean off O2 and PT eval  from home, livess w her son

## 2025-01-25 LAB
ALBUMIN SERPL ELPH-MCNC: 3.1 G/DL — LOW (ref 3.5–5.2)
ALP SERPL-CCNC: 73 U/L — SIGNIFICANT CHANGE UP (ref 30–115)
ALT FLD-CCNC: 23 U/L — SIGNIFICANT CHANGE UP (ref 0–41)
ANION GAP SERPL CALC-SCNC: 10 MMOL/L — SIGNIFICANT CHANGE UP (ref 7–14)
AST SERPL-CCNC: 38 U/L — SIGNIFICANT CHANGE UP (ref 0–41)
BASOPHILS # BLD AUTO: 0.07 K/UL — SIGNIFICANT CHANGE UP (ref 0–0.2)
BASOPHILS NFR BLD AUTO: 0.6 % — SIGNIFICANT CHANGE UP (ref 0–1)
BILIRUB SERPL-MCNC: 0.4 MG/DL — SIGNIFICANT CHANGE UP (ref 0.2–1.2)
BUN SERPL-MCNC: 21 MG/DL — HIGH (ref 10–20)
CALCIUM SERPL-MCNC: 8.3 MG/DL — LOW (ref 8.4–10.5)
CHLORIDE SERPL-SCNC: 98 MMOL/L — SIGNIFICANT CHANGE UP (ref 98–110)
CO2 SERPL-SCNC: 28 MMOL/L — SIGNIFICANT CHANGE UP (ref 17–32)
CREAT SERPL-MCNC: 0.7 MG/DL — SIGNIFICANT CHANGE UP (ref 0.7–1.5)
EGFR: 93 ML/MIN/1.73M2 — SIGNIFICANT CHANGE UP
EOSINOPHIL # BLD AUTO: 0.19 K/UL — SIGNIFICANT CHANGE UP (ref 0–0.7)
EOSINOPHIL NFR BLD AUTO: 1.6 % — SIGNIFICANT CHANGE UP (ref 0–8)
GLUCOSE BLDC GLUCOMTR-MCNC: 119 MG/DL — HIGH (ref 70–99)
GLUCOSE BLDC GLUCOMTR-MCNC: 121 MG/DL — HIGH (ref 70–99)
GLUCOSE BLDC GLUCOMTR-MCNC: 129 MG/DL — HIGH (ref 70–99)
GLUCOSE SERPL-MCNC: 111 MG/DL — HIGH (ref 70–99)
HCT VFR BLD CALC: 47.4 % — HIGH (ref 37–47)
HGB BLD-MCNC: 15.3 G/DL — SIGNIFICANT CHANGE UP (ref 12–16)
IMM GRANULOCYTES NFR BLD AUTO: 3.8 % — HIGH (ref 0.1–0.3)
LYMPHOCYTES # BLD AUTO: 0.61 K/UL — LOW (ref 1.2–3.4)
LYMPHOCYTES # BLD AUTO: 5.1 % — LOW (ref 20.5–51.1)
MAGNESIUM SERPL-MCNC: 1.9 MG/DL — SIGNIFICANT CHANGE UP (ref 1.8–2.4)
MCHC RBC-ENTMCNC: 28.9 PG — SIGNIFICANT CHANGE UP (ref 27–31)
MCHC RBC-ENTMCNC: 32.3 G/DL — SIGNIFICANT CHANGE UP (ref 32–37)
MCV RBC AUTO: 89.6 FL — SIGNIFICANT CHANGE UP (ref 81–99)
MONOCYTES # BLD AUTO: 0.58 K/UL — SIGNIFICANT CHANGE UP (ref 0.1–0.6)
MONOCYTES NFR BLD AUTO: 4.8 % — SIGNIFICANT CHANGE UP (ref 1.7–9.3)
NEUTROPHILS # BLD AUTO: 10.09 K/UL — HIGH (ref 1.4–6.5)
NEUTROPHILS NFR BLD AUTO: 84.1 % — HIGH (ref 42.2–75.2)
NRBC # BLD: 0 /100 WBCS — SIGNIFICANT CHANGE UP (ref 0–0)
NRBC BLD-RTO: 0 /100 WBCS — SIGNIFICANT CHANGE UP (ref 0–0)
PHOSPHATE SERPL-MCNC: 2.6 MG/DL — SIGNIFICANT CHANGE UP (ref 2.1–4.9)
PLATELET # BLD AUTO: 165 K/UL — SIGNIFICANT CHANGE UP (ref 130–400)
PMV BLD: 11 FL — HIGH (ref 7.4–10.4)
POTASSIUM SERPL-MCNC: 3.7 MMOL/L — SIGNIFICANT CHANGE UP (ref 3.5–5)
POTASSIUM SERPL-SCNC: 3.7 MMOL/L — SIGNIFICANT CHANGE UP (ref 3.5–5)
PROT SERPL-MCNC: 4.7 G/DL — LOW (ref 6–8)
RBC # BLD: 5.29 M/UL — SIGNIFICANT CHANGE UP (ref 4.2–5.4)
RBC # FLD: 14.4 % — SIGNIFICANT CHANGE UP (ref 11.5–14.5)
SODIUM SERPL-SCNC: 136 MMOL/L — SIGNIFICANT CHANGE UP (ref 135–146)
WBC # BLD: 11.99 K/UL — HIGH (ref 4.8–10.8)
WBC # FLD AUTO: 11.99 K/UL — HIGH (ref 4.8–10.8)

## 2025-01-25 PROCEDURE — 93010 ELECTROCARDIOGRAM REPORT: CPT

## 2025-01-25 PROCEDURE — 71045 X-RAY EXAM CHEST 1 VIEW: CPT | Mod: 26

## 2025-01-25 PROCEDURE — 99231 SBSQ HOSP IP/OBS SF/LOW 25: CPT

## 2025-01-25 RX ADMIN — Medication 100 MILLIGRAM(S): at 05:34

## 2025-01-25 RX ADMIN — IPRATROPIUM BROMIDE AND ALBUTEROL SULFATE 3 MILLILITER(S): .5; 2.5 SOLUTION RESPIRATORY (INHALATION) at 08:30

## 2025-01-25 RX ADMIN — Medication 25 MILLIGRAM(S): at 05:34

## 2025-01-25 RX ADMIN — ATORVASTATIN CALCIUM 40 MILLIGRAM(S): 80 TABLET, FILM COATED ORAL at 21:17

## 2025-01-25 RX ADMIN — PANTOPRAZOLE 40 MILLIGRAM(S): 20 TABLET, DELAYED RELEASE ORAL at 05:35

## 2025-01-25 RX ADMIN — Medication 250 MILLIGRAM(S): at 05:35

## 2025-01-25 RX ADMIN — ACETAMINOPHEN, DIPHENHYDRAMINE HCL, PHENYLEPHRINE HCL 5 MILLIGRAM(S): 325; 25; 5 TABLET ORAL at 21:18

## 2025-01-25 RX ADMIN — IPRATROPIUM BROMIDE AND ALBUTEROL SULFATE 3 MILLILITER(S): .5; 2.5 SOLUTION RESPIRATORY (INHALATION) at 13:06

## 2025-01-25 RX ADMIN — Medication 5000 UNIT(S): at 18:09

## 2025-01-25 RX ADMIN — Medication 100 MILLIGRAM(S): at 13:13

## 2025-01-25 RX ADMIN — Medication 100 MILLIGRAM(S): at 21:18

## 2025-01-25 RX ADMIN — Medication 250 MILLIGRAM(S): at 18:09

## 2025-01-25 RX ADMIN — Medication 5000 UNIT(S): at 05:34

## 2025-01-25 RX ADMIN — ANTISEPTIC SURGICAL SCRUB 1 APPLICATION(S): 0.04 SOLUTION TOPICAL at 11:22

## 2025-01-25 NOTE — PROGRESS NOTE ADULT - SUBJECTIVE AND OBJECTIVE BOX
Patient is a 69y old  Female who presents with a chief complaint of shortness of breath, malaise (16 Jan 2025 11:51)      OVERNIGHT EVENTS: remained stable, feels fine     SUBJECTIVE / INTERVAL HPI: Patient seen and examined at bedside.     VITAL SIGNS:  Vital Signs Last 24 Hrs  T(C): 37.4 (25 Jan 2025 05:00), Max: 37.4 (25 Jan 2025 05:00)  T(F): 99.3 (25 Jan 2025 05:00), Max: 99.3 (25 Jan 2025 05:00)  HR: 84 (25 Jan 2025 05:00) (83 - 84)  BP: 137/90 (25 Jan 2025 05:00) (120/74 - 137/90)  BP(mean): --  RR: 18 (25 Jan 2025 05:00) (18 - 18)  SpO2: 99% (25 Jan 2025 05:00) (92% - 99%)    Parameters below as of 25 Jan 2025 05:00  Patient On (Oxygen Delivery Method): BiPAP/CPAP        PHYSICAL EXAM:    General: old lady chroncially looks ill   HEENT: NC/AT; PERRL, clear conjunctiva  Neck: supple  Cardiovascular: +S1/S2; RRR  Respiratory: CTA b/l; no W/R/R  Gastrointestinal: soft, NT/ND; +BSx4  Extremities: WWP; 2+ peripheral pulses; no edema   Neurological: AAOx3; globally weak, no focal deficits    MEDICATIONS:  MEDICATIONS  (STANDING):  albuterol/ipratropium for Nebulization 3 milliLiter(s) Nebulizer every 4 hours  albuterol/ipratropium for Nebulization 3 milliLiter(s) Nebulizer every 20 minutes  atorvastatin 40 milliGRAM(s) Oral at bedtime  ceFAZolin   IVPB      ceFAZolin   IVPB 1000 milliGRAM(s) IV Intermittent every 8 hours  chlorhexidine 2% Cloths 1 Application(s) Topical daily  dextrose 5%. 1000 milliLiter(s) (100 mL/Hr) IV Continuous <Continuous>  dextrose 5%. 1000 milliLiter(s) (50 mL/Hr) IV Continuous <Continuous>  dextrose 50% Injectable 25 Gram(s) IV Push once  dextrose 50% Injectable 12.5 Gram(s) IV Push once  dextrose 50% Injectable 25 Gram(s) IV Push once  glucagon  Injectable 1 milliGRAM(s) IntraMuscular once  heparin   Injectable 5000 Unit(s) SubCutaneous every 12 hours  insulin lispro (ADMELOG) corrective regimen sliding scale   SubCutaneous two times a day before meals  melatonin 5 milliGRAM(s) Oral at bedtime  metoprolol succinate ER 25 milliGRAM(s) Oral daily  pantoprazole    Tablet 40 milliGRAM(s) Oral before breakfast  saccharomyces boulardii 250 milliGRAM(s) Oral two times a day    MEDICATIONS  (PRN):  acetaminophen     Tablet .. 650 milliGRAM(s) Oral every 6 hours PRN Mild Pain (1 - 3), Moderate Pain (4 - 6)  dextrose Oral Gel 15 Gram(s) Oral once PRN Blood Glucose LESS THAN 70 milliGRAM(s)/deciliter      ALLERGIES:  Allergies    No Known Drug Allergies  CONTRAST DYS (Nausea)    Intolerances        LABS:                        15.3   11.99 )-----------( 165      ( 25 Jan 2025 06:12 )             47.4     01-25    136  |  98  |  21[H]  ----------------------------<  111[H]  3.7   |  28  |  0.7    Ca    8.3[L]      25 Jan 2025 06:12  Phos  2.6     01-25  Mg     1.9     01-25    TPro  4.7[L]  /  Alb  3.1[L]  /  TBili  0.4  /  DBili  x   /  AST  38  /  ALT  23  /  AlkPhos  73  01-25      Urinalysis Basic - ( 25 Jan 2025 06:12 )    Color: x / Appearance: x / SG: x / pH: x  Gluc: 111 mg/dL / Ketone: x  / Bili: x / Urobili: x   Blood: x / Protein: x / Nitrite: x   Leuk Esterase: x / RBC: x / WBC x   Sq Epi: x / Non Sq Epi: x / Bacteria: x      CAPILLARY BLOOD GLUCOSE      POCT Blood Glucose.: 119 mg/dL (25 Jan 2025 07:58)      RADIOLOGY & ADDITIONAL TESTS: Reviewed.    ASSESSMENT:    PLAN:

## 2025-01-25 NOTE — PROGRESS NOTE ADULT - ASSESSMENT
70yo Female with PMH of HTN, HLD, GERD, CAD, COPD not on Home O2, Hx of Pneumothorax, polychondritis SCC of BOT s/p Trach/PEG (reversed in 4/2021, follows with Dr Louis), s/p CRT (7 sessions in 2021) who presents with SOB for 1 week duratio.  Pt was treated for  hypoxic resp failure due to COPD exacerbation complicated by tracheal stenosis s/p dilatation by CT surgery.   Pt has severe dysphagia, RRT was called on the floor, pt developed ARF due to aspiration, was intubated and upgraded to ICU, s/p bronch , self extubated on 1/17 and was downgraded to SDU.     Acute hypoxemic resp failure due to COPD exacerbation, improved  MSSA PNA   Reccurent aspiration with oropharyngeal dysphagia in setting of polychondritis  HO trach   h/o Tracheal stenosis SP dilation   History of lung nodules/ emphysema  - pt self extubated on 1/17, now stable now on 4 L NC, will taper down oxygen as tolerated   - discussed with s/s , dysphagia irreversible, recommending PEG  - pt failed barium swallow, on pureed   - discussed with patient re: NGT, she currently refusing. discussed at length with her re: PEG tube, she does not wish to pursue it at this time. Wants to try modified diet, understanding the risks of aspiration.   - MSSA in bronch cx -- currently on cefazolin 1g q 8 hours dc on keflex  - aspergillus on bronch reviewed w ID, no need for tx   - s/p steroids  - incentive spirometry, out of bed to chair, PT eval     Polychondritis   - as per Rheum note, patient was on cellcept 1000 G BID in the past   -  hydroxychloroquine d/c   - off immunosuppresants at this time, resume on dc     HTN/HLD/CAD/ combined CHF   - intake and output monitoring, daily weight  - monitor for fluid overload   - resume home meds if SBP is stable     Hyperglycemia / prediabetes   - CC diet, monitor f/s   - on Insulin now, adjust based on FS     GERD - C/w Protonix    Overall prognosis is guarded    pending: PT emre kirkpatrick DC to home w Home PT   from home, livess w her son

## 2025-01-26 LAB
ALBUMIN SERPL ELPH-MCNC: 3 G/DL — LOW (ref 3.5–5.2)
ALP SERPL-CCNC: 74 U/L — SIGNIFICANT CHANGE UP (ref 30–115)
ALT FLD-CCNC: 21 U/L — SIGNIFICANT CHANGE UP (ref 0–41)
ANION GAP SERPL CALC-SCNC: 10 MMOL/L — SIGNIFICANT CHANGE UP (ref 7–14)
AST SERPL-CCNC: 44 U/L — HIGH (ref 0–41)
BASOPHILS # BLD AUTO: 0 K/UL — SIGNIFICANT CHANGE UP (ref 0–0.2)
BASOPHILS NFR BLD AUTO: 0 % — SIGNIFICANT CHANGE UP (ref 0–1)
BILIRUB SERPL-MCNC: 0.5 MG/DL — SIGNIFICANT CHANGE UP (ref 0.2–1.2)
BUN SERPL-MCNC: 14 MG/DL — SIGNIFICANT CHANGE UP (ref 10–20)
CALCIUM SERPL-MCNC: 8.5 MG/DL — SIGNIFICANT CHANGE UP (ref 8.4–10.4)
CHLORIDE SERPL-SCNC: 100 MMOL/L — SIGNIFICANT CHANGE UP (ref 98–110)
CO2 SERPL-SCNC: 26 MMOL/L — SIGNIFICANT CHANGE UP (ref 17–32)
CREAT SERPL-MCNC: 0.6 MG/DL — LOW (ref 0.7–1.5)
EGFR: 96 ML/MIN/1.73M2 — SIGNIFICANT CHANGE UP
EOSINOPHIL # BLD AUTO: 0.3 K/UL — SIGNIFICANT CHANGE UP (ref 0–0.7)
EOSINOPHIL NFR BLD AUTO: 2.6 % — SIGNIFICANT CHANGE UP (ref 0–8)
GLUCOSE BLDC GLUCOMTR-MCNC: 121 MG/DL — HIGH (ref 70–99)
GLUCOSE BLDC GLUCOMTR-MCNC: 121 MG/DL — HIGH (ref 70–99)
GLUCOSE BLDC GLUCOMTR-MCNC: 126 MG/DL — HIGH (ref 70–99)
GLUCOSE BLDC GLUCOMTR-MCNC: 154 MG/DL — HIGH (ref 70–99)
GLUCOSE SERPL-MCNC: 106 MG/DL — HIGH (ref 70–99)
HCT VFR BLD CALC: 47.4 % — HIGH (ref 37–47)
HGB BLD-MCNC: 15.3 G/DL — SIGNIFICANT CHANGE UP (ref 12–16)
LYMPHOCYTES # BLD AUTO: 0.7 K/UL — LOW (ref 1.2–3.4)
LYMPHOCYTES # BLD AUTO: 6.1 % — LOW (ref 20.5–51.1)
MAGNESIUM SERPL-MCNC: 1.8 MG/DL — SIGNIFICANT CHANGE UP (ref 1.8–2.4)
MCHC RBC-ENTMCNC: 28.3 PG — SIGNIFICANT CHANGE UP (ref 27–31)
MCHC RBC-ENTMCNC: 32.3 G/DL — SIGNIFICANT CHANGE UP (ref 32–37)
MCV RBC AUTO: 87.8 FL — SIGNIFICANT CHANGE UP (ref 81–99)
MONOCYTES # BLD AUTO: 0.49 K/UL — SIGNIFICANT CHANGE UP (ref 0.1–0.6)
MONOCYTES NFR BLD AUTO: 4.3 % — SIGNIFICANT CHANGE UP (ref 1.7–9.3)
NEUTROPHILS # BLD AUTO: 9.94 K/UL — HIGH (ref 1.4–6.5)
NEUTROPHILS NFR BLD AUTO: 87 % — HIGH (ref 42.2–75.2)
PHOSPHATE SERPL-MCNC: 2.8 MG/DL — SIGNIFICANT CHANGE UP (ref 2.1–4.9)
PLATELET # BLD AUTO: 100 K/UL — LOW (ref 130–400)
PMV BLD: 11.5 FL — HIGH (ref 7.4–10.4)
POTASSIUM SERPL-MCNC: 4.1 MMOL/L — SIGNIFICANT CHANGE UP (ref 3.5–5)
POTASSIUM SERPL-SCNC: 4.1 MMOL/L — SIGNIFICANT CHANGE UP (ref 3.5–5)
PROT SERPL-MCNC: 4.8 G/DL — LOW (ref 6–8)
RBC # BLD: 5.4 M/UL — SIGNIFICANT CHANGE UP (ref 4.2–5.4)
RBC # FLD: 14.2 % — SIGNIFICANT CHANGE UP (ref 11.5–14.5)
SODIUM SERPL-SCNC: 136 MMOL/L — SIGNIFICANT CHANGE UP (ref 135–146)
WBC # BLD: 11.43 K/UL — HIGH (ref 4.8–10.8)
WBC # FLD AUTO: 11.43 K/UL — HIGH (ref 4.8–10.8)

## 2025-01-26 PROCEDURE — 99231 SBSQ HOSP IP/OBS SF/LOW 25: CPT

## 2025-01-26 PROCEDURE — 71045 X-RAY EXAM CHEST 1 VIEW: CPT | Mod: 26

## 2025-01-26 PROCEDURE — 93010 ELECTROCARDIOGRAM REPORT: CPT

## 2025-01-26 RX ORDER — FAMOTIDINE 10 MG/ML
20 INJECTION INTRAVENOUS ONCE
Refills: 0 | Status: COMPLETED | OUTPATIENT
Start: 2025-01-26 | End: 2025-01-26

## 2025-01-26 RX ADMIN — Medication 5000 UNIT(S): at 17:10

## 2025-01-26 RX ADMIN — IPRATROPIUM BROMIDE AND ALBUTEROL SULFATE 3 MILLILITER(S): .5; 2.5 SOLUTION RESPIRATORY (INHALATION) at 23:59

## 2025-01-26 RX ADMIN — ACETAMINOPHEN, DIPHENHYDRAMINE HCL, PHENYLEPHRINE HCL 5 MILLIGRAM(S): 325; 25; 5 TABLET ORAL at 21:16

## 2025-01-26 RX ADMIN — IPRATROPIUM BROMIDE AND ALBUTEROL SULFATE 3 MILLILITER(S): .5; 2.5 SOLUTION RESPIRATORY (INHALATION) at 20:22

## 2025-01-26 RX ADMIN — ANTISEPTIC SURGICAL SCRUB 1 APPLICATION(S): 0.04 SOLUTION TOPICAL at 13:06

## 2025-01-26 RX ADMIN — Medication 100 MILLIGRAM(S): at 13:05

## 2025-01-26 RX ADMIN — Medication 100 MILLIGRAM(S): at 06:00

## 2025-01-26 RX ADMIN — Medication 250 MILLIGRAM(S): at 17:10

## 2025-01-26 RX ADMIN — Medication 250 MILLIGRAM(S): at 06:01

## 2025-01-26 RX ADMIN — ATORVASTATIN CALCIUM 40 MILLIGRAM(S): 80 TABLET, FILM COATED ORAL at 21:16

## 2025-01-26 RX ADMIN — FAMOTIDINE 20 MILLIGRAM(S): 10 INJECTION INTRAVENOUS at 21:14

## 2025-01-26 RX ADMIN — Medication 5000 UNIT(S): at 06:01

## 2025-01-26 RX ADMIN — PANTOPRAZOLE 40 MILLIGRAM(S): 20 TABLET, DELAYED RELEASE ORAL at 06:00

## 2025-01-26 RX ADMIN — Medication 100 MILLIGRAM(S): at 21:13

## 2025-01-26 NOTE — PROGRESS NOTE ADULT - SUBJECTIVE AND OBJECTIVE BOX
Patient is a 69y old  Female who presents with a chief complaint of shortness of breath, malaise (16 Jan 2025 11:51)      OVERNIGHT EVENTS: remained stable, feels fine but weak     SUBJECTIVE / INTERVAL HPI: Patient seen and examined at bedside.     VITAL SIGNS:  Vital Signs Last 24 Hrs  T(C): 36.7 (26 Jan 2025 05:00), Max: 36.8 (25 Jan 2025 21:00)  T(F): 98.1 (26 Jan 2025 05:00), Max: 98.2 (25 Jan 2025 21:00)  HR: 83 (26 Jan 2025 08:00) (83 - 90)  BP: 99/65 (26 Jan 2025 05:00) (99/65 - 116/73)  BP(mean): --  RR: 18 (26 Jan 2025 05:00) (18 - 18)  SpO2: 90% (26 Jan 2025 08:00) (90% - 94%)    Parameters below as of 26 Jan 2025 08:00  Patient On (Oxygen Delivery Method): room air        PHYSICAL EXAM:    General: old lady chroncially looks ill   HEENT: NC/AT; PERRL, clear conjunctiva  Neck: supple  Cardiovascular: +S1/S2; RRR  Respiratory: CTA b/l; no W/R/R  Gastrointestinal: soft, NT/ND; +BSx4  Extremities: WWP; 2+ peripheral pulses; no edema   Neurological: AAOx3; globally weak, no focal deficits    MEDICATIONS:  MEDICATIONS  (STANDING):  albuterol/ipratropium for Nebulization 3 milliLiter(s) Nebulizer every 4 hours  albuterol/ipratropium for Nebulization 3 milliLiter(s) Nebulizer every 20 minutes  atorvastatin 40 milliGRAM(s) Oral at bedtime  ceFAZolin   IVPB      ceFAZolin   IVPB 1000 milliGRAM(s) IV Intermittent every 8 hours  chlorhexidine 2% Cloths 1 Application(s) Topical daily  dextrose 5%. 1000 milliLiter(s) (100 mL/Hr) IV Continuous <Continuous>  dextrose 5%. 1000 milliLiter(s) (50 mL/Hr) IV Continuous <Continuous>  dextrose 50% Injectable 25 Gram(s) IV Push once  dextrose 50% Injectable 12.5 Gram(s) IV Push once  dextrose 50% Injectable 25 Gram(s) IV Push once  glucagon  Injectable 1 milliGRAM(s) IntraMuscular once  heparin   Injectable 5000 Unit(s) SubCutaneous every 12 hours  insulin lispro (ADMELOG) corrective regimen sliding scale   SubCutaneous two times a day before meals  melatonin 5 milliGRAM(s) Oral at bedtime  metoprolol succinate ER 25 milliGRAM(s) Oral daily  pantoprazole    Tablet 40 milliGRAM(s) Oral before breakfast  saccharomyces boulardii 250 milliGRAM(s) Oral two times a day    MEDICATIONS  (PRN):  acetaminophen     Tablet .. 650 milliGRAM(s) Oral every 6 hours PRN Mild Pain (1 - 3), Moderate Pain (4 - 6)  dextrose Oral Gel 15 Gram(s) Oral once PRN Blood Glucose LESS THAN 70 milliGRAM(s)/deciliter      ALLERGIES:  Allergies    No Known Drug Allergies  CONTRAST DYS (Nausea)    Intolerances        LABS:                        15.3   11.43 )-----------( 100      ( 26 Jan 2025 07:04 )             47.4     01-26    136  |  100  |  14  ----------------------------<  106[H]  4.1   |  26  |  0.6[L]    Ca    8.5      26 Jan 2025 07:04  Phos  2.8     01-26  Mg     1.8     01-26    TPro  4.8[L]  /  Alb  3.0[L]  /  TBili  0.5  /  DBili  x   /  AST  44[H]  /  ALT  21  /  AlkPhos  74  01-26      Urinalysis Basic - ( 26 Jan 2025 07:04 )    Color: x / Appearance: x / SG: x / pH: x  Gluc: 106 mg/dL / Ketone: x  / Bili: x / Urobili: x   Blood: x / Protein: x / Nitrite: x   Leuk Esterase: x / RBC: x / WBC x   Sq Epi: x / Non Sq Epi: x / Bacteria: x      CAPILLARY BLOOD GLUCOSE      POCT Blood Glucose.: 121 mg/dL (26 Jan 2025 11:28)      RADIOLOGY & ADDITIONAL TESTS: Reviewed.    ASSESSMENT:    PLAN:

## 2025-01-26 NOTE — PROGRESS NOTE ADULT - ASSESSMENT
70yo Female with PMH of HTN, HLD, GERD, CAD, COPD not on Home O2, Hx of Pneumothorax, polychondritis SCC of BOT s/p Trach/PEG (reversed in 4/2021, follows with Dr Louis), s/p CRT (7 sessions in 2021) who presents with SOB for 1 week duratio.  Pt was treated for  hypoxic resp failure due to COPD exacerbation complicated by tracheal stenosis s/p dilatation by CT surgery.   Pt has severe dysphagia, RRT was called on the floor, pt developed ARF due to aspiration, was intubated and upgraded to ICU, s/p bronch , self extubated on 1/17 and was downgraded to SDU.    #Acute hypoxemic resp failure due to COPD exacerbation, improved  #MSSA PNA   #Reccurent aspiration with oropharyngeal dysphagia in setting of polychondritis  #HO trach   #h/o Tracheal stenosis SP dilation   #History of lung nodules/ emphysema  - pt self extubated on 1/17, currnetly on RA, sat 88-90%, check Ambulatory O2 after PT eval   - discussed with s/s , dysphagia irreversible, recommending PEG  - pt failed barium swallow, on pureed   - discussed with patient re: NGT, she refused. . discussed at length with her re: PEG tube, she does not wish to pursue it at this time. Wants to try modified diet, understanding the risks of aspiration.   - MSSA in bronch cx -- currently on cefazolin 1g q 8 hours dc on keflex until 1/29  - aspergillus on bronch reviewed w ID, no need for tx   - s/p steroids  - incentive spirometry, out of bed to chair, PT eval     #Thrombocytopenia  - no bleeding, slowly dropping 300s on admission, today, 100K, pt has been on Sub q heparin, repeat level in AM if cont to drop with send HIT panel     Polychondritis   - as per Rheum note, patient was on cellcept 1000 G BID in the past   -  hydroxychloroquine d/c   - off immunosuppresants at this time, resume on dc     HTN/HLD/CAD/ combined CHF   - intake and output monitoring, daily weight  - monitor for fluid overload       Hyperglycemia / prediabetes   - CC diet, monitor f/s   - on Insulin now, adjust based on FS     GERD - C/w Protonix    Overall prognosis is guarded    pending: PT eval, refused STR, likley to home w Home PT   from home, livess w her son

## 2025-01-27 LAB
ALBUMIN SERPL ELPH-MCNC: 3.1 G/DL — LOW (ref 3.5–5.2)
ALP SERPL-CCNC: 77 U/L — SIGNIFICANT CHANGE UP (ref 30–115)
ALT FLD-CCNC: 17 U/L — SIGNIFICANT CHANGE UP (ref 0–41)
ANION GAP SERPL CALC-SCNC: 10 MMOL/L — SIGNIFICANT CHANGE UP (ref 7–14)
AST SERPL-CCNC: 42 U/L — HIGH (ref 0–41)
BASOPHILS # BLD AUTO: 0.04 K/UL — SIGNIFICANT CHANGE UP (ref 0–0.2)
BASOPHILS NFR BLD AUTO: 0.4 % — SIGNIFICANT CHANGE UP (ref 0–1)
BILIRUB SERPL-MCNC: 0.5 MG/DL — SIGNIFICANT CHANGE UP (ref 0.2–1.2)
BUN SERPL-MCNC: 12 MG/DL — SIGNIFICANT CHANGE UP (ref 10–20)
CALCIUM SERPL-MCNC: 8.4 MG/DL — SIGNIFICANT CHANGE UP (ref 8.4–10.5)
CHLORIDE SERPL-SCNC: 101 MMOL/L — SIGNIFICANT CHANGE UP (ref 98–110)
CO2 SERPL-SCNC: 26 MMOL/L — SIGNIFICANT CHANGE UP (ref 17–32)
CREAT SERPL-MCNC: 0.7 MG/DL — SIGNIFICANT CHANGE UP (ref 0.7–1.5)
EGFR: 93 ML/MIN/1.73M2 — SIGNIFICANT CHANGE UP
EOSINOPHIL # BLD AUTO: 0.1 K/UL — SIGNIFICANT CHANGE UP (ref 0–0.7)
EOSINOPHIL NFR BLD AUTO: 0.9 % — SIGNIFICANT CHANGE UP (ref 0–8)
GLUCOSE BLDC GLUCOMTR-MCNC: 134 MG/DL — HIGH (ref 70–99)
GLUCOSE BLDC GLUCOMTR-MCNC: 136 MG/DL — HIGH (ref 70–99)
GLUCOSE BLDC GLUCOMTR-MCNC: 150 MG/DL — HIGH (ref 70–99)
GLUCOSE BLDC GLUCOMTR-MCNC: 160 MG/DL — HIGH (ref 70–99)
GLUCOSE SERPL-MCNC: 140 MG/DL — HIGH (ref 70–99)
HCT VFR BLD CALC: 45.4 % — SIGNIFICANT CHANGE UP (ref 37–47)
HGB BLD-MCNC: 14.7 G/DL — SIGNIFICANT CHANGE UP (ref 12–16)
IMM GRANULOCYTES NFR BLD AUTO: 1.6 % — HIGH (ref 0.1–0.3)
LYMPHOCYTES # BLD AUTO: 0.52 K/UL — LOW (ref 1.2–3.4)
LYMPHOCYTES # BLD AUTO: 4.9 % — LOW (ref 20.5–51.1)
MAGNESIUM SERPL-MCNC: 1.7 MG/DL — LOW (ref 1.8–2.4)
MCHC RBC-ENTMCNC: 28.9 PG — SIGNIFICANT CHANGE UP (ref 27–31)
MCHC RBC-ENTMCNC: 32.4 G/DL — SIGNIFICANT CHANGE UP (ref 32–37)
MCV RBC AUTO: 89.2 FL — SIGNIFICANT CHANGE UP (ref 81–99)
MONOCYTES # BLD AUTO: 0.55 K/UL — SIGNIFICANT CHANGE UP (ref 0.1–0.6)
MONOCYTES NFR BLD AUTO: 5.2 % — SIGNIFICANT CHANGE UP (ref 1.7–9.3)
NEUTROPHILS # BLD AUTO: 9.24 K/UL — HIGH (ref 1.4–6.5)
NEUTROPHILS NFR BLD AUTO: 87 % — HIGH (ref 42.2–75.2)
NRBC # BLD: 0 /100 WBCS — SIGNIFICANT CHANGE UP (ref 0–0)
NRBC BLD-RTO: 0 /100 WBCS — SIGNIFICANT CHANGE UP (ref 0–0)
PHOSPHATE SERPL-MCNC: 3.2 MG/DL — SIGNIFICANT CHANGE UP (ref 2.1–4.9)
PLATELET # BLD AUTO: 124 K/UL — LOW (ref 130–400)
PMV BLD: 11.5 FL — HIGH (ref 7.4–10.4)
POTASSIUM SERPL-MCNC: 4.3 MMOL/L — SIGNIFICANT CHANGE UP (ref 3.5–5)
POTASSIUM SERPL-SCNC: 4.3 MMOL/L — SIGNIFICANT CHANGE UP (ref 3.5–5)
PROT SERPL-MCNC: 4.9 G/DL — LOW (ref 6–8)
RBC # BLD: 5.09 M/UL — SIGNIFICANT CHANGE UP (ref 4.2–5.4)
RBC # FLD: 14.5 % — SIGNIFICANT CHANGE UP (ref 11.5–14.5)
SODIUM SERPL-SCNC: 137 MMOL/L — SIGNIFICANT CHANGE UP (ref 135–146)
WBC # BLD: 10.62 K/UL — SIGNIFICANT CHANGE UP (ref 4.8–10.8)
WBC # FLD AUTO: 10.62 K/UL — SIGNIFICANT CHANGE UP (ref 4.8–10.8)

## 2025-01-27 PROCEDURE — 71045 X-RAY EXAM CHEST 1 VIEW: CPT | Mod: 26

## 2025-01-27 PROCEDURE — 93010 ELECTROCARDIOGRAM REPORT: CPT

## 2025-01-27 PROCEDURE — 99231 SBSQ HOSP IP/OBS SF/LOW 25: CPT

## 2025-01-27 RX ORDER — MAGNESIUM SULFATE 0.8 MEQ/ML
2 AMPUL (ML) INJECTION ONCE
Refills: 0 | Status: COMPLETED | OUTPATIENT
Start: 2025-01-27 | End: 2025-01-27

## 2025-01-27 RX ORDER — ACETAMINOPHEN, DIPHENHYDRAMINE HCL, PHENYLEPHRINE HCL 325; 25; 5 MG/1; MG/1; MG/1
3 TABLET ORAL AT BEDTIME
Refills: 0 | Status: DISCONTINUED | OUTPATIENT
Start: 2025-01-27 | End: 2025-02-10

## 2025-01-27 RX ADMIN — Medication 25 GRAM(S): at 11:03

## 2025-01-27 RX ADMIN — IPRATROPIUM BROMIDE AND ALBUTEROL SULFATE 3 MILLILITER(S): .5; 2.5 SOLUTION RESPIRATORY (INHALATION) at 20:04

## 2025-01-27 RX ADMIN — Medication 2: at 17:24

## 2025-01-27 RX ADMIN — IPRATROPIUM BROMIDE AND ALBUTEROL SULFATE 3 MILLILITER(S): .5; 2.5 SOLUTION RESPIRATORY (INHALATION) at 03:35

## 2025-01-27 RX ADMIN — Medication 5000 UNIT(S): at 05:10

## 2025-01-27 RX ADMIN — IPRATROPIUM BROMIDE AND ALBUTEROL SULFATE 3 MILLILITER(S): .5; 2.5 SOLUTION RESPIRATORY (INHALATION) at 16:43

## 2025-01-27 RX ADMIN — Medication 250 MILLIGRAM(S): at 05:10

## 2025-01-27 RX ADMIN — Medication 100 MILLIGRAM(S): at 13:16

## 2025-01-27 RX ADMIN — ACETAMINOPHEN, DIPHENHYDRAMINE HCL, PHENYLEPHRINE HCL 3 MILLIGRAM(S): 325; 25; 5 TABLET ORAL at 22:15

## 2025-01-27 RX ADMIN — IPRATROPIUM BROMIDE AND ALBUTEROL SULFATE 3 MILLILITER(S): .5; 2.5 SOLUTION RESPIRATORY (INHALATION) at 12:16

## 2025-01-27 RX ADMIN — Medication 25 MILLIGRAM(S): at 05:11

## 2025-01-27 RX ADMIN — Medication 5000 UNIT(S): at 17:25

## 2025-01-27 RX ADMIN — Medication 250 MILLIGRAM(S): at 17:26

## 2025-01-27 RX ADMIN — Medication 100 MILLIGRAM(S): at 22:15

## 2025-01-27 RX ADMIN — ATORVASTATIN CALCIUM 40 MILLIGRAM(S): 80 TABLET, FILM COATED ORAL at 22:15

## 2025-01-27 RX ADMIN — IPRATROPIUM BROMIDE AND ALBUTEROL SULFATE 3 MILLILITER(S): .5; 2.5 SOLUTION RESPIRATORY (INHALATION) at 08:08

## 2025-01-27 RX ADMIN — Medication 100 MILLIGRAM(S): at 05:11

## 2025-01-27 RX ADMIN — ANTISEPTIC SURGICAL SCRUB 1 APPLICATION(S): 0.04 SOLUTION TOPICAL at 11:04

## 2025-01-27 RX ADMIN — PANTOPRAZOLE 40 MILLIGRAM(S): 20 TABLET, DELAYED RELEASE ORAL at 05:10

## 2025-01-27 NOTE — DISCHARGE NOTE PROVIDER - NSDCFUSCHEDAPPT_GEN_ALL_CORE_FT
Daquan Louis  [facility] Physician [facility]  OTOL[address]NG [address]  Scheduled Appointment: 02/13/2025    Unknown, Doctor  [facility] PreAdmits  Scheduled Appointment: 03/26/2025    [facility] Physician [facility]  BRST[address]AG [address]  Scheduled Appointment: 03/26/2025    [facility]  BREAST [address]  Scheduled Appointment: 04/01/2025    Jena Chavira  [facility] PreAdmits  Scheduled Appointment: 04/15/2025    [facility] Physician [facility]  GASTRO  [address]  Scheduled Appointment: 04/15/2025     Daquan Louis  [facility] Physician [facility]  [address]NG [address]  Scheduled Appointment: 02/13/2025    Unknown, Doctor  [facility] PreAdmits  Scheduled Appointment: 03/26/2025    [facility] Physician [facility]  [address]  Scheduled Appointment: 03/26/2025    [facility] Physician [facility]  BREAST [address]  Scheduled Appointment: 04/01/2025    Jena Chavira  [facility] PreAdmits  Scheduled Appointment: 04/15/2025    [facility] Physician [facility]  [address]STRO  [address]  Scheduled Appointment: 04/15/2025    Jose Peña  [facility] PreAdmits  Scheduled Appointment: 04/29/2025    [facility] Physician [facility]  [address]MMED  [address]  Scheduled Appointment: 04/29/2025

## 2025-01-27 NOTE — DISCHARGE NOTE NURSING/CASE MANAGEMENT/SOCIAL WORK - FINANCIAL ASSISTANCE
[facility] provides services at a reduced cost to those who are determined to be eligible through [facility]’s financial assistance program. Information regarding [facility]’s financial assistance program can be found by going to [email or web address] or by calling [phone].

## 2025-01-27 NOTE — PROGRESS NOTE ADULT - SUBJECTIVE AND OBJECTIVE BOX
Patient is a 69y old  Female who presents with a chief complaint of shortness of breath, malaise (16 Jan 2025 11:51)      OVERNIGHT EVENTS: remained stable, feels fine but weak   PT recommending rehab     SUBJECTIVE / INTERVAL HPI: Patient seen and examined at bedside.     VITAL SIGNS:  Vital Signs Last 24 Hrs  Vital Signs Last 24 Hrs  T(C): 36.2 (27 Jan 2025 11:36), Max: 36.7 (26 Jan 2025 21:39)  T(F): 97.2 (27 Jan 2025 11:36), Max: 98.1 (26 Jan 2025 21:39)  HR: 113 (27 Jan 2025 11:36) (67 - 113)  BP: 111/82 (27 Jan 2025 11:36) (104/72 - 111/82)  BP(mean): --  RR: 18 (27 Jan 2025 11:36) (17 - 18)  SpO2: 91% (27 Jan 2025 11:36) (91% - 96%)    Parameters below as of 26 Jan 2025 20:11  Patient On (Oxygen Delivery Method): room air            PHYSICAL EXAM:    General:  chroncially looks ill   HEENT: NC/AT; PERRL, clear conjunctiva  Neck: supple  Cardiovascular: +S1/S2; RRR  Respiratory: CTA b/l; no W/R/R  Gastrointestinal: soft, NT/ND; +BSx4  Extremities: WWP; 2+ peripheral pulses; no edema   Neurological: AAOx3; globally weak, no focal deficits    MEDICATIONS:  MEDICATIONS  (STANDING):  albuterol/ipratropium for Nebulization 3 milliLiter(s) Nebulizer every 4 hours  albuterol/ipratropium for Nebulization 3 milliLiter(s) Nebulizer every 20 minutes  atorvastatin 40 milliGRAM(s) Oral at bedtime  ceFAZolin   IVPB      ceFAZolin   IVPB 1000 milliGRAM(s) IV Intermittent every 8 hours  chlorhexidine 2% Cloths 1 Application(s) Topical daily  dextrose 5%. 1000 milliLiter(s) (100 mL/Hr) IV Continuous <Continuous>  dextrose 5%. 1000 milliLiter(s) (50 mL/Hr) IV Continuous <Continuous>  dextrose 50% Injectable 25 Gram(s) IV Push once  dextrose 50% Injectable 12.5 Gram(s) IV Push once  dextrose 50% Injectable 25 Gram(s) IV Push once  glucagon  Injectable 1 milliGRAM(s) IntraMuscular once  heparin   Injectable 5000 Unit(s) SubCutaneous every 12 hours  insulin lispro (ADMELOG) corrective regimen sliding scale   SubCutaneous two times a day before meals  melatonin 5 milliGRAM(s) Oral at bedtime  metoprolol succinate ER 25 milliGRAM(s) Oral daily  pantoprazole    Tablet 40 milliGRAM(s) Oral before breakfast  saccharomyces boulardii 250 milliGRAM(s) Oral two times a day    MEDICATIONS  (PRN):  acetaminophen     Tablet .. 650 milliGRAM(s) Oral every 6 hours PRN Mild Pain (1 - 3), Moderate Pain (4 - 6)  dextrose Oral Gel 15 Gram(s) Oral once PRN Blood Glucose LESS THAN 70 milliGRAM(s)/deciliter      ALLERGIES:  Allergies    No Known Drug Allergies  CONTRAST DYS (Nausea)    Intolerances        LABS:               LABS:                        14.7   10.62 )-----------( 124      ( 27 Jan 2025 07:02 )             45.4     01-27    137  |  101  |  12  ----------------------------<  140[H]  4.3   |  26  |  0.7    Ca    8.4      27 Jan 2025 07:02  Phos  3.2     01-27  Mg     1.7     01-27    TPro  4.9[L]  /  Alb  3.1[L]  /  TBili  0.5  /  DBili  x   /  AST  42[H]  /  ALT  17  /  AlkPhos  77  01-27            Urinalysis Basic - ( 26 Jan 2025 07:04 )    Color: x / Appearance: x / SG: x / pH: x  Gluc: 106 mg/dL / Ketone: x  / Bili: x / Urobili: x   Blood: x / Protein: x / Nitrite: x   Leuk Esterase: x / RBC: x / WBC x   Sq Epi: x / Non Sq Epi: x / Bacteria: x      CAPILLARY BLOOD GLUCOSE      POCT Blood Glucose.: 121 mg/dL (26 Jan 2025 11:28)      RADIOLOGY & ADDITIONAL TESTS: Reviewed.    ASSESSMENT:    PLAN:

## 2025-01-27 NOTE — DISCHARGE NOTE NURSING/CASE MANAGEMENT/SOCIAL WORK - PATIENT PORTAL LINK FT
You can access the FollowMyHealth Patient Portal offered by [facility] by registering at the following website: [email or web address]. By joining [facility]’s FollowMyHealth portal, you will also be able to view your health information using other applications (apps) compatible with our system.

## 2025-01-27 NOTE — DISCHARGE NOTE NURSING/CASE MANAGEMENT/SOCIAL WORK - NSDCVIVACCINE_GEN_ALL_CORE_FT
Tdap; 16-Apr-2024 15:10; Chandan Hill (RN); [employer]; [id number] (Exp. Date: 06-Dec-2025); IntraMuscular; Deltoid Left.; 0.5 milliLiter(s); VIS (VIS Published: 09-May-2013, VIS Presented: 16-Apr-2024);

## 2025-01-27 NOTE — DISCHARGE NOTE PROVIDER - CARE PROVIDER_API CALL
Alvin Araiza  Otolaryngology  [address] [place] [address]  Phone: [phone]  Fax: [phone]  Follow Up Time: 1 week    Ayana Cleveland  Gastroenterology  [address] [place] [address]  Phone: [phone]  Fax: [phone]  Follow Up Time: 1 week    Merlene Garza  Pulmonary Disease  [address], [address]  [place] [address]  Phone: [phone]  Fax: [phone]  Follow Up Time: 1 week    Alex Mendoza  Internal Medicine  [address] [place] [address]  Phone: [phone]  Fax: [phone]  Follow Up Time: 1-3 days    Irene Pulido  Cardiovascular Disease  [address] [place] [address]  Phone: [phone]  Fax: [phone]  Follow Up Time: 1 week

## 2025-01-27 NOTE — DISCHARGE NOTE PROVIDER - NSDCHHBASESERVICE_GEN_ALL_CORE
Detail Level: Simple
Additional Notes: Patient consent was obtained to proceed with the visit and recommended plan of care after discussion of all risks and benefits, including the risks of COVID-19 exposure.
Physical therapy

## 2025-01-27 NOTE — DISCHARGE NOTE PROVIDER - NSDCCPCAREPLAN_GEN_ALL_CORE_FT
PRINCIPAL DISCHARGE DIAGNOSIS  Diagnosis: Respiratory infection  Assessment and Plan of Treatment: You were admitted for respiratory distress secondary to your COPD exacerbation. CT scan was done and showed you had a narrowing of the trachea. You had dilation of the narrowing by CT surgery. After that you had another episode of respiratory distress likely due to aspirration this time and you  were intubated and admitted to the ICU.   You were extubated on 1/17 and your breathing improved. You were treated with antibiotics during this time. Prior to your planned discharged you had some elevated heart rate and trouble breathing and your platelets went down, which was conderning for heparin-induced thrombocytopenia (condition secondary to blood thinner use). You were found to have a blood clot in your lungs and were started on a different type of blood thinners.   You will continue on eliquis 5 2 times a day starting 2/11/25.  Please follow up outpatient with your PCP.  Follow up outpatient with ENT doctor and GI doctor regarding your trouble swallowing.  Follow up outpatient with pulmonologist regarding a pulmonary nodule that was found incidentally      SECONDARY DISCHARGE DIAGNOSES  Diagnosis: Diarrhea  Assessment and Plan of Treatment:     Diagnosis: Elevated troponin  Assessment and Plan of Treatment:      PRINCIPAL DISCHARGE DIAGNOSIS  Diagnosis: Respiratory infection  Assessment and Plan of Treatment: You were admitted for respiratory distress secondary to your COPD exacerbation. CT scan was done and showed you had a narrowing of the trachea. You had dilation of the narrowing by CT surgery. After that you had another episode of respiratory distress likely due to aspirration this time and you  were intubated and admitted to the ICU.   You were extubated on 1/17 and your breathing improved. You were treated with antibiotics during this time. Prior to your planned discharged you had some elevated heart rate and trouble breathing and your platelets went down, which was conderning for heparin-induced thrombocytopenia (condition secondary to blood thinner use). You were found to have a blood clot in your lungs and were started on a different type of blood thinners.   You will continue on eliquis 5 2 times a day starting 2/11/25.  Please follow up outpatient with your PCP.  Follow up outpatient with ENT doctor and GI doctor regarding your trouble swallowing.  Follow up outpatient with pulmonologist regarding a pulmonary nodule that was found incidentally      SECONDARY DISCHARGE DIAGNOSES  Diagnosis: Low serum magnesium level  Assessment and Plan of Treatment: Your magnesium level was 1.6 today. You received IV supplementation. Please take oral supplements and repeat a level in 3-4 days

## 2025-01-27 NOTE — CHART NOTE - NSCHARTNOTEFT_GEN_A_CORE
Registered Dietitian Follow-Up     Patient Profile Reviewed                           Yes [x]   No []     Nutrition History Previously Obtained        Yes [x]  No []       Pertinent Subjective Information:  Per SLP swallow assessment on 2025  Puree/thins w/ use of super-supraglottic swallow recommended. pt sitting in chair, rd unable to obtain updated weight. endorses good appetite. -pt not a breakfast eater,      Pertinent Medical Interventions:  #HTN  #HLD  #CAD   #AHRF 2/2 aspiration and MSSA PNA - improving  #Hx polychondritis and tracheal stenosis   #SCC of base of tongue s/p trach/PEG (reversed ) and CRT ()  #AHRF 2/2 acute on chronic COPD exacerbation - resolved   #Hx lung nodules  #Severe oropharyngeal dysphagia w/ recurrent aspiration iso polychondritis w/ tracheal stenosis     Diet order: Diet, Pureed:   Free Water Flush Instructions:  Puree/thins w/ use of super-supraglottic swallow. (25 @ 21:11) [Active]    Anthropometrics:  Ht : 162 cm  Wt: 72.6 kg   IBW: 54.5 kg  UBW: 78 kg     Daily Weight in k (), Weight in k ()  % Weight Change    MEDICATIONS  (STANDING):  albuterol/ipratropium for Nebulization 3 milliLiter(s) Nebulizer every 4 hours  albuterol/ipratropium for Nebulization 3 milliLiter(s) Nebulizer every 20 minutes  atorvastatin 40 milliGRAM(s) Oral at bedtime  ceFAZolin   IVPB      ceFAZolin   IVPB 1000 milliGRAM(s) IV Intermittent every 8 hours  chlorhexidine 2% Cloths 1 Application(s) Topical daily  dextrose 5%. 1000 milliLiter(s) (50 mL/Hr) IV Continuous <Continuous>  dextrose 5%. 1000 milliLiter(s) (100 mL/Hr) IV Continuous <Continuous>  dextrose 50% Injectable 25 Gram(s) IV Push once  dextrose 50% Injectable 12.5 Gram(s) IV Push once  dextrose 50% Injectable 25 Gram(s) IV Push once  glucagon  Injectable 1 milliGRAM(s) IntraMuscular once  heparin   Injectable 5000 Unit(s) SubCutaneous every 12 hours  insulin lispro (ADMELOG) corrective regimen sliding scale   SubCutaneous two times a day before meals  melatonin 5 milliGRAM(s) Oral at bedtime  metoprolol succinate ER 25 milliGRAM(s) Oral daily  pantoprazole    Tablet 40 milliGRAM(s) Oral before breakfast  saccharomyces boulardii 250 milliGRAM(s) Oral two times a day    Pertinent Labs:  @ 07:02: Na 137, BUN 12, Cr 0.7, [H], K+ 4.3, Phos 3.2, Mg 1.7[L], Alk Phos 77, ALT/SGPT 17, AST/SGOT 42[H], HbA1c --    Finger Sticks:  POCT Blood Glucose.: 150 mg/dL ( @ 11:26)  POCT Blood Glucose.: 134 mg/dL ( @ 07:41)  POCT Blood Glucose.: 154 mg/dL ( @ 20:52)  POCT Blood Glucose.: 126 mg/dL ( @ 17:09)    Physical Findings:  - Appearance: AOx4   - GI function: Last Bowel Movement: 2025 (25 @ 08:00)  Last Bowel Movement: 2025 (25 @ 20:00)  Last Bowel Movement: 2025 (25 @ 06:00)  Last Bowel Movement: 2025 (25 @ 07:40)  Last Bowel Movement: 15-Zeyad-2025 (01-15-25 @ 19:47)  - Tubes: N/A   - Oral/Mouth cavity: Pureed w/ thins   - Skin: no pressure wounds noted; b/L butt redness noted   - Edema:  +1 left arm;      Nutrition Requirements: Age, BMi, mobility, and comorbidities   Weight Used: 72.6 kg      Estimated Energy Needs    Continue []  Adjust [20-25 ]  Adjusted Energy Recommendations:  1452- 1815 kcal/day        Estimated Protein Needs    Continue []  Adjust [ 0.8-1.0g/kg  ]  Adjusted Protein Recommendations:  58-73 gm/day        Estimated Fluid Needs        Continue []  Adjust [25 cc/kg]  Adjusted Fluid Recommendations:   1815 mL/day     Nutrient Intake: 51-75%-- good PO intake per flowsheet     [] Previous Nutrition Diagnosis:  Problem: Unable to meet estimated needs on current diet order   Etiology: NPO at this time                [] Ongoing          [x] Resolved    [x] New active nutrition diagnosis identified at this time   Swallowing difficulty r/t dysphagia AEB pureed diet per SLP recommendations         [x] Ongoing          [] Resolved    Nutrition Education: discussed current diet order and encouraged PO intake.      Goal/Expected Outcome:  Pt to consume and tolerate 50-75% of meals/snacks and PO supplements in 3-5 days.      Indicator/Monitoring: weekly anthroprometrics, GI S/S, -Lytes (Phos, Mag, K+), BM, I/Os, Labs, NFPF, GI fxn. Energy intake and tolerance.     Recommendation:   1. c/w current diet order as tolerated. defer to slp for diet texture recs  2. maintain aspiration precautions.   3. monitor BG and manage PRN     Leila Razo x [phone] or Via TEAMS    low risk Follow Up .

## 2025-01-27 NOTE — DISCHARGE NOTE PROVIDER - NSDCPNSUBOBJ_GEN_ALL_CORE
Pt seen and examined at bedside. Pt feels well. HR controlled. Received IV magnesium to supplement. Stable for dc.

## 2025-01-27 NOTE — DISCHARGE NOTE PROVIDER - HOSPITAL COURSE
HPI:  A 68yo Female with PMH of HTN, HLD, GERD, CAD, COPD not on Home O2, Hx of Pneumothorax, polychondirits, SCC of BOT s/p Trach/PEG (reversed in 4/2021), s/p CRT (7 sessions in 2021) who presents with SOB for 1 week duration. History goes back to around 1 week ago When the patient started having Shortness of breath, productive cough, and malaise. Patient dnies any fever. Patient reported that her son was coughing. Patient reported some chest pain previously when coughing . Patient reports diarrhea for 4 days. which resolved now. PAtient denies an urinary symptoms. Patient is admitted for PNA, and COPD excacerbation .     Patient reports some intermittent left sided chest pain when coughing. and abdominal distention as well.  Patient follows with ENT Dr. Louis, last seen 9/23/24, CT Neck 9/2024 showed Abnormal soft tissue within the posterior nasopharynx greater the right, new since the reference exams and nonspecific in appearance. The abnormal submucosal soft tissue in the preepiglottic space greater on the left, asymmetric thickening of the left aryepiglottic fold, and mild epiglottic thickening; similar when correlated with the PET/CT dated 5/12/2022.  Stable rim-calcified right thyroid nodule measuring 1.8 cm. US guided FNA by IR 10/7/24 of  Stable rim-calcified right thyroid nodule measuring 1.8 cm. Showed atypia but not malignant cells.     ENT evaluated the pt for hoarseness, FFL showed, thick purulent secretions on larynx, airway patent. Recommended CT Neck with IVC for further evaluation, Albuterol neb treatment, Humidified O2 ( on 3L of O2 via NC)     Labs significant for WBC 11.2 K (neutrophilic), troponin 96 > 75, AG 16     09:57 - VBG - pH: 7.39  | pCO2: 38    | pO2: 36    | Lactate: 3.0      In Ed the patient was given 2 L  fluid bolus, Azithromycin and rocephin, duoneb and solu-medrol     CXR showed reticular infiltrates     EKG shows PACs, with MAT     VS significant for using 4 L NC, Tachycardia 126     ICU Vital Signs Last 24 Hrs  T(C): 37.3 (10 Zeyad 2025 15:36), Max: 37.8 (10 Zeyad 2025 10:41)  T(F): 99.1 (10 Zeyad 2025 15:36), Max: 100 (10 Zeyad 2025 10:41)  HR: 112 (10 Zeyad 2025 15:36) (97 - 126)  BP: 95/66 (10 Zeyad 2025 15:36) (95/66 - 116/86)  BP(mean): 76 (10 Zeyad 2025 15:36) (76 - 76)  RR: 20 (10 Zeyad 2025 15:36) (20 - 22)  SpO2: 94% (10 Zeyad 2025 15:36) (94% - 95%)  Patient On (Oxygen Delivery Method): nasal cannula  O2 Flow (L/min): 4             (10 Zeyad 2025 15:46)    Hospital Course:  Pt was admitted for AHRF 2/2 COPD exacerbation and tracheal stenosis. Pt received IV steroids, nebulizers, O2. CT surgery was consulted and performed tracheal dilation. On 1/17, patient required upgrade to MICU in setting of acute respiratory failure 2/2 aspiration. While intubated patient underwent bronchoscopy and then self-extubated later that day. She was transitioned to nasal cannula and downgraded to SDU. In SDU, patient was placed on antibiotics for MSSA PNA as per ID. Bronch pathology concerning for fungal organism, +Aspergillus, however per ID patient is improving on cefazolin and did not have imaging findings concerning for aspergillosis and did not recommend treatment. Her course was complicated by severe pharyngeal dysphagia that is likely irreversible with recurrent aspiration s/p FEES/MBS. After several goals of care discussions, patient did not want to pursue PEG tube placement and understands and accepts the risks of aspiration. Patient is currently on puree diet with swallow device and prefers to trial modified diet and outpatient therapy before considering PEG. On floors, patient was on room air, PT evaluated patient and recommended STR.    Patient is medically and hemodynamically stable, ready for discharge.    Discussion of discharge plan of care, including discharge diagnoses, medication reconciliation, and follow-ups was conducted with  *****on ***** at *****, and discharge was approved.    Important Medication Changes and Reason:    Active or Pending Issues Requiring Follow-up:    Advanced Directives:   [ ] Full code  [ ] DNR  [ ] Hospice    Discharge Diagnoses:         HPI:  A 68yo Female with PMH of HTN, HLD, GERD, CAD, COPD not on Home O2, Hx of Pneumothorax, polychondirits, SCC of BOT s/p Trach/PEG (reversed in 4/2021), s/p CRT (7 sessions in 2021) who presents with SOB for 1 week duration. History goes back to around 1 week ago When the patient started having Shortness of breath, productive cough, and malaise. Patient dnies any fever. Patient reported that her son was coughing. Patient reported some chest pain previously when coughing . Patient reports diarrhea for 4 days. which resolved now. PAtient denies an urinary symptoms. Patient is admitted for PNA, and COPD excacerbation .   Patient reports some intermittent left sided chest pain when coughing. and abdominal distention as well.  Patient follows with ENT Dr. Louis, last seen 9/23/24, CT Neck 9/2024 showed Abnormal soft tissue within the posterior nasopharynx greater the right, new since the reference exams and nonspecific in appearance. The abnormal submucosal soft tissue in the preepiglottic space greater on the left, asymmetric thickening of the left aryepiglottic fold, and mild epiglottic thickening; similar when correlated with the PET/CT dated 5/12/2022.  Stable rim-calcified right thyroid nodule measuring 1.8 cm. US guided FNA by IR 10/7/24 of  Stable rim-calcified right thyroid nodule measuring 1.8 cm. Showed atypia but ot malignant cells.   ENT evaluated the pt for hoarseness, FFL showed, thick purulent secretions on larynx, airway patent. Recommended CT Neck with IVC for further evaluation, Albuterol neb treatment, Humidified O2 ( on 3L of O2 via NC)   Labs significant for WBC 11.2 K (neutrophilic), troponin 96 > 75, AG 16  09:57 - VBG - pH: 7.39  | pCO2: 38    | pO2: 36    | Lactate: 3.0    In Ed the patient was given 2 L  fluid bolus, Azithromycin and rocephin, duoneb and solu-medrol   CXR showed reticular infiltrates   EKG shows PACs, with MAT   VS significant for using 4 L NC, Tachycardia 126     ICU Vital Signs Last 24 Hrs  T(C): 37.3 (10 Zeyad 2025 15:36), Max: 37.8 (10 Zeyad 2025 10:41)  T(F): 99.1 (10 Zeyad 2025 15:36), Max: 100 (10 Zeyad 2025 10:41)  HR: 112 (10 Zeyad 2025 15:36) (97 - 126)  BP: 95/66 (10 Zeyad 2025 15:36) (95/66 - 116/86)  BP(mean): 76 (10 Zeyad 2025 15:36) (76 - 76)  RR: 20 (10 Zeyad 2025 15:36) (20 - 22)  SpO2: 94% (10 Zeyad 2025 15:36) (94% - 95%)  Patient On (Oxygen Delivery Method): nasal cannula  O2 Flow (L/min): 4   (10 Zeyad 2025 15:46)  Hospital Course:  Pt was admitted for AHRF 2/2 COPD exacerbation and tracheal stenosis. Pt received IV steroids, nebulizers, O2. CT surgery was consulted and performed tracheal dilation. On 1/17, patient required upgrade to MICU in setting of acute respiratory failure 2/2 aspiration. While intubated patient underwent bronchoscopy and then self-extubated later that day. She was transitioned to nasal cannula and downgraded to SDU. In SDU, patient was placed on antibiotics for MSSA PNA as per ID. Bronch pathology concerning for fungal organism, +Aspergillus, however per ID patient is improving on cefazolin and did not have imaging findings concerning for aspergillosis and did not recommend treatment. Her course was complicated by severe pharyngeal dysphagia that is likely irreversible with recurrent aspiration s/p FEES/MBS. After several goals of care discussions, patient did not want to pursue PEG tube placement and understands and accepts the risks of aspiration. Patient is currently on puree diet with swallow device and prefers to trial modified diet and outpatient therapy before considering PEG. On floors, patient was on room air, PT evaluated patient and recommended STR.  While on the floor patient platelets started trending down, HIT panel was positive. Patient was also tachycardic and hypoxic. Patient was found to have extensive bilateral LE DVT. CTA was positive for PE. Patient was starteed on fondaparinux and then switched to eliquis    Patient is medically and hemodynamically stable, ready for discharge.    Discussion of discharge plan of care, including discharge diagnoses, medication reconciliation, and follow-ups was conducted with Dr. Ivory on 2/10/2025 and discharge was approved.    Sore throat  Ear ache  - RVP negative  - pt reports this happeneds frequently when air is dry  - lozenge prn  - ENT saw the patient: No pathological findings on exam    Acute Pulmonary embolism:   Acute Extensive b/l LE DVT due to HIT  Heparin induced Thrombocytopenia:   -PLT slowly dropping on admission 300s -> 75 -> 80 ->108>180  -Duplex 1/20 (-)  -Duplex 1/29 (+) w/ acute right femoral, popliteal, posterior tibial, and peroneal veins; acute DVT in the left popliteal, posterior tibial, and peroneal veins  -CTA PE protocol: Acute non-occulsive PE extending bifurcation of left main PA distally into a few upper and lower lobe segments to subsegmental, small RLL segmental/subsegmental PE, no RH strain; also notable b/l increasing GGO/patchy opacities may be infectious, new nodule  -Repeat TTE grossly unchanged from prior, hyperdynamic LV function 60-65%  -T4 score high probability, PF4 (+) SRO negative.  - Patient s/p fondaparinux and eliquis 10 BID. Will start eliquis 5 BID starting 2/11    Acute Hypoxic Respiratory Failure   COPD exacerbation: resolved.   MSSA PNA   -Reccurent aspiration pneumonia with oropharyngeal dysphagia in setting of polychondritis  -Tracheal stenosis SP dilation (history of trach s/p revision in 2021  -S/p intubation 1/17, self-extubation 1/17, BiPAP/HFNC  -Bronch cx (+) MSSA (1.17)  Bronch lavage path with neutrophils and fungal organisms +GMS c/f aspergillus (1.10) , galactomannan negative, Fungitell >500  -S/p Prednisone taper  -S/p Cefazolin IV course for MSSA (completed 1/29)  -Completed Zosyn per ID.  -as per ID unlikely fungal infection    Severe Oropharyngeal Dysphagia:   -SLP evaluations appreciated: pt has irreversible severe oropharyngeal dysphagia with aspiration of all consistencies, recommending PEG   -Patient refusing NGT, currently wishes to pursue modified diet and will consider PEG if fails, pt accepts risk of aspiration  -F/u ENT and GI as o/p  -Puree diet with supraglottic swallow device   -Aspiration precautions, HOB >45 deg    Acute on Chronic HFmrEF (Stable)  CAD  -Cardio eval appreciated: recommend SGLT2i on discharge, continue Losartan and Metoprolol succinate  -Hold home Lasix and Losartan due to soft BP  -Continue Metoprolol and Lipitor.     Polychondritis and Tracheal Stenosis   -Cellcept 1000mg BID, resume Hydroxychloroquine.     SCC of the tongue/ lung nodule  outpatient follow up

## 2025-01-27 NOTE — PROGRESS NOTE ADULT - SUBJECTIVE AND OBJECTIVE BOX
Patient is a 69y old Female who presents with a chief complaint of shortness of breath, malaise (16 Jan 2025 11:51)    Today is hospital day     Overnight events/Interval History:  - No acute overnight events  - Telemetry:   - At bedside, patient some reflux symptoms. Breathing comfortably on room air. Denies fevers, chills, SOB, chest pain, N/V/D/C, abdominal pain.    ROS:  - All ROS is negative unless indicated in HPI    Code Status:    ALLERGIES:  No Known Drug Allergies  CONTRAST DYS (Nausea)    MEDICATIONS:  STANDING MEDICATIONS  albuterol/ipratropium for Nebulization 3 milliLiter(s) Nebulizer every 4 hours  albuterol/ipratropium for Nebulization 3 milliLiter(s) Nebulizer every 20 minutes  atorvastatin 40 milliGRAM(s) Oral at bedtime  ceFAZolin   IVPB      ceFAZolin   IVPB 1000 milliGRAM(s) IV Intermittent every 8 hours  chlorhexidine 2% Cloths 1 Application(s) Topical daily  dextrose 5%. 1000 milliLiter(s) IV Continuous <Continuous>  dextrose 5%. 1000 milliLiter(s) IV Continuous <Continuous>  dextrose 50% Injectable 25 Gram(s) IV Push once  dextrose 50% Injectable 12.5 Gram(s) IV Push once  dextrose 50% Injectable 25 Gram(s) IV Push once  glucagon  Injectable 1 milliGRAM(s) IntraMuscular once  heparin   Injectable 5000 Unit(s) SubCutaneous every 12 hours  insulin lispro (ADMELOG) corrective regimen sliding scale   SubCutaneous two times a day before meals  melatonin 5 milliGRAM(s) Oral at bedtime  metoprolol succinate ER 25 milliGRAM(s) Oral daily  pantoprazole    Tablet 40 milliGRAM(s) Oral before breakfast  saccharomyces boulardii 250 milliGRAM(s) Oral two times a day    PRN MEDICATIONS  acetaminophen     Tablet .. 650 milliGRAM(s) Oral every 6 hours PRN  dextrose Oral Gel 15 Gram(s) Oral once PRN      VITALS LAST 24H:  Vital Signs Last 24 Hrs  T(C): 36.2 (27 Jan 2025 11:36), Max: 36.7 (26 Jan 2025 21:39)  T(F): 97.2 (27 Jan 2025 11:36), Max: 98.1 (26 Jan 2025 21:39)  HR: 113 (27 Jan 2025 11:36) (67 - 113)  BP: 111/82 (27 Jan 2025 11:36) (104/72 - 111/82)  BP(mean): --  RR: 18 (27 Jan 2025 11:36) (17 - 18)  SpO2: 91% (27 Jan 2025 11:36) (91% - 96%)    Parameters below as of 26 Jan 2025 20:11  Patient On (Oxygen Delivery Method): room air        PHYSICAL EXAM:  GENERAL: NAD, lying in bed comfortably  HEENT:  EOMI, Moist mucous membranes  CHEST/LUNG: cta b/l  HEART: Regular rate and rhythm  ABDOMEN: Soft, nontender, nondistended  EXTREMITIES:  No lower extremity edema bilaterally  NERVOUS SYSTEM:  A&Ox3, no focal deficits       LABS:                        14.7   10.62 )-----------( 124      ( 27 Jan 2025 07:02 )             45.4     01-27    137  |  101  |  12  ----------------------------<  140[H]  4.3   |  26  |  0.7    Ca    8.4      27 Jan 2025 07:02  Phos  3.2     01-27  Mg     1.7     01-27    TPro  4.9[L]  /  Alb  3.1[L]  /  TBili  0.5  /  DBili  x   /  AST  42[H]  /  ALT  17  /  AlkPhos  77  01-27      Urinalysis Basic - ( 27 Jan 2025 07:02 )    Color: x / Appearance: x / SG: x / pH: x  Gluc: 140 mg/dL / Ketone: x  / Bili: x / Urobili: x   Blood: x / Protein: x / Nitrite: x   Leuk Esterase: x / RBC: x / WBC x   Sq Epi: x / Non Sq Epi: x / Bacteria: x                RADIOLOGY:  CXR      CT

## 2025-01-27 NOTE — PROGRESS NOTE ADULT - ASSESSMENT
Patient seen at bedside. Changes made within note as well. Discussed with medical team that includes resident(s), medical student(s), nursing staff, case management.     as per initial summary     68yo Female with PMH of HTN, HLD, GERD, CAD, COPD not on Home O2, Hx of Pneumothorax, polychondritis SCC of BOT s/p Trach/PEG (reversed in 4/2021, follows with Dr Louis), s/p CRT (7 sessions in 2021) who presents with SOB for 1 week duratio.  Pt was treated for  hypoxic resp failure due to COPD exacerbation complicated by tracheal stenosis s/p dilatation by CT surgery.   Pt has severe dysphagia, RRT was called on the floor, pt developed ARF due to aspiration, was intubated and upgraded to ICU, s/p bronch , self extubated on 1/17 and was downgraded to SDU.    #Acute hypoxemic resp failure due to COPD exacerbation, improved  #MSSA PNA   #Reccurent aspiration with oropharyngeal dysphagia in setting of polychondritis  #HO trach   #h/o Tracheal stenosis SP dilation   #History of lung nodules/ emphysema  - pt self extubated on 1/17, currnetly on RA, sat 90-96%, check Ambulatory O2 after PT eval (as per PT Patient performed bed mobility anbd attempted transfers, not able to achieve standing or ambulate at this time.)  - discussed with s/s , dysphagia irreversible, recommending PEG. patient refusing   - pt failed barium swallow, on pureed   - previously discussed with patient re: NGT, she refused. . discussed at length with her re: PEG tube, she does not wish to pursue it at this time. Wants to try modified diet, understanding the risks of aspiration.   - MSSA in bronch cx -- currently on cefazolin 1g q 8 hours dc on keflex until 1/29 (as per ID - continue cefazolin for now -- can switch to PO Keflex 500 mg TID to complete until 1/29    - aspergillus on bronch reviewed w ID, no need for tx   - s/p steroids  - incentive spirometry, out of bed to chair, PT eval     #Thrombocytopenia, improving   - no bleeding, slowly dropping 300s on admission, today, 100K, pt has been on Sub q heparin,    monitor   improved today     Polychondritis   - as per Rheum note, patient was on cellcept 1000 G BID in the past   -  hydroxychloroquine d/c as per rheumatology OP note   - off immunosuppresants at this time, resume on dc     HTN/HLD/CAD/ combined CHF   - intake and output monitoring, daily weight  - monitor for fluid overload       Hyperglycemia / prediabetes   - CC diet, monitor f/s   - on Insulin now, adjust based on FS     GERD - C/w Protonix    Overall prognosis is guarded    pending: PT eval, refused STR, need to clarify with son regarding discharge planning patient told me she is not ready for discharge today. need ambulatpory oxygen but as per PT Patient performed bed mobility anbd attempted transfers, not able to achieve standing or ambulate at this time.)ideally rehab is recommendation at this point if patient agrees. I discussed with her regarding rehab. she is going to talk to her son. son will come to hospital today.   from home, lives w her son  but emre needs A REHAB.   (If patient agrees for peg tube need to do calorie count first as per GI recommendations).     patient is high risk of clinical deterioration. transfer to stepdown in case of any worsening clinical situation.      Patient seen at bedside. Changes made within note as well. Discussed with medical team that includes resident(s), medical student(s), nursing staff, case management.     as per initial summary     68yo Female with PMH of HTN, HLD, GERD, CAD, COPD not on Home O2, Hx of Pneumothorax, polychondritis SCC of BOT s/p Trach/PEG (reversed in 4/2021, follows with Dr Louis), s/p CRT (7 sessions in 2021) who presents with SOB for 1 week duratio.  Pt was treated for  hypoxic resp failure due to COPD exacerbation complicated by tracheal stenosis s/p dilatation by CT surgery.   Pt has severe dysphagia, RRT was called on the floor, pt developed ARF due to aspiration, was intubated and upgraded to ICU, s/p bronch , self extubated on 1/17 and was downgraded to SDU.    #Acute hypoxemic resp failure due to COPD exacerbation, improved  #MSSA PNA   #Reccurent aspiration with oropharyngeal dysphagia in setting of polychondritis  #HO trach   #h/o Tracheal stenosis SP dilation   #History of lung nodules/ emphysema  - pt self extubated on 1/17, currnetly on RA, sat 90-96%, check Ambulatory O2 after PT eval (as per PT Patient performed bed mobility anbd attempted transfers, not able to achieve standing or ambulate at this time.)  - discussed with s/s , dysphagia irreversible, recommending PEG. patient refusing   - pt failed barium swallow, on pureed   - previously discussed with patient re: NGT, she refused. . discussed at length with her re: PEG tube, she does not wish to pursue it at this time. Wants to try modified diet, understanding the risks of aspiration.   - MSSA in bronch cx -- currently on cefazolin 1g q 8 hours dc on keflex until 1/29 (as per ID - continue cefazolin for now -- can switch to PO Keflex 500 mg TID to complete until 1/29    - aspergillus on bronch reviewed w ID, no need for tx   - s/p steroids  - incentive spirometry, out of bed to chair, PT eval     #Thrombocytopenia, improving   - no bleeding, slowly dropping 300s on admission, today, 100K, pt has been on Sub q heparin,    monitor   improved today     Polychondritis   - as per Rheum note, patient was on cellcept 1000 G BID in the past   -  hydroxychloroquine d/c as per rheumatology OP note   - off immunosuppresants at this time, resume on dc     HTN/HLD/CAD/ combined CHF   - intake and output monitoring, daily weight  - monitor for fluid overload       Hyperglycemia / prediabetes   - CC diet, monitor f/s   - on Insulin now, adjust based on FS     GERD - C/w Protonix    Overall prognosis is guarded    pending: PT eval, refused STR, need to clarify with son regarding discharge planning patient told me she is not ready for discharge today. need ambulatpory oxygen but as per PT Patient performed bed mobility anbd attempted transfers, not able to achieve standing or ambulate at this time.)ideally rehab is recommendation at this point if patient agrees. I discussed with her regarding rehab. she is going to talk to her son. son will come to hospital today.   from home, lives w her son  but emre needs A REHAB.   (If patient agrees for peg tube need to do calorie count first as per GI recommendations).     patient is high risk of clinical deterioration. transfer to stepdown in case of any worsening clinical situation.     addendum: patient agreed with rehab. [facility]. d/w resident  as per ID cellcept can be resumed after abx course.

## 2025-01-27 NOTE — DISCHARGE NOTE PROVIDER - PROVIDER TOKENS
PROVIDER:[TOKEN:[[id number]],FOLLOWUP:[1 week]],PROVIDER:[TOKEN:[[id number]],FOLLOWUP:[1 week]],PROVIDER:[TOKEN:[[id number]],FOLLOWUP:[1 week]],PROVIDER:[TOKEN:[[id number]],FOLLOWUP:[1-3 days]],PROVIDER:[TOKEN:[[id number]],FOLLOWUP:[1 week]]

## 2025-01-27 NOTE — PROGRESS NOTE ADULT - ASSESSMENT
70yo Female with PMH of HTN, HLD, GERD, CAD, COPD not on Home O2, Hx of Pneumothorax, polychondritis, SCC of BOT s/p Trach/PEG (reversed in 4/2021, follows with Dr Louis), s/p CRT (7 sessions in 2021) who presents with SOB for 1 week duration.  Pt was admitted for AHRF 2/2 COPD exacerbation and tracheal stenosis s/p dilation by CT surgery.   Required upgrade to MICU iso ARF 2/2 aspiration, s/p bronch, self extubated 1/17, transitioned to NC in SDU.   SDU Course complicated by severe pharyngeal dysphagia with recurrent aspiration s/p FEES/MBS, after several GOC discussions patient is currently on puree diet with swallow device as patient is refusing NGT and prefers to trial modified diet and outpatient therapy before considering PEG.   Now on RA, pending dispo planning STR vs home PT    #Severe oropharyngeal dysphagia w/ recurrent aspiration iso polychondritis w/ tracheal stenosis   - SLP evaluations appreciated: pt has irreversible severe oropharyngeal dysphagia with aspiration of all consistencies, recommending PEG   - Patient refusing NGT, currently wishes to pursue modified diet and will consider PEG if fails   - appreciate palliative and GI recs   - f/u OP ENT, GI  - c/w puree diet with supraglottic swallow device   - aspiration precautions, HOB >45 deg    #AHRF 2/2 aspiration and MSSA PNA - improving  #Hx polychondritis and tracheal stenosis   #SCC of base of tongue s/p trach/PEG (reversed 2021) and CRT (2021)  #AHRF 2/2 acute on chronic COPD exacerbation - resolved   #Hx lung nodules  - s/p intubation 1/17, self-extubation 1/17, BiPAP/HFNC  - bronch cx (+) MSSA (1.17)  - bronch lavage path with neutrophils and fungal organisms +GMS c/f aspergillus (1.10) > no need for treatment per ID   - currently on NC 3L with BIPAP qhs, wean as tolerated   - s/p steroid course, completed pred taper  - c/w IV cefazolin for MSSA (bronch cx) > dc on keflex thru 1/29  - ID recs appreciated  - PT eval: [facility] vs home PT  - holding home cellcept due to active infection, f/u rheum outpatient for resumption    #Acute on chronic HFmrEF - improving   #CAD  #HTN  #HLD  - cardio eval appreciated: recommend sglt2 on dc, continue losartan and metoprolol succinate  - TTE: 1/2024: Mildly reduced LV and RV function. IVC > 50% collapsible.   - holding home losartan 100mg qd for now due to soft BP  - holding home lasix 40mg qd for now (sp diuresis earlier this admission)  - c/w atorvastatin 40mg qhs   - c/w metoprolol ER 25mg qd     ---------------------  DVT ppx: heparin  Diet: puree  GI ppx/Bowel regimen: PPI   Activity: IAT   GOC: dnr/intubate  Dispo: pending medical optimization, [facility] vs home PT  #Summary/Handoff:  - dispo planning, pt initially refusing [facility] however difficulty with ambulation will follow up   70yo Female with PMH of HTN, HLD, GERD, CAD, COPD not on Home O2, Hx of Pneumothorax, polychondritis, SCC of BOT s/p Trach/PEG (reversed in 4/2021, follows with Dr Louis), s/p CRT (7 sessions in 2021) who presents with SOB for 1 week duration.  Pt was admitted for AHRF 2/2 COPD exacerbation and tracheal stenosis s/p dilation by CT surgery.   Required upgrade to MICU iso ARF 2/2 aspiration, s/p bronch, self extubated 1/17, transitioned to NC in SDU.   SDU Course complicated by severe pharyngeal dysphagia with recurrent aspiration s/p FEES/MBS, after several GOC discussions patient is currently on puree diet with swallow device as patient is refusing NGT and prefers to trial modified diet and outpatient therapy before considering PEG.   Now on RA, pending dispo planning STR    #Severe oropharyngeal dysphagia w/ recurrent aspiration iso polychondritis w/ tracheal stenosis   - SLP evaluations appreciated: pt has irreversible severe oropharyngeal dysphagia with aspiration of all consistencies, recommending PEG   - Patient refusing NGT, currently wishes to pursue modified diet and will consider PEG if fails   - appreciate palliative and GI recs   - f/u OP ENT, GI  - c/w puree diet with supraglottic swallow device   - aspiration precautions, HOB >45 deg    #AHRF 2/2 aspiration and MSSA PNA - improving  #Hx polychondritis and tracheal stenosis   #SCC of base of tongue s/p trach/PEG (reversed 2021) and CRT (2021)  #AHRF 2/2 acute on chronic COPD exacerbation - resolved   #Hx lung nodules  - s/p intubation 1/17, self-extubation 1/17, BiPAP/HFNC  - bronch cx (+) MSSA (1.17)  - bronch lavage path with neutrophils and fungal organisms +GMS c/f aspergillus (1.10) > no need for treatment per ID   - currently on NC 3L with BIPAP qhs, wean as tolerated   - s/p steroid course, completed pred taper  - c/w IV cefazolin for MSSA (bronch cx) > dc on keflex thru 1/29  - ID recs appreciated  - PT eval: [facility] vs home PT  - holding home cellcept due to active infection, f/u rheum outpatient for resumption    #Acute on chronic HFmrEF - improving   #CAD  #HTN  #HLD  - cardio eval appreciated: recommend sglt2 on dc, continue losartan and metoprolol succinate  - TTE: 1/2024: Mildly reduced LV and RV function. IVC > 50% collapsible.   - holding home losartan 100mg qd for now due to soft BP  - holding home lasix 40mg qd for now (sp diuresis earlier this admission)  - c/w atorvastatin 40mg qhs   - c/w metoprolol ER 25mg qd     ---------------------  DVT ppx: heparin  Diet: puree  GI ppx/Bowel regimen: PPI   Activity: IAT   GOC: dnr/intubate  Dispo: pending medical optimization, [facility]  #Summary/Handoff:  - dispo planning to [facility]

## 2025-01-27 NOTE — CHART NOTE - NSCHARTNOTEFT_GEN_A_CORE
RD unable to locate calorie count sheet. CC not hanging in Pt's room or in paper chart.   Please restart CC if warranted.     Leila Razo  [phone] or [employer].

## 2025-01-27 NOTE — DISCHARGE NOTE PROVIDER - NSDCMRMEDTOKEN_GEN_ALL_CORE_FT
Anoro Ellipta 62.5 mcg-25 mcg/inh inhalation powder: 1 puff(s) inhaled once a day  hydroxychloroquine 100 mg oral tablet: 1 tab(s) orally Monday, Wednesday, and Friday  hydroxychloroquine 200 mg oral tablet: 1 tab(s) orally Tuesday, Thursday, Saturday  Lipitor 40 mg oral tablet: 1 tab(s) orally once a day  losartan 100 mg oral tablet: 1 tab(s) orally once a day  mycophenolate mofetil 500 mg oral tablet: 2 tab(s) by gastrostomy tube 2 times a day  omeprazole 40 mg oral delayed release capsule: 1 cap(s) by gastrostomy tube once a day  Ventolin HFA 90 mcg/inh inhalation aerosol: 2 puff(s) inhaled 4 times a day   Anoro Ellipta 62.5 mcg-25 mcg/inh inhalation powder: 1 puff(s) inhaled once a day  apixaban 5 mg oral tablet: 1 tab(s) orally every 12 hours Please take 2 tablets the night of 02/10/25, and then beginning 02/11/25 you will take 1 tablet twice a day  Lipitor 40 mg oral tablet: 1 tab(s) orally once a day  magnesium oxide: 400 milligram(s) orally once a day  metoprolol tartrate 25 mg oral tablet: 1 tab(s) orally every 8 hours  mycophenolate mofetil 500 mg oral tablet: 2 tab(s) by gastrostomy tube 2 times a day  omeprazole 40 mg oral delayed release capsule: 1 cap(s) by gastrostomy tube once a day  saccharomyces boulardii lyo 250 mg oral capsule: 1 cap(s) orally 2 times a day  Ventolin HFA 90 mcg/inh inhalation aerosol: 2 puff(s) inhaled 4 times a day   Anoro Ellipta 62.5 mcg-25 mcg/inh inhalation powder: 1 puff(s) inhaled once a day  apixaban 5 mg oral tablet: 1 tab(s) orally every 12 hours Please take 2 tablets the night of 02/10/25, and then beginning 02/11/25 you will take 1 tablet twice a day  Lipitor 40 mg oral tablet: 1 tab(s) orally once a day  magnesium oxide: 400 milligram(s) orally once a day  metoprolol tartrate 25 mg oral tablet: 1 tab(s) orally every 8 hours  mycophenolate mofetil 500 mg oral tablet: 2 tab(s) orally 2 times a day  omeprazole 40 mg oral delayed release capsule: 1 cap(s) by gastrostomy tube once a day  saccharomyces boulardii lyo 250 mg oral capsule: 1 cap(s) orally 2 times a day  Ventolin HFA 90 mcg/inh inhalation aerosol: 2 puff(s) inhaled 4 times a day   Anoro Ellipta 62.5 mcg-25 mcg/inh inhalation powder: 1 puff(s) inhaled once a day  apixaban 5 mg oral tablet: 1 tab(s) orally every 12 hours Please take 2 tablets the night of 02/10/25, and then beginning 02/11/25 you will take 1 tablet twice a day  Lipitor 40 mg oral tablet: 1 tab(s) orally once a day  magnesium oxide: 400 milligram(s) orally once a day  metoprolol tartrate 25 mg oral tablet: 1 tab(s) orally every 8 hours  mycophenolate mofetil 500 mg oral tablet: 2 tab(s) orally 2 times a day  omeprazole 40 mg oral delayed release capsule: 1 cap(s) orally once a day  saccharomyces boulardii lyo 250 mg oral capsule: 1 cap(s) orally 2 times a day  Ventolin HFA 90 mcg/inh inhalation aerosol: 2 puff(s) inhaled 4 times a day

## 2025-01-27 NOTE — DISCHARGE NOTE PROVIDER - CARE PROVIDERS DIRECT ADDRESSES
,[email or web address]

## 2025-01-28 LAB
GLUCOSE BLDC GLUCOMTR-MCNC: 121 MG/DL — HIGH (ref 70–99)
GLUCOSE BLDC GLUCOMTR-MCNC: 135 MG/DL — HIGH (ref 70–99)
GLUCOSE BLDC GLUCOMTR-MCNC: 155 MG/DL — HIGH (ref 70–99)
GLUCOSE BLDC GLUCOMTR-MCNC: 169 MG/DL — HIGH (ref 70–99)
MAGNESIUM SERPL-MCNC: 2 MG/DL — SIGNIFICANT CHANGE UP (ref 1.8–2.4)

## 2025-01-28 PROCEDURE — 71045 X-RAY EXAM CHEST 1 VIEW: CPT | Mod: 26

## 2025-01-28 PROCEDURE — 93010 ELECTROCARDIOGRAM REPORT: CPT

## 2025-01-28 PROCEDURE — 99232 SBSQ HOSP IP/OBS MODERATE 35: CPT

## 2025-01-28 RX ORDER — METOPROLOL SUCCINATE 25 MG
12.5 TABLET, EXTENDED RELEASE 24 HR ORAL EVERY 12 HOURS
Refills: 0 | Status: DISCONTINUED | OUTPATIENT
Start: 2025-01-29 | End: 2025-02-07

## 2025-01-28 RX ADMIN — IPRATROPIUM BROMIDE AND ALBUTEROL SULFATE 3 MILLILITER(S): .5; 2.5 SOLUTION RESPIRATORY (INHALATION) at 21:54

## 2025-01-28 RX ADMIN — ATORVASTATIN CALCIUM 40 MILLIGRAM(S): 80 TABLET, FILM COATED ORAL at 21:03

## 2025-01-28 RX ADMIN — IPRATROPIUM BROMIDE AND ALBUTEROL SULFATE 3 MILLILITER(S): .5; 2.5 SOLUTION RESPIRATORY (INHALATION) at 14:33

## 2025-01-28 RX ADMIN — Medication 250 MILLIGRAM(S): at 05:36

## 2025-01-28 RX ADMIN — PANTOPRAZOLE 40 MILLIGRAM(S): 20 TABLET, DELAYED RELEASE ORAL at 05:36

## 2025-01-28 RX ADMIN — IPRATROPIUM BROMIDE AND ALBUTEROL SULFATE 3 MILLILITER(S): .5; 2.5 SOLUTION RESPIRATORY (INHALATION) at 08:32

## 2025-01-28 RX ADMIN — Medication 25 MILLIGRAM(S): at 05:36

## 2025-01-28 RX ADMIN — Medication 100 MILLIGRAM(S): at 05:35

## 2025-01-28 RX ADMIN — Medication 25 MILLIGRAM(S): at 16:17

## 2025-01-28 RX ADMIN — Medication 100 MILLIGRAM(S): at 13:37

## 2025-01-28 RX ADMIN — ACETAMINOPHEN, DIPHENHYDRAMINE HCL, PHENYLEPHRINE HCL 3 MILLIGRAM(S): 325; 25; 5 TABLET ORAL at 21:03

## 2025-01-28 RX ADMIN — Medication 5000 UNIT(S): at 05:36

## 2025-01-28 RX ADMIN — ANTISEPTIC SURGICAL SCRUB 1 APPLICATION(S): 0.04 SOLUTION TOPICAL at 11:18

## 2025-01-28 RX ADMIN — Medication 2: at 17:03

## 2025-01-28 RX ADMIN — Medication 100 MILLIGRAM(S): at 21:03

## 2025-01-28 RX ADMIN — Medication 5000 UNIT(S): at 17:03

## 2025-01-28 RX ADMIN — Medication 250 MILLIGRAM(S): at 17:04

## 2025-01-28 NOTE — PROGRESS NOTE ADULT - SUBJECTIVE AND OBJECTIVE BOX
Patient is a 69y old  Female who presents with a chief complaint of shortness of breath, malaise (16 Jan 2025 11:51)      OVERNIGHT EVENTS: no major events reported     SUBJECTIVE / INTERVAL HPI: Patient seen and examined at bedside.     VITAL SIGNS:  Vital Signs Last 24 Hrs  T(C): 36.6 (28 Jan 2025 05:00), Max: 36.6 (28 Jan 2025 05:00)  T(F): 97.8 (28 Jan 2025 05:00), Max: 97.8 (28 Jan 2025 05:00)  HR: 106 (28 Jan 2025 05:00) (101 - 106)  BP: 112/78 (28 Jan 2025 05:00) (101/66 - 112/78)  BP(mean): --  RR: 18 (28 Jan 2025 05:00) (18 - 20)  SpO2: 96% (28 Jan 2025 05:00) (96% - 96%)        PHYSICAL EXAM:    General: old lady chroncially looks ill   HEENT: NC/AT; PERRL, clear conjunctiva  Neck: supple  Cardiovascular: +S1/S2; RRR  Respiratory: CTA b/l; no W/R/R  Gastrointestinal: soft, NT/ND; +BSx4  Extremities: WWP; 2+ peripheral pulses; no edema   Neurological: AAOx3; globally weak, no focal deficits    MEDICATIONS:  MEDICATIONS  (STANDING):  albuterol/ipratropium for Nebulization 3 milliLiter(s) Nebulizer every 4 hours  albuterol/ipratropium for Nebulization 3 milliLiter(s) Nebulizer every 20 minutes  atorvastatin 40 milliGRAM(s) Oral at bedtime  ceFAZolin   IVPB      ceFAZolin   IVPB 1000 milliGRAM(s) IV Intermittent every 8 hours  chlorhexidine 2% Cloths 1 Application(s) Topical daily  dextrose 5%. 1000 milliLiter(s) (50 mL/Hr) IV Continuous <Continuous>  dextrose 5%. 1000 milliLiter(s) (100 mL/Hr) IV Continuous <Continuous>  dextrose 50% Injectable 25 Gram(s) IV Push once  dextrose 50% Injectable 12.5 Gram(s) IV Push once  dextrose 50% Injectable 25 Gram(s) IV Push once  glucagon  Injectable 1 milliGRAM(s) IntraMuscular once  heparin   Injectable 5000 Unit(s) SubCutaneous every 12 hours  insulin lispro (ADMELOG) corrective regimen sliding scale   SubCutaneous two times a day before meals  melatonin 3 milliGRAM(s) Oral at bedtime  pantoprazole    Tablet 40 milliGRAM(s) Oral before breakfast  saccharomyces boulardii 250 milliGRAM(s) Oral two times a day    MEDICATIONS  (PRN):  acetaminophen     Tablet .. 650 milliGRAM(s) Oral every 6 hours PRN Mild Pain (1 - 3), Moderate Pain (4 - 6)  dextrose Oral Gel 15 Gram(s) Oral once PRN Blood Glucose LESS THAN 70 milliGRAM(s)/deciliter      ALLERGIES:  Allergies    No Known Drug Allergies  CONTRAST DYS (Nausea)    Intolerances        LABS:                        14.7   10.62 )-----------( 124      ( 27 Jan 2025 07:02 )             45.4     01-27    137  |  101  |  12  ----------------------------<  140[H]  4.3   |  26  |  0.7    Ca    8.4      27 Jan 2025 07:02  Phos  3.2     01-27  Mg     2.0     01-28    TPro  4.9[L]  /  Alb  3.1[L]  /  TBili  0.5  /  DBili  x   /  AST  42[H]  /  ALT  17  /  AlkPhos  77  01-27      Urinalysis Basic - ( 27 Jan 2025 07:02 )    Color: x / Appearance: x / SG: x / pH: x  Gluc: 140 mg/dL / Ketone: x  / Bili: x / Urobili: x   Blood: x / Protein: x / Nitrite: x   Leuk Esterase: x / RBC: x / WBC x   Sq Epi: x / Non Sq Epi: x / Bacteria: x      CAPILLARY BLOOD GLUCOSE      POCT Blood Glucose.: 169 mg/dL (28 Jan 2025 11:28)      RADIOLOGY & ADDITIONAL TESTS: Reviewed.    ASSESSMENT:    PLAN:

## 2025-01-28 NOTE — PROGRESS NOTE ADULT - SUBJECTIVE AND OBJECTIVE BOX
Patient is a 69y old Female who presents with a chief complaint of shortness of breath, malaise (16 Jan 2025 11:51)    Today is hospital day     Overnight events/Interval History:  - No acute overnight events  - At bedside, patient feels throat is tight, Breathing comfortably on room air    ROS:  - All ROS is negative unless indicated in HPI    Code Status: dnr/intubate     ALLERGIES:  No Known Drug Allergies  CONTRAST DYS (Nausea)    MEDICATIONS:  STANDING MEDICATIONS  albuterol/ipratropium for Nebulization 3 milliLiter(s) Nebulizer every 4 hours  albuterol/ipratropium for Nebulization 3 milliLiter(s) Nebulizer every 20 minutes  atorvastatin 40 milliGRAM(s) Oral at bedtime  ceFAZolin   IVPB      ceFAZolin   IVPB 1000 milliGRAM(s) IV Intermittent every 8 hours  chlorhexidine 2% Cloths 1 Application(s) Topical daily  dextrose 5%. 1000 milliLiter(s) IV Continuous <Continuous>  dextrose 5%. 1000 milliLiter(s) IV Continuous <Continuous>  dextrose 50% Injectable 25 Gram(s) IV Push once  dextrose 50% Injectable 12.5 Gram(s) IV Push once  dextrose 50% Injectable 25 Gram(s) IV Push once  glucagon  Injectable 1 milliGRAM(s) IntraMuscular once  heparin   Injectable 5000 Unit(s) SubCutaneous every 12 hours  insulin lispro (ADMELOG) corrective regimen sliding scale   SubCutaneous two times a day before meals  melatonin 3 milliGRAM(s) Oral at bedtime  pantoprazole    Tablet 40 milliGRAM(s) Oral before breakfast  saccharomyces boulardii 250 milliGRAM(s) Oral two times a day    PRN MEDICATIONS  acetaminophen     Tablet .. 650 milliGRAM(s) Oral every 6 hours PRN  dextrose Oral Gel 15 Gram(s) Oral once PRN    VITALS LAST 24H:  Vital Signs Last 24 Hrs  T(C): 36.6 (28 Jan 2025 05:00), Max: 36.6 (28 Jan 2025 05:00)  T(F): 97.8 (28 Jan 2025 05:00), Max: 97.8 (28 Jan 2025 05:00)  HR: 106 (28 Jan 2025 05:00) (101 - 113)  BP: 112/78 (28 Jan 2025 05:00) (101/66 - 112/78)  BP(mean): --  RR: 18 (28 Jan 2025 05:00) (18 - 20)  SpO2: 96% (28 Jan 2025 05:00) (91% - 96%)    PHYSICAL EXAM:  GENERAL: NAD, lying in bed comfortably  HEENT:  EOMI, Moist mucous membranes  CHEST/LUNG: cta b/l, no stridor, wheezing, rales/rhonchi  HEART: tachycardic and regular rhythm  ABDOMEN: Soft, nontender, nondistended  EXTREMITIES:  No lower extremity edema bilaterally  NERVOUS SYSTEM:  A&Ox3, no focal deficits       LABS:                        14.7   10.62 )-----------( 124      ( 27 Jan 2025 07:02 )             45.4     01-27    137  |  101  |  12  ----------------------------<  140[H]  4.3   |  26  |  0.7    Ca    8.4      27 Jan 2025 07:02  Phos  3.2     01-27  Mg     2.0     01-28    TPro  4.9[L]  /  Alb  3.1[L]  /  TBili  0.5  /  DBili  x   /  AST  42[H]  /  ALT  17  /  AlkPhos  77  01-27      Urinalysis Basic - ( 27 Jan 2025 07:02 )    Color: x / Appearance: x / SG: x / pH: x  Gluc: 140 mg/dL / Ketone: x  / Bili: x / Urobili: x   Blood: x / Protein: x / Nitrite: x   Leuk Esterase: x / RBC: x / WBC x   Sq Epi: x / Non Sq Epi: x / Bacteria: x                RADIOLOGY:  CXR      CT                        RADIOLOGY:  CXR      CT

## 2025-01-28 NOTE — PROGRESS NOTE ADULT - ASSESSMENT
70yo Female with PMH of HTN, HLD, GERD, CAD, COPD not on Home O2, Hx of Pneumothorax, polychondritis, SCC of BOT s/p Trach/PEG (reversed in 4/2021, follows with Dr Louis), s/p CRT (7 sessions in 2021) who presents with SOB for 1 week duration.  Pt was admitted for AHRF 2/2 COPD exacerbation and tracheal stenosis s/p dilation by CT surgery.   Required upgrade to MICU iso ARF 2/2 aspiration, s/p bronch, self extubated 1/17, transitioned to NC in SDU.   SDU Course complicated by severe pharyngeal dysphagia with recurrent aspiration s/p FEES/MBS, after several GOC discussions patient is currently on puree diet with swallow device as patient is refusing NGT and prefers to trial modified diet and outpatient therapy before considering PEG.   Now on RA, pending dispo planning STR    #Severe oropharyngeal dysphagia w/ recurrent aspiration iso polychondritis w/ tracheal stenosis   - SLP evaluations appreciated: pt has irreversible severe oropharyngeal dysphagia with aspiration of all consistencies, recommending PEG   - Patient refusing NGT, currently wishes to pursue modified diet and will consider PEG if fails   - appreciate palliative and GI recs   - f/u OP ENT, GI  - c/w puree diet with supraglottic swallow device   - aspiration precautions, HOB >45 deg    #AHRF 2/2 aspiration and MSSA PNA - improving  #Hx polychondritis and tracheal stenosis   #SCC of base of tongue s/p trach/PEG (reversed 2021) and CRT (2021)  #AHRF 2/2 acute on chronic COPD exacerbation - resolved   #Hx lung nodules  - s/p intubation 1/17, self-extubation 1/17, BiPAP/HFNC  - bronch cx (+) MSSA (1.17)  - bronch lavage path with neutrophils and fungal organisms +GMS c/f aspergillus (1.10) > no need for treatment per ID   - currently on NC 3L with BIPAP qhs, wean as tolerated   - s/p steroid course, completed pred taper  - c/w IV cefazolin for MSSA (bronch cx) > dc on keflex thru 1/29  - ID recs appreciated  - PT eval: [facility] vs home PT  - holding home cellcept due to active infection, f/u rheum outpatient for resumption    #Acute on chronic HFmrEF - improving   #CAD  #HTN  #HLD  - cardio eval appreciated: recommend sglt2 on dc, continue losartan and metoprolol succinate  - TTE: 1/2024: Mildly reduced LV and RV function. IVC > 50% collapsible.   - holding home losartan 100mg qd for now due to soft BP  - holding home lasix 40mg qd for now (sp diuresis earlier this admission)  - c/w atorvastatin 40mg qhs   - c/w metoprolol tartrate 12.5mg BID      ---------------------  DVT ppx: heparin  Diet: puree  GI ppx/Bowel regimen: PPI   Activity: IAT   GOC: dnr/intubate  Dispo: pending medical optimization, [facility]  #Summary/Handoff:  - dispo planning to [facility]

## 2025-01-28 NOTE — PROGRESS NOTE ADULT - ASSESSMENT
70yo Female with PMH of HTN, HLD, GERD, CAD, COPD not on Home O2, Hx of Pneumothorax, polychondritis SCC of BOT s/p Trach/PEG (reversed in 4/2021, follows with Dr Louis), s/p CRT (7 sessions in 2021) who presents with SOB for 1 week duratio.  Pt was treated for  hypoxic resp failure due to COPD exacerbation complicated by tracheal stenosis s/p dilatation by CT surgery.   Pt has severe dysphagia, RRT was called on the floor, pt developed ARF due to aspiration, was intubated and upgraded to ICU, s/p bronch , self extubated on 1/17 and was downgraded to SDU.    #Acute hypoxemic resp failure due to COPD exacerbation, improved  #MSSA PNA   #Reccurent aspiration with oropharyngeal dysphagia in setting of polychondritis  #HO trach   #h/o Tracheal stenosis SP dilation   #History of lung nodules/ emphysema  - pt self extubated on 1/17, currnetly on RA, sat 88-90%, check Ambulatory O2 after PT eval   - discussed with s/s , dysphagia irreversible, recommending PEG  - pt failed barium swallow, on pureed   - discussed with patient re: NGT, she refused. . discussed at length with her re: PEG tube, she does not wish to pursue it at this time. Wants to try modified diet, understanding the risks of aspiration.   - MSSA in bronch cx -- currently on cefazolin 1g q 8 hours dc on keflex until 1/29  - aspergillus on bronch reviewed w ID, no need for tx   - s/p steroids  - incentive spirometry, out of bed to chair, PT eval     #Thrombocytopenia  - no bleeding, slowly dropping 300s on admission, today, 100K, pt has been on Sub q heparin, repeat level in AM if cont to drop with send HIT panel     Polychondritis   - as per Rheum note, patient was on cellcept 1000 G BID in the past   -  hydroxychloroquine d/c   - off immunosuppresants at this time, resume on dc     HTN/HLD/CAD/ combined CHF   - intake and output monitoring, daily weight  - monitor for fluid overload       Hyperglycemia / prediabetes   - CC diet, monitor f/s   - on Insulin now, adjust based on FS     GERD - C/w Protonix    Overall prognosis is guarded    pending: wants home, couldn't ambulate, now pending DC to STR   from home, livess w her son   DNR/Intubate  70yo Female with PMH of HTN, HLD, GERD, CAD, COPD not on Home O2, Hx of Pneumothorax, polychondritis SCC of BOT s/p Trach/PEG (reversed in 4/2021, follows with Dr Louis), s/p CRT (7 sessions in 2021) who presents with SOB for 1 week duratio.  Pt was treated for  hypoxic resp failure due to COPD exacerbation complicated by tracheal stenosis s/p dilatation by CT surgery.   Pt has severe dysphagia, RRT was called on the floor, pt developed ARF due to aspiration, was intubated and upgraded to ICU, s/p bronch , self extubated on 1/17 and was downgraded to SDU.    #Acute hypoxemic resp failure due to COPD exacerbation, improved  #MSSA PNA   #Reccurent aspiration with oropharyngeal dysphagia in setting of polychondritis  #HO trach   #h/o Tracheal stenosis SP dilation   #History of lung nodules/ emphysema  - pt self extubated on 1/17, currnetly on RA, sat 88-90%, check Ambulatory O2 after PT eval   - discussed with s/s , dysphagia irreversible, recommending PEG  - pt failed barium swallow, on pureed   - discussed with patient re: NGT, she refused. . discussed at length with her re: PEG tube, she does not wish to pursue it at this time. Wants to try modified diet, understanding the risks of aspiration.   - MSSA in bronch cx -- currently on cefazolin 1g q 8 hours dc on keflex until 1/29  - aspergillus on bronch reviewed w ID, no need for tx   - s/p steroids  - incentive spirometry, out of bed to chair, PT eval     #Thrombocytopenia  - no bleeding, slowly dropping from 300s on admission,, pt has been on Sub q heparin, cont to monitor,     Polychondritis   - as per Rheum note, patient was on cellcept 1000 G BID in the past   -  hydroxychloroquine d/c   - off immunosuppresants at this time, resume on dc     HTN/HLD/CAD/ combined CHF   - intake and output monitoring, daily weight  - monitor for fluid overload       Hyperglycemia / prediabetes   - CC diet, monitor f/s   - on Insulin now, adjust based on FS     GERD - C/w Protonix    Overall prognosis is guarded    pending: wants home, couldn't ambulate, now pending DC to STR   from home, livess w her son   DNR/Intubate  70yo Female with PMH of HTN, HLD, GERD, CAD, COPD not on Home O2, Hx of Pneumothorax, polychondritis SCC of BOT s/p Trach/PEG (reversed in 4/2021, follows with Dr Louis), s/p CRT (7 sessions in 2021) who presents with SOB for 1 week duratio.  Pt was treated for  hypoxic resp failure due to COPD exacerbation complicated by tracheal stenosis s/p dilatation by CT surgery.   Pt has severe dysphagia, RRT was called on the floor, pt developed ARF due to aspiration, was intubated and upgraded to ICU, s/p bronch , self extubated on 1/17 and was downgraded to SDU.    #Acute hypoxemic resp failure due to COPD exacerbation, improved  #MSSA PNA   #Reccurent aspiration with oropharyngeal dysphagia in setting of polychondritis  #HO trach   #h/o Tracheal stenosis SP dilation   #History of lung nodules/ emphysema  - pt self extubated on 1/17, currnetly on RA, sat 88-90%, check Ambulatory O2 after PT eval   - discussed with s/s , dysphagia irreversible, recommending PEG  - pt failed barium swallow, on pureed   - discussed with patient re: NGT, she refused. . discussed at length with her re: PEG tube, she does not wish to pursue it at this time. Wants to try modified diet, understanding the risks of aspiration.   - MSSA in bronch cx -- currently on cefazolin 1g q 8 hours dc on keflex until 1/29  - aspergillus on bronch reviewed w ID, no need for tx   - s/p steroids  - incentive spirometry, out of bed to chair, PT eval     #Thrombocytopenia  - no bleeding, slowly dropping from 300s on admission,, pt has been on Sub q heparin, doubt H HIT, send PF4 ab and Do LE duplex, cont to monitor,     Polychondritis   - as per Rheum note, patient was on cellcept 1000 G BID in the past   -  hydroxychloroquine d/c   - off immunosuppresants at this time, resume on dc     HTN/HLD/CAD/ combined CHF   - intake and output monitoring, daily weight  - monitor for fluid overload       Hyperglycemia / prediabetes   - CC diet, monitor f/s   - on Insulin now, adjust based on FS     GERD - C/w Protonix    Overall prognosis is guarded    pending: wants home, couldn't ambulate, now pending DC to STR   from home, livess w her son   DNR/Intubate

## 2025-01-29 LAB
BASOPHILS # BLD AUTO: 0.01 K/UL — SIGNIFICANT CHANGE UP (ref 0–0.2)
BASOPHILS NFR BLD AUTO: 0.1 % — SIGNIFICANT CHANGE UP (ref 0–1)
EOSINOPHIL # BLD AUTO: 0.24 K/UL — SIGNIFICANT CHANGE UP (ref 0–0.7)
EOSINOPHIL NFR BLD AUTO: 3 % — SIGNIFICANT CHANGE UP (ref 0–8)
GALACTOMANNAN AG SERPL-ACNC: 0.04 INDEX — SIGNIFICANT CHANGE UP (ref 0–0.49)
GLUCOSE BLDC GLUCOMTR-MCNC: 103 MG/DL — HIGH (ref 70–99)
GLUCOSE BLDC GLUCOMTR-MCNC: 103 MG/DL — HIGH (ref 70–99)
GLUCOSE BLDC GLUCOMTR-MCNC: 160 MG/DL — HIGH (ref 70–99)
HCT VFR BLD CALC: 41.1 % — SIGNIFICANT CHANGE UP (ref 37–47)
HEPARIN-PF4 AB RESULT: 6.8 U/ML — HIGH (ref 0–0.9)
HGB BLD-MCNC: 13.1 G/DL — SIGNIFICANT CHANGE UP (ref 12–16)
IMM GRANULOCYTES NFR BLD AUTO: 0.6 % — HIGH (ref 0.1–0.3)
LYMPHOCYTES # BLD AUTO: 0.42 K/UL — LOW (ref 1.2–3.4)
LYMPHOCYTES # BLD AUTO: 5.3 % — LOW (ref 20.5–51.1)
MCHC RBC-ENTMCNC: 29.1 PG — SIGNIFICANT CHANGE UP (ref 27–31)
MCHC RBC-ENTMCNC: 31.9 G/DL — LOW (ref 32–37)
MCV RBC AUTO: 91.3 FL — SIGNIFICANT CHANGE UP (ref 81–99)
MONOCYTES # BLD AUTO: 0.45 K/UL — SIGNIFICANT CHANGE UP (ref 0.1–0.6)
MONOCYTES NFR BLD AUTO: 5.7 % — SIGNIFICANT CHANGE UP (ref 1.7–9.3)
NEUTROPHILS # BLD AUTO: 6.73 K/UL — HIGH (ref 1.4–6.5)
NEUTROPHILS NFR BLD AUTO: 85.3 % — HIGH (ref 42.2–75.2)
NRBC # BLD: 0 /100 WBCS — SIGNIFICANT CHANGE UP (ref 0–0)
NRBC BLD-RTO: 0 /100 WBCS — SIGNIFICANT CHANGE UP (ref 0–0)
PF4 HEPARIN CMPLX AB SER-ACNC: POSITIVE — SIGNIFICANT CHANGE UP
PLATELET # BLD AUTO: 75 K/UL — LOW (ref 130–400)
PMV BLD: 11.7 FL — HIGH (ref 7.4–10.4)
RBC # BLD: 4.5 M/UL — SIGNIFICANT CHANGE UP (ref 4.2–5.4)
RBC # FLD: 15.4 % — HIGH (ref 11.5–14.5)
WBC # BLD: 7.9 K/UL — SIGNIFICANT CHANGE UP (ref 4.8–10.8)
WBC # FLD AUTO: 7.9 K/UL — SIGNIFICANT CHANGE UP (ref 4.8–10.8)

## 2025-01-29 PROCEDURE — 99232 SBSQ HOSP IP/OBS MODERATE 35: CPT

## 2025-01-29 PROCEDURE — 71045 X-RAY EXAM CHEST 1 VIEW: CPT | Mod: 26

## 2025-01-29 PROCEDURE — 93970 EXTREMITY STUDY: CPT | Mod: 26

## 2025-01-29 RX ADMIN — ACETAMINOPHEN, DIPHENHYDRAMINE HCL, PHENYLEPHRINE HCL 3 MILLIGRAM(S): 325; 25; 5 TABLET ORAL at 22:38

## 2025-01-29 RX ADMIN — IPRATROPIUM BROMIDE AND ALBUTEROL SULFATE 3 MILLILITER(S): .5; 2.5 SOLUTION RESPIRATORY (INHALATION) at 23:57

## 2025-01-29 RX ADMIN — Medication 12.5 MILLIGRAM(S): at 17:35

## 2025-01-29 RX ADMIN — IPRATROPIUM BROMIDE AND ALBUTEROL SULFATE 3 MILLILITER(S): .5; 2.5 SOLUTION RESPIRATORY (INHALATION) at 13:10

## 2025-01-29 RX ADMIN — PANTOPRAZOLE 40 MILLIGRAM(S): 20 TABLET, DELAYED RELEASE ORAL at 05:19

## 2025-01-29 RX ADMIN — Medication 5000 UNIT(S): at 05:18

## 2025-01-29 RX ADMIN — ATORVASTATIN CALCIUM 40 MILLIGRAM(S): 80 TABLET, FILM COATED ORAL at 22:38

## 2025-01-29 RX ADMIN — Medication 2: at 08:32

## 2025-01-29 RX ADMIN — Medication 250 MILLIGRAM(S): at 17:35

## 2025-01-29 RX ADMIN — ANTISEPTIC SURGICAL SCRUB 1 APPLICATION(S): 0.04 SOLUTION TOPICAL at 12:34

## 2025-01-29 RX ADMIN — IPRATROPIUM BROMIDE AND ALBUTEROL SULFATE 3 MILLILITER(S): .5; 2.5 SOLUTION RESPIRATORY (INHALATION) at 08:22

## 2025-01-29 RX ADMIN — Medication 12.5 MILLIGRAM(S): at 06:36

## 2025-01-29 RX ADMIN — IPRATROPIUM BROMIDE AND ALBUTEROL SULFATE 3 MILLILITER(S): .5; 2.5 SOLUTION RESPIRATORY (INHALATION) at 19:44

## 2025-01-29 RX ADMIN — Medication 100 MILLIGRAM(S): at 05:19

## 2025-01-29 RX ADMIN — Medication 100 MILLIGRAM(S): at 22:38

## 2025-01-29 RX ADMIN — IPRATROPIUM BROMIDE AND ALBUTEROL SULFATE 3 MILLILITER(S): .5; 2.5 SOLUTION RESPIRATORY (INHALATION) at 18:21

## 2025-01-29 RX ADMIN — Medication 100 MILLIGRAM(S): at 13:51

## 2025-01-29 RX ADMIN — Medication 250 MILLIGRAM(S): at 05:19

## 2025-01-29 NOTE — PROGRESS NOTE ADULT - ASSESSMENT
68yo Female with PMH of HTN, HLD, GERD, CAD, COPD not on Home O2, Hx of Pneumothorax, polychondritis, SCC of BOT s/p Trach/PEG (reversed in 4/2021, follows with Dr Louis), s/p CRT (7 sessions in 2021) who presents with SOB for 1 week duration.  Pt was admitted for AHRF 2/2 COPD exacerbation and tracheal stenosis s/p dilation by CT surgery.   Required upgrade to MICU iso ARF 2/2 aspiration, s/p bronch, self extubated 1/17, transitioned to NC in SDU.   SDU Course complicated by severe pharyngeal dysphagia with recurrent aspiration s/p FEES/MBS, after several GOC discussions patient is currently on puree diet with swallow device as patient is refusing NGT and prefers to trial modified diet and outpatient therapy before considering PEG.   Now on RA, pending dispo planning STR    #Severe oropharyngeal dysphagia w/ recurrent aspiration iso polychondritis w/ tracheal stenosis   - SLP evaluations appreciated: pt has irreversible severe oropharyngeal dysphagia with aspiration of all consistencies, recommending PEG   - Patient refusing NGT, currently wishes to pursue modified diet and will consider PEG if fails   - appreciate palliative and GI recs   - f/u OP ENT, GI  - c/w puree diet with supraglottic swallow device   - aspiration precautions, HOB >45 deg    #Thrombocytopenia  - possible HIT  - hold hep  - f/u PF4, duplex    #AHRF 2/2 aspiration and MSSA PNA - improving  #Hx polychondritis and tracheal stenosis   #SCC of base of tongue s/p trach/PEG (reversed 2021) and CRT (2021)  #AHRF 2/2 acute on chronic COPD exacerbation - resolved   #Hx lung nodules  - s/p intubation 1/17, self-extubation 1/17, BiPAP/HFNC  - bronch cx (+) MSSA (1.17)  - bronch lavage path with neutrophils and fungal organisms +GMS c/f aspergillus (1.10) > no need for treatment per ID (galactomannan negative, unlikely clinically active)  - currently on NC 3L with BIPAP qhs, wean as tolerated   - s/p steroid course, completed pred taper  - c/w IV cefazolin for MSSA (bronch cx) > continue thru 1/29   - ID recs appreciated  - PT eval: [facility] vs home PT  - holding home cellcept due to active infection, resume tomorrow after abx off     #Acute on chronic HFmrEF - improving   #CAD  #HTN  #HLD  - cardio eval appreciated: recommend sglt2 on dc, continue losartan and metoprolol succinate  - TTE: 1/2024: Mildly reduced LV and RV function. IVC > 50% collapsible.   - holding home losartan 100mg qd for now due to soft BP  - holding home lasix 40mg qd for now (sp diuresis earlier this admission)  - c/w atorvastatin 40mg qhs   - c/w metoprolol tartrate 12.5mg BID      ---------------------  DVT ppx: heparin  Diet: puree  GI ppx/Bowel regimen: PPI   Activity: IAT   GOC: dnr/intubate  Dispo: pending medical optimization, [facility]  #Summary/Handoff:  - f/u PF4, duplex, dispo planning to [facility]

## 2025-01-29 NOTE — PROGRESS NOTE ADULT - SUBJECTIVE AND OBJECTIVE BOX
Patient is a 69y old Female who presents with a chief complaint of shortness of breath, malaise (16 Jan 2025 11:51)    Today is hospital day     Overnight events/Interval History:  - No acute overnight events  - At bedside, patient feels anxious, was placed on O2 overnight for comfort, did not use BiPAP as mask smelled (called resp for replacement)    ROS:  - All ROS is negative unless indicated in HPI    Code Status: dnr/intubate     ALLERGIES:  No Known Drug Allergies  CONTRAST DYS (Nausea)    MEDICATIONS:  STANDING MEDICATIONS  albuterol/ipratropium for Nebulization 3 milliLiter(s) Nebulizer every 4 hours  albuterol/ipratropium for Nebulization 3 milliLiter(s) Nebulizer every 20 minutes  atorvastatin 40 milliGRAM(s) Oral at bedtime  ceFAZolin   IVPB      ceFAZolin   IVPB 1000 milliGRAM(s) IV Intermittent every 8 hours  chlorhexidine 2% Cloths 1 Application(s) Topical daily  dextrose 5%. 1000 milliLiter(s) IV Continuous <Continuous>  dextrose 5%. 1000 milliLiter(s) IV Continuous <Continuous>  dextrose 50% Injectable 25 Gram(s) IV Push once  dextrose 50% Injectable 12.5 Gram(s) IV Push once  dextrose 50% Injectable 25 Gram(s) IV Push once  glucagon  Injectable 1 milliGRAM(s) IntraMuscular once  insulin lispro (ADMELOG) corrective regimen sliding scale   SubCutaneous two times a day before meals  melatonin 3 milliGRAM(s) Oral at bedtime  metoprolol tartrate 12.5 milliGRAM(s) Oral every 12 hours  pantoprazole    Tablet 40 milliGRAM(s) Oral before breakfast  saccharomyces boulardii 250 milliGRAM(s) Oral two times a day    PRN MEDICATIONS  acetaminophen     Tablet .. 650 milliGRAM(s) Oral every 6 hours PRN  dextrose Oral Gel 15 Gram(s) Oral once PRN  hydrOXYzine hydrochloride 25 milliGRAM(s) Oral every 12 hours PRN    VITALS LAST 24H:  Vital Signs Last 24 Hrs  T(C): 36.5 (29 Jan 2025 05:38), Max: 36.6 (28 Jan 2025 19:33)  T(F): 97.7 (29 Jan 2025 05:38), Max: 97.8 (28 Jan 2025 19:33)  HR: 88 (29 Jan 2025 05:38) (88 - 98)  BP: 103/68 (29 Jan 2025 05:38) (103/68 - 123/84)  BP(mean): --  RR: 18 (28 Jan 2025 19:33) (18 - 18)  SpO2: 97% (29 Jan 2025 05:38) (96% - 99%)    Parameters below as of 28 Jan 2025 19:33  Patient On (Oxygen Delivery Method): nasal cannula      PHYSICAL EXAM:  GENERAL: NAD, lying in bed comfortably  HEENT:  EOMI, Moist mucous membranes  CHEST/LUNG: cta b/l, no stridor, wheezing, rales/rhonchi  HEART: regular rate and rhythm  ABDOMEN: Soft, nontender, nondistended  EXTREMITIES:  No lower extremity edema bilaterally  NERVOUS SYSTEM:  A&Ox3, no focal deficits       LABS:                        13.1   7.90  )-----------( 75       ( 29 Jan 2025 07:03 )             41.1       Mg     2.0     01-28                    RADIOLOGY:  CXR      CT                            RADIOLOGY:  CXR      CT

## 2025-01-29 NOTE — PROGRESS NOTE ADULT - ATTENDING COMMENTS
70yo Female with PMH of HTN, HLD, GERD, CAD, COPD not on Home O2, Hx of Pneumothorax, polychondritis, SCC of BOT s/p Trach/PEG (reversed in 4/2021, follows with Dr Louis), s/p CRT (7 sessions in 2021) who presents with SOB for 1 week duration.  Pt was admitted for AHRF 2/2 COPD exacerbation and tracheal stenosis s/p dilation by CT surgery.   Required upgrade to MICU iso ARF 2/2 aspiration, s/p bronch, self extubated 1/17, transitioned to NC in SDU.   SDU Course complicated by severe pharyngeal dysphagia with recurrent aspiration s/p FEES/MBS, after several GOC discussions patient is currently on puree diet with swallow device as patient is refusing NGT and prefers to trial modified diet and outpatient therapy before considering PEG.   Now on RA, pending dispo planning STR    #Severe oropharyngeal dysphagia w/ recurrent aspiration iso polychondritis w/ tracheal stenosis   - SLP evaluations appreciated: pt has irreversible severe oropharyngeal dysphagia with aspiration of all consistencies, recommending PEG   - Patient refusing NGT, currently wishes to pursue modified diet and will consider PEG if fails   - appreciate palliative and GI recs   - f/u OP ENT, GI  - c/w puree diet with supraglottic swallow device   - aspiration precautions, HOB >45 deg    #AHRF 2/2 aspiration and MSSA PNA - improving  #Hx polychondritis and tracheal stenosis   #SCC of base of tongue s/p trach/PEG (reversed 2021) and CRT (2021)  #AHRF 2/2 acute on chronic COPD exacerbation - resolved   #Hx lung nodules  - s/p intubation 1/17, self-extubation 1/17, BiPAP/HFNC  - bronch cx (+) MSSA (1.17)  - bronch lavage path with neutrophils and fungal organisms +GMS c/f aspergillus (1.10) > no need for treatment per ID   - currently on NC 3L with BIPAP qhs, wean as tolerated   - s/p steroid course, completed pred taper  - c/w IV cefazolin for MSSA (bronch cx) > dc on keflex thru 1/29  - ID recs appreciated  - PT eval: [facility] vs home PT  - holding home cellcept due to active infection, f/u rheum outpatient for resumption    #Acute on chronic HFmrEF - improving   #CAD  #HTN  #HLD  - cardio eval appreciated: recommend sglt2 on dc, continue losartan and metoprolol succinate  - TTE: 1/2024: Mildly reduced LV and RV function. IVC > 50% collapsible.   - holding home losartan 100mg qd for now due to soft BP  - holding home lasix 40mg qd for now (sp diuresis earlier this admission)  - c/w atorvastatin 40mg qhs   - c/w metoprolol tartrate 12.5mg BID      DVT ppx: heparin  Diet: puree  GI ppx/Bowel regimen: PPI   Activity: IAT   GOC: dnr/intubate  Dispo: pending medical optimization, [facility]    - Dispo: STR awaiting Ins Auth 68yo Female with PMH of HTN, HLD, GERD, CAD, COPD not on Home O2, Hx of Pneumothorax, polychondritis, SCC of BOT s/p Trach/PEG (reversed in 4/2021, follows with Dr Louis), s/p CRT (7 sessions in 2021) who presents with SOB for 1 week duration.  Pt was admitted for AHRF 2/2 COPD exacerbation and tracheal stenosis s/p dilation by CT surgery.   Required upgrade to MICU iso ARF 2/2 aspiration, s/p bronch, self extubated 1/17, transitioned to NC in SDU.   SDU Course complicated by severe pharyngeal dysphagia with recurrent aspiration s/p FEES/MBS, after several GOC discussions patient is currently on puree diet with swallow device as patient is refusing NGT and prefers to trial modified diet and outpatient therapy before considering PEG.   Now on RA, pending dispo planning STR                          13.1   7.90  )-----------( 75       ( 29 Jan 2025 07:03 )             41.1     Mg     2.0     01-28      #Severe Thrombocytopenia - f/u platelete abs and send seratonin assay / trend cbc for platelete count     #Severe oropharyngeal dysphagia w/ recurrent aspiration iso polychondritis w/ tracheal stenosis   - SLP evaluations appreciated: pt has irreversible severe oropharyngeal dysphagia with aspiration of all consistencies, recommending PEG   - Patient refusing NGT, currently wishes to pursue modified diet and will consider PEG if fails   - appreciate palliative and GI recs   - f/u OP ENT, GI  - c/w puree diet with supraglottic swallow device   - aspiration precautions, HOB >45 deg    #AHRF 2/2 aspiration and MSSA PNA - improving  #Hx polychondritis and tracheal stenosis   #SCC of base of tongue s/p trach/PEG (reversed 2021) and CRT (2021)  #AHRF 2/2 acute on chronic COPD exacerbation - resolved   #Hx lung nodules  - s/p intubation 1/17, self-extubation 1/17, BiPAP/HFNC  - bronch cx (+) MSSA (1.17)  - bronch lavage path with neutrophils and fungal organisms +GMS c/f aspergillus (1.10) > no need for treatment per ID   - currently on NC 3L with BIPAP qhs, wean as tolerated   - s/p steroid course, completed pred taper  - c/w IV cefazolin for MSSA (bronch cx) > dc on keflex thru 1/29  - ID recs appreciated  - PT eval: [facility] vs home PT  - holding home cellcept due to active infection, f/u rheum outpatient for resumption    #Acute on chronic HFmrEF - improving   #CAD  #HTN  #HLD  - cardio eval appreciated: recommend sglt2 on dc, continue losartan and metoprolol succinate  - TTE: 1/2024: Mildly reduced LV and RV function. IVC > 50% collapsible.   - holding home losartan 100mg qd for now due to soft BP  - holding home lasix 40mg qd for now (sp diuresis earlier this admission)  - c/w atorvastatin 40mg qhs   - c/w metoprolol tartrate 12.5mg BID      DVT ppx: heparin  Diet: puree  GI ppx/Bowel regimen: PPI   Activity: IAT   GOC: dnr/intubate  Dispo: pending medical optimization, [facility]    - Dispo: STR awaiting Ins Auth and w/up for severe thrombocytopenia 75K f/u tomorrow / may need hematology cons - f/u seratonin assay and platelettes abs f/u cbc daily - monitor for bleeding clinically

## 2025-01-30 ENCOUNTER — RESULT REVIEW (OUTPATIENT)
Age: 70
End: 2025-01-30

## 2025-01-30 LAB
ALBUMIN SERPL ELPH-MCNC: 3.2 G/DL — LOW (ref 3.5–5.2)
ALP SERPL-CCNC: 83 U/L — SIGNIFICANT CHANGE UP (ref 30–115)
ALT FLD-CCNC: 15 U/L — SIGNIFICANT CHANGE UP (ref 0–41)
APTT BLD: 34 SEC — SIGNIFICANT CHANGE UP (ref 27–39.2)
APTT BLD: 35.4 SEC — SIGNIFICANT CHANGE UP (ref 27–39.2)
APTT BLD: 42.8 SEC — HIGH (ref 27–39.2)
APTT BLD: 46.4 SEC — HIGH (ref 27–39.2)
AST SERPL-CCNC: 34 U/L — SIGNIFICANT CHANGE UP (ref 0–41)
BASOPHILS # BLD AUTO: 0.03 K/UL — SIGNIFICANT CHANGE UP (ref 0–0.2)
BASOPHILS NFR BLD AUTO: 0.4 % — SIGNIFICANT CHANGE UP (ref 0–1)
BILIRUB SERPL-MCNC: 0.6 MG/DL — SIGNIFICANT CHANGE UP (ref 0.2–1.2)
BUN SERPL-MCNC: 6 MG/DL — LOW (ref 10–20)
CALCIUM SERPL-MCNC: 8.4 MG/DL — SIGNIFICANT CHANGE UP (ref 8.4–10.5)
CHLORIDE SERPL-SCNC: 103 MMOL/L — SIGNIFICANT CHANGE UP (ref 98–110)
CO2 SERPL-SCNC: 23 MMOL/L — SIGNIFICANT CHANGE UP (ref 17–32)
CREAT SERPL-MCNC: 0.5 MG/DL — LOW (ref 0.7–1.5)
D DIMER BLD IA.RAPID-MCNC: 2411 NG/ML DDU — HIGH
EGFR: 101 ML/MIN/1.73M2 — SIGNIFICANT CHANGE UP
EOSINOPHIL # BLD AUTO: 0.18 K/UL — SIGNIFICANT CHANGE UP (ref 0–0.7)
EOSINOPHIL NFR BLD AUTO: 2.4 % — SIGNIFICANT CHANGE UP (ref 0–8)
GAS PNL BLDA: SIGNIFICANT CHANGE UP
GLUCOSE BLDC GLUCOMTR-MCNC: 136 MG/DL — HIGH (ref 70–99)
GLUCOSE BLDC GLUCOMTR-MCNC: 182 MG/DL — HIGH (ref 70–99)
GLUCOSE BLDC GLUCOMTR-MCNC: 188 MG/DL — HIGH (ref 70–99)
GLUCOSE SERPL-MCNC: 145 MG/DL — HIGH (ref 70–99)
HCT VFR BLD CALC: 40.7 % — SIGNIFICANT CHANGE UP (ref 37–47)
HGB BLD-MCNC: 12.9 G/DL — SIGNIFICANT CHANGE UP (ref 12–16)
IMM GRANULOCYTES NFR BLD AUTO: 0.7 % — HIGH (ref 0.1–0.3)
INR BLD: 1.69 RATIO — HIGH (ref 0.65–1.3)
LYMPHOCYTES # BLD AUTO: 0.41 K/UL — LOW (ref 1.2–3.4)
LYMPHOCYTES # BLD AUTO: 5.5 % — LOW (ref 20.5–51.1)
MAGNESIUM SERPL-MCNC: 1.6 MG/DL — LOW (ref 1.8–2.4)
MCHC RBC-ENTMCNC: 29.1 PG — SIGNIFICANT CHANGE UP (ref 27–31)
MCHC RBC-ENTMCNC: 31.7 G/DL — LOW (ref 32–37)
MCV RBC AUTO: 91.7 FL — SIGNIFICANT CHANGE UP (ref 81–99)
MONOCYTES # BLD AUTO: 0.43 K/UL — SIGNIFICANT CHANGE UP (ref 0.1–0.6)
MONOCYTES NFR BLD AUTO: 5.7 % — SIGNIFICANT CHANGE UP (ref 1.7–9.3)
NEUTROPHILS # BLD AUTO: 6.39 K/UL — SIGNIFICANT CHANGE UP (ref 1.4–6.5)
NEUTROPHILS NFR BLD AUTO: 85.3 % — HIGH (ref 42.2–75.2)
NRBC # BLD: 0 /100 WBCS — SIGNIFICANT CHANGE UP (ref 0–0)
NRBC BLD-RTO: 0 /100 WBCS — SIGNIFICANT CHANGE UP (ref 0–0)
NT-PROBNP SERPL-SCNC: 7905 PG/ML — HIGH (ref 0–300)
PLATELET # BLD AUTO: 80 K/UL — LOW (ref 130–400)
PMV BLD: 11.3 FL — HIGH (ref 7.4–10.4)
POTASSIUM SERPL-MCNC: 4 MMOL/L — SIGNIFICANT CHANGE UP (ref 3.5–5)
POTASSIUM SERPL-SCNC: 4 MMOL/L — SIGNIFICANT CHANGE UP (ref 3.5–5)
PROT SERPL-MCNC: 5.2 G/DL — LOW (ref 6–8)
PROTHROM AB SERPL-ACNC: 20.1 SEC — HIGH (ref 9.95–12.87)
RBC # BLD: 4.44 M/UL — SIGNIFICANT CHANGE UP (ref 4.2–5.4)
RBC # FLD: 15.5 % — HIGH (ref 11.5–14.5)
SODIUM SERPL-SCNC: 138 MMOL/L — SIGNIFICANT CHANGE UP (ref 135–146)
TROPONIN T, HIGH SENSITIVITY RESULT: 52 NG/L — CRITICAL HIGH (ref 6–13)
TROPONIN T, HIGH SENSITIVITY RESULT: 60 NG/L — CRITICAL HIGH (ref 6–13)
TROPONIN T, HIGH SENSITIVITY RESULT: 73 NG/L — CRITICAL HIGH (ref 6–13)
WBC # BLD: 7.49 K/UL — SIGNIFICANT CHANGE UP (ref 4.8–10.8)
WBC # FLD AUTO: 7.49 K/UL — SIGNIFICANT CHANGE UP (ref 4.8–10.8)

## 2025-01-30 PROCEDURE — 99291 CRITICAL CARE FIRST HOUR: CPT

## 2025-01-30 PROCEDURE — 71275 CT ANGIOGRAPHY CHEST: CPT | Mod: 26

## 2025-01-30 PROCEDURE — 93306 TTE W/DOPPLER COMPLETE: CPT | Mod: 26

## 2025-01-30 PROCEDURE — 99233 SBSQ HOSP IP/OBS HIGH 50: CPT

## 2025-01-30 RX ORDER — DIPHENHYDRAMINE HCL 25 MG
25 CAPSULE ORAL ONCE
Refills: 0 | Status: COMPLETED | OUTPATIENT
Start: 2025-01-30 | End: 2025-01-30

## 2025-01-30 RX ORDER — PIPERACILLIN SODIUM AND TAZOBACTAM SODIUM 2; 250 G/50ML; MG/50ML
3.38 INJECTION, POWDER, FOR SOLUTION INTRAVENOUS ONCE
Refills: 0 | Status: COMPLETED | OUTPATIENT
Start: 2025-01-31 | End: 2025-01-31

## 2025-01-30 RX ORDER — MAGNESIUM SULFATE 0.8 MEQ/ML
2 AMPUL (ML) INJECTION ONCE
Refills: 0 | Status: COMPLETED | OUTPATIENT
Start: 2025-01-30 | End: 2025-01-30

## 2025-01-30 RX ORDER — FONDAPARINUX SODIUM 5 MG/.4ML
7.5 INJECTION, SOLUTION SUBCUTANEOUS DAILY
Refills: 0 | Status: DISCONTINUED | OUTPATIENT
Start: 2025-01-30 | End: 2025-02-04

## 2025-01-30 RX ORDER — PIPERACILLIN SODIUM AND TAZOBACTAM SODIUM 2; 250 G/50ML; MG/50ML
3.38 INJECTION, POWDER, FOR SOLUTION INTRAVENOUS ONCE
Refills: 0 | Status: COMPLETED | OUTPATIENT
Start: 2025-01-30 | End: 2025-01-30

## 2025-01-30 RX ORDER — ARGATROBAN 50 MG/50ML
2.25 INJECTION, SOLUTION INTRAVENOUS
Qty: 100 | Refills: 0 | Status: DISCONTINUED | OUTPATIENT
Start: 2025-01-30 | End: 2025-01-30

## 2025-01-30 RX ORDER — PIPERACILLIN SODIUM AND TAZOBACTAM SODIUM 2; 250 G/50ML; MG/50ML
3.38 INJECTION, POWDER, FOR SOLUTION INTRAVENOUS EVERY 6 HOURS
Refills: 0 | Status: DISCONTINUED | OUTPATIENT
Start: 2025-01-31 | End: 2025-02-05

## 2025-01-30 RX ORDER — MYCOPHENOLATE MOFETIL 200 MG/ML
1000 POWDER, FOR SUSPENSION ORAL
Refills: 0 | Status: DISCONTINUED | OUTPATIENT
Start: 2025-01-30 | End: 2025-02-10

## 2025-01-30 RX ORDER — METHYLPREDNISOLONE ACETATE 40 MG/ML
40 VIAL (ML) INJECTION ONCE
Refills: 0 | Status: COMPLETED | OUTPATIENT
Start: 2025-01-30 | End: 2025-01-30

## 2025-01-30 RX ADMIN — Medication 25 MILLIGRAM(S): at 09:30

## 2025-01-30 RX ADMIN — MYCOPHENOLATE MOFETIL 1000 MILLIGRAM(S): 200 POWDER, FOR SUSPENSION ORAL at 18:08

## 2025-01-30 RX ADMIN — PANTOPRAZOLE 40 MILLIGRAM(S): 20 TABLET, DELAYED RELEASE ORAL at 05:06

## 2025-01-30 RX ADMIN — PIPERACILLIN SODIUM AND TAZOBACTAM SODIUM 25 GRAM(S): 2; 250 INJECTION, POWDER, FOR SOLUTION INTRAVENOUS at 20:54

## 2025-01-30 RX ADMIN — IPRATROPIUM BROMIDE AND ALBUTEROL SULFATE 3 MILLILITER(S): .5; 2.5 SOLUTION RESPIRATORY (INHALATION) at 20:19

## 2025-01-30 RX ADMIN — IPRATROPIUM BROMIDE AND ALBUTEROL SULFATE 3 MILLILITER(S): .5; 2.5 SOLUTION RESPIRATORY (INHALATION) at 10:02

## 2025-01-30 RX ADMIN — Medication 25 MILLIGRAM(S): at 10:22

## 2025-01-30 RX ADMIN — ATORVASTATIN CALCIUM 40 MILLIGRAM(S): 80 TABLET, FILM COATED ORAL at 22:01

## 2025-01-30 RX ADMIN — Medication 250 MILLIGRAM(S): at 05:06

## 2025-01-30 RX ADMIN — PIPERACILLIN SODIUM AND TAZOBACTAM SODIUM 200 GRAM(S): 2; 250 INJECTION, POWDER, FOR SOLUTION INTRAVENOUS at 17:19

## 2025-01-30 RX ADMIN — ACETAMINOPHEN, DIPHENHYDRAMINE HCL, PHENYLEPHRINE HCL 3 MILLIGRAM(S): 325; 25; 5 TABLET ORAL at 22:01

## 2025-01-30 RX ADMIN — Medication 12.5 MILLIGRAM(S): at 17:36

## 2025-01-30 RX ADMIN — Medication 40 MILLIGRAM(S): at 09:28

## 2025-01-30 RX ADMIN — ANTISEPTIC SURGICAL SCRUB 1 APPLICATION(S): 0.04 SOLUTION TOPICAL at 11:39

## 2025-01-30 RX ADMIN — Medication 25 GRAM(S): at 13:37

## 2025-01-30 RX ADMIN — ARGATROBAN 8.71 MICROGRAM(S)/KG/MIN: 50 INJECTION, SOLUTION INTRAVENOUS at 03:38

## 2025-01-30 RX ADMIN — Medication 12.5 MILLIGRAM(S): at 05:06

## 2025-01-30 RX ADMIN — Medication 250 MILLIGRAM(S): at 18:09

## 2025-01-30 RX ADMIN — IPRATROPIUM BROMIDE AND ALBUTEROL SULFATE 3 MILLILITER(S): .5; 2.5 SOLUTION RESPIRATORY (INHALATION) at 12:36

## 2025-01-30 RX ADMIN — ARGATROBAN 9.8 MICROGRAM(S)/KG/MIN: 50 INJECTION, SOLUTION INTRAVENOUS at 09:33

## 2025-01-30 RX ADMIN — IPRATROPIUM BROMIDE AND ALBUTEROL SULFATE 3 MILLILITER(S): .5; 2.5 SOLUTION RESPIRATORY (INHALATION) at 23:09

## 2025-01-30 RX ADMIN — FONDAPARINUX SODIUM 7.5 MILLIGRAM(S): 5 INJECTION, SOLUTION SUBCUTANEOUS at 18:09

## 2025-01-30 RX ADMIN — Medication 2: at 17:32

## 2025-01-30 NOTE — PROGRESS NOTE ADULT - ASSESSMENT
IMPRESSION:    Acute bilateral PE  Acute DVT  probable HIT  Acute hypoxemic resp failure   Thrombocytopenia   HO COPD  HO Tracheal stenosis SP dilation   HO trach   History of lung nodules  History of relapsing polychondritis   HO Emphysema     PLAN:    CNS: No sedation.     HEENT:  Oral care; No interventions.     PULMONARY: On NRB, Wean off Fio2 keep pulse ox 92-96%.  Nebs every 4hours. Thoracic surgery follow up. follow up official PE study read. avoid positive pressure ventilation    CARDIOVASCULAR: keep even to negative fluid balance. trend troponin til peak/fall. Echocardiogram    GI: Speech and swallow follow up. High risk of aspiration. Considering PEG. pt refusing NGT Feeding per speech.    RENAL:  F/u  lytes.  Correct as needed.     INFECTIOUS DISEASE: finish course of cefazolin course. ID follow up.     HEMATOLOGICAL:  argatroban drip. follow up HIT antibodies. serotonin assay     ENDOCRINE:  Follow up FS.  Insulin protocol if needed.    Palliative care follow up   Dispo: SDU     IMPRESSION:    Acute bilateral PE  Acute DVT  probable HIT  Acute hypoxemic resp failure   Thrombocytopenia   HO COPD  HO Tracheal stenosis SP dilation   HO trach   History of lung nodules  History of relapsing polychondritis   HO Emphysema     PLAN:    CNS: No sedation.     HEENT:  Oral care; No interventions.     PULMONARY: On NRB, Wean off Fio2 keep pulse ox 92-96%.  Nebs every 4hours prn for wheezing.. Thoracic surgery follow up. follow up official PE study read. avoid positive pressure ventilation    CARDIOVASCULAR: keep even to negative fluid balance. trend troponin til peak/fall. Echocardiogram    GI: Speech and swallow follow up. High risk of aspiration. Considering PEG. pt refusing NGT Feeding per speech.    RENAL:  F/u  lytes.  Correct as needed.     INFECTIOUS DISEASE: finish course of cefazolin course. ID follow up as needed.     HEMATOLOGICAL:  argatroban drip, follow up PTTs. follow up HIT antibodies. serotonin assay    ENDOCRINE:  Follow up FS.  Insulin protocol if needed.    MSK: bed rest for 24 hours    Palliative care follow up   Dispo: SDU     IMPRESSION:    Acute bilateral PE  Acute DVT  probable HIT  Acute hypoxemic resp failure   Thrombocytopenia   HO COPD  HO Tracheal stenosis SP dilation   HO trach   History of lung nodules  History of relapsing polychondritis   HO Emphysema     PLAN:    CNS: No sedation.     HEENT:  Oral care; No interventions.     PULMONARY: On NRB, Wean off Fio2 keep pulse ox 92-96%.  Nebs every 4hours prn for wheezing.. Thoracic surgery follow up. follow up official PE study read. avoid positive pressure ventilation    CARDIOVASCULAR: keep even to negative fluid balance. trend troponin til peak/fall. Echocardiogram    GI: Speech and swallow follow up. High risk of aspiration. Considering PEG. pt refusing NGT Feeding per speech.    RENAL:  F/u  lytes.  Correct as needed.     INFECTIOUS DISEASE: emperic unasyn for aspiration pneumonia. 2 sets of blood cultures. Full rvp panel. ID follow up as needed.     HEMATOLOGICAL:  argatroban drip, follow up PTTs. follow up HIT antibodies. serotonin assay    ENDOCRINE:  Follow up FS.  Insulin protocol if needed.    MSK: bed rest for 24 hours    Palliative care follow up   Dispo: SDU     IMPRESSION:    Acute bilateral PE  Acute DVT  probable HIT  Acute hypoxemic resp failure   aspiration pneumonia - recurrent  Thrombocytopenia   HO COPD  HO Tracheal stenosis SP dilation   HO trach   History of lung nodules  History of relapsing polychondritis   HO Emphysema     PLAN:    CNS: No sedation.     HEENT:  Oral care; No interventions.     PULMONARY: On NRB, Wean off Fio2 keep pulse ox 92-96%.  Nebs every 4hours prn for wheezing.. Thoracic surgery follow up. follow up official PE study read. avoid positive pressure ventilation    CARDIOVASCULAR: keep even to negative fluid balance. trend troponin til peak/fall. Echocardiogram    GI: Speech and swallow follow up. High risk of aspiration. Considering PEG. pt refusing NGT Feeding per speech.    RENAL:  F/u  lytes.  Correct as needed.     INFECTIOUS DISEASE: emperic unasyn for aspiration pneumonia. 2 sets of blood cultures. Full rvp panel. ID follow up as needed.     HEMATOLOGICAL: d/c argatroban drip, fondaparinux 7.5 mg qd. follow up PTTs. follow up HIT antibodies. serotonin assay    ENDOCRINE:  Follow up FS.  Insulin protocol if needed.    MSK: bed rest for 24 hours    Palliative care follow up   Dispo: SDU     IMPRESSION:    Acute bilateral PE  Acute DVT  HIT - 4T score: high probability of HIT   Acute hypoxemic resp failure   aspiration pneumonia - recurrent  Thrombocytopenia   HO COPD  HO Tracheal stenosis SP dilation   HO trach   History of lung nodules  History of relapsing polychondritis   HO Emphysema     PLAN:    CNS: No sedation. MS at baseline.     HEENT: Oral care; No interventions.     PULMONARY: On facemask now with fio2 50%, Wean off Fio2 keep pulse ox 92-96%; transition to HFNC.  Nebs every 4hours prn for wheezing.. Thoracic surgery follow up for. Check stat ABG. Can use BIPAP if needed.     CARDIOVASCULAR: keep even  fluid balance. Trend trop. BNP noted. Echocardiogram. No evidence of RHS on the CT.     GI: Speech and swallow follow up. High risk of aspiration. Needs NGT but patient has been refusing.     RENAL:  F/u  lytes.  Correct as needed.     INFECTIOUS DISEASE: Pan cultures. Procal. Sputum culture. Zosyn for now. Nasal MRSA negative.     HEMATOLOGICAL: HIT likely. 4T score high. Check HIT panel; Ab and AXEL for confirmation. Therapeutic Findaparinux for now. Duplex noted with DVT; CTA positive for PE; no RHS. Hematology evaluation.     ENDOCRINE:  Follow up FS.  Insulin protocol if needed.    MSK: bed rest for 24 hours.     Palliative care follow up.     Dispo: SDU for now. Prognosis is guarded.

## 2025-01-30 NOTE — PROGRESS NOTE ADULT - SUBJECTIVE AND OBJECTIVE BOX
Patient is a 69y old Female who presents with a chief complaint of shortness of breath, malaise (16 Jan 2025 11:51)    Today is hospital day     Overnight events/Interval History:  - Overnight started on argatroban drip for extensive b/l DVT iso SILVANO  - At bedside, patient feels chest tightness and mid-thoracic back pain. Denies fevers, chills, SOB, abdominal pain, n/v/d/c, leg pain.  - After rounds, patient desatted to 70s, HR 120s, SBP 160s evidence of b/l faint crackles and upper airway stridor with increased work of breathing, placed on NRB and venti with improvement    ROS:  - All ROS is negative unless indicated in HPI    Code Status: dnr/intubate     ALLERGIES:  No Known Drug Allergies  CONTRAST DYS (Nausea)    MEDICATIONS:  STANDING MEDICATIONS  albuterol/ipratropium for Nebulization 3 milliLiter(s) Nebulizer every 4 hours  albuterol/ipratropium for Nebulization 3 milliLiter(s) Nebulizer every 20 minutes  argatroban Infusion 2 MICROgram(s)/kG/Min IV Continuous <Continuous>  atorvastatin 40 milliGRAM(s) Oral at bedtime  ceFAZolin   IVPB      ceFAZolin   IVPB 1000 milliGRAM(s) IV Intermittent every 8 hours  chlorhexidine 2% Cloths 1 Application(s) Topical daily  dextrose 5%. 1000 milliLiter(s) IV Continuous <Continuous>  dextrose 5%. 1000 milliLiter(s) IV Continuous <Continuous>  dextrose 50% Injectable 25 Gram(s) IV Push once  dextrose 50% Injectable 12.5 Gram(s) IV Push once  dextrose 50% Injectable 25 Gram(s) IV Push once  glucagon  Injectable 1 milliGRAM(s) IntraMuscular once  insulin lispro (ADMELOG) corrective regimen sliding scale   SubCutaneous two times a day before meals  melatonin 3 milliGRAM(s) Oral at bedtime  metoprolol tartrate 12.5 milliGRAM(s) Oral every 12 hours  pantoprazole    Tablet 40 milliGRAM(s) Oral before breakfast  saccharomyces boulardii 250 milliGRAM(s) Oral two times a day    PRN MEDICATIONS  acetaminophen     Tablet .. 650 milliGRAM(s) Oral every 6 hours PRN  dextrose Oral Gel 15 Gram(s) Oral once PRN  hydrOXYzine hydrochloride 25 milliGRAM(s) Oral every 12 hours PRN    VITALS LAST 24H:  Vital Signs Last 24 Hrs  T(C): 36.3 (30 Jan 2025 05:00), Max: 36.3 (29 Jan 2025 22:49)  T(F): 97.4 (30 Jan 2025 05:00), Max: 97.4 (29 Jan 2025 22:49)  HR: 94 (30 Jan 2025 05:00) (94 - 101)  BP: 122/83 (30 Jan 2025 05:00) (122/83 - 136/87)  BP(mean): --  RR: 18 (30 Jan 2025 05:00) (18 - 18)  SpO2: 97% (30 Jan 2025 05:00) (97% - 97%)      PHYSICAL EXAM:  GENERAL: NAD, lying in bed comfortably  HEENT:  EOMI, Moist mucous membranes  CHEST/LUNG: b/l faint rales, upper stridor, no chest wall tenderness   HEART: regular rate and rhythm  ABDOMEN: Soft, nontender, nondistended  EXTREMITIES:  No lower extremity edema bilaterally  NERVOUS SYSTEM:  A&Ox3, no focal deficits       LABS:                        13.1   7.90  )-----------( 75       ( 29 Jan 2025 07:03 )             41.1           PTT - ( 30 Jan 2025 07:52 )  PTT:42.8 sec          RADIOLOGY:  CXR      CT                   Patient is a 69y old Female who presents with a chief complaint of shortness of breath, malaise (16 Jan 2025 11:51)    Overnight events/Interval History:  - Overnight started on argatroban drip for extensive b/l DVT iso SILVANO  - At bedside, patient feels chest tightness and mid-thoracic back pain. Denies fevers, chills, SOB, abdominal pain, n/v/d/c, leg pain.  - After rounds, patient desatted to 70s, HR 120s, SBP 160s evidence of b/l faint crackles and upper airway stridor with increased work of breathing, placed on NRB and venti with improvement    ROS:  - All ROS is negative unless indicated in HPI    Code Status: dnr/intubate     ALLERGIES:  No Known Drug Allergies  CONTRAST DYS (Nausea)    MEDICATIONS:  STANDING MEDICATIONS  albuterol/ipratropium for Nebulization 3 milliLiter(s) Nebulizer every 4 hours  albuterol/ipratropium for Nebulization 3 milliLiter(s) Nebulizer every 20 minutes  argatroban Infusion 2 MICROgram(s)/kG/Min IV Continuous <Continuous>  atorvastatin 40 milliGRAM(s) Oral at bedtime  ceFAZolin   IVPB      ceFAZolin   IVPB 1000 milliGRAM(s) IV Intermittent every 8 hours  chlorhexidine 2% Cloths 1 Application(s) Topical daily  dextrose 5%. 1000 milliLiter(s) IV Continuous <Continuous>  dextrose 5%. 1000 milliLiter(s) IV Continuous <Continuous>  dextrose 50% Injectable 25 Gram(s) IV Push once  dextrose 50% Injectable 12.5 Gram(s) IV Push once  dextrose 50% Injectable 25 Gram(s) IV Push once  glucagon  Injectable 1 milliGRAM(s) IntraMuscular once  insulin lispro (ADMELOG) corrective regimen sliding scale   SubCutaneous two times a day before meals  melatonin 3 milliGRAM(s) Oral at bedtime  metoprolol tartrate 12.5 milliGRAM(s) Oral every 12 hours  pantoprazole    Tablet 40 milliGRAM(s) Oral before breakfast  saccharomyces boulardii 250 milliGRAM(s) Oral two times a day    PRN MEDICATIONS  acetaminophen     Tablet .. 650 milliGRAM(s) Oral every 6 hours PRN  dextrose Oral Gel 15 Gram(s) Oral once PRN  hydrOXYzine hydrochloride 25 milliGRAM(s) Oral every 12 hours PRN    VITALS LAST 24H:  Vital Signs Last 24 Hrs  T(C): 36.3 (30 Jan 2025 05:00), Max: 36.3 (29 Jan 2025 22:49)  T(F): 97.4 (30 Jan 2025 05:00), Max: 97.4 (29 Jan 2025 22:49)  HR: 94 (30 Jan 2025 05:00) (94 - 101)  BP: 122/83 (30 Jan 2025 05:00) (122/83 - 136/87)  BP(mean): --  RR: 18 (30 Jan 2025 05:00) (18 - 18)  SpO2: 97% (30 Jan 2025 05:00) (97% - 97%)      PHYSICAL EXAM:  GENERAL: NAD, lying in bed comfortably  HEENT:  EOMI, Moist mucous membranes  CHEST/LUNG: b/l faint rales, upper stridor, no chest wall tenderness   HEART: regular rate and rhythm  ABDOMEN: Soft, nontender, nondistended  EXTREMITIES:  No lower extremity edema bilaterally  NERVOUS SYSTEM:  A&Ox3, no focal deficits       LABS:                        13.1   7.90  )-----------( 75       ( 29 Jan 2025 07:03 )             41.1           PTT - ( 30 Jan 2025 07:52 )  PTT:42.8 sec    RADIOLOGY: reviewed

## 2025-01-30 NOTE — PROGRESS NOTE ADULT - ASSESSMENT
70yo Female with PMH of HTN, HLD, GERD, CAD, COPD not on Home O2, Hx of Pneumothorax, polychondritis, SCC of BOT s/p Trach/PEG (reversed in 4/2021, follows with Dr Louis), s/p CRT (7 sessions in 2021) who presents with SOB for 1 week duration.  Pt was admitted for AHRF 2/2 COPD exacerbation and tracheal stenosis s/p dilation by CT surgery.   Required upgrade to MICU iso ARF 2/2 aspiration, s/p bronch, self extubated 1/17, transitioned to NC in SDU.   SDU Course complicated by severe pharyngeal dysphagia with recurrent aspiration s/p FEES/MBS, after several GOC discussions patient is currently on puree diet with swallow device as patient is refusing NGT and prefers to trial modified diet and outpatient therapy before considering PEG.   On floors pt weaned to RA, was planning dispo however course then complicated by thrombocytpenia, (+)PF4, acute extensive b/l LE DVTs iso HIT, likely type 2 and now with hypoxia and tachycardia concerning for PE     #AHRF r/o PE, possible component of stridor from tracheal stenosis due to increased WOB   #Acute extensive b/l LE DVT iso HIT likely type 2  #Thrombocytopenia 2/2 HIT  - PLT slowly dropping on admission 300s >> 75   - PF4 (+)  - duplex 1/20 (-)  - duplex 1/29 (+) acute right femoral, popliteal, posterior tibial, and peroneal veins; acute DVT in the left popliteal, posteriortibial, and peroneal veins  - currently on Venti mask 31%/6L  - c/w argatroban drip  - f/u CTA PE protocol >> will also see trachea to eval for stenosis (needs premedication)-  - f/u echo  - f/u AXEL  - f/u heme/onc  - daily CBC  - possible upgrade     #Severe oropharyngeal dysphagia w/ recurrent aspiration iso polychondritis w/ tracheal stenosis   - SLP evaluations appreciated: pt has irreversible severe oropharyngeal dysphagia with aspiration of all consistencies, recommending PEG   - Patient refusing NGT, currently wishes to pursue modified diet and will consider PEG if fails   - appreciate palliative and GI recs   - f/u OP ENT, GI  - c/w puree diet with supraglottic swallow device   - aspiration precautions, HOB >45 deg    #AHRF 2/2 aspiration and MSSA PNA - resolved   #Hx polychondritis and tracheal stenosis   #SCC of base of tongue s/p trach/PEG (reversed 2021) and CRT (2021)  #AHRF 2/2 acute on chronic COPD exacerbation - resolved   #Hx lung nodules  - s/p intubation 1/17, self-extubation 1/17, BiPAP/HFNC  - bronch cx (+) MSSA (1.17)  - bronch lavage path with neutrophils and fungal organisms +GMS c/f aspergillus (1.10) > no need for treatment per ID (galactomannan negative, unlikely clinically active)  - currently on NC 3L with BIPAP qhs, wean as tolerated   - s/p steroid course, completed pred taper  - s/p IV cefazolin for MSSA (bronch cx) (end 1/29)   - ID recs appreciated  - PT eval: [facility] vs home PT  - resume cellcept 1000mg BID     #Acute on chronic HFmrEF - stable  #CAD  #HTN  #HLD  - cardio eval appreciated: recommend sglt2 on dc, continue losartan and metoprolol succinate  - TTE: 1/2024: Mildly reduced LV and RV function. IVC > 50% collapsible.   - holding home losartan 100mg qd for now due to soft BP  - holding lasix for now (sp diuresis earlier this admission)  - c/w atorvastatin 40mg qhs   - c/w metoprolol tartrate 12.5mg BID      ---------------------  DVT ppx: heparin  Diet: puree  GI ppx/Bowel regimen: PPI   Activity: IAT   GOC: dnr/intubate  Dispo: pending medical optimization, [facility]  #Summary/Handoff:  - f/u CTA PE, echo, c/w argatroban, heme/onc, monitor vitals, poss upgrade

## 2025-01-30 NOTE — PROGRESS NOTE ADULT - ATTENDING COMMENTS
69F PMH HTN, HLD, GERD, CAD, COPD not on Home O2, Hx of Pneumothorax, polychondritis, SCC of BOT s/p Trach/PEG (reversed in 4/2021, follows with Dr Louis), s/p CRT (7 sessions in 2021) who presents with SOB for 1 week duration.  Pt was admitted for AHRF 2/2 COPD exacerbation and tracheal stenosis s/p dilation by CT surgery.   Required upgrade to MICU iso ARF 2/2 aspiration, s/p bronch, self extubated 1/17, transitioned to NC in SDU.   SDU Course complicated by severe pharyngeal dysphagia with recurrent aspiration s/p FEES/MBS, after several GOC discussions patient is currently on puree diet with swallow device as patient is refusing NGT and prefers to trial modified diet and outpatient therapy before considering PEG.     #Severe Thrombocytopenia   #High Probability of HIT Diagnosis   #Acute Bilateral PEs   #Acute DVTs   #Acute Hypoxic Respiratory Failure   - f/u platelete abs and send seratonin assay / trend cbc for platelete count   - ICU Eval accepted to SDU today   - Hematology consultation - Suspecting HIT     #Immunocompromised due to long term cellcept therapy   #Severe oropharyngeal dysphagia w/ recurrent aspiration iso polychondritis w/ tracheal stenosis   - SLP evaluations appreciated: pt has irreversible severe oropharyngeal dysphagia with aspiration of all consistencies, recommending PEG   - Patient refusing NGT, currently wishes to pursue modified diet and will consider PEG if fails   - appreciate palliative and GI recs   - f/u OP ENT, GI  - c/w puree diet with supraglottic swallow device   - aspiration precautions, HOB >45 deg    #AHRF 2/2 aspiration and MSSA PNA - improved   #Hx polychondritis and tracheal stenosis   #SCC of base of tongue s/p trach/PEG (reversed 2021) and CRT (2021)  #AHRF 2/2 acute on chronic COPD exacerbation - resolved   #Hx lung nodules  - s/p intubation 1/17, self-extubation 1/17, BiPAP/HFNC  - bronch cx (+) MSSA (1.17)  - bronch lavage path with neutrophils and fungal organisms +GMS c/f aspergillus (1.10) > no need for treatment per ID   - currently on NC 3L with BIPAP qhs, wean as tolerated   - s/p steroid course, completed pred taper  - c/w IV cefazolin for MSSA (bronch cx) > dc on keflex thru 1/29  - ID recs appreciated  - PT eval: [facility] vs home PT  - holding home cellcept due to active infection, f/u rheum outpatient for resumption    #Acute on chronic HFmrEF - improving   #CAD  #HTN  #HLD  - cardio eval appreciated: recommend sglt2 on dc, continue losartan and metoprolol succinate  - TTE: 1/2024: Mildly reduced LV and RV function. IVC > 50% collapsible.   - holding home losartan 100mg qd for now due to soft BP  - holding home lasix 40mg qd for now (sp diuresis earlier this admission)  - c/w atorvastatin 40mg qhs   - c/w metoprolol tartrate 12.5mg BID      DVT ppx: heparin  Diet: puree  GI ppx/Bowel regimen: PPI   Activity: IAT   GOC: dnr/intubate  Dispo: pending medical optimization, [facility]    Dispo: Today Transfer to SDU / Will need STR and Insurance Auth when ready for d/c     Time-based billing (NON-critical care).   56 minutes spent on total encounter. The necessity of the time spent during the encounter on this date of service was due to:   direct pt care and interdisciplinary rounds / reviewed chart, labs and imaging and discussed plan w/ team and ICU Team

## 2025-01-30 NOTE — PROGRESS NOTE ADULT - SUBJECTIVE AND OBJECTIVE BOX
Patient is a 69y old  Female who presents with a chief complaint of shortness of breath, malaise (16 Jan 2025 11:51)      Over Night Events:  Patient seen and examined. overnight pt decompensated. now has acute dvt with pe. possibly secondary to hit. on nrb      ROS:  See HPI    PHYSICAL EXAM    ICU Vital Signs Last 24 Hrs  T(C): 36.3 (30 Jan 2025 09:09), Max: 36.3 (29 Jan 2025 22:49)  T(F): 97.3 (30 Jan 2025 09:09), Max: 97.4 (29 Jan 2025 22:49)  HR: 116 (30 Jan 2025 09:09) (94 - 116)  BP: 160/82 (30 Jan 2025 09:09) (122/83 - 160/82)  BP(mean): --  ABP: --  ABP(mean): --  RR: 20 (30 Jan 2025 09:09) (18 - 20)  SpO2: 97% (30 Jan 2025 09:09) (97% - 97%)    O2 Parameters below as of 30 Jan 2025 09:09  Patient On (Oxygen Delivery Method): non rebreather  O2 Flow (L/min): 15          General: ill appearing        Lymph Nodes: NO cervical LN   Lungs: Bilateral BS. tachypenia  Cardiovascular: Regular   Abdomen: Soft, Positive BS  Extremities: No clubbing   Skin: warm   Neurological: a&o x3  Musculoskeletal: move all ext     I&O's Detail    29 Jan 2025 07:01  -  30 Jan 2025 07:00  --------------------------------------------------------  IN:  Total IN: 0 mL    OUT:    Voided (mL): 1000 mL  Total OUT: 1000 mL    Total NET: -1000 mL          LABS:                          12.9   7.49  )-----------( 80       ( 30 Jan 2025 07:52 )             40.7         30 Jan 2025 11:55    138    |  103    |  6      ----------------------------<  145    4.0     |  23     |  0.5      Ca    8.4        30 Jan 2025 11:55  Mg     1.6       30 Jan 2025 11:55    TPro  5.2    /  Alb  3.2    /  TBili  0.6    /  DBili  x      /  AST  34     /  ALT  15     /  AlkPhos  83     30 Jan 2025 11:55  Amylase x     lipase x                                                 PT/INR - ( 30 Jan 2025 11:55 )   PT: 20.10 sec;   INR: 1.69 ratio         PTT - ( 30 Jan 2025 11:55 )  PTT:35.4 sec                                       Urinalysis Basic - ( 30 Jan 2025 11:55 )    Color: x / Appearance: x / SG: x / pH: x  Gluc: 145 mg/dL / Ketone: x  / Bili: x / Urobili: x   Blood: x / Protein: x / Nitrite: x   Leuk Esterase: x / RBC: x / WBC x   Sq Epi: x / Non Sq Epi: x / Bacteria: x                                                                                                                                                     MEDICATIONS  (STANDING):  albuterol/ipratropium for Nebulization 3 milliLiter(s) Nebulizer every 20 minutes  albuterol/ipratropium for Nebulization 3 milliLiter(s) Nebulizer every 4 hours  argatroban Infusion 2.25 MICROgram(s)/kG/Min (9.8 mL/Hr) IV Continuous <Continuous>  atorvastatin 40 milliGRAM(s) Oral at bedtime  chlorhexidine 2% Cloths 1 Application(s) Topical daily  dextrose 5%. 1000 milliLiter(s) (100 mL/Hr) IV Continuous <Continuous>  dextrose 5%. 1000 milliLiter(s) (50 mL/Hr) IV Continuous <Continuous>  dextrose 50% Injectable 25 Gram(s) IV Push once  dextrose 50% Injectable 12.5 Gram(s) IV Push once  dextrose 50% Injectable 25 Gram(s) IV Push once  glucagon  Injectable 1 milliGRAM(s) IntraMuscular once  insulin lispro (ADMELOG) corrective regimen sliding scale   SubCutaneous two times a day before meals  magnesium sulfate  IVPB 2 Gram(s) IV Intermittent once  melatonin 3 milliGRAM(s) Oral at bedtime  metoprolol tartrate 12.5 milliGRAM(s) Oral every 12 hours  mycophenolate mofetil 1000 milliGRAM(s) Oral two times a day  pantoprazole    Tablet 40 milliGRAM(s) Oral before breakfast  saccharomyces boulardii 250 milliGRAM(s) Oral two times a day    MEDICATIONS  (PRN):  acetaminophen     Tablet .. 650 milliGRAM(s) Oral every 6 hours PRN Mild Pain (1 - 3), Moderate Pain (4 - 6)  dextrose Oral Gel 15 Gram(s) Oral once PRN Blood Glucose LESS THAN 70 milliGRAM(s)/deciliter  hydrOXYzine hydrochloride 25 milliGRAM(s) Oral every 12 hours PRN Anxiety

## 2025-01-30 NOTE — CHART NOTE - NSCHARTNOTEFT_GEN_A_CORE
Notified by primary team that patient experiencing stridor and hypoxia s/p bronch w/ tracheal dilation 1/13/25. Patient seen without evidence of stridor, some crackles and decreased breath sounds at bilateral bases. On 8 L via venti mask speaking in full sentences.     CT reviewed with new PEs although area of prior stenosis seems patent without compromise.    No acute surgical intervention  Continue current medical management  AC for PE    [id number]

## 2025-01-30 NOTE — CHART NOTE - NSCHARTNOTEFT_GEN_A_CORE
UPGRADE FROM : 3A  UPGRADE TO : SDU unit       Patient is a 69y old  Female who presents with a chief complaint of shortness of breath, malaise (16 Jan 2025 11:51)      HPI:  68yo Female with PMH of HTN, HLD, GERD, CAD, COPD not on Home O2, Hx of Pneumothorax, polychondritis, SCC of BOT s/p Trach/PEG (reversed in 4/2021, follows with Dr Louis), s/p CRT (7 sessions in 2021) who presents with SOB for 1 week duration.  Pt was admitted for AHRF 2/2 COPD exacerbation and tracheal stenosis s/p dilation by CT surgery.   Required upgrade to MICU iso ARF 2/2 aspiration, s/p bronch, self extubated 1/17, transitioned to NC in SDU.   SDU Course complicated by severe pharyngeal dysphagia with recurrent aspiration s/p FEES/MBS, after several GOC discussions patient is currently on puree diet with swallow device as patient is refusing NGT and prefers to trial modified diet and outpatient therapy before considering PEG.   On floors pt weaned to RA, was planning dispo however course then complicated by thrombocytpenia, (+)PF4, acute extensive b/l LE DVTs iso HIT, likely type 2 and now with hypoxia and tachycardia concerning for PE       UPGRADE  EVENTS:  - Overnight started on argatroban drip for extensive b/l DVT iso SILVANO  - At bedside, patient feels chest tightness and mid-thoracic back pain. Denies fevers, chills, SOB, abdominal pain, n/v/d/c, leg pain.  - After rounds, patient desatted to 70s, HR 120s, SBP 160s evidence of b/l faint crackles and upper airway stridor with increased work of breathing, placed on NRB and venti with improvement        MEDICATIONS:  acetaminophen     Tablet .. 650 milliGRAM(s) Oral every 6 hours PRN  albuterol/ipratropium for Nebulization 3 milliLiter(s) Nebulizer every 20 minutes  albuterol/ipratropium for Nebulization 3 milliLiter(s) Nebulizer every 4 hours  argatroban Infusion 2.25 MICROgram(s)/kG/Min IV Continuous <Continuous>  atorvastatin 40 milliGRAM(s) Oral at bedtime  chlorhexidine 2% Cloths 1 Application(s) Topical daily  dextrose 5%. 1000 milliLiter(s) IV Continuous <Continuous>  dextrose 5%. 1000 milliLiter(s) IV Continuous <Continuous>  dextrose 50% Injectable 25 Gram(s) IV Push once  dextrose 50% Injectable 12.5 Gram(s) IV Push once  dextrose 50% Injectable 25 Gram(s) IV Push once  dextrose Oral Gel 15 Gram(s) Oral once PRN  glucagon  Injectable 1 milliGRAM(s) IntraMuscular once  hydrOXYzine hydrochloride 25 milliGRAM(s) Oral every 12 hours PRN  insulin lispro (ADMELOG) corrective regimen sliding scale   SubCutaneous two times a day before meals  melatonin 3 milliGRAM(s) Oral at bedtime  metoprolol tartrate 12.5 milliGRAM(s) Oral every 12 hours  mycophenolate mofetil 1000 milliGRAM(s) Oral two times a day  pantoprazole    Tablet 40 milliGRAM(s) Oral before breakfast  saccharomyces boulardii 250 milliGRAM(s) Oral two times a day      T(C): 36.3 (01-30-25 @ 09:09), Max: 36.3 (01-29-25 @ 22:49)  HR: 116 (01-30-25 @ 09:09) (94 - 116)  BP: 160/82 (01-30-25 @ 09:09) (122/83 - 160/82)  RR: 20 (01-30-25 @ 09:09) (18 - 20)  SpO2: 97% (01-30-25 @ 09:09) (97% - 97%)  Wt(kg): --Vital Signs Last 24 Hrs  T(C): 36.3 (30 Jan 2025 09:09), Max: 36.3 (29 Jan 2025 22:49)  T(F): 97.3 (30 Jan 2025 09:09), Max: 97.4 (29 Jan 2025 22:49)  HR: 116 (30 Jan 2025 09:09) (94 - 116)  BP: 160/82 (30 Jan 2025 09:09) (122/83 - 160/82)  BP(mean): --  RR: 20 (30 Jan 2025 09:09) (18 - 20)  SpO2: 97% (30 Jan 2025 09:09) (97% - 97%)    Parameters below as of 30 Jan 2025 09:09  Patient On (Oxygen Delivery Method): non rebreather  O2 Flow (L/min): 15      PHYSICAL EXAM:  GENERAL: NAD, well-groomed, well-developed  HEAD:  Atraumatic, Normocephalic  EYES: EOMI, PERRLA, conjunctiva and sclera clear  ENMT:  Moist mucous membranes  NECK: Supple, No JVD,  CHEST/LUNG: Clear to auscultation bilaterally; No rales, rhonchi, wheezing, or rubs  HEART: Regular rate and rhythm; No murmurs, rubs, or gallops  ABDOMEN: Soft, Nontender, Nondistended; Bowel sounds present  NEURO: Alert & Oriented X3  EXTREMITIES: No LE edema, no calf tenderness  LYMPH: No lymphadenopathy noted  SKIN: No rashes or lesions    Consultant(s) Notes Reviewed:  [x ] YES  [ ] NO  Care Discussed with Consultants/Other Providers [ x] YES  [ ] NO    LABS:                        12.9   7.49  )-----------( 80       ( 30 Jan 2025 07:52 )             40.7           PTT - ( 30 Jan 2025 07:52 )  PTT:42.8 sec    CAPILLARY BLOOD GLUCOSE      POCT Blood Glucose.: 136 mg/dL (30 Jan 2025 07:59)  POCT Blood Glucose.: 103 mg/dL (29 Jan 2025 17:19) UPGRADE FROM : 3A  UPGRADE TO : SDU unit   Sign out given to: Dr. Decker    Patient is a 69y old  Female who presents with a chief complaint of shortness of breath, malaise (16 Jan 2025 11:51)    HPI:  68yo Female with PMH of HTN, HLD, GERD, CAD, COPD not on Home O2, Hx of Pneumothorax, polychondritis, SCC of BOT s/p Trach/PEG (reversed in 4/2021, follows with Dr Louis), s/p CRT (7 sessions in 2021) who presents with SOB for 1 week duration.  Pt was admitted for AHRF 2/2 COPD exacerbation and tracheal stenosis s/p dilation by CT surgery.   Required upgrade to MICU iso ARF 2/2 aspiration, s/p bronch, self extubated 1/17, transitioned to NC in SDU.   SDU Course complicated by severe pharyngeal dysphagia with recurrent aspiration s/p FEES/MBS, after several GOC discussions patient is currently on puree diet with swallow device as patient is refusing NGT and prefers to trial modified diet and outpatient therapy before considering PEG.   On floors pt weaned to RA, was planning dispo however course then complicated by thrombocytpenia, (+)PF4, acute extensive b/l LE DVTs iso HIT, likely type 2 and now with hypoxia and tachycardia concerning for PE      UPGRADE  EVENTS:  - Overnight started on argatroban drip for extensive b/l DVT iso SILVANO. After rounds, patient desatted to 70s, HR 120s, SBP 160s, had chest tightness and back pain, exam notable for b/l faint crackles and upper airway stridor with increased work of breathing, placed on NRB and venti with improvement.   - s/p CTA PE protocol (patient received 40mg methylprenisolone and 50mg benadryl prior), wet red looks concerning for b/l PE, alejo appropriate labs (BNP 7900s, D-dimer 2400, INR 1.6), echo done but not read.   - pulm fellow called and approved upgrade for AHRF requiring venti mask 2/2 PE, possible worsening of tracheal stenosis     FOLLOW UP:  [] f/u official read CTA PE protocol  [] f/u TTE   [] f/u 3pm PTT for argatroban drip, ptt q6h, target ptt , adjust by 0.1-0.25 mcg/kg/min, continue q6h until therapeutic x2 then q24h  [] f/u 4pm trop (trend to peak)  [] f/u serotonin release assay  [] f/u heme/onc consult  [] wean O2 as tolerated, monitor for decompensation    ASSESSMENT AND PLAN:  #AHRF r/o PE, possible component of stridor from tracheal stenosis due to increased WOB   #Acute extensive b/l LE DVT iso HIT likely type 2  #Thrombocytopenia 2/2 HIT  - PLT slowly dropping on admission 300s >> 75 > 80  - PF4 (+)  - duplex 1/20 (-)  - duplex 1/29 (+) acute right femoral, popliteal, posterior tibial, and peroneal veins; acute DVT in the left popliteal, posteriortibial, and peroneal veins  - currently on Venti mask 31%/6L  - c/w argatroban drip, needs ptt q6h (next 3pm 1.30), target ptt , adjust by 0.1-0.25 mcg/kg/min, continue q6h until therapeutic x2 then q24h  - f/u CTA PE protocol, echo  - f/u 4pm trop  - f/u AXEL  - f/u heme/onc  - daily CBC    #Severe oropharyngeal dysphagia w/ recurrent aspiration iso polychondritis w/ tracheal stenosis   - SLP evaluations appreciated: pt has irreversible severe oropharyngeal dysphagia with aspiration of all consistencies, recommending PEG   - Patient refusing NGT, currently wishes to pursue modified diet and will consider PEG if fails   - appreciate palliative and GI recs   - f/u OP ENT, GI  - c/w puree diet with supraglottic swallow device   - aspiration precautions, HOB >45 deg    #AHRF 2/2 aspiration and MSSA PNA - resolved   #Hx polychondritis and tracheal stenosis   #SCC of base of tongue s/p trach/PEG (reversed 2021) and CRT (2021)  #AHRF 2/2 acute on chronic COPD exacerbation - resolved   #Hx lung nodules  - s/p intubation 1/17, self-extubation 1/17, BiPAP/HFNC  - bronch cx (+) MSSA (1.17)  - bronch lavage path with neutrophils and fungal organisms +GMS c/f aspergillus (1.10) > no need for treatment per ID (galactomannan negative, unlikely clinically active)  - currently on NC 3L with BIPAP qhs, wean as tolerated   - s/p steroid course, completed pred taper  - s/p IV cefazolin for MSSA (bronch cx) (end 1/29)   - ID recs appreciated  - PT eval: [facility] vs home PT  - resume cellcept 1000mg BID     #Acute on chronic HFmrEF - stable  #CAD  #HTN  #HLD  - cardio eval appreciated: recommend sglt2 on dc, continue losartan and metoprolol succinate  - TTE: 1/2024: Mildly reduced LV and RV function. IVC > 50% collapsible.   - holding home losartan 100mg qd for now due to soft BP  - holding lasix for now (sp diuresis earlier this admission)  - c/w atorvastatin 40mg qhs   - c/w metoprolol tartrate 12.5mg BID      ---------------------  DVT ppx: heparin  Diet: puree  GI ppx/Bowel regimen: PPI   Activity: IAT   GOC: dnr/intubate  Dispo: pending medical optimization, [facility] UPGRADE FROM : 3A  UPGRADE TO : SDU unit   Sign out given to: Dr. Decker    Patient is a 69y old  Female who presents with a chief complaint of shortness of breath, malaise (16 Jan 2025 11:51)    HPI:  70yo Female with PMH of HTN, HLD, GERD, CAD, COPD not on Home O2, Hx of Pneumothorax, polychondritis, SCC of BOT s/p Trach/PEG (reversed in 4/2021, follows with Dr Louis), s/p CRT (7 sessions in 2021) who presents with SOB for 1 week duration.  Pt was admitted for AHRF 2/2 COPD exacerbation and tracheal stenosis s/p dilation by CT surgery.   Required upgrade to MICU iso ARF 2/2 aspiration, s/p bronch, self extubated 1/17, transitioned to NC in SDU.   SDU Course complicated by severe pharyngeal dysphagia with recurrent aspiration s/p FEES/MBS, after several GOC discussions patient is currently on puree diet with swallow device as patient is refusing NGT and prefers to trial modified diet and outpatient therapy before considering PEG.   On floors pt weaned to RA, was planning dispo however course then complicated by thrombocytpenia, (+)PF4, acute extensive b/l LE DVTs iso HIT, likely type 2 and now with hypoxia and tachycardia concerning for PE      UPGRADE  EVENTS:  - Overnight started on argatroban drip for extensive b/l DVT iso SILVANO. After rounds, patient desatted to 70s, HR 120s, SBP 160s, had chest tightness and back pain, exam notable for b/l faint crackles and upper airway stridor with increased work of breathing, placed on NRB and venti with improvement.   - s/p CTA PE protocol with acute nonocculsive PE extending bifurcation of left main PA distally into a few upper and lower lobe segments to subsegmental, small RLL segmental/subsegmental PE, no RH strain; also notable b/l increasing GGO/patchy opacities may be infectious, new nodule  - s/p TTE with mildly reduced RV function (unchanged from 1.11). Hyperdynamic LV EF 70-75% from 40-45% (1.11)  - Labs: trop 73, BNP 7900s, D-dimer 2400s, INR 1.6  - pulm fellow called and approved upgrade for AHRF requiring venti mask 2/2 High risk PE, possible worsening of tracheal stenosis   - CT surgery called for follow up    FOLLOW UP:  [] f/u 3pm PTT for argatroban drip, ptt q6h, target ptt , adjust by 0.1-0.25 mcg/kg/min, continue q6h until therapeutic x2 then q24h  [] f/u 4pm trop (trend to peak)  [] f/u serotonin release assay  [] f/u heme/onc consult  [] f/u CTSx recs  [] wean O2 as tolerated, monitor for decompensation    ASSESSMENT AND PLAN:  #AHRF 2/2 high risk PE, possible component of stridor from tracheal stenosis due to increased WOB   #Acute extensive b/l LE DVT iso HIT likely type 2  #Thrombocytopenia 2/2 HIT  - PLT slowly dropping on admission 300s >> 75 > 80  - PF4 (+)  - duplex 1/20 (-)  - duplex 1/29 (+) acute right femoral, popliteal, posterior tibial, and peroneal veins; acute DVT in the left popliteal, posterior tibial, and peroneal veins  - currently on Venti mask 31%/6L  - c/w argatroban drip, needs ptt q6h (next 3pm 1.30), target ptt , adjust by 0.1-0.25 mcg/kg/min, continue q6h until therapeutic x2 then q24h  - CTA PE protocol with acute nonocculsive PE extending bifurcation of left main PA distally into a few upper and lower lobe segments to subsegmental, small RLL segmental/subsegmental PE, no RH strain; also notable b/l increasing GGO/patchy opacities may be infectious, new nodule  - TTE grossly unchanged from prior, hyperdynamic LV function 70-75% (increased from 40-45%)  - s/p 40 methylprednisolone and 50mg of benadryl for CT PE premedication  - trop 73, BNP 7900s, D-dimer 2400s, INR 1.6  - PESI score 160 pts, high risk  - f/u 4pm trop  - f/u AXEL  - f/u heme/onc  - f/u thoracic  - daily CBC    #Severe oropharyngeal dysphagia w/ recurrent aspiration iso polychondritis w/ tracheal stenosis   - SLP evaluations appreciated: pt has irreversible severe oropharyngeal dysphagia with aspiration of all consistencies, recommending PEG   - Patient refusing NGT, currently wishes to pursue modified diet and will consider PEG if fails, pt accepts risk of aspiration  - appreciate palliative and GI recs   - f/u OP ENT, GI  - c/w puree diet with supraglottic swallow device   - aspiration precautions, HOB >45 deg    #AHRF 2/2 aspiration and MSSA PNA - resolved   #Hx polychondritis and tracheal stenosis   #SCC of base of tongue s/p trach/PEG (reversed 2021) and CRT (2021)  #AHRF 2/2 acute on chronic COPD exacerbation - resolved   #Hx lung nodules  - s/p intubation 1/17, self-extubation 1/17, BiPAP/HFNC  - bronch cx (+) MSSA (1.17)  - bronch lavage path with neutrophils and fungal organisms +GMS c/f aspergillus (1.10) > no need for treatment per ID (galactomannan negative, unlikely clinically active)  - currently on NC 3L with BIPAP qhs, wean as tolerated   - s/p steroid course, completed pred taper  - completed IV cefazolin for MSSA (bronch cx) (end 1/29)   - ID recs appreciated  - PT eval: [facility] vs home PT  - resume cellcept 1000mg BID     #Acute on chronic HFmrEF - stable  #CAD  #HTN  #HLD  - cardio eval appreciated: recommend sglt2 on dc, continue losartan and metoprolol succinate  - TTE: 1/2024: Mildly reduced LV and RV function. IVC > 50% collapsible.   - holding home losartan 100mg qd for now due to soft BP  - holding lasix for now (sp diuresis earlier this admission)  - c/w atorvastatin 40mg qhs   - c/w metoprolol tartrate 12.5mg BID      ---------------------  DVT ppx: heparin  Diet: puree  GI ppx/Bowel regimen: PPI   Activity: IAT   GOC: dnr/intubate  Dispo: pending medical optimization, [facility] UPGRADE FROM : 3A  UPGRADE TO : SDU unit   Sign out given to: Dr. Worthy    Patient is a 69y old  Female who presents with a chief complaint of shortness of breath, malaise (16 Jan 2025 11:51)    HPI:  70yo Female with PMH of HTN, HLD, GERD, CAD, COPD not on Home O2, Hx of Pneumothorax, polychondritis, SCC of BOT s/p Trach/PEG (reversed in 4/2021, follows with Dr Louis), s/p CRT (7 sessions in 2021) who presents with SOB for 1 week duration.  Pt was admitted for AHRF 2/2 COPD exacerbation and tracheal stenosis s/p dilation by CT surgery.   Required upgrade to MICU iso ARF 2/2 aspiration, s/p bronch, self extubated 1/17, transitioned to NC in SDU.   SDU Course complicated by severe pharyngeal dysphagia with recurrent aspiration s/p FEES/MBS, after several GOC discussions patient is currently on puree diet with swallow device as patient is refusing NGT and prefers to trial modified diet and outpatient therapy before considering PEG.   On floors pt weaned to RA, was planning dispo however course then complicated by thrombocytpenia, (+)PF4, acute extensive b/l LE DVTs iso HIT, likely type 2 and now with hypoxia and tachycardia concerning for PE      UPGRADE  EVENTS:  - Overnight started on argatroban drip for extensive b/l DVT iso SILVANO. After rounds, patient desatted to 70s, HR 120s, SBP 160s, had chest tightness and back pain, exam notable for b/l faint crackles and upper airway stridor with increased work of breathing, placed on NRB and venti with improvement.   - s/p CTA PE protocol with acute nonocculsive PE extending bifurcation of left main PA distally into a few upper and lower lobe segments to subsegmental, small RLL segmental/subsegmental PE, no RH strain; also notable b/l increasing GGO/patchy opacities may be infectious, new nodule  - s/p TTE with mildly reduced RV function (unchanged from 1.11). Hyperdynamic LV EF 70-75% from 40-45% (1.11)  - Labs: trop 73, BNP 7900s, D-dimer 2400s, INR 1.6  - pulm fellow called and approved upgrade for AHRF requiring venti mask 2/2 High risk PE, possible worsening of tracheal stenosis   - CT surgery called for follow up    FOLLOW UP:  [] f/u 3pm PTT for argatroban drip, ptt q6h, target ptt , adjust by 0.1-0.25 mcg/kg/min, continue q6h until therapeutic x2 then q24h  [] f/u 4pm trop (trend to peak)  [] f/u serotonin release assay  [] f/u heme/onc consult  [] f/u CTSx recs  [] wean O2 as tolerated, monitor for decompensation    ASSESSMENT AND PLAN:  #AHRF 2/2 high risk PE, possible component of stridor from tracheal stenosis due to increased WOB   #Acute extensive b/l LE DVT iso HIT likely type 2  #Thrombocytopenia 2/2 HIT  - PLT slowly dropping on admission 300s >> 75 > 80  - PF4 (+)  - duplex 1/20 (-)  - duplex 1/29 (+) acute right femoral, popliteal, posterior tibial, and peroneal veins; acute DVT in the left popliteal, posterior tibial, and peroneal veins  - currently on Venti mask 31%/6L  - c/w argatroban drip, needs ptt q6h (next 3pm 1.30), target ptt , adjust by 0.1-0.25 mcg/kg/min, continue q6h until therapeutic x2 then q24h  - CTA PE protocol with acute nonocculsive PE extending bifurcation of left main PA distally into a few upper and lower lobe segments to subsegmental, small RLL segmental/subsegmental PE, no RH strain; also notable b/l increasing GGO/patchy opacities may be infectious, new nodule  - TTE grossly unchanged from prior, hyperdynamic LV function 70-75% (increased from 40-45%)  - s/p 40 methylprednisolone and 50mg of benadryl for CT PE premedication  - trop 73, BNP 7900s, D-dimer 2400s, INR 1.6  - PESI score 160 pts, high risk  - f/u 4pm trop  - f/u AXEL  - f/u heme/onc  - f/u thoracic  - daily CBC    #Severe oropharyngeal dysphagia w/ recurrent aspiration iso polychondritis w/ tracheal stenosis   - SLP evaluations appreciated: pt has irreversible severe oropharyngeal dysphagia with aspiration of all consistencies, recommending PEG   - Patient refusing NGT, currently wishes to pursue modified diet and will consider PEG if fails, pt accepts risk of aspiration  - appreciate palliative and GI recs   - f/u OP ENT, GI  - c/w puree diet with supraglottic swallow device   - aspiration precautions, HOB >45 deg    #AHRF 2/2 aspiration and MSSA PNA - resolved   #Hx polychondritis and tracheal stenosis   #SCC of base of tongue s/p trach/PEG (reversed 2021) and CRT (2021)  #AHRF 2/2 acute on chronic COPD exacerbation - resolved   #Hx lung nodules  - s/p intubation 1/17, self-extubation 1/17, BiPAP/HFNC  - bronch cx (+) MSSA (1.17)  - bronch lavage path with neutrophils and fungal organisms +GMS c/f aspergillus (1.10) > no need for treatment per ID (galactomannan negative, unlikely clinically active)  - currently on NC 3L with BIPAP qhs, wean as tolerated   - s/p steroid course, completed pred taper  - completed IV cefazolin for MSSA (bronch cx) (end 1/29)   - ID recs appreciated  - PT eval: [facility] vs home PT  - resume cellcept 1000mg BID     #Acute on chronic HFmrEF - stable  #CAD  #HTN  #HLD  - cardio eval appreciated: recommend sglt2 on dc, continue losartan and metoprolol succinate  - TTE: 1/2024: Mildly reduced LV and RV function. IVC > 50% collapsible.   - holding home losartan 100mg qd for now due to soft BP  - holding lasix for now (sp diuresis earlier this admission)  - c/w atorvastatin 40mg qhs   - c/w metoprolol tartrate 12.5mg BID      ---------------------  DVT ppx: heparin  Diet: puree  GI ppx/Bowel regimen: PPI   Activity: IAT   GOC: dnr/intubate  Dispo: pending medical optimization, [facility]

## 2025-01-30 NOTE — PROGRESS NOTE ADULT - CRITICAL CARE ATTENDING COMMENT
I have personally seen and examined this patient.    I have reviewed all pertinent clinical information and reviewed all relevant imaging and diagnostic studies personally.   I discussed recommendations with the primary team.
I have personally seen and examined this patient.    I have reviewed all pertinent clinical information and reviewed all relevant imaging and diagnostic studies personally.   I counseled the patient about diagnostic testing and treatment plan. All questions were answered.   I discussed recommendations with the primary team.
This patient is critically ill due to the following:  * Multiple organ failure requiring complex decision-making, and there is a high probability of imminent or life-threatening deterioration in the patient’s condition  * The patient required frequent reassessments and monitoring to ensure response to interventions and therapies.    Critical care time includes time spent evaluating and treating the patient's acute illness as well as time spent reviewing labs, radiology,  and discussing the case with a multidisciplinary team in an effort to prevent further life threatening deterioration or end organ damage. This time is independent of any procedures performed.
IMPRESSION:    Acute bilateral PE  Acute DVT  HIT - 4T score: high probability of HIT   Acute hypoxemic resp failure   aspiration pneumonia - recurrent  Thrombocytopenia   HO COPD  HO Tracheal stenosis SP dilation   HO trach   History of lung nodules  History of relapsing polychondritis   HO Emphysema     Impression and plan above have been altered and are my own.

## 2025-01-31 LAB
ALBUMIN SERPL ELPH-MCNC: 3.3 G/DL — LOW (ref 3.5–5.2)
ALP SERPL-CCNC: 82 U/L — SIGNIFICANT CHANGE UP (ref 30–115)
ALT FLD-CCNC: 15 U/L — SIGNIFICANT CHANGE UP (ref 0–41)
ANION GAP SERPL CALC-SCNC: 13 MMOL/L — SIGNIFICANT CHANGE UP (ref 7–14)
APTT BLD: 33.6 SEC — SIGNIFICANT CHANGE UP (ref 27–39.2)
AST SERPL-CCNC: 25 U/L — SIGNIFICANT CHANGE UP (ref 0–41)
BASOPHILS # BLD AUTO: 0.02 K/UL — SIGNIFICANT CHANGE UP (ref 0–0.2)
BASOPHILS NFR BLD AUTO: 0.2 % — SIGNIFICANT CHANGE UP (ref 0–1)
BILIRUB SERPL-MCNC: 0.5 MG/DL — SIGNIFICANT CHANGE UP (ref 0.2–1.2)
BUN SERPL-MCNC: 8 MG/DL — LOW (ref 10–20)
CALCIUM SERPL-MCNC: 8.3 MG/DL — LOW (ref 8.4–10.5)
CHLORIDE SERPL-SCNC: 103 MMOL/L — SIGNIFICANT CHANGE UP (ref 98–110)
CO2 SERPL-SCNC: 22 MMOL/L — SIGNIFICANT CHANGE UP (ref 17–32)
CREAT SERPL-MCNC: 0.6 MG/DL — LOW (ref 0.7–1.5)
EGFR: 96 ML/MIN/1.73M2 — SIGNIFICANT CHANGE UP
EOSINOPHIL # BLD AUTO: 0.01 K/UL — SIGNIFICANT CHANGE UP (ref 0–0.7)
EOSINOPHIL NFR BLD AUTO: 0.1 % — SIGNIFICANT CHANGE UP (ref 0–8)
GLUCOSE BLDC GLUCOMTR-MCNC: 126 MG/DL — HIGH (ref 70–99)
GLUCOSE BLDC GLUCOMTR-MCNC: 157 MG/DL — HIGH (ref 70–99)
GLUCOSE BLDC GLUCOMTR-MCNC: 169 MG/DL — HIGH (ref 70–99)
GLUCOSE SERPL-MCNC: 146 MG/DL — HIGH (ref 70–99)
HCT VFR BLD CALC: 42.8 % — SIGNIFICANT CHANGE UP (ref 37–47)
HGB BLD-MCNC: 13.5 G/DL — SIGNIFICANT CHANGE UP (ref 12–16)
IMM GRANULOCYTES NFR BLD AUTO: 0.4 % — HIGH (ref 0.1–0.3)
INR BLD: 1.16 RATIO — SIGNIFICANT CHANGE UP (ref 0.65–1.3)
LYMPHOCYTES # BLD AUTO: 0.44 K/UL — LOW (ref 1.2–3.4)
LYMPHOCYTES # BLD AUTO: 4.6 % — LOW (ref 20.5–51.1)
MAGNESIUM SERPL-MCNC: 2.1 MG/DL — SIGNIFICANT CHANGE UP (ref 1.8–2.4)
MCHC RBC-ENTMCNC: 28.8 PG — SIGNIFICANT CHANGE UP (ref 27–31)
MCHC RBC-ENTMCNC: 31.5 G/DL — LOW (ref 32–37)
MCV RBC AUTO: 91.5 FL — SIGNIFICANT CHANGE UP (ref 81–99)
MONOCYTES # BLD AUTO: 0.49 K/UL — SIGNIFICANT CHANGE UP (ref 0.1–0.6)
MONOCYTES NFR BLD AUTO: 5.2 % — SIGNIFICANT CHANGE UP (ref 1.7–9.3)
NEUTROPHILS # BLD AUTO: 8.51 K/UL — HIGH (ref 1.4–6.5)
NEUTROPHILS NFR BLD AUTO: 89.5 % — HIGH (ref 42.2–75.2)
NRBC # BLD: 0 /100 WBCS — SIGNIFICANT CHANGE UP (ref 0–0)
NRBC BLD-RTO: 0 /100 WBCS — SIGNIFICANT CHANGE UP (ref 0–0)
PLATELET # BLD AUTO: 108 K/UL — LOW (ref 130–400)
PMV BLD: 10.4 FL — SIGNIFICANT CHANGE UP (ref 7.4–10.4)
POTASSIUM SERPL-MCNC: 4.3 MMOL/L — SIGNIFICANT CHANGE UP (ref 3.5–5)
POTASSIUM SERPL-SCNC: 4.3 MMOL/L — SIGNIFICANT CHANGE UP (ref 3.5–5)
PROCALCITONIN SERPL-MCNC: 0.1 NG/ML — SIGNIFICANT CHANGE UP (ref 0.02–0.1)
PROT SERPL-MCNC: 5.1 G/DL — LOW (ref 6–8)
PROTHROM AB SERPL-ACNC: 13.7 SEC — HIGH (ref 9.95–12.87)
RBC # BLD: 4.68 M/UL — SIGNIFICANT CHANGE UP (ref 4.2–5.4)
RBC # FLD: 15.6 % — HIGH (ref 11.5–14.5)
SODIUM SERPL-SCNC: 138 MMOL/L — SIGNIFICANT CHANGE UP (ref 135–146)
UNFRACTIONATED HEPARIN INTERPRETATION: SIGNIFICANT CHANGE UP
UNFRACTIONATED HEPARIN RESULT: NEGATIVE — SIGNIFICANT CHANGE UP
UNFRACTIONATED HEPARIN-HIGH DOSE: 0 % — SIGNIFICANT CHANGE UP
UNFRACTIONATED HEPARIN-LOW DOSE: 0 % — SIGNIFICANT CHANGE UP
WBC # BLD: 9.51 K/UL — SIGNIFICANT CHANGE UP (ref 4.8–10.8)
WBC # FLD AUTO: 9.51 K/UL — SIGNIFICANT CHANGE UP (ref 4.8–10.8)

## 2025-01-31 PROCEDURE — 71045 X-RAY EXAM CHEST 1 VIEW: CPT | Mod: 26

## 2025-01-31 PROCEDURE — 99233 SBSQ HOSP IP/OBS HIGH 50: CPT

## 2025-01-31 PROCEDURE — 99223 1ST HOSP IP/OBS HIGH 75: CPT

## 2025-01-31 RX ADMIN — PIPERACILLIN SODIUM AND TAZOBACTAM SODIUM 25 GRAM(S): 2; 250 INJECTION, POWDER, FOR SOLUTION INTRAVENOUS at 11:28

## 2025-01-31 RX ADMIN — PIPERACILLIN SODIUM AND TAZOBACTAM SODIUM 25 GRAM(S): 2; 250 INJECTION, POWDER, FOR SOLUTION INTRAVENOUS at 22:26

## 2025-01-31 RX ADMIN — IPRATROPIUM BROMIDE AND ALBUTEROL SULFATE 3 MILLILITER(S): .5; 2.5 SOLUTION RESPIRATORY (INHALATION) at 14:09

## 2025-01-31 RX ADMIN — PANTOPRAZOLE 40 MILLIGRAM(S): 20 TABLET, DELAYED RELEASE ORAL at 06:04

## 2025-01-31 RX ADMIN — IPRATROPIUM BROMIDE AND ALBUTEROL SULFATE 3 MILLILITER(S): .5; 2.5 SOLUTION RESPIRATORY (INHALATION) at 23:17

## 2025-01-31 RX ADMIN — Medication 12.5 MILLIGRAM(S): at 06:04

## 2025-01-31 RX ADMIN — IPRATROPIUM BROMIDE AND ALBUTEROL SULFATE 3 MILLILITER(S): .5; 2.5 SOLUTION RESPIRATORY (INHALATION) at 20:01

## 2025-01-31 RX ADMIN — Medication 250 MILLIGRAM(S): at 17:28

## 2025-01-31 RX ADMIN — ACETAMINOPHEN, DIPHENHYDRAMINE HCL, PHENYLEPHRINE HCL 3 MILLIGRAM(S): 325; 25; 5 TABLET ORAL at 22:27

## 2025-01-31 RX ADMIN — PIPERACILLIN SODIUM AND TAZOBACTAM SODIUM 25 GRAM(S): 2; 250 INJECTION, POWDER, FOR SOLUTION INTRAVENOUS at 06:03

## 2025-01-31 RX ADMIN — IPRATROPIUM BROMIDE AND ALBUTEROL SULFATE 3 MILLILITER(S): .5; 2.5 SOLUTION RESPIRATORY (INHALATION) at 08:27

## 2025-01-31 RX ADMIN — MYCOPHENOLATE MOFETIL 1000 MILLIGRAM(S): 200 POWDER, FOR SUSPENSION ORAL at 06:04

## 2025-01-31 RX ADMIN — Medication 2: at 18:22

## 2025-01-31 RX ADMIN — IPRATROPIUM BROMIDE AND ALBUTEROL SULFATE 3 MILLILITER(S): .5; 2.5 SOLUTION RESPIRATORY (INHALATION) at 17:20

## 2025-01-31 RX ADMIN — Medication 12.5 MILLIGRAM(S): at 17:28

## 2025-01-31 RX ADMIN — MYCOPHENOLATE MOFETIL 1000 MILLIGRAM(S): 200 POWDER, FOR SUSPENSION ORAL at 17:28

## 2025-01-31 RX ADMIN — PIPERACILLIN SODIUM AND TAZOBACTAM SODIUM 25 GRAM(S): 2; 250 INJECTION, POWDER, FOR SOLUTION INTRAVENOUS at 01:06

## 2025-01-31 RX ADMIN — PIPERACILLIN SODIUM AND TAZOBACTAM SODIUM 25 GRAM(S): 2; 250 INJECTION, POWDER, FOR SOLUTION INTRAVENOUS at 17:27

## 2025-01-31 RX ADMIN — ATORVASTATIN CALCIUM 40 MILLIGRAM(S): 80 TABLET, FILM COATED ORAL at 22:27

## 2025-01-31 RX ADMIN — Medication 250 MILLIGRAM(S): at 06:04

## 2025-01-31 RX ADMIN — ANTISEPTIC SURGICAL SCRUB 1 APPLICATION(S): 0.04 SOLUTION TOPICAL at 11:35

## 2025-01-31 RX ADMIN — FONDAPARINUX SODIUM 7.5 MILLIGRAM(S): 5 INJECTION, SOLUTION SUBCUTANEOUS at 11:28

## 2025-01-31 NOTE — CONSULT NOTE ADULT - CONSULT REQUESTED DATE/TIME
13-Jan-2025 06:17
13-Jan-2025 09:52
31-Jan-2025 09:49
10-Zeyad-2025 11:24
12-Jan-2025 12:29
15-Zeyad-2025 11:47
22-Jan-2025 18:44
21-Jan-2025 11:41

## 2025-01-31 NOTE — PROGRESS NOTE ADULT - ASSESSMENT
ASSESSMENT  A 68yo Female with PMH of HTN, HLD, GERD, CAD, COPD not on Home O2, Hx of Pneumothorax, polychondirits, SCC of BOT s/p Trach/PEG (reversed in 4/2021), s/p CRT (7 sessions in 2021) who presents with SOB for 1 week duration.    IMPRESSION  #SCC of BOT s/p Trach/PEG (reversed 2021) s/p CRT  #Dyspnea/Acute Hypoxemic respiratory failure  #Tracheal Stenosis  - CT Neck Soft Tissue w/ IV Cont (01.11.25 @ 08:27): Interval development of focal moderate tracheal stenosis at the level of  the thyroid with partially collapsed appearance of the right tracheal  wall since the prior CT neck from 9/3/2024, possibly associated with the  history of prior tracheostomy. Stable mild thickening of the epiglottis and small preepiglottic soft  tissue density. Resolved nasopharyngeal soft tissue thickening and left  aryepiglottic fold thickening. Increased biapical reticular changes. Please see separately dictated  concurrent chest CT.  - s/p bronch 1/14 -- tracheal dilation with excision of legion causing tracheal stenosis     #Elevated Fungitell  - Bronch 1/10 cytology with no malignant cells found, but Aspergillus identified on GMS stain -- unclear significance  - Aspergillus Galactomannan Antigen: 0.04 (01.24.25 @ 06:52)  - CT Angio Chest PE Protocol w/ IV Cont (01.30.25 @ 11:23): Nonocclusive pulmonary embolus extending bifurcation of the left main  pulmonary artery distally into a few upper and lower lobe segments to  subsegmental. Small right lower lobe segmental subsegmental PE. No CT  evidence of acute right heart strain.  Small left pleural effusion with adjacent consolidation/atelectasis.  Bilateral patchy and groundglass opacities increased since prior  examination. Findings maybe infectious in nature. Correlate with  symptoms. Follow-up CT in 1-3 months is recommended giventhe high risk  background lungs. 7 mm subpleural solid nodule in the right lower lobe on series 7 image  161. This is new since 2024. Attention on follow-up is recommended.  /   #CAD  #COPD    #Obesity BMI (kg/m2): 29.2  #DM  #Abx allergy: No Known Drug Allergies        RECOMMENDATIONS  - CTA with new PE/Extensive DVT -- CT Chest reviewed with increased ground glass opacities  - unclear etiology of increased ground glass opacities with relation to aspergillus seen on cytology from 1/10; - fungitell possible false positive; does not have clear risk factors for invasive aspergillosis   -- please repeat aspergillus galactomannan -- to further evaluate, would ideally need bronch with fungal Cx and Bronch aspergillus galactomannan, PJP PCR  -- check urine histoplasma antigen and serum cryptococcal antigen   - anticoagulation by primary   - continue zosyn for now   -- currently with dry cough, but if able to produce sputum can send for PJP PCR; if O2 requirements continue to improve, unlikely to be PJP     Please call or message on Microsoft Teams if with any questions.  Spectra [phone]

## 2025-01-31 NOTE — PROGRESS NOTE ADULT - SUBJECTIVE AND OBJECTIVE BOX
GEREMIAS FULLER  69y, Female  Allergy: heparin (Other (Severe))  CONTRAST DYS (Nausea)      LOS  21d    CHIEF COMPLAINT: SOB (31 Jan 2025 06:55)      INTERVAL EVENTS/HPI  - No acute events overnight  - T(F): , Max: 97.8 (01-31-25 @ 04:00)  - desaturated 1/30  -- found to have extensive DVT - CTA with new PE   - was on venturi mask -- on NC when seen   - WBC Count: 9.51 (01-31-25 @ 04:42)  WBC Count: 7.49 (01-30-25 @ 07:52)     - Creatinine: 0.6 (01-31-25 @ 04:42)  Creatinine: 0.5 (01-30-25 @ 11:55)       ROS  General: Denies rigors, nightsweats  HEENT: Denies headache, rhinorrhea, sore throat, eye pain  CV: Denies CP, palpitations  PULM: Denies wheezing, hemoptysis  GI: Denies hematemesis, hematochezia, melena  : Denies discharge, hematuria  MSK: Denies arthralgias, myalgias  SKIN: Denies rash, lesions  NEURO: Denies paresthesias, weakness  PSYCH: Denies depression, anxiety    VITALS:  T(F): 97.4, Max: 97.8 (01-31-25 @ 04:00)  HR: 83  BP: 141/77  RR: 18Vital Signs Last 24 Hrs  T(C): 36.3 (31 Jan 2025 08:00), Max: 36.6 (31 Jan 2025 04:00)  T(F): 97.4 (31 Jan 2025 08:00), Max: 97.8 (31 Jan 2025 04:00)  HR: 83 (31 Jan 2025 08:00) (75 - 116)  BP: 141/77 (31 Jan 2025 08:00) (90/64 - 160/82)  BP(mean): 101 (31 Jan 2025 08:00) (73 - 102)  RR: 18 (31 Jan 2025 04:00) (18 - 20)  SpO2: 98% (31 Jan 2025 08:00) (96% - 100%)    Parameters below as of 31 Jan 2025 04:00  Patient On (Oxygen Delivery Method): mask, Venturi  O2 Flow (L/min): 6      PHYSICAL EXAM:  Gen: NAD, resting in bed  HEENT: Normocephalic, atraumatic  Neck: supple, no lymphadenopathy  CV: Regular rate & regular rhythm  Lungs: decreased BS at bases, no fremitus  Abdomen: Soft, BS present  Ext: Warm, well perfused  Neuro: non focal, awake  Skin: no rash, no erythema  Lines: no phlebitis    FH: Non-contributory  Social Hx: Non-contributory    TESTS & MEASUREMENTS:                        13.5   9.51  )-----------( 108      ( 31 Jan 2025 04:42 )             42.8     01-31    138  |  103  |  8[L]  ----------------------------<  146[H]  4.3   |  22  |  0.6[L]    Ca    8.3[L]      31 Jan 2025 04:42  Mg     2.1     01-31    TPro  5.1[L]  /  Alb  3.3[L]  /  TBili  0.5  /  DBili  x   /  AST  25  /  ALT  15  /  AlkPhos  82  01-31      LIVER FUNCTIONS - ( 31 Jan 2025 04:42 )  Alb: 3.3 g/dL / Pro: 5.1 g/dL / ALK PHOS: 82 U/L / ALT: 15 U/L / AST: 25 U/L / GGT: x           Urinalysis Basic - ( 31 Jan 2025 04:42 )    Color: x / Appearance: x / SG: x / pH: x  Gluc: 146 mg/dL / Ketone: x  / Bili: x / Urobili: x   Blood: x / Protein: x / Nitrite: x   Leuk Esterase: x / RBC: x / WBC x   Sq Epi: x / Non Sq Epi: x / Bacteria: x        Culture - Fungal, Bronchial (collected 01-17-25 @ 11:37)  Source: BAL  Preliminary Report (01-19-25 @ 11:30):    Few Yeast    Culture - Acid Fast - Bronchial w/Smear (collected 01-17-25 @ 11:37)  Source: BAL  Preliminary Report (01-25-25 @ 23:06):    No acid-fast bacilli isolated at 1 week. ****Acid-fast cultures are held    for 6 weeks.****    Culture - Bronchial (collected 01-17-25 @ 11:37)  Source: Lavage  Gram Stain (01-17-25 @ 23:40):    Few polymorphonuclear leukocytes per low power field    Rare Squamous epithelial cells per low power field    Moderate Gram Positive Cocci in Clusters per oil power field  Final Report (01-19-25 @ 16:13):    Moderate Staphylococcus aureus    Commensal kaushik consistent with body site  Organism: Staphylococcus aureus (01-19-25 @ 16:13)  Organism: Staphylococcus aureus (01-19-25 @ 16:13)      Method Type: ALONZO      -  Clindamycin: R <=0.25 This isolate is presumed to be clindamycin resistant based on detection of inducible resistance. Clindamycin may still be effective in some patients.      -  Erythromycin: R >4      -  Gentamicin: S <=4 Should not be used as monotherapy      -  Oxacillin: S <=0.25 Oxacillin predicts susceptibility for dicloxacillin, methicillin, and nafcillin      -  Penicillin: R 2      -  Rifampin: S <=1 Should not be used as monotherapy      -  Tetracycline: S <=4      -  Trimethoprim/Sulfamethoxazole: S <=0.5/9.5      -  Vancomycin: S 1    Culture - Blood (collected 01-14-25 @ 18:22)  Source: .Blood BLOOD  Final Report (01-19-25 @ 23:00):    No growth at 5 days    Culture - Blood (collected 01-14-25 @ 18:22)  Source: .Blood BLOOD  Final Report (01-19-25 @ 23:00):    No growth at 5 days            INFECTIOUS DISEASES TESTING  Aspergillus Galactomannan Antigen: 0.04 (01-24-25 @ 06:52)  Fungitell: >500 (01-17-25 @ 17:57)  Procalcitonin: 0.10 (01-17-25 @ 12:49)  MRSA PCR Result.: Negative (01-17-25 @ 12:08)  MRSA PCR Result.: Negative (01-17-25 @ 07:20)  MRSA PCR Result.: Negative (01-13-25 @ 12:40)  Procalcitonin: 0.08 (01-10-25 @ 21:09)  Fungitell: >500 (01-10-25 @ 21:09)      INFLAMMATORY MARKERS      RADIOLOGY & ADDITIONAL TESTS:  I have personally reviewed the last available Chest xray  CXR      CT  CT Angio Chest PE Protocol w/ IV Cont:   ACC: [id number] EXAM:  [facility]   ORDERED BY: MERA CAMARENA     PROCEDURE DATE:  01/30/2025          INTERPRETATION:  CLINICAL STATEMENT: ro pe    TECHNIQUE: Multislice helical sections were obtained from the thoracic   inlet to the lung bases during rapid administration of intravenous   contrast using a CTA protocol. Thin sections were reconstructed through   the pulmonary vasculature. Sagittal and coronal reformatted images were   acquired, as well as MIP reconstructedimages.    COMPARISON: CT CHEST 9/3/2024, CT CHEST 1/24/2025.    CONTRAST VOLUME: Omnipaque 350. 80 cc administered. 20 cc discarded.    FINDINGS:    Pulmonary Embolus: Nonocclusive pulmonary embolus extending bifurcation   of the left main pulmonaryartery distally into a few upper and lower   lobe segments to subsegmental. Small right lower lobe segmental   subsegmental PE.    Mediastinum/Lymph Nodes: No lymphadenopathy by size criteria. Small   hiatal hernia.    Heart/Great Vessels: No CT evidence of acute right heart strain. No   pericardial effusion. Normal caliber aorta. Dilated main pulmonary   measuring less than 3.5 cm without significant change.    Abdomen: Limited evaluation based on technique. No acute inflammatory   changes. Cholelithiasis.    Lungs, Pleura, and Airways: Upper tracheal secretions. Emphysema and   bilateral fibrotic changes. Unchanged right middle lobe atelectasis.   Small left pleural effusion with adjacent consolidation/atelectasis.   Bilateral patchy and groundglass opacities increased since prior   examination. Findings maybe infectious in nature. Correlate with   symptoms. Follow-up CT in 1-3 months is recommended given the high risk   background lungs.  7 mm subpleural solid nodule in the right lowerlobe on series 7 image   161. This is new since 2024.    Bones and soft tissues: No aggressive osseous lesion. Osteopenia.   Degenerative changes of the spine. Multilevel compression deformities   without change.      IMPRESSION:    Nonocclusive pulmonary embolus extending bifurcation of the left main   pulmonary artery distally into a few upper and lower lobe segments to   subsegmental. Small right lower lobe segmental subsegmental PE. No CT   evidence of acute right heart strain.    Small left pleural effusion with adjacent consolidation/atelectasis.   Bilateral patchy and groundglass opacities increased since prior   examination. Findings maybe infectious in nature. Correlate with   symptoms. Follow-up CT in 1-3 months is recommended giventhe high risk   background lungs.    7 mm subpleural solid nodule in the right lower lobe on series 7 image   161. This is new since 2024. Attention on follow-up is recommended.    --- End of Report ---            HAIM GONZALEZ MD; Attending Radiologist  This document has been electronically signed. Jan 30 2025  1:36PM (01-30-25 @ 11:23)      CARDIOLOGY TESTING  12 Lead ECG:   Ventricular Rate 101 BPM    Atrial Rate 101 BPM    P-R Interval 148 ms    QRS Duration 80 ms    Q-T Interval 300 ms    QTC Calculation(Bazett) 389 ms    P Axis 48 degrees    R Axis 8 degrees    T Axis 159 degrees    Diagnosis Line Sinus tachycardia  Minimal voltage criteria for LVH, may be normal variant ( R in aVL )  Inferior infarct , age undetermined  ST and T wave abnormality, consider lateral ischemia  Abnormal ECG    Confirmed by Alli Phillips ([id number]) on 1/28/2025 2:52:22 PM (01-28-25 @ 09:01)  12 Lead ECG:   Ventricular Rate 112 BPM    Atrial Rate 112 BPM    P-R Interval 112 ms    QRS Duration 74 ms    Q-T Interval 336 ms    QTC Calculation(Bazett) 458 ms    P Axis 58 degrees    R Axis 3 degrees    T Axis 144 degrees    Diagnosis Line Sinus tachycardiawith Premature supraventricular complexes  ST & T wave abnormality, consider lateral ischemia  Abnormal ECG    Confirmed by ULYSSES HUI MD ([id number]) on 1/28/2025 11:01:41 AM (01-27-25 @ 09:00)      MEDICATIONS  albuterol/ipratropium for Nebulization 3 Nebulizer every 20 minutes  albuterol/ipratropium for Nebulization 3 Nebulizer every 4 hours  atorvastatin 40 Oral at bedtime  chlorhexidine 2% Cloths 1 Topical daily  dextrose 5%. 1000 IV Continuous <Continuous>  dextrose 5%. 1000 IV Continuous <Continuous>  dextrose 50% Injectable 25 IV Push once  dextrose 50% Injectable 12.5 IV Push once  dextrose 50% Injectable 25 IV Push once  fondaparinux Injectable 7.5 SubCutaneous daily  glucagon  Injectable 1 IntraMuscular once  insulin lispro (ADMELOG) corrective regimen sliding scale  SubCutaneous two times a day before meals  melatonin 3 Oral at bedtime  metoprolol tartrate 12.5 Oral every 12 hours  mycophenolate mofetil 1000 Oral two times a day  pantoprazole    Tablet 40 Oral before breakfast  piperacillin/tazobactam IVPB.. 3.375 IV Intermittent every 6 hours  saccharomyces boulardii 250 Oral two times a day      WEIGHT  Weight (kg): 72.6 (01-17-25 @ 05:00)  Creatinine: 0.6 mg/dL (01-31-25 @ 04:42)  Creatinine: 0.5 mg/dL (01-30-25 @ 11:55)      ANTIBIOTICS:  piperacillin/tazobactam IVPB.. 3.375 Gram(s) IV Intermittent every 6 hours      All available historical records have been reviewed

## 2025-01-31 NOTE — PROGRESS NOTE ADULT - ATTENDING COMMENTS
#Acute hypoxic resp failure  due to acute PE, tracheal stenosis  initial ct neck with tracheal stenosis, collapse  s/p OR with bronch, tracheal dilation by cts  s/p intubation, since extubated  cta chest now with L main pe, subsegmental/ segmental; R subsegmental; bl ggo  tte with mild rv dysfunction, enlargement  va duplex diffuse bl dvts  HIT ab positive; f/u fabiola  fondaparinux  initial bal path with aspergillosis; fungitell elevated  bal 1/17 yeast  repeat galactomanin, cryptococcus, histoplasmosis; sputum pcp if able  nc to keep spo2 >92    #Progress Note Handoff  Pending (specify): fabiola, fondaparinux  Family discussion:   Disposition: snf

## 2025-01-31 NOTE — PROGRESS NOTE ADULT - ASSESSMENT
68yo Female with PMH of HTN, HLD, GERD, CAD, COPD not on Home O2, Hx of Pneumothorax, polychondritis, SCC of BOT s/p Trach/PEG (reversed in 4/2021, follows with Dr Louis), s/p CRT (7 sessions in 2021) who presents with SOB for 1 week duration.  Pt was admitted for AHRF 2/2 COPD exacerbation and tracheal stenosis s/p dilation by CT surgery. Required upgrade to MICU iso ARF 2/2 aspiration, s/p bronch, self extubated 1/17, transitioned to NC in SDU.   SDU Course complicated by severe pharyngeal dysphagia with recurrent aspiration s/p FEES/MBS, after several GOC discussions patient is currently on puree diet with swallow device as patient is refusing NGT and prefers to trial modified diet and outpatient therapy before considering PEG. On floors pt weaned to RA, was planning dispo however course then complicated by thrombocytpenia, (+)PF4, acute extensive b/l LE DVTs iso HIT, likely type 2 and now with hypoxia and tachycardia concerning for PE. s/p CTA PE protocol with acute nonocculsive PE extending bifurcation of left main PA distally into a few upper and lower lobe segments to subsegmental, small RLL segmental/subsegmental PE, no RH strain; also notable b/l increasing GGO/patchy opacities may be infectious, new nodule. Currently in SDU for PE and DVTs, started on therapeutic fondaparinux     ASSESSMENT AND PLAN:  #AHRF 2/2 high risk PE, possible component of stridor from tracheal stenosis due to increased WOB   #Acute extensive b/l LE DVT iso HIT likely type 2  #Thrombocytopenia 2/2 HIT  - PLT slowly dropping on admission 300s >> 75 > 80 > 108  - PF4 (+)  - duplex 1/20 (-)  - duplex 1/29 (+) acute right femoral, popliteal, posterior tibial, and peroneal veins; acute DVT in the left popliteal, posterior tibial, and peroneal veins  - currently on Venti mask 31%/6L - down to NC 4-5 L  - C/W fondaparinaux  - CTA PE protocol with acute nonocculsive PE extending bifurcation of left main PA distally into a few upper and lower lobe segments to subsegmental, small RLL segmental/subsegmental PE, no RH strain; also notable b/l increasing GGO/patchy opacities may be infectious, new nodule  - repeat TTE grossly unchanged from prior, hyperdynamic LV function 60-65%  - f/u heme/onc  - daily CBC    #Severe oropharyngeal dysphagia w/ recurrent aspiration iso polychondritis w/ tracheal stenosis   - SLP evaluations appreciated: pt has irreversible severe oropharyngeal dysphagia with aspiration of all consistencies, recommending PEG   - Patient refusing NGT, currently wishes to pursue modified diet and will consider PEG if fails, pt accepts risk of aspiration  - appreciate palliative and GI recs   - f/u OP ENT, GI  - c/w puree diet with supraglottic swallow device   - aspiration precautions, HOB >45 deg    #AHRF 2/2 aspiration and MSSA PNA - resolved   #Hx polychondritis and tracheal stenosis   #SCC of base of tongue s/p trach/PEG (reversed 2021) and CRT (2021)  #AHRF 2/2 acute on chronic COPD exacerbation - resolved   #Hx lung nodules  - s/p intubation 1/17, self-extubation 1/17, BiPAP/HFNC  - bronch cx (+) MSSA (1.17)  - bronch lavage path with neutrophils and fungal organisms +GMS c/f aspergillus (1.10) > no need for treatment per ID (galactomannan negative, unlikely clinically active)  - s/p steroid course, completed pred taper  - completed IV cefazolin for MSSA (bronch cx) (end 1/29)   - ID recs appreciated  - PT eval: [facility] vs home PT  - resumed cellcept 1000mg BID   - ID eval : - unclear etiology of increased ground glass opacities with relation to aspergillus seen on cytology from 1/10; - fungitell possible false positive; does not have clear risk factors for invasive aspergillosis   - F/U repeat aspergillus galactomannan, check urine histoplasma antigen and serum cryptococcal antigen      #Acute on chronic HFmrEF - stable  #CAD  #HTN  #HLD  - cardio eval appreciated: recommend sglt2 on dc, continue losartan and metoprolol succinate  - holding home losartan 100mg qd for now due to soft BP  - holding lasix for now (sp diuresis earlier this admission)  - c/w atorvastatin 40mg qhs   - c/w metoprolol tartrate 12.5mg BID      DVT ppx: heparin  Diet: puree  GI ppx/Bowel regimen: PPI   Activity: IAT   GOC: dnr/intubate

## 2025-01-31 NOTE — CONSULT NOTE ADULT - CONSULT REASON
Elevated Fungitell
hoarseness
SOB
Low EF
G tube placement
Tracheal Stenosis
Thrombocytopenia/HIT
Goals of care and symptom management as needed

## 2025-01-31 NOTE — PROGRESS NOTE ADULT - SUBJECTIVE AND OBJECTIVE BOX
24H events:    Patient is a 69y old Female who presents with a chief complaint of SOB (31 Jan 2025 06:55)    Primary diagnosis of Respiratory infection    PAST MEDICAL & SURGICAL HISTORY  HTN (hypertension)    Hypercholesterolemia    GERD (gastroesophageal reflux disease)    Pulmonary nodule    CAD (coronary artery disease)    COPD (chronic obstructive pulmonary disease)    Relapsing polychondritis    Female cystocele    H/O pneumothorax  2016    S/P GEETHA-BSO    S/P colon resection    Vocal cord polyps  removal of same 2005    Pulmonary nodule    History of bladder surgery  2019    H/O breast biopsy  LEFT      SOCIAL HISTORY:  Social History:      ALLERGIES:  heparin (Other (Severe))  CONTRAST DYS (Nausea)    MEDICATIONS:  STANDING MEDICATIONS  albuterol/ipratropium for Nebulization 3 milliLiter(s) Nebulizer every 20 minutes  albuterol/ipratropium for Nebulization 3 milliLiter(s) Nebulizer every 4 hours  atorvastatin 40 milliGRAM(s) Oral at bedtime  chlorhexidine 2% Cloths 1 Application(s) Topical daily  dextrose 5%. 1000 milliLiter(s) IV Continuous <Continuous>  dextrose 5%. 1000 milliLiter(s) IV Continuous <Continuous>  dextrose 50% Injectable 25 Gram(s) IV Push once  dextrose 50% Injectable 12.5 Gram(s) IV Push once  dextrose 50% Injectable 25 Gram(s) IV Push once  fondaparinux Injectable 7.5 milliGRAM(s) SubCutaneous daily  glucagon  Injectable 1 milliGRAM(s) IntraMuscular once  insulin lispro (ADMELOG) corrective regimen sliding scale   SubCutaneous two times a day before meals  melatonin 3 milliGRAM(s) Oral at bedtime  metoprolol tartrate 12.5 milliGRAM(s) Oral every 12 hours  mycophenolate mofetil 1000 milliGRAM(s) Oral two times a day  pantoprazole    Tablet 40 milliGRAM(s) Oral before breakfast  piperacillin/tazobactam IVPB.. 3.375 Gram(s) IV Intermittent every 6 hours  saccharomyces boulardii 250 milliGRAM(s) Oral two times a day    PRN MEDICATIONS  acetaminophen     Tablet .. 650 milliGRAM(s) Oral every 6 hours PRN  dextrose Oral Gel 15 Gram(s) Oral once PRN  hydrOXYzine hydrochloride 25 milliGRAM(s) Oral every 12 hours PRN    VITALS:   T(F): 97.4  HR: 83  BP: 141/77  RR: 18  SpO2: 98%    PHYSICAL EXAM:  GENERAL:   (x  ) NAD, lying in bed comfortably     (  ) obtunded     (  ) lethargic     (  ) somnolent    HEAD:   (x  ) Atraumatic     (  ) hematoma     (  ) laceration (specify location:       )     NECK:  ( x ) Supple     (  ) neck stiffness     (  ) nuchal rigidity     (  )  no JVD     (  ) JVD present ( -- cm)    HEART:  Rate -->     (x  ) normal rate     (  ) bradycardic     (  ) tachycardic  Rhythm -->     ( x ) regular     (  ) regularly irregular     (  ) irregularly irregular  Murmurs -->     (  ) normal s1s2     (  ) systolic murmur     (  ) diastolic murmur     (  ) continuous murmur      (  ) S3 present     (  ) S4 present    LUNGS:   ( x )Unlabored respirations     (  ) tachypnea  ( x ) B/L air entry     (  ) decreased breath sounds in:  (location     )    (  ) no adventitious sound     (  ) crackles     (  ) wheezing      (  ) rhonchi      (specify location:       )  (  ) chest wall tenderness (specify location:       )    ABDOMEN:   (x  ) Soft     (  ) tense   |   ( x ) nondistended     (  ) distended   |   (  ) +BS     (  ) hypoactive bowel sounds     (  ) hyperactive bowel sounds  (  ) nontender     (  ) RUQ tenderness     (  ) RLQ tenderness     (  ) LLQ tenderness     (  ) epigastric tenderness     (  ) diffuse tenderness  (  ) Splenomegaly      (  ) Hepatomegaly      (  ) Jaundice     (  ) ecchymosis     EXTREMITIES:  (x  ) Normal     (  ) Rash     (  ) ecchymosis     (  ) varicose veins      (  ) pitting edema     (  ) non-pitting edema   (  ) ulceration     (  ) gangrene:     (location:     )    NERVOUS SYSTEM:    (x  ) A&Ox3     (  ) confused     (  ) lethargic  CN II-XII:     (  ) Intact     (  ) deficits found     (Specify:     )   Upper extremities:     (  ) no sensorimotor deficits     (  ) weakness     (  ) loss of proprioception/vibration     (  ) loss of touch/temperature (specify:    )  Lower extremities:     (  ) no sensorimotor deficits     (  ) weakness     (  ) loss of proprioception/vibration     (  ) loss of touch/temperature (specify:    )    LABS:                        13.5   9.51  )-----------( 108      ( 31 Jan 2025 04:42 )             42.8     01-31    138  |  103  |  8[L]  ----------------------------<  146[H]  4.3   |  22  |  0.6[L]    Ca    8.3[L]      31 Jan 2025 04:42  Mg     2.1     01-31    TPro  5.1[L]  /  Alb  3.3[L]  /  TBili  0.5  /  DBili  x   /  AST  25  /  ALT  15  /  AlkPhos  82  01-31    PT/INR - ( 31 Jan 2025 04:42 )   PT: 13.70 sec;   INR: 1.16 ratio      PTT - ( 31 Jan 2025 04:42 )  PTT:33.6 sec  Urinalysis Basic - ( 31 Jan 2025 04:42 )    Color: x / Appearance: x / SG: x / pH: x  Gluc: 146 mg/dL / Ketone: x  / Bili: x / Urobili: x   Blood: x / Protein: x / Nitrite: x   Leuk Esterase: x / RBC: x / WBC x   Sq Epi: x / Non Sq Epi: x / Bacteria: x    ABG - ( 30 Jan 2025 16:59 )  pH, Arterial: 7.41  pH, Blood: x     /  pCO2: 40    /  pO2: 69    / HCO3: 25    / Base Excess: 0.7   /  SaO2: 94.4

## 2025-01-31 NOTE — CONSULT NOTE ADULT - ATTENDING COMMENTS
I edited the note
69F admitted for acute hypoxic resp failure due to COPD exacerbation and possible pneumonia. 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     TTE 1/12/25 LVEF 40-45%, mildly enlarged RV, mildly reduced RV systolic function, ePASP 40 mmHg    S/p IV fluids and IV diuretics. Today, IVC 1.6 cm > 50% collapsibility    Can switch to PO Lasix  Optimize HF regimen, switch to metoprolol to succinate at time of discharge, continue losartan, SGLT2i at discharge  On treatment for COPD exacerbation and PNA  Outpatient ischemic evaluation    Ivy Pulido MD  Vascular Cardiology Attending  Please text or call via MS Teams with questions
69F presented to hospital due to shortness of breath and diarrhea.  Admitted for pneumonia and COPD.  She was found to have thrombocytopenia, DVT and positive HIT antibody, highly suspicious of HIT.    Hematology consulted for the finding.    Agree with above.  Patient has a high 4T score, agreed to continue with fondaparinux.  Follow-up serotonin assay, a more specific test.  If patient is confirmed to have HIT, patient will be nonheparin anticoagulation for at least 3 months.  She can be transition to Eliquis upon discharge.  Will need to follow-up with hematology outpatient.

## 2025-01-31 NOTE — PROGRESS NOTE ADULT - SUBJECTIVE AND OBJECTIVE BOX
Over Night Events: events noted, on FM, afebrile, CT chest noted    PHYSICAL EXAM    ICU Vital Signs Last 24 Hrs  T(C): 36.3 (31 Jan 2025 00:00), Max: 36.4 (30 Jan 2025 16:00)  T(F): 97.3 (31 Jan 2025 00:00), Max: 97.6 (30 Jan 2025 20:00)  HR: 75 (31 Jan 2025 00:00) (75 - 116)  BP: 90/64 (31 Jan 2025 00:00) (90/64 - 160/82)  BP(mean): 73 (31 Jan 2025 00:00) (73 - 92)  RR: 18 (31 Jan 2025 00:00) (18 - 20)  SpO2: 97% (31 Jan 2025 00:00) (96% - 99%)    O2 Parameters below as of 31 Jan 2025 00:00  Patient On (Oxygen Delivery Method): mask, Venturi  O2 Flow (L/min): 6          General: ill looking  Lungs: Bilateral rhonchi  Cardiovascular: RUKHSANA 2.6  Abdomen: Soft, Positive BS  Extremities: No clubbing   Neurological: Non focal       01-29-25 @ 07:01  -  01-30-25 @ 07:00  --------------------------------------------------------  IN:  Total IN: 0 mL    OUT:    Voided (mL): 1000 mL  Total OUT: 1000 mL    Total NET: -1000 mL          LABS:                          13.5   9.51  )-----------( 108      ( 31 Jan 2025 04:42 )             42.8                                               01-30    138  |  103  |  6[L]  ----------------------------<  145[H]  4.0   |  23  |  0.5[L]    Ca    8.4      30 Jan 2025 11:55  Mg     1.6     01-30    TPro  5.2[L]  /  Alb  3.2[L]  /  TBili  0.6  /  DBili  x   /  AST  34  /  ALT  15  /  AlkPhos  83  01-30      PT/INR - ( 31 Jan 2025 04:42 )   PT: 13.70 sec;   INR: 1.16 ratio         PTT - ( 31 Jan 2025 04:42 )  PTT:33.6 sec                                       Urinalysis Basic - ( 30 Jan 2025 11:55 )    Color: x / Appearance: x / SG: x / pH: x  Gluc: 145 mg/dL / Ketone: x  / Bili: x / Urobili: x   Blood: x / Protein: x / Nitrite: x   Leuk Esterase: x / RBC: x / WBC x   Sq Epi: x / Non Sq Epi: x / Bacteria: x                                                  LIVER FUNCTIONS - ( 30 Jan 2025 11:55 )  Alb: 3.2 g/dL / Pro: 5.2 g/dL / ALK PHOS: 83 U/L / ALT: 15 U/L / AST: 34 U/L / GGT: x                                                                                                                                   ABG - ( 30 Jan 2025 16:59 )  pH, Arterial: 7.41  pH, Blood: x     /  pCO2: 40    /  pO2: 69    / HCO3: 25    / Base Excess: 0.7   /  SaO2: 94.4                MEDICATIONS  (STANDING):  albuterol/ipratropium for Nebulization 3 milliLiter(s) Nebulizer every 20 minutes  albuterol/ipratropium for Nebulization 3 milliLiter(s) Nebulizer every 4 hours  atorvastatin 40 milliGRAM(s) Oral at bedtime  chlorhexidine 2% Cloths 1 Application(s) Topical daily  dextrose 5%. 1000 milliLiter(s) (50 mL/Hr) IV Continuous <Continuous>  dextrose 5%. 1000 milliLiter(s) (100 mL/Hr) IV Continuous <Continuous>  dextrose 50% Injectable 25 Gram(s) IV Push once  dextrose 50% Injectable 12.5 Gram(s) IV Push once  dextrose 50% Injectable 25 Gram(s) IV Push once  fondaparinux Injectable 7.5 milliGRAM(s) SubCutaneous daily  glucagon  Injectable 1 milliGRAM(s) IntraMuscular once  insulin lispro (ADMELOG) corrective regimen sliding scale   SubCutaneous two times a day before meals  melatonin 3 milliGRAM(s) Oral at bedtime  metoprolol tartrate 12.5 milliGRAM(s) Oral every 12 hours  mycophenolate mofetil 1000 milliGRAM(s) Oral two times a day  pantoprazole    Tablet 40 milliGRAM(s) Oral before breakfast  piperacillin/tazobactam IVPB.. 3.375 Gram(s) IV Intermittent every 6 hours  saccharomyces boulardii 250 milliGRAM(s) Oral two times a day    MEDICATIONS  (PRN):  acetaminophen     Tablet .. 650 milliGRAM(s) Oral every 6 hours PRN Mild Pain (1 - 3), Moderate Pain (4 - 6)  dextrose Oral Gel 15 Gram(s) Oral once PRN Blood Glucose LESS THAN 70 milliGRAM(s)/deciliter  hydrOXYzine hydrochloride 25 milliGRAM(s) Oral every 12 hours PRN Anxiety

## 2025-01-31 NOTE — CONSULT NOTE ADULT - PROVIDER SPECIALTY LIST ADULT
Palliative Care
Cardiology
ENT
Gastroenterology
Thoracic Surgery
Heme/Onc
Infectious Disease
Critical Care

## 2025-01-31 NOTE — CONSULT NOTE ADULT - SUBJECTIVE AND OBJECTIVE BOX
Hematology Consult Note    HPI:  A 68yo Female with PMH of HTN, HLD, GERD, CAD, COPD not on Home O2, Hx of Pneumothorax, polychondirits, SCC of BOT s/p Trach/PEG (reversed in 4/2021), s/p CRT (7 sessions in 2021) who presents with SOB for 1 week duration. History goes back to around 1 week ago When the patient started having Shortness of breath, productive cough, and malaise. Patient dnies any fever. Patient reported that her son was coughing. Patient reported some chest pain previously when coughing . Patient reports diarrhea for 4 days. which resolved now. PAtient denies an urinary symptoms. Patient is admitted for PNA, and COPD excacerbation .     Patient reports some intermittent left sided chest pain when coughing. and abdominal distention as well.  Patient follows with ENT Dr. Louis, last seen 9/23/24, CT Neck 9/2024 showed Abnormal soft tissue within the posterior nasopharynx greater the right, new since the reference exams and nonspecific in appearance. The abnormal submucosal soft tissue in the preepiglottic space greater on the left, asymmetric thickening of the left aryepiglottic fold, and mild epiglottic thickening; similar when correlated with the PET/CT dated 5/12/2022.  Stable rim-calcified right thyroid nodule measuring 1.8 cm. US guided FNA by IR 10/7/24 of  Stable rim-calcified right thyroid nodule measuring 1.8 cm. Showed atypia but not malignant cells.     ENT evaluated the pt for hoarseness, FFL showed, thick purulent secretions on larynx, airway patent. Recommended CT Neck with IVC for further evaluation, Albuterol neb treatment, Humidified O2 ( on 3L of O2 via NC)     Labs significant for WBC 11.2 K (neutrophilic), troponin 96 > 75, AG 16     09:57 - VBG - pH: 7.39  | pCO2: 38    | pO2: 36    | Lactate: 3.0      In Ed the patient was given 2 L  fluid bolus, Azithromycin and rocephin, duoneb and solu-medrol     CXR showed reticular infiltrates     EKG shows PACs, with MAT     VS significant for using 4 L NC, Tachycardia 126     ICU Vital Signs Last 24 Hrs  T(C): 37.3 (10 Zeyad 2025 15:36), Max: 37.8 (10 Zeyad 2025 10:41)  T(F): 99.1 (10 Zeyad 2025 15:36), Max: 100 (10 Zeyad 2025 10:41)  HR: 112 (10 Zeyad 2025 15:36) (97 - 126)  BP: 95/66 (10 Zeyad 2025 15:36) (95/66 - 116/86)  BP(mean): 76 (10 Zeyad 2025 15:36) (76 - 76)  RR: 20 (10 Zeyad 2025 15:36) (20 - 22)  SpO2: 94% (10 Zeyad 2025 15:36) (94% - 95%)  Patient On (Oxygen Delivery Method): nasal cannula  O2 Flow (L/min): 4             (10 Zeyad 2025 15:46)      Allergies    heparin (Other (Severe))  CONTRAST DYS (Nausea)    Intolerances        MEDICATIONS  (STANDING):  albuterol/ipratropium for Nebulization 3 milliLiter(s) Nebulizer every 20 minutes  albuterol/ipratropium for Nebulization 3 milliLiter(s) Nebulizer every 4 hours  atorvastatin 40 milliGRAM(s) Oral at bedtime  chlorhexidine 2% Cloths 1 Application(s) Topical daily  dextrose 5%. 1000 milliLiter(s) (100 mL/Hr) IV Continuous <Continuous>  dextrose 5%. 1000 milliLiter(s) (50 mL/Hr) IV Continuous <Continuous>  dextrose 50% Injectable 25 Gram(s) IV Push once  dextrose 50% Injectable 12.5 Gram(s) IV Push once  dextrose 50% Injectable 25 Gram(s) IV Push once  fondaparinux Injectable 7.5 milliGRAM(s) SubCutaneous daily  glucagon  Injectable 1 milliGRAM(s) IntraMuscular once  insulin lispro (ADMELOG) corrective regimen sliding scale   SubCutaneous two times a day before meals  melatonin 3 milliGRAM(s) Oral at bedtime  metoprolol tartrate 12.5 milliGRAM(s) Oral every 12 hours  mycophenolate mofetil 1000 milliGRAM(s) Oral two times a day  pantoprazole    Tablet 40 milliGRAM(s) Oral before breakfast  piperacillin/tazobactam IVPB.. 3.375 Gram(s) IV Intermittent every 6 hours  saccharomyces boulardii 250 milliGRAM(s) Oral two times a day    MEDICATIONS  (PRN):  acetaminophen     Tablet .. 650 milliGRAM(s) Oral every 6 hours PRN Mild Pain (1 - 3), Moderate Pain (4 - 6)  dextrose Oral Gel 15 Gram(s) Oral once PRN Blood Glucose LESS THAN 70 milliGRAM(s)/deciliter  hydrOXYzine hydrochloride 25 milliGRAM(s) Oral every 12 hours PRN Anxiety      PAST MEDICAL & SURGICAL HISTORY:  HTN (hypertension)      Hypercholesterolemia      GERD (gastroesophageal reflux disease)      Pulmonary nodule      CAD (coronary artery disease)      COPD (chronic obstructive pulmonary disease)      Relapsing polychondritis      Female cystocele      H/O pneumothorax  2016      S/P GEETHA-BSO      S/P colon resection      Vocal cord polyps  removal of same 2005      Pulmonary nodule      History of bladder surgery  2019      H/O breast biopsy  LEFT          FAMILY HISTORY:  Family history of diabetes mellitus (DM)        SOCIAL HISTORY: No EtOH, no tobacco    REVIEW OF SYSTEMS:    CONSTITUTIONAL: No weakness, fevers or chills  EYES/ENT: No visual changes;  No vertigo or throat pain   NECK: No pain or stiffness  RESPIRATORY: No cough, wheezing, hemoptysis; No shortness of breath  CARDIOVASCULAR: No chest pain or palpitations  GASTROINTESTINAL: No abdominal or epigastric pain. No nausea, vomiting, or hematemesis; No diarrhea or constipation. No melena or hematochezia.  GENITOURINARY: No dysuria, frequency or hematuria  NEUROLOGICAL: No numbness or weakness  SKIN: No itching, burning, rashes, or lesions   All other review of systems is negative unless indicated above.        T(F): 97.4 (01-31-25 @ 08:00), Max: 97.8 (01-31-25 @ 04:00)  HR: 83 (01-31-25 @ 08:00)  BP: 141/77 (01-31-25 @ 08:00)  RR: 18 (01-31-25 @ 04:00)  SpO2: 98% (01-31-25 @ 08:00)  Wt(kg): --    GENERAL: NAD, well-developed  HEAD:  Atraumatic, Normocephalic  EYES: EOMI, PERRLA, conjunctiva and sclera clear  NECK: Supple, No JVD  CHEST/LUNG: Clear to auscultation bilaterally; No wheeze  HEART: Regular rate and rhythm; No murmurs, rubs, or gallops  ABDOMEN: Soft, Nontender, Nondistended; Bowel sounds present  EXTREMITIES:  2+ Peripheral Pulses, No clubbing, cyanosis, or edema  NEUROLOGY: non-focal  SKIN: No rashes or lesions                          13.5   9.51  )-----------( 108      ( 31 Jan 2025 04:42 )             42.8       01-31    138  |  103  |  8[L]  ----------------------------<  146[H]  4.3   |  22  |  0.6[L]    Ca    8.3[L]      31 Jan 2025 04:42  Mg     2.1     01-31    TPro  5.1[L]  /  Alb  3.3[L]  /  TBili  0.5  /  DBili  x   /  AST  25  /  ALT  15  /  AlkPhos  82  01-31      Magnesium: 2.1 mg/dL (01-31 @ 04:42)  Magnesium: 1.6 mg/dL (01-30 @ 11:55)      RADIOLOGY:  < from: CT Angio Chest PE Protocol w/ IV Cont (01.30.25 @ 11:23) >  Nonocclusive pulmonary embolus extending bifurcation of the left main   pulmonary artery distally into a few upper and lower lobe segments to   subsegmental. Small right lower lobe segmental subsegmental PE. No CT   evidence of acute right heart strain.    Small left pleural effusion with adjacent consolidation/atelectasis.   Bilateral patchy and groundglass opacities increased since prior   examination. Findings maybe infectious in nature. Correlate with   symptoms. Follow-up CT in 1-3 months is recommended giventhe high risk   background lungs.    7 mm subpleural solid nodule in the right lower lobe on series 7 image   161. This is new since 2024. Attention on follow-up is recommended.    < end of copied text >    < from: [facility] Duplex Lower Ext Vein Scan, Bilat (01.29.25 @ 22:03) >  IMPRESSION:  Acute DVT in the right femoral, popliteal, posterior tibial, and peroneal   veins.    Acute DVT in the left popliteal, posterior tibial, and peroneal veins.      < end of copied text >     Hematology Consult Note    HPI:  A 70yo Female with PMH of HTN, HLD, GERD, CAD, COPD not on Home O2, Hx of Pneumothorax, polychondirits, SCC of BOT s/p Trach/PEG (reversed in 4/2021), s/p CRT (7 sessions in 2021) who presents with SOB for 1 week duration. History goes back to around 1 week ago When the patient started having Shortness of breath, productive cough, and malaise. Patient dnies any fever. Patient reported that her son was coughing. Patient reported some chest pain previously when coughing . Patient reports diarrhea for 4 days. which resolved now. PAtient denies an urinary symptoms. Patient is admitted for PNA, and COPD excacerbation .     Patient reports some intermittent left sided chest pain when coughing. and abdominal distention as well.  Patient follows with ENT Dr. Louis, last seen 9/23/24, CT Neck 9/2024 showed Abnormal soft tissue within the posterior nasopharynx greater the right, new since the reference exams and nonspecific in appearance. The abnormal submucosal soft tissue in the preepiglottic space greater on the left, asymmetric thickening of the left aryepiglottic fold, and mild epiglottic thickening; similar when correlated with the PET/CT dated 5/12/2022.  Stable rim-calcified right thyroid nodule measuring 1.8 cm. US guided FNA by IR 10/7/24 of  Stable rim-calcified right thyroid nodule measuring 1.8 cm. Showed atypia but not malignant cells.     ENT evaluated the pt for hoarseness, FFL showed, thick purulent secretions on larynx, airway patent. Recommended CT Neck with IVC for further evaluation, Albuterol neb treatment, Humidified O2 ( on 3L of O2 via NC)     Labs significant for WBC 11.2 K (neutrophilic), troponin 96 > 75, AG 16     09:57 - VBG - pH: 7.39  | pCO2: 38    | pO2: 36    | Lactate: 3.0      In Ed the patient was given 2 L  fluid bolus, Azithromycin and rocephin, duoneb and solu-medrol     CXR showed reticular infiltrates     EKG shows PACs, with MAT     VS significant for using 4 L NC, Tachycardia 126     ICU Vital Signs Last 24 Hrs  T(C): 37.3 (10 Zeyad 2025 15:36), Max: 37.8 (10 Zeyad 2025 10:41)  T(F): 99.1 (10 Zeyad 2025 15:36), Max: 100 (10 Zeyad 2025 10:41)  HR: 112 (10 Zeyad 2025 15:36) (97 - 126)  BP: 95/66 (10 Zeyad 2025 15:36) (95/66 - 116/86)  BP(mean): 76 (10 Zeyad 2025 15:36) (76 - 76)  RR: 20 (10 Zeyad 2025 15:36) (20 - 22)  SpO2: 94% (10 Zeyda 2025 15:36) (94% - 95%)  Patient On (Oxygen Delivery Method): nasal cannula  O2 Flow (L/min): 4             (10 Zeyad 2025 15:46)      Allergies    heparin (Other (Severe))  CONTRAST DYS (Nausea)    Intolerances        MEDICATIONS  (STANDING):  albuterol/ipratropium for Nebulization 3 milliLiter(s) Nebulizer every 20 minutes  albuterol/ipratropium for Nebulization 3 milliLiter(s) Nebulizer every 4 hours  atorvastatin 40 milliGRAM(s) Oral at bedtime  chlorhexidine 2% Cloths 1 Application(s) Topical daily  dextrose 5%. 1000 milliLiter(s) (100 mL/Hr) IV Continuous <Continuous>  dextrose 5%. 1000 milliLiter(s) (50 mL/Hr) IV Continuous <Continuous>  dextrose 50% Injectable 25 Gram(s) IV Push once  dextrose 50% Injectable 12.5 Gram(s) IV Push once  dextrose 50% Injectable 25 Gram(s) IV Push once  fondaparinux Injectable 7.5 milliGRAM(s) SubCutaneous daily  glucagon  Injectable 1 milliGRAM(s) IntraMuscular once  insulin lispro (ADMELOG) corrective regimen sliding scale   SubCutaneous two times a day before meals  melatonin 3 milliGRAM(s) Oral at bedtime  metoprolol tartrate 12.5 milliGRAM(s) Oral every 12 hours  mycophenolate mofetil 1000 milliGRAM(s) Oral two times a day  pantoprazole    Tablet 40 milliGRAM(s) Oral before breakfast  piperacillin/tazobactam IVPB.. 3.375 Gram(s) IV Intermittent every 6 hours  saccharomyces boulardii 250 milliGRAM(s) Oral two times a day    MEDICATIONS  (PRN):  acetaminophen     Tablet .. 650 milliGRAM(s) Oral every 6 hours PRN Mild Pain (1 - 3), Moderate Pain (4 - 6)  dextrose Oral Gel 15 Gram(s) Oral once PRN Blood Glucose LESS THAN 70 milliGRAM(s)/deciliter  hydrOXYzine hydrochloride 25 milliGRAM(s) Oral every 12 hours PRN Anxiety      PAST MEDICAL & SURGICAL HISTORY:  HTN (hypertension)      Hypercholesterolemia      GERD (gastroesophageal reflux disease)      Pulmonary nodule      CAD (coronary artery disease)      COPD (chronic obstructive pulmonary disease)      Relapsing polychondritis      Female cystocele      H/O pneumothorax  2016      S/P GEETHA-BSO      S/P colon resection      Vocal cord polyps  removal of same 2005      Pulmonary nodule      History of bladder surgery  2019      H/O breast biopsy  LEFT          FAMILY HISTORY:  Family history of diabetes mellitus (DM)        SOCIAL HISTORY: No EtOH, no tobacco    REVIEW OF SYSTEMS:    CONSTITUTIONAL: No weakness, fevers or chills  EYES/ENT: No visual changes;  No vertigo or throat pain   NECK: No pain or stiffness  RESPIRATORY: No cough, wheezing, hemoptysis; No shortness of breath  CARDIOVASCULAR: No chest pain or palpitations  GASTROINTESTINAL: No abdominal or epigastric pain. No nausea, vomiting, or hematemesis; No diarrhea or constipation. No melena or hematochezia.  GENITOURINARY: No dysuria, frequency or hematuria  NEUROLOGICAL: No numbness or weakness  SKIN: No itching, burning, rashes, or lesions   All other review of systems is negative unless indicated above.        T(F): 97.4 (01-31-25 @ 08:00), Max: 97.8 (01-31-25 @ 04:00)  HR: 83 (01-31-25 @ 08:00)  BP: 141/77 (01-31-25 @ 08:00)  RR: 18 (01-31-25 @ 04:00)  SpO2: 98% (01-31-25 @ 08:00)  Wt(kg): --    GENERAL: NAD, well-developed  CHEST/LUNG: Bilateral ronchi  HEART: Regular rate and rhythm;  ABDOMEN: tense, ecchymosis  EXTREMITIES:  No edema  NEUROLOGY: non-focal                          13.5   9.51  )-----------( 108      ( 31 Jan 2025 04:42 )             42.8       01-31    138  |  103  |  8[L]  ----------------------------<  146[H]  4.3   |  22  |  0.6[L]    Ca    8.3[L]      31 Jan 2025 04:42  Mg     2.1     01-31    TPro  5.1[L]  /  Alb  3.3[L]  /  TBili  0.5  /  DBili  x   /  AST  25  /  ALT  15  /  AlkPhos  82  01-31      Magnesium: 2.1 mg/dL (01-31 @ 04:42)  Magnesium: 1.6 mg/dL (01-30 @ 11:55)      RADIOLOGY:  < from: CT Angio Chest PE Protocol w/ IV Cont (01.30.25 @ 11:23) >  Nonocclusive pulmonary embolus extending bifurcation of the left main   pulmonary artery distally into a few upper and lower lobe segments to   subsegmental. Small right lower lobe segmental subsegmental PE. No CT   evidence of acute right heart strain.    Small left pleural effusion with adjacent consolidation/atelectasis.   Bilateral patchy and groundglass opacities increased since prior   examination. Findings maybe infectious in nature. Correlate with   symptoms. Follow-up CT in 1-3 months is recommended giventhe high risk   background lungs.    7 mm subpleural solid nodule in the right lower lobe on series 7 image   161. This is new since 2024. Attention on follow-up is recommended.    < end of copied text >    < from: [facility] Duplex Lower Ext Vein Scan, Bilat (01.29.25 @ 22:03) >  IMPRESSION:  Acute DVT in the right femoral, popliteal, posterior tibial, and peroneal   veins.    Acute DVT in the left popliteal, posterior tibial, and peroneal veins.      < end of copied text >

## 2025-01-31 NOTE — CONSULT NOTE ADULT - ASSESSMENT
68 yo F PMHx HTN, HLD, GERD, CAD, COPD not on Home O2, Hx of Pneumothorax, polychondritis, SCC of BOT s/p Trach/PEG (reversed in 4/2021), s/p CRT (7 sessions in 2021) who presents with SOB for 1 week duration and diarrhea for 4 days. Patient is admitted for PNA, and COPD excacerbation .     Heme consulted for thrombocytopenia and positive PF4 Ab      # New onset thrombocytopenia, positive PF4 Ab, high suspicion for HIT  # Acute B/l DVT and PE  - Presented with normal platelet count on admission 346  - Onset on 1/26, nury 1/29 75. 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 ++Thick purulent secretions overlying Laryngeal structures. Epiglottis crisp, no edema. TVC/FVC mobile and intact, no glottic gap noted.        68 yo F PMHx HTN, HLD, GERD, CAD, COPD not on Home O2, Hx of Pneumothorax, polychondritis, SCC of BOT s/p Trach/PEG (reversed in 4/2021), s/p CRT (7 sessions in 2021) who presents with SOB for 1 week duration and diarrhea for 4 days. Patient is admitted for PNA, and COPD exacerbation.     Heme consulted for thrombocytopenia and positive PF4 Ab    # New onset thrombocytopenia, positive PF4 Ab, high suspicion for HIT  # Acute B/l DVT and PE  - Presented with normal platelet count on admission 346  - Onset on 1/26, nury 1/29 75. 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 ++Thick purulent secretions overlying Laryngeal structures. Epiglottis crisp, no edema. TVC/FVC mobile and intact, no glottic gap noted.       Plan:   Patient developed acute onset thrombocytopenia, HIT score 6, PF4 positive, heparin was discontinued. Pending AXEL. Agree with Fondaparinux for now, can transition to eliquis on discharge, given patient developed venous events, would be at least 3 months of AC. 68 yo F PMHx HTN, HLD, GERD, CAD, COPD not on Home O2, Hx of Pneumothorax, polychondritis, SCC of BOT s/p Trach/PEG (reversed in 4/2021), s/p CRT (7 sessions in 2021) who presents with SOB for 1 week duration and diarrhea for 4 days. Patient is admitted for PNA, and COPD exacerbation.     Heme consulted for thrombocytopenia and positive PF4 Ab    # New onset thrombocytopenia, positive PF4 Ab, high suspicion for HIT  # Acute B/l DVT and PE  - Presented with normal platelet count on admission 346  - Onset on 1/26, nury 1/29 75. 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 ++Thick purulent secretions overlying Laryngeal structures. Epiglottis crisp, no edema. TVC/FVC mobile and intact, no glottic gap noted.       Plan:   Patient developed acute onset thrombocytopenia, HIT score 6, PF4 positive, heparin was discontinued. Pending AXEL. Agree with Fondaparinux for now, can transition to eliquis on discharge, given patient developed DVT/PE, would be at least 3 months of AC. 68 yo F PMHx HTN, HLD, GERD, CAD, COPD not on Home O2, Hx of Pneumothorax, polychondritis, SCC of BOT s/p Trach/PEG (reversed in 4/2021), s/p CRT (7 sessions in 2021) who presents with SOB for 1 week duration and diarrhea for 4 days. Patient is admitted for PNA, and COPD exacerbation.     Heme consulted for thrombocytopenia and positive PF4 Ab    # New onset thrombocytopenia, positive PF4 Ab, high suspicion for HIT  # Acute B/l DVT and PE  - Presented with normal platelet count on admission 346  - Onset on 1/26, nury 1/29 75. 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 ++Thick purulent secretions overlying Laryngeal structures. Epiglottis crisp, no edema. TVC/FVC mobile and intact, no glottic gap noted.       Plan:   Patient developed acute onset thrombocytopenia, HIT score 6, PF4 positive, heparin was discontinued. Pending AXEL (PLEASE OBTAIN IF NOT REFLEXLY DONE). Agree with Fondaparinux for now, can transition to eliquis on discharge, given patient developed DVT/PE, would be at least 3 months of AC.

## 2025-02-01 LAB
ALBUMIN SERPL ELPH-MCNC: 3.1 G/DL — LOW (ref 3.5–5.2)
ALP SERPL-CCNC: 80 U/L — SIGNIFICANT CHANGE UP (ref 30–115)
ALT FLD-CCNC: 15 U/L — SIGNIFICANT CHANGE UP (ref 0–41)
ANION GAP SERPL CALC-SCNC: 10 MMOL/L — SIGNIFICANT CHANGE UP (ref 7–14)
AST SERPL-CCNC: 26 U/L — SIGNIFICANT CHANGE UP (ref 0–41)
BASOPHILS # BLD AUTO: 0.03 K/UL — SIGNIFICANT CHANGE UP (ref 0–0.2)
BASOPHILS NFR BLD AUTO: 0.4 % — SIGNIFICANT CHANGE UP (ref 0–1)
BILIRUB SERPL-MCNC: 0.4 MG/DL — SIGNIFICANT CHANGE UP (ref 0.2–1.2)
BUN SERPL-MCNC: 10 MG/DL — SIGNIFICANT CHANGE UP (ref 10–20)
CALCIUM SERPL-MCNC: 8.2 MG/DL — LOW (ref 8.4–10.4)
CHLORIDE SERPL-SCNC: 103 MMOL/L — SIGNIFICANT CHANGE UP (ref 98–110)
CO2 SERPL-SCNC: 24 MMOL/L — SIGNIFICANT CHANGE UP (ref 17–32)
CREAT SERPL-MCNC: 0.7 MG/DL — SIGNIFICANT CHANGE UP (ref 0.7–1.5)
EGFR: 93 ML/MIN/1.73M2 — SIGNIFICANT CHANGE UP
EOSINOPHIL # BLD AUTO: 0.19 K/UL — SIGNIFICANT CHANGE UP (ref 0–0.7)
EOSINOPHIL NFR BLD AUTO: 2.7 % — SIGNIFICANT CHANGE UP (ref 0–8)
GLUCOSE BLDC GLUCOMTR-MCNC: 113 MG/DL — HIGH (ref 70–99)
GLUCOSE BLDC GLUCOMTR-MCNC: 144 MG/DL — HIGH (ref 70–99)
GLUCOSE SERPL-MCNC: 96 MG/DL — SIGNIFICANT CHANGE UP (ref 70–99)
HCT VFR BLD CALC: 40.1 % — SIGNIFICANT CHANGE UP (ref 37–47)
HGB BLD-MCNC: 12.5 G/DL — SIGNIFICANT CHANGE UP (ref 12–16)
IMM GRANULOCYTES NFR BLD AUTO: 0.6 % — HIGH (ref 0.1–0.3)
LYMPHOCYTES # BLD AUTO: 0.49 K/UL — LOW (ref 1.2–3.4)
LYMPHOCYTES # BLD AUTO: 7 % — LOW (ref 20.5–51.1)
MAGNESIUM SERPL-MCNC: 1.6 MG/DL — LOW (ref 1.8–2.4)
MCHC RBC-ENTMCNC: 29 PG — SIGNIFICANT CHANGE UP (ref 27–31)
MCHC RBC-ENTMCNC: 31.2 G/DL — LOW (ref 32–37)
MCV RBC AUTO: 93 FL — SIGNIFICANT CHANGE UP (ref 81–99)
MONOCYTES # BLD AUTO: 0.45 K/UL — SIGNIFICANT CHANGE UP (ref 0.1–0.6)
MONOCYTES NFR BLD AUTO: 6.4 % — SIGNIFICANT CHANGE UP (ref 1.7–9.3)
NEUTROPHILS # BLD AUTO: 5.8 K/UL — SIGNIFICANT CHANGE UP (ref 1.4–6.5)
NEUTROPHILS NFR BLD AUTO: 82.9 % — HIGH (ref 42.2–75.2)
NRBC # BLD: 0 /100 WBCS — SIGNIFICANT CHANGE UP (ref 0–0)
NRBC BLD-RTO: 0 /100 WBCS — SIGNIFICANT CHANGE UP (ref 0–0)
PHOSPHATE SERPL-MCNC: 3.1 MG/DL — SIGNIFICANT CHANGE UP (ref 2.1–4.9)
PLATELET # BLD AUTO: 109 K/UL — LOW (ref 130–400)
PMV BLD: 10.6 FL — HIGH (ref 7.4–10.4)
POTASSIUM SERPL-MCNC: 4.2 MMOL/L — SIGNIFICANT CHANGE UP (ref 3.5–5)
POTASSIUM SERPL-SCNC: 4.2 MMOL/L — SIGNIFICANT CHANGE UP (ref 3.5–5)
PROT SERPL-MCNC: 4.8 G/DL — LOW (ref 6–8)
RBC # BLD: 4.31 M/UL — SIGNIFICANT CHANGE UP (ref 4.2–5.4)
RBC # FLD: 15.9 % — HIGH (ref 11.5–14.5)
SODIUM SERPL-SCNC: 137 MMOL/L — SIGNIFICANT CHANGE UP (ref 135–146)
WBC # BLD: 7 K/UL — SIGNIFICANT CHANGE UP (ref 4.8–10.8)
WBC # FLD AUTO: 7 K/UL — SIGNIFICANT CHANGE UP (ref 4.8–10.8)

## 2025-02-01 PROCEDURE — 99233 SBSQ HOSP IP/OBS HIGH 50: CPT

## 2025-02-01 RX ORDER — MAGNESIUM SULFATE 0.8 MEQ/ML
2 AMPUL (ML) INJECTION
Refills: 0 | Status: COMPLETED | OUTPATIENT
Start: 2025-02-01 | End: 2025-02-01

## 2025-02-01 RX ADMIN — PIPERACILLIN SODIUM AND TAZOBACTAM SODIUM 25 GRAM(S): 2; 250 INJECTION, POWDER, FOR SOLUTION INTRAVENOUS at 05:39

## 2025-02-01 RX ADMIN — IPRATROPIUM BROMIDE AND ALBUTEROL SULFATE 3 MILLILITER(S): .5; 2.5 SOLUTION RESPIRATORY (INHALATION) at 09:18

## 2025-02-01 RX ADMIN — PIPERACILLIN SODIUM AND TAZOBACTAM SODIUM 25 GRAM(S): 2; 250 INJECTION, POWDER, FOR SOLUTION INTRAVENOUS at 17:59

## 2025-02-01 RX ADMIN — MYCOPHENOLATE MOFETIL 1000 MILLIGRAM(S): 200 POWDER, FOR SUSPENSION ORAL at 18:01

## 2025-02-01 RX ADMIN — PIPERACILLIN SODIUM AND TAZOBACTAM SODIUM 25 GRAM(S): 2; 250 INJECTION, POWDER, FOR SOLUTION INTRAVENOUS at 12:21

## 2025-02-01 RX ADMIN — Medication 25 GRAM(S): at 10:14

## 2025-02-01 RX ADMIN — Medication 12.5 MILLIGRAM(S): at 18:00

## 2025-02-01 RX ADMIN — ATORVASTATIN CALCIUM 40 MILLIGRAM(S): 80 TABLET, FILM COATED ORAL at 22:00

## 2025-02-01 RX ADMIN — MYCOPHENOLATE MOFETIL 1000 MILLIGRAM(S): 200 POWDER, FOR SUSPENSION ORAL at 05:40

## 2025-02-01 RX ADMIN — IPRATROPIUM BROMIDE AND ALBUTEROL SULFATE 3 MILLILITER(S): .5; 2.5 SOLUTION RESPIRATORY (INHALATION) at 02:25

## 2025-02-01 RX ADMIN — FONDAPARINUX SODIUM 7.5 MILLIGRAM(S): 5 INJECTION, SOLUTION SUBCUTANEOUS at 12:21

## 2025-02-01 RX ADMIN — IPRATROPIUM BROMIDE AND ALBUTEROL SULFATE 3 MILLILITER(S): .5; 2.5 SOLUTION RESPIRATORY (INHALATION) at 20:14

## 2025-02-01 RX ADMIN — PANTOPRAZOLE 40 MILLIGRAM(S): 20 TABLET, DELAYED RELEASE ORAL at 05:40

## 2025-02-01 RX ADMIN — ACETAMINOPHEN, DIPHENHYDRAMINE HCL, PHENYLEPHRINE HCL 3 MILLIGRAM(S): 325; 25; 5 TABLET ORAL at 22:00

## 2025-02-01 RX ADMIN — Medication 250 MILLIGRAM(S): at 05:39

## 2025-02-01 RX ADMIN — ANTISEPTIC SURGICAL SCRUB 1 APPLICATION(S): 0.04 SOLUTION TOPICAL at 12:22

## 2025-02-01 RX ADMIN — PIPERACILLIN SODIUM AND TAZOBACTAM SODIUM 25 GRAM(S): 2; 250 INJECTION, POWDER, FOR SOLUTION INTRAVENOUS at 23:50

## 2025-02-01 RX ADMIN — Medication 250 MILLIGRAM(S): at 18:01

## 2025-02-01 RX ADMIN — Medication 12.5 MILLIGRAM(S): at 05:39

## 2025-02-01 RX ADMIN — IPRATROPIUM BROMIDE AND ALBUTEROL SULFATE 3 MILLILITER(S): .5; 2.5 SOLUTION RESPIRATORY (INHALATION) at 15:28

## 2025-02-01 RX ADMIN — Medication 25 GRAM(S): at 08:44

## 2025-02-01 NOTE — PROGRESS NOTE ADULT - ATTENDING COMMENTS
#Acute hypoxic resp failure  due to acute PE, tracheal stenosis  initial ct neck with tracheal stenosis, collapse  s/p OR with bronch, tracheal dilation by cts  s/p intubation, since extubated  cta chest now with L main pe, subsegmental/ segmental; R subsegmental; bl ggo  tte with mild rv dysfunction, enlargement  va duplex diffuse bl dvts  fondaparinux for now; f/u heme  initial bal path with aspergillosis; fungitell elevated  bal 1/17 yeast  repeat galactomanin, cryptococcus, histoplasmosis; sputum pcp if able  nc to keep spo2 >92    #Thrombocytopenia  suspect consumptive vs. bm suppression from infection  hit ab positive  fabiola neg not c/w HIT  f/u heme  trend    #Progress Note Handoff  Pending (specify): f/u heme, fungal w/u, id  Family discussion: d/w pt at bedside  Disposition: snf

## 2025-02-01 NOTE — PROGRESS NOTE ADULT - ASSESSMENT
70yo Female with PMH of HTN, HLD, GERD, CAD, COPD not on Home O2, Hx of Pneumothorax, polychondritis, SCC of BOT s/p Trach/PEG (reversed in 4/2021, follows with Dr Louis), s/p CRT (7 sessions in 2021) who presents with SOB for 1 week duration.  Pt was admitted for AHRF 2/2 COPD exacerbation and tracheal stenosis s/p dilation by CT surgery. Required upgrade to MICU iso ARF 2/2 aspiration, s/p bronch, self extubated 1/17, transitioned to NC in SDU.     SDU Course complicated by severe pharyngeal dysphagia with recurrent aspiration s/p FEES/MBS, after several GOC discussions patient is currently on puree diet with swallow device as patient is refusing NGT and prefers to trial modified diet and outpatient therapy before considering PEG. On floors pt weaned to RA, was planning dispo however course then complicated by thrombocytpenia, (+)PF4, acute extensive b/l LE DVTs iso HIT, likely type 2 and now with hypoxia and tachycardia concerning for PE. s/p CTA PE protocol with acute nonocculsive PE extending bifurcation of left main PA distally into a few upper and lower lobe segments to subsegmental, small RLL segmental/subsegmental PE, no RH strain; also notable b/l increasing GGO/patchy opacities may be infectious, new nodule. Currently in SDU for PE and DVTs, started on therapeutic fondaparinux     #Acute Hypoxic Respiratory Failure due to High Risk PE, Possible Component of Stridor due to Existing Tracheal Stenosis  #Acute Extensive b/l LE DVT r/o HIT  #Thrombocytopenia r/o HIT  - PLT slowly dropping on admission 300s -> 75 -> 80 ->108  - PF4 (+)  - Duplex 1/20 (-)  - Duplex 1/29 (+) w/ acute right femoral, popliteal, posterior tibial, and peroneal veins; acute DVT in the left popliteal, posterior tibial, and peroneal veins  - C/w Fondaparinaux  - CTA PE protocol: Acute non-occulsive PE extending bifurcation of left main PA distally into a few upper and lower lobe segments to subsegmental, small RLL segmental/subsegmental PE, no RH strain; also notable b/l increasing GGO/patchy opacities may be infectious, new nodule  - Repeat TTE grossly unchanged from prior, hyperdynamic LV function 60-65%  - SRO negative  - F/u Heme/Onc  - Monitor Plt count    #Severe Oropharyngeal Dysphagia w/ Recurrent Aspiration due to Polychondritis and Tracheal Stenosis   - SLP evaluations appreciated: pt has irreversible severe oropharyngeal dysphagia with aspiration of all consistencies, recommending PEG   - Patient refusing NGT, currently wishes to pursue modified diet and will consider PEG if fails, pt accepts risk of aspiration  - Appreciate palliative and GI recs   - F/u OP ENT, GI  - C/w puree diet with supraglottic swallow device   - C/w aspiration precautions, HOB >45 deg    #Aspiration and MSSA PNA (Resolved)  #Hx of Polychondritis and Tracheal Stenosis   #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 2021) and CRT in 2021  #Hx of Lung Nodules  - S/p intubation 1/17, self-extubation 1/17, BiPAP/HFNC  - Bronch cx (+) MSSA (1.17)  - Bronch lavage path with neutrophils and fungal organisms +GMS c/f aspergillus (1.10) > no need for treatment per ID (galactomannan negative, unlikely clinically active)  - S/p steroid course, completed pred taper  - Completed IV cefazolin for MSSA (bronch cx) (end 1/29)   - ID recs appreciated  - PT eval: [facility] vs home PT  - Resumed cellcept 1000mg BID   - ID eval : - unclear etiology of increased ground glass opacities with relation to aspergillus seen on cytology from 1/10; - fungitell possible false positive; does not have clear risk factors for invasive aspergillosis   - F/U repeat aspergillus galactomannan, check urine histoplasma antigen and serum cryptococcal antigen      #Acute on Chronic HFmrEF (Stable)  #CAD  #HTN  #HLD  - Cardio eval appreciated: recommend SGLT2i on discharge, continue Losartan and Metoprolol succinate  - Hold home Lasix and Losartan due to soft BP  - c/w Atorvastatin 40mg PO QHS   - c/w Metoprolol Tartrate 12.5mg PO BID      #Misc  #Code Status: DNR/Intubate  #DVT ppx: Argatroban  #GI ppx: Protonix  #Diet: Pureed  #Activity: AAT  #Inpatient Dispo: SDU  #Discharge Dispo: Likely STR   68yo Female with PMH of HTN, HLD, GERD, CAD, COPD not on Home O2, Hx of Pneumothorax, polychondritis, SCC of BOT s/p Trach/PEG (reversed in 4/2021, follows with Dr Louis), s/p CRT (7 sessions in 2021) who presents with SOB for 1 week duration.  Pt was admitted for AHRF 2/2 COPD exacerbation and tracheal stenosis s/p dilation by CT surgery. Required upgrade to MICU iso ARF 2/2 aspiration, s/p bronch, self extubated 1/17, transitioned to NC in SDU.     SDU Course complicated by severe pharyngeal dysphagia with recurrent aspiration s/p FEES/MBS, after several GOC discussions patient is currently on puree diet with swallow device as patient is refusing NGT and prefers to trial modified diet and outpatient therapy before considering PEG. On floors pt weaned to RA, was planning dispo however course then complicated by thrombocytpenia, (+)PF4, acute extensive b/l LE DVTs iso HIT, likely type 2 and now with hypoxia and tachycardia concerning for PE. s/p CTA PE protocol with acute nonocculsive PE extending bifurcation of left main PA distally into a few upper and lower lobe segments to subsegmental, small RLL segmental/subsegmental PE, no RH strain; also notable b/l increasing GGO/patchy opacities may be infectious, new nodule. Currently in SDU for PE and DVTs, started on therapeutic fondaparinux     #Acute Hypoxic Respiratory Failure due to High Risk PE, Possible Component of Stridor due to Existing Tracheal Stenosis  #Acute Extensive b/l LE DVT r/o HIT  #Thrombocytopenia r/o HIT  - PLT slowly dropping on admission 300s -> 75 -> 80 ->108  - PF4 (+)  - Duplex 1/20 (-)  - Duplex 1/29 (+) w/ acute right femoral, popliteal, posterior tibial, and peroneal veins; acute DVT in the left popliteal, posterior tibial, and peroneal veins  - C/w Fondaparinaux  - CTA PE protocol: Acute non-occulsive PE extending bifurcation of left main PA distally into a few upper and lower lobe segments to subsegmental, small RLL segmental/subsegmental PE, no RH strain; also notable b/l increasing GGO/patchy opacities may be infectious, new nodule  - Repeat TTE grossly unchanged from prior, hyperdynamic LV function 60-65%  - SRO negative  - F/u Heme/Onc  - Monitor Plt count    #Severe Oropharyngeal Dysphagia w/ Recurrent Aspiration due to Polychondritis and Tracheal Stenosis   - SLP evaluations appreciated: pt has irreversible severe oropharyngeal dysphagia with aspiration of all consistencies, recommending PEG   - Patient refusing NGT, currently wishes to pursue modified diet and will consider PEG if fails, pt accepts risk of aspiration  - Appreciate palliative and GI recs   - F/u OP ENT, GI  - C/w puree diet with supraglottic swallow device   - C/w aspiration precautions, HOB >45 deg    #Aspiration and MSSA PNA (Resolved)  #Hx of Polychondritis and Tracheal Stenosis   #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 2021) and CRT in 2021  #Hx of Lung Nodules  - S/p intubation 1/17, self-extubation 1/17, BiPAP/HFNC  - Bronch cx (+) MSSA (1.17)  - Bronch lavage path with neutrophils and fungal organisms +GMS c/f aspergillus (1.10) > no need for treatment per ID (galactomannan negative, unlikely clinically active)  - S/p steroid course, completed pred taper  - Completed IV cefazolin for MSSA (bronch cx) (end 1/29)   - ID recs appreciated  - PT eval: [facility] vs home PT  - Resumed cellcept 1000mg BID   - ID eval : - unclear etiology of increased ground glass opacities with relation to aspergillus seen on cytology from 1/10; - fungitell possible false positive; does not have clear risk factors for invasive aspergillosis   - F/U repeat aspergillus galactomannan, check urine histoplasma antigen and serum cryptococcal antigen      #Acute on Chronic HFmrEF (Stable)  #CAD  #HTN  #HLD  - Cardio eval appreciated: recommend SGLT2i on discharge, continue Losartan and Metoprolol succinate  - Hold home Lasix and Losartan due to soft BP  - c/w Atorvastatin 40mg PO QHS   - c/w Metoprolol Tartrate 12.5mg PO BID      #Misc  #Code Status: DNR/Intubate  #DVT ppx: Argatroban  #GI ppx: Protonix  #Diet: Pureed  #Activity: AAT  #Inpatient Dispo: SDU  #Discharge Dispo: Likely STR    #Pending: Heme/Onc f/u, infectious workup, resume home GDMT when BP can tolerate   68yo Female with PMH of HTN, HLD, GERD, CAD, COPD not on Home O2, Hx of Pneumothorax, polychondritis, SCC of BOT s/p Trach/PEG (reversed in 4/2021, follows with Dr Louis), s/p CRT (7 sessions in 2021) who presents with SOB for 1 week duration.  Pt was admitted for AHRF 2/2 COPD exacerbation and tracheal stenosis s/p dilation by CT surgery. Required upgrade to MICU iso ARF 2/2 aspiration, s/p bronch, self extubated 1/17, transitioned to NC in SDU.     SDU Course complicated by severe pharyngeal dysphagia with recurrent aspiration s/p FEES/MBS, after several GOC discussions patient is currently on puree diet with swallow device as patient is refusing NGT and prefers to trial modified diet and outpatient therapy before considering PEG. On floors pt weaned to RA, was planning dispo however course then complicated by thrombocytpenia, (+)PF4, acute extensive b/l LE DVTs iso HIT, likely type 2 and now with hypoxia and tachycardia concerning for PE. s/p CTA PE protocol with acute nonocculsive PE extending bifurcation of left main PA distally into a few upper and lower lobe segments to subsegmental, small RLL segmental/subsegmental PE, no RH strain; also notable b/l increasing GGO/patchy opacities may be infectious, new nodule. Currently in SDU for PE and DVTs, started on therapeutic fondaparinux     #Acute Hypoxic Respiratory Failure due to High Risk PE, Possible Component of Stridor due to Existing Tracheal Stenosis  #Acute Extensive b/l LE DVT r/o HIT  #Thrombocytopenia r/o HIT  - PLT slowly dropping on admission 300s -> 75 -> 80 ->108  - PF4 (+)  - Duplex 1/20 (-)  - Duplex 1/29 (+) w/ acute right femoral, popliteal, posterior tibial, and peroneal veins; acute DVT in the left popliteal, posterior tibial, and peroneal veins  - C/w Fondaparinaux  - CTA PE protocol: Acute non-occulsive PE extending bifurcation of left main PA distally into a few upper and lower lobe segments to subsegmental, small RLL segmental/subsegmental PE, no RH strain; also notable b/l increasing GGO/patchy opacities may be infectious, new nodule  - Repeat TTE grossly unchanged from prior, hyperdynamic LV function 60-65%  - SRO negative  - F/u Heme/Onc  - Monitor Plt count    #Severe Oropharyngeal Dysphagia w/ Recurrent Aspiration due to Polychondritis and Tracheal Stenosis   - SLP evaluations appreciated: pt has irreversible severe oropharyngeal dysphagia with aspiration of all consistencies, recommending PEG   - Patient refusing NGT, currently wishes to pursue modified diet and will consider PEG if fails, pt accepts risk of aspiration  - Appreciate palliative and GI recs   - F/u ENT and GI as o/p  - C/w Puree diet with supraglottic swallow device   - C/w Aspiration precautions, HOB >45 deg    #Aspiration and MSSA PNA (Resolved)  #Hx of Polychondritis and Tracheal Stenosis   #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 2021) and CRT in 2021  #Hx of Lung Nodules  - S/p intubation 1/17, self-extubation 1/17, BiPAP/HFNC  - Bronch cx (+) MSSA (1.17)  - Bronch lavage path with neutrophils and fungal organisms +GMS c/f aspergillus (1.10) > no need for treatment per ID (galactomannan negative, unlikely clinically active)  - S/p Prednisone taper  - S/p Cefazolin IV course for MSSA (completed 1/29)  - C/w Cellcept 1000mg BID   - ID eval: Unclear etiology of increased ground glass opacities with relation to aspergillus seen on cytology from 1/10; fungitell possible false positive; does not have clear risk factors for invasive aspergillosis   - F/u repeat aspergillus galactomannan, check urine histoplasma antigen and serum cryptococcal antigen      #Acute on Chronic HFmrEF (Stable)  #Hx of CAD  #HTN  #HLD  - Cardio eval appreciated: recommend SGLT2i on discharge, continue Losartan and Metoprolol succinate  - Hold home Lasix and Losartan due to soft BP  - C/w Atorvastatin 40mg PO QHS   - C/w Metoprolol Tartrate 12.5mg PO BID      #Misc  #Code Status: DNR/Intubate  #DVT ppx: Argatroban  #GI ppx: Protonix  #Diet: Pureed  #Activity: AAT  #Inpatient Dispo: SDU  #Discharge Dispo: [facility] vs. Home w/ PT    #Pending: Heme/Onc f/u, infectious workup, resume home GDMT when BP can tolerate

## 2025-02-01 NOTE — PROGRESS NOTE ADULT - SUBJECTIVE AND OBJECTIVE BOX
Interval History  Patient is a 69y old Female who presents with a chief complaint of SOB (01 Feb 2025 08:01)    GEREMIAS FULLER is admitted with a primary diagnosis of Respiratory infection      Today is hospital day 22d. The patient was examined at the bedside this morning. No acute overnight events were reported.    Admission HPI  HPI:  A 68yo Female with PMH of HTN, HLD, GERD, CAD, COPD not on Home O2, Hx of Pneumothorax, polychondirits, SCC of BOT s/p Trach/PEG (reversed in 4/2021), s/p CRT (7 sessions in 2021) who presents with SOB for 1 week duration. History goes back to around 1 week ago When the patient started having Shortness of breath, productive cough, and malaise. Patient dnies any fever. Patient reported that her son was coughing. Patient reported some chest pain previously when coughing . Patient reports diarrhea for 4 days. which resolved now. PAtient denies an urinary symptoms. Patient is admitted for PNA, and COPD excacerbation .     Patient reports some intermittent left sided chest pain when coughing. and abdominal distention as well.  Patient follows with ENT Dr. Louis, last seen 9/23/24, CT Neck 9/2024 showed Abnormal soft tissue within the posterior nasopharynx greater the right, new since the reference exams and nonspecific in appearance. The abnormal submucosal soft tissue in the preepiglottic space greater on the left, asymmetric thickening of the left aryepiglottic fold, and mild epiglottic thickening; similar when correlated with the PET/CT dated 5/12/2022.  Stable rim-calcified right thyroid nodule measuring 1.8 cm. US guided FNA by IR 10/7/24 of  Stable rim-calcified right thyroid nodule measuring 1.8 cm. Showed atypia but not malignant cells.     ENT evaluated the pt for hoarseness, FFL showed, thick purulent secretions on larynx, airway patent. Recommended CT Neck with IVC for further evaluation, Albuterol neb treatment, Humidified O2 ( on 3L of O2 via NC)     Labs significant for WBC 11.2 K (neutrophilic), troponin 96 > 75, AG 16     09:57 - VBG - pH: 7.39  | pCO2: 38    | pO2: 36    | Lactate: 3.0      In Ed the patient was given 2 L  fluid bolus, Azithromycin and rocephin, duoneb and solu-medrol     CXR showed reticular infiltrates     EKG shows PACs, with MAT     VS significant for using 4 L NC, Tachycardia 126     ICU Vital Signs Last 24 Hrs  T(C): 37.3 (10 Zeyad 2025 15:36), Max: 37.8 (10 Zeyad 2025 10:41)  T(F): 99.1 (10 Zeyad 2025 15:36), Max: 100 (10 Zeyad 2025 10:41)  HR: 112 (10 Zeyad 2025 15:36) (97 - 126)  BP: 95/66 (10 Zeyad 2025 15:36) (95/66 - 116/86)  BP(mean): 76 (10 Zeyad 2025 15:36) (76 - 76)  RR: 20 (10 Zeyad 2025 15:36) (20 - 22)  SpO2: 94% (10 Zeyad 2025 15:36) (94% - 95%)  Patient On (Oxygen Delivery Method): nasal cannula  O2 Flow (L/min): 4             (10 Zeyad 2025 15:46)      Past Medical and Surgical History  HTN (hypertension)    Hypercholesterolemia    GERD (gastroesophageal reflux disease)    Pulmonary nodule    CAD (coronary artery disease)    COPD (chronic obstructive pulmonary disease)    Relapsing polychondritis    Female cystocele    H/O pneumothorax  2016    S/P GEETHA-BSO    S/P colon resection    Vocal cord polyps  removal of same 2005    Pulmonary nodule    History of bladder surgery  2019    H/O breast biopsy  LEFT      Family History  FAMILY HISTORY:  Family history of diabetes mellitus (DM)      Social History  Social History:      Allergies  heparin (Other (Severe))  CONTRAST DYS (Nausea)    Medications  Standing Medications  albuterol/ipratropium for Nebulization 3 milliLiter(s) Nebulizer every 20 minutes  albuterol/ipratropium for Nebulization 3 milliLiter(s) Nebulizer every 4 hours  atorvastatin 40 milliGRAM(s) Oral at bedtime  chlorhexidine 2% Cloths 1 Application(s) Topical daily  dextrose 5%. 1000 milliLiter(s) IV Continuous <Continuous>  dextrose 5%. 1000 milliLiter(s) IV Continuous <Continuous>  dextrose 50% Injectable 25 Gram(s) IV Push once  dextrose 50% Injectable 12.5 Gram(s) IV Push once  dextrose 50% Injectable 25 Gram(s) IV Push once  fondaparinux Injectable 7.5 milliGRAM(s) SubCutaneous daily  glucagon  Injectable 1 milliGRAM(s) IntraMuscular once  insulin lispro (ADMELOG) corrective regimen sliding scale   SubCutaneous two times a day before meals  melatonin 3 milliGRAM(s) Oral at bedtime  metoprolol tartrate 12.5 milliGRAM(s) Oral every 12 hours  mycophenolate mofetil 1000 milliGRAM(s) Oral two times a day  pantoprazole    Tablet 40 milliGRAM(s) Oral before breakfast  piperacillin/tazobactam IVPB.. 3.375 Gram(s) IV Intermittent every 6 hours  saccharomyces boulardii 250 milliGRAM(s) Oral two times a day    PRN Medications  acetaminophen     Tablet .. 650 milliGRAM(s) Oral every 6 hours PRN  dextrose Oral Gel 15 Gram(s) Oral once PRN  hydrOXYzine hydrochloride 25 milliGRAM(s) Oral every 12 hours PRN    Vitals  T(F): 96.3  HR: 91  BP: 112/77  RR: 18  SpO2: 96%    I&O's      Physical Examination  General: NAD  Head: NCAT  Neck: Supple, no JVD  Cardiac: Regular rate and rhythm  Pulmonary: No wheezing  Abdominal: Soft, nontender, and nondistended  Extremities: No pitting edema  Neurologic: AOx3, nonfocal  Skin: Bruising RUE    Labs                        12.5   7.00  )-----------( 109      ( 01 Feb 2025 06:12 )             40.1     02-01    137  |  103  |  10  ----------------------------<  96  4.2   |  24  |  0.7    Ca    8.2[L]      01 Feb 2025 06:12  Phos  3.1     02-01  Mg     1.6     02-01    TPro  4.8[L]  /  Alb  3.1[L]  /  TBili  0.4  /  DBili  x   /  AST  26  /  ALT  15  /  AlkPhos  80  02-01    PT/INR - ( 31 Jan 2025 04:42 )   PT: 13.70 sec;   INR: 1.16 ratio         PTT - ( 31 Jan 2025 04:42 )  PTT:33.6 sec  Urinalysis Basic - ( 01 Feb 2025 06:12 )    Color: x / Appearance: x / SG: x / pH: x  Gluc: 96 mg/dL / Ketone: x  / Bili: x / Urobili: x   Blood: x / Protein: x / Nitrite: x   Leuk Esterase: x / RBC: x / WBC x   Sq Epi: x / Non Sq Epi: x / Bacteria: x      CAPILLARY BLOOD GLUCOSE      POCT Blood Glucose.: 113 mg/dL (01 Feb 2025 08:39)  POCT Blood Glucose.: 169 mg/dL (31 Jan 2025 17:52)  POCT Blood Glucose.: 157 mg/dL (31 Jan 2025 11:30)    ABG - ( 30 Jan 2025 16:59 )  pH, Arterial: 7.41  pH, Blood: x     /  pCO2: 40    /  pO2: 69    / HCO3: 25    / Base Excess: 0.7   /  SaO2: 94.4                Imaging  CXR      CT  CT Angio Chest PE Protocol w/ IV Cont:   ACC: [id number] EXAM:  CT ANGIO CHEST PULM ART [facility]   ORDERED BY: MERA CAMARENA     PROCEDURE DATE:  01/30/2025          INTERPRETATION:  CLINICAL STATEMENT: ro pe    TECHNIQUE: Multislice helical sections were obtained from the thoracic   inlet to the lung bases during rapid administration of intravenous   contrast using a CTA protocol. Thin sections were reconstructed through   the pulmonary vasculature. Sagittal and coronal reformatted images were   acquired, as well as MIP reconstructedimages.    COMPARISON: CT CHEST 9/3/2024, CT CHEST 1/24/2025.    CONTRAST VOLUME: Omnipaque 350. 80 cc administered. 20 cc discarded.    FINDINGS:    Pulmonary Embolus: Nonocclusive pulmonary embolus extending bifurcation   of the left main pulmonaryartery distally into a few upper and lower   lobe segments to subsegmental. Small right lower lobe segmental   subsegmental PE.    Mediastinum/Lymph Nodes: No lymphadenopathy by size criteria. Small   hiatal hernia.    Heart/Great Vessels: No CT evidence of acute right heart strain. No   pericardial effusion. Normal caliber aorta. Dilated main pulmonary   measuring less than 3.5 cm without significant change.    Abdomen: Limited evaluation based on technique. No acute inflammatory   changes. Cholelithiasis.    Lungs, Pleura, and Airways: Upper tracheal secretions. Emphysema and   bilateral fibrotic changes. Unchanged right middle lobe atelectasis.   Small left pleural effusion with adjacent consolidation/atelectasis.   Bilateral patchy and groundglass opacities increased since prior   examination. Findings maybe infectious in nature. Correlate with   symptoms. Follow-up CT in 1-3 months is recommended given the high risk   background lungs.  7 mm subpleural solid nodule in the right lowerlobe on series 7 image   161. This is new since 2024.    Bones and soft tissues: No aggressive osseous lesion. Osteopenia.   Degenerative changes of the spine. Multilevel compression deformities   without change.      IMPRESSION:    Nonocclusive pulmonary embolus extending bifurcation of the left main   pulmonary artery distally into a few upper and lower lobe segments to   subsegmental. Small right lower lobe segmental subsegmental PE. No CT   evidence of acute right heart strain.    Small left pleural effusion with adjacent consolidation/atelectasis.   Bilateral patchy and groundglass opacities increased since prior   examination. Findings maybe infectious in nature. Correlate with   symptoms. Follow-up CT in 1-3 months is recommended giventhe high risk   background lungs.    7 mm subpleural solid nodule in the right lower lobe on series 7 image   161. This is new since 2024. Attention on follow-up is recommended.    --- End of Report ---            HAIM GONZALEZ MD; Attending Radiologist  This document has been electronically signed. Jan 30 2025  1:36PM (01-30-25 @ 11:23)

## 2025-02-01 NOTE — PROGRESS NOTE ADULT - ATTENDING SUPERVISION STATEMENT
Fellow
Resident
Fellow
Resident
Fellow
Resident
Resident

## 2025-02-01 NOTE — PROGRESS NOTE ADULT - ASSESSMENT
IMPRESSION:    Acute bilateral PE  Acute DVT   Acute hypoxemic resp failure   aspiration pneumonia - recurrent  high fungitell  Thrombocytopenia / HIT +  HO COPD  HO Tracheal stenosis SP dilation   HO trach   History of lung nodules  History of relapsing polychondritis   HO Emphysema     PLAN:    CNS: No sedation. MS at baseline.     HEENT: Oral care; No interventions.     PULMONARY: Wean off Fio2 keep pulse ox 92-96%; .  NC keep Sao2 92 to 96%    CARDIOVASCULAR: keep even  fluid balance. Trend trop. BNP noted. Echocardiogram. No evidence of RHS on the CT.     GI: ppi, feeding    RENAL:  F/u  lytes.  Correct as needed.     INFECTIOUS DISEASE: abx per ID    HEMATOLOGICAL: FUP AXEL, Eliquis    ENDOCRINE:  Follow up FS.  Insulin protocol if needed.      Palliative care follow up.     Dispo: tele  GOC noted

## 2025-02-01 NOTE — PROGRESS NOTE ADULT - SUBJECTIVE AND OBJECTIVE BOX
Over Night Events: Events noted, on NC, feels better, ID/ hemoc noted    PHYSICAL EXAM    ICU Vital Signs Last 24 Hrs  T(C): 36.4 (31 Jan 2025 16:00), Max: 36.4 (31 Jan 2025 16:00)  T(F): 97.5 (31 Jan 2025 16:00), Max: 97.5 (31 Jan 2025 16:00)  HR: 87 (01 Feb 2025 04:03) (75 - 102)  BP: 109/83 (01 Feb 2025 04:03) (106/68 - 128/81)  BP(mean): 93 (01 Feb 2025 04:03) (88 - 100)  SpO2: 96% (01 Feb 2025 04:03) (95% - 99%)        General: ill looking   Lungs: Bilateral rhonchi  Cardiovascular: RUKHSANA 2.6  Abdomen: Soft, Positive BS  Extremities: No clubbing   Skin: Warm  Neurological: Non focal         LABS:                          12.5   7.00  )-----------( 109      ( 01 Feb 2025 06:12 )             40.1                                               02-01    137  |  103  |  10  ----------------------------<  96  4.2   |  24  |  0.7    Ca    8.2[L]      01 Feb 2025 06:12  Phos  3.1     02-01  Mg     1.6     02-01    TPro  4.8[L]  /  Alb  3.1[L]  /  TBili  0.4  /  DBili  x   /  AST  26  /  ALT  15  /  AlkPhos  80  02-01      PT/INR - ( 31 Jan 2025 04:42 )   PT: 13.70 sec;   INR: 1.16 ratio         PTT - ( 31 Jan 2025 04:42 )  PTT:33.6 sec                                       Urinalysis Basic - ( 01 Feb 2025 06:12 )    Color: x / Appearance: x / SG: x / pH: x  Gluc: 96 mg/dL / Ketone: x  / Bili: x / Urobili: x   Blood: x / Protein: x / Nitrite: x   Leuk Esterase: x / RBC: x / WBC x   Sq Epi: x / Non Sq Epi: x / Bacteria: x                                                  LIVER FUNCTIONS - ( 01 Feb 2025 06:12 )  Alb: 3.1 g/dL / Pro: 4.8 g/dL / ALK PHOS: 80 U/L / ALT: 15 U/L / AST: 26 U/L / GGT: x                                                                                                                                   ABG - ( 30 Jan 2025 16:59 )  pH, Arterial: 7.41  pH, Blood: x     /  pCO2: 40    /  pO2: 69    / HCO3: 25    / Base Excess: 0.7   /  SaO2: 94.4                MEDICATIONS  (STANDING):  albuterol/ipratropium for Nebulization 3 milliLiter(s) Nebulizer every 20 minutes  albuterol/ipratropium for Nebulization 3 milliLiter(s) Nebulizer every 4 hours  atorvastatin 40 milliGRAM(s) Oral at bedtime  chlorhexidine 2% Cloths 1 Application(s) Topical daily  dextrose 5%. 1000 milliLiter(s) (100 mL/Hr) IV Continuous <Continuous>  dextrose 5%. 1000 milliLiter(s) (50 mL/Hr) IV Continuous <Continuous>  dextrose 50% Injectable 25 Gram(s) IV Push once  dextrose 50% Injectable 12.5 Gram(s) IV Push once  dextrose 50% Injectable 25 Gram(s) IV Push once  fondaparinux Injectable 7.5 milliGRAM(s) SubCutaneous daily  glucagon  Injectable 1 milliGRAM(s) IntraMuscular once  insulin lispro (ADMELOG) corrective regimen sliding scale   SubCutaneous two times a day before meals  magnesium sulfate  IVPB 2 Gram(s) IV Intermittent every 2 hours  melatonin 3 milliGRAM(s) Oral at bedtime  metoprolol tartrate 12.5 milliGRAM(s) Oral every 12 hours  mycophenolate mofetil 1000 milliGRAM(s) Oral two times a day  pantoprazole    Tablet 40 milliGRAM(s) Oral before breakfast  piperacillin/tazobactam IVPB.. 3.375 Gram(s) IV Intermittent every 6 hours  saccharomyces boulardii 250 milliGRAM(s) Oral two times a day    MEDICATIONS  (PRN):  acetaminophen     Tablet .. 650 milliGRAM(s) Oral every 6 hours PRN Mild Pain (1 - 3), Moderate Pain (4 - 6)  dextrose Oral Gel 15 Gram(s) Oral once PRN Blood Glucose LESS THAN 70 milliGRAM(s)/deciliter  hydrOXYzine hydrochloride 25 milliGRAM(s) Oral every 12 hours PRN Anxiety

## 2025-02-02 DIAGNOSIS — J96.01 ACUTE RESPIRATORY FAILURE WITH HYPOXIA: ICD-10-CM

## 2025-02-02 DIAGNOSIS — D69.6 THROMBOCYTOPENIA, UNSPECIFIED: ICD-10-CM

## 2025-02-02 DIAGNOSIS — J44.9 CHRONIC OBSTRUCTIVE PULMONARY DISEASE, UNSPECIFIED: ICD-10-CM

## 2025-02-02 DIAGNOSIS — C06.9 MALIGNANT NEOPLASM OF MOUTH, UNSPECIFIED: ICD-10-CM

## 2025-02-02 DIAGNOSIS — Z79.899 OTHER LONG TERM (CURRENT) DRUG THERAPY: ICD-10-CM

## 2025-02-02 DIAGNOSIS — I25.10 ATHEROSCLEROTIC HEART DISEASE OF NATIVE CORONARY ARTERY WITHOUT ANGINA PECTORIS: ICD-10-CM

## 2025-02-02 DIAGNOSIS — A41.9 SEPSIS, UNSPECIFIED ORGANISM: ICD-10-CM

## 2025-02-02 DIAGNOSIS — I10 ESSENTIAL (PRIMARY) HYPERTENSION: ICD-10-CM

## 2025-02-02 DIAGNOSIS — M94.8X9 OTHER SPECIFIED DISORDERS OF CARTILAGE, UNSPECIFIED SITES: ICD-10-CM

## 2025-02-02 LAB
ALBUMIN SERPL ELPH-MCNC: 3.2 G/DL — LOW (ref 3.5–5.2)
ALP SERPL-CCNC: 84 U/L — SIGNIFICANT CHANGE UP (ref 30–115)
ALT FLD-CCNC: 14 U/L — SIGNIFICANT CHANGE UP (ref 0–41)
ANION GAP SERPL CALC-SCNC: 9 MMOL/L — SIGNIFICANT CHANGE UP (ref 7–14)
AST SERPL-CCNC: 24 U/L — SIGNIFICANT CHANGE UP (ref 0–41)
BASOPHILS # BLD AUTO: 0.04 K/UL — SIGNIFICANT CHANGE UP (ref 0–0.2)
BASOPHILS NFR BLD AUTO: 0.6 % — SIGNIFICANT CHANGE UP (ref 0–1)
BILIRUB SERPL-MCNC: 0.4 MG/DL — SIGNIFICANT CHANGE UP (ref 0.2–1.2)
BUN SERPL-MCNC: 6 MG/DL — LOW (ref 10–20)
CALCIUM SERPL-MCNC: 8.4 MG/DL — SIGNIFICANT CHANGE UP (ref 8.4–10.4)
CHLORIDE SERPL-SCNC: 105 MMOL/L — SIGNIFICANT CHANGE UP (ref 98–110)
CO2 SERPL-SCNC: 28 MMOL/L — SIGNIFICANT CHANGE UP (ref 17–32)
CREAT SERPL-MCNC: 0.6 MG/DL — LOW (ref 0.7–1.5)
CRYPTOC AG FLD QL: NEGATIVE — SIGNIFICANT CHANGE UP
EGFR: 96 ML/MIN/1.73M2 — SIGNIFICANT CHANGE UP
EOSINOPHIL # BLD AUTO: 0.19 K/UL — SIGNIFICANT CHANGE UP (ref 0–0.7)
EOSINOPHIL NFR BLD AUTO: 3.1 % — SIGNIFICANT CHANGE UP (ref 0–8)
GLUCOSE BLDC GLUCOMTR-MCNC: 126 MG/DL — HIGH (ref 70–99)
GLUCOSE BLDC GLUCOMTR-MCNC: 129 MG/DL — HIGH (ref 70–99)
GLUCOSE BLDC GLUCOMTR-MCNC: 130 MG/DL — HIGH (ref 70–99)
GLUCOSE SERPL-MCNC: 120 MG/DL — HIGH (ref 70–99)
HCT VFR BLD CALC: 40.4 % — SIGNIFICANT CHANGE UP (ref 37–47)
HGB BLD-MCNC: 12.8 G/DL — SIGNIFICANT CHANGE UP (ref 12–16)
IMM GRANULOCYTES NFR BLD AUTO: 0.5 % — HIGH (ref 0.1–0.3)
LYMPHOCYTES # BLD AUTO: 0.38 K/UL — LOW (ref 1.2–3.4)
LYMPHOCYTES # BLD AUTO: 6.1 % — LOW (ref 20.5–51.1)
MAGNESIUM SERPL-MCNC: 2 MG/DL — SIGNIFICANT CHANGE UP (ref 1.8–2.4)
MCHC RBC-ENTMCNC: 29.4 PG — SIGNIFICANT CHANGE UP (ref 27–31)
MCHC RBC-ENTMCNC: 31.7 G/DL — LOW (ref 32–37)
MCV RBC AUTO: 92.9 FL — SIGNIFICANT CHANGE UP (ref 81–99)
MONOCYTES # BLD AUTO: 0.38 K/UL — SIGNIFICANT CHANGE UP (ref 0.1–0.6)
MONOCYTES NFR BLD AUTO: 6.1 % — SIGNIFICANT CHANGE UP (ref 1.7–9.3)
NEUTROPHILS # BLD AUTO: 5.18 K/UL — SIGNIFICANT CHANGE UP (ref 1.4–6.5)
NEUTROPHILS NFR BLD AUTO: 83.6 % — HIGH (ref 42.2–75.2)
NRBC # BLD: 0 /100 WBCS — SIGNIFICANT CHANGE UP (ref 0–0)
NRBC BLD-RTO: 0 /100 WBCS — SIGNIFICANT CHANGE UP (ref 0–0)
PHOSPHATE SERPL-MCNC: 3.2 MG/DL — SIGNIFICANT CHANGE UP (ref 2.1–4.9)
PLATELET # BLD AUTO: 124 K/UL — LOW (ref 130–400)
PMV BLD: 10.3 FL — SIGNIFICANT CHANGE UP (ref 7.4–10.4)
POTASSIUM SERPL-MCNC: 4.3 MMOL/L — SIGNIFICANT CHANGE UP (ref 3.5–5)
POTASSIUM SERPL-SCNC: 4.3 MMOL/L — SIGNIFICANT CHANGE UP (ref 3.5–5)
PROT SERPL-MCNC: 4.9 G/DL — LOW (ref 6–8)
RBC # BLD: 4.35 M/UL — SIGNIFICANT CHANGE UP (ref 4.2–5.4)
RBC # FLD: 15.7 % — HIGH (ref 11.5–14.5)
SODIUM SERPL-SCNC: 142 MMOL/L — SIGNIFICANT CHANGE UP (ref 135–146)
WBC # BLD: 6.2 K/UL — SIGNIFICANT CHANGE UP (ref 4.8–10.8)
WBC # FLD AUTO: 6.2 K/UL — SIGNIFICANT CHANGE UP (ref 4.8–10.8)

## 2025-02-02 PROCEDURE — 71045 X-RAY EXAM CHEST 1 VIEW: CPT | Mod: 26

## 2025-02-02 PROCEDURE — 99233 SBSQ HOSP IP/OBS HIGH 50: CPT

## 2025-02-02 RX ADMIN — PIPERACILLIN SODIUM AND TAZOBACTAM SODIUM 25 GRAM(S): 2; 250 INJECTION, POWDER, FOR SOLUTION INTRAVENOUS at 12:28

## 2025-02-02 RX ADMIN — IPRATROPIUM BROMIDE AND ALBUTEROL SULFATE 3 MILLILITER(S): .5; 2.5 SOLUTION RESPIRATORY (INHALATION) at 14:33

## 2025-02-02 RX ADMIN — ACETAMINOPHEN, DIPHENHYDRAMINE HCL, PHENYLEPHRINE HCL 3 MILLIGRAM(S): 325; 25; 5 TABLET ORAL at 21:23

## 2025-02-02 RX ADMIN — MYCOPHENOLATE MOFETIL 1000 MILLIGRAM(S): 200 POWDER, FOR SUSPENSION ORAL at 05:55

## 2025-02-02 RX ADMIN — Medication 12.5 MILLIGRAM(S): at 05:55

## 2025-02-02 RX ADMIN — IPRATROPIUM BROMIDE AND ALBUTEROL SULFATE 3 MILLILITER(S): .5; 2.5 SOLUTION RESPIRATORY (INHALATION) at 03:44

## 2025-02-02 RX ADMIN — Medication 12.5 MILLIGRAM(S): at 18:14

## 2025-02-02 RX ADMIN — ATORVASTATIN CALCIUM 40 MILLIGRAM(S): 80 TABLET, FILM COATED ORAL at 21:23

## 2025-02-02 RX ADMIN — Medication 250 MILLIGRAM(S): at 05:57

## 2025-02-02 RX ADMIN — PIPERACILLIN SODIUM AND TAZOBACTAM SODIUM 25 GRAM(S): 2; 250 INJECTION, POWDER, FOR SOLUTION INTRAVENOUS at 05:55

## 2025-02-02 RX ADMIN — ANTISEPTIC SURGICAL SCRUB 1 APPLICATION(S): 0.04 SOLUTION TOPICAL at 12:28

## 2025-02-02 RX ADMIN — PANTOPRAZOLE 40 MILLIGRAM(S): 20 TABLET, DELAYED RELEASE ORAL at 05:55

## 2025-02-02 RX ADMIN — PIPERACILLIN SODIUM AND TAZOBACTAM SODIUM 25 GRAM(S): 2; 250 INJECTION, POWDER, FOR SOLUTION INTRAVENOUS at 23:27

## 2025-02-02 RX ADMIN — IPRATROPIUM BROMIDE AND ALBUTEROL SULFATE 3 MILLILITER(S): .5; 2.5 SOLUTION RESPIRATORY (INHALATION) at 20:11

## 2025-02-02 RX ADMIN — MYCOPHENOLATE MOFETIL 1000 MILLIGRAM(S): 200 POWDER, FOR SUSPENSION ORAL at 18:13

## 2025-02-02 RX ADMIN — Medication 250 MILLIGRAM(S): at 18:13

## 2025-02-02 RX ADMIN — FONDAPARINUX SODIUM 7.5 MILLIGRAM(S): 5 INJECTION, SOLUTION SUBCUTANEOUS at 12:29

## 2025-02-02 RX ADMIN — PIPERACILLIN SODIUM AND TAZOBACTAM SODIUM 25 GRAM(S): 2; 250 INJECTION, POWDER, FOR SOLUTION INTRAVENOUS at 18:14

## 2025-02-02 NOTE — PROGRESS NOTE ADULT - NSPROGADDITIONALINFOA_GEN_ALL_CORE
#Progress Note Handoff  Pending (specify): f/u heme, fungal w/u, id  Family discussion: d/w pt at bedside  Disposition: snf .

## 2025-02-02 NOTE — PROGRESS NOTE ADULT - PROBLEM SELECTOR PLAN 2
suspect consumptive vs. bm suppression from infection  hit ab positive  fabiola neg not c/w HIT  f/u heme  trend

## 2025-02-02 NOTE — PROGRESS NOTE ADULT - SUBJECTIVE AND OBJECTIVE BOX
INTERVAL HPI/OVERNIGHT EVENTS:    SUBJECTIVE: Patient seen and examined at bedside.     no cp, sob, abd pain, fever  no sob, orthopnea, pnd, cough    OBJECTIVE:    VITAL SIGNS:  Vital Signs Last 24 Hrs  T(C): 36.6 (02 Feb 2025 08:00), Max: 36.6 (02 Feb 2025 08:00)  T(F): 97.8 (02 Feb 2025 08:00), Max: 97.8 (02 Feb 2025 08:00)  HR: 90 (02 Feb 2025 08:00) (82 - 104)  BP: 128/70 (02 Feb 2025 08:00) (104/62 - 128/70)  BP(mean): 89 (02 Feb 2025 08:00) (77 - 101)  RR: 18 (02 Feb 2025 08:00) (18 - 19)  SpO2: 99% (02 Feb 2025 08:00) (93% - 99%)    Parameters below as of 02 Feb 2025 04:00  Patient On (Oxygen Delivery Method): nasal cannula          PHYSICAL EXAM:    General: NAD  HEENT: NC/AT; PERRL, clear conjunctiva  Neck: supple  Respiratory: CTA b/l  Cardiovascular: +S1/S2; RRR  Abdomen: soft, NT/ND; +BS x4  Extremities: WWP, 2+ peripheral pulses b/l; no LE edema  Skin: normal color and turgor; no rash  Neurological:    MEDICATIONS:  MEDICATIONS  (STANDING):  albuterol/ipratropium for Nebulization 3 milliLiter(s) Nebulizer every 20 minutes  albuterol/ipratropium for Nebulization 3 milliLiter(s) Nebulizer every 4 hours  atorvastatin 40 milliGRAM(s) Oral at bedtime  chlorhexidine 2% Cloths 1 Application(s) Topical daily  dextrose 5%. 1000 milliLiter(s) (100 mL/Hr) IV Continuous <Continuous>  dextrose 5%. 1000 milliLiter(s) (50 mL/Hr) IV Continuous <Continuous>  dextrose 50% Injectable 25 Gram(s) IV Push once  dextrose 50% Injectable 12.5 Gram(s) IV Push once  dextrose 50% Injectable 25 Gram(s) IV Push once  fondaparinux Injectable 7.5 milliGRAM(s) SubCutaneous daily  glucagon  Injectable 1 milliGRAM(s) IntraMuscular once  insulin lispro (ADMELOG) corrective regimen sliding scale   SubCutaneous two times a day before meals  melatonin 3 milliGRAM(s) Oral at bedtime  metoprolol tartrate 12.5 milliGRAM(s) Oral every 12 hours  mycophenolate mofetil 1000 milliGRAM(s) Oral two times a day  pantoprazole    Tablet 40 milliGRAM(s) Oral before breakfast  piperacillin/tazobactam IVPB.. 3.375 Gram(s) IV Intermittent every 6 hours  saccharomyces boulardii 250 milliGRAM(s) Oral two times a day    MEDICATIONS  (PRN):  acetaminophen     Tablet .. 650 milliGRAM(s) Oral every 6 hours PRN Mild Pain (1 - 3), Moderate Pain (4 - 6)  dextrose Oral Gel 15 Gram(s) Oral once PRN Blood Glucose LESS THAN 70 milliGRAM(s)/deciliter  hydrOXYzine hydrochloride 25 milliGRAM(s) Oral every 12 hours PRN Anxiety      ALLERGIES:  Allergies    heparin (Other (Severe))  CONTRAST DYS (Nausea)    Intolerances        LABS:                        12.8   6.20  )-----------( 124      ( 02 Feb 2025 04:50 )             40.4     Hemoglobin: 12.8 g/dL (02-02 @ 04:50)  Hemoglobin: 12.5 g/dL (02-01 @ 06:12)  Hemoglobin: 13.5 g/dL (01-31 @ 04:42)  Hemoglobin: 12.9 g/dL (01-30 @ 07:52)  Hemoglobin: 13.1 g/dL (01-29 @ 07:03)    CBC Full  -  ( 02 Feb 2025 04:50 )  WBC Count : 6.20 K/uL  RBC Count : 4.35 M/uL  Hemoglobin : 12.8 g/dL  Hematocrit : 40.4 %  Platelet Count - Automated : 124 K/uL  Mean Cell Volume : 92.9 fL  Mean Cell Hemoglobin : 29.4 pg  Mean Cell Hemoglobin Concentration : 31.7 g/dL  Auto Neutrophil # : 5.18 K/uL  Auto Lymphocyte # : 0.38 K/uL  Auto Monocyte # : 0.38 K/uL  Auto Eosinophil # : 0.19 K/uL  Auto Basophil # : 0.04 K/uL  Auto Neutrophil % : 83.6 %  Auto Lymphocyte % : 6.1 %  Auto Monocyte % : 6.1 %  Auto Eosinophil % : 3.1 %  Auto Basophil % : 0.6 %    02-02    142  |  105  |  6[L]  ----------------------------<  120[H]  4.3   |  28  |  0.6[L]    Ca    8.4      02 Feb 2025 04:50  Phos  3.2     02-02  Mg     2.0     02-02    TPro  4.9[L]  /  Alb  3.2[L]  /  TBili  0.4  /  DBili  x   /  AST  24  /  ALT  14  /  AlkPhos  84  02-02    Creatinine Trend: 0.6<--, 0.7<--, 0.6<--, 0.5<--, 0.7<--, 0.6<--  LIVER FUNCTIONS - ( 02 Feb 2025 04:50 )  Alb: 3.2 g/dL / Pro: 4.9 g/dL / ALK PHOS: 84 U/L / ALT: 14 U/L / AST: 24 U/L / GGT: x               hs Troponin:            Urinalysis Basic - ( 02 Feb 2025 04:50 )    Color: x / Appearance: x / SG: x / pH: x  Gluc: 120 mg/dL / Ketone: x  / Bili: x / Urobili: x   Blood: x / Protein: x / Nitrite: x   Leuk Esterase: x / RBC: x / WBC x   Sq Epi: x / Non Sq Epi: x / Bacteria: x      CSF:                      EKG:   MICROBIOLOGY:    Culture - Blood (collected 30 Jan 2025 20:00)  Source: .Blood BLOOD  Preliminary Report (02 Feb 2025 09:01):    No growth at 48 Hours      IMAGING:      Labs, imaging, EKG personally reviewed    RADIOLOGY & ADDITIONAL TESTS: Reviewed.

## 2025-02-02 NOTE — PROGRESS NOTE ADULT - PROBLEM SELECTOR PLAN 1
due to acute PE, tracheal stenosis  initial ct neck with tracheal stenosis, collapse  s/p OR with bronch, tracheal dilation by cts  s/p intubation, since extubated  cta chest now with L main pe, subsegmental/ segmental; R subsegmental; bl ggo  tte with mild rv dysfunction, enlargement  va duplex diffuse bl dvts  fondaparinux for now; f/u heme  initial bal path with aspergillosis; fungitell elevated  bal 1/17 yeast  repeat galactomanin, cryptococcus (neg), histoplasmosis; sputum pcp if able  nc to keep spo2 >92

## 2025-02-02 NOTE — PROGRESS NOTE ADULT - ASSESSMENT
69F PMHx HTN, CAD, COPD, oral SCC s/p trach/ PEG (reversed) chemo/ RT here with acute hypoxic resp failure, with tracheal stenosis s/p bronch with dilation; s/p intubation, since extubated. Course c/b acute PE/ DVT. Thrombocytopenia.

## 2025-02-02 NOTE — CHART NOTE - NSCHARTNOTEFT_GEN_A_CORE
Transfer from: SICU   Transfer to: [facility]    HOSPITAL COURSE:    68yo Female with PMH of HTN, HLD, GERD, CAD, COPD not on Home O2, Hx of Pneumothorax, polychondritis, SCC of BOT s/p Trach/PEG (reversed in 4/2021, follows with Dr Louis), s/p CRT (7 sessions in 2021) who presents with SOB for 1 week duration.  Pt was admitted for AHRF 2/2 COPD exacerbation and tracheal stenosis s/p dilation by CT surgery. Required upgrade to MICU iso ARF 2/2 aspiration, s/p bronch, self extubated 1/17, transitioned to NC in SDU.     SDU Course complicated by severe pharyngeal dysphagia with recurrent aspiration s/p FEES/MBS, after several GOC discussions patient is currently on puree diet with swallow device as patient is refusing NGT and prefers to trial modified diet and outpatient therapy before considering PEG. On floors pt weaned to RA, was planning dispo however course then complicated by thrombocytpenia, (+)PF4, acute extensive b/l LE DVTs iso HIT, likely type 2 and now with hypoxia and tachycardia concerning for PE. s/p CTA PE protocol with acute nonocculsive PE extending bifurcation of left main PA distally into a few upper and lower lobe segments to subsegmental, small RLL segmental/subsegmental PE, no RH strain; also notable b/l increasing GGO/patchy opacities may be infectious, new nodule. Currently in SDU for PE and DVTs, started on therapeutic fondaparinux     Downgrade Reason:    Patient has remained HDS, in NAD. Platelet count is improving. AXEL was negative. Patient is on Arixtra pending Heme f/u. Is working with PT. Is approved for downgrade by Dr. Wolf.      Vital Signs Last 24 Hrs  T(C): 36.6 (02 Feb 2025 08:00), Max: 36.6 (02 Feb 2025 08:00)  T(F): 97.8 (02 Feb 2025 08:00), Max: 97.8 (02 Feb 2025 08:00)  HR: 90 (02 Feb 2025 08:00) (82 - 104)  BP: 128/70 (02 Feb 2025 08:00) (104/62 - 128/70)  BP(mean): 89 (02 Feb 2025 08:00) (77 - 101)  RR: 18 (02 Feb 2025 08:00) (18 - 19)  SpO2: 99% (02 Feb 2025 08:00) (93% - 99%)    Parameters below as of 02 Feb 2025 04:00  Patient On (Oxygen Delivery Method): nasal cannula      I&O's Summary    01 Feb 2025 07:01  -  02 Feb 2025 07:00  --------------------------------------------------------  IN: 975 mL / OUT: 1700 mL / NET: -725 mL        PHYSICAL EXAM:  General: NAD  Head: NCAT  Neck: Supple, no JVD  Cardiac: Regular rate and rhythm  Pulmonary: No wheezing  Abdominal: Soft, nontender, and nondistended  Extremities: No pitting edema  Neurologic: AOx3, nonfocal  Skin: Bruising RUE        MEDICATIONS  (STANDING):  albuterol/ipratropium for Nebulization 3 milliLiter(s) Nebulizer every 20 minutes  albuterol/ipratropium for Nebulization 3 milliLiter(s) Nebulizer every 4 hours  atorvastatin 40 milliGRAM(s) Oral at bedtime  chlorhexidine 2% Cloths 1 Application(s) Topical daily  dextrose 5%. 1000 milliLiter(s) (100 mL/Hr) IV Continuous <Continuous>  dextrose 5%. 1000 milliLiter(s) (50 mL/Hr) IV Continuous <Continuous>  dextrose 50% Injectable 25 Gram(s) IV Push once  dextrose 50% Injectable 12.5 Gram(s) IV Push once  dextrose 50% Injectable 25 Gram(s) IV Push once  fondaparinux Injectable 7.5 milliGRAM(s) SubCutaneous daily  glucagon  Injectable 1 milliGRAM(s) IntraMuscular once  insulin lispro (ADMELOG) corrective regimen sliding scale   SubCutaneous two times a day before meals  melatonin 3 milliGRAM(s) Oral at bedtime  metoprolol tartrate 12.5 milliGRAM(s) Oral every 12 hours  mycophenolate mofetil 1000 milliGRAM(s) Oral two times a day  pantoprazole    Tablet 40 milliGRAM(s) Oral before breakfast  piperacillin/tazobactam IVPB.. 3.375 Gram(s) IV Intermittent every 6 hours  saccharomyces boulardii 250 milliGRAM(s) Oral two times a day    MEDICATIONS  (PRN):  acetaminophen     Tablet .. 650 milliGRAM(s) Oral every 6 hours PRN Mild Pain (1 - 3), Moderate Pain (4 - 6)  dextrose Oral Gel 15 Gram(s) Oral once PRN Blood Glucose LESS THAN 70 milliGRAM(s)/deciliter  hydrOXYzine hydrochloride 25 milliGRAM(s) Oral every 12 hours PRN Anxiety        LABS                                            12.8                  Neurophils% (auto):   83.6   (02-02 @ 04:50):    6.20 )-----------(124          Lymphocytes% (auto):  6.1                                           40.4                   Eosinphils% (auto):   3.1      Manual%: Neutrophils x    ; Lymphocytes x    ; Eosinophils x    ; Bands%: x    ; Blasts x                                    142    |  105    |  6                   Calcium: 8.4   / iCa: x      (02-02 @ 04:50)    ----------------------------<  120       Magnesium: 2.0                              4.3     |  28     |  0.6              Phosphorous: 3.2      TPro  4.9    /  Alb  3.2    /  TBili  0.4    /  DBili  x      /  AST  24     /  ALT  14     /  AlkPhos  84     02 Feb 2025 04:50        ASSESSMENT & PLAN:     #Acute Hypoxic Respiratory Failure due to High Risk PE, Possible Component of Stridor due to Existing Tracheal Stenosis  #Acute Extensive b/l LE DVT r/o HIT  #Thrombocytopenia r/o HIT  - PLT slowly dropping on admission 300s -> 75 -> 80 ->108  - PF4 (+)  - Duplex 1/20 (-)  - Duplex 1/29 (+) w/ acute right femoral, popliteal, posterior tibial, and peroneal veins; acute DVT in the left popliteal, posterior tibial, and peroneal veins  - C/w Fondaparinaux  - CTA PE protocol: Acute non-occulsive PE extending bifurcation of left main PA distally into a few upper and lower lobe segments to subsegmental, small RLL segmental/subsegmental PE, no RH strain; also notable b/l increasing GGO/patchy opacities may be infectious, new nodule  - Repeat TTE grossly unchanged from prior, hyperdynamic LV function 60-65%  - SRO negative  - F/u Heme/Onc  - Monitor Plt count    #Severe Oropharyngeal Dysphagia w/ Recurrent Aspiration due to Polychondritis and Tracheal Stenosis   - SLP evaluations appreciated: pt has irreversible severe oropharyngeal dysphagia with aspiration of all consistencies, recommending PEG   - Patient refusing NGT, currently wishes to pursue modified diet and will consider PEG if fails, pt accepts risk of aspiration  - Appreciate palliative and GI recs   - F/u ENT and GI as o/p  - C/w Puree diet with supraglottic swallow device   - C/w Aspiration precautions, HOB >45 deg    #Aspiration and MSSA PNA (Resolved)  #Hx of Polychondritis and Tracheal Stenosis   #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 2021) and CRT in 2021  #Hx of Lung Nodules  - S/p intubation 1/17, self-extubation 1/17, BiPAP/HFNC  - Bronch cx (+) MSSA (1.17)  - Bronch lavage path with neutrophils and fungal organisms +GMS c/f aspergillus (1.10) > no need for treatment per ID (galactomannan negative, unlikely clinically active)  - S/p Prednisone taper  - S/p Cefazolin IV course for MSSA (completed 1/29)  - C/w Cellcept 1000mg BID   - ID eval: Unclear etiology of increased ground glass opacities with relation to aspergillus seen on cytology from 1/10; fungitell possible false positive; does not have clear risk factors for invasive aspergillosis   - F/u repeat aspergillus galactomannan, check urine histoplasma antigen and serum cryptococcal antigen      #Acute on Chronic HFmrEF (Stable)  #Hx of CAD  #HTN  #HLD  - Cardio eval appreciated: recommend SGLT2i on discharge, continue Losartan and Metoprolol succinate  - Hold home Lasix and Losartan due to soft BP  - C/w Atorvastatin 40mg PO QHS   - C/w Metoprolol Tartrate 12.5mg PO BID      #Misc  #Code Status: DNR/Intubate  #DVT ppx: Argatroban  #GI ppx: Protonix  #Diet: Pureed  #Activity: AAT  #Inpatient Dispo: SDU  #Discharge Dispo: [facility] vs. Home w/ PT        For Follow-Up:  Heme/Onc f/u, infectious workup, resume home GDMT when BP can tolerate

## 2025-02-02 NOTE — PROGRESS NOTE ADULT - ASSESSMENT
IMPRESSION:    Acute bilateral PE  Acute DVT   Acute hypoxemic resp failure   aspiration pneumonia - recurrent  high fungitell  Thrombocytopenia / HIT +  HO COPD  HO Tracheal stenosis SP dilation   HO trach   History of lung nodules  History of relapsing polychondritis   HO Emphysema     PLAN:    CNS: No sedation. MS at baseline.     HEENT: Oral care; No interventions.     PULMONARY: Aspiration precaution,  NC keep Sao2 92 to 96%, Neb as needed    CARDIOVASCULAR: keep even  fluid balance. full ac    GI: ppi, feeding    RENAL:  F/u  lytes.  Correct as needed.     INFECTIOUS DISEASE: abx per ID    HEMATOLOGICAL: FUP AXEL, Eliquis/ fundop    ENDOCRINE:  Follow up FS.  Insulin protocol if needed.      Palliative care follow up.     Dispo: tele  GOC noted

## 2025-02-02 NOTE — PROGRESS NOTE ADULT - SUBJECTIVE AND OBJECTIVE BOX
Over Night Events: events noted, on NC, afebrile    PHYSICAL EXAM    ICU Vital Signs Last 24 Hrs  T(C): 36 (02 Feb 2025 00:00), Max: 36.1 (01 Feb 2025 12:00)  T(F): 96.8 (02 Feb 2025 00:00), Max: 96.9 (01 Feb 2025 12:00)  HR: 82 (02 Feb 2025 00:00) (82 - 104)  BP: 104/62 (02 Feb 2025 00:00) (104/62 - 126/85)  BP(mean): 77 (02 Feb 2025 00:00) (77 - 101)  RR: 18 (02 Feb 2025 00:00) (18 - 18)  SpO2: 95% (02 Feb 2025 00:00) (95% - 99%)    O2 Parameters below as of 02 Feb 2025 00:00  Patient On (Oxygen Delivery Method): nasal cannula            General: ill looking  Lungs: dec bs both bases  Cardiovascular: RUKHSANA 2/6  Abdomen: Soft, Positive BS  Extremities: No clubbing   Neurological: Non focal       02-01-25 @ 07:01  -  02-02-25 @ 06:37  --------------------------------------------------------  IN:    IV PiggyBack: 275 mL    Oral Fluid: 500 mL  Total IN: 775 mL    OUT:    Voided (mL): 1000 mL  Total OUT: 1000 mL    Total NET: -225 mL          LABS:                          12.8   6.20  )-----------( 124      ( 02 Feb 2025 04:50 )             40.4                                               02-02    142  |  105  |  6[L]  ----------------------------<  120[H]  4.3   |  28  |  0.6[L]    Ca    8.4      02 Feb 2025 04:50  Phos  3.2     02-02  Mg     2.0     02-02    TPro  4.9[L]  /  Alb  3.2[L]  /  TBili  0.4  /  DBili  x   /  AST  24  /  ALT  14  /  AlkPhos  84  02-02                                             Urinalysis Basic - ( 02 Feb 2025 04:50 )    Color: x / Appearance: x / SG: x / pH: x  Gluc: 120 mg/dL / Ketone: x  / Bili: x / Urobili: x   Blood: x / Protein: x / Nitrite: x   Leuk Esterase: x / RBC: x / WBC x   Sq Epi: x / Non Sq Epi: x / Bacteria: x                                                  LIVER FUNCTIONS - ( 02 Feb 2025 04:50 )  Alb: 3.2 g/dL / Pro: 4.9 g/dL / ALK PHOS: 84 U/L / ALT: 14 U/L / AST: 24 U/L / GGT: x                                                  Culture - Blood (collected 30 Jan 2025 20:00)  Source: .Blood BLOOD  Preliminary Report (01 Feb 2025 09:01):    No growth at 24 hours                                                                                           MEDICATIONS  (STANDING):  albuterol/ipratropium for Nebulization 3 milliLiter(s) Nebulizer every 20 minutes  albuterol/ipratropium for Nebulization 3 milliLiter(s) Nebulizer every 4 hours  atorvastatin 40 milliGRAM(s) Oral at bedtime  chlorhexidine 2% Cloths 1 Application(s) Topical daily  dextrose 5%. 1000 milliLiter(s) (100 mL/Hr) IV Continuous <Continuous>  dextrose 5%. 1000 milliLiter(s) (50 mL/Hr) IV Continuous <Continuous>  dextrose 50% Injectable 25 Gram(s) IV Push once  dextrose 50% Injectable 12.5 Gram(s) IV Push once  dextrose 50% Injectable 25 Gram(s) IV Push once  fondaparinux Injectable 7.5 milliGRAM(s) SubCutaneous daily  glucagon  Injectable 1 milliGRAM(s) IntraMuscular once  insulin lispro (ADMELOG) corrective regimen sliding scale   SubCutaneous two times a day before meals  melatonin 3 milliGRAM(s) Oral at bedtime  metoprolol tartrate 12.5 milliGRAM(s) Oral every 12 hours  mycophenolate mofetil 1000 milliGRAM(s) Oral two times a day  pantoprazole    Tablet 40 milliGRAM(s) Oral before breakfast  piperacillin/tazobactam IVPB.. 3.375 Gram(s) IV Intermittent every 6 hours  saccharomyces boulardii 250 milliGRAM(s) Oral two times a day    MEDICATIONS  (PRN):  acetaminophen     Tablet .. 650 milliGRAM(s) Oral every 6 hours PRN Mild Pain (1 - 3), Moderate Pain (4 - 6)  dextrose Oral Gel 15 Gram(s) Oral once PRN Blood Glucose LESS THAN 70 milliGRAM(s)/deciliter  hydrOXYzine hydrochloride 25 milliGRAM(s) Oral every 12 hours PRN Anxiety      Xrays:                                                                                     ECHO

## 2025-02-02 NOTE — CHART NOTE - NSCHARTNOTESELECT_GEN_ALL_CORE
CC/Nutrition Services
Calorie Count Initiated/Nutrition Services
Downgrade/Event Note
ENT/Event Note
Event Note
Event Note
Follow Up/Nutrition Services
Transfer Note
Event Note
Event Note
Follow up/Nutrition Services
Palliative Care- Social Work/Event Note
Palliative Care- Social Work/Event Note
Post op check/Event Note
Transfer Note
Transfer Note

## 2025-02-03 LAB
ALBUMIN SERPL ELPH-MCNC: 2.9 G/DL — LOW (ref 3.5–5.2)
ALP SERPL-CCNC: 86 U/L — SIGNIFICANT CHANGE UP (ref 30–115)
ALT FLD-CCNC: 13 U/L — SIGNIFICANT CHANGE UP (ref 0–41)
ANION GAP SERPL CALC-SCNC: 7 MMOL/L — SIGNIFICANT CHANGE UP (ref 7–14)
AST SERPL-CCNC: 20 U/L — SIGNIFICANT CHANGE UP (ref 0–41)
BASOPHILS # BLD AUTO: 0.04 K/UL — SIGNIFICANT CHANGE UP (ref 0–0.2)
BASOPHILS NFR BLD AUTO: 0.7 % — SIGNIFICANT CHANGE UP (ref 0–1)
BILIRUB SERPL-MCNC: 0.6 MG/DL — SIGNIFICANT CHANGE UP (ref 0.2–1.2)
BUN SERPL-MCNC: 6 MG/DL — LOW (ref 10–20)
CALCIUM SERPL-MCNC: 8.3 MG/DL — LOW (ref 8.4–10.4)
CHLORIDE SERPL-SCNC: 106 MMOL/L — SIGNIFICANT CHANGE UP (ref 98–110)
CO2 SERPL-SCNC: 26 MMOL/L — SIGNIFICANT CHANGE UP (ref 17–32)
CREAT SERPL-MCNC: 0.5 MG/DL — LOW (ref 0.7–1.5)
EGFR: 101 ML/MIN/1.73M2 — SIGNIFICANT CHANGE UP
EOSINOPHIL # BLD AUTO: 0.22 K/UL — SIGNIFICANT CHANGE UP (ref 0–0.7)
EOSINOPHIL NFR BLD AUTO: 3.9 % — SIGNIFICANT CHANGE UP (ref 0–8)
GLUCOSE BLDC GLUCOMTR-MCNC: 115 MG/DL — HIGH (ref 70–99)
GLUCOSE BLDC GLUCOMTR-MCNC: 135 MG/DL — HIGH (ref 70–99)
GLUCOSE BLDC GLUCOMTR-MCNC: 137 MG/DL — HIGH (ref 70–99)
GLUCOSE BLDC GLUCOMTR-MCNC: 157 MG/DL — HIGH (ref 70–99)
GLUCOSE BLDC GLUCOMTR-MCNC: 162 MG/DL — HIGH (ref 70–99)
GLUCOSE SERPL-MCNC: 119 MG/DL — HIGH (ref 70–99)
HCT VFR BLD CALC: 37.7 % — SIGNIFICANT CHANGE UP (ref 37–47)
HGB BLD-MCNC: 12.2 G/DL — SIGNIFICANT CHANGE UP (ref 12–16)
IMM GRANULOCYTES NFR BLD AUTO: 0.4 % — HIGH (ref 0.1–0.3)
LYMPHOCYTES # BLD AUTO: 0.4 K/UL — LOW (ref 1.2–3.4)
LYMPHOCYTES # BLD AUTO: 7.1 % — LOW (ref 20.5–51.1)
MAGNESIUM SERPL-MCNC: 1.6 MG/DL — LOW (ref 1.8–2.4)
MCHC RBC-ENTMCNC: 29.3 PG — SIGNIFICANT CHANGE UP (ref 27–31)
MCHC RBC-ENTMCNC: 32.4 G/DL — SIGNIFICANT CHANGE UP (ref 32–37)
MCV RBC AUTO: 90.4 FL — SIGNIFICANT CHANGE UP (ref 81–99)
MONOCYTES # BLD AUTO: 0.36 K/UL — SIGNIFICANT CHANGE UP (ref 0.1–0.6)
MONOCYTES NFR BLD AUTO: 6.4 % — SIGNIFICANT CHANGE UP (ref 1.7–9.3)
NEUTROPHILS # BLD AUTO: 4.56 K/UL — SIGNIFICANT CHANGE UP (ref 1.4–6.5)
NEUTROPHILS NFR BLD AUTO: 81.5 % — HIGH (ref 42.2–75.2)
NRBC # BLD: 0 /100 WBCS — SIGNIFICANT CHANGE UP (ref 0–0)
NRBC BLD-RTO: 0 /100 WBCS — SIGNIFICANT CHANGE UP (ref 0–0)
PHOSPHATE SERPL-MCNC: 3.1 MG/DL — SIGNIFICANT CHANGE UP (ref 2.1–4.9)
PLATELET # BLD AUTO: 144 K/UL — SIGNIFICANT CHANGE UP (ref 130–400)
PMV BLD: 10.2 FL — SIGNIFICANT CHANGE UP (ref 7.4–10.4)
POTASSIUM SERPL-MCNC: 4.2 MMOL/L — SIGNIFICANT CHANGE UP (ref 3.5–5)
POTASSIUM SERPL-SCNC: 4.2 MMOL/L — SIGNIFICANT CHANGE UP (ref 3.5–5)
PROT SERPL-MCNC: 4.7 G/DL — LOW (ref 6–8)
RBC # BLD: 4.17 M/UL — LOW (ref 4.2–5.4)
RBC # FLD: 15.6 % — HIGH (ref 11.5–14.5)
SODIUM SERPL-SCNC: 139 MMOL/L — SIGNIFICANT CHANGE UP (ref 135–146)
WBC # BLD: 5.6 K/UL — SIGNIFICANT CHANGE UP (ref 4.8–10.8)
WBC # FLD AUTO: 5.6 K/UL — SIGNIFICANT CHANGE UP (ref 4.8–10.8)

## 2025-02-03 PROCEDURE — 99233 SBSQ HOSP IP/OBS HIGH 50: CPT

## 2025-02-03 PROCEDURE — 71045 X-RAY EXAM CHEST 1 VIEW: CPT | Mod: 26

## 2025-02-03 RX ORDER — MAGNESIUM SULFATE 0.8 MEQ/ML
2 AMPUL (ML) INJECTION
Refills: 0 | Status: COMPLETED | OUTPATIENT
Start: 2025-02-03 | End: 2025-02-03

## 2025-02-03 RX ADMIN — PIPERACILLIN SODIUM AND TAZOBACTAM SODIUM 25 GRAM(S): 2; 250 INJECTION, POWDER, FOR SOLUTION INTRAVENOUS at 05:32

## 2025-02-03 RX ADMIN — MYCOPHENOLATE MOFETIL 1000 MILLIGRAM(S): 200 POWDER, FOR SUSPENSION ORAL at 05:30

## 2025-02-03 RX ADMIN — MYCOPHENOLATE MOFETIL 1000 MILLIGRAM(S): 200 POWDER, FOR SUSPENSION ORAL at 17:27

## 2025-02-03 RX ADMIN — ANTISEPTIC SURGICAL SCRUB 1 APPLICATION(S): 0.04 SOLUTION TOPICAL at 11:50

## 2025-02-03 RX ADMIN — Medication 250 MILLIGRAM(S): at 17:27

## 2025-02-03 RX ADMIN — Medication 12.5 MILLIGRAM(S): at 05:30

## 2025-02-03 RX ADMIN — IPRATROPIUM BROMIDE AND ALBUTEROL SULFATE 3 MILLILITER(S): .5; 2.5 SOLUTION RESPIRATORY (INHALATION) at 18:35

## 2025-02-03 RX ADMIN — Medication 25 GRAM(S): at 11:00

## 2025-02-03 RX ADMIN — Medication 25 GRAM(S): at 13:02

## 2025-02-03 RX ADMIN — PIPERACILLIN SODIUM AND TAZOBACTAM SODIUM 25 GRAM(S): 2; 250 INJECTION, POWDER, FOR SOLUTION INTRAVENOUS at 23:15

## 2025-02-03 RX ADMIN — FONDAPARINUX SODIUM 7.5 MILLIGRAM(S): 5 INJECTION, SOLUTION SUBCUTANEOUS at 11:38

## 2025-02-03 RX ADMIN — PIPERACILLIN SODIUM AND TAZOBACTAM SODIUM 25 GRAM(S): 2; 250 INJECTION, POWDER, FOR SOLUTION INTRAVENOUS at 17:27

## 2025-02-03 RX ADMIN — Medication 250 MILLIGRAM(S): at 05:32

## 2025-02-03 RX ADMIN — PANTOPRAZOLE 40 MILLIGRAM(S): 20 TABLET, DELAYED RELEASE ORAL at 05:31

## 2025-02-03 RX ADMIN — ATORVASTATIN CALCIUM 40 MILLIGRAM(S): 80 TABLET, FILM COATED ORAL at 21:18

## 2025-02-03 RX ADMIN — IPRATROPIUM BROMIDE AND ALBUTEROL SULFATE 3 MILLILITER(S): .5; 2.5 SOLUTION RESPIRATORY (INHALATION) at 11:57

## 2025-02-03 RX ADMIN — Medication 12.5 MILLIGRAM(S): at 17:27

## 2025-02-03 RX ADMIN — ACETAMINOPHEN, DIPHENHYDRAMINE HCL, PHENYLEPHRINE HCL 3 MILLIGRAM(S): 325; 25; 5 TABLET ORAL at 21:18

## 2025-02-03 RX ADMIN — ACETAMINOPHEN 650 MILLIGRAM(S): 160 SUSPENSION ORAL at 18:40

## 2025-02-03 RX ADMIN — IPRATROPIUM BROMIDE AND ALBUTEROL SULFATE 3 MILLILITER(S): .5; 2.5 SOLUTION RESPIRATORY (INHALATION) at 23:27

## 2025-02-03 RX ADMIN — PIPERACILLIN SODIUM AND TAZOBACTAM SODIUM 25 GRAM(S): 2; 250 INJECTION, POWDER, FOR SOLUTION INTRAVENOUS at 11:50

## 2025-02-03 RX ADMIN — IPRATROPIUM BROMIDE AND ALBUTEROL SULFATE 3 MILLILITER(S): .5; 2.5 SOLUTION RESPIRATORY (INHALATION) at 20:22

## 2025-02-03 RX ADMIN — IPRATROPIUM BROMIDE AND ALBUTEROL SULFATE 3 MILLILITER(S): .5; 2.5 SOLUTION RESPIRATORY (INHALATION) at 07:43

## 2025-02-03 RX ADMIN — ACETAMINOPHEN 650 MILLIGRAM(S): 160 SUSPENSION ORAL at 19:10

## 2025-02-03 NOTE — PROGRESS NOTE ADULT - SUBJECTIVE AND OBJECTIVE BOX
GEREMIAS FULLER  69y  Female      Patient is a 69y old  Female who presents with a chief complaint of sob (02 Feb 2025 06:36)      INTERVAL HPI/OVERNIGHT EVENTS:  She feels ok, no chest pain or SOB.   Vital Signs Last 24 Hrs  T(C): 36.7 (03 Feb 2025 11:35), Max: 36.7 (03 Feb 2025 11:35)  T(F): 98 (03 Feb 2025 11:35), Max: 98 (03 Feb 2025 11:35)  HR: 99 (03 Feb 2025 12:46) (86 - 99)  BP: 121/80 (03 Feb 2025 12:46) (98/66 - 121/80)  BP(mean): 94 (03 Feb 2025 12:46) (77 - 94)  RR: 18 (03 Feb 2025 12:46) (17 - 18)  SpO2: 100% (03 Feb 2025 12:46) (96% - 100%)    Parameters below as of 03 Feb 2025 12:46  Patient On (Oxygen Delivery Method): mask, nonrebreather  O2 Flow (L/min): 2        02-02-25 @ 07:01  -  02-03-25 @ 07:00  --------------------------------------------------------  IN: 0 mL / OUT: 1075 mL / NET: -1075 mL    02-03-25 @ 07:01  -  02-03-25 @ 13:13  --------------------------------------------------------  IN: 325 mL / OUT: 900 mL / NET: -575 mL            Consultant(s) Notes Reviewed:  [x ] YES  [ ] NO          MEDICATIONS  (STANDING):  albuterol/ipratropium for Nebulization 3 milliLiter(s) Nebulizer every 20 minutes  albuterol/ipratropium for Nebulization 3 milliLiter(s) Nebulizer every 4 hours  atorvastatin 40 milliGRAM(s) Oral at bedtime  chlorhexidine 2% Cloths 1 Application(s) Topical daily  dextrose 5%. 1000 milliLiter(s) (100 mL/Hr) IV Continuous <Continuous>  dextrose 5%. 1000 milliLiter(s) (50 mL/Hr) IV Continuous <Continuous>  dextrose 50% Injectable 25 Gram(s) IV Push once  dextrose 50% Injectable 12.5 Gram(s) IV Push once  dextrose 50% Injectable 25 Gram(s) IV Push once  fondaparinux Injectable 7.5 milliGRAM(s) SubCutaneous daily  glucagon  Injectable 1 milliGRAM(s) IntraMuscular once  insulin lispro (ADMELOG) corrective regimen sliding scale   SubCutaneous two times a day before meals  melatonin 3 milliGRAM(s) Oral at bedtime  metoprolol tartrate 12.5 milliGRAM(s) Oral every 12 hours  mycophenolate mofetil 1000 milliGRAM(s) Oral two times a day  pantoprazole    Tablet 40 milliGRAM(s) Oral before breakfast  piperacillin/tazobactam IVPB.. 3.375 Gram(s) IV Intermittent every 6 hours  saccharomyces boulardii 250 milliGRAM(s) Oral two times a day    MEDICATIONS  (PRN):  acetaminophen     Tablet .. 650 milliGRAM(s) Oral every 6 hours PRN Mild Pain (1 - 3), Moderate Pain (4 - 6)  dextrose Oral Gel 15 Gram(s) Oral once PRN Blood Glucose LESS THAN 70 milliGRAM(s)/deciliter  hydrOXYzine hydrochloride 25 milliGRAM(s) Oral every 12 hours PRN Anxiety      LABS                          12.2   5.60  )-----------( 144      ( 03 Feb 2025 07:18 )             37.7     02-03    139  |  106  |  6[L]  ----------------------------<  119[H]  4.2   |  26  |  0.5[L]    Ca    8.3[L]      03 Feb 2025 07:18  Phos  3.1     02-03  Mg     1.6     02-03    TPro  4.7[L]  /  Alb  2.9[L]  /  TBili  0.6  /  DBili  x   /  AST  20  /  ALT  13  /  AlkPhos  86  02-03      Urinalysis Basic - ( 03 Feb 2025 07:18 )    Color: x / Appearance: x / SG: x / pH: x  Gluc: 119 mg/dL / Ketone: x  / Bili: x / Urobili: x   Blood: x / Protein: x / Nitrite: x   Leuk Esterase: x / RBC: x / WBC x   Sq Epi: x / Non Sq Epi: x / Bacteria: x        Lactate Trend        CAPILLARY BLOOD GLUCOSE      POCT Blood Glucose.: 137 mg/dL (03 Feb 2025 11:29)      Culture - Blood (collected 01-30-25 @ 20:00)  Source: .Blood BLOOD  Preliminary Report (02-03-25 @ 09:01):    No growth at 72 Hours        RADIOLOGY & ADDITIONAL TESTS:    Imaging Personally Reviewed:  [ ] YES  [ ] NO    HEALTH ISSUES - PROBLEM Dx:  Tracheal stenosis    Pneumonia    Dysphagia    Palliative care encounter    Counseling regarding goals of care    Acute respiratory failure with hypoxia    Severe sepsis with thrombocytopenia    Thrombocytopenia    HTN (hypertension)    CAD (coronary artery disease)    COPD without exacerbation    Primary oral squamous cell carcinoma    On deep vein thrombosis (DVT) prophylaxis    Polychondritis            PHYSICAL EXAM:  GENERAL: NAD, well-developed.  HEAD:  Atraumatic, Normocephalic.  EYES: EOMI, PERRLA, conjunctiva and sclera clear.  NECK: Supple, No JVD.  CHEST/LUNG: poor air entry bilaterally, mild ronchi   HEART: Regular rate and rhythm; S1 S2.   ABDOMEN: Soft, Nontender, Nondistended; Bowel sounds present.  EXTREMITIES:  2+ Peripheral Pulses, No clubbing, cyanosis, or edema.  PSYCH: AAOx3.  NEUROLOGY: non-focal.  SKIN: No rashes or lesions.

## 2025-02-03 NOTE — PROGRESS NOTE ADULT - SUBJECTIVE AND OBJECTIVE BOX
GEREMIAS FULLER  69y, Female  Allergy: heparin (Other (Severe))  CONTRAST DYS (Nausea)      LOS  24d    CHIEF COMPLAINT: sob (02 Feb 2025 06:36)      INTERVAL EVENTS/HPI  - No acute events overnight  - T(F): , Max: 98 (02-03-25 @ 11:35)  - Denies any worsening symptoms  - Tolerating medication  - on 2L NC   - WBC Count: 5.60 (02-03-25 @ 07:18)  WBC Count: 6.20 (02-02-25 @ 04:50)     - Creatinine: 0.5 (02-03-25 @ 07:18)  Creatinine: 0.6 (02-02-25 @ 04:50)       ROS  General: Denies rigors, nightsweats  HEENT: Denies headache, rhinorrhea, sore throat, eye pain  CV: Denies CP, palpitations  PULM: Denies wheezing, hemoptysis  GI: Denies hematemesis, hematochezia, melena  : Denies discharge, hematuria  MSK: Denies arthralgias, myalgias  SKIN: Denies rash, lesions  NEURO: Denies paresthesias, weakness  PSYCH: Denies depression, anxiety    VITALS:  T(F): 98, Max: 98 (02-03-25 @ 11:35)  HR: 99  BP: 121/80  RR: 18Vital Signs Last 24 Hrs  T(C): 36.7 (03 Feb 2025 11:35), Max: 36.7 (03 Feb 2025 11:35)  T(F): 98 (03 Feb 2025 11:35), Max: 98 (03 Feb 2025 11:35)  HR: 99 (03 Feb 2025 12:46) (86 - 99)  BP: 121/80 (03 Feb 2025 12:46) (98/66 - 121/80)  BP(mean): 94 (03 Feb 2025 12:46) (77 - 94)  RR: 18 (03 Feb 2025 12:46) (17 - 18)  SpO2: 100% (03 Feb 2025 12:46) (96% - 100%)    Parameters below as of 03 Feb 2025 12:46  Patient On (Oxygen Delivery Method): mask, nonrebreather  O2 Flow (L/min): 2      PHYSICAL EXAM:  Gen: NAD, resting in bed  HEENT: Normocephalic, atraumatic  Neck: supple, no lymphadenopathy  CV: Regular rate & regular rhythm  Lungs: decreased BS at bases, no fremitus  Abdomen: Soft, BS present  Ext: Warm, well perfused  Neuro: non focal, awake  Skin: no rash, no erythema  Lines: no phlebitis    FH: Non-contributory  Social Hx: Non-contributory    TESTS & MEASUREMENTS:                        12.2   5.60  )-----------( 144      ( 03 Feb 2025 07:18 )             37.7     02-03    139  |  106  |  6[L]  ----------------------------<  119[H]  4.2   |  26  |  0.5[L]    Ca    8.3[L]      03 Feb 2025 07:18  Phos  3.1     02-03  Mg     1.6     02-03    TPro  4.7[L]  /  Alb  2.9[L]  /  TBili  0.6  /  DBili  x   /  AST  20  /  ALT  13  /  AlkPhos  86  02-03      LIVER FUNCTIONS - ( 03 Feb 2025 07:18 )  Alb: 2.9 g/dL / Pro: 4.7 g/dL / ALK PHOS: 86 U/L / ALT: 13 U/L / AST: 20 U/L / GGT: x           Urinalysis Basic - ( 03 Feb 2025 07:18 )    Color: x / Appearance: x / SG: x / pH: x  Gluc: 119 mg/dL / Ketone: x  / Bili: x / Urobili: x   Blood: x / Protein: x / Nitrite: x   Leuk Esterase: x / RBC: x / WBC x   Sq Epi: x / Non Sq Epi: x / Bacteria: x        Culture - Blood (collected 01-30-25 @ 20:00)  Source: .Blood BLOOD  Preliminary Report (02-03-25 @ 09:01):    No growth at 72 Hours    Culture - Fungal, Bronchial (collected 01-17-25 @ 11:37)  Source: BAL  Preliminary Report (01-19-25 @ 11:30):    Few Yeast    Culture - Acid Fast - Bronchial w/Smear (collected 01-17-25 @ 11:37)  Source: BAL  Preliminary Report (02-01-25 @ 23:06):    No acid-fast bacilli isolated at 2 weeks.    Culture - Bronchial (collected 01-17-25 @ 11:37)  Source: Lavage  Gram Stain (01-17-25 @ 23:40):    Few polymorphonuclear leukocytes per low power field    Rare Squamous epithelial cells per low power field    Moderate Gram Positive Cocci in Clusters per oil power field  Final Report (01-19-25 @ 16:13):    Moderate Staphylococcus aureus    Commensal kaushik consistent with body site  Organism: Staphylococcus aureus (01-19-25 @ 16:13)  Organism: Staphylococcus aureus (01-19-25 @ 16:13)      Method Type: ALONZO      -  Clindamycin: R <=0.25 This isolate is presumed to be clindamycin resistant based on detection of inducible resistance. Clindamycin may still be effective in some patients.      -  Erythromycin: R >4      -  Gentamicin: S <=4 Should not be used as monotherapy      -  Oxacillin: S <=0.25 Oxacillin predicts susceptibility for dicloxacillin, methicillin, and nafcillin      -  Penicillin: R 2      -  Rifampin: S <=1 Should not be used as monotherapy      -  Tetracycline: S <=4      -  Trimethoprim/Sulfamethoxazole: S <=0.5/9.5      -  Vancomycin: S 1    Culture - Blood (collected 01-14-25 @ 18:22)  Source: .Blood BLOOD  Final Report (01-19-25 @ 23:00):    No growth at 5 days    Culture - Blood (collected 01-14-25 @ 18:22)  Source: .Blood BLOOD  Final Report (01-19-25 @ 23:00):    No growth at 5 days            INFECTIOUS DISEASES TESTING  Procalcitonin: 0.10 (01-30-25 @ 20:00)  Aspergillus Galactomannan Antigen: 0.04 (01-24-25 @ 06:52)  Fungitell: >500 (01-17-25 @ 17:57)  Procalcitonin: 0.10 (01-17-25 @ 12:49)  MRSA PCR Result.: Negative (01-17-25 @ 12:08)  MRSA PCR Result.: Negative (01-17-25 @ 07:20)  MRSA PCR Result.: Negative (01-13-25 @ 12:40)  Procalcitonin: 0.08 (01-10-25 @ 21:09)  Fungitell: >500 (01-10-25 @ 21:09)      INFLAMMATORY MARKERS      RADIOLOGY & ADDITIONAL TESTS:  I have personally reviewed the last available Chest xray  CXR      CT      CARDIOLOGY TESTING  12 Lead ECG:   Ventricular Rate 101 BPM    Atrial Rate 101 BPM    P-R Interval 148 ms    QRS Duration 80 ms    Q-T Interval 300 ms    QTC Calculation(Bazett) 389 ms    P Axis 48 degrees    R Axis 8 degrees    T Axis 159 degrees    Diagnosis Line Sinus tachycardia  Minimal voltage criteria for LVH, may be normal variant ( R in aVL )  Inferior infarct , age undetermined  ST and T wave abnormality, consider lateral ischemia  Abnormal ECG    Confirmed by Alli Phillips ([id number]) on 1/28/2025 2:52:22 PM (01-28-25 @ 09:01)  12 Lead ECG:   Ventricular Rate 112 BPM    Atrial Rate 112 BPM    P-R Interval 112 ms    QRS Duration 74 ms    Q-T Interval 336 ms    QTC Calculation(Bazett) 458 ms    P Axis 58 degrees    R Axis 3 degrees    T Axis 144 degrees    Diagnosis Line Sinus tachycardiawith Premature supraventricular complexes  ST & T wave abnormality, consider lateral ischemia  Abnormal ECG    Confirmed by ULYSSES HUI MD ([id number]) on 1/28/2025 11:01:41 AM (01-27-25 @ 09:00)      MEDICATIONS  albuterol/ipratropium for Nebulization 3 Nebulizer every 20 minutes  albuterol/ipratropium for Nebulization 3 Nebulizer every 4 hours  atorvastatin 40 Oral at bedtime  chlorhexidine 2% Cloths 1 Topical daily  dextrose 5%. 1000 IV Continuous <Continuous>  dextrose 5%. 1000 IV Continuous <Continuous>  dextrose 50% Injectable 25 IV Push once  dextrose 50% Injectable 12.5 IV Push once  dextrose 50% Injectable 25 IV Push once  fondaparinux Injectable 7.5 SubCutaneous daily  glucagon  Injectable 1 IntraMuscular once  insulin lispro (ADMELOG) corrective regimen sliding scale  SubCutaneous two times a day before meals  melatonin 3 Oral at bedtime  metoprolol tartrate 12.5 Oral every 12 hours  mycophenolate mofetil 1000 Oral two times a day  pantoprazole    Tablet 40 Oral before breakfast  piperacillin/tazobactam IVPB.. 3.375 IV Intermittent every 6 hours  saccharomyces boulardii 250 Oral two times a day      WEIGHT  Weight (kg): 72.6 (01-17-25 @ 05:00)  Creatinine: 0.5 mg/dL (02-03-25 @ 07:18)      ANTIBIOTICS:  piperacillin/tazobactam IVPB.. 3.375 Gram(s) IV Intermittent every 6 hours      All available historical records have been reviewed

## 2025-02-03 NOTE — PROGRESS NOTE ADULT - ASSESSMENT
68yo Female with PMH of HTN, HLD, GERD, CAD, COPD not on Home O2, Hx of Pneumothorax, polychondritis, SCC of tongue s/p Trach/PEG (reversed in 4/2021), s/p CRT (7 sessions in 2021) who presents with SOB for 1 week duration.  Pt was admitted for AHRF 2/2 COPD exacerbation and tracheal stenosis s/p dilation by CT surgery. Required upgrade to MICU iso ARF 2/2 aspiration, s/p bronch, self extubated 1/17, transitioned to NC in SDU.   SDU Course complicated by severe pharyngeal dysphagia with recurrent aspiration s/p FEES/MBS, after several GOC discussions patient is currently on puree diet with swallow device as patient is refusing NGT and prefers to trial modified diet and outpatient therapy before considering PEG. On floors pt weaned to RA, was planning dispo however course then complicated by thrombocytpenia, (+)PF4, acute extensive b/l LE DVTs iso HIT, likely type 2 and now with hypoxia and tachycardia concerning for PE. s/p CTA PE protocol with acute nonocculsive PE extending bifurcation of left main PA distally into a few upper and lower lobe segments to subsegmental, small RLL segmental/subsegmental PE, no RH strain; also notable b/l increasing GGO/patchy opacities may be infectious, new nodule. Currently in SDU for PE and DVTs, started on therapeutic fondaparinux     A/P:   Pulmonary embolism:   Acute Extensive b/l LE DVT r/o HIT  Heparin induced Thrombocytopenia:   PLT slowly dropping on admission 300s -> 75 -> 80 ->108  Duplex 1/20 (-)  Duplex 1/29 (+) w/ acute right femoral, popliteal, posterior tibial, and peroneal veins; acute DVT in the left popliteal, posterior tibial, and peroneal veins  CTA PE protocol: Acute non-occulsive PE extending bifurcation of left main PA distally into a few upper and lower lobe segments to subsegmental, small RLL segmental/subsegmental PE, no RH strain; also notable b/l increasing GGO/patchy opacities may be infectious, new nodule  Repeat TTE grossly unchanged from prior, hyperdynamic LV function 60-65%  T4 score high probability, PF4 (+) SRO negative  Started on Fondaparinux can switch to Eliquis 10mg bId for 7 days then 5mg BID per hematology.     Acute Hypoxic Respiratory Failure   COPD exacerbation: resolved.   MSSA PNA   Reccurent aspiration pneumonia with oropharyngeal dysphagia in setting of polychondritis  Tracheal stenosis SP dilation (history of trach s/p revision in 2021   S/p intubation 1/17, self-extubation 1/17, BiPAP/HFNC  Bronch cx (+) MSSA (1.17)  Bronch lavage path with neutrophils and fungal organisms +GMS c/f aspergillus (1.10) , galactomannan negative, Fungitell >500  S/p Prednisone taper  S/p Cefazolin IV course for MSSA (completed 1/29)  Currently on Zosyn, ID follow up.  ID follow up, unclear if there is active fungal infection , follow up repeat aspergillus galactomannan, check urine histoplasma antigen and serum cryptococcal antigen      Severe Oropharyngeal Dysphagia:   SLP evaluations appreciated: pt has irreversible severe oropharyngeal dysphagia with aspiration of all consistencies, recommending PEG   Patient refusing NGT, currently wishes to pursue modified diet and will consider PEG if fails, pt accepts risk of aspiration  F/u ENT and GI as o/p  Puree diet with supraglottic swallow device   Aspiration precautions, HOB >45 deg    Acute on Chronic HFmrEF (Stable)  CAD  Cardio eval appreciated: recommend SGLT2i on discharge, continue Losartan and Metoprolol succinate  Hold home Lasix and Losartan due to soft BP  Continue Metoprolol and Lipitor.     Polychondritis and Tracheal Stenosis    Cellcept 1000mg BID     #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 2021) and CRT in 2021  #Hx of Lung Nodules      Code Status: DNR/Intubate  DVT Prophylaxis:   GI ppx: Protonix    #Pending: Heme/Onc f/u, infectious workup, resume home GDMT when BP can tolerate

## 2025-02-03 NOTE — PROGRESS NOTE ADULT - SUBJECTIVE AND OBJECTIVE BOX
SUBJECTIVE/OVERNIGHT EVENTS  Today is hospital day 24d. This morning patient was seen and examined at bedside, resting comfortably in bed. No acute or major events overnight.      CODE STATUS:      PMH:  PVD (peripheral vascular disease)    HTN (hypertension)    Hypercholesterolemia    GERD (gastroesophageal reflux disease)    Chronic polychondritis    Pulmonary nodule    CAD (coronary artery disease)    CAD (coronary artery disease)    COPD (chronic obstructive pulmonary disease)    H/O gastroesophageal reflux (GERD)    Relapsing polychondritis    Female cystocele    H/O pneumothorax          PSH:  S/P GEETHA-BSO    S/P colon resection    Vocal cord polyps    Pulmonary nodule    History of bladder surgery    H/O breast biopsy          MEDICATIONS  STANDING MEDICATIONS  albuterol/ipratropium for Nebulization 3 milliLiter(s) Nebulizer every 20 minutes  albuterol/ipratropium for Nebulization 3 milliLiter(s) Nebulizer every 4 hours  atorvastatin 40 milliGRAM(s) Oral at bedtime  chlorhexidine 2% Cloths 1 Application(s) Topical daily  dextrose 5%. 1000 milliLiter(s) IV Continuous <Continuous>  dextrose 5%. 1000 milliLiter(s) IV Continuous <Continuous>  dextrose 50% Injectable 25 Gram(s) IV Push once  dextrose 50% Injectable 12.5 Gram(s) IV Push once  dextrose 50% Injectable 25 Gram(s) IV Push once  fondaparinux Injectable 7.5 milliGRAM(s) SubCutaneous daily  glucagon  Injectable 1 milliGRAM(s) IntraMuscular once  insulin lispro (ADMELOG) corrective regimen sliding scale   SubCutaneous two times a day before meals  melatonin 3 milliGRAM(s) Oral at bedtime  metoprolol tartrate 12.5 milliGRAM(s) Oral every 12 hours  mycophenolate mofetil 1000 milliGRAM(s) Oral two times a day  pantoprazole    Tablet 40 milliGRAM(s) Oral before breakfast  piperacillin/tazobactam IVPB.. 3.375 Gram(s) IV Intermittent every 6 hours  saccharomyces boulardii 250 milliGRAM(s) Oral two times a day    PRN MEDICATIONS  acetaminophen     Tablet .. 650 milliGRAM(s) Oral every 6 hours PRN  dextrose Oral Gel 15 Gram(s) Oral once PRN  hydrOXYzine hydrochloride 25 milliGRAM(s) Oral every 12 hours PRN    VITALS  T(F): 98 (02-03-25 @ 11:35), Max: 98 (02-03-25 @ 11:35)  HR: 99 (02-03-25 @ 12:46) (86 - 99)  BP: 121/80 (02-03-25 @ 12:46) (98/66 - 121/80)  RR: 18 (02-03-25 @ 12:46) (17 - 18)  SpO2: 100% (02-03-25 @ 12:46) (96% - 100%)  POCT Blood Glucose.: 135 mg/dL (02-03-25 @ 16:45)  POCT Blood Glucose.: 137 mg/dL (02-03-25 @ 11:29)  POCT Blood Glucose.: 115 mg/dL (02-03-25 @ 07:41)  POCT Blood Glucose.: 162 mg/dL (02-03-25 @ 00:20)  POCT Blood Glucose.: 130 mg/dL (02-02-25 @ 18:01)    PHYSICAL EXAM  GENERAL  ( x ) NAD, lying in bed comfortably     (  ) obtunded     (  ) lethargic     (  ) somnolent    HEAD  ( x ) Atraumatic     (  ) hematoma     (  ) laceration (specify location:       )     NECK  ( x ) Supple     (  ) neck stiffness     (  ) nuchal rigidity     (  )  no JVD     (  ) JVD present ( -- cm)    HEART  Rate -->  ( x ) normal rate    (  ) bradycardic    (  ) tachycardic  Rhythm -->  ( x ) regular    (  ) regularly irregular    (  ) irregularly irregular  Murmurs -->  ( x ) normal s1/s2    (  ) systolic murmur    (  ) diastolic murmur    (  ) continuous murmur     (  ) S3 present    (  ) S4 present    LUNGS  ( x )Unlabored respirations     (  ) tachypnea  ( x ) B/L air entry     (  ) decreased breath sounds in:  (location     )    ( x ) no adventitious sound     (  ) crackles     (  ) wheezing      (  ) rhonchi      (specify location:       )  (  ) chest wall tenderness (specify location:       )    ABDOMEN  ( x ) Soft     (  ) tense   |   ( x ) nondistended     (  ) distended   |   (  ) +BS     (  ) hypoactive bowel sounds     (  ) hyperactive bowel sounds  ( x ) nontender     (  ) RUQ tenderness     (  ) RLQ tenderness     (  ) LLQ tenderness     (  ) epigastric tenderness     (  ) diffuse tenderness  (  ) Splenomegaly      (  ) Hepatomegaly      (  ) Jaundice     (  ) ecchymosis     EXTREMITIES  (x  ) Normal     (  ) Rash     (  ) ecchymosis     (  ) varicose veins      (  ) pitting edema     (  ) non-pitting edema   (  ) ulceration     (  ) gangrene:     (location:     )    NERVOUS SYSTEM  (  ) A&Ox3     (  ) confused     (  ) lethargic  CN II-XII:     (  ) Intact     (  ) focal deficits  (Specify:     )   Upper extremities:     (  ) strength X/5     (  ) focal deficit (specify:    )  Lower extremities:     (  ) strength  X/5    (  ) focal deficit (specify:    )      LABS             12.2   5.60  )-----------( 144      ( 02-03-25 @ 07:18 )             37.7     139  |  106  |  6   -------------------------<  119   02-03-25 @ 07:18  4.2  |  26  |  0.5    Ca      8.3     02-03-25 @ 07:18  Phos   3.1     02-03-25 @ 07:18  Mg     1.6     02-03-25 @ 07:18    TPro  4.7  /  Alb  2.9  /  TBili  0.6  /  DBili  x   /  AST  20  /  ALT  13  /  AlkPhos  86  /  GGT  x     02-03-25 @ 07:18        Urinalysis Basic - ( 03 Feb 2025 07:18 )    Color: x / Appearance: x / SG: x / pH: x  Gluc: 119 mg/dL / Ketone: x  / Bili: x / Urobili: x   Blood: x / Protein: x / Nitrite: x   Leuk Esterase: x / RBC: x / WBC x   Sq Epi: x / Non Sq Epi: x / Bacteria: x          IMAGING

## 2025-02-03 NOTE — PROGRESS NOTE ADULT - TIME BILLING
I have personally seen and examined this patient.    I have reviewed all pertinent clinical information and reviewed all relevant imaging and diagnostic studies personally.   I counseled the patient about diagnostic testing and treatment plan. All questions were answered.   I discussed recommendations with the primary team.
speaking to  the patient, physical exam, review labs, images, acute care
time spent on review of labs, imaging studies, old records, obtaining history, personally examining patient, counselling and communicating with patient, entering orders for medications/tests/etc, discussions with other health care providers, documentation in electronic health records, independent interpretation of labs, imaging/procedure results and care coordination.
time spent on review of labs, imaging studies, old records, obtaining history, personally examining patient, counselling and communicating with patient, entering orders for medications/tests/etc, discussions with other health care providers, documentation in electronic health records, independent interpretation of labs, imaging/procedure results and care coordination.
direct pt care and interdisciplinary rounds / reviewed chart, labs and imaging and discussed plan w/ team and CM/SW
Coordination of care
time spent on review of labs, imaging studies, old records, obtaining history, personally examining patient, counselling and communicating with patient/ family, entering orders for medications/tests/etc, discussions with other health care providers, documentation in electronic health records, independent interpretation of labs, imaging/procedure results and care coordination.
time spent on review of labs, imaging studies, old records, obtaining history, personally examining patient, counselling and communicating with patient, entering orders for medications/tests/etc, discussions with other health care providers, documentation in electronic health records, independent interpretation of labs, imaging/procedure results and care coordination.
time spent on review of labs, imaging studies, old records, obtaining history, personally examining patient, counselling and communicating with patient/ family, entering orders for medications/tests/etc, discussions with other health care providers, documentation in electronic health records, independent interpretation of labs, imaging/procedure results and care coordination.
Coordination of care
direct pt care and IDR / Chart reviewed / Imaging and TTE reviewed
time spent on review of labs, imaging studies, old records, obtaining history, personally examining patient, counselling and communicating with patient/ family, entering orders for medications/tests/etc, discussions with other health care providers, documentation in electronic health records, independent interpretation of labs, imaging/procedure results and care coordination.

## 2025-02-03 NOTE — PROGRESS NOTE ADULT - ASSESSMENT
ASSESSMENT  A 68yo Female with PMH of HTN, HLD, GERD, CAD, COPD not on Home O2, Hx of Pneumothorax, polychondirits, SCC of BOT s/p Trach/PEG (reversed in 4/2021), s/p CRT (7 sessions in 2021) who presents with SOB for 1 week duration.    IMPRESSION  #SCC of BOT s/p Trach/PEG (reversed 2021) s/p CRT  #Dyspnea/Acute Hypoxemic respiratory failure  #Tracheal Stenosis  - CT Neck Soft Tissue w/ IV Cont (01.11.25 @ 08:27): Interval development of focal moderate tracheal stenosis at the level of  the thyroid with partially collapsed appearance of the right tracheal  wall since the prior CT neck from 9/3/2024, possibly associated with the  history of prior tracheostomy. Stable mild thickening of the epiglottis and small preepiglottic soft  tissue density. Resolved nasopharyngeal soft tissue thickening and left  aryepiglottic fold thickening. Increased biapical reticular changes. Please see separately dictated  concurrent chest CT.  - s/p bronch 1/14 -- tracheal dilation with excision of legion causing tracheal stenosis     #Elevated Fungitell/Ground Glass Opacities   - Bronch 1/10 cytology with no malignant cells found, but Aspergillus identified on GMS stain -- unclear significance  - Aspergillus Galactomannan Antigen: 0.04 (01.24.25 @ 06:52)  - CT Angio Chest PE Protocol w/ IV Cont (01.30.25 @ 11:23): Nonocclusive pulmonary embolus extending bifurcation of the left main  pulmonary artery distally into a few upper and lower lobe segments to  subsegmental. Small right lower lobe segmental subsegmental PE. No CT  evidence of acute right heart strain.  Small left pleural effusion with adjacent consolidation/atelectasis.  Bilateral patchy and groundglass opacities increased since prior  examination. Findings maybe infectious in nature. Correlate with  symptoms. Follow-up CT in 1-3 months is recommended giventhe high risk  background lungs. 7 mm subpleural solid nodule in the right lower lobe on series 7 image  161. This is new since 2024. Attention on follow-up is recommended.  - serum Cryptococcal antigen negative     #CAD  #COPD    #Obesity BMI (kg/m2): 29.2  #DM  #Abx allergy: No Known Drug Allergies        RECOMMENDATIONS  - unclear etiology of increased ground glass opacities with relation to aspergillus seen on cytology from 1/10; - fungitell possible false positive; does not have clear risk factors for invasive aspergillosis -- clinically not acting like invasive infection   - continue zosyn for 5 day course -- last day tomorrow   - anticoagulation by primary     Please call or message on Microsoft Teams if with any questions.  Spectra [phone]

## 2025-02-03 NOTE — PROGRESS NOTE ADULT - ASSESSMENT
#Acute Hypoxic Respiratory Failure due to High Risk PE, Possible Component of Stridor due to Existing Tracheal Stenosis  #Acute Extensive b/l LE DVT r/o HIT  #Thrombocytopenia r/o HIT  - PLT slowly dropping on admission 300s -> 75 -> 80 ->108  - PF4 (+)  - Duplex 1/20 (-)  - Duplex 1/29 (+) w/ acute right femoral, popliteal, posterior tibial, and peroneal veins; acute DVT in the left popliteal, posterior tibial, and peroneal veins  - C/w Fondaparinaux  - CTA PE protocol: Acute non-occulsive PE extending bifurcation of left main PA distally into a few upper and lower lobe segments to subsegmental, small RLL segmental/subsegmental PE, no RH strain; also notable b/l increasing GGO/patchy opacities may be infectious, new nodule  - Repeat TTE grossly unchanged from prior, hyperdynamic LV function 60-65%      #Severe Oropharyngeal Dysphagia w/ Recurrent Aspiration due to Polychondritis and Tracheal Stenosis   - SLP evaluations appreciated: pt has irreversible severe oropharyngeal dysphagia with aspiration of all consistencies, recommending PEG   - Patient refusing NGT, currently wishes to pursue modified diet and will consider PEG if fails, pt accepts risk of aspiration  - Appreciate palliative and GI recs   - F/u ENT and GI as o/p  - C/w Puree diet with supraglottic swallow device   - C/w Aspiration precautions, HOB >45 deg    #Aspiration and MSSA PNA (Resolved)  #Hx of Polychondritis and Tracheal Stenosis   #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 2021) and CRT in 2021  #Hx of Lung Nodules  - S/p intubation 1/17, self-extubation 1/17, BiPAP/HFNC  - Bronch cx (+) MSSA (1.17)  - Bronch lavage path with neutrophils and fungal organisms +GMS c/f aspergillus (1.10) > no need for treatment per ID (galactomannan negative, unlikely clinically active)  - S/p Prednisone taper  - S/p Cefazolin IV course for MSSA (completed 1/29)  - C/w Cellcept 1000mg BID   - ID following  - Zosyn for 5 days    #Acute on Chronic HFmrEF (Stable)  #Hx of CAD  #HTN  #HLD  - Cardio eval appreciated: recommend SGLT2i on discharge, continue Losartan and Metoprolol succinate  - Hold home Lasix and Losartan due to soft BP  - C/w Atorvastatin 40mg PO QHS   - C/w Metoprolol Tartrate    #Misc  #Code Status: DNR/Intubate  #GI ppx: Protonix  #Diet: Pureed  #Activity: AAT  #Inpatient Dispo: SDU  #Discharge Dispo: [facility] vs. Home w/ PT

## 2025-02-04 LAB
ALBUMIN SERPL ELPH-MCNC: 3 G/DL — LOW (ref 3.5–5.2)
ALP SERPL-CCNC: 89 U/L — SIGNIFICANT CHANGE UP (ref 30–115)
ALT FLD-CCNC: 12 U/L — SIGNIFICANT CHANGE UP (ref 0–41)
ANION GAP SERPL CALC-SCNC: 13 MMOL/L — SIGNIFICANT CHANGE UP (ref 7–14)
AST SERPL-CCNC: 23 U/L — SIGNIFICANT CHANGE UP (ref 0–41)
BASOPHILS # BLD AUTO: 0.02 K/UL — SIGNIFICANT CHANGE UP (ref 0–0.2)
BASOPHILS NFR BLD AUTO: 0.5 % — SIGNIFICANT CHANGE UP (ref 0–1)
BILIRUB SERPL-MCNC: 0.3 MG/DL — SIGNIFICANT CHANGE UP (ref 0.2–1.2)
BUN SERPL-MCNC: 8 MG/DL — LOW (ref 10–20)
CALCIUM SERPL-MCNC: 7.8 MG/DL — LOW (ref 8.4–10.5)
CHLORIDE SERPL-SCNC: 102 MMOL/L — SIGNIFICANT CHANGE UP (ref 98–110)
CO2 SERPL-SCNC: 22 MMOL/L — SIGNIFICANT CHANGE UP (ref 17–32)
CREAT SERPL-MCNC: 0.6 MG/DL — LOW (ref 0.7–1.5)
EGFR: 96 ML/MIN/1.73M2 — SIGNIFICANT CHANGE UP
EOSINOPHIL # BLD AUTO: 0.22 K/UL — SIGNIFICANT CHANGE UP (ref 0–0.7)
EOSINOPHIL NFR BLD AUTO: 6 % — SIGNIFICANT CHANGE UP (ref 0–8)
GLUCOSE BLDC GLUCOMTR-MCNC: 119 MG/DL — HIGH (ref 70–99)
GLUCOSE BLDC GLUCOMTR-MCNC: 131 MG/DL — HIGH (ref 70–99)
GLUCOSE BLDC GLUCOMTR-MCNC: 149 MG/DL — HIGH (ref 70–99)
GLUCOSE BLDC GLUCOMTR-MCNC: 169 MG/DL — HIGH (ref 70–99)
GLUCOSE SERPL-MCNC: 218 MG/DL — HIGH (ref 70–99)
HCT VFR BLD CALC: 37 % — SIGNIFICANT CHANGE UP (ref 37–47)
HGB BLD-MCNC: 11.7 G/DL — LOW (ref 12–16)
IMM GRANULOCYTES NFR BLD AUTO: 0.3 % — SIGNIFICANT CHANGE UP (ref 0.1–0.3)
LYMPHOCYTES # BLD AUTO: 0.37 K/UL — LOW (ref 1.2–3.4)
LYMPHOCYTES # BLD AUTO: 10.1 % — LOW (ref 20.5–51.1)
MAGNESIUM SERPL-MCNC: 1.9 MG/DL — SIGNIFICANT CHANGE UP (ref 1.8–2.4)
MCHC RBC-ENTMCNC: 28.9 PG — SIGNIFICANT CHANGE UP (ref 27–31)
MCHC RBC-ENTMCNC: 31.6 G/DL — LOW (ref 32–37)
MCV RBC AUTO: 91.4 FL — SIGNIFICANT CHANGE UP (ref 81–99)
MONOCYTES # BLD AUTO: 0.24 K/UL — SIGNIFICANT CHANGE UP (ref 0.1–0.6)
MONOCYTES NFR BLD AUTO: 6.5 % — SIGNIFICANT CHANGE UP (ref 1.7–9.3)
NEUTROPHILS # BLD AUTO: 2.82 K/UL — SIGNIFICANT CHANGE UP (ref 1.4–6.5)
NEUTROPHILS NFR BLD AUTO: 76.6 % — HIGH (ref 42.2–75.2)
NRBC # BLD: 0 /100 WBCS — SIGNIFICANT CHANGE UP (ref 0–0)
NRBC BLD-RTO: 0 /100 WBCS — SIGNIFICANT CHANGE UP (ref 0–0)
PHOSPHATE SERPL-MCNC: 2.8 MG/DL — SIGNIFICANT CHANGE UP (ref 2.1–4.9)
PLATELET # BLD AUTO: 149 K/UL — SIGNIFICANT CHANGE UP (ref 130–400)
PMV BLD: 10.5 FL — HIGH (ref 7.4–10.4)
POTASSIUM SERPL-MCNC: 3.8 MMOL/L — SIGNIFICANT CHANGE UP (ref 3.5–5)
POTASSIUM SERPL-SCNC: 3.8 MMOL/L — SIGNIFICANT CHANGE UP (ref 3.5–5)
PROT SERPL-MCNC: 4.6 G/DL — LOW (ref 6–8)
RBC # BLD: 4.05 M/UL — LOW (ref 4.2–5.4)
RBC # FLD: 15.6 % — HIGH (ref 11.5–14.5)
SODIUM SERPL-SCNC: 137 MMOL/L — SIGNIFICANT CHANGE UP (ref 135–146)
WBC # BLD: 3.68 K/UL — LOW (ref 4.8–10.8)
WBC # FLD AUTO: 3.68 K/UL — LOW (ref 4.8–10.8)

## 2025-02-04 PROCEDURE — 99232 SBSQ HOSP IP/OBS MODERATE 35: CPT

## 2025-02-04 RX ORDER — APIXABAN 5 MG/1
10 TABLET, FILM COATED ORAL EVERY 12 HOURS
Refills: 0 | Status: DISCONTINUED | OUTPATIENT
Start: 2025-02-04 | End: 2025-02-10

## 2025-02-04 RX ADMIN — PIPERACILLIN SODIUM AND TAZOBACTAM SODIUM 25 GRAM(S): 2; 250 INJECTION, POWDER, FOR SOLUTION INTRAVENOUS at 05:28

## 2025-02-04 RX ADMIN — ATORVASTATIN CALCIUM 40 MILLIGRAM(S): 80 TABLET, FILM COATED ORAL at 21:23

## 2025-02-04 RX ADMIN — Medication 12.5 MILLIGRAM(S): at 05:25

## 2025-02-04 RX ADMIN — PIPERACILLIN SODIUM AND TAZOBACTAM SODIUM 25 GRAM(S): 2; 250 INJECTION, POWDER, FOR SOLUTION INTRAVENOUS at 18:44

## 2025-02-04 RX ADMIN — PANTOPRAZOLE 40 MILLIGRAM(S): 20 TABLET, DELAYED RELEASE ORAL at 05:26

## 2025-02-04 RX ADMIN — MYCOPHENOLATE MOFETIL 1000 MILLIGRAM(S): 200 POWDER, FOR SUSPENSION ORAL at 18:27

## 2025-02-04 RX ADMIN — APIXABAN 10 MILLIGRAM(S): 5 TABLET, FILM COATED ORAL at 10:56

## 2025-02-04 RX ADMIN — APIXABAN 10 MILLIGRAM(S): 5 TABLET, FILM COATED ORAL at 21:24

## 2025-02-04 RX ADMIN — Medication 250 MILLIGRAM(S): at 18:27

## 2025-02-04 RX ADMIN — IPRATROPIUM BROMIDE AND ALBUTEROL SULFATE 3 MILLILITER(S): .5; 2.5 SOLUTION RESPIRATORY (INHALATION) at 20:08

## 2025-02-04 RX ADMIN — Medication 250 MILLIGRAM(S): at 05:24

## 2025-02-04 RX ADMIN — ACETAMINOPHEN, DIPHENHYDRAMINE HCL, PHENYLEPHRINE HCL 3 MILLIGRAM(S): 325; 25; 5 TABLET ORAL at 21:23

## 2025-02-04 RX ADMIN — Medication 12.5 MILLIGRAM(S): at 18:27

## 2025-02-04 RX ADMIN — PIPERACILLIN SODIUM AND TAZOBACTAM SODIUM 25 GRAM(S): 2; 250 INJECTION, POWDER, FOR SOLUTION INTRAVENOUS at 13:01

## 2025-02-04 RX ADMIN — IPRATROPIUM BROMIDE AND ALBUTEROL SULFATE 3 MILLILITER(S): .5; 2.5 SOLUTION RESPIRATORY (INHALATION) at 14:27

## 2025-02-04 RX ADMIN — IPRATROPIUM BROMIDE AND ALBUTEROL SULFATE 3 MILLILITER(S): .5; 2.5 SOLUTION RESPIRATORY (INHALATION) at 02:36

## 2025-02-04 RX ADMIN — MYCOPHENOLATE MOFETIL 1000 MILLIGRAM(S): 200 POWDER, FOR SUSPENSION ORAL at 05:24

## 2025-02-04 RX ADMIN — Medication 2: at 16:54

## 2025-02-04 RX ADMIN — ANTISEPTIC SURGICAL SCRUB 1 APPLICATION(S): 0.04 SOLUTION TOPICAL at 12:00

## 2025-02-04 RX ADMIN — IPRATROPIUM BROMIDE AND ALBUTEROL SULFATE 3 MILLILITER(S): .5; 2.5 SOLUTION RESPIRATORY (INHALATION) at 09:00

## 2025-02-04 NOTE — PROGRESS NOTE ADULT - SUBJECTIVE AND OBJECTIVE BOX
GEREMIAS FULLER  69y  Female      Patient is a 69y old  Female who presents with a chief complaint of sob (02 Feb 2025 06:36)      INTERVAL HPI/OVERNIGHT EVENTS:  She feels ok, no chest pain or SOB.   Vital Signs Last 24 Hrs  T(C): 36.3 (04 Feb 2025 12:25), Max: 36.7 (03 Feb 2025 19:54)  T(F): 97.3 (04 Feb 2025 12:25), Max: 98 (03 Feb 2025 19:54)  HR: 87 (04 Feb 2025 12:25) (74 - 96)  BP: 125/84 (04 Feb 2025 12:25) (114/72 - 125/84)  BP(mean): 86 (04 Feb 2025 10:45) (86 - 94)  RR: 18 (04 Feb 2025 12:25) (17 - 18)  SpO2: 98% (04 Feb 2025 12:25) (96% - 98%)    Parameters below as of 04 Feb 2025 10:45  Patient On (Oxygen Delivery Method): nasal cannula  O2 Flow (L/min): 2        02-03-25 @ 07:01  -  02-04-25 @ 07:00  --------------------------------------------------------  IN: 443 mL / OUT: 1600 mL / NET: -1157 mL    02-04-25 @ 07:01  -  02-04-25 @ 16:07  --------------------------------------------------------  IN: 650 mL / OUT: 1200 mL / NET: -550 mL            Consultant(s) Notes Reviewed:  [x ] YES  [ ] NO          MEDICATIONS  (STANDING):  albuterol/ipratropium for Nebulization 3 milliLiter(s) Nebulizer every 20 minutes  albuterol/ipratropium for Nebulization 3 milliLiter(s) Nebulizer every 4 hours  apixaban 10 milliGRAM(s) Oral every 12 hours  atorvastatin 40 milliGRAM(s) Oral at bedtime  chlorhexidine 2% Cloths 1 Application(s) Topical daily  dextrose 5%. 1000 milliLiter(s) (100 mL/Hr) IV Continuous <Continuous>  dextrose 5%. 1000 milliLiter(s) (50 mL/Hr) IV Continuous <Continuous>  dextrose 50% Injectable 25 Gram(s) IV Push once  dextrose 50% Injectable 12.5 Gram(s) IV Push once  dextrose 50% Injectable 25 Gram(s) IV Push once  glucagon  Injectable 1 milliGRAM(s) IntraMuscular once  insulin lispro (ADMELOG) corrective regimen sliding scale   SubCutaneous two times a day before meals  melatonin 3 milliGRAM(s) Oral at bedtime  metoprolol tartrate 12.5 milliGRAM(s) Oral every 12 hours  mycophenolate mofetil 1000 milliGRAM(s) Oral two times a day  pantoprazole    Tablet 40 milliGRAM(s) Oral before breakfast  piperacillin/tazobactam IVPB.. 3.375 Gram(s) IV Intermittent every 6 hours  saccharomyces boulardii 250 milliGRAM(s) Oral two times a day    MEDICATIONS  (PRN):  acetaminophen     Tablet .. 650 milliGRAM(s) Oral every 6 hours PRN Mild Pain (1 - 3), Moderate Pain (4 - 6)  dextrose Oral Gel 15 Gram(s) Oral once PRN Blood Glucose LESS THAN 70 milliGRAM(s)/deciliter  hydrOXYzine hydrochloride 25 milliGRAM(s) Oral every 12 hours PRN Anxiety      LABS                          11.7   3.68  )-----------( 149      ( 04 Feb 2025 06:59 )             37.0     02-04    137  |  102  |  8[L]  ----------------------------<  218[H]  3.8   |  22  |  0.6[L]    Ca    7.8[L]      04 Feb 2025 06:59  Phos  2.8     02-04  Mg     1.9     02-04    TPro  4.6[L]  /  Alb  3.0[L]  /  TBili  0.3  /  DBili  x   /  AST  23  /  ALT  12  /  AlkPhos  89  02-04      Urinalysis Basic - ( 04 Feb 2025 06:59 )    Color: x / Appearance: x / SG: x / pH: x  Gluc: 218 mg/dL / Ketone: x  / Bili: x / Urobili: x   Blood: x / Protein: x / Nitrite: x   Leuk Esterase: x / RBC: x / WBC x   Sq Epi: x / Non Sq Epi: x / Bacteria: x        Lactate Trend        CAPILLARY BLOOD GLUCOSE      POCT Blood Glucose.: 119 mg/dL (04 Feb 2025 11:55)      Culture - Blood (collected 01-30-25 @ 20:00)  Source: .Blood BLOOD  Preliminary Report (02-04-25 @ 09:01):    No growth at 4 days        RADIOLOGY & ADDITIONAL TESTS:    Imaging Personally Reviewed:  [ ] YES  [ ] NO    HEALTH ISSUES - PROBLEM Dx:  Tracheal stenosis    Pneumonia    Dysphagia    Palliative care encounter    Counseling regarding goals of care    Acute respiratory failure with hypoxia    Severe sepsis with thrombocytopenia    Thrombocytopenia    HTN (hypertension)    CAD (coronary artery disease)    COPD without exacerbation    Primary oral squamous cell carcinoma    On deep vein thrombosis (DVT) prophylaxis    Polychondritis            PHYSICAL EXAM:  GENERAL: NAD, well-developed.  HEAD:  Atraumatic, Normocephalic.  EYES: EOMI, PERRLA, conjunctiva and sclera clear.  NECK: Supple, No JVD.  CHEST/LUNG: poor air entry bilaterally, mild ronchi   HEART: Regular rate and rhythm; S1 S2.   ABDOMEN: Soft, Nontender, Nondistended; Bowel sounds present.  EXTREMITIES:  2+ Peripheral Pulses, No clubbing, cyanosis, or edema.  PSYCH: AAOx3.  NEUROLOGY: non-focal.  SKIN: No rashes or lesions.

## 2025-02-04 NOTE — PROGRESS NOTE ADULT - SUBJECTIVE AND OBJECTIVE BOX
SUBJECTIVE/OVERNIGHT EVENTS  Today is hospital day 25d. This morning patient was seen and examined at bedside, resting comfortably in bed. No acute or major events overnight.      CODE STATUS:      PMH:  PVD (peripheral vascular disease)    HTN (hypertension)    Hypercholesterolemia    GERD (gastroesophageal reflux disease)    Chronic polychondritis    Pulmonary nodule    CAD (coronary artery disease)    CAD (coronary artery disease)    COPD (chronic obstructive pulmonary disease)    H/O gastroesophageal reflux (GERD)    Relapsing polychondritis    Female cystocele    H/O pneumothorax          PSH:  S/P GEETHA-BSO    S/P colon resection    Vocal cord polyps    Pulmonary nodule    History of bladder surgery    H/O breast biopsy          MEDICATIONS  STANDING MEDICATIONS  albuterol/ipratropium for Nebulization 3 milliLiter(s) Nebulizer every 20 minutes  albuterol/ipratropium for Nebulization 3 milliLiter(s) Nebulizer every 4 hours  apixaban 10 milliGRAM(s) Oral every 12 hours  atorvastatin 40 milliGRAM(s) Oral at bedtime  chlorhexidine 2% Cloths 1 Application(s) Topical daily  dextrose 5%. 1000 milliLiter(s) IV Continuous <Continuous>  dextrose 5%. 1000 milliLiter(s) IV Continuous <Continuous>  dextrose 50% Injectable 25 Gram(s) IV Push once  dextrose 50% Injectable 12.5 Gram(s) IV Push once  dextrose 50% Injectable 25 Gram(s) IV Push once  glucagon  Injectable 1 milliGRAM(s) IntraMuscular once  insulin lispro (ADMELOG) corrective regimen sliding scale   SubCutaneous two times a day before meals  melatonin 3 milliGRAM(s) Oral at bedtime  metoprolol tartrate 12.5 milliGRAM(s) Oral every 12 hours  mycophenolate mofetil 1000 milliGRAM(s) Oral two times a day  pantoprazole    Tablet 40 milliGRAM(s) Oral before breakfast  piperacillin/tazobactam IVPB.. 3.375 Gram(s) IV Intermittent every 6 hours  saccharomyces boulardii 250 milliGRAM(s) Oral two times a day    PRN MEDICATIONS  acetaminophen     Tablet .. 650 milliGRAM(s) Oral every 6 hours PRN  dextrose Oral Gel 15 Gram(s) Oral once PRN  hydrOXYzine hydrochloride 25 milliGRAM(s) Oral every 12 hours PRN    VITALS  T(F): 97.3 (02-04-25 @ 12:25), Max: 98 (02-03-25 @ 19:54)  HR: 87 (02-04-25 @ 12:25) (74 - 96)  BP: 125/84 (02-04-25 @ 12:25) (114/72 - 125/84)  RR: 18 (02-04-25 @ 12:25) (17 - 18)  SpO2: 98% (02-04-25 @ 12:25) (96% - 98%)  POCT Blood Glucose.: 119 mg/dL (02-04-25 @ 11:55)  POCT Blood Glucose.: 131 mg/dL (02-04-25 @ 07:57)  POCT Blood Glucose.: 157 mg/dL (02-03-25 @ 20:30)  POCT Blood Glucose.: 135 mg/dL (02-03-25 @ 16:45)    PHYSICAL EXAM  GENERAL  ( x ) NAD, lying in bed comfortably     (  ) obtunded     (  ) lethargic     (  ) somnolent    HEAD  ( x ) Atraumatic     (  ) hematoma     (  ) laceration (specify location:       )     NECK  ( x ) Supple     (  ) neck stiffness     (  ) nuchal rigidity     (  )  no JVD     (  ) JVD present ( -- cm)    HEART  Rate -->  ( x ) normal rate    (  ) bradycardic    (  ) tachycardic  Rhythm -->  ( x ) regular    (  ) regularly irregular    (  ) irregularly irregular  Murmurs -->  ( x ) normal s1/s2    (  ) systolic murmur    (  ) diastolic murmur    (  ) continuous murmur     (  ) S3 present    (  ) S4 present    LUNGS  ( x )Unlabored respirations     (  ) tachypnea  ( x ) B/L air entry     (  ) decreased breath sounds in:  (location     )    ( x ) no adventitious sound     (  ) crackles     (  ) wheezing      (  ) rhonchi      (specify location:       )  (  ) chest wall tenderness (specify location:       )    ABDOMEN  ( x ) Soft     (  ) tense   |   ( x ) nondistended     (  ) distended   |   (  ) +BS     (  ) hypoactive bowel sounds     (  ) hyperactive bowel sounds  ( x ) nontender     (  ) RUQ tenderness     (  ) RLQ tenderness     (  ) LLQ tenderness     (  ) epigastric tenderness     (  ) diffuse tenderness  (  ) Splenomegaly      (  ) Hepatomegaly      (  ) Jaundice     (  ) ecchymosis     EXTREMITIES  (x  ) Normal     (  ) Rash     (  ) ecchymosis     (  ) varicose veins      (  ) pitting edema     (  ) non-pitting edema   (  ) ulceration     (  ) gangrene:     (location:     )    NERVOUS SYSTEM  ( x ) A&Ox3     (  ) confused     (  ) lethargic  CN II-XII:     (  ) Intact     (  ) focal deficits  (Specify:     )   Upper extremities:     (  ) strength X/5     (  ) focal deficit (specify:    )  Lower extremities:     (  ) strength  X/5    (  ) focal deficit (specify:    )      LABS             11.7   3.68  )-----------( 149      ( 02-04-25 @ 06:59 )             37.0     137  |  102  |  8   -------------------------<  218   02-04-25 @ 06:59  3.8  |  22  |  0.6    Ca      7.8     02-04-25 @ 06:59  Phos   2.8     02-04-25 @ 06:59  Mg     1.9     02-04-25 @ 06:59    TPro  4.6  /  Alb  3.0  /  TBili  0.3  /  DBili  x   /  AST  23  /  ALT  12  /  AlkPhos  89  /  GGT  x     02-04-25 @ 06:59        Urinalysis Basic - ( 04 Feb 2025 06:59 )    Color: x / Appearance: x / SG: x / pH: x  Gluc: 218 mg/dL / Ketone: x  / Bili: x / Urobili: x   Blood: x / Protein: x / Nitrite: x   Leuk Esterase: x / RBC: x / WBC x   Sq Epi: x / Non Sq Epi: x / Bacteria: x

## 2025-02-04 NOTE — SWALLOW BEDSIDE ASSESSMENT ADULT - SLP PERTINENT HISTORY OF CURRENT PROBLEM
Pt is a 70 y/o F w/ PMHx: HTN, HLD, GERD, CAD, COPD (not on home O2), pneumothorax, SCC of BOT s/p trach/PEG (reversed in 4/2021, follows w/ ENT Dr. Louis, last seen 9/23/24), s/p CRT (7 sessions in 2021), p/w SOB x1 week duration. Pt is being treated for AHRF 2' COPD exacerbation 2' possible PNA w/ tracheal stenosis, HAGMA 2' lactic acidosis, likely HF exacerbation. FFL by ENT: No masses or lesions noted to NP/OP/HP. Thick purulent secretions overlying laryngeal structures. Epiglottis crisp, no edema. TVC/FVC mobile and intact, no glottic gap noted. CT neck 1/11/25-> Interval development of focal moderate tracheal stenosis at the level of the thyroid with partially collapsed appearance of the right tracheal wall since the prior CT neck from 9/3/2024, possibly associated w/ hx of prior trach. Stable mild thickening of the epiglottis and small preepiglottic soft tissue density. Resolved nasopharyngeal soft tissue thickening and left aryepiglottic fold thickening.
Pt is a 68 y/o Female w/ PMHx: HTN, HLD, GERD, CAD, COPD (not on home O2), pneumothorax, SCC of BOT s/p trach/PEG (reversed in 4/2021, follows w/ ENT Dr. Louis, last seen 9/23/24), s/p CRT (7 sessions in 2021), p/w SOB x1 week duration. Pt is being treated for AHRF 2' COPD exacerbation 2' possible PNA w/ tracheal stenosis, HAGMA 2' lactic acidosis, likely HF exacerbation. FFL by ENT: No masses or lesions noted to NP/OP/HP. Thick purulent secretions overlying laryngeal structures. Epiglottis crisp, no edema. TVC/FVC mobile and intact, no glottic gap noted. CT neck 1/11/25-> Interval development of focal moderate tracheal stenosis at the level of the thyroid with partially collapsed appearance of the right tracheal wall since the prior CT neck from 9/3/2024, possibly associated w/ hx of prior trach. Stable mild thickening of the epiglottis and small preepiglottic soft tissue density. Resolved nasopharyngeal soft tissue thickening and left aryepiglottic fold thickening.
Pt is a 70 y/o Female w/ PMHx: HTN, HLD, GERD, CAD, COPD (not on home O2), pneumothorax, SCC of BOT s/p trach/PEG (reversed in 4/2021, follows w/ ENT Dr. Louis, last seen 9/23/24), s/p CRT (7 sessions in 2021), p/w SOB x1 week duration. Pt is being treated for AHRF 2' COPD exacerbation 2' possible PNA w/ tracheal stenosis, HAGMA 2' lactic acidosis, likely HF exacerbation. FFL by ENT: No masses or lesions noted to NP/OP/HP. Thick purulent secretions overlying laryngeal structures. Epiglottis crisp, no edema. TVC/FVC mobile and intact, no glottic gap noted. CT neck 1/11/25-> Interval development of focal moderate tracheal stenosis at the level of the thyroid with partially collapsed appearance of the right tracheal wall since the prior CT neck from 9/3/2024, possibly associated w/ hx of prior trach. Stable mild thickening of the epiglottis and small preepiglottic soft tissue density. Resolved nasopharyngeal soft tissue thickening and left aryepiglottic fold thickening.
Pt is a 68 y/o Female w/ PMHx: HTN, HLD, GERD, CAD, COPD (not on home O2), pneumothorax, SCC of BOT s/p trach/PEG (reversed in 4/2021, follows w/ ENT Dr. Louis, last seen 9/23/24), s/p CRT (7 sessions in 2021), p/w SOB x1 week duration. Pt is being treated for AHRF 2' COPD exacerbation 2' possible PNA w/ tracheal stenosis, HAGMA 2' lactic acidosis, likely HF exacerbation. FFL by ENT: No masses or lesions noted to NP/OP/HP. Thick purulent secretions overlying laryngeal structures. Epiglottis crisp, no edema. TVC/FVC mobile and intact, no glottic gap noted. CT neck 1/11/25-> Interval development of focal moderate tracheal stenosis at the level of the thyroid with partially collapsed appearance of the right tracheal wall since the prior CT neck from 9/3/2024, possibly associated w/ hx of prior trach. Stable mild thickening of the epiglottis and small preepiglottic soft tissue density. Resolved nasopharyngeal soft tissue thickening and left aryepiglottic fold thickening.
Pt is a 70 y/o Female w/ PMHx: HTN, HLD, GERD, CAD, COPD (not on home O2), pneumothorax, SCC of BOT s/p trach/PEG (reversed in 4/2021, follows w/ ENT Dr. Louis, last seen 9/23/24), s/p CRT (7 sessions in 2021), p/w SOB x1 week duration. Pt is being treated for AHRF 2' COPD exacerbation 2' possible PNA w/ tracheal stenosis, HAGMA 2' lactic acidosis, likely HF exacerbation. FFL by ENT: No masses or lesions noted to NP/OP/HP. Thick purulent secretions overlying laryngeal structures. Epiglottis crisp, no edema. TVC/FVC mobile and intact, no glottic gap noted. CT neck 1/11/25-> Interval development of focal moderate tracheal stenosis at the level of the thyroid with partially collapsed appearance of the right tracheal wall since the prior CT neck from 9/3/2024, possibly associated w/ hx of prior trach. Stable mild thickening of the epiglottis and small preepiglottic soft tissue density. Resolved nasopharyngeal soft tissue thickening and left aryepiglottic fold thickening.
70yo Female with PMH of HTN, HLD, GERD, CAD, COPB not on Home O2, Hx of Pneumothorax, SCC of BOT s/p Trach/PEG (reversed in 4/2021), s/p CRT (7 sessions in 2021) who presents with SOB for 3 days - ENT c/s for hoarseness. 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 Stable mild thickening of the epiglottis and small preepiglottic soft tissue density. Resolved nasopharyngeal soft tissue thickening and left aryepiglottic fold thickening.

## 2025-02-04 NOTE — SWALLOW BEDSIDE ASSESSMENT ADULT - SWALLOW EVAL: FUNCTIONAL LEVEL AT TIME OF EVAL
pt received sitting upright in bed, awake, alert. on NC. Pt reports hating Puree- requesting repeat MBS.

## 2025-02-04 NOTE — PROGRESS NOTE ADULT - ASSESSMENT
#Acute Hypoxic Respiratory Failure due to High Risk PE, Possible Component of Stridor due to Existing Tracheal Stenosis  #Acute Extensive b/l LE DVT r/o HIT  #Thrombocytopenia r/o HIT  - PLT slowly dropping on admission 300s -> 75 -> 80 ->108  - PF4 (+)  - Duplex 1/20 (-)  - Duplex 1/29 (+) w/ acute right femoral, popliteal, posterior tibial, and peroneal veins; acute DVT in the left popliteal, posterior tibial, and peroneal veins  - s/p Fondaparinaux  - CTA PE protocol: Acute non-occulsive PE extending bifurcation of left main PA distally into a few upper and lower lobe segments to subsegmental, small RLL segmental/subsegmental PE, no RH strain; also notable b/l increasing GGO/patchy opacities may be infectious, new nodule  - Repeat TTE grossly unchanged from prior, hyperdynamic LV function 60-65%  - switch to Eliquis      #Severe Oropharyngeal Dysphagia w/ Recurrent Aspiration due to Polychondritis and Tracheal Stenosis   - SLP evaluations appreciated: pt has irreversible severe oropharyngeal dysphagia with aspiration of all consistencies, recommending PEG   - Patient refusing NGT, currently wishes to pursue modified diet and will consider PEG if fails, pt accepts risk of aspiration  - Appreciate palliative and GI recs   - F/u ENT and GI as o/p  - C/w Puree diet with supraglottic swallow device   - C/w Aspiration precautions, HOB >45 deg    #Aspiration and MSSA PNA (Resolved)  #Hx of Polychondritis and Tracheal Stenosis   #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 2021) and CRT in 2021  #Hx of Lung Nodules  - S/p intubation 1/17, self-extubation 1/17, BiPAP/HFNC  - Bronch cx (+) MSSA (1.17)  - Bronch lavage path with neutrophils and fungal organisms +GMS c/f aspergillus (1.10) > no need for treatment per ID (galactomannan negative, unlikely clinically active)  - S/p Prednisone taper  - S/p Cefazolin IV course for MSSA (completed 1/29)  - C/w Cellcept 1000mg BID   - ID following  - Zosyn for 5 days    #Acute on Chronic HFmrEF (Stable)  #Hx of CAD  #HTN  #HLD  - Cardio eval appreciated: recommend SGLT2i on discharge, continue Losartan and Metoprolol succinate  - Hold home Lasix and Losartan due to soft BP  - C/w Atorvastatin 40mg PO QHS   - C/w Metoprolol Tartrate    #Misc  #Code Status: DNR/Intubate  #GI ppx: Protonix  #Diet: Pureed  #Activity: AAT  #Inpatient Dispo: SDU  #Discharge Dispo: [facility] vs. Home w/ PT

## 2025-02-04 NOTE — PROGRESS NOTE ADULT - ASSESSMENT
70yo Female with PMH of HTN, HLD, GERD, CAD, COPD not on Home O2, Hx of Pneumothorax, polychondritis, SCC of tongue s/p Trach/PEG (reversed in 4/2021), s/p CRT (7 sessions in 2021) who presents with SOB for 1 week duration.  Pt was admitted for AHRF 2/2 COPD exacerbation and tracheal stenosis s/p dilation by CT surgery. Required upgrade to MICU iso ARF 2/2 aspiration, s/p bronch, self extubated 1/17, transitioned to NC in SDU.   SDU Course complicated by severe pharyngeal dysphagia with recurrent aspiration s/p FEES/MBS, after several GOC discussions patient is currently on puree diet with swallow device as patient is refusing NGT and prefers to trial modified diet and outpatient therapy before considering PEG. On floors pt weaned to RA, was planning dispo however course then complicated by thrombocytpenia, (+)PF4, acute extensive b/l LE DVTs iso HIT, likely type 2 and now with hypoxia and tachycardia concerning for PE. s/p CTA PE protocol with acute nonocculsive PE extending bifurcation of left main PA distally into a few upper and lower lobe segments to subsegmental, small RLL segmental/subsegmental PE, no RH strain; also notable b/l increasing GGO/patchy opacities may be infectious, new nodule. Currently in SDU for PE and DVTs, started on therapeutic fondaparinux     A/P:   Pulmonary embolism:   Acute Extensive b/l LE DVT r/o HIT  Heparin induced Thrombocytopenia:   PLT slowly dropping on admission 300s -> 75 -> 80 ->108  Duplex 1/20 (-)  Duplex 1/29 (+) w/ acute right femoral, popliteal, posterior tibial, and peroneal veins; acute DVT in the left popliteal, posterior tibial, and peroneal veins  CTA PE protocol: Acute non-occulsive PE extending bifurcation of left main PA distally into a few upper and lower lobe segments to subsegmental, small RLL segmental/subsegmental PE, no RH strain; also notable b/l increasing GGO/patchy opacities may be infectious, new nodule  Repeat TTE grossly unchanged from prior, hyperdynamic LV function 60-65%  T4 score high probability, PF4 (+) SRO negative  Started on Fondaparinux, switch to Eliquis 10mg BID for 7 days then 5mg BID per hematology.     Acute Hypoxic Respiratory Failure   COPD exacerbation: resolved.   MSSA PNA   Reccurent aspiration pneumonia with oropharyngeal dysphagia in setting of polychondritis  Tracheal stenosis SP dilation (history of trach s/p revision in 2021  S/p intubation 1/17, self-extubation 1/17, BiPAP/HFNC  Bronch cx (+) MSSA (1.17)  Bronch lavage path with neutrophils and fungal organisms +GMS c/f aspergillus (1.10) , galactomannan negative, Fungitell >500  S/p Prednisone taper  S/p Cefazolin IV course for MSSA (completed 1/29)  Currently on Zosyn end tonight per ID.  ID follow up, unclear if there is active fungal infection , follow up repeat aspergillus galactomannan, check urine histoplasma antigen and serum cryptococcal antigen      Severe Oropharyngeal Dysphagia:   SLP evaluations appreciated: pt has irreversible severe oropharyngeal dysphagia with aspiration of all consistencies, recommending PEG   Patient refusing NGT, currently wishes to pursue modified diet and will consider PEG if fails, pt accepts risk of aspiration  F/u ENT and GI as o/p  Puree diet with supraglottic swallow device   Aspiration precautions, HOB >45 deg    Acute on Chronic HFmrEF (Stable)  CAD  Cardio eval appreciated: recommend SGLT2i on discharge, continue Losartan and Metoprolol succinate  Hold home Lasix and Losartan due to soft BP  Continue Metoprolol and Lipitor.     Polychondritis and Tracheal Stenosis   Cellcept 1000mg BID, resume Hydroxychloroquine.     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 (Reversed 2021) and CRT in 2021  Lung Nodules      Code Status: DNR/Intubate  DVT Prophylaxis:   GI ppx: Protonix    #Pending: placement to STR in 24hrs, anticipate for discharge tomorrow.

## 2025-02-05 LAB
ALBUMIN SERPL ELPH-MCNC: 3.1 G/DL — LOW (ref 3.5–5.2)
ALP SERPL-CCNC: 91 U/L — SIGNIFICANT CHANGE UP (ref 30–115)
ALT FLD-CCNC: 10 U/L — SIGNIFICANT CHANGE UP (ref 0–41)
ANION GAP SERPL CALC-SCNC: 7 MMOL/L — SIGNIFICANT CHANGE UP (ref 7–14)
AST SERPL-CCNC: 19 U/L — SIGNIFICANT CHANGE UP (ref 0–41)
BASOPHILS # BLD AUTO: 0.02 K/UL — SIGNIFICANT CHANGE UP (ref 0–0.2)
BASOPHILS NFR BLD AUTO: 0.4 % — SIGNIFICANT CHANGE UP (ref 0–1)
BILIRUB SERPL-MCNC: 0.5 MG/DL — SIGNIFICANT CHANGE UP (ref 0.2–1.2)
BUN SERPL-MCNC: 7 MG/DL — LOW (ref 10–20)
CALCIUM SERPL-MCNC: 8.1 MG/DL — LOW (ref 8.4–10.5)
CHLORIDE SERPL-SCNC: 99 MMOL/L — SIGNIFICANT CHANGE UP (ref 98–110)
CO2 SERPL-SCNC: 26 MMOL/L — SIGNIFICANT CHANGE UP (ref 17–32)
CREAT SERPL-MCNC: 0.6 MG/DL — LOW (ref 0.7–1.5)
CULTURE RESULTS: SIGNIFICANT CHANGE UP
EGFR: 96 ML/MIN/1.73M2 — SIGNIFICANT CHANGE UP
EOSINOPHIL # BLD AUTO: 0.33 K/UL — SIGNIFICANT CHANGE UP (ref 0–0.7)
EOSINOPHIL NFR BLD AUTO: 6.4 % — SIGNIFICANT CHANGE UP (ref 0–8)
GLUCOSE BLDC GLUCOMTR-MCNC: 104 MG/DL — HIGH (ref 70–99)
GLUCOSE BLDC GLUCOMTR-MCNC: 110 MG/DL — HIGH (ref 70–99)
GLUCOSE BLDC GLUCOMTR-MCNC: 125 MG/DL — HIGH (ref 70–99)
GLUCOSE BLDC GLUCOMTR-MCNC: 133 MG/DL — HIGH (ref 70–99)
GLUCOSE SERPL-MCNC: 111 MG/DL — HIGH (ref 70–99)
HCT VFR BLD CALC: 39.3 % — SIGNIFICANT CHANGE UP (ref 37–47)
HGB BLD-MCNC: 12.7 G/DL — SIGNIFICANT CHANGE UP (ref 12–16)
IMM GRANULOCYTES NFR BLD AUTO: 0.4 % — HIGH (ref 0.1–0.3)
LYMPHOCYTES # BLD AUTO: 0.27 K/UL — LOW (ref 1.2–3.4)
LYMPHOCYTES # BLD AUTO: 5.2 % — LOW (ref 20.5–51.1)
MAGNESIUM SERPL-MCNC: 1.7 MG/DL — LOW (ref 1.8–2.4)
MCHC RBC-ENTMCNC: 29.6 PG — SIGNIFICANT CHANGE UP (ref 27–31)
MCHC RBC-ENTMCNC: 32.3 G/DL — SIGNIFICANT CHANGE UP (ref 32–37)
MCV RBC AUTO: 91.6 FL — SIGNIFICANT CHANGE UP (ref 81–99)
MONOCYTES # BLD AUTO: 0.26 K/UL — SIGNIFICANT CHANGE UP (ref 0.1–0.6)
MONOCYTES NFR BLD AUTO: 5 % — SIGNIFICANT CHANGE UP (ref 1.7–9.3)
NEUTROPHILS # BLD AUTO: 4.29 K/UL — SIGNIFICANT CHANGE UP (ref 1.4–6.5)
NEUTROPHILS NFR BLD AUTO: 82.6 % — HIGH (ref 42.2–75.2)
NRBC # BLD: 0 /100 WBCS — SIGNIFICANT CHANGE UP (ref 0–0)
NRBC BLD-RTO: 0 /100 WBCS — SIGNIFICANT CHANGE UP (ref 0–0)
NT-PROBNP SERPL-SCNC: 5387 PG/ML — HIGH (ref 0–300)
PHOSPHATE SERPL-MCNC: 3.1 MG/DL — SIGNIFICANT CHANGE UP (ref 2.1–4.9)
PLATELET # BLD AUTO: 180 K/UL — SIGNIFICANT CHANGE UP (ref 130–400)
PMV BLD: 10 FL — SIGNIFICANT CHANGE UP (ref 7.4–10.4)
POTASSIUM SERPL-MCNC: 4.3 MMOL/L — SIGNIFICANT CHANGE UP (ref 3.5–5)
POTASSIUM SERPL-SCNC: 4.3 MMOL/L — SIGNIFICANT CHANGE UP (ref 3.5–5)
PROT SERPL-MCNC: 4.6 G/DL — LOW (ref 6–8)
RBC # BLD: 4.29 M/UL — SIGNIFICANT CHANGE UP (ref 4.2–5.4)
RBC # FLD: 15.9 % — HIGH (ref 11.5–14.5)
SODIUM SERPL-SCNC: 132 MMOL/L — LOW (ref 135–146)
SPECIMEN SOURCE: SIGNIFICANT CHANGE UP
TROPONIN T, HIGH SENSITIVITY RESULT: 72 NG/L — CRITICAL HIGH (ref 6–13)
WBC # BLD: 5.19 K/UL — SIGNIFICANT CHANGE UP (ref 4.8–10.8)
WBC # FLD AUTO: 5.19 K/UL — SIGNIFICANT CHANGE UP (ref 4.8–10.8)

## 2025-02-05 PROCEDURE — 74230 X-RAY XM SWLNG FUNCJ C+: CPT | Mod: 26

## 2025-02-05 PROCEDURE — 99232 SBSQ HOSP IP/OBS MODERATE 35: CPT

## 2025-02-05 RX ORDER — MAGNESIUM SULFATE 0.8 MEQ/ML
2 AMPUL (ML) INJECTION
Refills: 0 | Status: COMPLETED | OUTPATIENT
Start: 2025-02-05 | End: 2025-02-05

## 2025-02-05 RX ADMIN — ANTISEPTIC SURGICAL SCRUB 1 APPLICATION(S): 0.04 SOLUTION TOPICAL at 11:59

## 2025-02-05 RX ADMIN — IPRATROPIUM BROMIDE AND ALBUTEROL SULFATE 3 MILLILITER(S): .5; 2.5 SOLUTION RESPIRATORY (INHALATION) at 13:49

## 2025-02-05 RX ADMIN — ACETAMINOPHEN 650 MILLIGRAM(S): 160 SUSPENSION ORAL at 17:49

## 2025-02-05 RX ADMIN — Medication 25 GRAM(S): at 11:59

## 2025-02-05 RX ADMIN — Medication 250 MILLIGRAM(S): at 17:49

## 2025-02-05 RX ADMIN — IPRATROPIUM BROMIDE AND ALBUTEROL SULFATE 3 MILLILITER(S): .5; 2.5 SOLUTION RESPIRATORY (INHALATION) at 20:43

## 2025-02-05 RX ADMIN — Medication 250 MILLIGRAM(S): at 05:09

## 2025-02-05 RX ADMIN — PANTOPRAZOLE 40 MILLIGRAM(S): 20 TABLET, DELAYED RELEASE ORAL at 05:08

## 2025-02-05 RX ADMIN — MYCOPHENOLATE MOFETIL 1000 MILLIGRAM(S): 200 POWDER, FOR SUSPENSION ORAL at 05:09

## 2025-02-05 RX ADMIN — Medication 12.5 MILLIGRAM(S): at 17:49

## 2025-02-05 RX ADMIN — PIPERACILLIN SODIUM AND TAZOBACTAM SODIUM 25 GRAM(S): 2; 250 INJECTION, POWDER, FOR SOLUTION INTRAVENOUS at 05:08

## 2025-02-05 RX ADMIN — APIXABAN 10 MILLIGRAM(S): 5 TABLET, FILM COATED ORAL at 21:44

## 2025-02-05 RX ADMIN — APIXABAN 10 MILLIGRAM(S): 5 TABLET, FILM COATED ORAL at 09:08

## 2025-02-05 RX ADMIN — ATORVASTATIN CALCIUM 40 MILLIGRAM(S): 80 TABLET, FILM COATED ORAL at 21:43

## 2025-02-05 RX ADMIN — Medication 25 GRAM(S): at 13:10

## 2025-02-05 RX ADMIN — ACETAMINOPHEN, DIPHENHYDRAMINE HCL, PHENYLEPHRINE HCL 3 MILLIGRAM(S): 325; 25; 5 TABLET ORAL at 21:44

## 2025-02-05 RX ADMIN — PIPERACILLIN SODIUM AND TAZOBACTAM SODIUM 25 GRAM(S): 2; 250 INJECTION, POWDER, FOR SOLUTION INTRAVENOUS at 00:01

## 2025-02-05 RX ADMIN — MYCOPHENOLATE MOFETIL 1000 MILLIGRAM(S): 200 POWDER, FOR SUSPENSION ORAL at 17:49

## 2025-02-05 NOTE — PROGRESS NOTE ADULT - ASSESSMENT
68yo Female with PMH of HTN, HLD, GERD, CAD, COPD not on Home O2, Hx of Pneumothorax, polychondritis, SCC of tongue s/p Trach/PEG (reversed in 4/2021), s/p CRT (7 sessions in 2021) who presents with SOB for 1 week duration.  Pt was admitted for AHRF 2/2 COPD exacerbation and tracheal stenosis s/p dilation by CT surgery. Required upgrade to MICU iso ARF 2/2 aspiration, s/p bronch, self extubated 1/17, transitioned to NC in SDU.   SDU Course complicated by severe pharyngeal dysphagia with recurrent aspiration s/p FEES/MBS, after several GOC discussions patient is currently on puree diet with swallow device as patient is refusing NGT and prefers to trial modified diet and outpatient therapy before considering PEG. On floors pt weaned to RA, was planning dispo however course then complicated by thrombocytpenia, (+)PF4, acute extensive b/l LE DVTs iso HIT, likely type 2 and now with hypoxia and tachycardia concerning for PE. s/p CTA PE protocol with acute nonocculsive PE extending bifurcation of left main PA distally into a few upper and lower lobe segments to subsegmental, small RLL segmental/subsegmental PE, no RH strain; also notable b/l increasing GGO/patchy opacities may be infectious, new nodule. Currently in SDU for PE and DVTs, started on therapeutic fondaparinux       Pulmonary embolism:   Acute Extensive b/l LE DVT r/o HIT  Heparin induced Thrombocytopenia:   PLT slowly dropping on admission 300s -> 75 -> 80 ->108>180  Duplex 1/20 (-)  Duplex 1/29 (+) w/ acute right femoral, popliteal, posterior tibial, and peroneal veins; acute DVT in the left popliteal, posterior tibial, and peroneal veins  CTA PE protocol: Acute non-occulsive PE extending bifurcation of left main PA distally into a few upper and lower lobe segments to subsegmental, small RLL segmental/subsegmental PE, no RH strain; also notable b/l increasing GGO/patchy opacities may be infectious, new nodule  Repeat TTE grossly unchanged from prior, hyperdynamic LV function 60-65%  T4 score high probability, PF4 (+) SRO negative  Started on Fondaparinux, switch to Eliquis 10mg BID for 7 days then 5mg BID per hematology.     Tachycardia ,New Afib ?  continue anticoagulation   EKG when tachy  repeat Echo   trend trops and BNP    Acute Hypoxic Respiratory Failure   COPD exacerbation: resolved.   MSSA PNA   Reccurent aspiration pneumonia with oropharyngeal dysphagia in setting of polychondritis  Tracheal stenosis SP dilation (history of trach s/p revision in 2021  S/p intubation 1/17, self-extubation 1/17, BiPAP/HFNC  Bronch cx (+) MSSA (1.17)  Bronch lavage path with neutrophils and fungal organisms +GMS c/f aspergillus (1.10) , galactomannan negative, Fungitell >500  S/p Prednisone taper  S/p Cefazolin IV course for MSSA (completed 1/29)  Completed Zosyn per ID.  ID follow up, unclear if there is active fungal infection , follow up repeat aspergillus galactomannan, check urine histoplasma antigen and serum cryptococcal antigen      Severe Oropharyngeal Dysphagia:   SLP evaluations appreciated: pt has irreversible severe oropharyngeal dysphagia with aspiration of all consistencies, recommending PEG   Patient refusing NGT, currently wishes to pursue modified diet and will consider PEG if fails, pt accepts risk of aspiration  F/u ENT and GI as o/p  Puree diet with supraglottic swallow device   Aspiration precautions, HOB >45 deg    Acute on Chronic HFmrEF (Stable)  CAD  Cardio eval appreciated: recommend SGLT2i on discharge, continue Losartan and Metoprolol succinate  Hold home Lasix and Losartan due to soft BP  Continue Metoprolol and Lipitor.     Polychondritis and Tracheal Stenosis   Cellcept 1000mg BID, resume Hydroxychloroquine.     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 (Reversed 2021) and CRT in 2021  Lung Nodules      Code Status: DNR/Intubate  DVT Prophylaxis:   GI ppx: Protonix    Handoff: follow repeat echo ,cont tele ,Afib? ,cardio follow up. placement to STR.        68yo Female with PMH of HTN, HLD, GERD, CAD, COPD not on Home O2, Hx of Pneumothorax, polychondritis, SCC of tongue s/p Trach/PEG (reversed in 4/2021), s/p CRT (7 sessions in 2021) who presents with SOB for 1 week duration.  Pt was admitted for AHRF 2/2 COPD exacerbation and tracheal stenosis s/p dilation by CT surgery. Required upgrade to MICU iso ARF 2/2 aspiration, s/p bronch, self extubated 1/17, transitioned to NC in SDU.   SDU Course complicated by severe pharyngeal dysphagia with recurrent aspiration s/p FEES/MBS, after several GOC discussions patient is currently on puree diet with swallow device as patient is refusing NGT and prefers to trial modified diet and outpatient therapy before considering PEG. On floors pt weaned to RA, was planning dispo however course then complicated by thrombocytpenia, (+)PF4, acute extensive b/l LE DVTs iso HIT, likely type 2 and now with hypoxia and tachycardia concerning for PE. s/p CTA PE protocol with acute nonocculsive PE extending bifurcation of left main PA distally into a few upper and lower lobe segments to subsegmental, small RLL segmental/subsegmental PE, no RH strain; also notable b/l increasing GGO/patchy opacities may be infectious, new nodule. Currently in SDU for PE and DVTs, started on therapeutic fondaparinux       Pulmonary embolism:   Acute Extensive b/l LE DVT r/o HIT  Heparin induced Thrombocytopenia:   PLT slowly dropping on admission 300s -> 75 -> 80 ->108>180  Duplex 1/20 (-)  Duplex 1/29 (+) w/ acute right femoral, popliteal, posterior tibial, and peroneal veins; acute DVT in the left popliteal, posterior tibial, and peroneal veins  CTA PE protocol: Acute non-occulsive PE extending bifurcation of left main PA distally into a few upper and lower lobe segments to subsegmental, small RLL segmental/subsegmental PE, no RH strain; also notable b/l increasing GGO/patchy opacities may be infectious, new nodule  Repeat TTE grossly unchanged from prior, hyperdynamic LV function 60-65%  T4 score high probability, PF4 (+) SRO negative  Started on Fondaparinux, switch to Eliquis 10mg BID for 7 days then 5mg BID per hematology.     Tachycardia ,New Afib ?  continue anticoagulation   EKG when tachy  cont metoprolol can increase to 25mg BID   repeat Echo   trend trops and BNP    Acute Hypoxic Respiratory Failure   COPD exacerbation: resolved.   MSSA PNA   Reccurent aspiration pneumonia with oropharyngeal dysphagia in setting of polychondritis  Tracheal stenosis SP dilation (history of trach s/p revision in 2021  S/p intubation 1/17, self-extubation 1/17, BiPAP/HFNC  Bronch cx (+) MSSA (1.17)  Bronch lavage path with neutrophils and fungal organisms +GMS c/f aspergillus (1.10) , galactomannan negative, Fungitell >500  S/p Prednisone taper  S/p Cefazolin IV course for MSSA (completed 1/29)  Completed Zosyn per ID.  ID follow up, unclear if there is active fungal infection , follow up repeat aspergillus galactomannan, check urine histoplasma antigen and serum cryptococcal antigen      Severe Oropharyngeal Dysphagia:   SLP evaluations appreciated: pt has irreversible severe oropharyngeal dysphagia with aspiration of all consistencies, recommending PEG   Patient refusing NGT, currently wishes to pursue modified diet and will consider PEG if fails, pt accepts risk of aspiration  F/u ENT and GI as o/p  Puree diet with supraglottic swallow device   Aspiration precautions, HOB >45 deg    Acute on Chronic HFmrEF (Stable)  CAD  Cardio eval appreciated: recommend SGLT2i on discharge, continue Losartan and Metoprolol succinate  Hold home Lasix and Losartan due to soft BP  Continue Metoprolol and Lipitor.     Polychondritis and Tracheal Stenosis   Cellcept 1000mg BID, resume Hydroxychloroquine.     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 (Reversed 2021) and CRT in 2021  Lung Nodules      Code Status: DNR/Intubate  DVT Prophylaxis:   GI ppx: Protonix    Handoff: follow repeat echo ,cont tele ,Afib? ,cardio follow up. placement to STR.

## 2025-02-05 NOTE — PROGRESS NOTE ADULT - SUBJECTIVE AND OBJECTIVE BOX
SUBJECTIVE/OVERNIGHT EVENTS  Today is hospital day 26d. This morning patient was seen and examined at bedside, resting comfortably in bed. No acute or major events overnight.      CODE STATUS:      PMH:  PVD (peripheral vascular disease)    HTN (hypertension)    Hypercholesterolemia    GERD (gastroesophageal reflux disease)    Chronic polychondritis    Pulmonary nodule    CAD (coronary artery disease)    CAD (coronary artery disease)    COPD (chronic obstructive pulmonary disease)    H/O gastroesophageal reflux (GERD)    Relapsing polychondritis    Female cystocele    H/O pneumothorax          PSH:  S/P GEETHA-BSO    S/P colon resection    Vocal cord polyps    Pulmonary nodule    History of bladder surgery    H/O breast biopsy          MEDICATIONS  STANDING MEDICATIONS  albuterol/ipratropium for Nebulization 3 milliLiter(s) Nebulizer every 20 minutes  albuterol/ipratropium for Nebulization 3 milliLiter(s) Nebulizer every 4 hours  apixaban 10 milliGRAM(s) Oral every 12 hours  atorvastatin 40 milliGRAM(s) Oral at bedtime  chlorhexidine 2% Cloths 1 Application(s) Topical daily  dextrose 5%. 1000 milliLiter(s) IV Continuous <Continuous>  dextrose 5%. 1000 milliLiter(s) IV Continuous <Continuous>  dextrose 50% Injectable 25 Gram(s) IV Push once  dextrose 50% Injectable 12.5 Gram(s) IV Push once  dextrose 50% Injectable 25 Gram(s) IV Push once  glucagon  Injectable 1 milliGRAM(s) IntraMuscular once  insulin lispro (ADMELOG) corrective regimen sliding scale   SubCutaneous two times a day before meals  melatonin 3 milliGRAM(s) Oral at bedtime  metoprolol tartrate 12.5 milliGRAM(s) Oral every 12 hours  mycophenolate mofetil 1000 milliGRAM(s) Oral two times a day  pantoprazole    Tablet 40 milliGRAM(s) Oral before breakfast  saccharomyces boulardii 250 milliGRAM(s) Oral two times a day    PRN MEDICATIONS  acetaminophen     Tablet .. 650 milliGRAM(s) Oral every 6 hours PRN  dextrose Oral Gel 15 Gram(s) Oral once PRN  hydrOXYzine hydrochloride 25 milliGRAM(s) Oral every 12 hours PRN    VITALS  T(F): 98.1 (02-05-25 @ 12:22), Max: 98.1 (02-05-25 @ 12:22)  HR: 106 (02-05-25 @ 12:22) (71 - 106)  BP: 110/76 (02-05-25 @ 12:22) (96/64 - 132/67)  RR: 18 (02-05-25 @ 12:22) (17 - 18)  SpO2: 96% (02-05-25 @ 06:08) (96% - 97%)  POCT Blood Glucose.: 125 mg/dL (02-05-25 @ 16:35)  POCT Blood Glucose.: 133 mg/dL (02-05-25 @ 11:56)  POCT Blood Glucose.: 104 mg/dL (02-05-25 @ 07:26)  POCT Blood Glucose.: 149 mg/dL (02-04-25 @ 20:58)    PHYSICAL EXAM  GENERAL  ( x ) NAD, lying in bed comfortably     (  ) obtunded     (  ) lethargic     (  ) somnolent    HEAD  ( x ) Atraumatic     (  ) hematoma     (  ) laceration (specify location:       )     NECK  ( x ) Supple     (  ) neck stiffness     (  ) nuchal rigidity     (  )  no JVD     (  ) JVD present ( -- cm)    HEART  Rate -->  ( x ) normal rate    (  ) bradycardic    (  ) tachycardic  Rhythm -->  ( x ) regular    (  ) regularly irregular    (  ) irregularly irregular  Murmurs -->  ( x ) normal s1/s2    (  ) systolic murmur    (  ) diastolic murmur    (  ) continuous murmur     (  ) S3 present    (  ) S4 present    LUNGS  ( x )Unlabored respirations     (  ) tachypnea  ( x ) B/L air entry     (  ) decreased breath sounds in:  (location     )    ( x ) no adventitious sound     (  ) crackles     (  ) wheezing      (  ) rhonchi      (specify location:       )  (  ) chest wall tenderness (specify location:       )    ABDOMEN  ( x ) Soft     (  ) tense   |   ( x ) nondistended     (  ) distended   |   (  ) +BS     (  ) hypoactive bowel sounds     (  ) hyperactive bowel sounds  ( x ) nontender     (  ) RUQ tenderness     (  ) RLQ tenderness     (  ) LLQ tenderness     (  ) epigastric tenderness     (  ) diffuse tenderness  (  ) Splenomegaly      (  ) Hepatomegaly      (  ) Jaundice     (  ) ecchymosis     EXTREMITIES  (x  ) Normal     (  ) Rash     (  ) ecchymosis     (  ) varicose veins      (  ) pitting edema     (  ) non-pitting edema   (  ) ulceration     (  ) gangrene:     (location:     )    NERVOUS SYSTEM  ( x ) A&Ox3     (  ) confused     (  ) lethargic  CN II-XII:     (  ) Intact     (  ) focal deficits  (Specify:     )   Upper extremities:     (  ) strength X/5     (  ) focal deficit (specify:    )  Lower extremities:     (  ) strength  X/5    (  ) focal deficit (specify:    )      LABS             12.7   5.19  )-----------( 180      ( 02-05-25 @ 06:55 )             39.3     132  |  99  |  7   -------------------------<  111   02-05-25 @ 06:55  4.3  |  26  |  0.6    Ca      8.1     02-05-25 @ 06:55  Phos   3.1     02-05-25 @ 06:55  Mg     1.7     02-05-25 @ 06:55    TPro  4.6  /  Alb  3.1  /  TBili  0.5  /  DBili  x   /  AST  19  /  ALT  10  /  AlkPhos  91  /  GGT  x     02-05-25 @ 06:55        Urinalysis Basic - ( 05 Feb 2025 06:55 )    Color: x / Appearance: x / SG: x / pH: x  Gluc: 111 mg/dL / Ketone: x  / Bili: x / Urobili: x   Blood: x / Protein: x / Nitrite: x   Leuk Esterase: x / RBC: x / WBC x   Sq Epi: x / Non Sq Epi: x / Bacteria: x          IMAGING

## 2025-02-05 NOTE — SWALLOW VFSS/MBS ASSESSMENT ADULT - DIAGNOSTIC IMPRESSIONS
severe pharyngeal dysphagia across all consistencies
Severe pharyngeal dysphagia across all consistencies- thin, mildly thick, and puree, with aspiration of all consistencies.

## 2025-02-05 NOTE — SWALLOW VFSS/MBS ASSESSMENT ADULT - RECOMMENDED CONSISTENCY
I spoke with the pt at length after the study both in radiology suite and upstairs at pt's bedside with MD team present. Pt has made wishes clear at this time- pt does not want PEG, wishes to continue safest diet by mouth and accepts the risks of aspiration including PNA, worsening respiratory status, and even death. She also understands that with worsening respiratory status there could be a need for intubation. Suggest f/u conversation with pt and family re: code status.     Given the GOC discussed with pt and medical team today, rec Puree/thins w/ use of super-supraglottic swallow.
minced and moist with thins via left head turn and alternating bite/sip

## 2025-02-05 NOTE — SWALLOW VFSS/MBS ASSESSMENT ADULT - RECOMMENDED FEEDING/EATING TECHNIQUES
+super-supraglottic swallow/alternate food with liquid/oral hygiene/position upright (90 degrees)/provide rest periods between swallows/small sips/bites
allow for swallow between intakes/alternate food with liquid/turn head left

## 2025-02-05 NOTE — PROGRESS NOTE ADULT - SUBJECTIVE AND OBJECTIVE BOX
GEREMIAS FULLER 69y Female  MRN#: [id number]       SUBJECTIVE  Patient is a 69y old Female who presents with a chief complaint of sob (02 Feb 2025 06:36)  Currently admitted to medicine with the primary diagnosis of Respiratory infection      Today is hospital day 26d, patient was seen and examined    INTERVAL HISTORY/OVERNIGHT EVENTS:  tachy    OBJECTIVE  PAST MEDICAL & SURGICAL HISTORY  HTN (hypertension)    Hypercholesterolemia    GERD (gastroesophageal reflux disease)    Pulmonary nodule    CAD (coronary artery disease)    COPD (chronic obstructive pulmonary disease)    Relapsing polychondritis    Female cystocele    H/O pneumothorax  2016    S/P GEETHA-BSO    S/P colon resection    Vocal cord polyps  removal of same 2005    Pulmonary nodule    History of bladder surgery  2019    H/O breast biopsy  LEFT        ALLERGIES:  heparin (Other (Severe))  CONTRAST DYS (Nausea)      MEDICATIONS:  STANDING MEDICATIONS  albuterol/ipratropium for Nebulization 3 milliLiter(s) Nebulizer every 20 minutes  albuterol/ipratropium for Nebulization 3 milliLiter(s) Nebulizer every 4 hours  apixaban 10 milliGRAM(s) Oral every 12 hours  atorvastatin 40 milliGRAM(s) Oral at bedtime  chlorhexidine 2% Cloths 1 Application(s) Topical daily  dextrose 5%. 1000 milliLiter(s) IV Continuous <Continuous>  dextrose 5%. 1000 milliLiter(s) IV Continuous <Continuous>  dextrose 50% Injectable 25 Gram(s) IV Push once  dextrose 50% Injectable 12.5 Gram(s) IV Push once  dextrose 50% Injectable 25 Gram(s) IV Push once  glucagon  Injectable 1 milliGRAM(s) IntraMuscular once  insulin lispro (ADMELOG) corrective regimen sliding scale   SubCutaneous two times a day before meals  melatonin 3 milliGRAM(s) Oral at bedtime  metoprolol tartrate 12.5 milliGRAM(s) Oral every 12 hours  mycophenolate mofetil 1000 milliGRAM(s) Oral two times a day  pantoprazole    Tablet 40 milliGRAM(s) Oral before breakfast  saccharomyces boulardii 250 milliGRAM(s) Oral two times a day    PRN MEDICATIONS  acetaminophen     Tablet .. 650 milliGRAM(s) Oral every 6 hours PRN  dextrose Oral Gel 15 Gram(s) Oral once PRN  hydrOXYzine hydrochloride 25 milliGRAM(s) Oral every 12 hours PRN      VITAL SIGNS  VITAL SIGNS: Last 24 Hours  T(C): 36.7 (05 Feb 2025 12:22), Max: 36.7 (05 Feb 2025 12:22)  T(F): 98.1 (05 Feb 2025 12:22), Max: 98.1 (05 Feb 2025 12:22)  HR: 106 (05 Feb 2025 12:22) (71 - 106)  BP: 110/76 (05 Feb 2025 12:22) (96/64 - 132/67)  BP(mean): 75 (05 Feb 2025 04:57) (75 - 75)  RR: 18 (05 Feb 2025 12:22) (17 - 18)  SpO2: 96% (05 Feb 2025 06:08) (96% - 97%)    LABS:                        12.7   5.19  )-----------( 180      ( 05 Feb 2025 06:55 )             39.3     02-05    132[L]  |  99  |  7[L]  ----------------------------<  111[H]  4.3   |  26  |  0.6[L]    Ca    8.1[L]      05 Feb 2025 06:55  Phos  3.1     02-05  Mg     1.7     02-05    TPro  4.6[L]  /  Alb  3.1[L]  /  TBili  0.5  /  DBili  x   /  AST  19  /  ALT  10  /  AlkPhos  91  02-05      Urinalysis Basic - ( 05 Feb 2025 06:55 )    Color: x / Appearance: x / SG: x / pH: x  Gluc: 111 mg/dL / Ketone: x  / Bili: x / Urobili: x   Blood: x / Protein: x / Nitrite: x   Leuk Esterase: x / RBC: x / WBC x   Sq Epi: x / Non Sq Epi: x / Bacteria: x          PHYSICAL EXAM:  CONSTITUTIONAL: Well nourished.  NAD  ENT: Airway patent, No thrush  EYES: Clear bilaterally, pupils equal, round and reactive to light.  CARDIAC: Normal rate, regular rhythm. no edema  RESPIRATORY: No wheezing; Normal chest expansion. Not tachypneic, No use of accessory muscles  GASTROINTESTINAL: Abdomen soft, non-tender, No guarding, Positive BS  MUSCULOSKELETAL: Range of motion is not limited, No clubbing, cyanosis  NEUROLOGICAL: Alert and oriented, No motor deficits.  SKIN: Skin normal color for race, No evidence of rash    RADIOLOGY:   GEREMIAS FULLER 69y Female  MRN#: [id number]       SUBJECTIVE  Patient is a 69y old Female who presents with a chief complaint of sob (02 Feb 2025 06:36)  Currently admitted to medicine with the primary diagnosis of Respiratory infection      Today is hospital day 26d, patient was seen and examined at bedside ,alert resting comfortably in bed. no new complaints.  on 2L NC sat 96%    INTERVAL HISTORY/OVERNIGHT EVENTS:  tachy ,Afib ?    OBJECTIVE  PAST MEDICAL & SURGICAL HISTORY  HTN (hypertension)    Hypercholesterolemia    GERD (gastroesophageal reflux disease)    Pulmonary nodule    CAD (coronary artery disease)    COPD (chronic obstructive pulmonary disease)    Relapsing polychondritis    Female cystocele    H/O pneumothorax  2016    S/P GEETHA-BSO    S/P colon resection    Vocal cord polyps  removal of same 2005    Pulmonary nodule    History of bladder surgery  2019    H/O breast biopsy  LEFT        ALLERGIES:  heparin (Other (Severe))  CONTRAST DYS (Nausea)      MEDICATIONS:  STANDING MEDICATIONS  albuterol/ipratropium for Nebulization 3 milliLiter(s) Nebulizer every 20 minutes  albuterol/ipratropium for Nebulization 3 milliLiter(s) Nebulizer every 4 hours  apixaban 10 milliGRAM(s) Oral every 12 hours  atorvastatin 40 milliGRAM(s) Oral at bedtime  chlorhexidine 2% Cloths 1 Application(s) Topical daily  dextrose 5%. 1000 milliLiter(s) IV Continuous <Continuous>  dextrose 5%. 1000 milliLiter(s) IV Continuous <Continuous>  dextrose 50% Injectable 25 Gram(s) IV Push once  dextrose 50% Injectable 12.5 Gram(s) IV Push once  dextrose 50% Injectable 25 Gram(s) IV Push once  glucagon  Injectable 1 milliGRAM(s) IntraMuscular once  insulin lispro (ADMELOG) corrective regimen sliding scale   SubCutaneous two times a day before meals  melatonin 3 milliGRAM(s) Oral at bedtime  metoprolol tartrate 12.5 milliGRAM(s) Oral every 12 hours  mycophenolate mofetil 1000 milliGRAM(s) Oral two times a day  pantoprazole    Tablet 40 milliGRAM(s) Oral before breakfast  saccharomyces boulardii 250 milliGRAM(s) Oral two times a day    PRN MEDICATIONS  acetaminophen     Tablet .. 650 milliGRAM(s) Oral every 6 hours PRN  dextrose Oral Gel 15 Gram(s) Oral once PRN  hydrOXYzine hydrochloride 25 milliGRAM(s) Oral every 12 hours PRN      VITAL SIGNS  VITAL SIGNS: Last 24 Hours  T(C): 36.7 (05 Feb 2025 12:22), Max: 36.7 (05 Feb 2025 12:22)  T(F): 98.1 (05 Feb 2025 12:22), Max: 98.1 (05 Feb 2025 12:22)  HR: 106 (05 Feb 2025 12:22) (71 - 106)  BP: 110/76 (05 Feb 2025 12:22) (96/64 - 132/67)  BP(mean): 75 (05 Feb 2025 04:57) (75 - 75)  RR: 18 (05 Feb 2025 12:22) (17 - 18)  SpO2: 96% (05 Feb 2025 06:08) (96% - 97%)    LABS:                        12.7   5.19  )-----------( 180      ( 05 Feb 2025 06:55 )             39.3     02-05    132[L]  |  99  |  7[L]  ----------------------------<  111[H]  4.3   |  26  |  0.6[L]    Ca    8.1[L]      05 Feb 2025 06:55  Phos  3.1     02-05  Mg     1.7     02-05    TPro  4.6[L]  /  Alb  3.1[L]  /  TBili  0.5  /  DBili  x   /  AST  19  /  ALT  10  /  AlkPhos  91  02-05      Urinalysis Basic - ( 05 Feb 2025 06:55 )    Color: x / Appearance: x / SG: x / pH: x  Gluc: 111 mg/dL / Ketone: x  / Bili: x / Urobili: x   Blood: x / Protein: x / Nitrite: x   Leuk Esterase: x / RBC: x / WBC x   Sq Epi: x / Non Sq Epi: x / Bacteria: x          PHYSICAL EXAM:  CONSTITUTIONAL: Well nourished.  NAD  ENT: Airway patent, No thrush  EYES: Clear bilaterally, pupils equal, round and reactive to light.  CARDIAC: tachycardia . no edema  RESPIRATORY: Not tachypneic, No use of accessory muscles  GASTROINTESTINAL: Abdomen soft, non-tender, No guarding, Positive BS  MUSCULOSKELETAL:  No clubbing, cyanosis  NEUROLOGICAL: Alert, No motor deficits.  SKIN: Skin normal color for race, No evidence of rash    RADIOLOGY:  ACC: [id number] EXAM:  XR CHEST PORTABLE ROUTINE 1V   ORDERED BY: CHIQUITA DIAZ     PROCEDURE DATE:  02/03/2025    IMPRESSION:    Unchanged diffuse interstitial opacities.    --- End of Report ---

## 2025-02-05 NOTE — SWALLOW VFSS/MBS ASSESSMENT ADULT - PHARYNGEAL PHASE COMMENTS
severe pharyngeal dysphagia
Severe pharyngeal dysphagia across all consistencies- thin, mildly thick, and puree, with aspiration of all consistencies.

## 2025-02-05 NOTE — SWALLOW VFSS/MBS ASSESSMENT ADULT - SLP PERTINENT HISTORY OF CURRENT PROBLEM
Pt is a 68 y/o Female w/ PMHx: HTN, HLD, GERD, CAD, COPD (not on home O2), pneumothorax, SCC of BOT s/p trach/PEG (reversed in 4/2021, follows w/ ENT Dr. Louis, last seen 9/23/24), s/p CRT (7 sessions in 2021), p/w SOB x1 week duration. Pt is being treated for AHRF 2' COPD exacerbation 2' possible PNA w/ tracheal stenosis, HAGMA 2' lactic acidosis, likely HF exacerbation. FFL by ENT: No masses or lesions noted to NP/OP/HP. Thick purulent secretions overlying laryngeal structures. Epiglottis crisp, no edema. TVC/FVC mobile and intact, no glottic gap noted. CT neck 1/11/25-> Interval development of focal moderate tracheal stenosis at the level of the thyroid with partially collapsed appearance of the right tracheal wall since the prior CT neck from 9/3/2024, possibly associated w/ hx of prior trach. Stable mild thickening of the epiglottis and small preepiglottic soft tissue density. Resolved nasopharyngeal soft tissue thickening and left aryepiglottic fold thickening.
Pt is a 68 y/o Female w/ PMHx: HTN, HLD, GERD, CAD, COPD (not on home O2), pneumothorax, SCC of BOT s/p trach/PEG (reversed in 4/2021, follows w/ ENT Dr. Louis, last seen 9/23/24), s/p CRT (7 sessions in 2021), p/w SOB x1 week duration. Pt is being treated for AHRF 2' COPD exacerbation 2' possible PNA w/ tracheal stenosis, HAGMA 2' lactic acidosis, likely HF exacerbation. FFL by ENT: No masses or lesions noted to NP/OP/HP. Thick purulent secretions overlying laryngeal structures. Epiglottis crisp, no edema. TVC/FVC mobile and intact, no glottic gap noted. CT neck 1/11/25-> Interval development of focal moderate tracheal stenosis at the level of the thyroid with partially collapsed appearance of the right tracheal wall since the prior CT neck from 9/3/2024, possibly associated w/ hx of prior trach. Stable mild thickening of the epiglottis and small preepiglottic soft tissue density. Resolved nasopharyngeal soft tissue thickening and left aryepiglottic fold thickening.

## 2025-02-06 ENCOUNTER — RESULT REVIEW (OUTPATIENT)
Age: 70
End: 2025-02-06

## 2025-02-06 LAB
ALBUMIN SERPL ELPH-MCNC: 3.1 G/DL — LOW (ref 3.5–5.2)
ALP SERPL-CCNC: 86 U/L — SIGNIFICANT CHANGE UP (ref 30–115)
ALT FLD-CCNC: 13 U/L — SIGNIFICANT CHANGE UP (ref 0–41)
ANION GAP SERPL CALC-SCNC: 9 MMOL/L — SIGNIFICANT CHANGE UP (ref 7–14)
AST SERPL-CCNC: 48 U/L — HIGH (ref 0–41)
BASOPHILS # BLD AUTO: 0.01 K/UL — SIGNIFICANT CHANGE UP (ref 0–0.2)
BASOPHILS NFR BLD AUTO: 0.3 % — SIGNIFICANT CHANGE UP (ref 0–1)
BILIRUB SERPL-MCNC: 0.4 MG/DL — SIGNIFICANT CHANGE UP (ref 0.2–1.2)
BUN SERPL-MCNC: 7 MG/DL — LOW (ref 10–20)
CALCIUM SERPL-MCNC: 7.9 MG/DL — LOW (ref 8.4–10.5)
CHLORIDE SERPL-SCNC: 101 MMOL/L — SIGNIFICANT CHANGE UP (ref 98–110)
CO2 SERPL-SCNC: 24 MMOL/L — SIGNIFICANT CHANGE UP (ref 17–32)
CREAT SERPL-MCNC: 0.5 MG/DL — LOW (ref 0.7–1.5)
EGFR: 101 ML/MIN/1.73M2 — SIGNIFICANT CHANGE UP
EOSINOPHIL # BLD AUTO: 0.23 K/UL — SIGNIFICANT CHANGE UP (ref 0–0.7)
EOSINOPHIL NFR BLD AUTO: 7.3 % — SIGNIFICANT CHANGE UP (ref 0–8)
GALACTOMANNAN AG SERPL-ACNC: 0.03 INDEX — SIGNIFICANT CHANGE UP (ref 0–0.49)
GLUCOSE BLDC GLUCOMTR-MCNC: 101 MG/DL — HIGH (ref 70–99)
GLUCOSE BLDC GLUCOMTR-MCNC: 119 MG/DL — HIGH (ref 70–99)
GLUCOSE BLDC GLUCOMTR-MCNC: 120 MG/DL — HIGH (ref 70–99)
GLUCOSE BLDC GLUCOMTR-MCNC: 129 MG/DL — HIGH (ref 70–99)
GLUCOSE SERPL-MCNC: 102 MG/DL — HIGH (ref 70–99)
HCT VFR BLD CALC: 38.7 % — SIGNIFICANT CHANGE UP (ref 37–47)
HGB BLD-MCNC: 12.6 G/DL — SIGNIFICANT CHANGE UP (ref 12–16)
IMM GRANULOCYTES NFR BLD AUTO: 0 % — LOW (ref 0.1–0.3)
LYMPHOCYTES # BLD AUTO: 0.23 K/UL — LOW (ref 1.2–3.4)
LYMPHOCYTES # BLD AUTO: 7.3 % — LOW (ref 20.5–51.1)
MAGNESIUM SERPL-MCNC: 2 MG/DL — SIGNIFICANT CHANGE UP (ref 1.8–2.4)
MCHC RBC-ENTMCNC: 29.2 PG — SIGNIFICANT CHANGE UP (ref 27–31)
MCHC RBC-ENTMCNC: 32.6 G/DL — SIGNIFICANT CHANGE UP (ref 32–37)
MCV RBC AUTO: 89.8 FL — SIGNIFICANT CHANGE UP (ref 81–99)
MONOCYTES # BLD AUTO: 0.31 K/UL — SIGNIFICANT CHANGE UP (ref 0.1–0.6)
MONOCYTES NFR BLD AUTO: 9.8 % — HIGH (ref 1.7–9.3)
NEUTROPHILS # BLD AUTO: 2.37 K/UL — SIGNIFICANT CHANGE UP (ref 1.4–6.5)
NEUTROPHILS NFR BLD AUTO: 75.3 % — HIGH (ref 42.2–75.2)
NRBC # BLD: 0 /100 WBCS — SIGNIFICANT CHANGE UP (ref 0–0)
NRBC BLD-RTO: 0 /100 WBCS — SIGNIFICANT CHANGE UP (ref 0–0)
PHOSPHATE SERPL-MCNC: 3.5 MG/DL — SIGNIFICANT CHANGE UP (ref 2.1–4.9)
PLATELET # BLD AUTO: 186 K/UL — SIGNIFICANT CHANGE UP (ref 130–400)
PMV BLD: 10 FL — SIGNIFICANT CHANGE UP (ref 7.4–10.4)
POTASSIUM SERPL-MCNC: 4.9 MMOL/L — SIGNIFICANT CHANGE UP (ref 3.5–5)
POTASSIUM SERPL-SCNC: 4.9 MMOL/L — SIGNIFICANT CHANGE UP (ref 3.5–5)
PROT SERPL-MCNC: 4.8 G/DL — LOW (ref 6–8)
RBC # BLD: 4.31 M/UL — SIGNIFICANT CHANGE UP (ref 4.2–5.4)
RBC # FLD: 15.9 % — HIGH (ref 11.5–14.5)
SODIUM SERPL-SCNC: 134 MMOL/L — LOW (ref 135–146)
TROPONIN T, HIGH SENSITIVITY RESULT: 63 NG/L — CRITICAL HIGH (ref 6–13)
WBC # BLD: 3.15 K/UL — LOW (ref 4.8–10.8)
WBC # FLD AUTO: 3.15 K/UL — LOW (ref 4.8–10.8)

## 2025-02-06 PROCEDURE — 99233 SBSQ HOSP IP/OBS HIGH 50: CPT

## 2025-02-06 PROCEDURE — 93306 TTE W/DOPPLER COMPLETE: CPT | Mod: 26

## 2025-02-06 RX ADMIN — ACETAMINOPHEN 650 MILLIGRAM(S): 160 SUSPENSION ORAL at 18:34

## 2025-02-06 RX ADMIN — Medication 12.5 MILLIGRAM(S): at 17:17

## 2025-02-06 RX ADMIN — IPRATROPIUM BROMIDE AND ALBUTEROL SULFATE 3 MILLILITER(S): .5; 2.5 SOLUTION RESPIRATORY (INHALATION) at 22:17

## 2025-02-06 RX ADMIN — IPRATROPIUM BROMIDE AND ALBUTEROL SULFATE 3 MILLILITER(S): .5; 2.5 SOLUTION RESPIRATORY (INHALATION) at 07:46

## 2025-02-06 RX ADMIN — ACETAMINOPHEN 650 MILLIGRAM(S): 160 SUSPENSION ORAL at 04:00

## 2025-02-06 RX ADMIN — MYCOPHENOLATE MOFETIL 1000 MILLIGRAM(S): 200 POWDER, FOR SUSPENSION ORAL at 05:46

## 2025-02-06 RX ADMIN — APIXABAN 10 MILLIGRAM(S): 5 TABLET, FILM COATED ORAL at 22:02

## 2025-02-06 RX ADMIN — IPRATROPIUM BROMIDE AND ALBUTEROL SULFATE 3 MILLILITER(S): .5; 2.5 SOLUTION RESPIRATORY (INHALATION) at 06:04

## 2025-02-06 RX ADMIN — APIXABAN 10 MILLIGRAM(S): 5 TABLET, FILM COATED ORAL at 10:18

## 2025-02-06 RX ADMIN — PANTOPRAZOLE 40 MILLIGRAM(S): 20 TABLET, DELAYED RELEASE ORAL at 05:44

## 2025-02-06 RX ADMIN — Medication 12.5 MILLIGRAM(S): at 05:44

## 2025-02-06 RX ADMIN — Medication 250 MILLIGRAM(S): at 05:44

## 2025-02-06 RX ADMIN — Medication 250 MILLIGRAM(S): at 17:17

## 2025-02-06 RX ADMIN — ATORVASTATIN CALCIUM 40 MILLIGRAM(S): 80 TABLET, FILM COATED ORAL at 22:02

## 2025-02-06 RX ADMIN — IPRATROPIUM BROMIDE AND ALBUTEROL SULFATE 3 MILLILITER(S): .5; 2.5 SOLUTION RESPIRATORY (INHALATION) at 11:30

## 2025-02-06 RX ADMIN — IPRATROPIUM BROMIDE AND ALBUTEROL SULFATE 3 MILLILITER(S): .5; 2.5 SOLUTION RESPIRATORY (INHALATION) at 16:16

## 2025-02-06 RX ADMIN — MYCOPHENOLATE MOFETIL 1000 MILLIGRAM(S): 200 POWDER, FOR SUSPENSION ORAL at 17:17

## 2025-02-06 RX ADMIN — ACETAMINOPHEN 650 MILLIGRAM(S): 160 SUSPENSION ORAL at 03:55

## 2025-02-06 RX ADMIN — ANTISEPTIC SURGICAL SCRUB 1 APPLICATION(S): 0.04 SOLUTION TOPICAL at 12:24

## 2025-02-06 RX ADMIN — ACETAMINOPHEN, DIPHENHYDRAMINE HCL, PHENYLEPHRINE HCL 3 MILLIGRAM(S): 325; 25; 5 TABLET ORAL at 22:02

## 2025-02-06 NOTE — PROGRESS NOTE ADULT - SUBJECTIVE AND OBJECTIVE BOX
CHIEF COMPLAINT:    Patient is a 69y old  Female who presents with a chief complaint of sob    INTERVAL HPI/OVERNIGHT EVENTS:    Patient seen and examined at bedside. No acute overnight events occurred.    ROS: All other systems are negative.    Medications:  Standing  albuterol/ipratropium for Nebulization 3 milliLiter(s) Nebulizer every 20 minutes  albuterol/ipratropium for Nebulization 3 milliLiter(s) Nebulizer every 4 hours  apixaban 10 milliGRAM(s) Oral every 12 hours  atorvastatin 40 milliGRAM(s) Oral at bedtime  chlorhexidine 2% Cloths 1 Application(s) Topical daily  dextrose 5%. 1000 milliLiter(s) IV Continuous <Continuous>  dextrose 5%. 1000 milliLiter(s) IV Continuous <Continuous>  dextrose 50% Injectable 25 Gram(s) IV Push once  dextrose 50% Injectable 12.5 Gram(s) IV Push once  dextrose 50% Injectable 25 Gram(s) IV Push once  glucagon  Injectable 1 milliGRAM(s) IntraMuscular once  insulin lispro (ADMELOG) corrective regimen sliding scale   SubCutaneous two times a day before meals  melatonin 3 milliGRAM(s) Oral at bedtime  metoprolol tartrate 12.5 milliGRAM(s) Oral every 12 hours  mycophenolate mofetil 1000 milliGRAM(s) Oral two times a day  pantoprazole    Tablet 40 milliGRAM(s) Oral before breakfast  saccharomyces boulardii 250 milliGRAM(s) Oral two times a day    PRN Meds  acetaminophen     Tablet .. 650 milliGRAM(s) Oral every 6 hours PRN  dextrose Oral Gel 15 Gram(s) Oral once PRN  hydrOXYzine hydrochloride 25 milliGRAM(s) Oral every 12 hours PRN        Vital Signs:    T(F): 98 (25 @ 05:07), Max: 98.1 (25 @ 12:22)  HR: 95 (25 @ 05:07) (84 - 106)  BP: 107/67 (25 @ 05:07) (91/63 - 110/76)  RR: 17 (25 @ 05:07) (17 - 18)  SpO2: 96% (25 @ 05:07) (96% - 96%)  I&O's Summary    2025 07:01  -  2025 07:00  --------------------------------------------------------  IN: 1019 mL / OUT: 1100 mL / NET: -81 mL      Daily     Daily Weight in k.2 (2025 05:07)  CAPILLARY BLOOD GLUCOSE      POCT Blood Glucose.: 101 mg/dL (2025 08:00)  POCT Blood Glucose.: 110 mg/dL (2025 21:29)  POCT Blood Glucose.: 125 mg/dL (2025 16:35)  POCT Blood Glucose.: 133 mg/dL (2025 11:56)      PHYSICAL EXAM:  GENERAL:  NAD  SKIN: No rashes or lesions  HEENT: Atraumatic. Normocephalic. Anicteric  NECK:  No JVD.   PULMONARY: Clear to ausculation bilaterally. No wheezing. No rales  CVS: Normal S1, S2. Regular rate and rhythm. No murmurs.  ABDOMEN/GI: Soft, Nontender, Nondistended; Bowel sounds are present  EXTREMITIES:  No edema B/L LE.  NEUROLOGIC:  No motor deficit.  PSYCH: Alert & oriented x 3, normal affect      LABS:                        12.6   3.15  )-----------( 186      ( 2025 06:46 )             38.7     02-    132[L]  |  99  |  7[L]  ----------------------------<  111[H]  4.3   |  26  |  0.6[L]    Ca    8.1[L]      2025 06:55  Phos  3.1     02-05  Mg     1.7     -05    TPro  4.6[L]  /  Alb  3.1[L]  /  TBili  0.5  /  DBili  x   /  AST  19  /  ALT  10  /  AlkPhos  91  02-05              RADIOLOGY & ADDITIONAL TESTS:  Imaging or report Personally Reviewed:  [ ] YES  [ ] NO -->no new images    Telemetry reviewed independently - NSR, no acute events  EKG reviewed independently -->no new EKGs    Consultant(s) Notes Reviewed:  [ ] YES  [ ] NO  Care Discussed with Consultants/Other Providers [ ] YES  [ ] NO    Case discussed with resident  Care discussed with pt         CHIEF COMPLAINT:    Patient is a 69y old  Female who presents with a chief complaint of sob    INTERVAL HPI/OVERNIGHT EVENTS:    Patient seen and examined at bedside. No acute overnight events occurred.    ROS: Denies SOB, chest pain. All other systems are negative.    Medications:  Standing  albuterol/ipratropium for Nebulization 3 milliLiter(s) Nebulizer every 20 minutes  albuterol/ipratropium for Nebulization 3 milliLiter(s) Nebulizer every 4 hours  apixaban 10 milliGRAM(s) Oral every 12 hours  atorvastatin 40 milliGRAM(s) Oral at bedtime  chlorhexidine 2% Cloths 1 Application(s) Topical daily  dextrose 5%. 1000 milliLiter(s) IV Continuous <Continuous>  dextrose 5%. 1000 milliLiter(s) IV Continuous <Continuous>  dextrose 50% Injectable 25 Gram(s) IV Push once  dextrose 50% Injectable 12.5 Gram(s) IV Push once  dextrose 50% Injectable 25 Gram(s) IV Push once  glucagon  Injectable 1 milliGRAM(s) IntraMuscular once  insulin lispro (ADMELOG) corrective regimen sliding scale   SubCutaneous two times a day before meals  melatonin 3 milliGRAM(s) Oral at bedtime  metoprolol tartrate 12.5 milliGRAM(s) Oral every 12 hours  mycophenolate mofetil 1000 milliGRAM(s) Oral two times a day  pantoprazole    Tablet 40 milliGRAM(s) Oral before breakfast  saccharomyces boulardii 250 milliGRAM(s) Oral two times a day    PRN Meds  acetaminophen     Tablet .. 650 milliGRAM(s) Oral every 6 hours PRN  dextrose Oral Gel 15 Gram(s) Oral once PRN  hydrOXYzine hydrochloride 25 milliGRAM(s) Oral every 12 hours PRN        Vital Signs:    T(F): 98 (25 @ 05:07), Max: 98.1 (25 @ 12:22)  HR: 95 (25 @ 05:07) (84 - 106)  BP: 107/67 (25 @ 05:07) (91/63 - 110/76)  RR: 17 (25 @ 05:07) (17 - 18)  SpO2: 96% (25 @ 05:07) (96% - 96%)  I&O's Summary    2025 07:01  -  2025 07:00  --------------------------------------------------------  IN: 1019 mL / OUT: 1100 mL / NET: -81 mL      Daily     Daily Weight in k.2 (2025 05:07)  CAPILLARY BLOOD GLUCOSE      POCT Blood Glucose.: 101 mg/dL (2025 08:00)  POCT Blood Glucose.: 110 mg/dL (2025 21:29)  POCT Blood Glucose.: 125 mg/dL (2025 16:35)  POCT Blood Glucose.: 133 mg/dL (2025 11:56)      PHYSICAL EXAM:  GENERAL:  NAD  SKIN: No rashes or lesions  HEENT: Atraumatic. Normocephalic. Anicteric  NECK:  No JVD.   PULMONARY: Clear to ausculation bilaterally. No wheezing. No rales  CVS: Normal S1, S2. Regular rate and rhythm. No murmurs.  ABDOMEN/GI: Soft, Nontender, Nondistended; Bowel sounds are present  EXTREMITIES:  No edema B/L LE.  NEUROLOGIC:  No motor deficit.  PSYCH: Alert & oriented x 3, normal affect      LABS:                        12.6   3.15  )-----------( 186      ( 2025 06:46 )             38.7     -    132[L]  |  99  |  7[L]  ----------------------------<  111[H]  4.3   |  26  |  0.6[L]    Ca    8.1[L]      2025 06:55  Phos  3.1     02-05  Mg     1.7     -05    TPro  4.6[L]  /  Alb  3.1[L]  /  TBili  0.5  /  DBili  x   /  AST  19  /  ALT  10  /  AlkPhos  91  02-05    RADIOLOGY & ADDITIONAL TESTS:  Imaging or report Personally Reviewed:  [ ] YES  [ ] NO -->no new images    Telemetry reviewed independently - Sinus tachycardia  EKG reviewed independently -->no new EKGs    Consultant(s) Notes Reviewed:  [ ] YES  [ ] NO  Care Discussed with Consultants/Other Providers [ ] YES  [ ] NO    Case discussed with resident  Care discussed with pt

## 2025-02-06 NOTE — SWALLOW BEDSIDE ASSESSMENT ADULT - COMMENTS
Pt is known to SLP dept from previous admission, VFSS in 2020, recs for PEG as 1' means of nutrition and hydration, ice chips and controlled single sips of water as tolerated.  -Pt is s/p Bronchoscopy, s/p tracheal stenosis with tracheal dilation and debridement on 1/14/25. Pt was RR code on 1/17. Pt  was not tolerating Bipap, had difficulty breathing, became unresponsive and desaturated rapidly. Decision was made to intubate at bedside. Pt is now s/p self extubation on 1/18.  -CT neck soft Tissue 1/18-->At the level of the previously described tracheal stenosis there is Improved tracheal patency consistent with interval dilation.    VFSS completed 1/21; severe pharyngeal dysphagia across all consistencies- thin, mildly thick, and puree, with aspiration of all consistencies.  I spoke with the pt at length after the study both in radiology suite and upstairs at pt's bedside with MD team present. Pt has made wishes clear at this time- pt does not want PEG, wishes to continue safest diet by mouth and accepts the risks of aspiration including PNA, worsening respiratory status, and even death. She also understands that with worsening respiratory status there could be a need for intubation. Suggest f/u conversation with pt and family re: code status. Given the GOC discussed with pt and medical team at the time, recs for Puree/thins w/ use of super-supraglottic swallow.    -VFSS 2/5: recs for minced and moist w/ thins via left head turn and alternating bite/sip

## 2025-02-06 NOTE — SWALLOW BEDSIDE ASSESSMENT ADULT - SWALLOW EVAL: PATIENT/FAMILY GOALS STATEMENT
Pt has spoken at length with medical team, SLP team, and palliative team re: PEG tube placement. Pt has decided at this time that she will opt for safest PO diet and decline PEG placement, accepting the risks. SLP in agreement with POC Pt requesting further diet upgrade to solids. SLP educated pt at length re: safest consumption of Easy to chew consistencies w/ use of strategies. Pt is A&Ox4, endorsed understanding.

## 2025-02-06 NOTE — SWALLOW BEDSIDE ASSESSMENT ADULT - SWALLOW EVAL: DIAGNOSIS
+ toleration observed without overt symptoms of penetration/aspiration for soft and bite sized, thins with left head turn. + toleration observed without overt symptoms of penetration/aspiration for easy to chew, thins with left head turn.

## 2025-02-06 NOTE — PROGRESS NOTE ADULT - SUBJECTIVE AND OBJECTIVE BOX
SUBJECTIVE/OVERNIGHT EVENTS  Today is hospital day 27d. This morning patient was seen and examined at bedside, resting comfortably in bed. No acute or major events overnight.      CODE STATUS:      PMH:  PVD (peripheral vascular disease)    HTN (hypertension)    Hypercholesterolemia    GERD (gastroesophageal reflux disease)    Chronic polychondritis    Pulmonary nodule    CAD (coronary artery disease)    CAD (coronary artery disease)    COPD (chronic obstructive pulmonary disease)    H/O gastroesophageal reflux (GERD)    Relapsing polychondritis    Female cystocele    H/O pneumothorax          PSH:  S/P GEETHA-BSO    S/P colon resection    Vocal cord polyps    Pulmonary nodule    History of bladder surgery    H/O breast biopsy          MEDICATIONS  STANDING MEDICATIONS  albuterol/ipratropium for Nebulization 3 milliLiter(s) Nebulizer every 20 minutes  albuterol/ipratropium for Nebulization 3 milliLiter(s) Nebulizer every 4 hours  apixaban 10 milliGRAM(s) Oral every 12 hours  atorvastatin 40 milliGRAM(s) Oral at bedtime  chlorhexidine 2% Cloths 1 Application(s) Topical daily  dextrose 5%. 1000 milliLiter(s) IV Continuous <Continuous>  dextrose 5%. 1000 milliLiter(s) IV Continuous <Continuous>  dextrose 50% Injectable 25 Gram(s) IV Push once  dextrose 50% Injectable 12.5 Gram(s) IV Push once  dextrose 50% Injectable 25 Gram(s) IV Push once  glucagon  Injectable 1 milliGRAM(s) IntraMuscular once  insulin lispro (ADMELOG) corrective regimen sliding scale   SubCutaneous two times a day before meals  melatonin 3 milliGRAM(s) Oral at bedtime  metoprolol tartrate 12.5 milliGRAM(s) Oral every 12 hours  mycophenolate mofetil 1000 milliGRAM(s) Oral two times a day  pantoprazole    Tablet 40 milliGRAM(s) Oral before breakfast  saccharomyces boulardii 250 milliGRAM(s) Oral two times a day    PRN MEDICATIONS  acetaminophen     Tablet .. 650 milliGRAM(s) Oral every 6 hours PRN  dextrose Oral Gel 15 Gram(s) Oral once PRN  hydrOXYzine hydrochloride 25 milliGRAM(s) Oral every 12 hours PRN    VITALS  T(F): 97.8 (02-06-25 @ 13:53), Max: 98 (02-06-25 @ 05:07)  HR: 95 (02-06-25 @ 05:07) (84 - 95)  BP: 99/69 (02-06-25 @ 13:53) (91/63 - 107/71)  RR: 18 (02-06-25 @ 13:53) (17 - 18)  SpO2: 96% (02-06-25 @ 08:28) (96% - 96%)  POCT Blood Glucose.: 129 mg/dL (02-06-25 @ 11:32)  POCT Blood Glucose.: 101 mg/dL (02-06-25 @ 08:00)  POCT Blood Glucose.: 110 mg/dL (02-05-25 @ 21:29)  POCT Blood Glucose.: 125 mg/dL (02-05-25 @ 16:35)    PHYSICAL EXAM  GENERAL  ( x ) NAD, lying in bed comfortably     (  ) obtunded     (  ) lethargic     (  ) somnolent    HEAD  ( x ) Atraumatic     (  ) hematoma     (  ) laceration (specify location:       )     NECK  ( x ) Supple     (  ) neck stiffness     (  ) nuchal rigidity     (  )  no JVD     (  ) JVD present ( -- cm)    HEART  Rate -->  ( x ) normal rate    (  ) bradycardic    (  ) tachycardic  Rhythm -->  ( x ) regular    (  ) regularly irregular    (  ) irregularly irregular  Murmurs -->  ( x ) normal s1/s2    (  ) systolic murmur    (  ) diastolic murmur    (  ) continuous murmur     (  ) S3 present    (  ) S4 present    LUNGS  ( x )Unlabored respirations     (  ) tachypnea  ( x ) B/L air entry     (  ) decreased breath sounds in:  (location     )    ( x ) no adventitious sound     (  ) crackles     (  ) wheezing      (  ) rhonchi      (specify location:       )  (  ) chest wall tenderness (specify location:       )    ABDOMEN  ( x ) Soft     (  ) tense   |   ( x ) nondistended     (  ) distended   |   (  ) +BS     (  ) hypoactive bowel sounds     (  ) hyperactive bowel sounds  ( x ) nontender     (  ) RUQ tenderness     (  ) RLQ tenderness     (  ) LLQ tenderness     (  ) epigastric tenderness     (  ) diffuse tenderness  (  ) Splenomegaly      (  ) Hepatomegaly      (  ) Jaundice     (  ) ecchymosis     EXTREMITIES  (x  ) Normal     (  ) Rash     (  ) ecchymosis     (  ) varicose veins      (  ) pitting edema     (  ) non-pitting edema   (  ) ulceration     (  ) gangrene:     (location:     )    NERVOUS SYSTEM  ( x ) A&Ox3     (  ) confused     (  ) lethargic  CN II-XII:     (  ) Intact     (  ) focal deficits  (Specify:     )   Upper extremities:     (  ) strength X/5     (  ) focal deficit (specify:    )  Lower extremities:     (  ) strength  X/5    (  ) focal deficit (specify:    )      LABS             12.6   3.15  )-----------( 186      ( 02-06-25 @ 06:46 )             38.7     134  |  101  |  7   -------------------------<  102   02-06-25 @ 06:46  4.9  |  24  |  0.5    Ca      7.9     02-06-25 @ 06:46  Phos   3.5     02-06-25 @ 06:46  Mg     2.0     02-06-25 @ 06:46    TPro  4.8  /  Alb  3.1  /  TBili  0.4  /  DBili  x   /  AST  48  /  ALT  13  /  AlkPhos  86  /  GGT  x     02-06-25 @ 06:46      Troponin T, High Sensitivity Result: 63 ng/L (02-06-25 @ 11:40)  Troponin T, High Sensitivity Result: 72 ng/L (02-05-25 @ 17:14)  Pro-Brain Natriuretic Peptide: 5387 pg/mL (02-05-25 @ 17:14)    Urinalysis Basic - ( 06 Feb 2025 06:46 )    Color: x / Appearance: x / SG: x / pH: x  Gluc: 102 mg/dL / Ketone: x  / Bili: x / Urobili: x   Blood: x / Protein: x / Nitrite: x   Leuk Esterase: x / RBC: x / WBC x   Sq Epi: x / Non Sq Epi: x / Bacteria: x          IMAGING

## 2025-02-06 NOTE — PROGRESS NOTE ADULT - ASSESSMENT
70yo Female with PMH of HTN, HLD, GERD, CAD, COPD not on Home O2, Hx of Pneumothorax, polychondritis, SCC of tongue s/p Trach/PEG (reversed in 4/2021), s/p CRT (7 sessions in 2021) who presents with SOB for 1 week duration.  Pt was admitted for AHRF 2/2 COPD exacerbation and tracheal stenosis s/p dilation by CT surgery. Required upgrade to MICU iso ARF 2/2 aspiration, s/p bronch, self extubated 1/17, transitioned to NC in SDU.   SDU Course complicated by severe pharyngeal dysphagia with recurrent aspiration s/p FEES/MBS, after several GOC discussions patient is currently on puree diet with swallow device as patient is refusing NGT and prefers to trial modified diet and outpatient therapy before considering PEG. On floors pt weaned to RA, was planning dispo however course then complicated by thrombocytpenia, (+)PF4, acute extensive b/l LE DVTs iso HIT, likely type 2 and now with hypoxia and tachycardia concerning for PE. s/p CTA PE protocol with acute nonocculsive PE extending bifurcation of left main PA distally into a few upper and lower lobe segments to subsegmental, small RLL segmental/subsegmental PE, no RH strain; also notable b/l increasing GGO/patchy opacities may be infectious, new nodule. Currently in SDU for PE and DVTs, started on therapeutic fondaparinux       Pulmonary embolism:   Acute Extensive b/l LE DVT r/o HIT  Heparin induced Thrombocytopenia:   PLT slowly dropping on admission 300s -> 75 -> 80 ->108>180  Duplex 1/20 (-)  Duplex 1/29 (+) w/ acute right femoral, popliteal, posterior tibial, and peroneal veins; acute DVT in the left popliteal, posterior tibial, and peroneal veins  CTA PE protocol: Acute non-occulsive PE extending bifurcation of left main PA distally into a few upper and lower lobe segments to subsegmental, small RLL segmental/subsegmental PE, no RH strain; also notable b/l increasing GGO/patchy opacities may be infectious, new nodule  Repeat TTE grossly unchanged from prior, hyperdynamic LV function 60-65%  T4 score high probability, PF4 (+) SRO negative  Started on Fondaparinux, switch to Eliquis 10mg BID for 7 days then 5mg BID per hematology.     Tachycardia ,New Afib ?  continue anticoagulation   EKG when tachy  cont metoprolol can increase to 25mg BID   repeat Echo   trend trops and BNP    Acute Hypoxic Respiratory Failure   COPD exacerbation: resolved.   MSSA PNA   Reccurent aspiration pneumonia with oropharyngeal dysphagia in setting of polychondritis  Tracheal stenosis SP dilation (history of trach s/p revision in 2021  S/p intubation 1/17, self-extubation 1/17, BiPAP/HFNC  Bronch cx (+) MSSA (1.17)  Bronch lavage path with neutrophils and fungal organisms +GMS c/f aspergillus (1.10) , galactomannan negative, Fungitell >500  S/p Prednisone taper  S/p Cefazolin IV course for MSSA (completed 1/29)  Completed Zosyn per ID.  ID follow up, unclear if there is active fungal infection , follow up repeat aspergillus galactomannan, check urine histoplasma antigen and serum cryptococcal antigen      Severe Oropharyngeal Dysphagia:   SLP evaluations appreciated: pt has irreversible severe oropharyngeal dysphagia with aspiration of all consistencies, recommending PEG   Patient refusing NGT, currently wishes to pursue modified diet and will consider PEG if fails, pt accepts risk of aspiration  F/u ENT and GI as o/p  Puree diet with supraglottic swallow device   Aspiration precautions, HOB >45 deg    Acute on Chronic HFmrEF (Stable)  CAD  Cardio eval appreciated: recommend SGLT2i on discharge, continue Losartan and Metoprolol succinate  Hold home Lasix and Losartan due to soft BP  Continue Metoprolol and Lipitor.     Polychondritis and Tracheal Stenosis   Cellcept 1000mg BID, resume Hydroxychloroquine.     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 (Reversed 2021) and CRT in 2021  Lung Nodules      Code Status: DNR/Intubate  DVT Prophylaxis:   GI ppx: Protonix    Handoff: follow repeat echo ,cont tele ,Afib? ,cardio follow up. placement to STR.        68 yo F PMHx HTN, HLD, GERD, CAD, COPD not on Home O2, pneumothorax, polychondritis, SCC of tongue s/p Trach/PEG (reversed in 4/2021), s/p CRT (7 sessions in 2021) who presents for evaluation of SOB for 1 week duration. Pt was admitted for AHRF 2/2 COPD exacerbation and tracheal stenosis s/p dilation by CT surgery. Required upgrade to MICU iso ARF 2/2 aspiration, s/p bronch, self extubated 1/17, transitioned to NC in SDU. SDU course complicated by severe pharyngeal dysphagia with recurrent aspiration s/p FEES/MBS, after several GOC discussions patient is currently on puree diet with swallow device as patient is refusing NGT and prefers to trial modified diet and outpatient therapy before considering PEG. On floors pt weaned to RA, was planning dispo however course then complicated by thrombocytopenia (+)PF4, acute extensive b/l LE DVTs due HIT, likely type 2 and now with hypoxia and tachycardia concerning for PE. s/p CTA PE protocol with acute nonocculsive PE extending bifurcation of left main PA distally into a few upper and lower lobe segments to subsegmental, small RLL segmental/subsegmental PE, no RH strain; also notable b/l increasing GGO/patchy opacities may be infectious, new nodule. Pt subsequently downgraded to telemetry    Acute Pulmonary embolism:   Acute Extensive b/l LE DVT due to HIT  Heparin induced Thrombocytopenia:   -PLT slowly dropping on admission 300s -> 75 -> 80 ->108>180  -Duplex 1/20 (-)  -Duplex 1/29 (+) w/ acute right femoral, popliteal, posterior tibial, and peroneal veins; acute DVT in the left popliteal, posterior tibial, and peroneal veins  -CTA PE protocol: Acute non-occulsive PE extending bifurcation of left main PA distally into a few upper and lower lobe segments to subsegmental, small RLL segmental/subsegmental PE, no RH strain; also notable b/l increasing GGO/patchy opacities may be infectious, new nodule  -Repeat TTE grossly unchanged from prior, hyperdynamic LV function 60-65%  -T4 score high probability, PF4 (+) SRO negative  -Started on Fondaparinux, switch to Eliquis 10mg BID for 7 days then 5mg BID per hematology.     Sinus tachycardia  -likely from PE  -pt asymptomatic  -metoprolol previously increased  -echo done, pending read      Acute Hypoxic Respiratory Failure   COPD exacerbation: resolved.   MSSA PNA   -Reccurent aspiration pneumonia with oropharyngeal dysphagia in setting of polychondritis  -Tracheal stenosis SP dilation (history of trach s/p revision in 2021  -S/p intubation 1/17, self-extubation 1/17, BiPAP/HFNC  -Bronch cx (+) MSSA (1.17)  Bronch lavage path with neutrophils and fungal organisms +GMS c/f aspergillus (1.10) , galactomannan negative, Fungitell >500  -S/p Prednisone taper  -S/p Cefazolin IV course for MSSA (completed 1/29)  -Completed Zosyn per ID.  -as per ID unlikely fungal infection      Severe Oropharyngeal Dysphagia:   -SLP evaluations appreciated: pt has irreversible severe oropharyngeal dysphagia with aspiration of all consistencies, recommending PEG   -Patient refusing NGT, currently wishes to pursue modified diet and will consider PEG if fails, pt accepts risk of aspiration  -F/u ENT and GI as o/p  -Puree diet with supraglottic swallow device   -Aspiration precautions, HOB >45 deg    Acute on Chronic HFmrEF (Stable)  CAD  -Cardio eval appreciated: recommend SGLT2i on discharge, continue Losartan and Metoprolol succinate  -Hold home Lasix and Losartan due to soft BP  -Continue Metoprolol and Lipitor.     Polychondritis and Tracheal Stenosis   -Cellcept 1000mg BID, resume Hydroxychloroquine.     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 (Reversed 2021) and CRT in 2021  Lung Nodules  - outpt follow up    Code Status: DNR/Intubate  DVT Prophylaxis:   GI ppx: Protonix    #Progress Note Handoff:  Pending (specify):  Echo, HR monitoring, placement  Family discussion: As above with patient  Disposition: Home___/SNF___/Other________/Unknown at this time___x_____

## 2025-02-07 LAB
ALBUMIN SERPL ELPH-MCNC: 3 G/DL — LOW (ref 3.5–5.2)
ALP SERPL-CCNC: 93 U/L — SIGNIFICANT CHANGE UP (ref 30–115)
ALT FLD-CCNC: 13 U/L — SIGNIFICANT CHANGE UP (ref 0–41)
ANION GAP SERPL CALC-SCNC: 13 MMOL/L — SIGNIFICANT CHANGE UP (ref 7–14)
AST SERPL-CCNC: 30 U/L — SIGNIFICANT CHANGE UP (ref 0–41)
BASOPHILS # BLD AUTO: 0.02 K/UL — SIGNIFICANT CHANGE UP (ref 0–0.2)
BASOPHILS NFR BLD AUTO: 0.6 % — SIGNIFICANT CHANGE UP (ref 0–1)
BILIRUB SERPL-MCNC: 0.3 MG/DL — SIGNIFICANT CHANGE UP (ref 0.2–1.2)
BUN SERPL-MCNC: 6 MG/DL — LOW (ref 10–20)
CALCIUM SERPL-MCNC: 8.5 MG/DL — SIGNIFICANT CHANGE UP (ref 8.4–10.5)
CHLORIDE SERPL-SCNC: 104 MMOL/L — SIGNIFICANT CHANGE UP (ref 98–110)
CO2 SERPL-SCNC: 23 MMOL/L — SIGNIFICANT CHANGE UP (ref 17–32)
CREAT SERPL-MCNC: 0.5 MG/DL — LOW (ref 0.7–1.5)
EGFR: 101 ML/MIN/1.73M2 — SIGNIFICANT CHANGE UP
EOSINOPHIL # BLD AUTO: 0.29 K/UL — SIGNIFICANT CHANGE UP (ref 0–0.7)
EOSINOPHIL NFR BLD AUTO: 8.8 % — HIGH (ref 0–8)
GLUCOSE BLDC GLUCOMTR-MCNC: 103 MG/DL — HIGH (ref 70–99)
GLUCOSE BLDC GLUCOMTR-MCNC: 111 MG/DL — HIGH (ref 70–99)
GLUCOSE BLDC GLUCOMTR-MCNC: 128 MG/DL — HIGH (ref 70–99)
GLUCOSE BLDC GLUCOMTR-MCNC: 130 MG/DL — HIGH (ref 70–99)
GLUCOSE SERPL-MCNC: 89 MG/DL — SIGNIFICANT CHANGE UP (ref 70–99)
HCT VFR BLD CALC: 39.8 % — SIGNIFICANT CHANGE UP (ref 37–47)
HGB BLD-MCNC: 12.8 G/DL — SIGNIFICANT CHANGE UP (ref 12–16)
IMM GRANULOCYTES NFR BLD AUTO: 0.3 % — SIGNIFICANT CHANGE UP (ref 0.1–0.3)
LYMPHOCYTES # BLD AUTO: 0.32 K/UL — LOW (ref 1.2–3.4)
LYMPHOCYTES # BLD AUTO: 9.7 % — LOW (ref 20.5–51.1)
MAGNESIUM SERPL-MCNC: 1.7 MG/DL — LOW (ref 1.8–2.4)
MCHC RBC-ENTMCNC: 29 PG — SIGNIFICANT CHANGE UP (ref 27–31)
MCHC RBC-ENTMCNC: 32.2 G/DL — SIGNIFICANT CHANGE UP (ref 32–37)
MCV RBC AUTO: 90.2 FL — SIGNIFICANT CHANGE UP (ref 81–99)
MONOCYTES # BLD AUTO: 0.38 K/UL — SIGNIFICANT CHANGE UP (ref 0.1–0.6)
MONOCYTES NFR BLD AUTO: 11.6 % — HIGH (ref 1.7–9.3)
NEUTROPHILS # BLD AUTO: 2.27 K/UL — SIGNIFICANT CHANGE UP (ref 1.4–6.5)
NEUTROPHILS NFR BLD AUTO: 69 % — SIGNIFICANT CHANGE UP (ref 42.2–75.2)
NRBC # BLD: 0 /100 WBCS — SIGNIFICANT CHANGE UP (ref 0–0)
NRBC BLD-RTO: 0 /100 WBCS — SIGNIFICANT CHANGE UP (ref 0–0)
PHOSPHATE SERPL-MCNC: 3.6 MG/DL — SIGNIFICANT CHANGE UP (ref 2.1–4.9)
PLATELET # BLD AUTO: 208 K/UL — SIGNIFICANT CHANGE UP (ref 130–400)
PMV BLD: 9.9 FL — SIGNIFICANT CHANGE UP (ref 7.4–10.4)
POTASSIUM SERPL-MCNC: 4.6 MMOL/L — SIGNIFICANT CHANGE UP (ref 3.5–5)
POTASSIUM SERPL-SCNC: 4.6 MMOL/L — SIGNIFICANT CHANGE UP (ref 3.5–5)
PROT SERPL-MCNC: 5 G/DL — LOW (ref 6–8)
RBC # BLD: 4.41 M/UL — SIGNIFICANT CHANGE UP (ref 4.2–5.4)
RBC # FLD: 16 % — HIGH (ref 11.5–14.5)
SODIUM SERPL-SCNC: 140 MMOL/L — SIGNIFICANT CHANGE UP (ref 135–146)
WBC # BLD: 3.29 K/UL — LOW (ref 4.8–10.8)
WBC # FLD AUTO: 3.29 K/UL — LOW (ref 4.8–10.8)

## 2025-02-07 PROCEDURE — 99233 SBSQ HOSP IP/OBS HIGH 50: CPT

## 2025-02-07 RX ORDER — MAGNESIUM SULFATE 0.8 MEQ/ML
2 AMPUL (ML) INJECTION ONCE
Refills: 0 | Status: COMPLETED | OUTPATIENT
Start: 2025-02-07 | End: 2025-02-07

## 2025-02-07 RX ORDER — METOPROLOL SUCCINATE 25 MG
25 TABLET, EXTENDED RELEASE 24 HR ORAL EVERY 8 HOURS
Refills: 0 | Status: DISCONTINUED | OUTPATIENT
Start: 2025-02-07 | End: 2025-02-10

## 2025-02-07 RX ADMIN — APIXABAN 10 MILLIGRAM(S): 5 TABLET, FILM COATED ORAL at 10:17

## 2025-02-07 RX ADMIN — IPRATROPIUM BROMIDE AND ALBUTEROL SULFATE 3 MILLILITER(S): .5; 2.5 SOLUTION RESPIRATORY (INHALATION) at 21:11

## 2025-02-07 RX ADMIN — Medication 25 GRAM(S): at 09:45

## 2025-02-07 RX ADMIN — PANTOPRAZOLE 40 MILLIGRAM(S): 20 TABLET, DELAYED RELEASE ORAL at 05:03

## 2025-02-07 RX ADMIN — ATORVASTATIN CALCIUM 40 MILLIGRAM(S): 80 TABLET, FILM COATED ORAL at 21:38

## 2025-02-07 RX ADMIN — Medication 250 MILLIGRAM(S): at 17:58

## 2025-02-07 RX ADMIN — IPRATROPIUM BROMIDE AND ALBUTEROL SULFATE 3 MILLILITER(S): .5; 2.5 SOLUTION RESPIRATORY (INHALATION) at 16:56

## 2025-02-07 RX ADMIN — ACETAMINOPHEN 650 MILLIGRAM(S): 160 SUSPENSION ORAL at 14:53

## 2025-02-07 RX ADMIN — IPRATROPIUM BROMIDE AND ALBUTEROL SULFATE 3 MILLILITER(S): .5; 2.5 SOLUTION RESPIRATORY (INHALATION) at 13:16

## 2025-02-07 RX ADMIN — MYCOPHENOLATE MOFETIL 1000 MILLIGRAM(S): 200 POWDER, FOR SUSPENSION ORAL at 17:57

## 2025-02-07 RX ADMIN — ANTISEPTIC SURGICAL SCRUB 1 APPLICATION(S): 0.04 SOLUTION TOPICAL at 11:13

## 2025-02-07 RX ADMIN — APIXABAN 10 MILLIGRAM(S): 5 TABLET, FILM COATED ORAL at 21:38

## 2025-02-07 RX ADMIN — Medication 25 MILLIGRAM(S): at 13:04

## 2025-02-07 RX ADMIN — ACETAMINOPHEN, DIPHENHYDRAMINE HCL, PHENYLEPHRINE HCL 3 MILLIGRAM(S): 325; 25; 5 TABLET ORAL at 21:38

## 2025-02-07 RX ADMIN — IPRATROPIUM BROMIDE AND ALBUTEROL SULFATE 3 MILLILITER(S): .5; 2.5 SOLUTION RESPIRATORY (INHALATION) at 07:45

## 2025-02-07 RX ADMIN — Medication 12.5 MILLIGRAM(S): at 05:03

## 2025-02-07 RX ADMIN — Medication 250 MILLIGRAM(S): at 05:04

## 2025-02-07 RX ADMIN — MYCOPHENOLATE MOFETIL 1000 MILLIGRAM(S): 200 POWDER, FOR SUSPENSION ORAL at 05:03

## 2025-02-07 RX ADMIN — Medication 25 MILLIGRAM(S): at 21:38

## 2025-02-07 NOTE — SWALLOW BEDSIDE ASSESSMENT ADULT - ADDITIONAL RECOMMENDATIONS
SLP will plan to f/u with FEES
D/C SLP. Reconsult PRN
SLP will f/u for carry-over of strategies.
SLP will f/u with POC.
SLP will f/u with toleration of recommended PO diet.

## 2025-02-07 NOTE — SWALLOW BEDSIDE ASSESSMENT ADULT - SLP PATIENT PROFILE REVIEW
Patient reports she was taken off Orencia when in hospital because it's high risk due to her infection, she reports.  Patient reports she was told she can restart Orencia a couple weeks after discharge.  Picked up prescription and was left out of refrigerator for 18 hours last Saturday due to spouse.  Patient reports he was told to put medication in refrigerator.  She called  and was told it can only be out 6 hours.  She has been without this medication now and needs new prescription sent.  Informed patient that a prescription was sent to [id number]. She will call pharmacy when she finishes this call.   Patient reports she is homebound.  She does not know if she is able to make any future appointments and is working with her  for transportation.  She will call back if needing assistance with [id number].  
yes

## 2025-02-07 NOTE — SWALLOW BEDSIDE ASSESSMENT ADULT - SWALLOW EVAL: DIAGNOSIS
+ toleration observed without overt symptoms of penetration/aspiration for easy to chew, thins with left head turn.

## 2025-02-07 NOTE — SWALLOW BEDSIDE ASSESSMENT ADULT - SLP GENERAL OBSERVATIONS
pt received OOBTC awake and alert. +room air. pt. reports toleration of current PO diet.
pt awake alert without c/o pain. pt on o2 via HFNC. dysphonic vocal quality
pt received in bed awake anxious alert +HFNC 50L/min, 55% O2
pt received OOBTC awake and alert. +room air. pt. reports toleration of current PO diet.
pt received at bedside, awake, appeared anxious, +HFNC 60L/min, 60% O2.

## 2025-02-07 NOTE — SWALLOW BEDSIDE ASSESSMENT ADULT - SWALLOW EVAL: FEEDING ASSISTANCE
no assistance needed
minimal assistance required
no assistance needed
no assistance needed
able to follow single-step instructions
no assistance needed

## 2025-02-07 NOTE — SWALLOW BEDSIDE ASSESSMENT ADULT - DIET PRIOR TO ADMI
as per pt regular with thins
soft bite sized w/ thin liquids recommended prior to intubation w/ plan for instrumental swallow study
soft bite sized w/ thin liquids recommended prior to intubation
soft bite sized w/ thin liquids recommended prior to intubation w/ plan for instrumental swallow study.
soft bite sized w/ thin liquids recommended prior to intubation w/ plan for instrumental swallow study.
soft bite sized w/ thin liquids recommended prior to intubation

## 2025-02-07 NOTE — SWALLOW BEDSIDE ASSESSMENT ADULT - SWALLOW EVAL: RECOMMENDED FEEDING/EATING TECHNIQUES
2020 16:55
alternate food with liquid/position upright (90 degrees)/turn head left
use of super-supraglottic swallow/position upright (90 degrees)/small sips/bites
use of super-supraglottic swallow/position upright (90 degrees)/small sips/bites
alternate food with liquid/position upright (90 degrees)/turn head left
maintain upright posture during/after eating for 30 mins/position upright (90 degrees)/small sips/bites
crush medication (when feasible)/maintain upright posture during/after eating for 30 mins/oral hygiene/position upright (90 degrees)/small sips/bites
oral hygiene

## 2025-02-07 NOTE — SWALLOW BEDSIDE ASSESSMENT ADULT - PHARYNGEAL PHASE
Delayed throat clear post oral intake/Multiple swallows
Within functional limits
+pt completing cued cough after every swallow as instructed by SLP.
Delayed cough post oral intake/Delayed throat clear post oral intake
+pt completing cued cough after every swallow as instructed by SLP.
Within functional limits

## 2025-02-07 NOTE — SWALLOW BEDSIDE ASSESSMENT ADULT - ORAL PHASE
Within functional limits
Delayed oral transit time
Within functional limits

## 2025-02-07 NOTE — PROGRESS NOTE ADULT - ASSESSMENT
68 yo F PMHx HTN, HLD, GERD, CAD, COPD not on Home O2, pneumothorax, polychondritis, SCC of tongue s/p Trach/PEG (reversed in 4/2021), s/p CRT (7 sessions in 2021) who presents for evaluation of SOB for 1 week duration. Pt was admitted for AHRF 2/2 COPD exacerbation and tracheal stenosis s/p dilation by CT surgery. Required upgrade to MICU iso ARF 2/2 aspiration, s/p bronch, self extubated 1/17, transitioned to NC in SDU. SDU course complicated by severe pharyngeal dysphagia with recurrent aspiration s/p FEES/MBS, after several GOC discussions patient is currently on puree diet with swallow device as patient is refusing NGT and prefers to trial modified diet and outpatient therapy before considering PEG. On floors pt weaned to RA, was planning dispo however course then complicated by thrombocytopenia (+)PF4, acute extensive b/l LE DVTs due HIT, likely type 2 and now with hypoxia and tachycardia concerning for PE. s/p CTA PE protocol with acute nonocclusive PE extending bifurcation of left main PA distally into a few upper and lower lobe segments to subsegmental, small RLL segmental/subsegmental PE, no RH strain; also notable b/l increasing GGO/patchy opacities may be infectious, new nodule. Pt subsequently downgraded to telemetry    Acute Pulmonary embolism:   Acute Extensive b/l LE DVT due to HIT  Heparin induced Thrombocytopenia:   -PLT slowly dropping on admission 300s -> 75 -> 80 ->108>180  -Duplex 1/20 (-)  -Duplex 1/29 (+) w/ acute right femoral, popliteal, posterior tibial, and peroneal veins; acute DVT in the left popliteal, posterior tibial, and peroneal veins  -CTA PE protocol: Acute non-occulsive PE extending bifurcation of left main PA distally into a few upper and lower lobe segments to subsegmental, small RLL segmental/subsegmental PE, no RH strain; also notable b/l increasing GGO/patchy opacities may be infectious, new nodule  -Repeat TTE grossly unchanged from prior, hyperdynamic LV function 60-65%  -T4 score high probability, PF4 (+) SRO negative  -Started on Fondaparinux, switch to Eliquis 10mg BID for 7 days then 5mg BID per hematology.     Sinus tachycardia  -likely from PE  -pt asymptomatic  -metoprolol previously increased  -echo done, shows  impaired relaxation pattern of left ventricular myocardial filling (Grade I diastolic dysfunction).  Mildly enlarged right ventricle.Mildly reduced RV systolic function.   -above was present prior to PE so doubt PE is triggering    Acute Hypoxic Respiratory Failure   COPD exacerbation: resolved.   MSSA PNA   -Reccurent aspiration pneumonia with oropharyngeal dysphagia in setting of polychondritis  -Tracheal stenosis SP dilation (history of trach s/p revision in 2021  -S/p intubation 1/17, self-extubation 1/17, BiPAP/HFNC  -Bronch cx (+) MSSA (1.17)  Bronch lavage path with neutrophils and fungal organisms +GMS c/f aspergillus (1.10) , galactomannan negative, Fungitell >500  -S/p Prednisone taper  -S/p Cefazolin IV course for MSSA (completed 1/29)  -Completed Zosyn per ID.  -as per ID unlikely fungal infection      Severe Oropharyngeal Dysphagia:   -SLP evaluations appreciated: pt has irreversible severe oropharyngeal dysphagia with aspiration of all consistencies, recommending PEG   -Patient refusing NGT, currently wishes to pursue modified diet and will consider PEG if fails, pt accepts risk of aspiration  -F/u ENT and GI as o/p  -Puree diet with supraglottic swallow device   -Aspiration precautions, HOB >45 deg    Acute on Chronic HFmrEF (Stable)  CAD  -Cardio eval appreciated: recommend SGLT2i on discharge, continue Losartan and Metoprolol succinate  -Hold home Lasix and Losartan due to soft BP  -Continue Metoprolol and Lipitor.     Polychondritis and Tracheal Stenosis   -Cellcept 1000mg BID, resume Hydroxychloroquine.     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 (Reversed 2021) and CRT in 2021  Lung Nodules  - outpt follow up    Code Status: DNR/Intubate  DVT Prophylaxis:   GI ppx: Protonix    #Progress Note Handoff:  Pending (specify): HR montioring  Family discussion: As above with patient, I called sister Maame [phone] at patient's request but there was no answer. Message left.   Disposition: Home___/SNF___/Other________/Unknown at this time___x_____

## 2025-02-07 NOTE — PROGRESS NOTE ADULT - SUBJECTIVE AND OBJECTIVE BOX
SUBJECTIVE/OVERNIGHT EVENTS  Today is hospital day 28d. This morning patient was seen and examined at bedside, resting comfortably in bed. No acute or major events overnight.      CODE STATUS:      PMH:  PVD (peripheral vascular disease)    HTN (hypertension)    Hypercholesterolemia    GERD (gastroesophageal reflux disease)    Chronic polychondritis    Pulmonary nodule    CAD (coronary artery disease)    CAD (coronary artery disease)    COPD (chronic obstructive pulmonary disease)    H/O gastroesophageal reflux (GERD)    Relapsing polychondritis    Female cystocele    H/O pneumothorax          PSH:  S/P GEETHA-BSO    S/P colon resection    Vocal cord polyps    Pulmonary nodule    History of bladder surgery    H/O breast biopsy          MEDICATIONS  STANDING MEDICATIONS  albuterol/ipratropium for Nebulization 3 milliLiter(s) Nebulizer every 20 minutes  albuterol/ipratropium for Nebulization 3 milliLiter(s) Nebulizer every 4 hours  apixaban 10 milliGRAM(s) Oral every 12 hours  atorvastatin 40 milliGRAM(s) Oral at bedtime  chlorhexidine 2% Cloths 1 Application(s) Topical daily  dextrose 5%. 1000 milliLiter(s) IV Continuous <Continuous>  dextrose 5%. 1000 milliLiter(s) IV Continuous <Continuous>  dextrose 50% Injectable 25 Gram(s) IV Push once  dextrose 50% Injectable 12.5 Gram(s) IV Push once  dextrose 50% Injectable 25 Gram(s) IV Push once  glucagon  Injectable 1 milliGRAM(s) IntraMuscular once  insulin lispro (ADMELOG) corrective regimen sliding scale   SubCutaneous two times a day before meals  melatonin 3 milliGRAM(s) Oral at bedtime  metoprolol tartrate 25 milliGRAM(s) Oral every 8 hours  mycophenolate mofetil 1000 milliGRAM(s) Oral two times a day  pantoprazole    Tablet 40 milliGRAM(s) Oral before breakfast  saccharomyces boulardii 250 milliGRAM(s) Oral two times a day    PRN MEDICATIONS  acetaminophen     Tablet .. 650 milliGRAM(s) Oral every 6 hours PRN  dextrose Oral Gel 15 Gram(s) Oral once PRN  hydrOXYzine hydrochloride 25 milliGRAM(s) Oral every 12 hours PRN    VITALS  T(F): 97.5 (02-07-25 @ 11:36), Max: 97.5 (02-07-25 @ 11:36)  HR: 102 (02-07-25 @ 12:33) (83 - 102)  BP: 112/85 (02-07-25 @ 12:33) (106/71 - 132/85)  RR: 16 (02-07-25 @ 11:36) (16 - 17)  SpO2: 95% (02-07-25 @ 04:28) (95% - 95%)  POCT Blood Glucose.: 111 mg/dL (02-07-25 @ 11:00)  POCT Blood Glucose.: 103 mg/dL (02-07-25 @ 07:29)  POCT Blood Glucose.: 119 mg/dL (02-06-25 @ 21:28)  POCT Blood Glucose.: 120 mg/dL (02-06-25 @ 16:47)    PHYSICAL EXAM  GENERAL  ( x ) NAD, lying in bed comfortably     (  ) obtunded     (  ) lethargic     (  ) somnolent    HEAD  ( x ) Atraumatic     (  ) hematoma     (  ) laceration (specify location:       )     NECK  ( x ) Supple     (  ) neck stiffness     (  ) nuchal rigidity     (  )  no JVD     (  ) JVD present ( -- cm)    HEART  Rate -->  ( x ) normal rate    (  ) bradycardic    (  ) tachycardic  Rhythm -->  ( x ) regular    (  ) regularly irregular    (  ) irregularly irregular  Murmurs -->  ( x ) normal s1/s2    (  ) systolic murmur    (  ) diastolic murmur    (  ) continuous murmur     (  ) S3 present    (  ) S4 present    LUNGS  ( x )Unlabored respirations     (  ) tachypnea  ( x ) B/L air entry     (  ) decreased breath sounds in:  (location     )    ( x ) no adventitious sound     (  ) crackles     (  ) wheezing      (  ) rhonchi      (specify location:       )  (  ) chest wall tenderness (specify location:       )    ABDOMEN  ( x ) Soft     (  ) tense   |   ( x ) nondistended     (  ) distended   |   (  ) +BS     (  ) hypoactive bowel sounds     (  ) hyperactive bowel sounds  ( x ) nontender     (  ) RUQ tenderness     (  ) RLQ tenderness     (  ) LLQ tenderness     (  ) epigastric tenderness     (  ) diffuse tenderness  (  ) Splenomegaly      (  ) Hepatomegaly      (  ) Jaundice     (  ) ecchymosis     EXTREMITIES  (x  ) Normal     (  ) Rash     (  ) ecchymosis     (  ) varicose veins      (  ) pitting edema     (  ) non-pitting edema   (  ) ulceration     (  ) gangrene:     (location:     )    NERVOUS SYSTEM  ( x ) A&Ox3     (  ) confused     (  ) lethargic  CN II-XII:     (  ) Intact     (  ) focal deficits  (Specify:     )   Upper extremities:     (  ) strength X/5     (  ) focal deficit (specify:    )  Lower extremities:     (  ) strength  X/5    (  ) focal deficit (specify:    )      LABS             12.8   3.29  )-----------( 208      ( 02-07-25 @ 06:34 )             39.8     140  |  104  |  6   -------------------------<  89   02-07-25 @ 06:34  4.6  |  23  |  0.5    Ca      8.5     02-07-25 @ 06:34  Phos   3.6     02-07-25 @ 06:34  Mg     1.7     02-07-25 @ 06:34    TPro  5.0  /  Alb  3.0  /  TBili  0.3  /  DBili  x   /  AST  30  /  ALT  13  /  AlkPhos  93  /  GGT  x     02-07-25 @ 06:34      Troponin T, High Sensitivity Result: 63 ng/L (02-06-25 @ 11:40)  Troponin T, High Sensitivity Result: 72 ng/L (02-05-25 @ 17:14)  Pro-Brain Natriuretic Peptide: 5387 pg/mL (02-05-25 @ 17:14)    Urinalysis Basic - ( 07 Feb 2025 06:34 )    Color: x / Appearance: x / SG: x / pH: x  Gluc: 89 mg/dL / Ketone: x  / Bili: x / Urobili: x   Blood: x / Protein: x / Nitrite: x   Leuk Esterase: x / RBC: x / WBC x   Sq Epi: x / Non Sq Epi: x / Bacteria: x          IMAGING

## 2025-02-07 NOTE — SWALLOW BEDSIDE ASSESSMENT ADULT - MODE OF PRESENTATION
cup/self fed
cup/self fed
cup/spoon/self fed
cup/spoon/self fed
cup/spoon/straw/self fed
cup/spoon/self fed

## 2025-02-07 NOTE — SWALLOW BEDSIDE ASSESSMENT ADULT - SWALLOW EVAL: RECOMMENDED DIET
continue current diet
easy to chew, thin liquids
Puree/thins w/ use of super-supraglottic swallow.
Puree/thins w/ use of super-supraglottic swallow.
NPO with non oral means for nutrition/hydration
soft and bite size with thins
easy to chew, thin liquids

## 2025-02-07 NOTE — SWALLOW BEDSIDE ASSESSMENT ADULT - NS SPL SWALLOW CLINIC TRIAL FT
pt refusing Puree at this time.
left head turn for liquids
suspected pharyngeal dysphagia;  however pt has baseline dysphagia since BOT ca and trach/peg  pt would benefit from MBS once respiratory status improves to evaluate current swallow function
left head turn for liquids

## 2025-02-07 NOTE — SWALLOW BEDSIDE ASSESSMENT ADULT - SWALLOW EVAL: CURRENT DIET
Puree/thins w/ use of super-supraglottic swallow.
Puree/thins w/ use of super-supraglottic swallow.
soft and bite sized, thin liquids
soft and bite sized, thin liquids
minced and moist w/ thins via left head turn and alternating bite/sip
regular with thins
easy to chew w/ thins via left head turn and alternating bite/sip

## 2025-02-07 NOTE — SWALLOW BEDSIDE ASSESSMENT ADULT - NS ASR SWALLOW FINDINGS DISCUS
DONA Tinoco & rounding team aware
Physician/Nursing/Patient
RN MD Verónica made aware./Physician/Nursing/Patient
Nursing/Patient
Physician/Nursing
Physician/Nursing/Patient

## 2025-02-07 NOTE — PROGRESS NOTE ADULT - SUBJECTIVE AND OBJECTIVE BOX
CHIEF COMPLAINT:    Patient is a 69y old  Female who presents with a chief complaint of sob     INTERVAL HPI/OVERNIGHT EVENTS:    Patient seen and examined at bedside. No acute overnight events occurred.    ROS: All other systems are negative.    Medications:  Standing  albuterol/ipratropium for Nebulization 3 milliLiter(s) Nebulizer every 20 minutes  albuterol/ipratropium for Nebulization 3 milliLiter(s) Nebulizer every 4 hours  apixaban 10 milliGRAM(s) Oral every 12 hours  atorvastatin 40 milliGRAM(s) Oral at bedtime  chlorhexidine 2% Cloths 1 Application(s) Topical daily  dextrose 5%. 1000 milliLiter(s) IV Continuous <Continuous>  dextrose 5%. 1000 milliLiter(s) IV Continuous <Continuous>  dextrose 50% Injectable 25 Gram(s) IV Push once  dextrose 50% Injectable 12.5 Gram(s) IV Push once  dextrose 50% Injectable 25 Gram(s) IV Push once  glucagon  Injectable 1 milliGRAM(s) IntraMuscular once  insulin lispro (ADMELOG) corrective regimen sliding scale   SubCutaneous two times a day before meals  melatonin 3 milliGRAM(s) Oral at bedtime  metoprolol tartrate 25 milliGRAM(s) Oral every 8 hours  mycophenolate mofetil 1000 milliGRAM(s) Oral two times a day  pantoprazole    Tablet 40 milliGRAM(s) Oral before breakfast  saccharomyces boulardii 250 milliGRAM(s) Oral two times a day    PRN Meds  acetaminophen     Tablet .. 650 milliGRAM(s) Oral every 6 hours PRN  dextrose Oral Gel 15 Gram(s) Oral once PRN  hydrOXYzine hydrochloride 25 milliGRAM(s) Oral every 12 hours PRN        Vital Signs:    T(F): 97.5 (02-07-25 @ 11:36), Max: 97.5 (02-07-25 @ 11:36)  HR: 102 (02-07-25 @ 12:33) (83 - 102)  BP: 112/85 (02-07-25 @ 12:33) (106/71 - 132/85)  RR: 16 (02-07-25 @ 11:36) (16 - 17)  SpO2: 95% (02-07-25 @ 04:28) (95% - 95%)  I&O's Summary    06 Feb 2025 07:01  -  07 Feb 2025 07:00  --------------------------------------------------------  IN: 1365 mL / OUT: 1500 mL / NET: -135 mL    07 Feb 2025 07:01  -  07 Feb 2025 15:36  --------------------------------------------------------  IN: 384 mL / OUT: 0 mL / NET: 384 mL      Daily     Daily   CAPILLARY BLOOD GLUCOSE      POCT Blood Glucose.: 111 mg/dL (07 Feb 2025 11:00)  POCT Blood Glucose.: 103 mg/dL (07 Feb 2025 07:29)  POCT Blood Glucose.: 119 mg/dL (06 Feb 2025 21:28)  POCT Blood Glucose.: 120 mg/dL (06 Feb 2025 16:47)      PHYSICAL EXAM:  GENERAL:  NAD  SKIN: No rashes or lesions  HEENT: Atraumatic. Normocephalic. Anicteric  NECK:  No JVD.   PULMONARY: Clear to ausculation bilaterally. No wheezing. No rales  CVS: Normal S1, S2. Regular rate and rhythm. No murmurs.  ABDOMEN/GI: Soft, Nontender, Nondistended; Bowel sounds are present  EXTREMITIES:  No edema B/L LE.  NEUROLOGIC:  No motor deficit.  PSYCH: Alert & oriented x 3, normal affect      LABS:                        12.8   3.29  )-----------( 208      ( 07 Feb 2025 06:34 )             39.8     02-07    140  |  104  |  6[L]  ----------------------------<  89  4.6   |  23  |  0.5[L]    Ca    8.5      07 Feb 2025 06:34  Phos  3.6     02-07  Mg     1.7     02-07    TPro  5.0[L]  /  Alb  3.0[L]  /  TBili  0.3  /  DBili  x   /  AST  30  /  ALT  13  /  AlkPhos  93  02-07              RADIOLOGY & ADDITIONAL TESTS:  Imaging or report Personally Reviewed:  [ ] YES  [ ] NO -->no new images    Telemetry reviewed independently - NSR, no acute events  EKG reviewed independently -->no new EKGs    Consultant(s) Notes Reviewed:  [ ] YES  [ ] NO  Care Discussed with Consultants/Other Providers [ ] YES  [ ] NO    Case discussed with resident  Care discussed with pt         CHIEF COMPLAINT:    Patient is a 69y old  Female who presents with a chief complaint of sob     INTERVAL HPI/OVERNIGHT EVENTS:    Patient seen and examined at bedside. No acute overnight events occurred.    ROS: Denies SOB, chest pain. All other systems are negative.    Medications:  Standing  albuterol/ipratropium for Nebulization 3 milliLiter(s) Nebulizer every 20 minutes  albuterol/ipratropium for Nebulization 3 milliLiter(s) Nebulizer every 4 hours  apixaban 10 milliGRAM(s) Oral every 12 hours  atorvastatin 40 milliGRAM(s) Oral at bedtime  chlorhexidine 2% Cloths 1 Application(s) Topical daily  dextrose 5%. 1000 milliLiter(s) IV Continuous <Continuous>  dextrose 5%. 1000 milliLiter(s) IV Continuous <Continuous>  dextrose 50% Injectable 25 Gram(s) IV Push once  dextrose 50% Injectable 12.5 Gram(s) IV Push once  dextrose 50% Injectable 25 Gram(s) IV Push once  glucagon  Injectable 1 milliGRAM(s) IntraMuscular once  insulin lispro (ADMELOG) corrective regimen sliding scale   SubCutaneous two times a day before meals  melatonin 3 milliGRAM(s) Oral at bedtime  metoprolol tartrate 25 milliGRAM(s) Oral every 8 hours  mycophenolate mofetil 1000 milliGRAM(s) Oral two times a day  pantoprazole    Tablet 40 milliGRAM(s) Oral before breakfast  saccharomyces boulardii 250 milliGRAM(s) Oral two times a day    PRN Meds  acetaminophen     Tablet .. 650 milliGRAM(s) Oral every 6 hours PRN  dextrose Oral Gel 15 Gram(s) Oral once PRN  hydrOXYzine hydrochloride 25 milliGRAM(s) Oral every 12 hours PRN        Vital Signs:    T(F): 97.5 (02-07-25 @ 11:36), Max: 97.5 (02-07-25 @ 11:36)  HR: 102 (02-07-25 @ 12:33) (83 - 102)  BP: 112/85 (02-07-25 @ 12:33) (106/71 - 132/85)  RR: 16 (02-07-25 @ 11:36) (16 - 17)  SpO2: 95% (02-07-25 @ 04:28) (95% - 95%)  I&O's Summary    06 Feb 2025 07:01  -  07 Feb 2025 07:00  --------------------------------------------------------  IN: 1365 mL / OUT: 1500 mL / NET: -135 mL    07 Feb 2025 07:01  -  07 Feb 2025 15:36  --------------------------------------------------------  IN: 384 mL / OUT: 0 mL / NET: 384 mL      POCT Blood Glucose.: 111 mg/dL (07 Feb 2025 11:00)  POCT Blood Glucose.: 103 mg/dL (07 Feb 2025 07:29)  POCT Blood Glucose.: 119 mg/dL (06 Feb 2025 21:28)  POCT Blood Glucose.: 120 mg/dL (06 Feb 2025 16:47)      PHYSICAL EXAM:  GENERAL:  NAD  SKIN: No rashes or lesions  HEENT: Atraumatic. Normocephalic. Anicteric  NECK:  No JVD.   PULMONARY: Clear to ausculation bilaterally. No wheezing. No rales  CVS: Normal S1, S2. Regular rate and rhythm. No murmurs.  ABDOMEN/GI: Soft, Nontender, Nondistended; Bowel sounds are present  EXTREMITIES:  No edema B/L LE.  NEUROLOGIC:  No motor deficit.  PSYCH: Alert & oriented x 3, normal affect      LABS:                        12.8   3.29  )-----------( 208      ( 07 Feb 2025 06:34 )             39.8     02-07    140  |  104  |  6[L]  ----------------------------<  89  4.6   |  23  |  0.5[L]    Ca    8.5      07 Feb 2025 06:34  Phos  3.6     02-07  Mg     1.7     02-07    TPro  5.0[L]  /  Alb  3.0[L]  /  TBili  0.3  /  DBili  x   /  AST  30  /  ALT  13  /  AlkPhos  93  02-07              RADIOLOGY & ADDITIONAL TESTS:  Imaging or report Personally Reviewed:  [ ] YES  [ ] NO -->no new images    Telemetry reviewed independently - Sinus tachycardia  EKG reviewed independently -->no new EKGs    Consultant(s) Notes Reviewed:  [ ] YES  [ ] NO  Care Discussed with Consultants/Other Providers [ ] YES  [ ] NO    Case discussed with resident  Care discussed with pt

## 2025-02-08 LAB
ALBUMIN SERPL ELPH-MCNC: 3 G/DL — LOW (ref 3.5–5.2)
ALP SERPL-CCNC: 105 U/L — SIGNIFICANT CHANGE UP (ref 30–115)
ALT FLD-CCNC: 12 U/L — SIGNIFICANT CHANGE UP (ref 0–41)
ANION GAP SERPL CALC-SCNC: 12 MMOL/L — SIGNIFICANT CHANGE UP (ref 7–14)
AST SERPL-CCNC: 17 U/L — SIGNIFICANT CHANGE UP (ref 0–41)
BASOPHILS # BLD AUTO: 0.02 K/UL — SIGNIFICANT CHANGE UP (ref 0–0.2)
BASOPHILS NFR BLD AUTO: 0.4 % — SIGNIFICANT CHANGE UP (ref 0–1)
BILIRUB SERPL-MCNC: 0.2 MG/DL — SIGNIFICANT CHANGE UP (ref 0.2–1.2)
BUN SERPL-MCNC: 10 MG/DL — SIGNIFICANT CHANGE UP (ref 10–20)
CALCIUM SERPL-MCNC: 8 MG/DL — LOW (ref 8.4–10.5)
CHLORIDE SERPL-SCNC: 100 MMOL/L — SIGNIFICANT CHANGE UP (ref 98–110)
CO2 SERPL-SCNC: 22 MMOL/L — SIGNIFICANT CHANGE UP (ref 17–32)
CREAT SERPL-MCNC: <0.5 MG/DL — LOW (ref 0.7–1.5)
EGFR: 106 ML/MIN/1.73M2 — SIGNIFICANT CHANGE UP
EOSINOPHIL # BLD AUTO: 0.25 K/UL — SIGNIFICANT CHANGE UP (ref 0–0.7)
EOSINOPHIL NFR BLD AUTO: 5.2 % — SIGNIFICANT CHANGE UP (ref 0–8)
FLUAV AG NPH QL: SIGNIFICANT CHANGE UP
FLUBV AG NPH QL: SIGNIFICANT CHANGE UP
GLUCOSE BLDC GLUCOMTR-MCNC: 113 MG/DL — HIGH (ref 70–99)
GLUCOSE BLDC GLUCOMTR-MCNC: 126 MG/DL — HIGH (ref 70–99)
GLUCOSE BLDC GLUCOMTR-MCNC: 135 MG/DL — HIGH (ref 70–99)
GLUCOSE BLDC GLUCOMTR-MCNC: 94 MG/DL — SIGNIFICANT CHANGE UP (ref 70–99)
GLUCOSE SERPL-MCNC: 103 MG/DL — HIGH (ref 70–99)
HCT VFR BLD CALC: 38.8 % — SIGNIFICANT CHANGE UP (ref 37–47)
HGB BLD-MCNC: 12.4 G/DL — SIGNIFICANT CHANGE UP (ref 12–16)
IMM GRANULOCYTES NFR BLD AUTO: 0.2 % — SIGNIFICANT CHANGE UP (ref 0.1–0.3)
LYMPHOCYTES # BLD AUTO: 0.3 K/UL — LOW (ref 1.2–3.4)
LYMPHOCYTES # BLD AUTO: 6.2 % — LOW (ref 20.5–51.1)
MAGNESIUM SERPL-MCNC: 1.7 MG/DL — LOW (ref 1.8–2.4)
MCHC RBC-ENTMCNC: 29.1 PG — SIGNIFICANT CHANGE UP (ref 27–31)
MCHC RBC-ENTMCNC: 32 G/DL — SIGNIFICANT CHANGE UP (ref 32–37)
MCV RBC AUTO: 91.1 FL — SIGNIFICANT CHANGE UP (ref 81–99)
MONOCYTES # BLD AUTO: 0.43 K/UL — SIGNIFICANT CHANGE UP (ref 0.1–0.6)
MONOCYTES NFR BLD AUTO: 8.9 % — SIGNIFICANT CHANGE UP (ref 1.7–9.3)
NEUTROPHILS # BLD AUTO: 3.84 K/UL — SIGNIFICANT CHANGE UP (ref 1.4–6.5)
NEUTROPHILS NFR BLD AUTO: 79.1 % — HIGH (ref 42.2–75.2)
NRBC # BLD: 0 /100 WBCS — SIGNIFICANT CHANGE UP (ref 0–0)
NRBC BLD-RTO: 0 /100 WBCS — SIGNIFICANT CHANGE UP (ref 0–0)
PHOSPHATE SERPL-MCNC: 3.1 MG/DL — SIGNIFICANT CHANGE UP (ref 2.1–4.9)
PLATELET # BLD AUTO: 224 K/UL — SIGNIFICANT CHANGE UP (ref 130–400)
PMV BLD: 9.8 FL — SIGNIFICANT CHANGE UP (ref 7.4–10.4)
POTASSIUM SERPL-MCNC: 3.8 MMOL/L — SIGNIFICANT CHANGE UP (ref 3.5–5)
POTASSIUM SERPL-SCNC: 3.8 MMOL/L — SIGNIFICANT CHANGE UP (ref 3.5–5)
PROT SERPL-MCNC: 4.6 G/DL — LOW (ref 6–8)
RBC # BLD: 4.26 M/UL — SIGNIFICANT CHANGE UP (ref 4.2–5.4)
RBC # FLD: 16.2 % — HIGH (ref 11.5–14.5)
RSV RNA NPH QL NAA+NON-PROBE: SIGNIFICANT CHANGE UP
SARS-COV-2 RNA SPEC QL NAA+PROBE: SIGNIFICANT CHANGE UP
SODIUM SERPL-SCNC: 134 MMOL/L — LOW (ref 135–146)
WBC # BLD: 4.85 K/UL — SIGNIFICANT CHANGE UP (ref 4.8–10.8)
WBC # FLD AUTO: 4.85 K/UL — SIGNIFICANT CHANGE UP (ref 4.8–10.8)

## 2025-02-08 PROCEDURE — 99233 SBSQ HOSP IP/OBS HIGH 50: CPT

## 2025-02-08 RX ORDER — PHENOL 1.4 %
1 AEROSOL, SPRAY (ML) MUCOUS MEMBRANE THREE TIMES A DAY
Refills: 0 | Status: DISCONTINUED | OUTPATIENT
Start: 2025-02-08 | End: 2025-02-10

## 2025-02-08 RX ADMIN — ANTISEPTIC SURGICAL SCRUB 1 APPLICATION(S): 0.04 SOLUTION TOPICAL at 11:54

## 2025-02-08 RX ADMIN — PANTOPRAZOLE 40 MILLIGRAM(S): 20 TABLET, DELAYED RELEASE ORAL at 05:23

## 2025-02-08 RX ADMIN — MYCOPHENOLATE MOFETIL 1000 MILLIGRAM(S): 200 POWDER, FOR SUSPENSION ORAL at 17:01

## 2025-02-08 RX ADMIN — Medication 1 LOZENGE: at 16:17

## 2025-02-08 RX ADMIN — Medication 25 MILLIGRAM(S): at 15:17

## 2025-02-08 RX ADMIN — Medication 25 MILLIGRAM(S): at 05:23

## 2025-02-08 RX ADMIN — MYCOPHENOLATE MOFETIL 1000 MILLIGRAM(S): 200 POWDER, FOR SUSPENSION ORAL at 05:22

## 2025-02-08 RX ADMIN — Medication 250 MILLIGRAM(S): at 05:23

## 2025-02-08 RX ADMIN — APIXABAN 10 MILLIGRAM(S): 5 TABLET, FILM COATED ORAL at 21:50

## 2025-02-08 RX ADMIN — ATORVASTATIN CALCIUM 40 MILLIGRAM(S): 80 TABLET, FILM COATED ORAL at 21:50

## 2025-02-08 RX ADMIN — IPRATROPIUM BROMIDE AND ALBUTEROL SULFATE 3 MILLILITER(S): .5; 2.5 SOLUTION RESPIRATORY (INHALATION) at 19:24

## 2025-02-08 RX ADMIN — Medication 250 MILLIGRAM(S): at 17:01

## 2025-02-08 RX ADMIN — ACETAMINOPHEN, DIPHENHYDRAMINE HCL, PHENYLEPHRINE HCL 3 MILLIGRAM(S): 325; 25; 5 TABLET ORAL at 21:51

## 2025-02-08 RX ADMIN — APIXABAN 10 MILLIGRAM(S): 5 TABLET, FILM COATED ORAL at 10:32

## 2025-02-08 RX ADMIN — IPRATROPIUM BROMIDE AND ALBUTEROL SULFATE 3 MILLILITER(S): .5; 2.5 SOLUTION RESPIRATORY (INHALATION) at 13:08

## 2025-02-08 RX ADMIN — Medication 25 MILLIGRAM(S): at 21:51

## 2025-02-08 NOTE — PROGRESS NOTE ADULT - ASSESSMENT
70 yo F PMHx HTN, HLD, GERD, CAD, COPD not on Home O2, pneumothorax, polychondritis, SCC of tongue s/p Trach/PEG (reversed in 4/2021), s/p CRT (7 sessions in 2021) who presents for evaluation of SOB for 1 week duration. Pt was admitted for AHRF 2/2 COPD exacerbation and tracheal stenosis s/p dilation by CT surgery. Required upgrade to MICU iso ARF 2/2 aspiration, s/p bronch, self extubated 1/17, transitioned to NC in SDU. SDU course complicated by severe pharyngeal dysphagia with recurrent aspiration s/p FEES/MBS, after several GOC discussions patient is currently on puree diet with swallow device as patient is refusing NGT and prefers to trial modified diet and outpatient therapy before considering PEG. On floors pt weaned to RA, was planning dispo however course then complicated by thrombocytopenia (+)PF4, acute extensive b/l LE DVTs due HIT, likely type 2 and now with hypoxia and tachycardia concerning for PE. s/p CTA PE protocol with acute nonocclusive PE extending bifurcation of left main PA distally into a few upper and lower lobe segments to subsegmental, small RLL segmental/subsegmental PE, no RH strain; also notable b/l increasing GGO/patchy opacities may be infectious, new nodule. Pt subsequently downgraded to telemetry    Sore throat  Ear ache  - check RVP  - lozenge prn    Acute Pulmonary embolism:   Acute Extensive b/l LE DVT due to HIT  Heparin induced Thrombocytopenia:   -PLT slowly dropping on admission 300s -> 75 -> 80 ->108>180  -Duplex 1/20 (-)  -Duplex 1/29 (+) w/ acute right femoral, popliteal, posterior tibial, and peroneal veins; acute DVT in the left popliteal, posterior tibial, and peroneal veins  -CTA PE protocol: Acute non-occulsive PE extending bifurcation of left main PA distally into a few upper and lower lobe segments to subsegmental, small RLL segmental/subsegmental PE, no RH strain; also notable b/l increasing GGO/patchy opacities may be infectious, new nodule  -Repeat TTE grossly unchanged from prior, hyperdynamic LV function 60-65%  -T4 score high probability, PF4 (+) SRO negative  -Started on Fondaparinux, switch to Eliquis 10mg BID for 7 days then 5mg BID per hematology.     Sinus tachycardia  -likely from PE  -pt asymptomatic  -metoprolol previously increased, HR controlled  -echo done, shows  impaired relaxation pattern of left ventricular myocardial filling (Grade I diastolic dysfunction).  Mildly enlarged right ventricle.Mildly reduced RV systolic function.   -above was present prior to PE so doubt PE is triggering    Acute Hypoxic Respiratory Failure   COPD exacerbation: resolved.   MSSA PNA   -Reccurent aspiration pneumonia with oropharyngeal dysphagia in setting of polychondritis  -Tracheal stenosis SP dilation (history of trach s/p revision in 2021  -S/p intubation 1/17, self-extubation 1/17, BiPAP/HFNC  -Bronch cx (+) MSSA (1.17)  Bronch lavage path with neutrophils and fungal organisms +GMS c/f aspergillus (1.10) , galactomannan negative, Fungitell >500  -S/p Prednisone taper  -S/p Cefazolin IV course for MSSA (completed 1/29)  -Completed Zosyn per ID.  -as per ID unlikely fungal infection      Severe Oropharyngeal Dysphagia:   -SLP evaluations appreciated: pt has irreversible severe oropharyngeal dysphagia with aspiration of all consistencies, recommending PEG   -Patient refusing NGT, currently wishes to pursue modified diet and will consider PEG if fails, pt accepts risk of aspiration  -F/u ENT and GI as o/p  -Puree diet with supraglottic swallow device   -Aspiration precautions, HOB >45 deg    Acute on Chronic HFmrEF (Stable)  CAD  -Cardio eval appreciated: recommend SGLT2i on discharge, continue Losartan and Metoprolol succinate  -Hold home Lasix and Losartan due to soft BP  -Continue Metoprolol and Lipitor.     Polychondritis and Tracheal Stenosis   -Cellcept 1000mg BID, resume Hydroxychloroquine.     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

## 2025-02-09 LAB
ALBUMIN SERPL ELPH-MCNC: 3 G/DL — LOW (ref 3.5–5.2)
ALP SERPL-CCNC: 102 U/L — SIGNIFICANT CHANGE UP (ref 30–115)
ALT FLD-CCNC: 12 U/L — SIGNIFICANT CHANGE UP (ref 0–41)
ANION GAP SERPL CALC-SCNC: 13 MMOL/L — SIGNIFICANT CHANGE UP (ref 7–14)
AST SERPL-CCNC: 15 U/L — SIGNIFICANT CHANGE UP (ref 0–41)
BASOPHILS # BLD AUTO: 0.02 K/UL — SIGNIFICANT CHANGE UP (ref 0–0.2)
BASOPHILS NFR BLD AUTO: 0.4 % — SIGNIFICANT CHANGE UP (ref 0–1)
BILIRUB SERPL-MCNC: 0.3 MG/DL — SIGNIFICANT CHANGE UP (ref 0.2–1.2)
BUN SERPL-MCNC: 6 MG/DL — LOW (ref 10–20)
CALCIUM SERPL-MCNC: 8 MG/DL — LOW (ref 8.4–10.5)
CHLORIDE SERPL-SCNC: 103 MMOL/L — SIGNIFICANT CHANGE UP (ref 98–110)
CO2 SERPL-SCNC: 23 MMOL/L — SIGNIFICANT CHANGE UP (ref 17–32)
CREAT SERPL-MCNC: <0.5 MG/DL — LOW (ref 0.7–1.5)
EGFR: 106 ML/MIN/1.73M2 — SIGNIFICANT CHANGE UP
EOSINOPHIL # BLD AUTO: 0.25 K/UL — SIGNIFICANT CHANGE UP (ref 0–0.7)
EOSINOPHIL NFR BLD AUTO: 5.1 % — SIGNIFICANT CHANGE UP (ref 0–8)
GLUCOSE BLDC GLUCOMTR-MCNC: 102 MG/DL — HIGH (ref 70–99)
GLUCOSE BLDC GLUCOMTR-MCNC: 115 MG/DL — HIGH (ref 70–99)
GLUCOSE BLDC GLUCOMTR-MCNC: 132 MG/DL — HIGH (ref 70–99)
GLUCOSE BLDC GLUCOMTR-MCNC: 99 MG/DL — SIGNIFICANT CHANGE UP (ref 70–99)
GLUCOSE SERPL-MCNC: 94 MG/DL — SIGNIFICANT CHANGE UP (ref 70–99)
H CAPSUL AG SPEC-ACNC: SIGNIFICANT CHANGE UP
H CAPSUL AG UR QL IA: SIGNIFICANT CHANGE UP
HCT VFR BLD CALC: 37.9 % — SIGNIFICANT CHANGE UP (ref 37–47)
HGB BLD-MCNC: 12.2 G/DL — SIGNIFICANT CHANGE UP (ref 12–16)
IMM GRANULOCYTES NFR BLD AUTO: 0.2 % — SIGNIFICANT CHANGE UP (ref 0.1–0.3)
LYMPHOCYTES # BLD AUTO: 0.35 K/UL — LOW (ref 1.2–3.4)
LYMPHOCYTES # BLD AUTO: 7.2 % — LOW (ref 20.5–51.1)
MAGNESIUM SERPL-MCNC: 1.6 MG/DL — LOW (ref 1.8–2.4)
MCHC RBC-ENTMCNC: 29.2 PG — SIGNIFICANT CHANGE UP (ref 27–31)
MCHC RBC-ENTMCNC: 32.2 G/DL — SIGNIFICANT CHANGE UP (ref 32–37)
MCV RBC AUTO: 90.7 FL — SIGNIFICANT CHANGE UP (ref 81–99)
MONOCYTES # BLD AUTO: 0.44 K/UL — SIGNIFICANT CHANGE UP (ref 0.1–0.6)
MONOCYTES NFR BLD AUTO: 9 % — SIGNIFICANT CHANGE UP (ref 1.7–9.3)
NEUTROPHILS # BLD AUTO: 3.8 K/UL — SIGNIFICANT CHANGE UP (ref 1.4–6.5)
NEUTROPHILS NFR BLD AUTO: 78.1 % — HIGH (ref 42.2–75.2)
NRBC # BLD: 0 /100 WBCS — SIGNIFICANT CHANGE UP (ref 0–0)
NRBC BLD-RTO: 0 /100 WBCS — SIGNIFICANT CHANGE UP (ref 0–0)
PHOSPHATE SERPL-MCNC: 3.2 MG/DL — SIGNIFICANT CHANGE UP (ref 2.1–4.9)
PLATELET # BLD AUTO: 232 K/UL — SIGNIFICANT CHANGE UP (ref 130–400)
PMV BLD: 10.1 FL — SIGNIFICANT CHANGE UP (ref 7.4–10.4)
POTASSIUM SERPL-MCNC: 3.9 MMOL/L — SIGNIFICANT CHANGE UP (ref 3.5–5)
POTASSIUM SERPL-SCNC: 3.9 MMOL/L — SIGNIFICANT CHANGE UP (ref 3.5–5)
PROT SERPL-MCNC: 4.6 G/DL — LOW (ref 6–8)
RBC # BLD: 4.18 M/UL — LOW (ref 4.2–5.4)
RBC # FLD: 16.2 % — HIGH (ref 11.5–14.5)
SODIUM SERPL-SCNC: 139 MMOL/L — SIGNIFICANT CHANGE UP (ref 135–146)
WBC # BLD: 4.87 K/UL — SIGNIFICANT CHANGE UP (ref 4.8–10.8)
WBC # FLD AUTO: 4.87 K/UL — SIGNIFICANT CHANGE UP (ref 4.8–10.8)

## 2025-02-09 PROCEDURE — 99232 SBSQ HOSP IP/OBS MODERATE 35: CPT

## 2025-02-09 RX ADMIN — MYCOPHENOLATE MOFETIL 1000 MILLIGRAM(S): 200 POWDER, FOR SUSPENSION ORAL at 17:27

## 2025-02-09 RX ADMIN — Medication 1 LOZENGE: at 15:14

## 2025-02-09 RX ADMIN — ACETAMINOPHEN, DIPHENHYDRAMINE HCL, PHENYLEPHRINE HCL 3 MILLIGRAM(S): 325; 25; 5 TABLET ORAL at 21:39

## 2025-02-09 RX ADMIN — Medication 25 MILLIGRAM(S): at 05:10

## 2025-02-09 RX ADMIN — ACETAMINOPHEN 650 MILLIGRAM(S): 160 SUSPENSION ORAL at 04:47

## 2025-02-09 RX ADMIN — APIXABAN 10 MILLIGRAM(S): 5 TABLET, FILM COATED ORAL at 10:42

## 2025-02-09 RX ADMIN — IPRATROPIUM BROMIDE AND ALBUTEROL SULFATE 3 MILLILITER(S): .5; 2.5 SOLUTION RESPIRATORY (INHALATION) at 15:00

## 2025-02-09 RX ADMIN — Medication 250 MILLIGRAM(S): at 05:09

## 2025-02-09 RX ADMIN — IPRATROPIUM BROMIDE AND ALBUTEROL SULFATE 3 MILLILITER(S): .5; 2.5 SOLUTION RESPIRATORY (INHALATION) at 11:54

## 2025-02-09 RX ADMIN — IPRATROPIUM BROMIDE AND ALBUTEROL SULFATE 3 MILLILITER(S): .5; 2.5 SOLUTION RESPIRATORY (INHALATION) at 19:24

## 2025-02-09 RX ADMIN — IPRATROPIUM BROMIDE AND ALBUTEROL SULFATE 3 MILLILITER(S): .5; 2.5 SOLUTION RESPIRATORY (INHALATION) at 08:29

## 2025-02-09 RX ADMIN — Medication 25 MILLIGRAM(S): at 21:39

## 2025-02-09 RX ADMIN — APIXABAN 10 MILLIGRAM(S): 5 TABLET, FILM COATED ORAL at 21:39

## 2025-02-09 RX ADMIN — Medication 1 LOZENGE: at 20:29

## 2025-02-09 RX ADMIN — Medication 250 MILLIGRAM(S): at 17:28

## 2025-02-09 RX ADMIN — ACETAMINOPHEN 650 MILLIGRAM(S): 160 SUSPENSION ORAL at 05:17

## 2025-02-09 RX ADMIN — ANTISEPTIC SURGICAL SCRUB 1 APPLICATION(S): 0.04 SOLUTION TOPICAL at 11:11

## 2025-02-09 RX ADMIN — ATORVASTATIN CALCIUM 40 MILLIGRAM(S): 80 TABLET, FILM COATED ORAL at 21:39

## 2025-02-09 RX ADMIN — Medication 1 LOZENGE: at 05:07

## 2025-02-09 RX ADMIN — MYCOPHENOLATE MOFETIL 1000 MILLIGRAM(S): 200 POWDER, FOR SUSPENSION ORAL at 05:10

## 2025-02-09 RX ADMIN — PANTOPRAZOLE 40 MILLIGRAM(S): 20 TABLET, DELAYED RELEASE ORAL at 05:10

## 2025-02-09 RX ADMIN — ACETAMINOPHEN 650 MILLIGRAM(S): 160 SUSPENSION ORAL at 19:54

## 2025-02-09 NOTE — PROGRESS NOTE ADULT - ASSESSMENT
68 yo F PMHx HTN, HLD, GERD, CAD, COPD not on Home O2, pneumothorax, polychondritis, SCC of tongue s/p Trach/PEG (reversed in 4/2021), s/p CRT (7 sessions in 2021) who presents for evaluation of SOB for 1 week duration. Pt was admitted for AHRF 2/2 COPD exacerbation and tracheal stenosis s/p dilation by CT surgery. Required upgrade to MICU iso ARF 2/2 aspiration, s/p bronch, self extubated 1/17, transitioned to NC in SDU. SDU course complicated by severe pharyngeal dysphagia with recurrent aspiration s/p FEES/MBS, after several GOC discussions patient is currently on puree diet with swallow device as patient is refusing NGT and prefers to trial modified diet and outpatient therapy before considering PEG. On floors pt weaned to RA, was planning dispo however course then complicated by thrombocytopenia (+)PF4, acute extensive b/l LE DVTs due HIT, likely type 2 and now with hypoxia and tachycardia concerning for PE. s/p CTA PE protocol with acute nonocclusive PE extending bifurcation of left main PA distally into a few upper and lower lobe segments to subsegmental, small RLL segmental/subsegmental PE, no RH strain; also notable b/l increasing GGO/patchy opacities may be infectious, new nodule. Pt subsequently downgraded to telemetry    Sore throat  Ear ache  - RVP negative  - pt reports this happeneds frequently when air is dry  - lozenge prn    Acute Pulmonary embolism:   Acute Extensive b/l LE DVT due to HIT  Heparin induced Thrombocytopenia:   -PLT slowly dropping on admission 300s -> 75 -> 80 ->108>180  -Duplex 1/20 (-)  -Duplex 1/29 (+) w/ acute right femoral, popliteal, posterior tibial, and peroneal veins; acute DVT in the left popliteal, posterior tibial, and peroneal veins  -CTA PE protocol: Acute non-occulsive PE extending bifurcation of left main PA distally into a few upper and lower lobe segments to subsegmental, small RLL segmental/subsegmental PE, no RH strain; also notable b/l increasing GGO/patchy opacities may be infectious, new nodule  -Repeat TTE grossly unchanged from prior, hyperdynamic LV function 60-65%  -T4 score high probability, PF4 (+) SRO negative  -Started on Fondaparinux, switch to Eliquis 10mg BID for 7 days then 5mg BID per hematology.     Sinus tachycardia  -likely from PE  -pt asymptomatic  -metoprolol previously increased, HR remains controlled  -echo done, shows  impaired relaxation pattern of left ventricular myocardial filling (Grade I diastolic dysfunction).  Mildly enlarged right ventricle.Mildly reduced RV systolic function.   -above was present prior to PE so doubt PE is triggering    Acute Hypoxic Respiratory Failure   COPD exacerbation: resolved.   MSSA PNA   -Reccurent aspiration pneumonia with oropharyngeal dysphagia in setting of polychondritis  -Tracheal stenosis SP dilation (history of trach s/p revision in 2021  -S/p intubation 1/17, self-extubation 1/17, BiPAP/HFNC  -Bronch cx (+) MSSA (1.17)  Bronch lavage path with neutrophils and fungal organisms +GMS c/f aspergillus (1.10) , galactomannan negative, Fungitell >500  -S/p Prednisone taper  -S/p Cefazolin IV course for MSSA (completed 1/29)  -Completed Zosyn per ID.  -as per ID unlikely fungal infection      Severe Oropharyngeal Dysphagia:   -SLP evaluations appreciated: pt has irreversible severe oropharyngeal dysphagia with aspiration of all consistencies, recommending PEG   -Patient refusing NGT, currently wishes to pursue modified diet and will consider PEG if fails, pt accepts risk of aspiration  -F/u ENT and GI as o/p  -Puree diet with supraglottic swallow device   -Aspiration precautions, HOB >45 deg    Acute on Chronic HFmrEF (Stable)  CAD  -Cardio eval appreciated: recommend SGLT2i on discharge, continue Losartan and Metoprolol succinate  -Hold home Lasix and Losartan due to soft BP  -Continue Metoprolol and Lipitor.     Polychondritis and Tracheal Stenosis   -Cellcept 1000mg BID, resume Hydroxychloroquine.     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

## 2025-02-09 NOTE — PROGRESS NOTE ADULT - SUBJECTIVE AND OBJECTIVE BOX
CHIEF COMPLAINT:    Patient is a 69y old  Female who presents with a chief complaint of sob     INTERVAL HPI/OVERNIGHT EVENTS:    Patient seen and examined at bedside. No acute overnight events occurred.    ROS: Denies sore throat. All other systems are negative.    Medications:  Standing  albuterol/ipratropium for Nebulization 3 milliLiter(s) Nebulizer every 20 minutes  albuterol/ipratropium for Nebulization 3 milliLiter(s) Nebulizer every 4 hours  apixaban 10 milliGRAM(s) Oral every 12 hours  atorvastatin 40 milliGRAM(s) Oral at bedtime  benzocaine 20% Spray 1 Spray(s) Topical three times a day  chlorhexidine 2% Cloths 1 Application(s) Topical daily  dextrose 5%. 1000 milliLiter(s) IV Continuous <Continuous>  dextrose 5%. 1000 milliLiter(s) IV Continuous <Continuous>  dextrose 50% Injectable 25 Gram(s) IV Push once  dextrose 50% Injectable 12.5 Gram(s) IV Push once  dextrose 50% Injectable 25 Gram(s) IV Push once  glucagon  Injectable 1 milliGRAM(s) IntraMuscular once  insulin lispro (ADMELOG) corrective regimen sliding scale   SubCutaneous two times a day before meals  melatonin 3 milliGRAM(s) Oral at bedtime  metoprolol tartrate 25 milliGRAM(s) Oral every 8 hours  mycophenolate mofetil 1000 milliGRAM(s) Oral two times a day  pantoprazole    Tablet 40 milliGRAM(s) Oral before breakfast  saccharomyces boulardii 250 milliGRAM(s) Oral two times a day    PRN Meds  acetaminophen     Tablet .. 650 milliGRAM(s) Oral every 6 hours PRN  benzocaine/menthol Lozenge 1 Lozenge Oral three times a day PRN  dextrose Oral Gel 15 Gram(s) Oral once PRN  hydrOXYzine hydrochloride 25 milliGRAM(s) Oral every 12 hours PRN        Vital Signs:    T(F): 98.7 (25 @ 05:16), Max: 98.7 (25 @ 05:16)  HR: 81 (25 @ 05:16) (81 - 85)  BP: 111/74 (25 @ 05:16) (94/56 - 111/74)  RR: 17 (25 @ 05:16) (17 - 18)  SpO2: 97% (25 @ 07:37) (97% - 97%)  I&O's Summary    2025 07:01  -  2025 07:00  --------------------------------------------------------  IN: 240 mL / OUT: 720 mL / NET: -480 mL    2025 07:01  -  2025 12:51  --------------------------------------------------------  IN: 120 mL / OUT: 0 mL / NET: 120 mL      Daily     Daily Weight in k (2025 05:16)  CAPILLARY BLOOD GLUCOSE      POCT Blood Glucose.: 99 mg/dL (2025 11:32)  POCT Blood Glucose.: 102 mg/dL (2025 07:40)  POCT Blood Glucose.: 126 mg/dL (2025 21:40)  POCT Blood Glucose.: 113 mg/dL (2025 16:12)      PHYSICAL EXAM:  GENERAL:  NAD  SKIN: No rashes or lesions  HEENT: Atraumatic. Normocephalic. Anicteric  NECK:  No JVD.   PULMONARY: Clear to ausculation bilaterally. No wheezing. No rales  CVS: Normal S1, S2. Regular rate and rhythm. No murmurs.  ABDOMEN/GI: Soft, Nontender, Nondistended; Bowel sounds are present  EXTREMITIES:  No edema B/L LE.  NEUROLOGIC:  No motor deficit.  PSYCH: Alert & oriented x 3, normal affect      LABS:                        12.2   4.87  )-----------( 232      ( 2025 09:06 )             37.9         139  |  103  |  6[L]  ----------------------------<  94  3.9   |  23  |  <0.5[L]    Ca    8.0[L]      2025 09:06  Phos  3.2       Mg     1.6         TPro  4.6[L]  /  Alb  3.0[L]  /  TBili  0.3  /  DBili  x   /  AST  15  /  ALT  12  /  AlkPhos  102                RADIOLOGY & ADDITIONAL TESTS:  Imaging or report Personally Reviewed:  [ ] YES  [ ] NO -->no new images    Telemetry reviewed independently - NSR, no acute events  EKG reviewed independently -->no new EKGs    Consultant(s) Notes Reviewed:  [ ] YES  [ ] NO  Care Discussed with Consultants/Other Providers [ ] YES  [ ] NO    Case discussed with resident  Care discussed with pt

## 2025-02-09 NOTE — PROGRESS NOTE ADULT - SUBJECTIVE AND OBJECTIVE BOX
SUBJECTIVE/OVERNIGHT EVENTS  Today is hospital day 30d. This morning patient was seen and examined at bedside, resting comfortably in bed. No acute or major events overnight.      CODE STATUS:      PMH:  PVD (peripheral vascular disease)    HTN (hypertension)    Hypercholesterolemia    GERD (gastroesophageal reflux disease)    Chronic polychondritis    Pulmonary nodule    CAD (coronary artery disease)    CAD (coronary artery disease)    COPD (chronic obstructive pulmonary disease)    H/O gastroesophageal reflux (GERD)    Relapsing polychondritis    Female cystocele    H/O pneumothorax          PSH:  S/P GEETHA-BSO    S/P colon resection    Vocal cord polyps    Pulmonary nodule    History of bladder surgery    H/O breast biopsy          MEDICATIONS  STANDING MEDICATIONS  albuterol/ipratropium for Nebulization 3 milliLiter(s) Nebulizer every 20 minutes  albuterol/ipratropium for Nebulization 3 milliLiter(s) Nebulizer every 4 hours  apixaban 10 milliGRAM(s) Oral every 12 hours  atorvastatin 40 milliGRAM(s) Oral at bedtime  benzocaine 20% Spray 1 Spray(s) Topical three times a day  chlorhexidine 2% Cloths 1 Application(s) Topical daily  dextrose 5%. 1000 milliLiter(s) IV Continuous <Continuous>  dextrose 5%. 1000 milliLiter(s) IV Continuous <Continuous>  dextrose 50% Injectable 25 Gram(s) IV Push once  dextrose 50% Injectable 12.5 Gram(s) IV Push once  dextrose 50% Injectable 25 Gram(s) IV Push once  glucagon  Injectable 1 milliGRAM(s) IntraMuscular once  insulin lispro (ADMELOG) corrective regimen sliding scale   SubCutaneous two times a day before meals  melatonin 3 milliGRAM(s) Oral at bedtime  metoprolol tartrate 25 milliGRAM(s) Oral every 8 hours  mycophenolate mofetil 1000 milliGRAM(s) Oral two times a day  pantoprazole    Tablet 40 milliGRAM(s) Oral before breakfast  saccharomyces boulardii 250 milliGRAM(s) Oral two times a day    PRN MEDICATIONS  acetaminophen     Tablet .. 650 milliGRAM(s) Oral every 6 hours PRN  benzocaine/menthol Lozenge 1 Lozenge Oral three times a day PRN  dextrose Oral Gel 15 Gram(s) Oral once PRN  hydrOXYzine hydrochloride 25 milliGRAM(s) Oral every 12 hours PRN    VITALS  T(F): 97.6 (02-09-25 @ 13:17), Max: 98.7 (02-09-25 @ 05:16)  HR: 82 (02-09-25 @ 13:17) (81 - 82)  BP: 101/63 (02-09-25 @ 13:17) (94/56 - 111/74)  RR: 18 (02-09-25 @ 13:17) (17 - 18)  SpO2: 97% (02-09-25 @ 07:37) (97% - 97%)  POCT Blood Glucose.: 99 mg/dL (02-09-25 @ 11:32)  POCT Blood Glucose.: 102 mg/dL (02-09-25 @ 07:40)  POCT Blood Glucose.: 126 mg/dL (02-08-25 @ 21:40)  POCT Blood Glucose.: 113 mg/dL (02-08-25 @ 16:12)    PHYSICAL EXAM  GENERAL  ( x ) NAD, lying in bed comfortably     (  ) obtunded     (  ) lethargic     (  ) somnolent    HEAD  ( x ) Atraumatic     (  ) hematoma     (  ) laceration (specify location:       )     NECK  ( x ) Supple     (  ) neck stiffness     (  ) nuchal rigidity     (  )  no JVD     (  ) JVD present ( -- cm)    HEART  Rate -->  ( x ) normal rate    (  ) bradycardic    (  ) tachycardic  Rhythm -->  ( x ) regular    (  ) regularly irregular    (  ) irregularly irregular  Murmurs -->  ( x ) normal s1/s2    (  ) systolic murmur    (  ) diastolic murmur    (  ) continuous murmur     (  ) S3 present    (  ) S4 present    LUNGS  ( x )Unlabored respirations     (  ) tachypnea  ( x ) B/L air entry     (  ) decreased breath sounds in:  (location     )    ( x ) no adventitious sound     (  ) crackles     (  ) wheezing      (  ) rhonchi      (specify location:       )  (  ) chest wall tenderness (specify location:       )    ABDOMEN  ( x ) Soft     (  ) tense   |   ( x ) nondistended     (  ) distended   |   (  ) +BS     (  ) hypoactive bowel sounds     (  ) hyperactive bowel sounds  ( x ) nontender     (  ) RUQ tenderness     (  ) RLQ tenderness     (  ) LLQ tenderness     (  ) epigastric tenderness     (  ) diffuse tenderness  (  ) Splenomegaly      (  ) Hepatomegaly      (  ) Jaundice     (  ) ecchymosis     EXTREMITIES  (x  ) Normal     (  ) Rash     (  ) ecchymosis     (  ) varicose veins      (  ) pitting edema     (  ) non-pitting edema   (  ) ulceration     (  ) gangrene:     (location:     )    NERVOUS SYSTEM  ( x ) A&Ox3     (  ) confused     (  ) lethargic  CN II-XII:     (  ) Intact     (  ) focal deficits  (Specify:     )   Upper extremities:     (  ) strength X/5     (  ) focal deficit (specify:    )  Lower extremities:     (  ) strength  X/5    (  ) focal deficit (specify:    )      LABS             12.2   4.87  )-----------( 232      ( 02-09-25 @ 09:06 )             37.9     139  |  103  |  6   -------------------------<  94   02-09-25 @ 09:06  3.9  |  23  |  <0.5    Ca      8.0     02-09-25 @ 09:06  Phos   3.2     02-09-25 @ 09:06  Mg     1.6     02-09-25 @ 09:06    TPro  4.6  /  Alb  3.0  /  TBili  0.3  /  DBili  x   /  AST  15  /  ALT  12  /  AlkPhos  102  /  GGT  x     02-09-25 @ 09:06        Urinalysis Basic - ( 09 Feb 2025 09:06 )    Color: x / Appearance: x / SG: x / pH: x  Gluc: 94 mg/dL / Ketone: x  / Bili: x / Urobili: x   Blood: x / Protein: x / Nitrite: x   Leuk Esterase: x / RBC: x / WBC x   Sq Epi: x / Non Sq Epi: x / Bacteria: x          IMAGING

## 2025-02-10 VITALS
RESPIRATION RATE: 18 BRPM | DIASTOLIC BLOOD PRESSURE: 74 MMHG | SYSTOLIC BLOOD PRESSURE: 107 MMHG | HEART RATE: 93 BPM | TEMPERATURE: 97 F

## 2025-02-10 LAB
ALBUMIN SERPL ELPH-MCNC: 3.1 G/DL — LOW (ref 3.5–5.2)
ALP SERPL-CCNC: 109 U/L — SIGNIFICANT CHANGE UP (ref 30–115)
ALT FLD-CCNC: 11 U/L — SIGNIFICANT CHANGE UP (ref 0–41)
ANION GAP SERPL CALC-SCNC: 8 MMOL/L — SIGNIFICANT CHANGE UP (ref 7–14)
AST SERPL-CCNC: 14 U/L — SIGNIFICANT CHANGE UP (ref 0–41)
BASOPHILS # BLD AUTO: 0.01 K/UL — SIGNIFICANT CHANGE UP (ref 0–0.2)
BASOPHILS NFR BLD AUTO: 0.3 % — SIGNIFICANT CHANGE UP (ref 0–1)
BILIRUB SERPL-MCNC: 0.2 MG/DL — SIGNIFICANT CHANGE UP (ref 0.2–1.2)
BUN SERPL-MCNC: 10 MG/DL — SIGNIFICANT CHANGE UP (ref 10–20)
CALCIUM SERPL-MCNC: 8.4 MG/DL — SIGNIFICANT CHANGE UP (ref 8.4–10.5)
CHLORIDE SERPL-SCNC: 104 MMOL/L — SIGNIFICANT CHANGE UP (ref 98–110)
CO2 SERPL-SCNC: 28 MMOL/L — SIGNIFICANT CHANGE UP (ref 17–32)
CREAT SERPL-MCNC: 0.5 MG/DL — LOW (ref 0.7–1.5)
EGFR: 101 ML/MIN/1.73M2 — SIGNIFICANT CHANGE UP
EOSINOPHIL # BLD AUTO: 0.19 K/UL — SIGNIFICANT CHANGE UP (ref 0–0.7)
EOSINOPHIL NFR BLD AUTO: 5.9 % — SIGNIFICANT CHANGE UP (ref 0–8)
GLUCOSE BLDC GLUCOMTR-MCNC: 125 MG/DL — HIGH (ref 70–99)
GLUCOSE BLDC GLUCOMTR-MCNC: 125 MG/DL — HIGH (ref 70–99)
GLUCOSE BLDC GLUCOMTR-MCNC: 95 MG/DL — SIGNIFICANT CHANGE UP (ref 70–99)
GLUCOSE SERPL-MCNC: 100 MG/DL — HIGH (ref 70–99)
HCT VFR BLD CALC: 40.2 % — SIGNIFICANT CHANGE UP (ref 37–47)
HGB BLD-MCNC: 12.8 G/DL — SIGNIFICANT CHANGE UP (ref 12–16)
IMM GRANULOCYTES NFR BLD AUTO: 0.3 % — SIGNIFICANT CHANGE UP (ref 0.1–0.3)
LYMPHOCYTES # BLD AUTO: 0.4 K/UL — LOW (ref 1.2–3.4)
LYMPHOCYTES # BLD AUTO: 12.3 % — LOW (ref 20.5–51.1)
MAGNESIUM SERPL-MCNC: 1.6 MG/DL — LOW (ref 1.8–2.4)
MCHC RBC-ENTMCNC: 29.3 PG — SIGNIFICANT CHANGE UP (ref 27–31)
MCHC RBC-ENTMCNC: 31.8 G/DL — LOW (ref 32–37)
MCV RBC AUTO: 92 FL — SIGNIFICANT CHANGE UP (ref 81–99)
MONOCYTES # BLD AUTO: 0.32 K/UL — SIGNIFICANT CHANGE UP (ref 0.1–0.6)
MONOCYTES NFR BLD AUTO: 9.9 % — HIGH (ref 1.7–9.3)
NEUTROPHILS # BLD AUTO: 2.31 K/UL — SIGNIFICANT CHANGE UP (ref 1.4–6.5)
NEUTROPHILS NFR BLD AUTO: 71.3 % — SIGNIFICANT CHANGE UP (ref 42.2–75.2)
NRBC # BLD: 0 /100 WBCS — SIGNIFICANT CHANGE UP (ref 0–0)
NRBC BLD-RTO: 0 /100 WBCS — SIGNIFICANT CHANGE UP (ref 0–0)
PHOSPHATE SERPL-MCNC: 3.7 MG/DL — SIGNIFICANT CHANGE UP (ref 2.1–4.9)
PLATELET # BLD AUTO: 261 K/UL — SIGNIFICANT CHANGE UP (ref 130–400)
PMV BLD: 9.7 FL — SIGNIFICANT CHANGE UP (ref 7.4–10.4)
POTASSIUM SERPL-MCNC: 4.7 MMOL/L — SIGNIFICANT CHANGE UP (ref 3.5–5)
POTASSIUM SERPL-SCNC: 4.7 MMOL/L — SIGNIFICANT CHANGE UP (ref 3.5–5)
PROT SERPL-MCNC: 4.9 G/DL — LOW (ref 6–8)
RBC # BLD: 4.37 M/UL — SIGNIFICANT CHANGE UP (ref 4.2–5.4)
RBC # FLD: 16 % — HIGH (ref 11.5–14.5)
SODIUM SERPL-SCNC: 140 MMOL/L — SIGNIFICANT CHANGE UP (ref 135–146)
WBC # BLD: 3.24 K/UL — LOW (ref 4.8–10.8)
WBC # FLD AUTO: 3.24 K/UL — LOW (ref 4.8–10.8)

## 2025-02-10 PROCEDURE — 99238 HOSP IP/OBS DSCHRG MGMT 30/<: CPT

## 2025-02-10 RX ORDER — HYDROXYCHLOROQUINE SULFATE 200 MG/1
1 TABLET, FILM COATED ORAL
Refills: 0 | DISCHARGE

## 2025-02-10 RX ORDER — APIXABAN 5 MG/1
1 TABLET, FILM COATED ORAL
Qty: 60 | Refills: 0
Start: 2025-02-10 | End: 2025-03-11

## 2025-02-10 RX ORDER — METOPROLOL SUCCINATE 25 MG
1 TABLET, EXTENDED RELEASE 24 HR ORAL
Qty: 0 | Refills: 0 | DISCHARGE
Start: 2025-02-10

## 2025-02-10 RX ORDER — SACCHAROMYCES BOULARDII 50 MG
1 CAPSULE ORAL
Qty: 0 | Refills: 0 | DISCHARGE
Start: 2025-02-10

## 2025-02-10 RX ORDER — LOSARTAN POTASSIUM 100 MG
1 TABLET ORAL
Refills: 0 | DISCHARGE

## 2025-02-10 RX ORDER — MAGNESIUM OXIDE 400 MG
400 TABLET ORAL
Qty: 0 | Refills: 0 | DISCHARGE

## 2025-02-10 RX ORDER — MAGNESIUM SULFATE 0.8 MEQ/ML
2 AMPUL (ML) INJECTION
Refills: 0 | Status: COMPLETED | OUTPATIENT
Start: 2025-02-10 | End: 2025-02-10

## 2025-02-10 RX ADMIN — ACETAMINOPHEN 650 MILLIGRAM(S): 160 SUSPENSION ORAL at 10:05

## 2025-02-10 RX ADMIN — Medication 250 MILLIGRAM(S): at 05:29

## 2025-02-10 RX ADMIN — Medication 1 LOZENGE: at 13:11

## 2025-02-10 RX ADMIN — IPRATROPIUM BROMIDE AND ALBUTEROL SULFATE 3 MILLILITER(S): .5; 2.5 SOLUTION RESPIRATORY (INHALATION) at 13:45

## 2025-02-10 RX ADMIN — Medication 25 GRAM(S): at 09:33

## 2025-02-10 RX ADMIN — PANTOPRAZOLE 40 MILLIGRAM(S): 20 TABLET, DELAYED RELEASE ORAL at 05:29

## 2025-02-10 RX ADMIN — MYCOPHENOLATE MOFETIL 1000 MILLIGRAM(S): 200 POWDER, FOR SUSPENSION ORAL at 05:29

## 2025-02-10 RX ADMIN — MYCOPHENOLATE MOFETIL 1000 MILLIGRAM(S): 200 POWDER, FOR SUSPENSION ORAL at 17:00

## 2025-02-10 RX ADMIN — APIXABAN 10 MILLIGRAM(S): 5 TABLET, FILM COATED ORAL at 09:33

## 2025-02-10 RX ADMIN — Medication 25 GRAM(S): at 07:42

## 2025-02-10 RX ADMIN — ANTISEPTIC SURGICAL SCRUB 1 APPLICATION(S): 0.04 SOLUTION TOPICAL at 11:15

## 2025-02-10 RX ADMIN — Medication 250 MILLIGRAM(S): at 17:01

## 2025-02-10 RX ADMIN — Medication 25 MILLIGRAM(S): at 05:29

## 2025-02-10 RX ADMIN — IPRATROPIUM BROMIDE AND ALBUTEROL SULFATE 3 MILLILITER(S): .5; 2.5 SOLUTION RESPIRATORY (INHALATION) at 20:25

## 2025-02-10 NOTE — PROGRESS NOTE ADULT - PROVIDER SPECIALTY LIST ADULT
Critical Care
Critical Care
ENT
Gastroenterology
Hospitalist
Internal Medicine
Thoracic Surgery
CCU
CCU
Critical Care
ENT
Gastroenterology
Hospitalist
Internal Medicine
CCU
Critical Care
Hospitalist
Infectious Disease
Internal Medicine
Pulmonology
Thoracic Surgery
Thoracic Surgery
Critical Care
Critical Care
Infectious Disease
Palliative Care
Critical Care
Hospitalist
Infectious Disease
Hospitalist
Infectious Disease
Infectious Disease
Internal Medicine
Palliative Care

## 2025-02-10 NOTE — PROGRESS NOTE ADULT - SUBJECTIVE AND OBJECTIVE BOX
Patient is a 69y old Female who presents with a chief complaint of sob (02 Feb 2025 06:36)    No acute or major events overnight. Patient was seen at bedside. Patient is HD stable, afebrile, Saturating well on 2L NC  ROS was negative      ALLERGIES:  heparin (Other (Severe))  CONTRAST DYS (Nausea)    MEDICATIONS:  STANDING MEDICATIONS  albuterol/ipratropium for Nebulization 3 milliLiter(s) Nebulizer every 20 minutes  albuterol/ipratropium for Nebulization 3 milliLiter(s) Nebulizer every 4 hours  apixaban 10 milliGRAM(s) Oral every 12 hours  atorvastatin 40 milliGRAM(s) Oral at bedtime  benzocaine 20% Spray 1 Spray(s) Topical three times a day  chlorhexidine 2% Cloths 1 Application(s) Topical daily  dextrose 5%. 1000 milliLiter(s) IV Continuous <Continuous>  dextrose 5%. 1000 milliLiter(s) IV Continuous <Continuous>  dextrose 50% Injectable 25 Gram(s) IV Push once  dextrose 50% Injectable 12.5 Gram(s) IV Push once  dextrose 50% Injectable 25 Gram(s) IV Push once  glucagon  Injectable 1 milliGRAM(s) IntraMuscular once  insulin lispro (ADMELOG) corrective regimen sliding scale   SubCutaneous two times a day before meals  melatonin 3 milliGRAM(s) Oral at bedtime  metoprolol tartrate 25 milliGRAM(s) Oral every 8 hours  mycophenolate mofetil 1000 milliGRAM(s) Oral two times a day  pantoprazole    Tablet 40 milliGRAM(s) Oral before breakfast  saccharomyces boulardii 250 milliGRAM(s) Oral two times a day    PRN MEDICATIONS  acetaminophen     Tablet .. 650 milliGRAM(s) Oral every 6 hours PRN  benzocaine/menthol Lozenge 1 Lozenge Oral three times a day PRN  dextrose Oral Gel 15 Gram(s) Oral once PRN  hydrOXYzine hydrochloride 25 milliGRAM(s) Oral every 12 hours PRN      VITALS LAST 24H:  Vital Signs Last 24 Hrs  T(C): 36.4 (10 Feb 2025 13:00), Max: 36.4 (09 Feb 2025 20:42)  T(F): 97.6 (10 Feb 2025 13:00), Max: 97.6 (09 Feb 2025 20:42)  HR: 84 (10 Feb 2025 13:00) (81 - 89)  BP: 94/65 (10 Feb 2025 13:00) (94/65 - 145/80)  BP(mean): 102 (10 Feb 2025 05:24) (102 - 102)  RR: 18 (10 Feb 2025 13:00) (18 - 18)  SpO2: 98% (10 Feb 2025 05:24) (98% - 98%)        PHYSICAL EXAM:  GENERAL: NAD, lying in bed comfortably  CHEST/LUNG: Clear to auscultation bilaterally; on 2L NC  HEART: Regular rate and rhythm  ABDOMEN: Soft, nontender, nondistended, Bowel sounds present  EXTREMITIES:  2+ Peripheral Pulses,   NERVOUS SYSTEM:  A&Ox3, no focal deficits     LABS:                        12.8   3.24  )-----------( 261      ( 10 Feb 2025 05:35 )             40.2     02-10    140  |  104  |  10  ----------------------------<  100[H]  4.7   |  28  |  0.5[L]    Ca    8.4      10 Feb 2025 05:35  Phos  3.7     02-10  Mg     1.6     02-10    TPro  4.9[L]  /  Alb  3.1[L]  /  TBili  0.2  /  DBili  x   /  AST  14  /  ALT  11  /  AlkPhos  109  02-10      Urinalysis Basic - ( 10 Feb 2025 05:35 )    Color: x / Appearance: x / SG: x / pH: x  Gluc: 100 mg/dL / Ketone: x  / Bili: x / Urobili: x   Blood: x / Protein: x / Nitrite: x   Leuk Esterase: x / RBC: x / WBC x   Sq Epi: x / Non Sq Epi: x / Bacteria: x

## 2025-02-10 NOTE — PROGRESS NOTE ADULT - ASSESSMENT
#Acute Hypoxic Respiratory Failure due to High Risk PE, Possible Component of Stridor due to Existing Tracheal Stenosis  #Acute Extensive b/l LE DVT   #Thrombocytopenia 2/2 HIT  #low risk PE  - PLT slowly dropping on admission 300s -> 75 -> 80 ->108  - PF4 (+)  - Duplex 1/20 (-)  - Duplex 1/29 (+) w/ acute right femoral, popliteal, posterior tibial, and peroneal veins; acute DVT in the left popliteal, posterior tibial, and peroneal veins  - s/p [place]  - CTA PE protocol: Acute non-occulsive PE extending bifurcation of left main PA distally into a few upper and lower lobe segments to subsegmental, small RLL segmental/subsegmental PE, no RH strain; also notable b/l increasing GGO/patchy opacities may be infectious, new nodule  - Repeat TTE grossly unchanged from prior, hyperdynamic LV function 60-65%  - Patient started on eliquis. will be discharge on eliquis 5 BID starting 2/11    #Severe Oropharyngeal Dysphagia w/ Recurrent Aspiration due to Polychondritis and Tracheal Stenosis   - SLP evaluations appreciated: pt has irreversible severe oropharyngeal dysphagia with aspiration of all consistencies, recommending PEG   - Patient refusing NGT, currently wishes to pursue modified diet and will consider PEG if fails, pt accepts risk of aspiration  - Appreciate palliative and GI recs   - F/u ENT and GI as o/p  - C/w Puree diet with supraglottic swallow device   - C/w Aspiration precautions, HOB >45 deg    #Aspiration and MSSA PNA (Resolved)  #Hx of Polychondritis and Tracheal Stenosis   #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 2021) and CRT in 2021  #Hx of Lung Nodules  - S/p intubation 1/17, self-extubation 1/17, BiPAP/HFNC  - Bronch cx (+) MSSA (1.17)  - Bronch lavage path with neutrophils and fungal organisms +GMS c/f aspergillus (1.10) > no need for treatment per ID (galactomannan negative, unlikely clinically active)  - S/p Prednisone taper  - S/p Cefazolin IV course for MSSA (completed 1/29)  - C/w Cellcept 1000mg BID   - ID following  - Zosyn for 5 days  - D/u Pulm outpatient    #Acute on Chronic HFmrEF (Stable)  #Hx of CAD  #HTN  #HLD  - Cardio eval appreciated: recommend SGLT2i on discharge, continue Losartan and Metoprolol succinate  - Hold home Lasix and Losartan due to soft BP  - C/w Atorvastatin 40mg PO QHS   - C/w Metoprolol Tartrate    #Misc  #Code Status: DNR/Intubate  #GI ppx: Protonix  #Diet: Pureed  #Activity: AAT  #Inpatient Dispo: Discharge  #Discharge Dispo: NH

## 2025-02-12 LAB
CULTURE RESULTS: ABNORMAL
SPECIMEN SOURCE: SIGNIFICANT CHANGE UP

## 2025-02-13 ENCOUNTER — NON-APPOINTMENT (OUTPATIENT)
Age: 70
End: 2025-02-13

## 2025-02-13 ENCOUNTER — APPOINTMENT (OUTPATIENT)
Dept: OTOLARYNGOLOGY | Facility: CLINIC | Age: 70
End: 2025-02-13
Payer: MEDICARE

## 2025-02-13 VITALS — HEIGHT: 64 IN | BODY MASS INDEX: 26.29 KG/M2 | WEIGHT: 154 LBS

## 2025-02-13 DIAGNOSIS — C01 MALIGNANT NEOPLASM OF BASE OF TONGUE: ICD-10-CM

## 2025-02-13 DIAGNOSIS — C77.0 SECONDARY AND UNSPECIFIED MALIGNANT NEOPLASM OF LYMPH NODES OF HEAD, FACE AND NECK: ICD-10-CM

## 2025-02-13 DIAGNOSIS — R13.12 DYSPHAGIA, OROPHARYNGEAL PHASE: ICD-10-CM

## 2025-02-13 DIAGNOSIS — E04.1 NONTOXIC SINGLE THYROID NODULE: ICD-10-CM

## 2025-02-13 PROCEDURE — 31575 DIAGNOSTIC LARYNGOSCOPY: CPT

## 2025-02-13 PROCEDURE — 99214 OFFICE O/P EST MOD 30 MIN: CPT | Mod: 25

## 2025-02-19 DIAGNOSIS — R73.03 PREDIABETES: ICD-10-CM

## 2025-02-19 DIAGNOSIS — E87.20 ACIDOSIS, UNSPECIFIED: ICD-10-CM

## 2025-02-19 DIAGNOSIS — J43.9 EMPHYSEMA, UNSPECIFIED: ICD-10-CM

## 2025-02-19 DIAGNOSIS — I82.433 ACUTE EMBOLISM AND THROMBOSIS OF POPLITEAL VEIN, BILATERAL: ICD-10-CM

## 2025-02-19 DIAGNOSIS — J44.1 CHRONIC OBSTRUCTIVE PULMONARY DISEASE WITH (ACUTE) EXACERBATION: ICD-10-CM

## 2025-02-19 DIAGNOSIS — R19.7 DIARRHEA, UNSPECIFIED: ICD-10-CM

## 2025-02-19 DIAGNOSIS — I25.10 ATHEROSCLEROTIC HEART DISEASE OF NATIVE CORONARY ARTERY WITHOUT ANGINA PECTORIS: ICD-10-CM

## 2025-02-19 DIAGNOSIS — K21.9 GASTRO-ESOPHAGEAL REFLUX DISEASE WITHOUT ESOPHAGITIS: ICD-10-CM

## 2025-02-19 DIAGNOSIS — Z85.810 PERSONAL HISTORY OF MALIGNANT NEOPLASM OF TONGUE: ICD-10-CM

## 2025-02-19 DIAGNOSIS — I50.23 ACUTE ON CHRONIC SYSTOLIC (CONGESTIVE) HEART FAILURE: ICD-10-CM

## 2025-02-19 DIAGNOSIS — I82.443 ACUTE EMBOLISM AND THROMBOSIS OF TIBIAL VEIN, BILATERAL: ICD-10-CM

## 2025-02-19 DIAGNOSIS — I26.99 OTHER PULMONARY EMBOLISM WITHOUT ACUTE COR PULMONALE: ICD-10-CM

## 2025-02-19 DIAGNOSIS — R00.0 TACHYCARDIA, UNSPECIFIED: ICD-10-CM

## 2025-02-19 DIAGNOSIS — H92.09 OTALGIA, UNSPECIFIED EAR: ICD-10-CM

## 2025-02-19 DIAGNOSIS — I82.411 ACUTE EMBOLISM AND THROMBOSIS OF RIGHT FEMORAL VEIN: ICD-10-CM

## 2025-02-19 DIAGNOSIS — D84.9 IMMUNODEFICIENCY, UNSPECIFIED: ICD-10-CM

## 2025-02-19 DIAGNOSIS — J95.5 POSTPROCEDURAL SUBGLOTTIC STENOSIS: ICD-10-CM

## 2025-02-19 DIAGNOSIS — E78.5 HYPERLIPIDEMIA, UNSPECIFIED: ICD-10-CM

## 2025-02-19 DIAGNOSIS — D75.829 HEPARIN-INDUCED THROMBOCYTOPENIA, UNSPECIFIED: ICD-10-CM

## 2025-02-19 DIAGNOSIS — I82.453 ACUTE EMBOLISM AND THROMBOSIS OF PERONEAL VEIN, BILATERAL: ICD-10-CM

## 2025-02-19 DIAGNOSIS — I11.0 HYPERTENSIVE HEART DISEASE WITH HEART FAILURE: ICD-10-CM

## 2025-02-19 DIAGNOSIS — J69.0 PNEUMONITIS DUE TO INHALATION OF FOOD AND VOMIT: ICD-10-CM

## 2025-02-19 DIAGNOSIS — J96.01 ACUTE RESPIRATORY FAILURE WITH HYPOXIA: ICD-10-CM

## 2025-02-19 DIAGNOSIS — J15.211 PNEUMONIA DUE TO METHICILLIN SUSCEPTIBLE STAPHYLOCOCCUS AUREUS: ICD-10-CM

## 2025-02-19 DIAGNOSIS — R06.02 SHORTNESS OF BREATH: ICD-10-CM

## 2025-02-19 DIAGNOSIS — J39.8 OTHER SPECIFIED DISEASES OF UPPER RESPIRATORY TRACT: ICD-10-CM

## 2025-02-19 DIAGNOSIS — R13.12 DYSPHAGIA, OROPHARYNGEAL PHASE: ICD-10-CM

## 2025-02-19 DIAGNOSIS — M94.1 RELAPSING POLYCHONDRITIS: ICD-10-CM

## 2025-02-19 DIAGNOSIS — Z91.041 RADIOGRAPHIC DYE ALLERGY STATUS: ICD-10-CM

## 2025-02-19 DIAGNOSIS — Z66 DO NOT RESUSCITATE: ICD-10-CM

## 2025-02-19 DIAGNOSIS — Z85.118 PERSONAL HISTORY OF OTHER MALIGNANT NEOPLASM OF BRONCHUS AND LUNG: ICD-10-CM

## 2025-03-05 LAB
CULTURE RESULTS: SIGNIFICANT CHANGE UP
SPECIMEN SOURCE: SIGNIFICANT CHANGE UP

## 2025-03-07 ENCOUNTER — OUTPATIENT (OUTPATIENT)
Dept: OUTPATIENT SERVICES | Facility: HOSPITAL | Age: 70
LOS: 1 days | End: 2025-03-07
Payer: MEDICARE

## 2025-03-07 ENCOUNTER — APPOINTMENT (OUTPATIENT)
Age: 70
End: 2025-03-07
Payer: MEDICARE

## 2025-03-07 VITALS
TEMPERATURE: 97.9 F | DIASTOLIC BLOOD PRESSURE: 85 MMHG | SYSTOLIC BLOOD PRESSURE: 134 MMHG | HEIGHT: 64 IN | RESPIRATION RATE: 16 BRPM | BODY MASS INDEX: 27.14 KG/M2 | HEART RATE: 85 BPM | WEIGHT: 159 LBS

## 2025-03-07 DIAGNOSIS — Z90.49 ACQUIRED ABSENCE OF OTHER SPECIFIED PARTS OF DIGESTIVE TRACT: Chronic | ICD-10-CM

## 2025-03-07 DIAGNOSIS — I82.409 ACUTE EMBOLISM AND THROMBOSIS OF UNSPECIFIED DEEP VEINS OF UNSPECIFIED LOWER EXTREMITY: ICD-10-CM

## 2025-03-07 DIAGNOSIS — J44.9 CHRONIC OBSTRUCTIVE PULMONARY DISEASE, UNSPECIFIED: ICD-10-CM

## 2025-03-07 DIAGNOSIS — C01 MALIGNANT NEOPLASM OF BASE OF TONGUE: ICD-10-CM

## 2025-03-07 DIAGNOSIS — J38.1 POLYP OF VOCAL CORD AND LARYNX: Chronic | ICD-10-CM

## 2025-03-07 DIAGNOSIS — Z98.890 OTHER SPECIFIED POSTPROCEDURAL STATES: Chronic | ICD-10-CM

## 2025-03-07 DIAGNOSIS — R91.1 SOLITARY PULMONARY NODULE: Chronic | ICD-10-CM

## 2025-03-07 DIAGNOSIS — C77.0 SECONDARY AND UNSPECIFIED MALIGNANT NEOPLASM OF LYMPH NODES OF HEAD, FACE AND NECK: ICD-10-CM

## 2025-03-07 DIAGNOSIS — I82.90 ACUTE EMBOLISM AND THROMBOSIS OF UNSPECIFIED VEIN: ICD-10-CM

## 2025-03-07 DIAGNOSIS — Z90.710 ACQUIRED ABSENCE OF BOTH CERVIX AND UTERUS: Chronic | ICD-10-CM

## 2025-03-07 LAB
ALBUMIN SERPL ELPH-MCNC: 3.5 G/DL
ALP BLD-CCNC: 104 U/L
ALT SERPL-CCNC: 12 U/L
ANION GAP SERPL CALC-SCNC: 9 MMOL/L
AST SERPL-CCNC: 14 U/L
AUTO BASOPHILS #: 0.05 K/UL
AUTO BASOPHILS %: 0.6 %
AUTO EOSINOPHILS #: 0.24 K/UL
AUTO EOSINOPHILS %: 2.9 %
AUTO IMMATURE GRANULOCYTES #: 0.04 K/UL
AUTO LYMPHOCYTES #: 0.62 K/UL
AUTO LYMPHOCYTES %: 7.4 %
AUTO MONOCYTES #: 0.52 K/UL
AUTO MONOCYTES %: 6.2 %
AUTO NEUTROPHILS #: 6.88 K/UL
AUTO NEUTROPHILS %: 82.4 %
AUTO NRBC #: 0 K/UL
BILIRUB SERPL-MCNC: 0.2 MG/DL
BUN SERPL-MCNC: 9 MG/DL
CALCIUM SERPL-MCNC: 8.8 MG/DL
CHLORIDE SERPL-SCNC: 105 MMOL/L
CO2 SERPL-SCNC: 26 MMOL/L
CREAT SERPL-MCNC: 0.5 MG/DL
EGFRCR SERPLBLD CKD-EPI 2021: 100 ML/MIN/1.73M2
GLUCOSE SERPL-MCNC: 110 MG/DL
HCT VFR BLD CALC: 38.4 %
HGB BLD-MCNC: 12 G/DL
IMM GRANULOCYTES NFR BLD AUTO: 0.5 %
MAN DIFF?: NORMAL
MCHC RBC-ENTMCNC: 28.2 PG
MCHC RBC-ENTMCNC: 31.3 G/DL
MCV RBC AUTO: 90.1 FL
PLATELET # BLD AUTO: 264 K/UL
PMV BLD AUTO: 0 /100 WBCS
PMV BLD: 9.7 FL
POTASSIUM SERPL-SCNC: 3.6 MMOL/L
PROT SERPL-MCNC: 5.4 G/DL
RBC # BLD: 4.26 M/UL
RBC # FLD: 15.9 %
SODIUM SERPL-SCNC: 140 MMOL/L
WBC # FLD AUTO: 8.35 K/UL

## 2025-03-07 PROCEDURE — G2211 COMPLEX E/M VISIT ADD ON: CPT

## 2025-03-07 PROCEDURE — 99215 OFFICE O/P EST HI 40 MIN: CPT

## 2025-03-07 PROCEDURE — 86146 BETA-2 GLYCOPROTEIN ANTIBODY: CPT

## 2025-03-07 PROCEDURE — 80053 COMPREHEN METABOLIC PANEL: CPT

## 2025-03-07 PROCEDURE — 85025 COMPLETE CBC W/AUTO DIFF WBC: CPT

## 2025-03-07 PROCEDURE — 99205 OFFICE O/P NEW HI 60 MIN: CPT

## 2025-03-07 PROCEDURE — 86147 CARDIOLIPIN ANTIBODY EA IG: CPT

## 2025-03-07 RX ORDER — HYDROXYZINE HYDROCHLORIDE 25 MG/1
25 TABLET ORAL
Refills: 0 | Status: ACTIVE | COMMUNITY

## 2025-03-07 RX ORDER — OMEPRAZOLE 40 MG/1
40 CAPSULE, DELAYED RELEASE ORAL
Refills: 0 | Status: ACTIVE | COMMUNITY

## 2025-03-07 RX ORDER — METOPROLOL TARTRATE 25 MG/1
25 TABLET ORAL
Refills: 0 | Status: ACTIVE | COMMUNITY

## 2025-03-07 RX ORDER — SACCHAROMYCES BOULARDII 50 MG
250 CAPSULE ORAL
Refills: 0 | Status: ACTIVE | COMMUNITY

## 2025-03-07 RX ORDER — GLUCOSAMINE/MSM/CHONDROIT SULF 500-166.6
10 TABLET ORAL
Refills: 0 | Status: ACTIVE | COMMUNITY

## 2025-03-07 RX ORDER — MYCOPHENOLATE MOFETIL 500 MG/1
500 TABLET, FILM COATED ORAL
Refills: 0 | Status: ACTIVE | COMMUNITY

## 2025-03-07 RX ORDER — ATORVASTATIN CALCIUM 40 MG/1
40 TABLET, FILM COATED ORAL
Refills: 0 | Status: ACTIVE | COMMUNITY

## 2025-03-07 RX ORDER — ALBUTEROL SULFATE 90 UG/1
108 (90 BASE) AEROSOL, METERED RESPIRATORY (INHALATION)
Refills: 0 | Status: ACTIVE | COMMUNITY

## 2025-03-07 RX ORDER — APIXABAN 5 MG/1
5 TABLET, FILM COATED ORAL
Refills: 0 | Status: ACTIVE | COMMUNITY

## 2025-03-07 RX ORDER — OMEGA-3/DHA/EPA/FISH OIL 300-1000MG
400 CAPSULE ORAL
Refills: 0 | Status: ACTIVE | COMMUNITY

## 2025-03-07 RX ORDER — UMECLIDINIUM BROMIDE AND VILANTEROL TRIFENATATE 62.5; 25 UG/1; UG/1
62.5-25 POWDER RESPIRATORY (INHALATION)
Refills: 0 | Status: ACTIVE | COMMUNITY

## 2025-03-08 DIAGNOSIS — I82.409 ACUTE EMBOLISM AND THROMBOSIS OF UNSPECIFIED DEEP VEINS OF UNSPECIFIED LOWER EXTREMITY: ICD-10-CM

## 2025-03-09 LAB
B2 GLYCOPROT1 IGG SER-ACNC: <1.4 U/ML
B2 GLYCOPROT1 IGM SER-ACNC: <1.5 U/ML
CARDIOLIPIN IGM SER-MCNC: <1.5 MPL U/ML
CARDIOLIPIN IGM SER-MCNC: <1.6 GPL U/ML

## 2025-03-26 ENCOUNTER — RESULT REVIEW (OUTPATIENT)
Age: 70
End: 2025-03-26

## 2025-03-26 ENCOUNTER — OUTPATIENT (OUTPATIENT)
Dept: OUTPATIENT SERVICES | Facility: HOSPITAL | Age: 70
LOS: 1 days | End: 2025-03-26
Payer: MEDICARE

## 2025-03-26 DIAGNOSIS — Z90.710 ACQUIRED ABSENCE OF BOTH CERVIX AND UTERUS: Chronic | ICD-10-CM

## 2025-03-26 DIAGNOSIS — J38.1 POLYP OF VOCAL CORD AND LARYNX: Chronic | ICD-10-CM

## 2025-03-26 DIAGNOSIS — Z90.49 ACQUIRED ABSENCE OF OTHER SPECIFIED PARTS OF DIGESTIVE TRACT: Chronic | ICD-10-CM

## 2025-03-26 DIAGNOSIS — R91.1 SOLITARY PULMONARY NODULE: Chronic | ICD-10-CM

## 2025-03-26 DIAGNOSIS — D24.2 BENIGN NEOPLASM OF LEFT BREAST: ICD-10-CM

## 2025-03-26 DIAGNOSIS — Z98.890 OTHER SPECIFIED POSTPROCEDURAL STATES: Chronic | ICD-10-CM

## 2025-03-26 DIAGNOSIS — Z12.31 ENCOUNTER FOR SCREENING MAMMOGRAM FOR MALIGNANT NEOPLASM OF BREAST: ICD-10-CM

## 2025-03-26 PROCEDURE — 77063 BREAST TOMOSYNTHESIS BI: CPT | Mod: 26

## 2025-03-26 PROCEDURE — 77067 SCR MAMMO BI INCL CAD: CPT | Mod: 26

## 2025-03-26 PROCEDURE — 77063 BREAST TOMOSYNTHESIS BI: CPT

## 2025-03-26 PROCEDURE — 77067 SCR MAMMO BI INCL CAD: CPT

## 2025-04-01 ENCOUNTER — APPOINTMENT (OUTPATIENT)
Dept: BREAST CENTER | Facility: CLINIC | Age: 70
End: 2025-04-01

## 2025-04-02 ENCOUNTER — APPOINTMENT (OUTPATIENT)
Dept: CARDIOLOGY | Facility: CLINIC | Age: 70
End: 2025-04-02
Payer: MEDICARE

## 2025-04-02 ENCOUNTER — APPOINTMENT (OUTPATIENT)
Age: 70
End: 2025-04-02

## 2025-04-02 VITALS
DIASTOLIC BLOOD PRESSURE: 86 MMHG | HEIGHT: 64 IN | BODY MASS INDEX: 25.95 KG/M2 | SYSTOLIC BLOOD PRESSURE: 130 MMHG | WEIGHT: 152 LBS | HEART RATE: 78 BPM

## 2025-04-02 DIAGNOSIS — E78.5 HYPERLIPIDEMIA, UNSPECIFIED: ICD-10-CM

## 2025-04-02 DIAGNOSIS — I10 ESSENTIAL (PRIMARY) HYPERTENSION: ICD-10-CM

## 2025-04-02 DIAGNOSIS — Z86.711 PERSONAL HISTORY OF PULMONARY EMBOLISM: ICD-10-CM

## 2025-04-02 PROCEDURE — 93000 ELECTROCARDIOGRAM COMPLETE: CPT

## 2025-04-02 PROCEDURE — G2211 COMPLEX E/M VISIT ADD ON: CPT

## 2025-04-02 PROCEDURE — 99215 OFFICE O/P EST HI 40 MIN: CPT

## 2025-04-02 RX ORDER — IPRATROPIUM BROMIDE AND ALBUTEROL SULFATE 2.5; .5 MG/3ML; MG/3ML
0.5-2.5 (3) SOLUTION RESPIRATORY (INHALATION) 3 TIMES DAILY
Refills: 0 | Status: ACTIVE | COMMUNITY

## 2025-04-15 ENCOUNTER — OUTPATIENT (OUTPATIENT)
Dept: OUTPATIENT SERVICES | Facility: HOSPITAL | Age: 70
LOS: 1 days | End: 2025-04-15

## 2025-04-15 ENCOUNTER — APPOINTMENT (OUTPATIENT)
Dept: GASTROENTEROLOGY | Facility: CLINIC | Age: 70
End: 2025-04-15

## 2025-04-15 DIAGNOSIS — R91.1 SOLITARY PULMONARY NODULE: Chronic | ICD-10-CM

## 2025-04-15 DIAGNOSIS — Z98.890 OTHER SPECIFIED POSTPROCEDURAL STATES: Chronic | ICD-10-CM

## 2025-04-15 DIAGNOSIS — Z90.710 ACQUIRED ABSENCE OF BOTH CERVIX AND UTERUS: Chronic | ICD-10-CM

## 2025-04-15 DIAGNOSIS — Z90.49 ACQUIRED ABSENCE OF OTHER SPECIFIED PARTS OF DIGESTIVE TRACT: Chronic | ICD-10-CM

## 2025-04-15 DIAGNOSIS — J38.1 POLYP OF VOCAL CORD AND LARYNX: Chronic | ICD-10-CM

## 2025-04-15 DIAGNOSIS — Z00.00 ENCOUNTER FOR GENERAL ADULT MEDICAL EXAMINATION WITHOUT ABNORMAL FINDINGS: ICD-10-CM

## 2025-04-15 PROCEDURE — 99213 OFFICE O/P EST LOW 20 MIN: CPT

## 2025-04-29 ENCOUNTER — APPOINTMENT (OUTPATIENT)
Dept: PULMONOLOGY | Facility: CLINIC | Age: 70
End: 2025-04-29
Payer: MEDICARE

## 2025-04-29 ENCOUNTER — OUTPATIENT (OUTPATIENT)
Dept: OUTPATIENT SERVICES | Facility: HOSPITAL | Age: 70
LOS: 1 days | End: 2025-04-29
Payer: MEDICARE

## 2025-04-29 VITALS
HEIGHT: 64 IN | BODY MASS INDEX: 26.63 KG/M2 | WEIGHT: 156 LBS | TEMPERATURE: 97.7 F | OXYGEN SATURATION: 98 % | HEART RATE: 71 BPM | DIASTOLIC BLOOD PRESSURE: 78 MMHG | SYSTOLIC BLOOD PRESSURE: 117 MMHG

## 2025-04-29 DIAGNOSIS — J38.1 POLYP OF VOCAL CORD AND LARYNX: Chronic | ICD-10-CM

## 2025-04-29 DIAGNOSIS — Z90.49 ACQUIRED ABSENCE OF OTHER SPECIFIED PARTS OF DIGESTIVE TRACT: Chronic | ICD-10-CM

## 2025-04-29 DIAGNOSIS — R91.1 SOLITARY PULMONARY NODULE: Chronic | ICD-10-CM

## 2025-04-29 DIAGNOSIS — R13.12 DYSPHAGIA, OROPHARYNGEAL PHASE: ICD-10-CM

## 2025-04-29 DIAGNOSIS — Z90.710 ACQUIRED ABSENCE OF BOTH CERVIX AND UTERUS: Chronic | ICD-10-CM

## 2025-04-29 DIAGNOSIS — Z86.711 PERSONAL HISTORY OF PULMONARY EMBOLISM: ICD-10-CM

## 2025-04-29 DIAGNOSIS — J44.9 CHRONIC OBSTRUCTIVE PULMONARY DISEASE, UNSPECIFIED: ICD-10-CM

## 2025-04-29 DIAGNOSIS — J84.9 INTERSTITIAL PULMONARY DISEASE, UNSPECIFIED: ICD-10-CM

## 2025-04-29 DIAGNOSIS — Z00.00 ENCOUNTER FOR GENERAL ADULT MEDICAL EXAMINATION WITHOUT ABNORMAL FINDINGS: ICD-10-CM

## 2025-04-29 DIAGNOSIS — Z98.890 OTHER SPECIFIED POSTPROCEDURAL STATES: Chronic | ICD-10-CM

## 2025-04-29 DIAGNOSIS — R91.8 OTHER NONSPECIFIC ABNORMAL FINDING OF LUNG FIELD: ICD-10-CM

## 2025-04-29 PROCEDURE — 99214 OFFICE O/P EST MOD 30 MIN: CPT | Mod: 25,GC

## 2025-04-29 PROCEDURE — 94618 PULMONARY STRESS TESTING: CPT | Mod: 26,GC

## 2025-04-29 PROCEDURE — 99214 OFFICE O/P EST MOD 30 MIN: CPT

## 2025-04-29 RX ORDER — FLUTICASONE FUROATE, UMECLIDINIUM BROMIDE AND VILANTEROL TRIFENATATE 200; 62.5; 25 UG/1; UG/1; UG/1
200-62.5-25 POWDER RESPIRATORY (INHALATION) DAILY
Qty: 1 | Refills: 5 | Status: ACTIVE | COMMUNITY
Start: 2025-04-29 | End: 1900-01-01

## 2025-04-30 DIAGNOSIS — R91.8 OTHER NONSPECIFIC ABNORMAL FINDING OF LUNG FIELD: ICD-10-CM

## 2025-04-30 DIAGNOSIS — R13.12 DYSPHAGIA, OROPHARYNGEAL PHASE: ICD-10-CM

## 2025-04-30 DIAGNOSIS — Z86.711 PERSONAL HISTORY OF PULMONARY EMBOLISM: ICD-10-CM

## 2025-04-30 DIAGNOSIS — J44.9 CHRONIC OBSTRUCTIVE PULMONARY DISEASE, UNSPECIFIED: ICD-10-CM

## 2025-04-30 DIAGNOSIS — J84.9 INTERSTITIAL PULMONARY DISEASE, UNSPECIFIED: ICD-10-CM

## 2025-05-02 ENCOUNTER — APPOINTMENT (OUTPATIENT)
Dept: INTERNAL MEDICINE | Facility: CLINIC | Age: 70
End: 2025-05-02

## 2025-05-15 ENCOUNTER — OUTPATIENT (OUTPATIENT)
Dept: OUTPATIENT SERVICES | Facility: HOSPITAL | Age: 70
LOS: 1 days | End: 2025-05-15
Payer: MEDICARE

## 2025-05-15 ENCOUNTER — APPOINTMENT (OUTPATIENT)
Dept: INTERNAL MEDICINE | Facility: CLINIC | Age: 70
End: 2025-05-15
Payer: MEDICARE

## 2025-05-15 VITALS
BODY MASS INDEX: 25.92 KG/M2 | HEART RATE: 102 BPM | OXYGEN SATURATION: 95 % | TEMPERATURE: 97 F | WEIGHT: 151 LBS | DIASTOLIC BLOOD PRESSURE: 103 MMHG | SYSTOLIC BLOOD PRESSURE: 160 MMHG

## 2025-05-15 VITALS — DIASTOLIC BLOOD PRESSURE: 83 MMHG | SYSTOLIC BLOOD PRESSURE: 144 MMHG

## 2025-05-15 DIAGNOSIS — J38.1 POLYP OF VOCAL CORD AND LARYNX: Chronic | ICD-10-CM

## 2025-05-15 DIAGNOSIS — I10 ESSENTIAL (PRIMARY) HYPERTENSION: ICD-10-CM

## 2025-05-15 DIAGNOSIS — J84.9 INTERSTITIAL PULMONARY DISEASE, UNSPECIFIED: ICD-10-CM

## 2025-05-15 DIAGNOSIS — Z86.711 PERSONAL HISTORY OF PULMONARY EMBOLISM: ICD-10-CM

## 2025-05-15 DIAGNOSIS — E78.5 HYPERLIPIDEMIA, UNSPECIFIED: ICD-10-CM

## 2025-05-15 DIAGNOSIS — Z98.890 OTHER SPECIFIED POSTPROCEDURAL STATES: Chronic | ICD-10-CM

## 2025-05-15 DIAGNOSIS — Z90.710 ACQUIRED ABSENCE OF BOTH CERVIX AND UTERUS: Chronic | ICD-10-CM

## 2025-05-15 DIAGNOSIS — C01 MALIGNANT NEOPLASM OF BASE OF TONGUE: ICD-10-CM

## 2025-05-15 DIAGNOSIS — Z00.00 ENCOUNTER FOR GENERAL ADULT MEDICAL EXAMINATION WITHOUT ABNORMAL FINDINGS: ICD-10-CM

## 2025-05-15 DIAGNOSIS — K57.90 DIVERTICULOSIS OF INTESTINE, PART UNSPECIFIED, W/OUT PERFORATION OR ABSCESS W/OUT BLEEDING: ICD-10-CM

## 2025-05-15 DIAGNOSIS — R91.1 SOLITARY PULMONARY NODULE: Chronic | ICD-10-CM

## 2025-05-15 DIAGNOSIS — Z90.49 ACQUIRED ABSENCE OF OTHER SPECIFIED PARTS OF DIGESTIVE TRACT: Chronic | ICD-10-CM

## 2025-05-15 DIAGNOSIS — J44.9 CHRONIC OBSTRUCTIVE PULMONARY DISEASE, UNSPECIFIED: ICD-10-CM

## 2025-05-15 DIAGNOSIS — N63.0 UNSPECIFIED LUMP IN UNSPECIFIED BREAST: ICD-10-CM

## 2025-05-15 DIAGNOSIS — M81.0 AGE-RELATED OSTEOPOROSIS W/OUT CURRENT PATHOLOGICAL FRACTURE: ICD-10-CM

## 2025-05-15 PROCEDURE — G2211 COMPLEX E/M VISIT ADD ON: CPT

## 2025-05-15 PROCEDURE — 99214 OFFICE O/P EST MOD 30 MIN: CPT

## 2025-05-15 RX ORDER — LOSARTAN POTASSIUM 100 MG/1
100 TABLET, FILM COATED ORAL DAILY
Qty: 1 | Refills: 0 | Status: ACTIVE | COMMUNITY
Start: 2025-05-15 | End: 1900-01-01

## 2025-05-15 RX ORDER — APIXABAN 5 MG/1
5 TABLET, FILM COATED ORAL TWICE DAILY
Qty: 120 | Refills: 3 | Status: ACTIVE | COMMUNITY
Start: 2025-05-15 | End: 1900-01-01

## 2025-05-15 RX ORDER — ATORVASTATIN CALCIUM 40 MG/1
40 TABLET, FILM COATED ORAL
Qty: 30 | Refills: 0 | Status: ACTIVE | COMMUNITY
Start: 2025-05-15 | End: 1900-01-01

## 2025-05-15 RX ORDER — METOPROLOL TARTRATE 25 MG/1
25 TABLET ORAL
Qty: 240 | Refills: 0 | Status: ACTIVE | COMMUNITY
Start: 2025-05-15 | End: 1900-01-01

## 2025-05-17 NOTE — ED ADULT NURSE NOTE - NSFALLRISKFACTORS_ED_ALL_ED
--DO NOT REPLY - Sent from PACT - If sent to wrong pool, reroute to [facility] Reroute pool --    General Message: Patient called in again requesting a call back to go over her results.   Callback #: [phone]  Can a detailed message be left? Yes - Voicemail   Caller has been advised this message will be addressed within:1 business day [high priority]        
Called and left a voice message.  
Copied from CRM #[id number]. Topic: MW Clinical Concerns - MW Medical Question  >> May 13, 2025 11:20 AM Ange S wrote:  -- DO NOT REPLY / DO NOT REPLY ALL --  -- This inbox is not monitored. If this was sent to the wrong provider or department, reroute message to [facility] Reroute pool. --  -- Message is from Engagement Center Operations ([facility]) --    Request Result      Which Result are your requesting?  5/1/2025 lab     What is the full name of the provider that ordered the lab or test?: [facility] site name: Terrence Mckeon     Caller Information       Contact Date/Time Type Contact Phone/Fax    05/13/2025 11:19 AM CDT Phone (Incoming) [address] [phone]            Alternative phone number: No     Can a detailed message be left?: Yes - Voicemail     Patient has been advised the message will be addressed within 2-3 business days.      [address] called to check if results are available:    Patient has [employer]. Results available and patient has questions; Selected 'Wrap Up CRM' and created new Telephone Encounter after clicking 'Convert to Clinical Call'. Selected Reason for Call 'Results'.  Sent Pt message template and routed as routine priority per Clinician KB page to appropriate clinician pool. Readback full message.  
Other

## 2025-05-20 ENCOUNTER — APPOINTMENT (OUTPATIENT)
Dept: OPHTHALMOLOGY | Facility: CLINIC | Age: 70
End: 2025-05-20

## 2025-05-22 DIAGNOSIS — Z86.711 PERSONAL HISTORY OF PULMONARY EMBOLISM: ICD-10-CM

## 2025-05-22 DIAGNOSIS — E78.5 HYPERLIPIDEMIA, UNSPECIFIED: ICD-10-CM

## 2025-05-22 DIAGNOSIS — N63.0 UNSPECIFIED LUMP IN UNSPECIFIED BREAST: ICD-10-CM

## 2025-05-22 DIAGNOSIS — K57.90 DIVERTICULOSIS OF INTESTINE, PART UNSPECIFIED, WITHOUT PERFORATION OR ABSCESS WITHOUT BLEEDING: ICD-10-CM

## 2025-05-22 DIAGNOSIS — J84.9 INTERSTITIAL PULMONARY DISEASE, UNSPECIFIED: ICD-10-CM

## 2025-05-22 DIAGNOSIS — C01 MALIGNANT NEOPLASM OF BASE OF TONGUE: ICD-10-CM

## 2025-05-22 DIAGNOSIS — J44.9 CHRONIC OBSTRUCTIVE PULMONARY DISEASE, UNSPECIFIED: ICD-10-CM

## 2025-05-22 DIAGNOSIS — I10 ESSENTIAL (PRIMARY) HYPERTENSION: ICD-10-CM

## 2025-06-20 ENCOUNTER — APPOINTMENT (OUTPATIENT)
Age: 70
End: 2025-06-20
Payer: MEDICARE

## 2025-06-20 ENCOUNTER — OUTPATIENT (OUTPATIENT)
Dept: OUTPATIENT SERVICES | Facility: HOSPITAL | Age: 70
LOS: 1 days | End: 2025-06-20
Payer: MEDICARE

## 2025-06-20 VITALS
WEIGHT: 152 LBS | TEMPERATURE: 98.1 F | HEART RATE: 83 BPM | HEIGHT: 64 IN | SYSTOLIC BLOOD PRESSURE: 135 MMHG | OXYGEN SATURATION: 95 % | DIASTOLIC BLOOD PRESSURE: 81 MMHG | RESPIRATION RATE: 16 BRPM | BODY MASS INDEX: 25.95 KG/M2

## 2025-06-20 DIAGNOSIS — Z90.49 ACQUIRED ABSENCE OF OTHER SPECIFIED PARTS OF DIGESTIVE TRACT: Chronic | ICD-10-CM

## 2025-06-20 DIAGNOSIS — R91.1 SOLITARY PULMONARY NODULE: Chronic | ICD-10-CM

## 2025-06-20 DIAGNOSIS — Z98.890 OTHER SPECIFIED POSTPROCEDURAL STATES: Chronic | ICD-10-CM

## 2025-06-20 DIAGNOSIS — I82.409 ACUTE EMBOLISM AND THROMBOSIS OF UNSPECIFIED DEEP VEINS OF UNSPECIFIED LOWER EXTREMITY: ICD-10-CM

## 2025-06-20 DIAGNOSIS — Z90.710 ACQUIRED ABSENCE OF BOTH CERVIX AND UTERUS: Chronic | ICD-10-CM

## 2025-06-20 DIAGNOSIS — J38.1 POLYP OF VOCAL CORD AND LARYNX: Chronic | ICD-10-CM

## 2025-06-20 LAB
ALBUMIN SERPL ELPH-MCNC: 4.5 G/DL
ALP BLD-CCNC: 99 U/L
ALT SERPL-CCNC: 12 U/L
ANION GAP SERPL CALC-SCNC: 12 MMOL/L
AST SERPL-CCNC: 16 U/L
AUTO BASOPHILS #: 0.05 K/UL
AUTO BASOPHILS %: 0.4 %
AUTO EOSINOPHILS #: 0.06 K/UL
AUTO EOSINOPHILS %: 0.5 %
AUTO IMMATURE GRANULOCYTES #: 0.04 K/UL
AUTO LYMPHOCYTES #: 0.81 K/UL
AUTO LYMPHOCYTES %: 7.1 %
AUTO MONOCYTES #: 0.77 K/UL
AUTO MONOCYTES %: 6.7 %
AUTO NEUTROPHILS #: 9.71 K/UL
AUTO NEUTROPHILS %: 85 %
AUTO NRBC #: 0 K/UL
BILIRUB SERPL-MCNC: 0.3 MG/DL
BUN SERPL-MCNC: 13 MG/DL
CALCIUM SERPL-MCNC: 9.7 MG/DL
CHLORIDE SERPL-SCNC: 107 MMOL/L
CHOLEST SERPL-MCNC: 183 MG/DL
CO2 SERPL-SCNC: 24 MMOL/L
CREAT SERPL-MCNC: 0.8 MG/DL
DEPRECATED D DIMER PPP IA-ACNC: <150 NG/ML DDU
EGFRCR SERPLBLD CKD-EPI 2021: 79 ML/MIN/1.73M2
ESTIMATED AVERAGE GLUCOSE: 131 MG/DL
GLUCOSE SERPL-MCNC: 104 MG/DL
HBA1C MFR BLD HPLC: 6.2 %
HCT VFR BLD CALC: 37.5 %
HDLC SERPL-MCNC: 48 MG/DL
HGB BLD-MCNC: 11.7 G/DL
IMM GRANULOCYTES NFR BLD AUTO: 0.3 %
LDLC SERPL-MCNC: 84 MG/DL
MAN DIFF?: NORMAL
MCHC RBC-ENTMCNC: 26.4 PG
MCHC RBC-ENTMCNC: 31.2 G/DL
MCV RBC AUTO: 84.7 FL
NONHDLC SERPL-MCNC: 135 MG/DL
NT-PROBNP SERPL-MCNC: 291 PG/ML
PLATELET # BLD AUTO: 321 K/UL
PMV BLD AUTO: 0 /100 WBCS
PMV BLD: 10 FL
POTASSIUM SERPL-SCNC: 4.6 MMOL/L
PROT SERPL-MCNC: 6.5 G/DL
RBC # BLD: 4.43 M/UL
RBC # FLD: 14.7 %
SODIUM SERPL-SCNC: 143 MMOL/L
TRIGL SERPL-MCNC: 314 MG/DL
WBC # FLD AUTO: 11.44 K/UL

## 2025-06-20 PROCEDURE — 84443 ASSAY THYROID STIM HORMONE: CPT

## 2025-06-20 PROCEDURE — 36415 COLL VENOUS BLD VENIPUNCTURE: CPT

## 2025-06-20 PROCEDURE — 99214 OFFICE O/P EST MOD 30 MIN: CPT

## 2025-06-20 PROCEDURE — 85025 COMPLETE CBC W/AUTO DIFF WBC: CPT

## 2025-06-20 PROCEDURE — 83036 HEMOGLOBIN GLYCOSYLATED A1C: CPT

## 2025-06-20 PROCEDURE — 83880 ASSAY OF NATRIURETIC PEPTIDE: CPT

## 2025-06-20 PROCEDURE — G2211 COMPLEX E/M VISIT ADD ON: CPT

## 2025-06-20 PROCEDURE — 80053 COMPREHEN METABOLIC PANEL: CPT

## 2025-06-20 PROCEDURE — 85379 FIBRIN DEGRADATION QUANT: CPT

## 2025-06-20 PROCEDURE — 80061 LIPID PANEL: CPT

## 2025-06-21 DIAGNOSIS — I82.409 ACUTE EMBOLISM AND THROMBOSIS OF UNSPECIFIED DEEP VEINS OF UNSPECIFIED LOWER EXTREMITY: ICD-10-CM

## 2025-06-21 LAB — TSH SERPL-ACNC: 3.16 UIU/ML

## 2025-06-29 ENCOUNTER — OUTPATIENT (OUTPATIENT)
Dept: OUTPATIENT SERVICES | Facility: HOSPITAL | Age: 70
LOS: 1 days | End: 2025-06-29
Payer: MEDICARE

## 2025-06-29 DIAGNOSIS — R91.1 SOLITARY PULMONARY NODULE: Chronic | ICD-10-CM

## 2025-06-29 DIAGNOSIS — Z98.890 OTHER SPECIFIED POSTPROCEDURAL STATES: Chronic | ICD-10-CM

## 2025-06-29 DIAGNOSIS — Z00.8 ENCOUNTER FOR OTHER GENERAL EXAMINATION: ICD-10-CM

## 2025-06-29 DIAGNOSIS — Z90.710 ACQUIRED ABSENCE OF BOTH CERVIX AND UTERUS: Chronic | ICD-10-CM

## 2025-06-29 DIAGNOSIS — J84.9 INTERSTITIAL PULMONARY DISEASE, UNSPECIFIED: ICD-10-CM

## 2025-06-29 DIAGNOSIS — Z90.49 ACQUIRED ABSENCE OF OTHER SPECIFIED PARTS OF DIGESTIVE TRACT: Chronic | ICD-10-CM

## 2025-06-29 DIAGNOSIS — J38.1 POLYP OF VOCAL CORD AND LARYNX: Chronic | ICD-10-CM

## 2025-06-29 PROCEDURE — 71250 CT THORAX DX C-: CPT

## 2025-06-29 PROCEDURE — 71250 CT THORAX DX C-: CPT | Mod: 26

## 2025-06-30 DIAGNOSIS — J84.9 INTERSTITIAL PULMONARY DISEASE, UNSPECIFIED: ICD-10-CM

## 2025-07-02 ENCOUNTER — APPOINTMENT (OUTPATIENT)
Dept: PULMONOLOGY | Facility: HOSPITAL | Age: 70
End: 2025-07-02

## 2025-07-02 ENCOUNTER — OUTPATIENT (OUTPATIENT)
Dept: OUTPATIENT SERVICES | Facility: HOSPITAL | Age: 70
LOS: 1 days | End: 2025-07-02
Payer: MEDICARE

## 2025-07-02 DIAGNOSIS — Z90.49 ACQUIRED ABSENCE OF OTHER SPECIFIED PARTS OF DIGESTIVE TRACT: Chronic | ICD-10-CM

## 2025-07-02 DIAGNOSIS — Z98.890 OTHER SPECIFIED POSTPROCEDURAL STATES: Chronic | ICD-10-CM

## 2025-07-02 DIAGNOSIS — J44.9 CHRONIC OBSTRUCTIVE PULMONARY DISEASE, UNSPECIFIED: ICD-10-CM

## 2025-07-02 DIAGNOSIS — R91.1 SOLITARY PULMONARY NODULE: Chronic | ICD-10-CM

## 2025-07-02 DIAGNOSIS — J38.1 POLYP OF VOCAL CORD AND LARYNX: Chronic | ICD-10-CM

## 2025-07-02 PROCEDURE — 94729 DIFFUSING CAPACITY: CPT | Mod: 26

## 2025-07-02 PROCEDURE — 94664 DEMO&/EVAL PT USE INHALER: CPT

## 2025-07-02 PROCEDURE — 94060 EVALUATION OF WHEEZING: CPT | Mod: 26

## 2025-07-02 PROCEDURE — 94726 PLETHYSMOGRAPHY LUNG VOLUMES: CPT

## 2025-07-02 PROCEDURE — 94070 EVALUATION OF WHEEZING: CPT

## 2025-07-02 PROCEDURE — 94729 DIFFUSING CAPACITY: CPT

## 2025-07-02 PROCEDURE — 94726 PLETHYSMOGRAPHY LUNG VOLUMES: CPT | Mod: 26

## 2025-07-03 DIAGNOSIS — J44.9 CHRONIC OBSTRUCTIVE PULMONARY DISEASE, UNSPECIFIED: ICD-10-CM

## 2025-07-07 ENCOUNTER — OUTPATIENT (OUTPATIENT)
Dept: OUTPATIENT SERVICES | Facility: HOSPITAL | Age: 70
LOS: 1 days | End: 2025-07-07
Payer: MEDICARE

## 2025-07-07 DIAGNOSIS — Z90.49 ACQUIRED ABSENCE OF OTHER SPECIFIED PARTS OF DIGESTIVE TRACT: Chronic | ICD-10-CM

## 2025-07-07 DIAGNOSIS — N63.0 UNSPECIFIED LUMP IN UNSPECIFIED BREAST: ICD-10-CM

## 2025-07-07 DIAGNOSIS — Z98.890 OTHER SPECIFIED POSTPROCEDURAL STATES: Chronic | ICD-10-CM

## 2025-07-07 DIAGNOSIS — Z90.710 ACQUIRED ABSENCE OF BOTH CERVIX AND UTERUS: Chronic | ICD-10-CM

## 2025-07-07 DIAGNOSIS — R91.1 SOLITARY PULMONARY NODULE: Chronic | ICD-10-CM

## 2025-07-07 DIAGNOSIS — J38.1 POLYP OF VOCAL CORD AND LARYNX: Chronic | ICD-10-CM

## 2025-07-07 PROCEDURE — 83036 HEMOGLOBIN GLYCOSYLATED A1C: CPT

## 2025-07-07 PROCEDURE — 80061 LIPID PANEL: CPT

## 2025-07-08 ENCOUNTER — OUTPATIENT (OUTPATIENT)
Dept: OUTPATIENT SERVICES | Facility: HOSPITAL | Age: 70
LOS: 1 days | End: 2025-07-08
Payer: MEDICARE

## 2025-07-08 ENCOUNTER — APPOINTMENT (OUTPATIENT)
Dept: PULMONOLOGY | Facility: CLINIC | Age: 70
End: 2025-07-08
Payer: MEDICARE

## 2025-07-08 VITALS
SYSTOLIC BLOOD PRESSURE: 133 MMHG | HEIGHT: 64 IN | WEIGHT: 157 LBS | BODY MASS INDEX: 26.8 KG/M2 | TEMPERATURE: 97.6 F | HEART RATE: 82 BPM | DIASTOLIC BLOOD PRESSURE: 85 MMHG | OXYGEN SATURATION: 96 %

## 2025-07-08 DIAGNOSIS — N63.0 UNSPECIFIED LUMP IN UNSPECIFIED BREAST: ICD-10-CM

## 2025-07-08 DIAGNOSIS — R91.8 OTHER NONSPECIFIC ABNORMAL FINDING OF LUNG FIELD: ICD-10-CM

## 2025-07-08 DIAGNOSIS — Z98.890 OTHER SPECIFIED POSTPROCEDURAL STATES: Chronic | ICD-10-CM

## 2025-07-08 DIAGNOSIS — J38.1 POLYP OF VOCAL CORD AND LARYNX: Chronic | ICD-10-CM

## 2025-07-08 DIAGNOSIS — F17.201 NICOTINE DEPENDENCE, UNSPECIFIED, IN REMISSION: ICD-10-CM

## 2025-07-08 DIAGNOSIS — J44.9 CHRONIC OBSTRUCTIVE PULMONARY DISEASE, UNSPECIFIED: ICD-10-CM

## 2025-07-08 DIAGNOSIS — Z90.49 ACQUIRED ABSENCE OF OTHER SPECIFIED PARTS OF DIGESTIVE TRACT: Chronic | ICD-10-CM

## 2025-07-08 DIAGNOSIS — Z00.00 ENCOUNTER FOR GENERAL ADULT MEDICAL EXAMINATION WITHOUT ABNORMAL FINDINGS: ICD-10-CM

## 2025-07-08 DIAGNOSIS — R91.1 SOLITARY PULMONARY NODULE: Chronic | ICD-10-CM

## 2025-07-08 DIAGNOSIS — Z90.710 ACQUIRED ABSENCE OF BOTH CERVIX AND UTERUS: Chronic | ICD-10-CM

## 2025-07-08 PROBLEM — Z87.09 HISTORY OF INTERSTITIAL LUNG DISEASE: Status: RESOLVED | Noted: 2025-04-29 | Resolved: 2025-07-08

## 2025-07-08 PROCEDURE — 99214 OFFICE O/P EST MOD 30 MIN: CPT | Mod: GC

## 2025-07-08 PROCEDURE — 99214 OFFICE O/P EST MOD 30 MIN: CPT

## 2025-07-08 PROCEDURE — G2211 COMPLEX E/M VISIT ADD ON: CPT

## 2025-07-09 ENCOUNTER — APPOINTMENT (OUTPATIENT)
Dept: CARDIOLOGY | Facility: CLINIC | Age: 70
End: 2025-07-09
Payer: MEDICARE

## 2025-07-09 PROCEDURE — 93306 TTE W/DOPPLER COMPLETE: CPT

## 2025-07-09 PROCEDURE — 93970 EXTREMITY STUDY: CPT

## 2025-07-14 ENCOUNTER — APPOINTMENT (OUTPATIENT)
Dept: RHEUMATOLOGY | Facility: CLINIC | Age: 70
End: 2025-07-14
Payer: MEDICARE

## 2025-07-14 VITALS
HEART RATE: 82 BPM | OXYGEN SATURATION: 97 % | BODY MASS INDEX: 26.98 KG/M2 | HEIGHT: 64 IN | DIASTOLIC BLOOD PRESSURE: 98 MMHG | WEIGHT: 158 LBS | SYSTOLIC BLOOD PRESSURE: 144 MMHG | TEMPERATURE: 97.9 F

## 2025-07-14 PROCEDURE — 20605 DRAIN/INJ JOINT/BURSA W/O US: CPT | Mod: LT

## 2025-07-14 PROCEDURE — 99214 OFFICE O/P EST MOD 30 MIN: CPT | Mod: 25

## 2025-07-14 RX ORDER — MYCOPHENOLATE MOFETIL 500 MG/1
500 TABLET ORAL TWICE DAILY
Qty: 360 | Refills: 1 | Status: ACTIVE | COMMUNITY
Start: 2025-07-10 | End: 1900-01-01

## 2025-07-15 ENCOUNTER — OUTPATIENT (OUTPATIENT)
Dept: OUTPATIENT SERVICES | Facility: HOSPITAL | Age: 70
LOS: 1 days | End: 2025-07-15
Payer: MEDICARE

## 2025-07-15 ENCOUNTER — RESULT REVIEW (OUTPATIENT)
Age: 70
End: 2025-07-15

## 2025-07-15 DIAGNOSIS — Z98.890 OTHER SPECIFIED POSTPROCEDURAL STATES: Chronic | ICD-10-CM

## 2025-07-15 DIAGNOSIS — Z90.710 ACQUIRED ABSENCE OF BOTH CERVIX AND UTERUS: Chronic | ICD-10-CM

## 2025-07-15 DIAGNOSIS — Z13.820 ENCOUNTER FOR SCREENING FOR OSTEOPOROSIS: ICD-10-CM

## 2025-07-15 DIAGNOSIS — J38.1 POLYP OF VOCAL CORD AND LARYNX: Chronic | ICD-10-CM

## 2025-07-15 DIAGNOSIS — Z90.49 ACQUIRED ABSENCE OF OTHER SPECIFIED PARTS OF DIGESTIVE TRACT: Chronic | ICD-10-CM

## 2025-07-15 DIAGNOSIS — Z00.8 ENCOUNTER FOR OTHER GENERAL EXAMINATION: ICD-10-CM

## 2025-07-15 PROCEDURE — 77080 DXA BONE DENSITY AXIAL: CPT

## 2025-07-15 PROCEDURE — 77080 DXA BONE DENSITY AXIAL: CPT | Mod: 26

## 2025-07-16 DIAGNOSIS — Z13.820 ENCOUNTER FOR SCREENING FOR OSTEOPOROSIS: ICD-10-CM

## 2025-07-17 ENCOUNTER — APPOINTMENT (OUTPATIENT)
Dept: CARDIOLOGY | Facility: CLINIC | Age: 70
End: 2025-07-17
Payer: MEDICARE

## 2025-07-17 VITALS
HEIGHT: 64 IN | DIASTOLIC BLOOD PRESSURE: 70 MMHG | SYSTOLIC BLOOD PRESSURE: 130 MMHG | WEIGHT: 155 LBS | BODY MASS INDEX: 26.46 KG/M2

## 2025-07-17 PROBLEM — I82.90 VTE (VENOUS THROMBOEMBOLISM): Status: ACTIVE | Noted: 2025-06-20

## 2025-07-17 PROCEDURE — G2211 COMPLEX E/M VISIT ADD ON: CPT

## 2025-07-17 PROCEDURE — 99214 OFFICE O/P EST MOD 30 MIN: CPT

## 2025-08-06 ENCOUNTER — OUTPATIENT (OUTPATIENT)
Dept: OUTPATIENT SERVICES | Facility: HOSPITAL | Age: 70
LOS: 1 days | End: 2025-08-06
Payer: MEDICARE

## 2025-08-06 ENCOUNTER — APPOINTMENT (OUTPATIENT)
Dept: INTERNAL MEDICINE | Facility: CLINIC | Age: 70
End: 2025-08-06
Payer: MEDICARE

## 2025-08-06 VITALS
SYSTOLIC BLOOD PRESSURE: 131 MMHG | HEART RATE: 78 BPM | HEIGHT: 64 IN | OXYGEN SATURATION: 97 % | WEIGHT: 158 LBS | DIASTOLIC BLOOD PRESSURE: 88 MMHG | BODY MASS INDEX: 26.98 KG/M2 | TEMPERATURE: 97 F

## 2025-08-06 DIAGNOSIS — R91.1 SOLITARY PULMONARY NODULE: Chronic | ICD-10-CM

## 2025-08-06 DIAGNOSIS — J44.9 CHRONIC OBSTRUCTIVE PULMONARY DISEASE, UNSPECIFIED: ICD-10-CM

## 2025-08-06 DIAGNOSIS — C01 MALIGNANT NEOPLASM OF BASE OF TONGUE: ICD-10-CM

## 2025-08-06 DIAGNOSIS — I10 ESSENTIAL (PRIMARY) HYPERTENSION: ICD-10-CM

## 2025-08-06 DIAGNOSIS — Z90.49 ACQUIRED ABSENCE OF OTHER SPECIFIED PARTS OF DIGESTIVE TRACT: Chronic | ICD-10-CM

## 2025-08-06 DIAGNOSIS — Z86.718 PERSONAL HISTORY OF OTHER VENOUS THROMBOSIS AND EMBOLISM: ICD-10-CM

## 2025-08-06 DIAGNOSIS — Z90.710 ACQUIRED ABSENCE OF BOTH CERVIX AND UTERUS: Chronic | ICD-10-CM

## 2025-08-06 DIAGNOSIS — Z00.00 ENCOUNTER FOR GENERAL ADULT MEDICAL EXAMINATION WITHOUT ABNORMAL FINDINGS: ICD-10-CM

## 2025-08-06 DIAGNOSIS — R73.03 PREDIABETES.: ICD-10-CM

## 2025-08-06 DIAGNOSIS — K21.9 GASTRO-ESOPHAGEAL REFLUX DISEASE W/OUT ESOPHAGITIS: ICD-10-CM

## 2025-08-06 DIAGNOSIS — E78.5 HYPERLIPIDEMIA, UNSPECIFIED: ICD-10-CM

## 2025-08-06 DIAGNOSIS — Z98.890 OTHER SPECIFIED POSTPROCEDURAL STATES: Chronic | ICD-10-CM

## 2025-08-06 DIAGNOSIS — M25.512 PAIN IN LEFT SHOULDER: ICD-10-CM

## 2025-08-06 DIAGNOSIS — Z86.711 PERSONAL HISTORY OF PULMONARY EMBOLISM: ICD-10-CM

## 2025-08-06 PROCEDURE — 99214 OFFICE O/P EST MOD 30 MIN: CPT

## 2025-08-06 PROCEDURE — G2211 COMPLEX E/M VISIT ADD ON: CPT

## 2025-08-06 RX ORDER — OMEPRAZOLE 40 MG/1
40 CAPSULE, DELAYED RELEASE ORAL
Qty: 30 | Refills: 3 | Status: ACTIVE | COMMUNITY
Start: 2025-08-06 | End: 1900-01-01

## 2025-08-13 DIAGNOSIS — J44.9 CHRONIC OBSTRUCTIVE PULMONARY DISEASE, UNSPECIFIED: ICD-10-CM

## 2025-08-13 DIAGNOSIS — K21.9 GASTRO-ESOPHAGEAL REFLUX DISEASE WITHOUT ESOPHAGITIS: ICD-10-CM

## 2025-08-13 DIAGNOSIS — M25.512 PAIN IN LEFT SHOULDER: ICD-10-CM

## 2025-08-13 DIAGNOSIS — Z86.711 PERSONAL HISTORY OF PULMONARY EMBOLISM: ICD-10-CM

## 2025-08-13 DIAGNOSIS — E78.5 HYPERLIPIDEMIA, UNSPECIFIED: ICD-10-CM

## 2025-08-13 DIAGNOSIS — I10 ESSENTIAL (PRIMARY) HYPERTENSION: ICD-10-CM

## 2025-08-13 DIAGNOSIS — R73.03 PREDIABETES: ICD-10-CM

## 2025-08-13 DIAGNOSIS — C01 MALIGNANT NEOPLASM OF BASE OF TONGUE: ICD-10-CM

## 2025-08-14 ENCOUNTER — APPOINTMENT (OUTPATIENT)
Dept: OTOLARYNGOLOGY | Facility: CLINIC | Age: 70
End: 2025-08-14
Payer: MEDICARE

## 2025-08-14 VITALS — BODY MASS INDEX: 27.29 KG/M2 | WEIGHT: 159 LBS

## 2025-08-14 DIAGNOSIS — C77.0 SECONDARY AND UNSPECIFIED MALIGNANT NEOPLASM OF LYMPH NODES OF HEAD, FACE AND NECK: ICD-10-CM

## 2025-08-14 DIAGNOSIS — C01 MALIGNANT NEOPLASM OF BASE OF TONGUE: ICD-10-CM

## 2025-08-14 DIAGNOSIS — R13.10 DYSPHAGIA, UNSPECIFIED: ICD-10-CM

## 2025-08-14 PROCEDURE — 31575 DIAGNOSTIC LARYNGOSCOPY: CPT

## 2025-08-14 PROCEDURE — 99214 OFFICE O/P EST MOD 30 MIN: CPT | Mod: 25

## 2025-09-03 ENCOUNTER — OUTPATIENT (OUTPATIENT)
Dept: OUTPATIENT SERVICES | Facility: HOSPITAL | Age: 70
LOS: 1 days | End: 2025-09-03
Payer: MEDICARE

## 2025-09-03 ENCOUNTER — RESULT REVIEW (OUTPATIENT)
Age: 70
End: 2025-09-03

## 2025-09-03 DIAGNOSIS — M19.019 PRIMARY OSTEOARTHRITIS, UNSPECIFIED SHOULDER: ICD-10-CM

## 2025-09-03 DIAGNOSIS — J38.1 POLYP OF VOCAL CORD AND LARYNX: Chronic | ICD-10-CM

## 2025-09-03 DIAGNOSIS — Z98.890 OTHER SPECIFIED POSTPROCEDURAL STATES: Chronic | ICD-10-CM

## 2025-09-03 DIAGNOSIS — Z90.49 ACQUIRED ABSENCE OF OTHER SPECIFIED PARTS OF DIGESTIVE TRACT: Chronic | ICD-10-CM

## 2025-09-03 DIAGNOSIS — R91.1 SOLITARY PULMONARY NODULE: Chronic | ICD-10-CM

## 2025-09-03 DIAGNOSIS — Z90.710 ACQUIRED ABSENCE OF BOTH CERVIX AND UTERUS: Chronic | ICD-10-CM

## 2025-09-03 PROCEDURE — 73030 X-RAY EXAM OF SHOULDER: CPT | Mod: 26,50

## 2025-09-03 PROCEDURE — 73030 X-RAY EXAM OF SHOULDER: CPT | Mod: 50

## 2025-09-03 RX ORDER — DICLOFENAC SODIUM 20 MG/G
2 SOLUTION TOPICAL
Qty: 1 | Refills: 11 | Status: ACTIVE | COMMUNITY
Start: 2025-09-03 | End: 1900-01-01

## 2025-09-04 DIAGNOSIS — M19.019 PRIMARY OSTEOARTHRITIS, UNSPECIFIED SHOULDER: ICD-10-CM

## 2025-09-19 ENCOUNTER — NON-APPOINTMENT (OUTPATIENT)
Age: 70
End: 2025-09-19

## 2025-09-19 ENCOUNTER — APPOINTMENT (OUTPATIENT)
Age: 70
End: 2025-09-19
Payer: MEDICARE

## 2025-09-19 VITALS
BODY MASS INDEX: 29.41 KG/M2 | HEIGHT: 63 IN | RESPIRATION RATE: 16 BRPM | OXYGEN SATURATION: 94 % | TEMPERATURE: 97.8 F | WEIGHT: 166 LBS | SYSTOLIC BLOOD PRESSURE: 147 MMHG | HEART RATE: 79 BPM | DIASTOLIC BLOOD PRESSURE: 85 MMHG

## 2025-09-19 DIAGNOSIS — Z86.711 PERSONAL HISTORY OF PULMONARY EMBOLISM: ICD-10-CM

## 2025-09-19 DIAGNOSIS — C01 MALIGNANT NEOPLASM OF BASE OF TONGUE: ICD-10-CM

## 2025-09-19 DIAGNOSIS — J44.9 CHRONIC OBSTRUCTIVE PULMONARY DISEASE, UNSPECIFIED: ICD-10-CM

## 2025-09-19 DIAGNOSIS — D50.9 IRON DEFICIENCY ANEMIA, UNSPECIFIED: ICD-10-CM

## 2025-09-19 DIAGNOSIS — I25.10 ATHEROSCLEROTIC HEART DISEASE OF NATIVE CORONARY ARTERY W/OUT ANGINA PECTORIS: ICD-10-CM

## 2025-09-19 LAB
ALBUMIN SERPL ELPH-MCNC: 4.5 G/DL
ALP BLD-CCNC: 86 U/L
ALT SERPL-CCNC: 12 U/L
ANION GAP SERPL CALC-SCNC: 17 MMOL/L
AST SERPL-CCNC: 22 U/L
AUTO BASOPHILS #: 0.06 K/UL
AUTO BASOPHILS %: 0.8 %
AUTO EOSINOPHILS #: 0.08 K/UL
AUTO EOSINOPHILS %: 1 %
AUTO IMMATURE GRANULOCYTES #: 0.04 K/UL
AUTO LYMPHOCYTES #: 0.65 K/UL
AUTO LYMPHOCYTES %: 8.4 %
AUTO MONOCYTES #: 0.51 K/UL
AUTO MONOCYTES %: 6.6 %
AUTO NEUTROPHILS #: 6.4 K/UL
AUTO NEUTROPHILS %: 82.7 %
AUTO NRBC #: 0 K/UL
BILIRUB SERPL-MCNC: 0.3 MG/DL
BUN SERPL-MCNC: 16 MG/DL
CALCIUM SERPL-MCNC: 9.3 MG/DL
CHLORIDE SERPL-SCNC: 104 MMOL/L
CO2 SERPL-SCNC: 20 MMOL/L
CREAT SERPL-MCNC: 0.8 MG/DL
EGFRCR SERPLBLD CKD-EPI 2021: 79 ML/MIN/1.73M2
GLUCOSE SERPL-MCNC: 110 MG/DL
HCT VFR BLD CALC: 33 %
HGB BLD-MCNC: 10.2 G/DL
IMM GRANULOCYTES NFR BLD AUTO: 0.5 %
IRON SATN MFR SERPL: 32 %
IRON SERPL-MCNC: 105 UG/DL
MAN DIFF?: NORMAL
MCHC RBC-ENTMCNC: 25.8 PG
MCHC RBC-ENTMCNC: 30.9 G/DL
MCV RBC AUTO: 83.3 FL
PLATELET # BLD AUTO: 266 K/UL
PMV BLD AUTO: 0 /100 WBCS
PMV BLD: 9.3 FL
POTASSIUM SERPL-SCNC: 4.6 MMOL/L
PROT SERPL-MCNC: 6.4 G/DL
RBC # BLD: 3.96 M/UL
RBC # FLD: 16.2 %
SODIUM SERPL-SCNC: 141 MMOL/L
TIBC SERPL-MCNC: 329 UG/DL
UIBC SERPL-MCNC: 224 UG/DL
WBC # FLD AUTO: 7.74 K/UL

## 2025-09-19 PROCEDURE — G2211 COMPLEX E/M VISIT ADD ON: CPT

## 2025-09-19 PROCEDURE — 99214 OFFICE O/P EST MOD 30 MIN: CPT

## 2025-09-22 PROBLEM — D50.9 ANEMIA, IRON DEFICIENCY: Status: ACTIVE | Noted: 2025-09-22

## 2025-09-22 LAB — FERRITIN SERPL-MCNC: 17 NG/ML
